# Patient Record
Sex: MALE | Race: WHITE | NOT HISPANIC OR LATINO | Employment: OTHER | URBAN - METROPOLITAN AREA
[De-identification: names, ages, dates, MRNs, and addresses within clinical notes are randomized per-mention and may not be internally consistent; named-entity substitution may affect disease eponyms.]

---

## 2017-01-06 ENCOUNTER — HOSPITAL ENCOUNTER (EMERGENCY)
Facility: CLINIC | Age: 52
Discharge: HOME | End: 2017-01-06
Attending: NURSE PRACTITIONER | Admitting: NURSE PRACTITIONER
Payer: COMMERCIAL

## 2017-01-06 VITALS
RESPIRATION RATE: 18 BRPM | SYSTOLIC BLOOD PRESSURE: 118 MMHG | OXYGEN SATURATION: 94 % | HEART RATE: 79 BPM | TEMPERATURE: 97.1 F | DIASTOLIC BLOOD PRESSURE: 108 MMHG | WEIGHT: 160 LBS | HEIGHT: 67 IN | BODY MASS INDEX: 25.11 KG/M2

## 2017-01-06 DIAGNOSIS — M54.2 CERVICALGIA: ICD-10-CM

## 2017-01-06 DIAGNOSIS — F11.93 OPIOID WITHDRAWAL (H): ICD-10-CM

## 2017-01-06 DIAGNOSIS — A08.4 VIRAL GASTROENTERITIS: ICD-10-CM

## 2017-01-06 LAB
ALBUMIN SERPL-MCNC: 3.8 G/DL (ref 3.4–5)
ALP SERPL-CCNC: 80 U/L (ref 40–150)
ALT SERPL W P-5'-P-CCNC: 26 U/L (ref 0–70)
ANION GAP SERPL CALCULATED.3IONS-SCNC: 9 MMOL/L (ref 3–14)
AST SERPL W P-5'-P-CCNC: 25 U/L (ref 0–45)
BASOPHILS # BLD AUTO: 0 10E9/L (ref 0–0.2)
BASOPHILS NFR BLD AUTO: 0.1 %
BILIRUB SERPL-MCNC: 0.7 MG/DL (ref 0.2–1.3)
BUN SERPL-MCNC: 15 MG/DL (ref 7–30)
CALCIUM SERPL-MCNC: 9.7 MG/DL (ref 8.5–10.1)
CHLORIDE SERPL-SCNC: 87 MMOL/L (ref 94–109)
CO2 SERPL-SCNC: 36 MMOL/L (ref 20–32)
CREAT SERPL-MCNC: 0.8 MG/DL (ref 0.66–1.25)
DIFFERENTIAL METHOD BLD: ABNORMAL
EOSINOPHIL # BLD AUTO: 0 10E9/L (ref 0–0.7)
EOSINOPHIL NFR BLD AUTO: 0 %
ERYTHROCYTE [DISTWIDTH] IN BLOOD BY AUTOMATED COUNT: 12.8 % (ref 10–15)
GFR SERPL CREATININE-BSD FRML MDRD: ABNORMAL ML/MIN/1.7M2
GLUCOSE SERPL-MCNC: 119 MG/DL (ref 70–99)
HCT VFR BLD AUTO: 45.3 % (ref 40–53)
HGB BLD-MCNC: 15.4 G/DL (ref 13.3–17.7)
IMM GRANULOCYTES # BLD: 0 10E9/L (ref 0–0.4)
IMM GRANULOCYTES NFR BLD: 0.2 %
LIPASE SERPL-CCNC: 142 U/L (ref 73–393)
LYMPHOCYTES # BLD AUTO: 1.2 10E9/L (ref 0.8–5.3)
LYMPHOCYTES NFR BLD AUTO: 6.9 %
MCH RBC QN AUTO: 30 PG (ref 26.5–33)
MCHC RBC AUTO-ENTMCNC: 34 G/DL (ref 31.5–36.5)
MCV RBC AUTO: 88 FL (ref 78–100)
MONOCYTES # BLD AUTO: 0.7 10E9/L (ref 0–1.3)
MONOCYTES NFR BLD AUTO: 3.9 %
NEUTROPHILS # BLD AUTO: 15.9 10E9/L (ref 1.6–8.3)
NEUTROPHILS NFR BLD AUTO: 88.9 %
PLATELET # BLD AUTO: 285 10E9/L (ref 150–450)
POTASSIUM SERPL-SCNC: 3.1 MMOL/L (ref 3.4–5.3)
PROT SERPL-MCNC: 8.9 G/DL (ref 6.8–8.8)
RBC # BLD AUTO: 5.14 10E12/L (ref 4.4–5.9)
SODIUM SERPL-SCNC: 132 MMOL/L (ref 133–144)
WBC # BLD AUTO: 17.9 10E9/L (ref 4–11)

## 2017-01-06 PROCEDURE — 83690 ASSAY OF LIPASE: CPT | Performed by: NURSE PRACTITIONER

## 2017-01-06 PROCEDURE — 99285 EMERGENCY DEPT VISIT HI MDM: CPT | Mod: 25

## 2017-01-06 PROCEDURE — 96375 TX/PRO/DX INJ NEW DRUG ADDON: CPT

## 2017-01-06 PROCEDURE — 96365 THER/PROPH/DIAG IV INF INIT: CPT

## 2017-01-06 PROCEDURE — 96361 HYDRATE IV INFUSION ADD-ON: CPT

## 2017-01-06 PROCEDURE — 85025 COMPLETE CBC W/AUTO DIFF WBC: CPT | Performed by: NURSE PRACTITIONER

## 2017-01-06 PROCEDURE — 25000125 ZZHC RX 250: Performed by: NURSE PRACTITIONER

## 2017-01-06 PROCEDURE — 80053 COMPREHEN METABOLIC PANEL: CPT | Performed by: NURSE PRACTITIONER

## 2017-01-06 PROCEDURE — 25000128 H RX IP 250 OP 636: Performed by: NURSE PRACTITIONER

## 2017-01-06 PROCEDURE — 99284 EMERGENCY DEPT VISIT MOD MDM: CPT | Performed by: NURSE PRACTITIONER

## 2017-01-06 RX ORDER — LIDOCAINE 40 MG/G
CREAM TOPICAL
Status: DISCONTINUED | OUTPATIENT
Start: 2017-01-06 | End: 2017-01-06 | Stop reason: HOSPADM

## 2017-01-06 RX ORDER — HYDROMORPHONE HYDROCHLORIDE 1 MG/ML
0.5 INJECTION, SOLUTION INTRAMUSCULAR; INTRAVENOUS; SUBCUTANEOUS
Status: DISCONTINUED | OUTPATIENT
Start: 2017-01-06 | End: 2017-01-06 | Stop reason: HOSPADM

## 2017-01-06 RX ORDER — POTASSIUM CHLORIDE 1500 MG/1
20 TABLET, EXTENDED RELEASE ORAL ONCE
Status: DISCONTINUED | OUTPATIENT
Start: 2017-01-06 | End: 2017-01-06

## 2017-01-06 RX ORDER — POTASSIUM CHLORIDE 7.45 MG/ML
10 INJECTION INTRAVENOUS ONCE
Status: DISCONTINUED | OUTPATIENT
Start: 2017-01-06 | End: 2017-01-06

## 2017-01-06 RX ORDER — BUPIVACAINE HYDROCHLORIDE 2.5 MG/ML
10 INJECTION, SOLUTION INFILTRATION; PERINEURAL ONCE
Status: COMPLETED | OUTPATIENT
Start: 2017-01-06 | End: 2017-01-06

## 2017-01-06 RX ORDER — POTASSIUM CHLORIDE 7.45 MG/ML
10 INJECTION INTRAVENOUS CONTINUOUS
Status: DISCONTINUED | OUTPATIENT
Start: 2017-01-06 | End: 2017-01-06 | Stop reason: HOSPADM

## 2017-01-06 RX ORDER — ONDANSETRON 2 MG/ML
4 INJECTION INTRAMUSCULAR; INTRAVENOUS EVERY 30 MIN PRN
Status: DISCONTINUED | OUTPATIENT
Start: 2017-01-06 | End: 2017-01-06 | Stop reason: HOSPADM

## 2017-01-06 RX ORDER — SODIUM CHLORIDE 9 MG/ML
1000 INJECTION, SOLUTION INTRAVENOUS CONTINUOUS
Status: DISCONTINUED | OUTPATIENT
Start: 2017-01-06 | End: 2017-01-06 | Stop reason: HOSPADM

## 2017-01-06 RX ADMIN — ONDANSETRON HYDROCHLORIDE 4 MG: 2 SOLUTION INTRAMUSCULAR; INTRAVENOUS at 17:46

## 2017-01-06 RX ADMIN — HYDROMORPHONE HYDROCHLORIDE 0.5 MG: 1 INJECTION, SOLUTION INTRAMUSCULAR; INTRAVENOUS; SUBCUTANEOUS at 17:49

## 2017-01-06 RX ADMIN — BUPIVACAINE HYDROCHLORIDE 25 MG: 2.5 INJECTION, SOLUTION INFILTRATION; PERINEURAL at 19:22

## 2017-01-06 RX ADMIN — HYDROMORPHONE HYDROCHLORIDE 0.5 MG: 1 INJECTION, SOLUTION INTRAMUSCULAR; INTRAVENOUS; SUBCUTANEOUS at 18:18

## 2017-01-06 RX ADMIN — SODIUM CHLORIDE 1000 ML: 9 INJECTION, SOLUTION INTRAVENOUS at 19:23

## 2017-01-06 RX ADMIN — POTASSIUM CHLORIDE 10 MEQ: 7.46 INJECTION, SOLUTION INTRAVENOUS at 19:28

## 2017-01-06 RX ADMIN — SODIUM CHLORIDE 1000 ML: 9 INJECTION, SOLUTION INTRAVENOUS at 17:41

## 2017-01-06 ASSESSMENT — ENCOUNTER SYMPTOMS
NAUSEA: 1
DIARRHEA: 0
VOMITING: 1
SORE THROAT: 1
HEADACHES: 1
ABDOMINAL PAIN: 1

## 2017-01-06 NOTE — ED NOTES
Pt here with nausea and vomiting for 2-3 days and then a migraine. Unable to keep his chronic pain meds down.

## 2017-01-06 NOTE — ED PROVIDER NOTES
"  History     Chief Complaint   Patient presents with     Headache     The history is provided by the patient and a relative.     Joselito Abarca is a 51 year old male who presents to the emergency department with a headache and vomiting. He states that for the last couple of days he has been \"power vomiting\" and has been unable to keep anything down including his pain medication. His relative notes he has acid burns on his lips and mouth from how much he has vomited. Patient reports being in \"so much pain\" and has a \"killer headache\". He states that he gets trigger point injections when his head pain is this bad. Patient's last emesis was around 1100 today which is when his relative states he took nausea medication that he had forgotten that he had. Patient complains of abdominal pain and throat pain because much of his vomit has been acid. Patient says he needs IV pain medication \"in case of withdrawal\" because he has not been able to take his pain medication for over 2 days. He notes a history of ulcers but denies diarrhea or history of migraines.  Patient lives alone so no one else is sick around him.    I have reviewed the Medications, Allergies, Past Medical and Surgical History, and Social History in the Epic system.    Patient Active Problem List   Diagnosis     Generalized anxiety disorder     Alcohol abuse, in remission     Esophageal reflux     Constipation     Thoracic or lumbosacral neuritis or radiculitis, unspecified     Intervertebral cervical disc disorder with myelopathy, cervical region     CARDIOVASCULAR SCREENING; LDL GOAL LESS THAN 160     Arthrodesis status     Neck pain     Health Care Home     Degeneration of cervical intervertebral disc     Chronic rhinitis     Disease of bronchial airway (HCC)     Leukocytosis     Tobacco use disorder     Cough     Nausea with vomiting     Acute respiratory failure with hypoxia (H)     Chronic pain     Moderate persistent asthma without complication     " Chronic pain syndrome     Past Medical History   Diagnosis Date     GERD (gastroesophageal reflux disease)      Anxiety      Neck pain 6/15/2011       Past Surgical History   Procedure Laterality Date     Hc drain/inj major joint/bursa w/o us  5/5/2008     Left sacroiliac joint injection.     Hc inj epidural lumbar/sacral w/wo contrast  2009     Fusion cervical anterior two levels  1/29/2010     Hernia repair       Discectomy lumbar posterior microscopic one level  2/21/2012     Procedure:DISCECTOMY LUMBAR POSTERIOR MICROSCOPIC ONE LEVEL; LEFT T1-T2 THORASIC HEMILAMINECTOMY MICRODISCECTOMY WITH MEXTRIX II ; Surgeon:SHARON PURI; Location:[facility] OR     Head & neck surgery       2013     Inject facet joint Bilateral 5/27/2015     Procedure: INJECT FACET JOINT;  Surgeon: Ronald Driscoll MD;  Location: [facility] OR     Inject block medial branch cervical/thoracic/lumbar Bilateral 8/26/2015     Procedure: INJECT BLOCK MEDIAL BRANCH CERVICAL / THORACIC / LUMBAR;  Surgeon: Ronald Driscoll MD;  Location: [facility] OR       Family History   Problem Relation Age of Onset     Family History Negative No family hx of      C.A.D. No family hx of        Social History   Substance Use Topics     Smoking status: Passive Smoke Exposure - Never Smoker     Types: Cigars     Last Attempt to Quit: 12/19/2009     Smokeless tobacco: Never Used     Alcohol Use: No      Comment: HX OF ABUSE-IN REMISSION        Immunization History   Administered Date(s) Administered     Influenza (IIV3) 01/28/2013     Influenza Vaccine IM 3yrs+ 4 Valent IIV4 02/16/2016     Pneumococcal 23 valent 04/28/2016     TDAP (ADACEL AGES 11-64) 04/16/2009          Allergies   Allergen Reactions     Vicodin [Hydrocodone-Acetaminophen] Nausea     HEADACHE       Current Outpatient Prescriptions   Medication Sig Dispense Refill     fluticasone (FLONASE) 50 MCG/ACT spray USE TWO SPRAYS IN EACH NOSTRIL EVERY DAY 16 g 5     oxyCODONE (OXY-IR) 5 MG capsule Take 2 capsules (10 mg) by  mouth every 4 hours as needed 2 caps q 4 hour prn pain up to 12 per day May fill 5/24/2012 360 capsule 0     methadone (DOLOPHINE) 10 MG tablet Take 1 tablet (10 mg) by mouth every 8 hours as needed 90 tablet 0     clonazePAM (KLONOPIN) 0.5 MG tablet Take 0.5-1 tablets (0.25-0.5 mg) by mouth 3 times daily as needed for anxiety 90 tablet 0     doxycycline (VIBRA-TABS) 100 MG tablet Take 1 tablet (100 mg) by mouth 2 times daily 20 tablet 0     clotrimazole 10 MG ros Place 1 Ros (10 mg) inside cheek 5 times daily 70 Ros 0     PARoxetine (PAXIL) 10 MG tablet Take 1 tablet (10 mg) by mouth At Bedtime 30 tablet 3     ipratropium - albuterol 0.5 mg/2.5 mg/3 mL (DUONEB) 0.5-2.5 (3) MG/3ML nebulization NEBULIZE CONTENTS OF ONE VIAL BY MOUTH EVERY 4 HOURS AS NEEDED FOR SHORTNESS OF BREATH / DYSPNEA OR WHEEZING 180 mL 3     albuterol (2.5 MG/3ML) 0.083% nebulizer solution NEBULIZE CONTENTS OF ONE VIAL EVERY 4 HOURS AS NEEDED FOR SHORTNESS OF BREATH /DYSPNEA OR WHEEZING 90 mL 0     gabapentin (NEURONTIN) 300 MG capsule TAKE TWO CAPSULES BY MOUTH THREE TIMES A  capsule 11     fluticasone (FLOVENT HFA) 220 MCG/ACT inhaler Inhale 2 puffs into the lungs 2 times daily 3 Inhaler 1     omeprazole (PRILOSEC) 20 MG capsule TAKE 1 CAPSULE BY MOUTH DAILY, 30 TO 60 MINUTES BEFORE A MEAL. 30 capsule 11     VENTOLIN  (90 BASE) MCG/ACT inhaler INHALE 2 PUFFS INTO THE LUNGS EVERY 6 HOURS AS NEEDED FOR SHORTNESS OF BREATH 18 g 0     ranitidine (ZANTAC) 150 MG tablet TAKE ONE TABLET BY MOUTH EVERY MORNING 30 tablet 11     Cholecalciferol (VITAMIN D) 2000 UNITS tablet Take 1 tablet by mouth daily 100 tablet 3     ibuprofen (ADVIL,MOTRIN) 200 MG tablet Take 3 tablets (600 mg) by mouth every 6 hours as needed for other (mild pain)       escitalopram (LEXAPRO) 10 MG tablet Take 10 mg by mouth daily        zolpidem (AMBIEN) 10 MG tablet Take 10 mg by mouth nightly as needed        clonazePAM (KLONOPIN) 0.5 MG tablet Take 1  "tablet by mouth 3 times daily as needed for anxiety. 20 tablet 0     traZODone (DESYREL) 100 MG tablet Take 3 tablets (300 mg) by mouth At Bedtime 90 tablet 3     Review of Systems   HENT: Positive for sore throat.    Gastrointestinal: Positive for nausea, vomiting and abdominal pain. Negative for diarrhea.   Neurological: Positive for headaches.   All other systems reviewed and are negative.      Physical Exam   BP: 98/84 mmHg  Pulse: 79  Temp: 97.1  F (36.2  C)  Resp: 16  Height: 170.2 cm (5' 7\")  Weight: 72.576 kg (160 lb)  SpO2: 91 %  Physical Exam   Constitutional: He is oriented to person, place, and time. He appears well-developed.   HENT:   Head: Normocephalic and atraumatic.   Mouth/Throat: Mucous membranes are dry (moderately).   Eyes: Conjunctivae and EOM are normal.   Neck: Normal range of motion.   Musculoskeletal: Normal range of motion.   Neurological: He is alert and oriented to person, place, and time.   Skin: Skin is warm. There is pallor.   Clammy   Psychiatric: He has a normal mood and affect. His behavior is normal.   Nursing note and vitals reviewed.      ED Course   Procedures    Results for orders placed or performed during the hospital encounter of 01/06/17 (from the past 24 hour(s))   CBC with platelets differential   Result Value Ref Range    WBC 17.9 (H) 4.0 - 11.0 10e9/L    RBC Count 5.14 4.4 - 5.9 10e12/L    Hemoglobin 15.4 13.3 - 17.7 g/dL    Hematocrit 45.3 40.0 - 53.0 %    MCV 88 78 - 100 fl    MCH 30.0 26.5 - 33.0 pg    MCHC 34.0 31.5 - 36.5 g/dL    RDW 12.8 10.0 - 15.0 %    Platelet Count 285 150 - 450 10e9/L    Diff Method Automated Method     % Neutrophils 88.9 %    % Lymphocytes 6.9 %    % Monocytes 3.9 %    % Eosinophils 0.0 %    % Basophils 0.1 %    % Immature Granulocytes 0.2 %    Absolute Neutrophil 15.9 (H) 1.6 - 8.3 10e9/L    Absolute Lymphocytes 1.2 0.8 - 5.3 10e9/L    Absolute Monocytes 0.7 0.0 - 1.3 10e9/L    Absolute Eosinophils 0.0 0.0 - 0.7 10e9/L    Absolute " Basophils 0.0 0.0 - 0.2 10e9/L    Abs Immature Granulocytes 0.0 0 - 0.4 10e9/L   Comprehensive metabolic panel   Result Value Ref Range    Sodium 132 (L) 133 - 144 mmol/L    Potassium 3.1 (L) 3.4 - 5.3 mmol/L    Chloride 87 (L) 94 - 109 mmol/L    Carbon Dioxide 36 (H) 20 - 32 mmol/L    Anion Gap 9 3 - 14 mmol/L    Glucose 119 (H) 70 - 99 mg/dL    Urea Nitrogen 15 7 - 30 mg/dL    Creatinine 0.80 0.66 - 1.25 mg/dL    GFR Estimate >90  Non  GFR Calc   >60 mL/min/1.7m2    GFR Estimate If Black >90   GFR Calc   >60 mL/min/1.7m2    Calcium 9.7 8.5 - 10.1 mg/dL    Bilirubin Total 0.7 0.2 - 1.3 mg/dL    Albumin 3.8 3.4 - 5.0 g/dL    Protein Total 8.9 (H) 6.8 - 8.8 g/dL    Alkaline Phosphatase 80 40 - 150 U/L    ALT 26 0 - 70 U/L    AST 25 0 - 45 U/L   Lipase   Result Value Ref Range    Lipase 142 73 - 393 U/L       Medications   lidocaine 1 % 1 mL (not administered)   lidocaine (LMX4) kit (not administered)   sodium chloride (PF) 0.9% PF flush 3 mL (not administered)   sodium chloride (PF) 0.9% PF flush 3 mL (not administered)   0.9% sodium chloride BOLUS (not administered)     Followed by   0.9% sodium chloride BOLUS (not administered)     Followed by   0.9% sodium chloride infusion (not administered)   ondansetron (ZOFRAN) injection 4 mg (not administered)   HYDROmorphone (PF) (DILAUDID) injection 0.5 mg (not administered)     Assessments & Plan (with Medical Decision Making)  Joselito is a 51-year-old male with a history of generalized anxiety, and chronic pain who presents to the emergency department today with complaints of nausea, vomiting, opioid withdrawal and right lateral neck pain.  Patient is on chronic opiates at home for his chronic pain syndrome, however, given his vomiting the last 36 hours he has been unable to keep any of these down.  Patient denies any diarrhea, he denies any fever.  It is likely the patient has a viral gastroenteritis and start of opioid withdrawal.   Peripheral IV was established and patient was given IV fluids as well as a dose of Dilaudid and Zofran here with improvement in symptoms.  Patient is requesting a trigger point injection for his right occiput and right lateral neck pain, this was done with 10 mL of 0.25% Marcaine with good relief and no adverse reactions.  CBC was obtained which shows a white count of 17.9 with a left shift, comprehensive panel returns with a sodium of 132, potassium of 3.1, a chloride of 87 and a carbon dioxide of 36.  Patient was given 10 mEq of potassium replacement IV.  Remaining CMP and lipase are unremarkable.  Patient received 1 L of normal saline here and is requesting to be discharged home as he is feeling much better.  Patient does have ODT Zofran at home which he can use as needed, I did encourage patient to follow-up with his primary care provider in the next week, reasons to return to the emergency department were discussed, patient is agreeable to plan of care, questions were answered prior to discharge.    Patient discharged from the emergency department with his friend driving and in stable condition.       I have reviewed the nursing notes.    I have reviewed the findings, diagnosis, plan and need for follow up with the patient.    Discharge Medication List as of 1/6/2017  8:36 PM          Final diagnoses:   Viral gastroenteritis   Opioid withdrawal (H)   Cervicalgia     This document serves as a record of services personally performed by Ernestine Kinsey AP*. It was created on their behalf by Giovanna Khan, a trained medical scribe. The creation of this record is based on the provider's personal observations and the statements of the patient. This document has been checked and approved by the attending provider.     Note: Chart documentation done in part with Dragon Voice Recognition software. Although reviewed after completion, some word and grammatical errors may remain.    1/6/2017   [facility]  EMERGENCY DEPARTMENT      Ernestine Kinsey, CORA CNP  01/06/17 [id number]

## 2017-01-06 NOTE — ED AVS SNAPSHOT
[facility] Emergency Department    [address] [place] [address]    Phone:  [phone]    Fax:  [phone]                                       Joselito Abarca   MRN: [id number]    Department:  [facility] Emergency Department   Date of Visit:  1/6/2017           After Visit Summary Signature Page     I have received my discharge instructions, and my questions have been answered. I have discussed any challenges I see with this plan with the nurse or doctor.    ..........................................................................................................................................  Patient/Patient Representative Signature      ..........................................................................................................................................  Patient Representative Print Name and Relationship to Patient    ..................................................               ................................................  Date                                            Time    ..........................................................................................................................................  Reviewed by Signature/Title    ...................................................              ..............................................  Date                                                            Time

## 2017-01-06 NOTE — ED AVS SNAPSHOT
[facility] Emergency Department    [address] [place] [address]    Phone:  [phone]    Fax:  [phone]                                       Joselito Abarca   MRN: [id number]    Department:  [facility] Emergency Department   Date of Visit:  1/6/2017           Patient Information     Date Of Birth          1965        Your diagnoses for this visit were:     Viral gastroenteritis     Opioid withdrawal (H)     Cervicalgia        You were seen by Ernestine Kinsey APRN CNP.      Follow-up Information     Follow up with Schoen, Gregory G, MD In 1 week.    Specialty:  Family Practice    Why:  As needed    Contact information:    [facility]  [address]   [place] [address] [phone]          Follow up with [facility] Emergency Department.    Specialty:  EMERGENCY MEDICINE    Why:  If symptoms worsen    Contact information:    [address] [place] [address] [phone]    Additional information:    From [address]: Exit at [address] on [address] side of [address]. Turn right on [address]. Turn left at stoplight on [address]. [facility] will be in view two blocks ahead        Discharge Instructions         Viral Gastroenteritis (Adult)    Gastroenteritis is commonly called the stomach flu. It is most often caused by a virus that affects the stomach and intestinal tract and usually lasts from 2 to 7 days. Common viruses causing gastroenteritis include norovirus, rotavirus, and hepatitis A. Non-viral causes of gastroenteritis include bacteria, parasites, and toxins.  The danger from repeated vomiting or diarrhea is dehydration. This is the loss of too much fluid from the body. When this occurs, body fluids must be replaced. Antibiotics do not help with this illness because it is usually viral.Simple home treatment will be helpful.  Symptoms of viral gastroenteritis may include:    Watery, loose  stools    Stomach pain or abdominal cramps    Fever and chills    Nausea and vomiting    Loss of bowel control    Headache  Home care  Gastroenteritis is transmitted by contact with the stool or vomit of an infected person. This can occur from person to person or from contact with a contaminated surface.  Follow these guidelines when caring for yourself at home:    If symptoms are severe, rest at home for the next 24 hours or until you are feeling better.    Wash your hands with soap and water or use alcohol-based  to prevent the spread of infection. Wash your hands after touching anyone who is sick.    Wash your hands or use alcohol-based  after using the toilet and before meals. Clean the toilet after each use.  Remember these tips when preparing food:    People with diarrhea should not prepare or serve food to others. When preparing foods, wash your hands before and after.    Wash your hands after using cutting boards, countertops, knives, or utensils that have been in contact with raw food.    Keep uncooked meats away from cooked and ready-to-eat foods.  Medicine  You may use acetaminophen or NSAID medicines like ibuprofen or naproxen to control fever unless another medicine was given. If you have chronic liver or kidney disease, talk with your healthcare provider before using these medicines. Also talk with your provider if you've had a stomach ulcer or gastrointestinal bleeding. Don't give aspirin to anyone under 18 years of age who is ill with a fever. It may cause severe liver damage. Don't use NSAIDS is you are already taking one for another condition (like arthritis) or are on aspirin (such as for heart disease or after a stroke).  If medicine for vomiting or diarrhea are prescribed, take these only as directed. Do not take over-the-counter medicines for vomiting or diarrhea unless instructed by your healthcare provider.  Diet  Follow these guidelines for food:    Water and liquids are  important so you don't get dehydrated. Drink a small amount at a time or suck on ice chips if you are vomiting.    If you eat, avoid fatty, greasy, spicy, or fried foods.    Don't eat dairy if you have diarrhea. This can make diarrhea worse.    Avoid tobacco, alcohol, and caffeine which may worsen symptoms.  During the first 24 hours (the first full day), follow the diet below:    Beverages. Sports drinks, soft drinks without caffeine, ginger ale, mineral water (plain or flavored), decaffeinated tea and coffee. If you are very dehydrated, sports drinks aren't a good choice. They have too much sugar and not enough electrolytes. In this case, commercially available products called oral rehydration solutions, are best.    Soups. Eat clear broth, consommé, and bouillon.    Desserts. Eat gelatin, popsicles, and fruit juice bars.  During the next 24 hours (the second day), you may add the following to the above:    Hot cereal, plain toast, bread, rolls, and crackers    Plain noodles, rice, mashed potatoes, chicken noodle or rice soup    Unsweetened canned fruit (avoid pineapple), bananas    Limit fat intake to less than 15 grams per day. Do this by avoiding margarine, butter, oils, mayonnaise, sauces, gravies, fried foods, peanut butter, meat, poultry, and fish.    Limit fiber and avoid raw or cooked vegetables, fresh fruits (except bananas), and bran cereals.    Limit caffeine and chocolate. Don't use spices or seasonings other than salt.    Limit dairy products.    Avoid alcohol.  During the next 24 hours:    Gradually resume a normal diet as you feel better and your symptoms improve.    If at any time it starts getting worse again, go back to clear liquids until you feel better.  Follow-up care  Follow up with your healthcare provider, or as advised. Call your provider if you don't get better within 24 hours or if diarrhea lasts more than a week. Also follow up if you are unable to keep down liquids and get dehydrated.  If a stool (diarrhea) sample was taken, call as directed for the results.  Call 911  Call 911 if any of these occur:    Trouble breathing    Chest pain    Confused    Severe drowsiness or trouble awakening    Fainting or loss of consciousness    Rapid heart rate    Seizure    Stiff neck  When to seek medical advice  Call your healthcare provider right away if any of these occur:    Abdominal pain that gets worse    Continued vomiting (unable to keep liquids down)    Frequent diarrhea (more than 5 times a day)    Blood in vomit or stool (black or red color)    Dark urine, reduced urine output, or extreme thirst    Weakness or dizziness    Drowsiness    Fever of 100.4 F (38 C) oral or higher that does not get better with fever medicine    New rash    8162-8511 [employer]. [address] [place] [address], [place] [address]. All rights reserved. This information is not intended as a substitute for professional medical care. Always follow your healthcare professional's instructions.          24 Hour Appointment Hotline       To make an appointment at any [facility], call [phone] ([phone]). If you don't have a family doctor or clinic, we will help you find one. [facility] clinics are conveniently located to serve the needs of you and your family.             Review of your medicines      Our records show that you are taking the medicines listed below. If these are incorrect, please call your family doctor or clinic.        Dose / Directions Last dose taken    * clonazePAM 0.5 MG tablet   Commonly known as:  klonoPIN   Dose:  0.5 mg   Quantity:  20 tablet        Take 1 tablet by mouth 3 times daily as needed for anxiety.   Refills:  0        * clonazePAM 0.5 MG tablet   Commonly known as:  klonoPIN   Dose:  0.25-0.5 mg   Quantity:  90 tablet        Take 0.5-1 tablets (0.25-0.5 mg) by mouth 3 times daily as needed for anxiety   Refills:  0        clotrimazole 10 MG ros   Dose:  1 Ros   Quantity:   70 Ros        Place 1 Ros (10 mg) inside cheek 5 times daily   Refills:  0        doxycycline 100 MG tablet   Commonly known as:  VIBRA-TABS   Dose:  100 mg   Quantity:  20 tablet        Take 1 tablet (100 mg) by mouth 2 times daily   Refills:  0        escitalopram 10 MG tablet   Commonly known as:  LEXAPRO   Dose:  10 mg   Indication:  Depression        Take 10 mg by mouth daily   Refills:  0        fluticasone 220 MCG/ACT Inhaler   Commonly known as:  FLOVENT HFA   Dose:  2 puff   Quantity:  3 Inhaler        Inhale 2 puffs into the lungs 2 times daily   Refills:  1        fluticasone 50 MCG/ACT spray   Commonly known as:  FLONASE   Quantity:  16 g        USE TWO SPRAYS IN EACH NOSTRIL EVERY DAY   Refills:  5        gabapentin 300 MG capsule   Commonly known as:  NEURONTIN   Quantity:  270 capsule        TAKE TWO CAPSULES BY MOUTH THREE TIMES A DAY   Refills:  11        ibuprofen 200 MG tablet   Commonly known as:  ADVIL/MOTRIN   Dose:  600 mg        Take 3 tablets (600 mg) by mouth every 6 hours as needed for other (mild pain)   Refills:  0        ipratropium - albuterol 0.5 mg/2.5 mg/3 mL 0.5-2.5 (3) MG/3ML neb solution   Commonly known as:  DUONEB   Quantity:  180 mL        NEBULIZE CONTENTS OF ONE VIAL BY MOUTH EVERY 4 HOURS AS NEEDED FOR SHORTNESS OF BREATH / DYSPNEA OR WHEEZING   Refills:  3        methadone 10 MG tablet   Commonly known as:  DOLOPHINE   Dose:  10 mg   Quantity:  90 tablet        Take 1 tablet (10 mg) by mouth every 8 hours as needed   Refills:  0        omeprazole 20 MG CR capsule   Commonly known as:  priLOSEC   Quantity:  30 capsule        TAKE 1 CAPSULE BY MOUTH DAILY, 30 TO 60 MINUTES BEFORE A MEAL.   Refills:  11        oxyCODONE 5 MG capsule   Commonly known as:  OXY-IR   Dose:  10 mg   Quantity:  360 capsule        Take 2 capsules (10 mg) by mouth every 4 hours as needed 2 caps q 4 hour prn pain up to 12 per day May fill 5/24/2012   Refills:  0        PARoxetine 10 MG tablet    Commonly known as:  PAXIL   Dose:  10 mg   Quantity:  30 tablet        Take 1 tablet (10 mg) by mouth At Bedtime   Refills:  3        ranitidine 150 MG tablet   Commonly known as:  ZANTAC   Quantity:  30 tablet        TAKE ONE TABLET BY MOUTH EVERY MORNING   Refills:  11        traZODone 100 MG tablet   Commonly known as:  DESYREL   Dose:  300 mg   Quantity:  90 tablet        Take 3 tablets (300 mg) by mouth At Bedtime   Refills:  3        * VENTOLIN  (90 BASE) MCG/ACT Inhaler   Quantity:  18 g   Generic drug:  albuterol        INHALE 2 PUFFS INTO THE LUNGS EVERY 6 HOURS AS NEEDED FOR SHORTNESS OF BREATH   Refills:  0        * albuterol (2.5 MG/3ML) 0.083% neb solution   Quantity:  90 mL        NEBULIZE CONTENTS OF ONE VIAL EVERY 4 HOURS AS NEEDED FOR SHORTNESS OF BREATH /DYSPNEA OR WHEEZING   Refills:  0        vitamin D 2000 UNITS tablet   Dose:  1 tablet   Quantity:  100 tablet        Take 1 tablet by mouth daily   Refills:  3        zolpidem 10 MG tablet   Commonly known as:  AMBIEN   Dose:  10 mg        Take 10 mg by mouth nightly as needed   Refills:  0        * Notice:  This list has 4 medication(s) that are the same as other medications prescribed for you. Read the directions carefully, and ask your doctor or other care provider to review them with you.            Procedures and tests performed during your visit     CBC with platelets differential    Comprehensive metabolic panel    Lipase    Peripheral IV catheter      Orders Needing Specimen Collection     None      Pending Results     No orders found from 1/5/2017 to 1/7/2017.            Pending Culture Results     No orders found from 1/5/2017 to 1/7/2017.            Thank you for choosing [facility]       Thank you for choosing [facility] for your care. Our goal is always to provide you with excellent care. Hearing back from our patients is one way we can continue to improve our services. Please take a few minutes to complete the written survey  "that you may receive in the mail after you visit with us. Thank you!        [employer] Information     [employer] lets you send messages to your doctor, view your test results, renew your prescriptions, schedule appointments and more. To sign up, go to [email or web address] . Click on \"Log in\" on the left side of the screen, which will take you to the Welcome page. Then click on \"Sign up Now\" on the right side of the page.     You will be asked to enter the access code listed below, as well as some personal information. Please follow the directions to create your username and password.     Your access code is: [email or web address]  Expires: 2017  2:11 PM     Your access code will  in 90 days. If you need help or a new code, please call your [facility] clinic or [phone].        Care EveryWhere ID     This is your Care EveryWhere ID. This could be used by other organizations to access your [facility] medical records  [id number]        After Visit Summary       This is your record. Keep this with you and show to your community pharmacist(s) and doctor(s) at your next visit.                  "

## 2017-01-07 NOTE — DISCHARGE INSTRUCTIONS
Viral Gastroenteritis (Adult)    Gastroenteritis is commonly called the stomach flu. It is most often caused by a virus that affects the stomach and intestinal tract and usually lasts from 2 to 7 days. Common viruses causing gastroenteritis include norovirus, rotavirus, and hepatitis A. Non-viral causes of gastroenteritis include bacteria, parasites, and toxins.  The danger from repeated vomiting or diarrhea is dehydration. This is the loss of too much fluid from the body. When this occurs, body fluids must be replaced. Antibiotics do not help with this illness because it is usually viral.Simple home treatment will be helpful.  Symptoms of viral gastroenteritis may include:    Watery, loose stools    Stomach pain or abdominal cramps    Fever and chills    Nausea and vomiting    Loss of bowel control    Headache  Home care  Gastroenteritis is transmitted by contact with the stool or vomit of an infected person. This can occur from person to person or from contact with a contaminated surface.  Follow these guidelines when caring for yourself at home:    If symptoms are severe, rest at home for the next 24 hours or until you are feeling better.    Wash your hands with soap and water or use alcohol-based  to prevent the spread of infection. Wash your hands after touching anyone who is sick.    Wash your hands or use alcohol-based  after using the toilet and before meals. Clean the toilet after each use.  Remember these tips when preparing food:    People with diarrhea should not prepare or serve food to others. When preparing foods, wash your hands before and after.    Wash your hands after using cutting boards, countertops, knives, or utensils that have been in contact with raw food.    Keep uncooked meats away from cooked and ready-to-eat foods.  Medicine  You may use acetaminophen or NSAID medicines like ibuprofen or naproxen to control fever unless another medicine was given. If you have chronic  liver or kidney disease, talk with your healthcare provider before using these medicines. Also talk with your provider if you've had a stomach ulcer or gastrointestinal bleeding. Don't give aspirin to anyone under 18 years of age who is ill with a fever. It may cause severe liver damage. Don't use NSAIDS is you are already taking one for another condition (like arthritis) or are on aspirin (such as for heart disease or after a stroke).  If medicine for vomiting or diarrhea are prescribed, take these only as directed. Do not take over-the-counter medicines for vomiting or diarrhea unless instructed by your healthcare provider.  Diet  Follow these guidelines for food:    Water and liquids are important so you don't get dehydrated. Drink a small amount at a time or suck on ice chips if you are vomiting.    If you eat, avoid fatty, greasy, spicy, or fried foods.    Don't eat dairy if you have diarrhea. This can make diarrhea worse.    Avoid tobacco, alcohol, and caffeine which may worsen symptoms.  During the first 24 hours (the first full day), follow the diet below:    Beverages. Sports drinks, soft drinks without caffeine, ginger ale, mineral water (plain or flavored), decaffeinated tea and coffee. If you are very dehydrated, sports drinks aren't a good choice. They have too much sugar and not enough electrolytes. In this case, commercially available products called oral rehydration solutions, are best.    Soups. Eat clear broth, consommé, and bouillon.    Desserts. Eat gelatin, popsicles, and fruit juice bars.  During the next 24 hours (the second day), you may add the following to the above:    Hot cereal, plain toast, bread, rolls, and crackers    Plain noodles, rice, mashed potatoes, chicken noodle or rice soup    Unsweetened canned fruit (avoid pineapple), bananas    Limit fat intake to less than 15 grams per day. Do this by avoiding margarine, butter, oils, mayonnaise, sauces, gravies, fried foods, peanut  butter, meat, poultry, and fish.    Limit fiber and avoid raw or cooked vegetables, fresh fruits (except bananas), and bran cereals.    Limit caffeine and chocolate. Don't use spices or seasonings other than salt.    Limit dairy products.    Avoid alcohol.  During the next 24 hours:    Gradually resume a normal diet as you feel better and your symptoms improve.    If at any time it starts getting worse again, go back to clear liquids until you feel better.  Follow-up care  Follow up with your healthcare provider, or as advised. Call your provider if you don't get better within 24 hours or if diarrhea lasts more than a week. Also follow up if you are unable to keep down liquids and get dehydrated. If a stool (diarrhea) sample was taken, call as directed for the results.  Call 911  Call 911 if any of these occur:    Trouble breathing    Chest pain    Confused    Severe drowsiness or trouble awakening    Fainting or loss of consciousness    Rapid heart rate    Seizure    Stiff neck  When to seek medical advice  Call your healthcare provider right away if any of these occur:    Abdominal pain that gets worse    Continued vomiting (unable to keep liquids down)    Frequent diarrhea (more than 5 times a day)    Blood in vomit or stool (black or red color)    Dark urine, reduced urine output, or extreme thirst    Weakness or dizziness    Drowsiness    Fever of 100.4 F (38 C) oral or higher that does not get better with fever medicine    New rash    4387-5254 [employer]. [address] [place] [address], [place] [address]. All rights reserved. This information is not intended as a substitute for professional medical care. Always follow your healthcare professional's instructions.

## 2017-01-13 DIAGNOSIS — F41.1 GENERALIZED ANXIETY DISORDER: Primary | ICD-10-CM

## 2017-01-13 RX ORDER — CLONAZEPAM 0.5 MG/1
0.25-0.5 TABLET ORAL 3 TIMES DAILY PRN
Qty: 90 TABLET | Refills: 0 | Status: SHIPPED | OUTPATIENT
Start: 2017-01-13 | End: 2017-02-10

## 2017-01-13 NOTE — TELEPHONE ENCOUNTER
Clonazepam       Last Written Prescription Date: 12/12/2016  Last Fill Quantity: 90,  # refills: 0   Last Office Visit with [facility], [facility] or [facility] prescribing provider: 12/12/2016    Thanks  Dorothy Tony [facility]   [phone]

## 2017-01-24 DIAGNOSIS — M50.30 DDD (DEGENERATIVE DISC DISEASE), CERVICAL: Primary | ICD-10-CM

## 2017-01-24 DIAGNOSIS — M50.00 INTERVERTEBRAL CERVICAL DISC DISORDER WITH MYELOPATHY, CERVICAL REGION: ICD-10-CM

## 2017-01-25 NOTE — TELEPHONE ENCOUNTER
Methadone,  Oxycodone     Last Written Prescription Date: 12/23/16  Last Fill Quantity: 1 month,  # refills: 0   Last Office Visit with [facility], [facility] or [facility] prescribing provider: 12/12/16

## 2017-01-26 RX ORDER — OXYCODONE HYDROCHLORIDE 5 MG/1
10 CAPSULE ORAL EVERY 4 HOURS PRN
Qty: 360 CAPSULE | Refills: 0 | Status: SHIPPED | OUTPATIENT
Start: 2017-01-26 | End: 2017-02-25

## 2017-01-26 RX ORDER — METHADONE HYDROCHLORIDE 10 MG/1
10 TABLET ORAL EVERY 8 HOURS PRN
Qty: 90 TABLET | Refills: 0 | Status: SHIPPED | OUTPATIENT
Start: 2017-01-26 | End: 2017-02-25

## 2017-02-10 DIAGNOSIS — F41.1 GENERALIZED ANXIETY DISORDER: Primary | ICD-10-CM

## 2017-02-11 NOTE — TELEPHONE ENCOUNTER
clonazepam  Last Written Prescription Date: 1/13/17  Last Fill Quantity: 90,  # refills: 0   Last Office Visit with [facility], [facility] or [facility] prescribing provider: 12/12/16      Kandi Negrete  [facility].  [facility]  [phone]

## 2017-02-13 RX ORDER — CLONAZEPAM 0.5 MG/1
0.25-0.5 TABLET ORAL 3 TIMES DAILY PRN
Qty: 90 TABLET | Refills: 0 | Status: SHIPPED | OUTPATIENT
Start: 2017-02-13 | End: 2017-03-11

## 2017-02-25 DIAGNOSIS — M50.00 INTERVERTEBRAL CERVICAL DISC DISORDER WITH MYELOPATHY, CERVICAL REGION: ICD-10-CM

## 2017-02-25 DIAGNOSIS — M50.30 DDD (DEGENERATIVE DISC DISEASE), CERVICAL: ICD-10-CM

## 2017-02-25 NOTE — TELEPHONE ENCOUNTER
Methadone     Last Written Prescription Date: 1/26/17  Last Fill Quantity: 90,  # refills: 0   Last Office Visit with [facility] or [facility] prescribing provider: 12/12/16    Oxycodone   Last Written Prescription Date: 1/26/17  Last Fill Quantity: 360,  # refills: 0   Last Office Visit with [facility], [facility] or [facility] prescribing provider: 12/12/16

## 2017-02-27 RX ORDER — METHADONE HYDROCHLORIDE 10 MG/1
10 TABLET ORAL EVERY 8 HOURS PRN
Qty: 90 TABLET | Refills: 0 | Status: SHIPPED | OUTPATIENT
Start: 2017-02-27 | End: 2017-03-25

## 2017-02-27 RX ORDER — OXYCODONE HYDROCHLORIDE 5 MG/1
10 CAPSULE ORAL EVERY 4 HOURS PRN
Qty: 360 CAPSULE | Refills: 0 | Status: SHIPPED | OUTPATIENT
Start: 2017-02-27 | End: 2017-03-25

## 2017-03-06 ENCOUNTER — HOSPITAL ENCOUNTER (EMERGENCY)
Facility: CLINIC | Age: 52
Discharge: HOME | End: 2017-03-06
Attending: FAMILY MEDICINE | Admitting: FAMILY MEDICINE
Payer: COMMERCIAL

## 2017-03-06 VITALS
WEIGHT: 165 LBS | DIASTOLIC BLOOD PRESSURE: 86 MMHG | OXYGEN SATURATION: 96 % | RESPIRATION RATE: 16 BRPM | BODY MASS INDEX: 25.84 KG/M2 | SYSTOLIC BLOOD PRESSURE: 132 MMHG | HEART RATE: 84 BPM | TEMPERATURE: 97.1 F

## 2017-03-06 DIAGNOSIS — M54.2 TRIGGER POINT WITH NECK PAIN: ICD-10-CM

## 2017-03-06 PROCEDURE — S0020 INJECTION, BUPIVICAINE HYDRO: HCPCS | Performed by: FAMILY MEDICINE

## 2017-03-06 PROCEDURE — 20552 NJX 1/MLT TRIGGER POINT 1/2: CPT

## 2017-03-06 PROCEDURE — 99284 EMERGENCY DEPT VISIT MOD MDM: CPT | Mod: 25 | Performed by: FAMILY MEDICINE

## 2017-03-06 PROCEDURE — 99283 EMERGENCY DEPT VISIT LOW MDM: CPT | Mod: 25

## 2017-03-06 PROCEDURE — 25000125 ZZHC RX 250: Performed by: FAMILY MEDICINE

## 2017-03-06 PROCEDURE — 20552 NJX 1/MLT TRIGGER POINT 1/2: CPT | Performed by: FAMILY MEDICINE

## 2017-03-06 RX ORDER — BUPIVACAINE HYDROCHLORIDE 2.5 MG/ML
30 INJECTION, SOLUTION EPIDURAL; INFILTRATION; INTRACAUDAL ONCE
Status: COMPLETED | OUTPATIENT
Start: 2017-03-06 | End: 2017-03-06

## 2017-03-06 RX ADMIN — BUPIVACAINE HYDROCHLORIDE 75 MG: 2.5 INJECTION, SOLUTION EPIDURAL; INFILTRATION; INTRACAUDAL at 19:19

## 2017-03-06 ASSESSMENT — ENCOUNTER SYMPTOMS
NUMBNESS: 0
WEAKNESS: 0
NECK PAIN: 1
HEADACHES: 1
FEVER: 0

## 2017-03-06 NOTE — ED AVS SNAPSHOT
[facility] Emergency Department    [address] [place] [address]    Phone:  [phone]    Fax:  [phone]                                       Joselito Abarca   MRN: [id number]    Department:  [facility] Emergency Department   Date of Visit:  3/6/2017           After Visit Summary Signature Page     I have received my discharge instructions, and my questions have been answered. I have discussed any challenges I see with this plan with the nurse or doctor.    ..........................................................................................................................................  Patient/Patient Representative Signature      ..........................................................................................................................................  Patient Representative Print Name and Relationship to Patient    ..................................................               ................................................  Date                                            Time    ..........................................................................................................................................  Reviewed by Signature/Title    ...................................................              ..............................................  Date                                                            Time

## 2017-03-06 NOTE — ED AVS SNAPSHOT
[facility] Emergency Department    [address] [place] [address]    Phone:  [phone]    Fax:  [phone]                                       Joselito Abarca   MRN: [id number]    Department:  [facility] Emergency Department   Date of Visit:  3/6/2017           Patient Information     Date Of Birth          1965        Your diagnoses for this visit were:     Trigger point with neck pain        You were seen by Don Love MD.      Follow-up Information     Follow up with Schoen, Gregory G, MD.    Specialty:  Family Practice    Why:  As needed    Contact information:    [facility]  [address] [place] [id number] [phone]          Discharge Instructions       Ice 20 minutes per hour, (20 minutes on, 40 minutes off) at least 4 times a day.   Activity as tolerated.    Recheck in clinic with Dr Schoen if persistent problems.   It was nice visiting with you again tonight.   I hope this gives you long lasting relief.    Thank you for choosing [facility]. We appreciate the opportunity to meet your urgent medical needs. Please let us know if we could have done anything to make your stay more satisfying.    After discharge, please closely monitor for any new or worsening symptoms. Return to the Emergency Department if you develop any acute worsening signs or symptoms.    If you had lab work, cultures or imaging studies done during your stay, the final results may still be pending. We will call you if your plan of care needs to change. However, if you are not improving as expected, please follow up with your primary care provider or clinic.     Start any prescription medications that were prescribed to you and take them as directed.     Please see additional handouts that may be pertinent to your condition.        24 Hour Appointment Hotline       To make an appointment at any [facility], call [phone]  [phone]). If you don't have a family doctor or clinic, we will help you find one. [place] clinics are conveniently located to serve the needs of you and your family.             Review of your medicines      Our records show that you are taking the medicines listed below. If these are incorrect, please call your family doctor or clinic.        Dose / Directions Last dose taken    * clonazePAM 0.5 MG tablet   Commonly known as:  klonoPIN   Dose:  0.5 mg   Quantity:  20 tablet        Take 1 tablet by mouth 3 times daily as needed for anxiety.   Refills:  0        * clonazePAM 0.5 MG tablet   Commonly known as:  klonoPIN   Dose:  0.25-0.5 mg   Quantity:  90 tablet        Take 0.5-1 tablets (0.25-0.5 mg) by mouth 3 times daily as needed for anxiety   Refills:  0        doxycycline 100 MG tablet   Commonly known as:  VIBRA-TABS   Dose:  100 mg   Quantity:  20 tablet        Take 1 tablet (100 mg) by mouth 2 times daily   Refills:  0        escitalopram 10 MG tablet   Commonly known as:  LEXAPRO   Dose:  10 mg   Indication:  Depression        Take 10 mg by mouth daily   Refills:  0        fluticasone 220 MCG/ACT Inhaler   Commonly known as:  FLOVENT HFA   Dose:  2 puff   Quantity:  3 Inhaler        Inhale 2 puffs into the lungs 2 times daily   Refills:  1        fluticasone 50 MCG/ACT spray   Commonly known as:  FLONASE   Quantity:  16 g        USE TWO SPRAYS IN EACH NOSTRIL EVERY DAY   Refills:  5        gabapentin 300 MG capsule   Commonly known as:  NEURONTIN   Quantity:  270 capsule        TAKE TWO CAPSULES BY MOUTH THREE TIMES A DAY   Refills:  11        ibuprofen 200 MG tablet   Commonly known as:  ADVIL/MOTRIN   Dose:  600 mg        Take 3 tablets (600 mg) by mouth every 6 hours as needed for other (mild pain)   Refills:  0        ipratropium - albuterol 0.5 mg/2.5 mg/3 mL 0.5-2.5 (3) MG/3ML neb solution   Commonly known as:  DUONEB   Quantity:  180 mL        NEBULIZE CONTENTS OF ONE VIAL BY MOUTH EVERY 4  HOURS AS NEEDED FOR SHORTNESS OF BREATH / DYSPNEA OR WHEEZING   Refills:  3        methadone 10 MG tablet   Commonly known as:  DOLOPHINE   Dose:  10 mg   Quantity:  90 tablet        Take 1 tablet (10 mg) by mouth every 8 hours as needed   Refills:  0        omeprazole 20 MG CR capsule   Commonly known as:  priLOSEC   Quantity:  30 capsule        TAKE 1 CAPSULE BY MOUTH DAILY, 30 TO 60 MINUTES BEFORE A MEAL.   Refills:  11        oxyCODONE 5 MG capsule   Commonly known as:  OXY-IR   Dose:  10 mg   Quantity:  360 capsule        Take 2 capsules (10 mg) by mouth every 4 hours as needed 2 caps q 4 hour prn pain up to 12 per day May fill 5/24/2012   Refills:  0        ranitidine 150 MG tablet   Commonly known as:  ZANTAC   Quantity:  30 tablet        TAKE ONE TABLET BY MOUTH EVERY MORNING   Refills:  11        traZODone 100 MG tablet   Commonly known as:  DESYREL   Dose:  300 mg   Quantity:  90 tablet        Take 3 tablets (300 mg) by mouth At Bedtime   Refills:  3        * VENTOLIN  (90 BASE) MCG/ACT Inhaler   Quantity:  18 g   Generic drug:  albuterol        INHALE 2 PUFFS INTO THE LUNGS EVERY 6 HOURS AS NEEDED FOR SHORTNESS OF BREATH   Refills:  0        * albuterol (2.5 MG/3ML) 0.083% neb solution   Quantity:  90 mL        NEBULIZE CONTENTS OF ONE VIAL EVERY 4 HOURS AS NEEDED FOR SHORTNESS OF BREATH /DYSPNEA OR WHEEZING   Refills:  0        vitamin D 2000 UNITS tablet   Dose:  1 tablet   Quantity:  100 tablet        Take 1 tablet by mouth daily   Refills:  3        zolpidem 10 MG tablet   Commonly known as:  AMBIEN   Dose:  10 mg        Take 10 mg by mouth nightly as needed   Refills:  0        * Notice:  This list has 4 medication(s) that are the same as other medications prescribed for you. Read the directions carefully, and ask your doctor or other care provider to review them with you.            Orders Needing Specimen Collection     None      Pending Results     No orders found from 3/4/2017 to 3/7/2017.  "           Pending Culture Results     No orders found from 3/4/2017 to 3/7/2017.            Thank you for choosing [facility]       Thank you for choosing [facility] for your care. Our goal is always to provide you with excellent care. Hearing back from our patients is one way we can continue to improve our services. Please take a few minutes to complete the written survey that you may receive in the mail after you visit with us. Thank you!        [employer] Information     [employer] lets you send messages to your doctor, view your test results, renew your prescriptions, schedule appointments and more. To sign up, go to [email or web address] . Click on \"Log in\" on the left side of the screen, which will take you to the Welcome page. Then click on \"Sign up Now\" on the right side of the page.     You will be asked to enter the access code listed below, as well as some personal information. Please follow the directions to create your username and password.     Your access code is: [email or web address]  Expires: 2017  7:23 PM     Your access code will  in 90 days. If you need help or a new code, please call your [facility] clinic or [phone].        Care EveryWhere ID     This is your Care EveryWhere ID. This could be used by other organizations to access your [facility] medical records  [id number]        After Visit Summary       This is your record. Keep this with you and show to your community pharmacist(s) and doctor(s) at your next visit.                  "

## 2017-03-07 NOTE — ED NOTES
Received trigger point injections from MD.  Pt states that the pain is gone and he feels much better.
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
## 2017-03-07 NOTE — DISCHARGE INSTRUCTIONS
Ice 20 minutes per hour, (20 minutes on, 40 minutes off) at least 4 times a day.   Activity as tolerated.    Recheck in clinic with Dr Schoen if persistent problems.   It was nice visiting with you again tonight.   I hope this gives you long lasting relief.    Thank you for choosing [facility]. We appreciate the opportunity to meet your urgent medical needs. Please let us know if we could have done anything to make your stay more satisfying.    After discharge, please closely monitor for any new or worsening symptoms. Return to the Emergency Department if you develop any acute worsening signs or symptoms.    If you had lab work, cultures or imaging studies done during your stay, the final results may still be pending. We will call you if your plan of care needs to change. However, if you are not improving as expected, please follow up with your primary care provider or clinic.     Start any prescription medications that were prescribed to you and take them as directed.     Please see additional handouts that may be pertinent to your condition.

## 2017-03-11 DIAGNOSIS — F41.1 GENERALIZED ANXIETY DISORDER: ICD-10-CM

## 2017-03-11 NOTE — TELEPHONE ENCOUNTER
clonazepam  Last Written Prescription Date:  2/13/17  Last Fill Quantity: 90,  # refills: 0   Last Office Visit with G, P or [facility] prescribing provider: 12/12/16

## 2017-03-13 RX ORDER — CLONAZEPAM 0.5 MG/1
0.25-0.5 TABLET ORAL 3 TIMES DAILY PRN
Qty: 90 TABLET | Refills: 0 | Status: SHIPPED | OUTPATIENT
Start: 2017-03-13 | End: 2017-04-08

## 2017-03-15 ENCOUNTER — HOSPITAL ENCOUNTER (EMERGENCY)
Facility: CLINIC | Age: 52
Discharge: HOME | End: 2017-03-16
Attending: FAMILY MEDICINE | Admitting: FAMILY MEDICINE
Payer: COMMERCIAL

## 2017-03-15 ENCOUNTER — APPOINTMENT (OUTPATIENT)
Dept: GENERAL RADIOLOGY | Facility: CLINIC | Age: 52
End: 2017-03-15
Attending: FAMILY MEDICINE
Payer: COMMERCIAL

## 2017-03-15 ENCOUNTER — TELEPHONE (OUTPATIENT)
Dept: FAMILY MEDICINE | Facility: CLINIC | Age: 52
End: 2017-03-15

## 2017-03-15 DIAGNOSIS — G89.4 CHRONIC PAIN SYNDROME: ICD-10-CM

## 2017-03-15 DIAGNOSIS — J15.9 COMMUNITY ACQUIRED BACTERIAL PNEUMONIA: ICD-10-CM

## 2017-03-15 DIAGNOSIS — E87.6 HYPOKALEMIA: ICD-10-CM

## 2017-03-15 DIAGNOSIS — R05.9 COUGH: ICD-10-CM

## 2017-03-15 DIAGNOSIS — D72.823 LEUKEMOID REACTION: ICD-10-CM

## 2017-03-15 LAB
ANION GAP SERPL CALCULATED.3IONS-SCNC: 15 MMOL/L (ref 3–14)
BASOPHILS # BLD AUTO: 0 10E9/L (ref 0–0.2)
BASOPHILS NFR BLD AUTO: 0.1 %
BUN SERPL-MCNC: 7 MG/DL (ref 7–30)
CALCIUM SERPL-MCNC: 9 MG/DL (ref 8.5–10.1)
CHLORIDE SERPL-SCNC: 97 MMOL/L (ref 94–109)
CO2 SERPL-SCNC: 24 MMOL/L (ref 20–32)
CREAT SERPL-MCNC: 0.94 MG/DL (ref 0.66–1.25)
CRP SERPL-MCNC: 151 MG/L (ref 0–8)
DIFFERENTIAL METHOD BLD: ABNORMAL
EOSINOPHIL # BLD AUTO: 0 10E9/L (ref 0–0.7)
EOSINOPHIL NFR BLD AUTO: 0 %
ERYTHROCYTE [DISTWIDTH] IN BLOOD BY AUTOMATED COUNT: 13.3 % (ref 10–15)
GFR SERPL CREATININE-BSD FRML MDRD: 85 ML/MIN/1.7M2
GLUCOSE SERPL-MCNC: 169 MG/DL (ref 70–99)
HCT VFR BLD AUTO: 42.6 % (ref 40–53)
HGB BLD-MCNC: 14.4 G/DL (ref 13.3–17.7)
IMM GRANULOCYTES # BLD: 0 10E9/L (ref 0–0.4)
IMM GRANULOCYTES NFR BLD: 0.4 %
LYMPHOCYTES # BLD AUTO: 0.6 10E9/L (ref 0.8–5.3)
LYMPHOCYTES NFR BLD AUTO: 5.1 %
MCH RBC QN AUTO: 30.3 PG (ref 26.5–33)
MCHC RBC AUTO-ENTMCNC: 33.8 G/DL (ref 31.5–36.5)
MCV RBC AUTO: 90 FL (ref 78–100)
MONOCYTES # BLD AUTO: 0.3 10E9/L (ref 0–1.3)
MONOCYTES NFR BLD AUTO: 2.2 %
NEUTROPHILS # BLD AUTO: 10.5 10E9/L (ref 1.6–8.3)
NEUTROPHILS NFR BLD AUTO: 92.2 %
PLATELET # BLD AUTO: 150 10E9/L (ref 150–450)
POTASSIUM SERPL-SCNC: 2.7 MMOL/L (ref 3.4–5.3)
RBC # BLD AUTO: 4.76 10E12/L (ref 4.4–5.9)
SODIUM SERPL-SCNC: 136 MMOL/L (ref 133–144)
TROPONIN I SERPL-MCNC: NORMAL UG/L (ref 0–0.04)
WBC # BLD AUTO: 11.4 10E9/L (ref 4–11)

## 2017-03-15 PROCEDURE — 84484 ASSAY OF TROPONIN QUANT: CPT | Performed by: FAMILY MEDICINE

## 2017-03-15 PROCEDURE — 80048 BASIC METABOLIC PNL TOTAL CA: CPT | Performed by: FAMILY MEDICINE

## 2017-03-15 PROCEDURE — 96361 HYDRATE IV INFUSION ADD-ON: CPT

## 2017-03-15 PROCEDURE — 99284 EMERGENCY DEPT VISIT MOD MDM: CPT | Mod: 25

## 2017-03-15 PROCEDURE — 85025 COMPLETE CBC W/AUTO DIFF WBC: CPT | Performed by: FAMILY MEDICINE

## 2017-03-15 PROCEDURE — 71020 XR CHEST 2 VW: CPT | Mod: TC

## 2017-03-15 PROCEDURE — 99284 EMERGENCY DEPT VISIT MOD MDM: CPT | Performed by: FAMILY MEDICINE

## 2017-03-15 PROCEDURE — 86140 C-REACTIVE PROTEIN: CPT | Performed by: FAMILY MEDICINE

## 2017-03-15 PROCEDURE — 83036 HEMOGLOBIN GLYCOSYLATED A1C: CPT | Performed by: FAMILY MEDICINE

## 2017-03-15 PROCEDURE — 25000132 ZZH RX MED GY IP 250 OP 250 PS 637: Performed by: FAMILY MEDICINE

## 2017-03-15 PROCEDURE — S0020 INJECTION, BUPIVICAINE HYDRO: HCPCS | Performed by: FAMILY MEDICINE

## 2017-03-15 PROCEDURE — 25000125 ZZHC RX 250: Performed by: FAMILY MEDICINE

## 2017-03-15 PROCEDURE — 25000128 H RX IP 250 OP 636: Performed by: FAMILY MEDICINE

## 2017-03-15 RX ORDER — POTASSIUM CHLORIDE 1500 MG/1
40 TABLET, EXTENDED RELEASE ORAL ONCE
Status: COMPLETED | OUTPATIENT
Start: 2017-03-15 | End: 2017-03-15

## 2017-03-15 RX ORDER — SODIUM CHLORIDE 9 MG/ML
1000 INJECTION, SOLUTION INTRAVENOUS CONTINUOUS
Status: DISCONTINUED | OUTPATIENT
Start: 2017-03-15 | End: 2017-03-16 | Stop reason: HOSPADM

## 2017-03-15 RX ORDER — BUPIVACAINE HYDROCHLORIDE 2.5 MG/ML
5 INJECTION, SOLUTION EPIDURAL; INFILTRATION; INTRACAUDAL ONCE
Status: COMPLETED | OUTPATIENT
Start: 2017-03-15 | End: 2017-03-15

## 2017-03-15 RX ORDER — LIDOCAINE 40 MG/G
CREAM TOPICAL
Status: DISCONTINUED | OUTPATIENT
Start: 2017-03-15 | End: 2017-03-16 | Stop reason: HOSPADM

## 2017-03-15 RX ORDER — DOXYCYCLINE 100 MG/1
100 CAPSULE ORAL ONCE
Status: COMPLETED | OUTPATIENT
Start: 2017-03-15 | End: 2017-03-15

## 2017-03-15 RX ADMIN — DOXYCYCLINE HYCLATE 100 MG: 100 CAPSULE ORAL at 23:37

## 2017-03-15 RX ADMIN — SODIUM CHLORIDE 1000 ML: 9 INJECTION, SOLUTION INTRAVENOUS at 22:28

## 2017-03-15 RX ADMIN — SODIUM CHLORIDE 1000 ML: 9 INJECTION, SOLUTION INTRAVENOUS at 23:57

## 2017-03-15 RX ADMIN — POTASSIUM CHLORIDE 40 MEQ: 1500 TABLET, EXTENDED RELEASE ORAL at 23:38

## 2017-03-15 RX ADMIN — BUPIVACAINE HYDROCHLORIDE 4 ML: 2.5 INJECTION, SOLUTION EPIDURAL; INFILTRATION; INTRACAUDAL at 23:43

## 2017-03-15 ASSESSMENT — ENCOUNTER SYMPTOMS
VOMITING: 1
SHORTNESS OF BREATH: 1
NECK PAIN: 1
COUGH: 0
DIAPHORESIS: 1
HEADACHES: 1
NAUSEA: 1
DIARRHEA: 0
APPETITE CHANGE: 0
DIFFICULTY URINATING: 1

## 2017-03-15 NOTE — ED AVS SNAPSHOT
[facility] Emergency Department    [address] [place] [address]    Phone:  [phone]    Fax:  [phone]                                       Joselito Abarca   MRN: [id number]    Department:  [facility] Emergency Department   Date of Visit:  3/15/2017           After Visit Summary Signature Page     I have received my discharge instructions, and my questions have been answered. I have discussed any challenges I see with this plan with the nurse or doctor.    ..........................................................................................................................................  Patient/Patient Representative Signature      ..........................................................................................................................................  Patient Representative Print Name and Relationship to Patient    ..................................................               ................................................  Date                                            Time    ..........................................................................................................................................  Reviewed by Signature/Title    ...................................................              ..............................................  Date                                                            Time

## 2017-03-15 NOTE — ED AVS SNAPSHOT
[facility]    [address] [place] [address]    Phone:  [phone]    Fax:  [phone]                                       Joselito Abarca   MRN: [id number]    Department:  [facility]   Date of Visit:  3/15/2017           Patient Information     Date Of Birth          1965        Your diagnoses for this visit were:     Community acquired bacterial pneumonia     Chronic pain syndrome     Hypokalemia     Cough     Leukemoid reaction        You were seen by Jung Mccarty DO.      Follow-up Information     Follow up with Schoen, Gregory G, MD.    Specialty:  Family Practice    Why:  on Friday in Dr. Schoen's clinic at 2:00PM, for recheck    Contact information:    [facility]  [address] [place] [address] [phone]          Follow up with [facility].    Specialty:  EMERGENCY MEDICINE    Why:  If symptoms worsen    Contact information:    [address]   [place] [address] [phone]    Additional information:    From [facility] [address]: Exit at [address] on south side of [place]. Turn right on [address]. Turn left at stoplight on [address]. [facility] will be in view two blocks ahead        Discharge Instructions       Please read and follow the handout(s) instructions. Return, if needed, for increased or worsening symptoms and as directed by the handout(s).    You will be scheduled for a follow-up appointment in your clinic in the next 2 days. It is important you make that appointment with Dr. Schoen.     Increase your fluid intake. Drinking small amounts often is the best way to take in more fluids when you are feeling nauseated.    Yogurt orally twice a day while on the antibiotics may help prevent diarrhea and secondary infections caused by the antibiotic use.    I hope you feel better soon!    Electronically signed, Jung IRWIN  Lul DO        Discharge References/Attachments     ADULT, PNEUMONIA (ENGLISH)    PNEUMONIA, TREATMENT (ENGLISH)    DIET, HIGH POTASSIUM (ENGLISH)    INJECTION, TRIGGER POINT (ENGLISH)      Future Appointments        Provider Department Dept Phone Center    3/17/2017 2:10 PM Gregory G. Schoen, MD [facility] [phone] [facility]      24 Hour Appointment Hotline       To make an appointment at any [facility], call [phone] ([phone]). If you don't have a family doctor or clinic, we will help you find one. [facility] are conveniently located to serve the needs of you and your family.             Review of your medicines      START taking        Dose / Directions Last dose taken    doxycycline 100 MG tablet   Commonly known as:  VIBRA-TABS   Dose:  100 mg   Quantity:  20 tablet        Take 1 tablet (100 mg) by mouth 2 times daily for 20 doses   Refills:  0        potassium chloride SA 20 MEQ CR tablet   Commonly known as:  K-DUR/KLOR-CON M   Dose:  20 mEq   Quantity:  20 tablet        Take 1 tablet (20 mEq) by mouth 3 times daily   Refills:  0          CONTINUE these medicines which may have CHANGED, or have new prescriptions. If we are uncertain of the size of tablets/capsules you have at home, strength may be listed as something that might have changed.        Dose / Directions Last dose taken    ipratropium - albuterol 0.5 mg/2.5 mg/3 mL 0.5-2.5 (3) MG/3ML neb solution   Commonly known as:  DUONEB   Dose:  1 vial   What changed:  See the new instructions.   Quantity:  30 vial        Take 1 vial (3 mLs) by nebulization every 4 hours as needed for shortness of breath / dyspnea   Refills:  0          Our records show that you are taking the medicines listed below. If these are incorrect, please call your family doctor or clinic.        Dose / Directions Last dose taken    * clonazePAM 0.5 MG tablet   Commonly known as:  klonoPIN   Dose:  0.5 mg   Quantity:  20 tablet         Take 1 tablet by mouth 3 times daily as needed for anxiety.   Refills:  0        * clonazePAM 0.5 MG tablet   Commonly known as:  klonoPIN   Dose:  0.25-0.5 mg   Quantity:  90 tablet        Take 0.5-1 tablets (0.25-0.5 mg) by mouth 3 times daily as needed for anxiety   Refills:  0        escitalopram 10 MG tablet   Commonly known as:  LEXAPRO   Dose:  10 mg   Indication:  Depression        Take 10 mg by mouth daily   Refills:  0        fluticasone 220 MCG/ACT Inhaler   Commonly known as:  FLOVENT HFA   Dose:  2 puff   Quantity:  3 Inhaler        Inhale 2 puffs into the lungs 2 times daily   Refills:  1        fluticasone 50 MCG/ACT spray   Commonly known as:  FLONASE   Quantity:  16 g        USE TWO SPRAYS IN EACH NOSTRIL EVERY DAY   Refills:  5        gabapentin 300 MG capsule   Commonly known as:  NEURONTIN   Quantity:  270 capsule        TAKE TWO CAPSULES BY MOUTH THREE TIMES A DAY   Refills:  11        ibuprofen 200 MG tablet   Commonly known as:  ADVIL/MOTRIN   Dose:  600 mg        Take 3 tablets (600 mg) by mouth every 6 hours as needed for other (mild pain)   Refills:  0        methadone 10 MG tablet   Commonly known as:  DOLOPHINE   Dose:  10 mg   Quantity:  90 tablet        Take 1 tablet (10 mg) by mouth every 8 hours as needed   Refills:  0        omeprazole 20 MG CR capsule   Commonly known as:  priLOSEC   Quantity:  30 capsule        TAKE 1 CAPSULE BY MOUTH DAILY, 30 TO 60 MINUTES BEFORE A MEAL.   Refills:  11        oxyCODONE 5 MG capsule   Commonly known as:  OXY-IR   Dose:  10 mg   Quantity:  360 capsule        Take 2 capsules (10 mg) by mouth every 4 hours as needed 2 caps q 4 hour prn pain up to 12 per day May fill 5/24/2012   Refills:  0        ranitidine 150 MG tablet   Commonly known as:  ZANTAC   Quantity:  30 tablet        TAKE ONE TABLET BY MOUTH EVERY MORNING   Refills:  11        traZODone 100 MG tablet   Commonly known as:  DESYREL   Dose:  300 mg   Quantity:  90 tablet        Take 3  tablets (300 mg) by mouth At Bedtime   Refills:  3        * VENTOLIN  (90 BASE) MCG/ACT Inhaler   Quantity:  18 g   Generic drug:  albuterol        INHALE 2 PUFFS INTO THE LUNGS EVERY 6 HOURS AS NEEDED FOR SHORTNESS OF BREATH   Refills:  0        * albuterol (2.5 MG/3ML) 0.083% neb solution   Quantity:  90 mL        NEBULIZE CONTENTS OF ONE VIAL EVERY 4 HOURS AS NEEDED FOR SHORTNESS OF BREATH /DYSPNEA OR WHEEZING   Refills:  0        vitamin D 2000 UNITS tablet   Dose:  1 tablet   Quantity:  100 tablet        Take 1 tablet by mouth daily   Refills:  3        zolpidem 10 MG tablet   Commonly known as:  AMBIEN   Dose:  10 mg        Take 10 mg by mouth nightly as needed   Refills:  0        * Notice:  This list has 4 medication(s) that are the same as other medications prescribed for you. Read the directions carefully, and ask your doctor or other care provider to review them with you.            Prescriptions were sent or printed at these locations (3 Prescriptions)                   [facility]   [address] [address]    Telephone:  [phone]   Fax:  [phone]   Hours:                  These medications are not ready yet because we are checking if your insurance will help you pay for them. Call your pharmacy to confirm that your medication is ready for pickup. It may take up to 24 hours for them to receive the prescription. If the prescription is not ready within 3 business days, please contact your clinic or your provider (3 of 3)         doxycycline (VIBRA-TABS) 100 MG tablet               ipratropium - albuterol 0.5 mg/2.5 mg/3 mL (DUONEB) 0.5-2.5 (3) MG/3ML neb solution               potassium chloride SA (K-DUR/KLOR-CON M) 20 MEQ CR tablet                Procedures and tests performed during your visit     Basic metabolic panel    CBC with platelets differential    CRP inflammation    Hemoglobin A1c    Peripheral IV catheter    Troponin I    XR Chest 2  "Views      Orders Needing Specimen Collection     Ordered          03/15/17 2306  Sputum Culture Aerobic Bacterial - STAT, Prio: STAT, Needs to be Collected     Scheduled Task Status   03/15/17 2307 Collect Sputum Culture Aerobic Bacterial Open   Order Class:  PCU Collect                03/15/17 2306  Gram stain - STAT, Prio: STAT, Needs to be Collected     Scheduled Task Status   03/15/17 2307 Collect Gram stain Open   Order Class:  PCU Collect                  Pending Results     No orders found for last 3 day(s).            Pending Culture Results     No orders found for last 3 day(s).            Thank you for choosing [facility]       Thank you for choosing [facility] for your care. Our goal is always to provide you with excellent care. Hearing back from our patients is one way we can continue to improve our services. Please take a few minutes to complete the written survey that you may receive in the mail after you visit with us. Thank you!        [employer] Information     [employer] lets you send messages to your doctor, view your test results, renew your prescriptions, schedule appointments and more. To sign up, go to [email or web address] . Click on \"Log in\" on the left side of the screen, which will take you to the Welcome page. Then click on \"Sign up Now\" on the right side of the page.     You will be asked to enter the access code listed below, as well as some personal information. Please follow the directions to create your username and password.     Your access code is: [email or web address]  Expires: 2017  8:23 PM     Your access code will  in 90 days. If you need help or a new code, please call your [facility] clinic or [phone].        Care EveryWhere ID     This is your Care EveryWhere ID. This could be used by other organizations to access your [facility] medical records  [id number]        After Visit Summary       This is your record. Keep this with you and show to your community pharmacist(s) and " doctor(s) at your next visit.

## 2017-03-16 VITALS
DIASTOLIC BLOOD PRESSURE: 78 MMHG | OXYGEN SATURATION: 91 % | WEIGHT: 161 LBS | SYSTOLIC BLOOD PRESSURE: 109 MMHG | RESPIRATION RATE: 16 BRPM | TEMPERATURE: 97 F | BODY MASS INDEX: 25.22 KG/M2

## 2017-03-16 LAB — HBA1C MFR BLD: 5.6 % (ref 4.3–6)

## 2017-03-16 PROCEDURE — 25000125 ZZHC RX 250

## 2017-03-16 PROCEDURE — 96374 THER/PROPH/DIAG INJ IV PUSH: CPT

## 2017-03-16 PROCEDURE — 25000128 H RX IP 250 OP 636: Performed by: FAMILY MEDICINE

## 2017-03-16 RX ORDER — IPRATROPIUM BROMIDE AND ALBUTEROL SULFATE 2.5; .5 MG/3ML; MG/3ML
1 SOLUTION RESPIRATORY (INHALATION) EVERY 4 HOURS PRN
Qty: 30 VIAL | Refills: 0 | Status: SHIPPED | OUTPATIENT
Start: 2017-03-15 | End: 2020-12-14

## 2017-03-16 RX ORDER — DOXYCYCLINE HYCLATE 100 MG
100 TABLET ORAL 2 TIMES DAILY
Qty: 20 TABLET | Refills: 0 | Status: SHIPPED | OUTPATIENT
Start: 2017-03-15 | End: 2017-03-25

## 2017-03-16 RX ORDER — LIDOCAINE HYDROCHLORIDE 10 MG/ML
INJECTION, SOLUTION INFILTRATION; PERINEURAL
Status: COMPLETED
Start: 2017-03-16 | End: 2017-03-16

## 2017-03-16 RX ORDER — POTASSIUM CHLORIDE 1500 MG/1
20 TABLET, EXTENDED RELEASE ORAL 3 TIMES DAILY
Qty: 20 TABLET | Refills: 0 | Status: SHIPPED | OUTPATIENT
Start: 2017-03-15 | End: 2017-09-18

## 2017-03-16 RX ORDER — CEFTRIAXONE SODIUM 1 G
1 VIAL (EA) INJECTION ONCE
Status: COMPLETED | OUTPATIENT
Start: 2017-03-16 | End: 2017-03-16

## 2017-03-16 RX ADMIN — LIDOCAINE HYDROCHLORIDE 2.1 ML: 10 INJECTION, SOLUTION INFILTRATION; PERINEURAL at 00:45

## 2017-03-16 RX ADMIN — CEFTRIAXONE SODIUM 1 G: 1 INJECTION, POWDER, FOR SOLUTION INTRAMUSCULAR; INTRAVENOUS at 00:44

## 2017-03-16 NOTE — ED NOTES
"Presents with multiple complaints. States he has been getting short of breath with activity, his chronic back pain is getting worse, and his stomach has been bothering him. \"I've been throwing up a lot of acid\".  "

## 2017-03-16 NOTE — ED NOTES
"Pt is less alert and talkative as I have known him to be in the past. Pt states he has \"been drinking water like crazy lately\".  "

## 2017-03-16 NOTE — DISCHARGE INSTRUCTIONS
Please read and follow the handout(s) instructions. Return, if needed, for increased or worsening symptoms and as directed by the handout(s).    You will be scheduled for a follow-up appointment in your clinic in the next 2 days. It is important you make that appointment with Dr. Schoen.     Increase your fluid intake. Drinking small amounts often is the best way to take in more fluids when you are feeling nauseated.    Yogurt orally twice a day while on the antibiotics may help prevent diarrhea and secondary infections caused by the antibiotic use.    I hope you feel better soon!    Electronically signed, Jung Mccarty DO
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
## 2017-03-16 NOTE — TELEPHONE ENCOUNTER
Joselito was in the ER today and declined to stay in the hospital on observation status.  Please put him on my schedule at 12:30 tomorrow and if that doesn't work he will need to be seen by another provider.  Electronically signed by Greg Schoen, MD

## 2017-03-17 ENCOUNTER — OFFICE VISIT (OUTPATIENT)
Dept: FAMILY MEDICINE | Facility: CLINIC | Age: 52
End: 2017-03-17
Payer: COMMERCIAL

## 2017-03-17 ENCOUNTER — TELEPHONE (OUTPATIENT)
Dept: FAMILY MEDICINE | Facility: CLINIC | Age: 52
End: 2017-03-17

## 2017-03-17 VITALS
HEART RATE: 100 BPM | RESPIRATION RATE: 20 BRPM | SYSTOLIC BLOOD PRESSURE: 98 MMHG | WEIGHT: 160 LBS | BODY MASS INDEX: 25.06 KG/M2 | DIASTOLIC BLOOD PRESSURE: 72 MMHG | OXYGEN SATURATION: 96 % | TEMPERATURE: 96.6 F

## 2017-03-17 DIAGNOSIS — E87.6 HYPOKALEMIA: Primary | ICD-10-CM

## 2017-03-17 DIAGNOSIS — J18.9 PNEUMONIA OF BOTH LOWER LOBES DUE TO INFECTIOUS ORGANISM: ICD-10-CM

## 2017-03-17 LAB — POTASSIUM SERPL-SCNC: 3.6 MMOL/L (ref 3.4–5.3)

## 2017-03-17 PROCEDURE — 36415 COLL VENOUS BLD VENIPUNCTURE: CPT | Performed by: FAMILY MEDICINE

## 2017-03-17 PROCEDURE — 84132 ASSAY OF SERUM POTASSIUM: CPT | Performed by: FAMILY MEDICINE

## 2017-03-17 PROCEDURE — 99213 OFFICE O/P EST LOW 20 MIN: CPT | Performed by: FAMILY MEDICINE

## 2017-03-17 ASSESSMENT — PAIN SCALES - GENERAL: PAINLEVEL: SEVERE PAIN (7)

## 2017-03-17 NOTE — NURSING NOTE
"Chief Complaint   Patient presents with     ER F/U     was in ED on 3/15 with pneumonia       Initial BP 98/72  Pulse 100  Temp 96.6  F (35.9  C) (Temporal)  Resp 20  Wt 160 lb (72.6 kg)  SpO2 96%  BMI 25.06 kg/m2 Estimated body mass index is 25.06 kg/(m^2) as calculated from the following:    Height as of 1/6/17: 5' 7\" (1.702 m).    Weight as of this encounter: 160 lb (72.6 kg).  Medication Reconciliation: complete   Cathleen GRAFF MA      "

## 2017-03-17 NOTE — PROGRESS NOTES
SUBJECTIVE:                                                    Joselito Abarca is a 51 year old male who presents to clinic today for the following health issues:      ED/UC Followup:    Facility:  [facility]  Date of visit: 3/15/17  Reason for visit: Community acquired bacterial pneumonia   Current Status: improved but still SOB     States he is starting to mobilize more sputum.  He is taking doxycycline bid and can feel that this is helping. He is using albuterol 4-5 times a day but varies. Potassium was low and has a hard time tolerating the oral K. He is on PPI for stomach acid and the potassium doesn't sit too well with him.  He was having issues with poor eating the past week and taking in excessive water and feels that might be why the potassium was low.     Current Outpatient Prescriptions   Medication Sig Dispense Refill     doxycycline (VIBRA-TABS) 100 MG tablet Take 1 tablet (100 mg) by mouth 2 times daily for 20 doses 20 tablet 0     ipratropium - albuterol 0.5 mg/2.5 mg/3 mL (DUONEB) 0.5-2.5 (3) MG/3ML neb solution Take 1 vial (3 mLs) by nebulization every 4 hours as needed for shortness of breath / dyspnea 30 vial 0     potassium chloride SA (K-DUR/KLOR-CON M) 20 MEQ CR tablet Take 1 tablet (20 mEq) by mouth 3 times daily 20 tablet 0     clonazePAM (KLONOPIN) 0.5 MG tablet Take 0.5-1 tablets (0.25-0.5 mg) by mouth 3 times daily as needed for anxiety 90 tablet 0     oxyCODONE (OXY-IR) 5 MG capsule Take 2 capsules (10 mg) by mouth every 4 hours as needed 2 caps q 4 hour prn pain up to 12 per day May fill 5/24/2012 360 capsule 0     methadone (DOLOPHINE) 10 MG tablet Take 1 tablet (10 mg) by mouth every 8 hours as needed 90 tablet 0     fluticasone (FLONASE) 50 MCG/ACT spray USE TWO SPRAYS IN EACH NOSTRIL EVERY DAY 16 g 5     albuterol (2.5 MG/3ML) 0.083% nebulizer solution NEBULIZE CONTENTS OF ONE VIAL EVERY 4 HOURS AS NEEDED FOR SHORTNESS OF BREATH /DYSPNEA OR WHEEZING 90 mL 0     gabapentin (NEURONTIN)  300 MG capsule TAKE TWO CAPSULES BY MOUTH THREE TIMES A  capsule 11     fluticasone (FLOVENT HFA) 220 MCG/ACT inhaler Inhale 2 puffs into the lungs 2 times daily 3 Inhaler 1     omeprazole (PRILOSEC) 20 MG capsule TAKE 1 CAPSULE BY MOUTH DAILY, 30 TO 60 MINUTES BEFORE A MEAL. 30 capsule 11     VENTOLIN  (90 BASE) MCG/ACT inhaler INHALE 2 PUFFS INTO THE LUNGS EVERY 6 HOURS AS NEEDED FOR SHORTNESS OF BREATH 18 g 0     ranitidine (ZANTAC) 150 MG tablet TAKE ONE TABLET BY MOUTH EVERY MORNING 30 tablet 11     Cholecalciferol (VITAMIN D) 2000 UNITS tablet Take 1 tablet by mouth daily 100 tablet 3     traZODone (DESYREL) 100 MG tablet Take 3 tablets (300 mg) by mouth At Bedtime 90 tablet 3     escitalopram (LEXAPRO) 10 MG tablet Take 10 mg by mouth daily        zolpidem (AMBIEN) 10 MG tablet Take 10 mg by mouth nightly as needed        ibuprofen (ADVIL,MOTRIN) 200 MG tablet Take 3 tablets (600 mg) by mouth every 6 hours as needed for other (mild pain)       [DISCONTINUED] clonazePAM (KLONOPIN) 0.5 MG tablet Take 1 tablet by mouth 3 times daily as needed for anxiety. 20 tablet 0       Heart is regular, no murmurs.  Lungs with no rales but some scattered rhonchi and only mild end expiratory wheezes with deep breaths.        ASSESSMENT:   Hypokalemia  Pneumonia of both lower lobes due to infectious organism     Appears to be stable on oral antibiotics at this time.      PLAN:  Complete 10 day course of doxy and recheck if not feeling improved. Will stop by lab for K+ today and will make recommendation based on results. Not on any K+ depleting meds but states diet has been poor with excess water.  Also notes he is seeing [facility] next week and will let me know how he is doing after that.     Electronically signed by Greg Schoen, MD

## 2017-03-17 NOTE — TELEPHONE ENCOUNTER
Notes Recorded by Schoen, Gregory G, MD on 3/17/2017 at 2:51 PM  Please notify Spenser that his potassium is now normal and he can stop the supplement as long as he is eating and drinking normally. Avoid excessive water intake.   Electronically signed by Greg Schoen, MD          Left message for patient to return call to clinic.  Cathleen GRAFF MA

## 2017-03-18 ASSESSMENT — ASTHMA QUESTIONNAIRES: ACT_TOTALSCORE: 17

## 2017-03-20 NOTE — TELEPHONE ENCOUNTER
Called pt and left a msg on his personal voice mail with the results and below msg.  Laurita Link MA

## 2017-03-25 DIAGNOSIS — M50.00 INTERVERTEBRAL CERVICAL DISC DISORDER WITH MYELOPATHY, CERVICAL REGION: ICD-10-CM

## 2017-03-25 DIAGNOSIS — M50.30 DDD (DEGENERATIVE DISC DISEASE), CERVICAL: ICD-10-CM

## 2017-03-26 RX ORDER — OXYCODONE HYDROCHLORIDE 5 MG/1
10 CAPSULE ORAL EVERY 4 HOURS PRN
Qty: 360 CAPSULE | Refills: 0 | Status: SHIPPED | OUTPATIENT
Start: 2017-03-26 | End: 2017-04-25

## 2017-03-26 RX ORDER — METHADONE HYDROCHLORIDE 10 MG/1
10 TABLET ORAL EVERY 8 HOURS PRN
Qty: 90 TABLET | Refills: 0 | Status: SHIPPED | OUTPATIENT
Start: 2017-03-26 | End: 2017-04-25

## 2017-04-08 DIAGNOSIS — F41.1 GENERALIZED ANXIETY DISORDER: ICD-10-CM

## 2017-04-08 NOTE — TELEPHONE ENCOUNTER
clonazepam  Last Written Prescription Date: 3/13/17  Last Fill Quantity: 90,  # refills: 0   Last Office Visit with [facility], [facility] or [facility] prescribing provider: 3/17/17

## 2017-04-10 RX ORDER — CLONAZEPAM 0.5 MG/1
0.25-0.5 TABLET ORAL 3 TIMES DAILY PRN
Qty: 90 TABLET | Refills: 0 | Status: SHIPPED | OUTPATIENT
Start: 2017-04-10 | End: 2017-05-12

## 2017-04-24 DIAGNOSIS — M50.30 DEGENERATION OF CERVICAL INTERVERTEBRAL DISC: ICD-10-CM

## 2017-04-24 DIAGNOSIS — K21.9 ESOPHAGEAL REFLUX: ICD-10-CM

## 2017-04-25 DIAGNOSIS — M50.30 DDD (DEGENERATIVE DISC DISEASE), CERVICAL: ICD-10-CM

## 2017-04-25 DIAGNOSIS — M50.00 INTERVERTEBRAL CERVICAL DISC DISORDER WITH MYELOPATHY, CERVICAL REGION: ICD-10-CM

## 2017-04-25 RX ORDER — OXYCODONE HYDROCHLORIDE 5 MG/1
10 CAPSULE ORAL EVERY 4 HOURS PRN
Qty: 360 CAPSULE | Refills: 0 | Status: SHIPPED | OUTPATIENT
Start: 2017-04-25 | End: 2017-05-24

## 2017-04-25 RX ORDER — METHADONE HYDROCHLORIDE 10 MG/1
10 TABLET ORAL EVERY 8 HOURS PRN
Qty: 90 TABLET | Refills: 0 | Status: SHIPPED | OUTPATIENT
Start: 2017-04-25 | End: 2017-05-24

## 2017-04-25 NOTE — TELEPHONE ENCOUNTER
Methadone      Last Written Prescription Date: 3/26/17  Last Fill Quantity: 90,  # refills: 0   Last Office Visit with [facility], [facility] or [facility] prescribing provider: 3/17/17                                             Oxycodone      Last Written Prescription Date: 3/26/17  Last Fill Quantity: 360,  # refills: 0   Last Office Visit with [facility], [facility] or [facility] prescribing provider: 3/17/17

## 2017-04-26 RX ORDER — CHOLECALCIFEROL (VITAMIN D3) 50 MCG
TABLET ORAL
Qty: 100 TABLET | Refills: 3 | Status: SHIPPED | OUTPATIENT
Start: 2017-04-26 | End: 2018-04-30

## 2017-04-26 NOTE — TELEPHONE ENCOUNTER
Cholecalciferol  Last Written Prescription Date: 3/29/16  Last Fill Quantity: 100, # refills: 3  Last Office Visit with [facility], [facility] or [facility] prescribing provider: 3/17/17       DEXA Scan:  No order of DX HIP/PELVIS/SPINE is found.     No order of DX HIP/PELVIS/SPINE W LAT FRACTION ANALYSIS is found.       Creatinine   Date Value Ref Range Status   03/15/2017 0.94 0.66 - 1.25 mg/dL Final         Ranitidine        Last Written Prescription Date: 4/29/16  Last Fill Quantity: 30,  # refills: 11   Last Office Visit with [facility], [facility] or [facility] prescribing provider: 3/17/17

## 2017-04-26 NOTE — TELEPHONE ENCOUNTER
Prescription approved per [facility] Refill Protocol.  SUGAR Knapp

## 2017-05-02 ENCOUNTER — HOSPITAL ENCOUNTER (EMERGENCY)
Facility: CLINIC | Age: 52
Discharge: HOME | End: 2017-05-02
Attending: PHYSICIAN ASSISTANT | Admitting: PHYSICIAN ASSISTANT
Payer: COMMERCIAL

## 2017-05-02 VITALS
RESPIRATION RATE: 18 BRPM | SYSTOLIC BLOOD PRESSURE: 109 MMHG | OXYGEN SATURATION: 99 % | WEIGHT: 165 LBS | BODY MASS INDEX: 25.84 KG/M2 | TEMPERATURE: 97.5 F | HEART RATE: 104 BPM | DIASTOLIC BLOOD PRESSURE: 76 MMHG

## 2017-05-02 DIAGNOSIS — M62.838 NECK MUSCLE SPASM: ICD-10-CM

## 2017-05-02 DIAGNOSIS — G89.29 CHRONIC NECK PAIN: ICD-10-CM

## 2017-05-02 DIAGNOSIS — M54.2 CHRONIC NECK PAIN: ICD-10-CM

## 2017-05-02 DIAGNOSIS — M54.2 NECK PAIN: ICD-10-CM

## 2017-05-02 PROCEDURE — 20553 NJX 1/MLT TRIGGER POINTS 3/>: CPT

## 2017-05-02 PROCEDURE — 99283 EMERGENCY DEPT VISIT LOW MDM: CPT | Mod: 25

## 2017-05-02 PROCEDURE — 20553 NJX 1/MLT TRIGGER POINTS 3/>: CPT | Mod: Z6 | Performed by: PHYSICIAN ASSISTANT

## 2017-05-02 PROCEDURE — 99283 EMERGENCY DEPT VISIT LOW MDM: CPT | Mod: 25 | Performed by: PHYSICIAN ASSISTANT

## 2017-05-02 RX ORDER — BUPIVACAINE HYDROCHLORIDE 2.5 MG/ML
10 INJECTION, SOLUTION EPIDURAL; INFILTRATION; INTRACAUDAL ONCE
Status: DISCONTINUED | OUTPATIENT
Start: 2017-05-02 | End: 2017-05-03 | Stop reason: HOSPADM

## 2017-05-02 NOTE — ED AVS SNAPSHOT
[facility] Emergency Department    [address] [place] [address]    Phone:  [phone]    Fax:  [phone]                                       Joselito Abarca   MRN: [id number]    Department:  [facility] Emergency Department   Date of Visit:  5/2/2017           After Visit Summary Signature Page     I have received my discharge instructions, and my questions have been answered. I have discussed any challenges I see with this plan with the nurse or doctor.    ..........................................................................................................................................  Patient/Patient Representative Signature      ..........................................................................................................................................  Patient Representative Print Name and Relationship to Patient    ..................................................               ................................................  Date                                            Time    ..........................................................................................................................................  Reviewed by Signature/Title    ...................................................              ..............................................  Date                                                            Time

## 2017-05-02 NOTE — ED AVS SNAPSHOT
[facility] Emergency Department    [address] [place] [address]    Phone:  [phone]    Fax:  [phone]                                       Joselito Abarca   MRN: [id number]    Department:  [facility] Emergency [facility]   Date of Visit:  5/2/2017           Patient Information     Date Of Birth          1965        Your diagnoses for this visit were:     Chronic neck pain     Neck muscle spasm        You were seen by Rajendra Ojeda PA-C.      Follow-up Information     Follow up with [facility].    Specialty:  EMERGENCY MEDICINE    Why:  As needed, If symptoms worsen    Contact information:    [address] [place] [address] [phone]    Additional information:    From [address]: Exit at [address] on south side of [place]. Turn right on [address]. Turn left at stoplight on [address]. [facility] will be in view two blocks ahead        Discharge Instructions       1.  Please apply heat to your sore muscles for 20 minutes every 1-2 hours as needed to help loosen up the muscle spasms.    It was a pleasure working with you today!  I am glad that your neck muscles are feeling better with the trigger point injections!     24 Hour Appointment Hotline       To make an appointment at any [facility] clinic, call [phone] ([phone]). If you don't have a family doctor or clinic, we will help you find one. [facility] clinics are conveniently located to serve the needs of you and your family.             Review of your medicines      Our records show that you are taking the medicines listed below. If these are incorrect, please call your family doctor or clinic.        Dose / Directions Last dose taken    clonazePAM 0.5 MG tablet   Commonly known as:  klonoPIN   Dose:  0.25-0.5 mg   Quantity:  90 tablet        Take 0.5-1 tablets (0.25-0.5 mg) by mouth 3 times daily as needed for anxiety    Refills:  0        escitalopram 10 MG tablet   Commonly known as:  LEXAPRO   Dose:  10 mg   Indication:  Depression        Take 10 mg by mouth daily   Refills:  0        fluticasone 220 MCG/ACT Inhaler   Commonly known as:  FLOVENT HFA   Dose:  2 puff   Quantity:  3 Inhaler        Inhale 2 puffs into the lungs 2 times daily   Refills:  1        fluticasone 50 MCG/ACT spray   Commonly known as:  FLONASE   Quantity:  16 g        USE TWO SPRAYS IN EACH NOSTRIL EVERY DAY   Refills:  5        gabapentin 300 MG capsule   Commonly known as:  NEURONTIN   Quantity:  270 capsule        TAKE TWO CAPSULES BY MOUTH THREE TIMES A DAY   Refills:  11        ibuprofen 200 MG tablet   Commonly known as:  ADVIL/MOTRIN   Dose:  600 mg        Take 3 tablets (600 mg) by mouth every 6 hours as needed for other (mild pain)   Refills:  0        ipratropium - albuterol 0.5 mg/2.5 mg/3 mL 0.5-2.5 (3) MG/3ML neb solution   Commonly known as:  DUONEB   Dose:  1 vial   Quantity:  30 vial        Take 1 vial (3 mLs) by nebulization every 4 hours as needed for shortness of breath / dyspnea   Refills:  0        methadone 10 MG tablet   Commonly known as:  DOLOPHINE   Dose:  10 mg   Quantity:  90 tablet        Take 1 tablet (10 mg) by mouth every 8 hours as needed   Refills:  0        omeprazole 20 MG CR capsule   Commonly known as:  priLOSEC   Quantity:  30 capsule        TAKE 1 CAPSULE BY MOUTH DAILY, 30 TO 60 MINUTES BEFORE A MEAL.   Refills:  11        oxyCODONE 5 MG capsule   Commonly known as:  OXY-IR   Dose:  10 mg   Quantity:  360 capsule        Take 2 capsules (10 mg) by mouth every 4 hours as needed 2 caps q 4 hour prn pain up to 12 per day May fill 5/24/2012   Refills:  0        potassium chloride SA 20 MEQ CR tablet   Commonly known as:  K-DUR/KLOR-CON M   Dose:  20 mEq   Quantity:  20 tablet        Take 1 tablet (20 mEq) by mouth 3 times daily   Refills:  0        ranitidine 150 MG tablet   Commonly known as:  ZANTAC   Quantity:  30  tablet        TAKE ONE TABLET BY MOUTH EVERY MORNING   Refills:  10        traZODone 100 MG tablet   Commonly known as:  DESYREL   Dose:  300 mg   Quantity:  90 tablet        Take 3 tablets (300 mg) by mouth At Bedtime   Refills:  3        * VENTOLIN  (90 BASE) MCG/ACT Inhaler   Quantity:  18 g   Generic drug:  albuterol        INHALE 2 PUFFS INTO THE LUNGS EVERY 6 HOURS AS NEEDED FOR SHORTNESS OF BREATH   Refills:  0        * albuterol (2.5 MG/3ML) 0.083% neb solution   Quantity:  90 mL        NEBULIZE CONTENTS OF ONE VIAL EVERY 4 HOURS AS NEEDED FOR SHORTNESS OF BREATH /DYSPNEA OR WHEEZING   Refills:  0        vitamin D 2000 UNITS tablet   Quantity:  100 tablet        TAKE ONE TABLET BY MOUTH EVERY DAY   Refills:  3        zolpidem 10 MG tablet   Commonly known as:  AMBIEN   Dose:  10 mg        Take 10 mg by mouth nightly as needed   Refills:  0        * Notice:  This list has 2 medication(s) that are the same as other medications prescribed for you. Read the directions carefully, and ask your doctor or other care provider to review them with you.            Orders Needing Specimen Collection     None      Pending Results     No orders found from 4/30/2017 to 5/3/2017.            Pending Culture Results     No orders found from 4/30/2017 to 5/3/2017.            Pending Results Instructions     If you had any lab results that were not finalized at the time of your Discharge, you can call the ED Lab Result RN at [phone]. You will be contacted by this team for any positive Lab results or changes in treatment. The nurses are available 7 days a week from 10A to 6:30P.  You can leave a message 24 hours per day and they will return your call.        Thank you for choosing [facility]       Thank you for choosing [facility] for your care. Our goal is always to provide you with excellent care. Hearing back from our patients is one way we can continue to improve our services. Please take a few minutes to complete the  "written survey that you may receive in the mail after you visit with us. Thank you!        [employer] Information     [email or web address] lets you send messages to your doctor, view your test results, renew your prescriptions, schedule appointments and more. To sign up, go to [email or web address] . Click on \"Log in\" on the left side of the screen, which will take you to the Welcome page. Then click on \"Sign up Now\" on the right side of the page.     You will be asked to enter the access code listed below, as well as some personal information. Please follow the directions to create your username and password.     Your access code is: [email or web address]  Expires: 2017  8:23 PM     Your access code will  in 90 days. If you need help or a new code, please call your [facility] clinic or [phone].        Care EveryWhere ID     This is your Care EveryWhere ID. This could be used by other organizations to access your [facility] medical records  [id number]        After Visit Summary       This is your record. Keep this with you and show to your community pharmacist(s) and doctor(s) at your next visit.                  "

## 2017-05-03 NOTE — ED PROVIDER NOTES
History     Chief Complaint   Patient presents with     Headache     Neck Pain     HPI  Joselito Abarca is a 51 year old male who has a history of chronic neck pain and gets periodic headaches related to cervical neck tension. His regular pain medication is not helping his current concerns. He describes a headache extending from the right occiput up over the head and into the posterior oral space. Associated nausea but no vomiting. No photophobia or vision changes. He gets this about every 6 months per his report. Generally, trigger point injection takes care of the symptoms. He would like to repeat that today if at all possible. He denies any new numbness, tingling, or weakness of the right lateral upper extremities. She denies any fevers or chills. No recent URI symptoms.    I have reviewed the Medications, Allergies, Past Medical and Surgical History, and Social History in the [facility] system.    Past Medical History:   Diagnosis Date     Anxiety      GERD (gastroesophageal reflux disease)      Neck pain 6/15/2011        Patient Active Problem List   Diagnosis     Generalized anxiety disorder     Alcohol abuse, in remission     Esophageal reflux     Constipation     Thoracic or lumbosacral neuritis or radiculitis, unspecified     Intervertebral cervical disc disorder with myelopathy, cervical region     CARDIOVASCULAR SCREENING; LDL GOAL LESS THAN 160     Arthrodesis status     Neck pain     Health Care Home     Degeneration of cervical intervertebral disc     Chronic rhinitis     Disease of bronchial airway (HCC)     Leukocytosis     Tobacco use disorder     Cough     Nausea with vomiting     Acute respiratory failure with hypoxia (H)     Chronic pain     Moderate persistent asthma without complication     Chronic pain syndrome        Past Surgical History:   Procedure Laterality Date     DISCECTOMY LUMBAR POSTERIOR MICROSCOPIC ONE LEVEL  2/21/2012    Procedure:DISCECTOMY LUMBAR POSTERIOR MICROSCOPIC ONE LEVEL;  LEFT T1-T2 THORASIC HEMILAMINECTOMY MICRODISCECTOMY WITH MEXTRIX II ; Surgeon:SHARON PURI; Location:[facility] OR     FUSION CERVICAL ANTERIOR TWO LEVELS  1/29/2010     HC DRAIN/INJ MAJOR JOINT/BURSA W/O US  5/5/2008    Left sacroiliac joint injection.     HC INJ EPIDURAL LUMBAR/SACRAL W/WO CONTRAST  2009     HEAD & NECK SURGERY      2013     HERNIA REPAIR       INJECT BLOCK MEDIAL BRANCH CERVICAL/THORACIC/LUMBAR Bilateral 8/26/2015    Procedure: INJECT BLOCK MEDIAL BRANCH CERVICAL / THORACIC / LUMBAR;  Surgeon: Ronald Driscoll MD;  Location: [facility] OR     INJECT FACET JOINT Bilateral 5/27/2015    Procedure: INJECT FACET JOINT;  Surgeon: Ronald Driscoll MD;  Location: [facility] OR        Social History     Social History     Marital status: Single     Spouse name: N/A     Number of children: N/A     Years of education: N/A     Occupational History     umemployed      Social History Main Topics     Smoking status: Passive Smoke Exposure - Never Smoker     Types: Cigars     Last attempt to quit: 12/19/2009     Smokeless tobacco: Never Used     Alcohol use No      Comment: HX OF ABUSE-IN REMISSION     Drug use: No     Sexual activity: Yes     Partners: Female     Other Topics Concern     Not on file     Social History Narrative        No current facility-administered medications for this encounter.      Current Outpatient Prescriptions   Medication     ranitidine (ZANTAC) 150 MG tablet     Cholecalciferol (VITAMIN D) 2000 UNITS tablet     methadone (DOLOPHINE) 10 MG tablet     oxyCODONE (OXY-IR) 5 MG capsule     clonazePAM (KLONOPIN) 0.5 MG tablet     ipratropium - albuterol 0.5 mg/2.5 mg/3 mL (DUONEB) 0.5-2.5 (3) MG/3ML neb solution     fluticasone (FLONASE) 50 MCG/ACT spray     albuterol (2.5 MG/3ML) 0.083% nebulizer solution     gabapentin (NEURONTIN) 300 MG capsule     fluticasone (FLOVENT HFA) 220 MCG/ACT inhaler     omeprazole (PRILOSEC) 20 MG capsule     VENTOLIN  (90 BASE) MCG/ACT inhaler     traZODone  (DESYREL) 100 MG tablet     zolpidem (AMBIEN) 10 MG tablet     potassium chloride SA (K-DUR/KLOR-CON M) 20 MEQ CR tablet     ibuprofen (ADVIL,MOTRIN) 200 MG tablet     escitalopram (LEXAPRO) 10 MG tablet           Allergies   Allergen Reactions     Vicodin [Hydrocodone-Acetaminophen] Nausea     HEADACHE          Review of Systems   All other systems reviewed and are negative.      Physical Exam   BP: 109/76  Pulse: 104  Temp: 97.5  F (36.4  C)  Resp: 18  Weight: 74.8 kg (165 lb)  SpO2: 99 %  Physical Exam  Generally healthy appearing male in NAD who is active and non-toxic appearing.   Neck:  FROM with pain.  No abnormality on inspection.  Nontender along the spinous processes and Tender to palpation over the paraspinal muscles of the cervical  Spine, mostly in the occipital region.  Trapezius muscles tender with trigger points.  No pain with axial loading. Upper extremities with FROM and no pain.  Strength is equal and appropriate.  Bicep, tricep, and brachioradialis DTR's 2/4 without clonus.  Sensation intact to light touch.  Distal pulses normal.    Neuro:  Cranial nerves II-XII grossly intact.  Romberg is steady.  Gait WNL's.  Cerebellar testing is WNL's.  Sensation is intact to light touch throughout.    Skin:  No rashes or lesions are noted on inspection of the torso, face, and upper extremities.       ED Course     ED Course     Procedures       0.25% bupivacaine without epinephrine was used to inject 5 trigger points in the paravertebral cervical musculature, primarily at the level of the occiput. 1 trigger point injection was placed in each of the trapezius muscles. 1 cc of bupivacaine was used for each of the injections.  The patient had immediate relief of his symptoms and was very pleased with the results. He asked for discharge, rather than a trial of monitoring. I did apply a heat pack to the paracervical musculature to help with the discomfort. No complications of the procedure were noted.      Critical Care time:  none               Labs Ordered and Resulted from Time of ED Arrival Up to the Time of Departure from the ED - No data to display    Assessments & Plan (with Medical Decision Making)     Chronic neck pain  Neck muscle spasm     51 year old male with a history of chronic neck and back pain presents for an acute exacerbation of muscle spasms in the cervical spine and trapezius area. He generally gets these type of exacerbations about every 5-6 months. He has had excellent results with trigger point injections in the past, and he is hoping to have them repeated today. He did not have any red flag symptoms at this time, and appears to be an excellent candidate for trigger point injections. Trigger point injections were carried out in a standard fashion as noted in the procedure note above. A total of 7 trigger point injections were given. This provided immediate and excellent relief of his symptoms. He noted resolution of his headache. He is discharged in stable condition and was in agreement with the plan. We discussed heat to be applied to the muscles to help with muscle spasm. Return if symptoms worsening or changing. He was in agreement with this plan.      I have reviewed the nursing notes.    I have reviewed the findings, diagnosis, plan and need for follow up with the patient.    Discharge Medication List as of 5/2/2017 10:03 PM          Final diagnoses:   Chronic neck pain   Neck muscle spasm       Disclaimer: This note consists of symbols derived from keyboarding, dictation and/or voice recognition software. As a result, there may be errors in the script that have gone undetected. Please consider this when interpreting information found in this chart.      5/2/2017   Rajendra Ojeda PA-C   [facility] EMERGENCY DEPARTMENT     Rajendra Ojeda PA-C  05/03/17 0153

## 2017-05-03 NOTE — ED NOTES
Pt c/o chronic neck pain and a HA x 4 days.  States the only thing that helps when is gets this bad is injections.

## 2017-05-12 DIAGNOSIS — F41.1 GENERALIZED ANXIETY DISORDER: ICD-10-CM

## 2017-05-12 RX ORDER — CLONAZEPAM 0.5 MG/1
0.25-0.5 TABLET ORAL 3 TIMES DAILY PRN
Qty: 90 TABLET | Refills: 0 | Status: SHIPPED | OUTPATIENT
Start: 2017-05-12 | End: 2017-06-03

## 2017-05-12 NOTE — TELEPHONE ENCOUNTER
Clonazepam       Last Written Prescription Date: 04/10/2017  Last Fill Quantity: 90,  # refills: 0   Last Office Visit with [facility], [facility] or [facility] prescribing provider: 03/17/2017    Thanks  Dorothy [facility]   [phone]

## 2017-05-24 DIAGNOSIS — M50.30 DDD (DEGENERATIVE DISC DISEASE), CERVICAL: ICD-10-CM

## 2017-05-24 DIAGNOSIS — K21.9 ESOPHAGEAL REFLUX: ICD-10-CM

## 2017-05-24 DIAGNOSIS — J84.9 ILD (INTERSTITIAL LUNG DISEASE) (H): ICD-10-CM

## 2017-05-24 DIAGNOSIS — J45.40 MODERATE PERSISTENT ASTHMA WITHOUT COMPLICATION: Primary | ICD-10-CM

## 2017-05-24 DIAGNOSIS — M50.00 INTERVERTEBRAL CERVICAL DISC DISORDER WITH MYELOPATHY, CERVICAL REGION: ICD-10-CM

## 2017-05-25 RX ORDER — FLUTICASONE PROPIONATE 220 UG/1
AEROSOL, METERED RESPIRATORY (INHALATION)
Qty: 36 G | Refills: 1 | Status: SHIPPED | OUTPATIENT
Start: 2017-05-25 | End: 2018-07-23

## 2017-05-25 NOTE — TELEPHONE ENCOUNTER
Flovent,    Last Written Prescription Date: 5/13/16  Last Fill Quantity: 3, # refills: 1    Last Office Visit with [facility] or  Health prescribing provider:  3/17/17   Future Office Visit:       Date of Last Asthma Action Plan Letter:   Asthma Action Plan Q1 Year    Topic Date Due     Asthma Action Plan - yearly  11/02/2017      Asthma Control Test:   ACT Total Scores 3/17/2017   ACT TOTAL SCORE (Goal Greater than or Equal to 20) 17   In the past 12 months, how many times did you visit the emergency room for your asthma without being admitted to the hospital? 3   In the past 12 months, how many times were you hospitalized overnight because of your asthma? 1       Date of Last Spirometry Test:   No results found for this or any previous visit.      Omeprazole      Last Written Prescription Date: 4/29/16  Last Fill Quantity: 30,  # refills: 11   Last Office Visit with [facility], [facility] or [facility] prescribing provider: 3/17/17

## 2017-05-25 NOTE — TELEPHONE ENCOUNTER
Methadone 10mg  Last Written Prescription Date: 04/28/2017  Last Fill Quantity: 90,  # refills: 0   Last Office Visit with [facility], [facility] or [facility] prescribing provider: 03/17/2017      Oxycodone 5mg      Last Written Prescription Date: 04/28/2017  Last Fill Quantity: 360,  # refills: 0   Last Office Visit with [facility], [facility] or [facility] prescribing provider: 03/17/2017                                               Nancy Valencia, Pharmacy Technician  [facility]  [phone]

## 2017-05-26 RX ORDER — OXYCODONE HYDROCHLORIDE 5 MG/1
10 CAPSULE ORAL EVERY 4 HOURS PRN
Qty: 360 CAPSULE | Refills: 0 | Status: SHIPPED | OUTPATIENT
Start: 2017-05-26 | End: 2017-06-23

## 2017-05-26 RX ORDER — METHADONE HYDROCHLORIDE 10 MG/1
10 TABLET ORAL EVERY 8 HOURS PRN
Qty: 90 TABLET | Refills: 0 | Status: SHIPPED | OUTPATIENT
Start: 2017-05-26 | End: 2017-06-23

## 2017-06-03 DIAGNOSIS — F41.1 GENERALIZED ANXIETY DISORDER: ICD-10-CM

## 2017-06-03 NOTE — TELEPHONE ENCOUNTER
Clonazepam      Last Written Prescription Date: 5/12/17  Last Fill Quantity: 90,  # refills: 0   Last Office Visit with [facility], [facility] or [facility] prescribing provider: 3/17/17

## 2017-06-05 RX ORDER — CLONAZEPAM 0.5 MG/1
0.25-0.5 TABLET ORAL 3 TIMES DAILY PRN
Qty: 90 TABLET | Refills: 0 | Status: SHIPPED | OUTPATIENT
Start: 2017-06-05 | End: 2017-07-11

## 2017-06-12 ENCOUNTER — HOSPITAL ENCOUNTER (EMERGENCY)
Facility: CLINIC | Age: 52
Discharge: HOME | End: 2017-06-12
Attending: FAMILY MEDICINE | Admitting: FAMILY MEDICINE
Payer: COMMERCIAL

## 2017-06-12 VITALS
OXYGEN SATURATION: 99 % | DIASTOLIC BLOOD PRESSURE: 72 MMHG | HEART RATE: 53 BPM | HEIGHT: 67 IN | WEIGHT: 160 LBS | BODY MASS INDEX: 25.11 KG/M2 | RESPIRATION RATE: 17 BRPM | SYSTOLIC BLOOD PRESSURE: 112 MMHG | TEMPERATURE: 98 F

## 2017-06-12 DIAGNOSIS — G89.29 OTHER CHRONIC PAIN: ICD-10-CM

## 2017-06-12 DIAGNOSIS — M54.2 CHRONIC NECK PAIN: ICD-10-CM

## 2017-06-12 DIAGNOSIS — M79.18 MYOFASCIAL PAIN ON RIGHT SIDE: ICD-10-CM

## 2017-06-12 DIAGNOSIS — G89.29 CHRONIC NECK PAIN: ICD-10-CM

## 2017-06-12 DIAGNOSIS — M54.2 NECK PAIN: ICD-10-CM

## 2017-06-12 DIAGNOSIS — M79.10 MYALGIA: ICD-10-CM

## 2017-06-12 PROCEDURE — 20553 NJX 1/MLT TRIGGER POINTS 3/>: CPT | Mod: Z6 | Performed by: FAMILY MEDICINE

## 2017-06-12 PROCEDURE — 99284 EMERGENCY DEPT VISIT MOD MDM: CPT | Mod: 25 | Performed by: FAMILY MEDICINE

## 2017-06-12 PROCEDURE — 20553 NJX 1/MLT TRIGGER POINTS 3/>: CPT | Performed by: FAMILY MEDICINE

## 2017-06-12 PROCEDURE — 99283 EMERGENCY DEPT VISIT LOW MDM: CPT | Mod: 25 | Performed by: FAMILY MEDICINE

## 2017-06-12 PROCEDURE — 25000125 ZZHC RX 250: Performed by: FAMILY MEDICINE

## 2017-06-12 PROCEDURE — S0020 INJECTION, BUPIVICAINE HYDRO: HCPCS | Performed by: FAMILY MEDICINE

## 2017-06-12 RX ORDER — BUPIVACAINE HYDROCHLORIDE 2.5 MG/ML
10 INJECTION, SOLUTION EPIDURAL; INFILTRATION; INTRACAUDAL ONCE
Status: COMPLETED | OUTPATIENT
Start: 2017-06-12 | End: 2017-06-12

## 2017-06-12 RX ADMIN — BUPIVACAINE HYDROCHLORIDE 25 MG: 2.5 INJECTION, SOLUTION EPIDURAL; INFILTRATION; INTRACAUDAL at 21:55

## 2017-06-12 NOTE — ED AVS SNAPSHOT
[facility] Emergency Department    [address] [place] [address]    Phone:  [phone]    Fax:  [phone]                                       Joselito Abarca   MRN: [id number]    Department:  [facility] Emergency Department   Date of Visit:  6/12/2017           Patient Information     Date Of Birth          1965        Your diagnoses for this visit were:     Chronic neck pain     Myofascial pain on right side        You were seen by Becca Bains MD.      Follow-up Information     Follow up with Schoen, Gregory G, MD In 3 days.    Specialty:  Family Practice    Why:  if not improving    Contact information:    [facility]  [address] [place] [address] [phone]          Discharge Instructions       You received trigger point injections with bupivacaine tonight.    Follow-up with your primary care provider if you are no better in 2-3 days.    24 Hour Appointment Hotline       To make an appointment at any [facility], call [phone] ([phone]). If you don't have a family doctor or clinic, we will help you find one. [place] clinics are conveniently located to serve the needs of you and your family.             Review of your medicines      Our records show that you are taking the medicines listed below. If these are incorrect, please call your family doctor or clinic.        Dose / Directions Last dose taken    clonazePAM 0.5 MG tablet   Commonly known as:  klonoPIN   Dose:  0.25-0.5 mg   Quantity:  90 tablet        Take 0.5-1 tablets (0.25-0.5 mg) by mouth 3 times daily as needed for anxiety   Refills:  0        escitalopram 10 MG tablet   Commonly known as:  LEXAPRO   Dose:  10 mg   Indication:  Depression        Take 10 mg by mouth daily   Refills:  0        FLOVENT  MCG/ACT Inhaler   Quantity:  36 g   Generic drug:  fluticasone        INHALE 2 PUFFS INTO THE LUNGS TWICE DAILY   Refills:  1        * fluticasone 50  MCG/ACT spray   Commonly known as:  FLONASE   Quantity:  16 g        USE TWO SPRAYS IN EACH NOSTRIL EVERY DAY   Refills:  5        * fluticasone 50 MCG/ACT spray   Commonly known as:  FLONASE   Quantity:  16 g        USE TWO SPRAYS IN EACH NOSTRIL EVERY DAY   Refills:  5        gabapentin 300 MG capsule   Commonly known as:  NEURONTIN   Quantity:  270 capsule        TAKE TWO CAPSULES BY MOUTH THREE TIMES A DAY   Refills:  11        ibuprofen 200 MG tablet   Commonly known as:  ADVIL/MOTRIN   Dose:  600 mg        Take 3 tablets (600 mg) by mouth every 6 hours as needed for other (mild pain)   Refills:  0        ipratropium - albuterol 0.5 mg/2.5 mg/3 mL 0.5-2.5 (3) MG/3ML neb solution   Commonly known as:  DUONEB   Dose:  1 vial   Quantity:  30 vial        Take 1 vial (3 mLs) by nebulization every 4 hours as needed for shortness of breath / dyspnea   Refills:  0        methadone 10 MG tablet   Commonly known as:  DOLOPHINE   Dose:  10 mg   Quantity:  90 tablet        Take 1 tablet (10 mg) by mouth every 8 hours as needed   Refills:  0        omeprazole 20 MG CR capsule   Commonly known as:  priLOSEC   Quantity:  30 capsule        TAKE 1 CAPSULE BY MOUTH DAILY, 30 TO 60 MINUTES BEFORE A MEAL.   Refills:  9        oxyCODONE 5 MG capsule   Commonly known as:  OXY-IR   Dose:  10 mg   Quantity:  360 capsule        Take 2 capsules (10 mg) by mouth every 4 hours as needed 2 caps q 4 hour prn pain up to 12 per day May fill 5/24/2012   Refills:  0        potassium chloride SA 20 MEQ CR tablet   Commonly known as:  K-DUR/KLOR-CON M   Dose:  20 mEq   Quantity:  20 tablet        Take 1 tablet (20 mEq) by mouth 3 times daily   Refills:  0        ranitidine 150 MG tablet   Commonly known as:  ZANTAC   Quantity:  30 tablet        TAKE ONE TABLET BY MOUTH EVERY MORNING   Refills:  10        traZODone 100 MG tablet   Commonly known as:  DESYREL   Dose:  300 mg   Quantity:  90 tablet        Take 3 tablets (300 mg) by mouth At Bedtime    Refills:  3        * VENTOLIN  (90 BASE) MCG/ACT Inhaler   Quantity:  18 g   Generic drug:  albuterol        INHALE 2 PUFFS INTO THE LUNGS EVERY 6 HOURS AS NEEDED FOR SHORTNESS OF BREATH   Refills:  0        * albuterol (2.5 MG/3ML) 0.083% neb solution   Quantity:  90 mL        NEBULIZE CONTENTS OF ONE VIAL EVERY 4 HOURS AS NEEDED FOR SHORTNESS OF BREATH /DYSPNEA OR WHEEZING   Refills:  0        vitamin D 2000 UNITS tablet   Quantity:  100 tablet        TAKE ONE TABLET BY MOUTH EVERY DAY   Refills:  3        zolpidem 10 MG tablet   Commonly known as:  AMBIEN   Dose:  10 mg        Take 10 mg by mouth nightly as needed   Refills:  0        * Notice:  This list has 4 medication(s) that are the same as other medications prescribed for you. Read the directions carefully, and ask your doctor or other care provider to review them with you.            Orders Needing Specimen Collection     None      Pending Results     No orders found from 6/10/2017 to 6/13/2017.            Pending Culture Results     No orders found from 6/10/2017 to 6/13/2017.            Pending Results Instructions     If you had any lab results that were not finalized at the time of your Discharge, you can call the ED Lab Result RN at [phone]. You will be contacted by this team for any positive Lab results or changes in treatment. The nurses are available 7 days a week from 10A to 6:30P.  You can leave a message 24 hours per day and they will return your call.        Thank you for choosing [facility]       Thank you for choosing [facility] for your care. Our goal is always to provide you with excellent care. Hearing back from our patients is one way we can continue to improve our services. Please take a few minutes to complete the written survey that you may receive in the mail after you visit with us. Thank you!        [facility] Information     [facility] lets you send messages to your doctor, view your test results, renew your prescriptions,  "schedule appointments and more. To sign up, go to [email or web address] . Click on \"Log in\" on the left side of the screen, which will take you to the Welcome page. Then click on \"Sign up Now\" on the right side of the page.     You will be asked to enter the access code listed below, as well as some personal information. Please follow the directions to create your username and password.     Your access code is: [email or web address]  Expires: 9/10/2017 10:21 PM     Your access code will  in 90 days. If you need help or a new code, please call your [facility] clinic or [phone].        Care EveryWhere ID     This is your Care EveryWhere ID. This could be used by other organizations to access your [facility] medical records  [id number]        After Visit Summary       This is your record. Keep this with you and show to your community pharmacist(s) and doctor(s) at your next visit.                  "

## 2017-06-12 NOTE — ED AVS SNAPSHOT
[facility] Emergency Department    [address] [place] [address]    Phone:  [phone]    Fax:  [phone]                                       Joselito Abarca   MRN: [id number]    Department:  [facility] Emergency Department   Date of Visit:  6/12/2017           After Visit Summary Signature Page     I have received my discharge instructions, and my questions have been answered. I have discussed any challenges I see with this plan with the nurse or doctor.    ..........................................................................................................................................  Patient/Patient Representative Signature      ..........................................................................................................................................  Patient Representative Print Name and Relationship to Patient    ..................................................               ................................................  Date                                            Time    ..........................................................................................................................................  Reviewed by Signature/Title    ...................................................              ..............................................  Date                                                            Time

## 2017-06-13 NOTE — ED NOTES
Presents with chronic neck pain that he states he needs to come get injections in at times.  Pain started getting worse a couple days ago.

## 2017-06-13 NOTE — ED PROVIDER NOTES
[facility] ED Provider Note   CC:     Chief Complaint   Patient presents with     Neck Pain     HPI:  Joselito Abarca is a 51 year old male who presented to the emergency department with complaints of neck pain. He states that he has chronic right sided neck pain, that is alleviated with trigger point injections. He endorses that the flare up began Friday after having his granddaughter with him and playing with her for a few days. The patient reports that his pain is at a 9 out of 10 currently, and that his baseline pain level is a 2 out of 10.     Problem List:  Patient Active Problem List    Diagnosis     Chronic pain syndrome     Moderate persistent asthma without complication     Acute respiratory failure with hypoxia (H)     Nausea with vomiting     Cough     Tobacco use disorder     Leukocytosis     Disease of bronchial airway (HCC)     Chronic pain     Degeneration of cervical intervertebral disc     Chronic rhinitis     Health Care Home     Arthrodesis status     Neck pain     CARDIOVASCULAR SCREENING; LDL GOAL LESS THAN 160     Intervertebral cervical disc disorder with myelopathy, cervical region     Constipation     Thoracic or lumbosacral neuritis or radiculitis, unspecified     Esophageal reflux     Generalized anxiety disorder     Alcohol abuse, in remission       MEDS:   Discharge Medication List as of 6/12/2017 10:21 PM      CONTINUE these medications which have NOT CHANGED    Details   clonazePAM (KLONOPIN) 0.5 MG tablet Take 0.5-1 tablets (0.25-0.5 mg) by mouth 3 times daily as needed for anxiety, Disp-90 tablet, R-0, [facility]      oxyCODONE (OXY-IR) 5 MG capsule Take 2 capsules (10 mg) by mouth every 4 hours as needed 2 caps q 4 hour prn pain up to 12 per day May fill 5/24/2012, Disp-360 capsule, R-0, [facility] - 04/28/2017      methadone (DOLOPHINE) 10 MG tablet Take 1 tablet (10 mg) by mouth every 8 hours as  needed, Disp-90 tablet, R-0, [facility] 4/28/17      omeprazole (PRILOSEC) 20 MG CR capsule TAKE 1 CAPSULE BY MOUTH DAILY, 30 TO 60 MINUTES BEFORE A MEAL., Disp-30 capsule, R-9, E-Prescribe      FLOVENT  MCG/ACT Inhaler INHALE 2 PUFFS INTO THE LUNGS TWICE DAILY, Disp-36 g, R-1, E-Prescribe      !! fluticasone (FLONASE) 50 MCG/ACT spray USE TWO SPRAYS IN EACH NOSTRIL EVERY DAY, Disp-16 g, R-5, E-Prescribe      ranitidine (ZANTAC) 150 MG tablet TAKE ONE TABLET BY MOUTH EVERY MORNING, Disp-30 tablet, R-10, E-Prescribe      Cholecalciferol (VITAMIN D) 2000 UNITS tablet TAKE ONE TABLET BY MOUTH EVERY DAY, Disp-100 tablet, R-3, E-Prescribe      ipratropium - albuterol 0.5 mg/2.5 mg/3 mL (DUONEB) 0.5-2.5 (3) MG/3ML neb solution Take 1 vial (3 mLs) by nebulization every 4 hours as needed for shortness of breath / dyspnea, Disp-30 vial, R-0, E-Prescribe      potassium chloride SA (K-DUR/KLOR-CON M) 20 MEQ CR tablet Take 1 tablet (20 mEq) by mouth 3 times daily, Disp-20 tablet, R-0, E-PrescribeTake one tablet 3 times a day with food until rechecked in clinic with Dr. Schoen on Friday and he can direct the number of tablets to take after that visit.      !! fluticasone (FLONASE) 50 MCG/ACT spray USE TWO SPRAYS IN EACH NOSTRIL EVERY DAY, Disp-16 g, R-5, E-Prescribe      albuterol (2.5 MG/3ML) 0.083% nebulizer solution NEBULIZE CONTENTS OF ONE VIAL EVERY 4 HOURS AS NEEDED FOR SHORTNESS OF BREATH /DYSPNEA OR WHEEZING, Disp-90 mL, R-0, E-Prescribe      gabapentin (NEURONTIN) 300 MG capsule TAKE TWO CAPSULES BY MOUTH THREE TIMES A DAY, Disp-270 capsule, R-11, E-Prescribe      VENTOLIN  (90 BASE) MCG/ACT inhaler INHALE 2 PUFFS INTO THE LUNGS EVERY 6 HOURS AS NEEDED FOR SHORTNESS OF BREATH, Disp-18 g, R-0, E-Prescribe      ibuprofen (ADVIL,MOTRIN) 200 MG tablet Take 3 tablets (600 mg) by mouth every 6 hours as needed for other (mild pain), Historical      traZODone (DESYREL) 100 MG tablet Take 3  "tablets (300 mg) by mouth At Bedtime, Disp-90 tablet, R-3, E-Prescribe      escitalopram (LEXAPRO) 10 MG tablet Take 10 mg by mouth daily , Historical      zolpidem (AMBIEN) 10 MG tablet Take 10 mg by mouth nightly as needed , Historical       !! - Potential duplicate medications found. Please discuss with provider.          ALLERGIES:    Allergies   Allergen Reactions     Vicodin [Hydrocodone-Acetaminophen] Nausea     HEADACHE       Past medical, surgical, family and social histories, triage and nursing notes were all reviewed.    Review of Systems   All other systems were reviewed and are negative    Physical Exam     Vitals were reviewed  Patient Vitals for the past 8 hrs:   BP Temp Temp src Pulse Resp SpO2 Height Weight   06/12/17 2210 112/72 - - 53 - - - -   06/12/17 2100 116/89 98  F (36.7  C) Oral 67 17 99 % 1.702 m (5' 7\") 72.6 kg (160 lb)     GENERAL APPEARANCE: Alert, moderate distress due to pain  FACE: normal facies  EYES: Pupils are equal  HENT: normal external exam  NECK: Trigger point tenderness along the right side of the neck and shoulder  RESP: normal respiratory effort  MS: Normal muscle tone, no atrophy, muscle spasms along the right side of the neck and upper shoulders/back  SKIN: no worrisome rash  NEURO: no facial droop; no focal deficits, speech is normal        Available Lab/Imaging Results   No results found for this or any previous visit (from the past 24 hour(s)).    Procedure note: Patient had 5 myofascial trigger point injections with a total of 13 ml of Bupivacaine 0.25%. Patient reported relief and there were no complications shortly after the injections.    Impression     Final diagnoses:   Chronic neck pain   Myofascial pain on right side       ED Course & Medical Decision Making   Joselito Abarca is a 51 year old male who presented to the emergency department with acute exacerbation of his chronic neck pain.  Chin has myofascial pain along the right side of the neck, shoulder and " back.  Some spasms noted of the musculature in this area.  Trigger point injections were locally anesthetized with a total of 13 mL also of bupivacaine 0.25% with improvement of his overall pain level..  Patient will continue his chronic pain regimen.  Recheck in the clinic in 2-3 days if not continuing to see improvement.  Return to the ED at any time if symptoms worsen.      Written after-visit summary and instructions were given at the time of discharge.      Discharge Medication List as of 6/12/2017 10:21 PM        This document serves as a record of services personally performed by Becca Bains MD. It was created on their behalf by Melody Joiner, a trained medical scribe. The creation of this record is based on the provider's personal observations and the statements of the patient. This document has been checked and approved by the attending provider.    This note was completed in part using Dragon voice recognition, and may contain word and grammatical errors.        Becca Bains MD  06/13/17 0302

## 2017-06-13 NOTE — DISCHARGE INSTRUCTIONS
You received trigger point injections with bupivacaine tonight.    Follow-up with your primary care provider if you are no better in 2-3 days.

## 2017-06-23 DIAGNOSIS — M50.00 INTERVERTEBRAL CERVICAL DISC DISORDER WITH MYELOPATHY, CERVICAL REGION: ICD-10-CM

## 2017-06-23 DIAGNOSIS — M50.30 DDD (DEGENERATIVE DISC DISEASE), CERVICAL: ICD-10-CM

## 2017-06-23 RX ORDER — METHADONE HYDROCHLORIDE 10 MG/1
10 TABLET ORAL EVERY 8 HOURS PRN
Qty: 90 TABLET | Refills: 0 | Status: SHIPPED | OUTPATIENT
Start: 2017-06-23 | End: 2017-07-24

## 2017-06-23 RX ORDER — OXYCODONE HYDROCHLORIDE 5 MG/1
10 CAPSULE ORAL EVERY 4 HOURS PRN
Qty: 360 CAPSULE | Refills: 0 | Status: SHIPPED | OUTPATIENT
Start: 2017-06-23 | End: 2017-07-24

## 2017-06-23 NOTE — TELEPHONE ENCOUNTER
methadone  Last Written Prescription Date: 5/26/17  Last Fill Quantity: 90,  # refills: 0   Last Office Visit with [facility] [place], [facility] or  Health prescribing provider: 3/17/17      oxycodone  Last Written Prescription Date: 5/26/17  Last Fill Quantity: 360,  # refills: 0   Last Office Visit with [facility] [place], [facility] or [facility] prescribing provider: 3/17/17    Kandi Negrete  Pharmacy [facility].  [facility]  [phone]

## 2017-06-26 DIAGNOSIS — M50.00 INTERVERTEBRAL CERVICAL DISC DISORDER WITH MYELOPATHY, CERVICAL REGION: ICD-10-CM

## 2017-06-26 DIAGNOSIS — M50.30 DEGENERATION OF CERVICAL INTERVERTEBRAL DISC: ICD-10-CM

## 2017-06-27 RX ORDER — GABAPENTIN 300 MG/1
CAPSULE ORAL
Qty: 270 CAPSULE | Refills: 11 | Status: SHIPPED | OUTPATIENT
Start: 2017-06-27 | End: 2018-08-02

## 2017-07-11 DIAGNOSIS — F41.1 GENERALIZED ANXIETY DISORDER: ICD-10-CM

## 2017-07-12 RX ORDER — CLONAZEPAM 0.5 MG/1
0.25-0.5 TABLET ORAL 3 TIMES DAILY PRN
Qty: 90 TABLET | Refills: 0 | Status: SHIPPED | OUTPATIENT
Start: 2017-07-12 | End: 2017-08-10

## 2017-07-12 NOTE — TELEPHONE ENCOUNTER
Clonazepam 0.5mg      Last Written Prescription Date: 06/13/2017  Last Fill Quantity: 90,  # refills: 0   Last Office Visit with [facility] or [facility] prescribing provider: 03/17/2017    Nancy Valencia, Pharmacy Technician  [facility]  [phone]

## 2017-07-24 DIAGNOSIS — M50.30 DDD (DEGENERATIVE DISC DISEASE), CERVICAL: ICD-10-CM

## 2017-07-24 DIAGNOSIS — M50.00 INTERVERTEBRAL CERVICAL DISC DISORDER WITH MYELOPATHY, CERVICAL REGION: ICD-10-CM

## 2017-07-24 NOTE — TELEPHONE ENCOUNTER
Methadone 10mg      Last Written Prescription Date: 06/23/2017  Last Fill Quantity: 90,  # refills: 0   Last Office Visit with [facility], [facility] or [facility] prescribing provider: 03/17/2017    Oxycodone 5mg      Last Written Prescription Date: 06/23/2017  Last Fill Quantity: 360,  # refills: 0   Last Office Visit with [facility], [facility] or [facility] prescribing provider: 03/17/2017    Nancy Valencia, Pharmacy Technician  [facility]  [phone]

## 2017-07-25 RX ORDER — OXYCODONE HYDROCHLORIDE 5 MG/1
10 CAPSULE ORAL EVERY 4 HOURS PRN
Qty: 360 CAPSULE | Refills: 0 | Status: SHIPPED | OUTPATIENT
Start: 2017-07-25 | End: 2017-08-23

## 2017-07-25 RX ORDER — METHADONE HYDROCHLORIDE 10 MG/1
10 TABLET ORAL EVERY 8 HOURS PRN
Qty: 90 TABLET | Refills: 0 | Status: SHIPPED | OUTPATIENT
Start: 2017-07-25 | End: 2017-08-23

## 2017-07-27 ENCOUNTER — TELEPHONE (OUTPATIENT)
Dept: FAMILY MEDICINE | Facility: CLINIC | Age: 52
End: 2017-07-27

## 2017-07-27 NOTE — TELEPHONE ENCOUNTER
Panel Management Review      Patient has the following on his problem list:     Asthma review     ACT Total Scores 3/17/2017   ACT TOTAL SCORE (Goal Greater than or Equal to 20) 17   In the past 12 months, how many times did you visit the emergency room for your asthma without being admitted to the hospital? 3   In the past 12 months, how many times were you hospitalized overnight because of your asthma? 1      1. Is Asthma diagnosis on the Problem List? Yes    2. Is Asthma listed on Health Maintenance? No   3. Patient is due for:  none        Composite cancer screening  Chart review shows that this patient is due/due soon for the following None  Summary:    Patient is due/failing the following:   NONE    Action needed:   Patient needs office visit for Follow up asthma.    Type of outreach:    Phone, spoke to patient.  patient made an appt  and aug 21 17    Questions for provider review:    None                                                                                                                                    Katt Negron MA 7/27/2017         Chart routed to  .

## 2017-08-04 ENCOUNTER — HOSPITAL ENCOUNTER (EMERGENCY)
Facility: CLINIC | Age: 52
Discharge: HOME | End: 2017-08-04
Attending: FAMILY MEDICINE | Admitting: FAMILY MEDICINE
Payer: COMMERCIAL

## 2017-08-04 VITALS
SYSTOLIC BLOOD PRESSURE: 134 MMHG | HEART RATE: 85 BPM | OXYGEN SATURATION: 99 % | DIASTOLIC BLOOD PRESSURE: 92 MMHG | TEMPERATURE: 97.3 F | RESPIRATION RATE: 20 BRPM

## 2017-08-04 DIAGNOSIS — M79.18 MYOFACIAL MUSCLE PAIN: ICD-10-CM

## 2017-08-04 DIAGNOSIS — G89.29 CHRONIC NECK PAIN: ICD-10-CM

## 2017-08-04 DIAGNOSIS — M54.2 CHRONIC NECK PAIN: ICD-10-CM

## 2017-08-04 DIAGNOSIS — M54.2 CERVICALGIA: ICD-10-CM

## 2017-08-04 DIAGNOSIS — G89.29 OTHER CHRONIC PAIN: ICD-10-CM

## 2017-08-04 PROCEDURE — 20552 NJX 1/MLT TRIGGER POINT 1/2: CPT | Mod: Z6 | Performed by: FAMILY MEDICINE

## 2017-08-04 PROCEDURE — 99283 EMERGENCY DEPT VISIT LOW MDM: CPT | Mod: 25 | Performed by: FAMILY MEDICINE

## 2017-08-04 PROCEDURE — 20552 NJX 1/MLT TRIGGER POINT 1/2: CPT | Performed by: FAMILY MEDICINE

## 2017-08-04 PROCEDURE — 99284 EMERGENCY DEPT VISIT MOD MDM: CPT | Mod: 25 | Performed by: FAMILY MEDICINE

## 2017-08-04 RX ORDER — IBUPROFEN 800 MG/1
800 TABLET, FILM COATED ORAL EVERY 8 HOURS PRN
Qty: 30 TABLET | Refills: 0 | Status: SHIPPED | OUTPATIENT
Start: 2017-08-04 | End: 2017-11-17

## 2017-08-04 RX ORDER — BUPIVACAINE HYDROCHLORIDE 2.5 MG/ML
10 INJECTION, SOLUTION EPIDURAL; INFILTRATION; INTRACAUDAL ONCE
Status: DISCONTINUED | OUTPATIENT
Start: 2017-08-04 | End: 2017-08-04 | Stop reason: HOSPADM

## 2017-08-04 NOTE — ED AVS SNAPSHOT
[facility] Emergency Department    [address] [place] [address]    Phone:  [phone]    Fax:  [phone]                                       Joselito Abarca   MRN: [id number]    Department:  [facility] Emergency Department   Date of Visit:  8/4/2017           After Visit Summary Signature Page     I have received my discharge instructions, and my questions have been answered. I have discussed any challenges I see with this plan with the nurse or doctor.    ..........................................................................................................................................  Patient/Patient Representative Signature      ..........................................................................................................................................  Patient Representative Print Name and Relationship to Patient    ..................................................               ................................................  Date                                            Time    ..........................................................................................................................................  Reviewed by Signature/Title    ...................................................              ..............................................  Date                                                            Time

## 2017-08-04 NOTE — DISCHARGE INSTRUCTIONS
Nonsurgical Treatment of Cervical Spine Problems  Cervical spine problems can often be treated without surgery. Choices include rest, medicines, or injections. Your healthcare provider may also suggest physical therapy or certain exercises. These may all help to relieve your symptoms. These treatments are often successful. But if your symptoms don t subside, talk to your healthcare provider. He or she may tell you that surgery is your best choice.  Relieving your symptoms  Your healthcare provider may recommend:    Medicines. These help to reduce pain and inflammation.    Epidural steroid injections. These are injections into the spinal canal near the spinal cord. They may relieve severe pain and reduce inflammation.    Restricting aggravating activities. This can help to give your cervical spine time to heal.    A soft cervical collar. This can help to support your head while keeping your cervical spine aligned.    Traction. This may help relieve pressure on the irritated nerves.  Restoring mobility and strength  Your healthcare provider may recommend that you work with a physical therapist. This can help you regain mobility and strength in your neck. Physical therapy may last for 4 to 8 weeks. It may include:    Exercises to improve your neck s range of motion and strength.    Evaluation and correction of posture and body movements. This can correct problems that affect your cervical spine.    Heat, massage, and traction to help relieve your symptoms.  Self-care  You ll take an active role in your own therapy. To protect your neck from further injury:    Follow any exercise program given to you by your healthcare provider or physical therapist.    Practice good posture while sitting, standing, or moving.    Have your workspace evaluated. Rearrange it if needed.    When lying down, support your neck. You can use a special cervical pillow or rolled-up towel.   Date Last Reviewed: 10/5/2015    0740-6022 The  [employer]. [address] [place] [address], [place] [address]. All rights reserved. This information is not intended as a substitute for professional medical care. Always follow your healthcare professional's instructions.          Chronic Pain  Pain serves an important role. It lets you know something is wrong that needs your attention. When the body heals, pain normally goes away.  When pain lasts longer than six months, it is called  chronic  pain. This is pain that is present even after the body has healed. Chronic pain can cause mood problems and get in the way of your relationships and your daily life.  A number of conditions can cause chronic pain. Some of the more common include:    Previous surgery    An old injury    Infection    Diseases such as diabetes    Nerve damage    Back injury    Arthritis    Migraine or other headaches    Fibromyalgia    Cancer  Depression and stress can make chronic pain symptoms worse. In some cases, a cause for the pain cannot be found.   Treatment  Treatment can greatly reduce pain. In many cases, pain can become less severe, occur less often, and interfere less with your daily life. Chronic pain is often treated with a combination of medicines, therapies, and lifestyle changes. You will work closely with your healthcare provider to find a treatment plan that works best for you.    Ask your healthcare provider for a referral to a pain management specialty center. These can provide the most recent and proven pain management strategies, along with emotional support and comprehensive services.    Several different types of medicines may be prescribed for chronic pain. Work with your healthcare provider to develop a medicine plan that helps manage your pain.    Physical therapy can be very effective in helping reduce certain types of chronic pain.    Occupational therapy teaches you how to do routine tasks of daily living in ways that lessen your discomfort.    Psychological  therapy can help you cope better with stress and pain.    Other therapies such as meditation, yoga, biofeedback, massage, and acupuncture can also help manage chronic pain.    Changing certain habits can help reduce chronic pain. They include:    Eating healthy    Developing an exercise routine    Getting enough sleep at night    Stopping smoking and limiting alcohol use    Losing excess weight  Follow-up care  Follow up with your healthcare provider as advised. Let your healthcare provider know if your current treatment plan is working or if changes are needed.  Resources  For more information, contact:    [employer] [place] for Headache Society www.achenet.org    American Chronic Pain Association www.theacpa.org 008-792-5620  Date Last Reviewed: 7/26/2015 2000-2017 [employer]. [address] [place] [address], [place] [address]. All rights reserved. This information is not intended as a substitute for professional medical care. Always follow your healthcare professional's instructions.

## 2017-08-04 NOTE — ED AVS SNAPSHOT
[facility] Emergency Department    [address] [place] [address]    Phone:  [phone]    Fax:  [phone]                                       Joselito Abarca   MRN: [id number]    Department:  [facility] Emergency Department   Date of Visit:  8/4/2017           Patient Information     Date Of Birth          1965        Your diagnoses for this visit were:     Myofacial muscle pain     Chronic neck pain        You were seen by Kirk Jaramillo MD.      Follow-up Information     Follow up with Schoen, Gregory G, MD. Schedule an appointment as soon as possible for a visit in 4 days.    Specialty:  Family Practice    Why:  If not improving.    Contact information:    [facility]  [address] [place] [id number] [phone]          Discharge Instructions         Nonsurgical Treatment of Cervical Spine Problems  Cervical spine problems can often be treated without surgery. Choices include rest, medicines, or injections. Your healthcare provider may also suggest physical therapy or certain exercises. These may all help to relieve your symptoms. These treatments are often successful. But if your symptoms don t subside, talk to your healthcare provider. He or she may tell you that surgery is your best choice.  Relieving your symptoms  Your healthcare provider may recommend:    Medicines. These help to reduce pain and inflammation.    Epidural steroid injections. These are injections into the spinal canal near the spinal cord. They may relieve severe pain and reduce inflammation.    Restricting aggravating activities. This can help to give your cervical spine time to heal.    A soft cervical collar. This can help to support your head while keeping your cervical spine aligned.    Traction. This may help relieve pressure on the irritated nerves.  Restoring mobility and strength  Your healthcare provider may recommend that you work with a physical  therapist. This can help you regain mobility and strength in your neck. Physical therapy may last for 4 to 8 weeks. It may include:    Exercises to improve your neck s range of motion and strength.    Evaluation and correction of posture and body movements. This can correct problems that affect your cervical spine.    Heat, massage, and traction to help relieve your symptoms.  Self-care  You ll take an active role in your own therapy. To protect your neck from further injury:    Follow any exercise program given to you by your healthcare provider or physical therapist.    Practice good posture while sitting, standing, or moving.    Have your workspace evaluated. Rearrange it if needed.    When lying down, support your neck. You can use a special cervical pillow or rolled-up towel.   Date Last Reviewed: 10/5/2015    7488-3721 [employer]. [address] [place] [address], [place] [address]. All rights reserved. This information is not intended as a substitute for professional medical care. Always follow your healthcare professional's instructions.          Chronic Pain  Pain serves an important role. It lets you know something is wrong that needs your attention. When the body heals, pain normally goes away.  When pain lasts longer than six months, it is called  chronic  pain. This is pain that is present even after the body has healed. Chronic pain can cause mood problems and get in the way of your relationships and your daily life.  A number of conditions can cause chronic pain. Some of the more common include:    Previous surgery    An old injury    Infection    Diseases such as diabetes    Nerve damage    Back injury    Arthritis    Migraine or other headaches    Fibromyalgia    Cancer  Depression and stress can make chronic pain symptoms worse. In some cases, a cause for the pain cannot be found.   Treatment  Treatment can greatly reduce pain. In many cases, pain can become less severe, occur less often, and  interfere less with your daily life. Chronic pain is often treated with a combination of medicines, therapies, and lifestyle changes. You will work closely with your healthcare provider to find a treatment plan that works best for you.    Ask your healthcare provider for a referral to a pain management specialty center. These can provide the most recent and proven pain management strategies, along with emotional support and comprehensive services.    Several different types of medicines may be prescribed for chronic pain. Work with your healthcare provider to develop a medicine plan that helps manage your pain.    Physical therapy can be very effective in helping reduce certain types of chronic pain.    Occupational therapy teaches you how to do routine tasks of daily living in ways that lessen your discomfort.    Psychological therapy can help you cope better with stress and pain.    Other therapies such as meditation, yoga, biofeedback, massage, and acupuncture can also help manage chronic pain.    Changing certain habits can help reduce chronic pain. They include:    Eating healthy    Developing an exercise routine    Getting enough sleep at night    Stopping smoking and limiting alcohol use    Losing excess weight  Follow-up care  Follow up with your healthcare provider as advised. Let your healthcare provider know if your current treatment plan is working or if changes are needed.  Resources  For more information, contact:    American [place] for Headache Society www.achenet.org    American Chronic Pain Association www.theacpa.org 004-812-4929  Date Last Reviewed: 7/26/2015 2000-2017 [employer]. [address] [place] [address], [place] [address]. All rights reserved. This information is not intended as a substitute for professional medical care. Always follow your healthcare professional's instructions.          Future Appointments        Provider Department Dept Phone Center    8/21/2017 4:30 PM  Gregory G. Schoen, MD [facility] [phone] [facility]      24 Hour Appointment Hotline       To make an appointment at any [facility] call [phone] ([phone]). If you don't have a family doctor or clinic, we will help you find one. [facility] are conveniently located to serve the needs of you and your family.             Review of your medicines      CONTINUE these medicines which may have CHANGED, or have new prescriptions. If we are uncertain of the size of tablets/capsules you have at home, strength may be listed as something that might have changed.        Dose / Directions Last dose taken    ibuprofen 800 MG tablet   Commonly known as:  ADVIL/MOTRIN   Dose:  800 mg   What changed:    - medication strength  - how much to take  - when to take this  - reasons to take this   Quantity:  30 tablet        Take 1 tablet (800 mg) by mouth every 8 hours as needed for pain   Refills:  0          Our records show that you are taking the medicines listed below. If these are incorrect, please call your family doctor or clinic.        Dose / Directions Last dose taken    clonazePAM 0.5 MG tablet   Commonly known as:  klonoPIN   Dose:  0.25-0.5 mg   Quantity:  90 tablet        Take 0.5-1 tablets (0.25-0.5 mg) by mouth 3 times daily as needed for anxiety   Refills:  0        escitalopram 10 MG tablet   Commonly known as:  LEXAPRO   Dose:  10 mg   Indication:  Depression        Take 10 mg by mouth daily   Refills:  0        FLOVENT  MCG/ACT Inhaler   Quantity:  36 g   Generic drug:  fluticasone        INHALE 2 PUFFS INTO THE LUNGS TWICE DAILY   Refills:  1        * fluticasone 50 MCG/ACT spray   Commonly known as:  FLONASE   Quantity:  16 g        USE TWO SPRAYS IN EACH NOSTRIL EVERY DAY   Refills:  5        * fluticasone 50 MCG/ACT spray   Commonly known as:  FLONASE   Quantity:  16 g        USE TWO SPRAYS IN EACH NOSTRIL EVERY DAY   Refills:  5        gabapentin 300 MG  capsule   Commonly known as:  NEURONTIN   Quantity:  270 capsule        TAKE TWO CAPSULES BY MOUTH THREE TIMES A DAY   Refills:  11        ipratropium - albuterol 0.5 mg/2.5 mg/3 mL 0.5-2.5 (3) MG/3ML neb solution   Commonly known as:  DUONEB   Dose:  1 vial   Quantity:  30 vial        Take 1 vial (3 mLs) by nebulization every 4 hours as needed for shortness of breath / dyspnea   Refills:  0        methadone 10 MG tablet   Commonly known as:  DOLOPHINE   Dose:  10 mg   Quantity:  90 tablet        Take 1 tablet (10 mg) by mouth every 8 hours as needed   Refills:  0        omeprazole 20 MG CR capsule   Commonly known as:  priLOSEC   Quantity:  30 capsule        TAKE 1 CAPSULE BY MOUTH DAILY, 30 TO 60 MINUTES BEFORE A MEAL.   Refills:  9        oxyCODONE 5 MG capsule   Commonly known as:  OXY-IR   Dose:  10 mg   Quantity:  360 capsule        Take 2 capsules (10 mg) by mouth every 4 hours as needed 2 caps q 4 hour prn pain up to 12 per day May fill 5/24/2012   Refills:  0        potassium chloride SA 20 MEQ CR tablet   Commonly known as:  K-DUR/KLOR-CON M   Dose:  20 mEq   Quantity:  20 tablet        Take 1 tablet (20 mEq) by mouth 3 times daily   Refills:  0        ranitidine 150 MG tablet   Commonly known as:  ZANTAC   Quantity:  30 tablet        TAKE ONE TABLET BY MOUTH EVERY MORNING   Refills:  10        traZODone 100 MG tablet   Commonly known as:  DESYREL   Dose:  300 mg   Quantity:  90 tablet        Take 3 tablets (300 mg) by mouth At Bedtime   Refills:  3        * VENTOLIN  (90 BASE) MCG/ACT Inhaler   Quantity:  18 g   Generic drug:  albuterol        INHALE 2 PUFFS INTO THE LUNGS EVERY 6 HOURS AS NEEDED FOR SHORTNESS OF BREATH   Refills:  0        * albuterol (2.5 MG/3ML) 0.083% neb solution   Quantity:  90 mL        NEBULIZE CONTENTS OF ONE VIAL EVERY 4 HOURS AS NEEDED FOR SHORTNESS OF BREATH /DYSPNEA OR WHEEZING   Refills:  0        vitamin D 2000 UNITS tablet   Quantity:  100 tablet        TAKE ONE  TABLET BY MOUTH EVERY DAY   Refills:  3        zolpidem 10 MG tablet   Commonly known as:  AMBIEN   Dose:  10 mg        Take 10 mg by mouth nightly as needed   Refills:  0        * Notice:  This list has 4 medication(s) that are the same as other medications prescribed for you. Read the directions carefully, and ask your doctor or other care provider to review them with you.            Prescriptions were sent or printed at these locations (1 Prescription)                   [facility]   [address] [address]    Telephone:  [phone]   Fax:  [phone]   Hours:                  These medications are not ready yet because we are checking if your insurance will help you pay for them. Call your pharmacy to confirm that your medication is ready for pickup. It may take up to 24 hours for them to receive the prescription. If the prescription is not ready within 3 business days, please contact your clinic or your provider (1 of 1)         ibuprofen (ADVIL/MOTRIN) 800 MG tablet                Orders Needing Specimen Collection     None      Pending Results     No orders found from 8/2/2017 to 8/5/2017.            Pending Culture Results     No orders found from 8/2/2017 to 8/5/2017.            Pending Results Instructions     If you had any lab results that were not finalized at the time of your Discharge, you can call the ED Lab Result RN at [phone]. You will be contacted by this team for any positive Lab results or changes in treatment. The nurses are available 7 days a week from 10A to 6:30P.  You can leave a message 24 hours per day and they will return your call.        Thank you for choosing [facility]       Thank you for choosing [facility] for your care. Our goal is always to provide you with excellent care. Hearing back from our patients is one way we can continue to improve our services. Please take a few minutes to complete the written survey that you may  "receive in the mail after you visit with us. Thank you!        [employer] Information     [employer] lets you send messages to your doctor, view your test results, renew your prescriptions, schedule appointments and more. To sign up, go to [email or web address] . Click on \"Log in\" on the left side of the screen, which will take you to the Welcome page. Then click on \"Sign up Now\" on the right side of the page.     You will be asked to enter the access code listed below, as well as some personal information. Please follow the directions to create your username and password.     Your access code is: [email or web address]  Expires: 9/10/2017 10:21 PM     Your access code will  in 90 days. If you need help or a new code, please call your [facility] clinic or [phone].        Care EveryWhere ID     This is your Care EveryWhere ID. This could be used by other organizations to access your [facility] medical records  [id number]        Equal Access to Services     [facility] : Hadii maddy anno Solev, waaxda luqadaha, qaybta kaalmada adeegyadottie, omayra hebert . So [facility] [phone].    ATENCIÓN: Si habla español, tiene a alfaro disposición servicios gratuitos de asistencia lingüística. Llame al [phone].    We comply with applicable federal civil rights laws and [place] laws. We do not discriminate on the basis of race, color, national origin, age, disability sex, sexual orientation or gender identity.            After Visit Summary       This is your record. Keep this with you and show to your community pharmacist(s) and doctor(s) at your next visit.                  "

## 2017-08-04 NOTE — ED PROVIDER NOTES
History     Chief Complaint   Patient presents with     Neck Pain     HPI  Joselito Abarca is a 52 year old male who presents with exacerbation of his chronic neck pain.  He is in requesting to get trigger point injections.  Patient has been in multiple times before and has received trigger point injections with good relief of symptoms.  Patient states sometimes is pain gets so bad that he has to come in to get this.  He has been using his oxycodone and Motrin but this is not been helping today.  He denies any radicular like symptoms or weakness in his extremities.  There is been no recent trauma.    I have reviewed the Medications, Allergies, Past Medical and Surgical History, and Social History in the Epic system.        Review of Systems   All other systems reviewed and are negative.      Physical Exam   BP: (!) 134/92  Pulse: 85  Temp: 97.3  F (36.3  C)  Resp: 20  SpO2: 99 %  Physical Exam   Constitutional: He is oriented to person, place, and time. He appears well-developed and well-nourished. No distress.   Neck: Muscular tenderness present. No spinous process tenderness present. Normal range of motion present.       Cardiovascular: Intact distal pulses.    Neurological: He is alert and oriented to person, place, and time. No cranial nerve deficit. He exhibits normal muscle tone.   Skin: Skin is warm and dry. No rash noted. He is not diaphoretic.   Nursing note and vitals reviewed.      ED Course     ED Course     Procedures   Procedure: 10ml 0.25% Marcaine was divided equally among the 6 trigger points. Cleansing of the skin was performed with alcohol. Joselito was given informed consent as to the possible side effects of the injection to include: allergic reaction, infection, and possible puncture of lung. Joselito agrees to proceed with the trigger point injection(s). A timeout was observed prior to injecting the trigger points. The trigger points areas were injected without complication. Joselito was watched  for signs of allergic reaction and none were noted. Joselito was instructed to ice the trigger point areas and continue the gentle stretching exercises. He is encouraged to watch for evidence of infection at the trigger point injection site(s) and return to the ER or his clinic should infection be suspected.           patient tolerated the trigger point injections well with good relief.  He requested to get even milligrams tablets of the ibuprofen and I prescribed this for him.  He was told to follow-up with his doctor if there is no improvement over the next few days.    Assessments & Plan (with Medical Decision Making)  myofacial muscle pain, chronic neck pain      I have reviewed the nursing notes.    I have reviewed the findings, diagnosis, plan and need for follow up with the patient.      New Prescriptions    IBUPROFEN (ADVIL/MOTRIN) 800 MG TABLET    Take 1 tablet (800 mg) by mouth every 8 hours as needed for pain       Final diagnoses:   Myofacial muscle pain   Chronic neck pain       8/4/2017   [facility] EMERGENCY DEPARTMENT     Kirk Jaramillo MD  08/04/17 0218       Kirk Jaramillo MD  08/06/17 0961

## 2017-08-10 DIAGNOSIS — F41.1 GENERALIZED ANXIETY DISORDER: ICD-10-CM

## 2017-08-10 NOTE — TELEPHONE ENCOUNTER
clonazepam  Last Written Prescription Date: 7/12/17  Last Fill Quantity: 90,  # refills: 0   Last Office Visit with G, P or [facility] prescribing provider: 3/17/17                                       Next 5 appointments (look out 90 days)     Aug 21, 2017  4:30 PM CDT   SHORT with Gregory G Schoen, MD   [facility] ([facility])    [address] [place] [id number] [phone]

## 2017-08-14 RX ORDER — CLONAZEPAM 0.5 MG/1
0.25-0.5 TABLET ORAL 3 TIMES DAILY PRN
Qty: 90 TABLET | Refills: 0 | Status: SHIPPED | OUTPATIENT
Start: 2017-08-14 | End: 2017-08-30

## 2017-08-23 DIAGNOSIS — M50.30 DDD (DEGENERATIVE DISC DISEASE), CERVICAL: ICD-10-CM

## 2017-08-23 DIAGNOSIS — M50.00 INTERVERTEBRAL CERVICAL DISC DISORDER WITH MYELOPATHY, CERVICAL REGION: ICD-10-CM

## 2017-08-24 NOTE — TELEPHONE ENCOUNTER
Methadone 10mg      Last Written Prescription Date: 07/25/2017  Last Fill Quantity: 90,  # refills: 0   Last Office Visit with [facility], [facility] or [facility] prescribing provider: 03/17/2017    Oxycodone 5mg      Last Written Prescription Date: 07/25/2017  Last Fill Quantity: 360,  # refills: 0   Last Office Visit with [facility], [facility] or [facility] prescribing provider: 03/17/2017    Nancy Valencia, Pharmacy Technician  [facility]  [phone]

## 2017-08-29 RX ORDER — METHADONE HYDROCHLORIDE 10 MG/1
10 TABLET ORAL EVERY 8 HOURS PRN
Qty: 90 TABLET | Refills: 0 | Status: SHIPPED | OUTPATIENT
Start: 2017-08-29 | End: 2017-09-25

## 2017-08-29 RX ORDER — OXYCODONE HYDROCHLORIDE 5 MG/1
10 CAPSULE ORAL EVERY 4 HOURS PRN
Qty: 360 CAPSULE | Refills: 0 | Status: SHIPPED | OUTPATIENT
Start: 2017-08-29 | End: 2017-09-25

## 2017-08-30 DIAGNOSIS — F41.1 GENERALIZED ANXIETY DISORDER: ICD-10-CM

## 2017-08-30 RX ORDER — CLONAZEPAM 0.5 MG/1
0.25-0.5 TABLET ORAL 3 TIMES DAILY PRN
Qty: 90 TABLET | Refills: 0 | Status: SHIPPED | OUTPATIENT
Start: 2017-08-30 | End: 2017-10-05

## 2017-08-30 NOTE — TELEPHONE ENCOUNTER
clonazepam      Last Written Prescription Date: 08/14/2017  Last Fill Quantity: 90,  # refills: 0   Last Office Visit with G, P or [facility] prescribing provider: 08/21/2017                                         Next 5 appointments (look out 90 days)     Sep 18, 2017  3:50 PM CDT   Office Visit with Gregory G Schoen, MD   [facility]    [address] [place] [id number] [phone]                    Thank You,  Kaiden Marquez, Pharmacy Tech  [facility]

## 2017-09-18 ENCOUNTER — OFFICE VISIT (OUTPATIENT)
Dept: FAMILY MEDICINE | Facility: CLINIC | Age: 52
End: 2017-09-18
Payer: COMMERCIAL

## 2017-09-18 ENCOUNTER — DOCUMENTATION ONLY (OUTPATIENT)
Dept: FAMILY MEDICINE | Facility: CLINIC | Age: 52
End: 2017-09-18

## 2017-09-18 VITALS
BODY MASS INDEX: 26.78 KG/M2 | SYSTOLIC BLOOD PRESSURE: 102 MMHG | HEART RATE: 72 BPM | RESPIRATION RATE: 16 BRPM | WEIGHT: 171 LBS | OXYGEN SATURATION: 100 % | DIASTOLIC BLOOD PRESSURE: 62 MMHG | TEMPERATURE: 97 F

## 2017-09-18 DIAGNOSIS — Z12.11 SPECIAL SCREENING FOR MALIGNANT NEOPLASMS, COLON: Primary | ICD-10-CM

## 2017-09-18 DIAGNOSIS — Z98.1 ARTHRODESIS STATUS: ICD-10-CM

## 2017-09-18 DIAGNOSIS — M54.2 NECK PAIN: ICD-10-CM

## 2017-09-18 DIAGNOSIS — M50.00 INTERVERTEBRAL CERVICAL DISC DISORDER WITH MYELOPATHY, CERVICAL REGION: ICD-10-CM

## 2017-09-18 DIAGNOSIS — G89.4 CHRONIC PAIN SYNDROME: ICD-10-CM

## 2017-09-18 DIAGNOSIS — M47.14 OSTEOARTHRITIS OF THORACIC SPINE WITH MYELOPATHY: ICD-10-CM

## 2017-09-18 PROCEDURE — 99213 OFFICE O/P EST LOW 20 MIN: CPT | Performed by: FAMILY MEDICINE

## 2017-09-18 ASSESSMENT — PAIN SCALES - GENERAL: PAINLEVEL: SEVERE PAIN (7)

## 2017-09-18 NOTE — NURSING NOTE
"No chief complaint on file.      Initial /62 (BP Location: Right arm, Patient Position: Chair, Cuff Size: Adult Regular)  Pulse 72  Temp 97  F (36.1  C) (Temporal)  Resp 16  Wt 171 lb (77.6 kg)  SpO2 100%  BMI 26.78 kg/m2 Estimated body mass index is 26.78 kg/(m^2) as calculated from the following:    Height as of 6/12/17: 5' 7\" (1.702 m).    Weight as of this encounter: 171 lb (77.6 kg).  Medication Reconciliation: complete  "

## 2017-09-18 NOTE — MR AVS SNAPSHOT
After Visit Summary   9/18/2017    Joselito Abarca    MRN: [id number]           Patient Information     Date Of Birth          1965        Visit Information        Provider Department      9/18/2017 3:50 PM Schoen, Gregory G, MD [facility]        Today's Diagnoses     Special screening for malignant neoplasms, colon    -  1    Chronic pain syndrome        Arthrodesis status        Neck pain        Intervertebral cervical disc disorder with myelopathy, cervical region        Osteoarthritis of thoracic spine with myelopathy           Follow-ups after your visit        Additional Services     GASTROENTEROLOGY ADULT REF PROCEDURE ONLY       Last Lab Result: Creatinine (mg/dL)       Date                     Value                 03/15/2017               0.94             ----------  There is no height or weight on file to calculate BMI.      Patient will be contacted to schedule procedure.     Please be aware that coverage of these services is subject to the terms and limitations of your health insurance plan.  Call member services at your health plan with any benefit or coverage questions.  Any procedures must be performed at a [facility] facility OR coordinated by your clinic's referral office.    Please bring the following with you to your appointment:    (1) Any X-Rays, CTs or MRIs which have been performed.  Contact the facility where they were done to arrange for  prior to your scheduled appointment.    (2) List of current medications   (3) This referral request   (4) Any documents/labs given to you for this referral            ORTHO  REFERRAL       [facility] is referring you to the Orthopedic  Services at [facility].       The  Representative will assist you in the coordination of your Orthopedic and Musculoskeletal Care as prescribed by your physician.    The  Representative will call you within 1  "business day to help schedule your appointment, or you may contact the  Representative at:    All areas ~ [phone]     Type of Referral : Spine: Cervical / Thoracic: Cervical / Thoracic Spine Surgeon        Timeframe requested: Within 2 weeks    Coverage of these services is subject to the terms and limitations of your health insurance plan.  Please call member services at your health plan with any benefit or coverage questions.      If X-rays, CT or MRI's have been performed, please contact the facility where they were done to arrange for , prior to your scheduled appointment.  Please bring this referral request to your appointment and present it to your specialist.                  Future tests that were ordered for you today     Open Future Orders        Priority Expected Expires Ordered    MR Thoracic Spine w/o Contrast Routine  9/18/2018 9/18/2017    MR Cervical Spine w/o Contrast Routine  9/18/2018 9/18/2017            Who to contact     If you have questions or need follow up information about today's clinic visit or your schedule please contact [facility] directly at [phone].  Normal or non-critical lab and imaging results will be communicated to you by MyChart, letter or phone within 4 business days after the clinic has received the results. If you do not hear from us within 7 days, please contact the clinic through Qwilrhart or phone. If you have a critical or abnormal lab result, we will notify you by phone as soon as possible.  Submit refill requests through [employer] or call your pharmacy and they will forward the refill request to us. Please allow 3 business days for your refill to be completed.          Additional Information About Your Visit        [employer] Information     [employer] lets you send messages to your doctor, view your test results, renew your prescriptions, schedule appointments and more. To sign up, go to [email or web address] . Click on \"Log in\" " "on the left side of the screen, which will take you to the Welcome page. Then click on \"Sign up Now\" on the right side of the page.     You will be asked to enter the access code listed below, as well as some personal information. Please follow the directions to create your username and password.     Your access code is: [email or web address]  Expires: 2017  5:18 PM     Your access code will  in 90 days. If you need help or a new code, please call your [facility] or [phone].        Care EveryWhere ID     This is your Care EveryWhere ID. This could be used by other organizations to access your [place] medical records  [id number]        Your Vitals Were     Pulse Temperature Respirations Pulse Oximetry BMI (Body Mass Index)       72 97  F (36.1  C) (Temporal) 16 100% 26.78 kg/m2        Blood Pressure from Last 3 Encounters:   17 102/62   17 (!) 134/92   17 112/72    Weight from Last 3 Encounters:   17 171 lb (77.6 kg)   17 160 lb (72.6 kg)   17 165 lb (74.8 kg)              We Performed the Following     Drug  Screen Comprehensive , Urine with Reported Meds (MedTox) (Pain Care Package)     GASTROENTEROLOGY ADULT REF PROCEDURE ONLY     ORTHO  REFERRAL        Primary Care Provider Office Phone # Fax #    Gregory G Schoen, [phone]       [address] [place] [phone]        Goals        General    I will call to schedule an appointment  if I develop new or worsening symptoms. Started 16. (pt-stated)     Notes - Note created  2016  3:32 PM by Behl, Melissa K, RN    As of today's date 2016 goal is met at 0 - 25%.   Goal Status:  Active  Melissa Behl BSN, RN, PHN  [facility] Care Coordinator  [phone]        I will use my nebulizers and inhalers as prescribed. Started 16 (pt-stated)     Notes - Note edited  5/10/2016  8:58 AM by Behl, Melissa K, RN    As of today's date 5/10/2016 goal is met at 51 - 75%.   " Goal Status:  Active  Melissa Behl BSN, RN, PHN  [facility] Care Coordinator  [phone]          Equal Access to Services     DADA DUARTE : Hadii maddy redman vickie Hebert, waabdida luhelga, qaramirota kaqianada ric, omayra obedin hayaaroscoe cashmansoor camarena la'celsoroscoe teresa. So [facility] [phone].    ATENCIÓN: Si habla español, tiene a alfaro disposición servicios gratuitos de asistencia lingüística. Llame al [phone].    We comply with applicable federal civil rights laws and [place] laws. We do not discriminate on the basis of race, color, national origin, age, disability sex, sexual orientation or gender identity.            Thank you!     Thank you for choosing [facility]  for your care. Our goal is always to provide you with excellent care. Hearing back from our patients is one way we can continue to improve our services. Please take a few minutes to complete the written survey that you may receive in the mail after your visit with us. Thank you!             Your Updated Medication List - Protect others around you: Learn how to safely use, store and throw away your medicines at www.disposemymeds.org.          This list is accurate as of: 9/18/17  5:18 PM.  Always use your most recent med list.                   Brand Name Dispense Instructions for use Diagnosis    clonazePAM 0.5 MG tablet    klonoPIN    90 tablet    Take 0.5-1 tablets (0.25-0.5 mg) by mouth 3 times daily as needed for anxiety    Generalized anxiety disorder       FLOVENT  MCG/ACT Inhaler   Generic drug:  fluticasone     36 g    INHALE 2 PUFFS INTO THE LUNGS TWICE DAILY    ILD (interstitial lung disease) (H)       fluticasone 50 MCG/ACT spray    FLONASE    16 g    USE TWO SPRAYS IN EACH NOSTRIL EVERY DAY    Chronic rhinitis       gabapentin 300 MG capsule    NEURONTIN    270 capsule    TAKE TWO CAPSULES BY MOUTH THREE TIMES A DAY    Intervertebral cervical disc disorder with myelopathy, cervical region, Degeneration of cervical  intervertebral disc       ibuprofen 800 MG tablet    ADVIL/MOTRIN    30 tablet    Take 1 tablet (800 mg) by mouth every 8 hours as needed for pain        ipratropium - albuterol 0.5 mg/2.5 mg/3 mL 0.5-2.5 (3) MG/3ML neb solution    DUONEB    30 vial    Take 1 vial (3 mLs) by nebulization every 4 hours as needed for shortness of breath / dyspnea        methadone 10 MG tablet    DOLOPHINE    90 tablet    Take 1 tablet (10 mg) by mouth every 8 hours as needed    DDD (degenerative disc disease), cervical       omeprazole 20 MG CR capsule    priLOSEC    30 capsule    TAKE 1 CAPSULE BY MOUTH DAILY, 30 TO 60 MINUTES BEFORE A MEAL.    Esophageal reflux       oxyCODONE 5 MG capsule    OXY-IR    360 capsule    Take 2 capsules (10 mg) by mouth every 4 hours as needed 2 caps q 4 hour prn pain up to 12 per day May fill 5/24/2012    Intervertebral cervical disc disorder with myelopathy, cervical region       ranitidine 150 MG tablet    ZANTAC    30 tablet    TAKE ONE TABLET BY MOUTH EVERY MORNING    Esophageal reflux       traZODone 100 MG tablet    DESYREL    90 tablet    Take 3 tablets (300 mg) by mouth At Bedtime    Panic attack       * VENTOLIN  (90 BASE) MCG/ACT Inhaler   Generic drug:  albuterol     18 g    INHALE 2 PUFFS INTO THE LUNGS EVERY 6 HOURS AS NEEDED FOR SHORTNESS OF BREATH    Pneumonitis       * albuterol (2.5 MG/3ML) 0.083% neb solution     90 mL    NEBULIZE CONTENTS OF ONE VIAL EVERY 4 HOURS AS NEEDED FOR SHORTNESS OF BREATH /DYSPNEA OR WHEEZING    Mild intermittent asthma with acute exacerbation       vitamin D 2000 UNITS tablet     100 tablet    TAKE ONE TABLET BY MOUTH EVERY DAY    Degeneration of cervical intervertebral disc       zolpidem 10 MG tablet    AMBIEN     Take 10 mg by mouth nightly as needed        * Notice:  This list has 2 medication(s) that are the same as other medications prescribed for you. Read the directions carefully, and ask your doctor or other care provider to review them with  you.

## 2017-09-19 ENCOUNTER — TELEPHONE (OUTPATIENT)
Dept: FAMILY MEDICINE | Facility: CLINIC | Age: 52
End: 2017-09-19

## 2017-09-19 ASSESSMENT — ASTHMA QUESTIONNAIRES: ACT_TOTALSCORE: 12

## 2017-09-19 NOTE — PROGRESS NOTES
This patient did not show up for the labs that were ordered today.  I have canceled and reordered as future for one week.  Please contact the patient to come back in.  Thank you! -Shahnaz JOHNSON, Lab

## 2017-09-19 NOTE — NURSING NOTE
MRI'S are scheduled on Monday 9/25 at 2:00pm. Message left for patient of date and time. Gloria Devine LPN

## 2017-09-19 NOTE — TELEPHONE ENCOUNTER
Message left for patient of his MRI'S being scheduled on Monday 9/25. Arrive at 1:45 pm for a 2:00 pm scan. Number left to call if this date and time do not work for him. Any questions to return call to clinic.  Gloria Devine LPN

## 2017-09-19 NOTE — LETTER
Dear Joselito,        At [facility] we care about your health and are committed to providing quality patient care, which includes staying current on preventive cancer screenings.  You can increase your chances of finding and treating cancers through regular screenings.     Our records indicate you may be due for the following preventive screening(s):    Colonoscopy    Colonoscopy is recommended every ten years for everyone age 50 and older. We strongly urge our patient's to consider having a colonoscopy done, which is the best screening test available and only needs to be done every 10 years if results are normal. If you are unwilling or unable to have a colonoscopy then we recommend the annual stool testing for blood. This test is called a FIT test and it looks for blood in the stool.       To schedule your colonoscopy, you may contact us by phone at [phone]    If you have had any of the screenings listed above at another facility, please call us so that we may update your chart.          Your [facility] Care Team

## 2017-09-19 NOTE — TELEPHONE ENCOUNTER
Left message for patient to return call to schedule colonoscopy or EGD. If Pat or April are unavailable, please transfer to the surgery center.     OK to schedule with [facility] or [facility]

## 2017-09-25 ENCOUNTER — HOSPITAL ENCOUNTER (OUTPATIENT)
Dept: MRI IMAGING | Facility: CLINIC | Age: 52
End: 2017-09-25
Attending: FAMILY MEDICINE
Payer: COMMERCIAL

## 2017-09-25 ENCOUNTER — HOSPITAL ENCOUNTER (OUTPATIENT)
Dept: MRI IMAGING | Facility: CLINIC | Age: 52
Discharge: HOME | End: 2017-09-25
Attending: FAMILY MEDICINE | Admitting: FAMILY MEDICINE
Payer: COMMERCIAL

## 2017-09-25 DIAGNOSIS — M50.30 DDD (DEGENERATIVE DISC DISEASE), CERVICAL: ICD-10-CM

## 2017-09-25 DIAGNOSIS — M50.00 INTERVERTEBRAL CERVICAL DISC DISORDER WITH MYELOPATHY, CERVICAL REGION: ICD-10-CM

## 2017-09-25 DIAGNOSIS — M47.14 OSTEOARTHRITIS OF THORACIC SPINE WITH MYELOPATHY: ICD-10-CM

## 2017-09-25 PROCEDURE — 72146 MRI CHEST SPINE W/O DYE: CPT

## 2017-09-25 PROCEDURE — 72141 MRI NECK SPINE W/O DYE: CPT

## 2017-09-25 RX ORDER — OXYCODONE HYDROCHLORIDE 5 MG/1
10 CAPSULE ORAL EVERY 4 HOURS PRN
Qty: 360 CAPSULE | Refills: 0 | Status: SHIPPED | OUTPATIENT
Start: 2017-09-25 | End: 2017-10-25

## 2017-09-25 RX ORDER — METHADONE HYDROCHLORIDE 10 MG/1
10 TABLET ORAL EVERY 8 HOURS PRN
Qty: 90 TABLET | Refills: 0 | Status: SHIPPED | OUTPATIENT
Start: 2017-09-25 | End: 2017-10-25

## 2017-09-25 NOTE — TELEPHONE ENCOUNTER
methadone      Last Written Prescription Date: 08/29/2017  Last Fill Quantity: 90,  # refills: 0   Last Office Visit with [facility] or  Health prescribing provider: 09/18/2017    oxycodone      Last Written Prescription Date: 08/29/2017  Last Fill Quantity: 360,  # refills: 0   Last Office Visit with [facility] or  Health prescribing provider: 09/18/2017    Thank You,  Kaiden Marquez, Pharmacy [facility]

## 2017-09-27 NOTE — TELEPHONE ENCOUNTER
Left message for patient to return call to schedule EGD/colonoscopy. If April or Pat are not available, please transfer to same day surgery        X3, letter sent

## 2017-09-29 ENCOUNTER — TELEPHONE (OUTPATIENT)
Dept: FAMILY MEDICINE | Facility: CLINIC | Age: 52
End: 2017-09-29

## 2017-09-29 NOTE — TELEPHONE ENCOUNTER
----- Message from Gregory G Schoen, MD sent at 9/26/2017  2:49 PM CDT -----  Please contact Joselito and inform him that the lowest level of his cervical spine shows progression of degenerative changes and some worsening of the narrowing of the canal for the nerve to pass through.  The upper back between the shoulder blades appeared stable.  Check to see if he has an appointment yet with spine up here in [place] and if not, see about getting him set up for that appointment.   Electronically signed by Greg Schoen, MD

## 2017-09-29 NOTE — TELEPHONE ENCOUNTER
Patient was informed that his lowest level of his cervical spine shows progression of degenerative changes and some worsening of the narrowing of the canal for the nerve to pass through.  The upper back between the shoulder blades appeared stable.  Patient state that he has not set up an appointment with the spine specialist here in [place] yet.  Patient was informed that we will have the care coordinator assist in setting up an appointment with him.  Patient states that he has a new phone number. The following number is  [phone].  Please assist patient in scheduling appointmentYolanda.    Maverick Casillas, [facility]

## 2017-10-02 NOTE — TELEPHONE ENCOUNTER
Message left with patient of appointment being scheduled with Spine Specialist on Thursday, 10/5 at 3:00 pm here in [place]. Number left to call if this date and time do not work. Any questions to feel free to contact the clinic. Gloria Devine LPN

## 2017-10-05 ENCOUNTER — OFFICE VISIT (OUTPATIENT)
Dept: NEUROSURGERY | Facility: CLINIC | Age: 52
End: 2017-10-05
Attending: FAMILY MEDICINE
Payer: COMMERCIAL

## 2017-10-05 VITALS — WEIGHT: 175.2 LBS | HEIGHT: 67 IN | TEMPERATURE: 96.8 F | BODY MASS INDEX: 27.5 KG/M2

## 2017-10-05 DIAGNOSIS — M48.02 SPINAL STENOSIS IN CERVICAL REGION: Primary | ICD-10-CM

## 2017-10-05 DIAGNOSIS — F41.1 GENERALIZED ANXIETY DISORDER: ICD-10-CM

## 2017-10-05 PROCEDURE — 99204 OFFICE O/P NEW MOD 45 MIN: CPT | Performed by: NEUROLOGICAL SURGERY

## 2017-10-05 ASSESSMENT — PAIN SCALES - GENERAL: PAINLEVEL: MODERATE PAIN (4)
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
## 2017-10-05 NOTE — PROGRESS NOTES
"Joselito Abarca is a 52 year old male who presents for:  Chief Complaint   Patient presents with     Neurologic Problem     cervical issues w/ bilateral arm pain        Initial Vitals:  Temp 96.8  F (36  C) (Temporal)  Ht 1.702 m (5' 7\")  Wt 79.5 kg (175 lb 3.2 oz)  BMI 27.44 kg/m2 Estimated body mass index is 27.44 kg/(m^2) as calculated from the following:    Height as of this encounter: 1.702 m (5' 7\").    Weight as of this encounter: 79.5 kg (175 lb 3.2 oz).. Body surface area is 1.94 meters squared. BP completed using cuff size: NA (Not Taken)  Moderate Pain (4)    Do you feel safe in your environment?  Yes  Do you need any refills today? No    Nursing Comments:         Alyse Fairchild cma    "

## 2017-10-05 NOTE — PATIENT INSTRUCTIONS
Surgery scheduled at [facility] for C6-7 ACDF (anterior cervical discectomy and fusion), possible C4-6 removal of hardware     -CT cervical prior to surgery. We can help you schedule this.       Pre-Operative:  -Surgical risks: blood clots in the leg or lung, problems urinating, nerve damage, drainage from the incision, infection, stiffness.  - Pre-operative physical with primary care physician within 30 days of surgical date.   -Stop all solid foods and liquids 8 hours before surgery.    -Shower procedure: Please shower with antibacterial soap the night before surgery and the morning of surgery. Refer to information sheet in folder.   - Discontinue Aspirin, NSAIDs (Advil, Ibuprofen, Naproxen, Nuprin, Diclofenac, Meloxicam, Aleve, Celebrex) x 7 days prior to surgical date. After surgery, do not begin taking these medications until given clearance. May cause bleeding and interfere with bone healing.  - May take Tylenol for pain.      Post-Operative:  -1 night hospitalization.   - Post operative pain may require pain medications and muscle relaxants. You will receive medications upon discharge.  -Do NOT drive while taking narcotic pain medication.  -Post operative incision care- Watch for signs of infection: redness, swelling, warmth, drainage, and fever of 101 degrees or higher. Notify clinic [phone].  -Keep incision clean and dry. You may shower. No submerging incision in water such as pools, hot tubs, baths for at least 8 weeks or until incision is healed.   - Post operative activity limitations for 6 weeks after surgery: no lifting > 10 pounds, no bending, twisting, or overhead reaching. You will be re-evaluated at your follow up appointments.   -If a brace is required per Dr. Vasques, Orthotics will fit you for the brace in the hospital.  -If you are currently employed, you will need to be off work for recovery and healing. Please fax any FMLA/short term disability paperwork to [phone].  You may call our clinic when you'd like to return to work and we can provide a work letter.   - Follow up appointments: 6 week post op, 3 months post op, 6 months post op, 1 year post op. You will need to an xray before each appointment. Please call to schedule these appointments at [phone].

## 2017-10-05 NOTE — NURSING NOTE
Patient Education    Education included but not limited to:  - Surgical risks: blood clots, urinating difficulties, nerve damage, infection.  - Pre-operative physical with primary care physician within 30 days of surgical date.   - Pre-operative clearance from other pertaining specialties.   - Discontinue NSAIDS x 7 days prior to surgical date.   -Do not begin taking NSAIDs (Advil, Motrin, Ibuprofen, Nuprin, Diclofenac, Meloxicam, Aleve, Celebrex, Aspirin, etc.) until 6 weeks after surgery if you had a fusion. May cause bleeding and interfere with bone healing.    -May try Tylenol for pain.  -Smoking cessation  -Discussed being off work after surgery, short term disability, FMLA, etc.   -Forms to be completed    -Pre-op timeline: NPO, shower, medications    -Hospital stay: Checking in, surgery, recovery room, hospital room.    - Post operative pain management: narcotics, muscle relaxants, ice, etc.   -No driving while taking narcotics     -Post operative incision care:   Keep your incision clean and dry.   Okay to shower. No submerging in water until incision healed.   Watch for signs of infection and notify clinic if drainage or fever develops.   - Post operative activity limitations: for the first 6 weeks we recommend no lifting > 10 pounds, no bending, twisting, or overhead reaching.  -If a brace is required per Dr. Vasques, Orthotics will fit you for the brace in the hospital.  - Follow up appointments: 6 week post op, 3 months post op, 6 months post op, 1 year post op. You will need to an xray before each appointment. Please call to schedule follow up appointment at [phone].   - Education book was also given to the patient for further review.      Patient verbalized understanding of above instructions. All questions were answered to the best of my ability and the patient's satisfaction. Patient advised to call with any additional questions or concerns.

## 2017-10-05 NOTE — MR AVS SNAPSHOT
After Visit Summary   10/5/2017    Joselito Abarca    MRN: [id number]           Patient Information     Date Of Birth          1965        Visit Information        Provider Department      10/5/2017 3:00 PM Nikolas Vasques MD [facility]        Care Instructions    Surgery scheduled at [facility] for C6-7 ACDF (anterior cervical discectomy and fusion), possible C4-6 removal of hardware     -CT cervical prior to surgery. We can help you schedule this.       Pre-Operative:  -Surgical risks: blood clots in the leg or lung, problems urinating, nerve damage, drainage from the incision, infection, stiffness.  - Pre-operative physical with primary care physician within 30 days of surgical date.   -Stop all solid foods and liquids 8 hours before surgery.    -Shower procedure: Please shower with antibacterial soap the night before surgery and the morning of surgery. Refer to information sheet in folder.   - Discontinue Aspirin, NSAIDs (Advil, Ibuprofen, Naproxen, Nuprin, Diclofenac, Meloxicam, Aleve, Celebrex) x 7 days prior to surgical date. After surgery, do not begin taking these medications until given clearance. May cause bleeding and interfere with bone healing.  - Discontinue Plavix x 7-10 days prior to surgical date, stay off Plavix 3-4 days post operatively. Discontinue Xarelto 5-7 days prior to surgery. Discontinue coumadin/warfarin 5-7 days prior to surgery. INR needs to be <1.4/  - May take Tylenol for pain.      Post-Operative:  -1 night hospitalization.   - Post operative pain may require pain medications and muscle relaxants. You will receive medications upon discharge.  -Do NOT drive while taking narcotic pain medication.  -Post operative incision care- Watch for signs of infection: redness, swelling, warmth, drainage, and fever of 101 degrees or higher. Notify clinic [phone].  -Keep incision clean and dry. You may shower. No submerging incision in  "water such as pools, hot tubs, baths for at least 8 weeks or until incision is healed.   - Post operative activity limitations for 6 weeks after surgery: no lifting > 10 pounds, no bending, twisting, or overhead reaching. You will be re-evaluated at your follow up appointments.   -If a brace is required per Dr. Vasques, [facility] will fit you for the brace in the hospital.  -If you are currently employed, you will need to be off work for recovery and healing. Please fax any FMLA/short term disability paperwork to [phone]. You may call our clinic when you'd like to return to work and we can provide a work letter.   - Follow up appointments: 6 week post op, 3 months post op, 6 months post op, 1 year post op. You will need to an xray before each appointment. Please call to schedule these appointments at [phone].                Follow-ups after your visit        Who to contact     If you have questions or need follow up information about today's clinic visit or your schedule please contact [facility] directly at [phone].  Normal or non-critical lab and imaging results will be communicated to you by MicroTransponderhart, letter or phone within 4 business days after the clinic has received the results. If you do not hear from us within 7 days, please contact the clinic through MicroTransponderhart or phone. If you have a critical or abnormal lab result, we will notify you by phone as soon as possible.  Submit refill requests through Valopaa or call your pharmacy and they will forward the refill request to us. Please allow 3 business days for your refill to be completed.          Additional Information About Your Visit        MyChart Information     Valopaa lets you send messages to your doctor, view your test results, renew your prescriptions, schedule appointments and more. To sign up, go to [email or web address] . Click on \"Log in\" on the left side of the screen, which will take you to the Welcome page. Then click " "on \"Sign up Now\" on the right side of the page.     You will be asked to enter the access code listed below, as well as some personal information. Please follow the directions to create your username and password.     Your access code is: [email or web address]  Expires: 2017  5:18 PM     Your access code will  in 90 days. If you need help or a new code, please call your [facility] or [phone].        Care EveryWhere ID     This is your Care EveryWhere ID. This could be used by other organizations to access your [facility] medical records  [id number]        Your Vitals Were     Temperature Height BMI (Body Mass Index)             96.8  F (36  C) (Temporal) 5' 7\" (1.702 m) 27.44 kg/m2          Blood Pressure from Last 3 Encounters:   17 102/62   17 (!) 134/92   17 112/72    Weight from Last 3 Encounters:   10/05/17 175 lb 3.2 oz (79.5 kg)   17 171 lb (77.6 kg)   17 160 lb (72.6 kg)              Today, you had the following     No orders found for display       Primary Care Provider Office Phone # Fax #    Gregory G Schoen, [phone] [address] [place] [phone]        Goals        General    I will call to schedule an appointment  if I develop new or worsening symptoms. Started 16. (pt-stated)     Notes - Note created  2016  3:32 PM by Behl, Melissa K, RN    As of today's date 2016 goal is met at 0 - 25%.   Goal Status:  Active  Melissa Behl BSN, RN, N  [facility] Care Coordinator  [phone]        I will use my nebulizers and inhalers as prescribed. Started 16 (pt-stated)     Notes - Note edited  5/10/2016  8:58 AM by Behl, Melissa K, RN    As of today's date 5/10/2016 goal is met at 51 - 75%.   Goal Status:  Active  Melissa Behl BSN, RN, N  [facility] Care Coordinator  [phone]          Equal Access to Services     DADA DUARTE AH: damari Newman, blanca larios, " omayra cashmansoor jean'aan ah. So [facility] [phone].    ATENCIÓN: Si erinla laila, tiene a alfaro disposición servicios gratuitos de asistencia lingüística. Bola marquis [phone].    We comply with applicable federal civil rights laws and [place] laws. We do not discriminate on the basis of race, color, national origin, age, disability, sex, sexual orientation, or gender identity.            Thank you!     Thank you for choosing [facility]  for your care. Our goal is always to provide you with excellent care. Hearing back from our patients is one way we can continue to improve our services. Please take a few minutes to complete the written survey that you may receive in the mail after your visit with us. Thank you!             Your Updated Medication List - Protect others around you: Learn how to safely use, store and throw away your medicines at www.disposemymeds.org.          This list is accurate as of: 10/5/17  3:20 PM.  Always use your most recent med list.                   Brand Name Dispense Instructions for use Diagnosis    clonazePAM 0.5 MG tablet    klonoPIN    90 tablet    Take 0.5-1 tablets (0.25-0.5 mg) by mouth 3 times daily as needed for anxiety    Generalized anxiety disorder       FLOVENT  MCG/ACT Inhaler   Generic drug:  fluticasone     36 g    INHALE 2 PUFFS INTO THE LUNGS TWICE DAILY    ILD (interstitial lung disease) (H)       fluticasone 50 MCG/ACT spray    FLONASE    16 g    USE TWO SPRAYS IN EACH NOSTRIL EVERY DAY    Chronic rhinitis       gabapentin 300 MG capsule    NEURONTIN    270 capsule    TAKE TWO CAPSULES BY MOUTH THREE TIMES A DAY    Intervertebral cervical disc disorder with myelopathy, cervical region, Degeneration of cervical intervertebral disc       ibuprofen 800 MG tablet    ADVIL/MOTRIN    30 tablet    Take 1 tablet (800 mg) by mouth every 8 hours as needed for pain        ipratropium - albuterol 0.5 mg/2.5 mg/3 mL 0.5-2.5 (3) MG/3ML neb  solution    DUONEB    30 vial    Take 1 vial (3 mLs) by nebulization every 4 hours as needed for shortness of breath / dyspnea        methadone 10 MG tablet    DOLOPHINE    90 tablet    Take 1 tablet (10 mg) by mouth every 8 hours as needed    DDD (degenerative disc disease), cervical       omeprazole 20 MG CR capsule    priLOSEC    30 capsule    TAKE 1 CAPSULE BY MOUTH DAILY, 30 TO 60 MINUTES BEFORE A MEAL.    Esophageal reflux       oxyCODONE 5 MG capsule    OXY-IR    360 capsule    Take 2 capsules (10 mg) by mouth every 4 hours as needed 2 caps q 4 hour prn pain up to 12 per day May fill 5/24/2012    Intervertebral cervical disc disorder with myelopathy, cervical region       ranitidine 150 MG tablet    ZANTAC    30 tablet    TAKE ONE TABLET BY MOUTH EVERY MORNING    Esophageal reflux       traZODone 100 MG tablet    DESYREL    90 tablet    Take 3 tablets (300 mg) by mouth At Bedtime    Panic attack       * VENTOLIN  (90 BASE) MCG/ACT Inhaler   Generic drug:  albuterol     18 g    INHALE 2 PUFFS INTO THE LUNGS EVERY 6 HOURS AS NEEDED FOR SHORTNESS OF BREATH    Pneumonitis       * albuterol (2.5 MG/3ML) 0.083% neb solution     90 mL    NEBULIZE CONTENTS OF ONE VIAL EVERY 4 HOURS AS NEEDED FOR SHORTNESS OF BREATH /DYSPNEA OR WHEEZING    Mild intermittent asthma with acute exacerbation       vitamin D 2000 UNITS tablet     100 tablet    TAKE ONE TABLET BY MOUTH EVERY DAY    Degeneration of cervical intervertebral disc       zolpidem 10 MG tablet    AMBIEN     Take 10 mg by mouth nightly as needed        * Notice:  This list has 2 medication(s) that are the same as other medications prescribed for you. Read the directions carefully, and ask your doctor or other care provider to review them with you.

## 2017-10-06 RX ORDER — CLONAZEPAM 0.5 MG/1
0.25-0.5 TABLET ORAL 3 TIMES DAILY PRN
Qty: 90 TABLET | Refills: 0 | Status: SHIPPED | OUTPATIENT
Start: 2017-10-06 | End: 2017-11-04

## 2017-10-06 NOTE — TELEPHONE ENCOUNTER
Clonazepam 0.5mg      Last Written Prescription Date: 08/30/2017  Last Fill Quantity: 90,  # refills: 0   Last Office Visit with G, P or [facility] prescribing provider: 09/18/2017                                         Next 5 appointments (look out 90 days)     Nov 17, 2017  3:10 PM CST   Pre-Op physical with Gregory G Schoen, MD   [facility]    [address] [place] [phone]                  Nancy Valencia, Pharmacy Technician  [facility]  [phone]

## 2017-10-10 DIAGNOSIS — J45.21 MILD INTERMITTENT ASTHMA WITH ACUTE EXACERBATION: ICD-10-CM

## 2017-10-11 NOTE — TELEPHONE ENCOUNTER
VENTOLIN  (90 BASE) MCG/ACT inhaler       Last Written Prescription Date: 4/29/16  Last Fill Quantity: 18, # refills: 0    Last Office Visit with [facility], [facility] or [facility] prescribing provider:  9/18/17   Future Office Visit:    Next 5 appointments (look out 90 days)     Nov 17, 2017  3:10 PM CST   Pre-Op physical with Gregory G Schoen, MD   [facility] ([facility])    [address] [place] [phone]                   Date of Last Asthma Action Plan Letter:   Asthma Action Plan Q1 Year    Topic Date Due     Asthma Action Plan - yearly  11/02/2017      Asthma Control Test:   ACT Total Scores 9/18/2017   ACT TOTAL SCORE (Goal Greater than or Equal to 20) 12   In the past 12 months, how many times did you visit the emergency room for your asthma without being admitted to the hospital? 3   In the past 12 months, how many times were you hospitalized overnight because of your asthma? 0       Date of Last Spirometry Test: 12/26/08

## 2017-10-12 NOTE — TELEPHONE ENCOUNTER
Routing refill request to provider for review/approval because:  A break in medication  Medication given for pneumonitis  Jennifer Barber RN

## 2017-10-13 RX ORDER — ALBUTEROL SULFATE 90 UG/1
AEROSOL, METERED RESPIRATORY (INHALATION)
Qty: 18 G | Refills: 3 | Status: SHIPPED | OUTPATIENT
Start: 2017-10-13 | End: 2018-11-26

## 2017-10-25 DIAGNOSIS — M50.00 INTERVERTEBRAL CERVICAL DISC DISORDER WITH MYELOPATHY, CERVICAL REGION: ICD-10-CM

## 2017-10-25 DIAGNOSIS — M50.30 DDD (DEGENERATIVE DISC DISEASE), CERVICAL: ICD-10-CM

## 2017-10-26 RX ORDER — OXYCODONE HYDROCHLORIDE 5 MG/1
10 CAPSULE ORAL EVERY 4 HOURS PRN
Qty: 60 CAPSULE | Refills: 0 | Status: SHIPPED | OUTPATIENT
Start: 2017-10-26 | End: 2017-10-30

## 2017-10-26 RX ORDER — METHADONE HYDROCHLORIDE 10 MG/1
10 TABLET ORAL EVERY 8 HOURS PRN
Qty: 15 TABLET | Refills: 0 | Status: SHIPPED | OUTPATIENT
Start: 2017-10-26 | End: 2017-10-30

## 2017-10-26 NOTE — TELEPHONE ENCOUNTER
methadone   Last Written Prescription Date: 9/25/17  Last Fill Quantity: 90,  # refills: 0   Last Office Visit with [facility], [facility] or [facility] prescribing provider: 10/5/17                                    Next 5 appointments (look out 90 days)     Nov 17, 2017  3:10 PM CST   Pre-Op physical with Gregory G Schoen, MD   [facility] ([facility])    [address] [place] [phone]                  oxycodone  Last Written Prescription Date: 9/25/17  Last Fill Quantity: 360,  # refills: 0   Last Office Visit with G, [facility] or [facility] prescribing provider: 10/5/17                                         Next 5 appointments (look out 90 days)     Nov 17, 2017  3:10 PM CST   Pre-Op physical with Gregory G Schoen, MD   [facility] ([facility])    [address] [place] [phone]

## 2017-10-27 NOTE — TELEPHONE ENCOUNTER
Reviewed chart - on high dose Oxycodone, Methadone. Also on Klonopin.  Do not feel comfortable to fill for a month supply.  5 days supply to last until Dr Schoen is back to address it.

## 2017-10-30 DIAGNOSIS — M50.00 INTERVERTEBRAL CERVICAL DISC DISORDER WITH MYELOPATHY, CERVICAL REGION: ICD-10-CM

## 2017-10-30 DIAGNOSIS — M50.30 DDD (DEGENERATIVE DISC DISEASE), CERVICAL: ICD-10-CM

## 2017-10-31 RX ORDER — METHADONE HYDROCHLORIDE 10 MG/1
10 TABLET ORAL EVERY 8 HOURS PRN
Qty: 15 TABLET | Refills: 0 | Status: SHIPPED | OUTPATIENT
Start: 2017-10-31 | End: 2017-11-17

## 2017-10-31 RX ORDER — OXYCODONE HYDROCHLORIDE 5 MG/1
10 CAPSULE ORAL EVERY 4 HOURS PRN
Qty: 60 CAPSULE | Refills: 0 | Status: SHIPPED | OUTPATIENT
Start: 2017-10-31 | End: 2017-11-17

## 2017-10-31 NOTE — TELEPHONE ENCOUNTER
Methadone 10mg    Last Written Prescription Date: 10/27/2017  Last Fill Quantity: 90,  # refills: 0   Last Office Visit with [facility], [facility] or [facility] prescribing provider: 09/18/2017                                         Next 5 appointments (look out 90 days)     Nov 17, 2017  3:10 PM CST   Pre-Op physical with Gregory G Schoen, MD   [facility]    [address] [place] [phone]                  Oxycodone 5mg      Last Written Prescription Date: 10/27/2017  Last Fill Quantity: 360,  # refills: 0   Last Office Visit with [facility], [facility] or [facility] prescribing provider: 09/18/2017                                         Next 5 appointments (look out 90 days)     Nov 17, 2017  3:10 PM CST   Pre-Op physical with Gregory G Schoen, MD   [facility] ([facility])    [address] [place] [phone]                  Nancy Valencia, Pharmacy Technician  [facility]  [phone]

## 2017-11-03 DIAGNOSIS — M50.30 DDD (DEGENERATIVE DISC DISEASE), CERVICAL: ICD-10-CM

## 2017-11-03 RX ORDER — METHADONE HYDROCHLORIDE 10 MG/1
10 TABLET ORAL EVERY 8 HOURS PRN
Qty: 15 TABLET | Refills: 0 | Status: CANCELLED | OUTPATIENT
Start: 2017-11-03

## 2017-11-03 NOTE — TELEPHONE ENCOUNTER
Pt would like to have  write scripts his original qty's   Methadone  10 mg / 90 tablets   And His   Oxycodone 5 mg / 360 tablets

## 2017-11-03 NOTE — TELEPHONE ENCOUNTER
Joselito is calling because his refill while Dr Schoen was gone was done by Dr Riddle to cover him just enough until Dr Schoen returned.  His last refill on 10/31 did not have the quantity changed back to what it should of been, but for only a 5 day supply.    Please send in the correct prescription today, he believes he will be out on Sunday.    Thank you,  Karlee LINN

## 2017-11-04 DIAGNOSIS — F41.1 GENERALIZED ANXIETY DISORDER: ICD-10-CM

## 2017-11-04 NOTE — TELEPHONE ENCOUNTER
Clonazepam 0.5mg      Last Written Prescription Date: 10/12/2017  Last Fill Quantity: 90,  # refills: 0   Last Office Visit with G, P or [facility] prescribing provider: 09/18/2017                                         Next 5 appointments (look out 90 days)     Nov 17, 2017  3:10 PM CST   Pre-Op physical with Gregory G Schoen, MD   [facility] ([facility])    [address] [place] [address] [phone]                  Nancy Valencia, Pharmacy Technician  [facility]  [phone]

## 2017-11-06 ENCOUNTER — TELEPHONE (OUTPATIENT)
Dept: FAMILY MEDICINE | Facility: CLINIC | Age: 52
End: 2017-11-06

## 2017-11-06 DIAGNOSIS — M50.30 DDD (DEGENERATIVE DISC DISEASE), CERVICAL: ICD-10-CM

## 2017-11-06 DIAGNOSIS — M50.00 INTERVERTEBRAL CERVICAL DISC DISORDER WITH MYELOPATHY, CERVICAL REGION: ICD-10-CM

## 2017-11-06 RX ORDER — CLONAZEPAM 0.5 MG/1
0.25-0.5 TABLET ORAL 3 TIMES DAILY PRN
Qty: 90 TABLET | Refills: 0 | Status: SHIPPED | OUTPATIENT
Start: 2017-11-06 | End: 2017-12-01

## 2017-11-06 RX ORDER — OXYCODONE HYDROCHLORIDE 5 MG/1
10 CAPSULE ORAL EVERY 4 HOURS PRN
Qty: 60 CAPSULE | Refills: 0 | Status: CANCELLED | OUTPATIENT
Start: 2017-11-06

## 2017-11-06 NOTE — TELEPHONE ENCOUNTER
Reason for Call:  Medication or medication refill: Oxycodone    Do you use a [facility]?  Name of the pharmacy and phone number for the current request:  [facility] [phone]    Name of the medication requested: Oxycodone    Other request: Patient stated that he took is last one today and will start going through withdrawals.     Can we leave a detailed message on this number? YES    Phone number patient can be reached at: Cell number on file:    Telephone Information:   Mobile [phone]       Best Time: any    Call taken on 11/6/2017 at 5:25 PM by Pamella Blanton

## 2017-11-07 RX ORDER — METHADONE HYDROCHLORIDE 10 MG/1
10 TABLET ORAL EVERY 8 HOURS PRN
Qty: 90 TABLET | Refills: 0 | Status: SHIPPED | OUTPATIENT
Start: 2017-11-07 | End: 2017-12-01

## 2017-11-07 RX ORDER — OXYCODONE HYDROCHLORIDE 5 MG/1
10 CAPSULE ORAL EVERY 4 HOURS PRN
Qty: 360 CAPSULE | Refills: 0 | Status: ON HOLD | OUTPATIENT
Start: 2017-11-07 | End: 2017-11-29

## 2017-11-07 NOTE — TELEPHONE ENCOUNTER
oxyCODONE (OXY-IR) 5 MG capsule      Last Written Prescription Date:  10/31/17  Last Fill Quantity: 60,   # refills: 0  Future Office visit:    Next 5 appointments (look out 90 days)     Nov 17, 2017  3:10 PM CST   Pre-Op physical with Gregory G Schoen, MD   [facility]    [address] [place] [phone]   [phone]                   Routing refill request to provider for review/approval because:  Drug not on the [facility], [facility] or [facility] refill protocol or controlled substance

## 2017-11-17 ENCOUNTER — OFFICE VISIT (OUTPATIENT)
Dept: FAMILY MEDICINE | Facility: CLINIC | Age: 52
End: 2017-11-17
Payer: COMMERCIAL

## 2017-11-17 VITALS
HEART RATE: 60 BPM | SYSTOLIC BLOOD PRESSURE: 102 MMHG | HEIGHT: 67 IN | TEMPERATURE: 96.8 F | BODY MASS INDEX: 28.09 KG/M2 | DIASTOLIC BLOOD PRESSURE: 58 MMHG | WEIGHT: 179 LBS | OXYGEN SATURATION: 98 %

## 2017-11-17 DIAGNOSIS — Z23 NEED FOR PROPHYLACTIC VACCINATION AND INOCULATION AGAINST INFLUENZA: ICD-10-CM

## 2017-11-17 DIAGNOSIS — Z01.818 PREOP GENERAL PHYSICAL EXAM: Primary | ICD-10-CM

## 2017-11-17 DIAGNOSIS — M50.30 DDD (DEGENERATIVE DISC DISEASE), CERVICAL: ICD-10-CM

## 2017-11-17 LAB
ANION GAP SERPL CALCULATED.3IONS-SCNC: 7 MMOL/L (ref 3–14)
BASOPHILS # BLD AUTO: 0.1 10E9/L (ref 0–0.2)
BASOPHILS NFR BLD AUTO: 0.8 %
BUN SERPL-MCNC: 10 MG/DL (ref 7–30)
CALCIUM SERPL-MCNC: 8.9 MG/DL (ref 8.5–10.1)
CHLORIDE SERPL-SCNC: 98 MMOL/L (ref 94–109)
CO2 SERPL-SCNC: 30 MMOL/L (ref 20–32)
CREAT SERPL-MCNC: 0.91 MG/DL (ref 0.66–1.25)
DIFFERENTIAL METHOD BLD: NORMAL
EOSINOPHIL # BLD AUTO: 0.7 10E9/L (ref 0–0.7)
EOSINOPHIL NFR BLD AUTO: 8.4 %
ERYTHROCYTE [DISTWIDTH] IN BLOOD BY AUTOMATED COUNT: 12.8 % (ref 10–15)
GFR SERPL CREATININE-BSD FRML MDRD: 88 ML/MIN/1.7M2
GLUCOSE SERPL-MCNC: 92 MG/DL (ref 70–99)
HCT VFR BLD AUTO: 43.9 % (ref 40–53)
HGB BLD-MCNC: 14.2 G/DL (ref 13.3–17.7)
IMM GRANULOCYTES # BLD: 0 10E9/L (ref 0–0.4)
IMM GRANULOCYTES NFR BLD: 0.1 %
LYMPHOCYTES # BLD AUTO: 3.1 10E9/L (ref 0.8–5.3)
LYMPHOCYTES NFR BLD AUTO: 39 %
MCH RBC QN AUTO: 30.1 PG (ref 26.5–33)
MCHC RBC AUTO-ENTMCNC: 32.3 G/DL (ref 31.5–36.5)
MCV RBC AUTO: 93 FL (ref 78–100)
MONOCYTES # BLD AUTO: 0.5 10E9/L (ref 0–1.3)
MONOCYTES NFR BLD AUTO: 6.8 %
NEUTROPHILS # BLD AUTO: 3.5 10E9/L (ref 1.6–8.3)
NEUTROPHILS NFR BLD AUTO: 44.9 %
PLATELET # BLD AUTO: 195 10E9/L (ref 150–450)
POTASSIUM SERPL-SCNC: 3.7 MMOL/L (ref 3.4–5.3)
RBC # BLD AUTO: 4.71 10E12/L (ref 4.4–5.9)
SODIUM SERPL-SCNC: 135 MMOL/L (ref 133–144)
WBC # BLD AUTO: 7.9 10E9/L (ref 4–11)

## 2017-11-17 PROCEDURE — 80048 BASIC METABOLIC PNL TOTAL CA: CPT | Performed by: FAMILY MEDICINE

## 2017-11-17 PROCEDURE — 90471 IMMUNIZATION ADMIN: CPT | Performed by: FAMILY MEDICINE

## 2017-11-17 PROCEDURE — 36415 COLL VENOUS BLD VENIPUNCTURE: CPT | Performed by: FAMILY MEDICINE

## 2017-11-17 PROCEDURE — 99214 OFFICE O/P EST MOD 30 MIN: CPT | Mod: 25 | Performed by: FAMILY MEDICINE

## 2017-11-17 PROCEDURE — 90686 IIV4 VACC NO PRSV 0.5 ML IM: CPT | Performed by: FAMILY MEDICINE

## 2017-11-17 PROCEDURE — 85025 COMPLETE CBC W/AUTO DIFF WBC: CPT | Performed by: FAMILY MEDICINE

## 2017-11-17 PROCEDURE — 93000 ELECTROCARDIOGRAM COMPLETE: CPT | Performed by: FAMILY MEDICINE

## 2017-11-17 NOTE — MR AVS SNAPSHOT
After Visit Summary   11/17/2017    Joselito Abarca    MRN: [id number]           Patient Information     Date Of Birth          1965        Visit Information        Provider Department      11/17/2017 3:10 PM Schoen, Gregory G, MD [facility]        Today's Diagnoses     Preop general physical exam    -  1    Need for prophylactic vaccination and inoculation against influenza        DDD (degenerative disc disease), cervical          Care Instructions      Before Your Surgery      Call your surgeon if there is any change in your health. This includes signs of a cold or flu (such as a sore throat, runny nose, cough, rash or fever).    Do not smoke, drink alcohol or take over the counter medicine (unless your surgeon or primary care doctor tells you to) for the 24 hours before and after surgery.    If you take prescribed drugs: Follow your doctor s orders about which medicines to take and which to stop until after surgery.    Eating and drinking prior to surgery: follow the instructions from your surgeon    Take a shower or bath the night before surgery. Use the soap your surgeon gave you to gently clean your skin. If you do not have soap from your surgeon, use your regular soap. Do not shave or scrub the surgery site.  Wear clean pajamas and have clean sheets on your bed.           Follow-ups after your visit        Your next 10 appointments already scheduled     Nov 22, 2017  3:00 PM CST   CT CERVICAL SPINE W/O CONTRAST with PHCT1   [facility] CT Scan ([facility])    [address] [place] [id number] [phone]           Please bring any scans or X-rays taken at other hospitals, if similar tests were done. Also bring a list of your medicines, including vitamins, minerals and over-the-counter drugs. It is safest to leave personal items at home.  Be sure to tell your doctor:   If you have any allergies.   If there s any chance you are pregnant.    If you are breastfeeding.   If you have any special needs.  You do not need to do anything special to prepare.  Please wear loose clothing, such as a sweat suit or jogging clothes. Avoid snaps, zippers and other metal. We may ask you to undress and put on a hospital gown.            Nov 29, 2017   Procedure with Nikolas Vasques MD   [facility] Periop [facility] ([facility])    [address] [place] [id number] [phone]           From [address]: Exit at [address] on south side of [place]. Turn right on [address]. Turn left at stoplight on [address]. [facility] will be in view two blocks ahead            Nov 29, 2017  7:30 AM CST   XR SURGERY AFTAB LESS THAN 5 MIN FLUORO W STILLS with PHCARM1   [facility] ([facility])    [address]  [place] [id number] [phone]           Please bring a list of your current medicines to your exam. (Include vitamins, minerals and over-thecounter medicines.) Leave your valuables at home.  Tell your doctor if there is a chance you may be pregnant.  You do not need to do anything special for this exam.              Who to contact     If you have questions or need follow up information about today's clinic visit or your schedule please contact [facility] directly at [phone].  Normal or non-critical lab and imaging results will be communicated to you by MyChart, letter or phone within 4 business days after the clinic has received the results. If you do not hear from us within 7 days, please contact the clinic through [facility] or phone. If you have a critical or abnormal lab result, we will notify you by phone as soon as possible.  Submit refill requests through [facility] or call your pharmacy and they will forward the refill request to us. Please allow 3 business days for your refill to be completed.          Additional Information About Your Visit       "  MyChart Information     [email or web address] lets you send messages to your doctor, view your test results, renew your prescriptions, schedule appointments and more. To sign up, go to [email or web address] . Click on \"Log in\" on the left side of the screen, which will take you to the Welcome page. Then click on \"Sign up Now\" on the right side of the page.     You will be asked to enter the access code listed below, as well as some personal information. Please follow the directions to create your username and password.     Your access code is: [email or web address]  Expires: 2017  4:18 PM     Your access code will  in 90 days. If you need help or a new code, please call your [place] clinic or [phone].        Care EveryWhere ID     This is your Care EveryWhere ID. This could be used by other organizations to access your [facility] [place] medical records  [id number]        Your Vitals Were     Pulse Temperature Height Pulse Oximetry BMI (Body Mass Index)       60 96.8  F (36  C) (Tympanic) 5' 7\" (1.702 m) 98% 28.04 kg/m2        Blood Pressure from Last 3 Encounters:   17 102/58   17 102/62   17 (!) 134/92    Weight from Last 3 Encounters:   17 179 lb (81.2 kg)   10/05/17 175 lb 3.2 oz (79.5 kg)   17 171 lb (77.6 kg)              We Performed the Following     Asthma Action Plan (AAP)     Basic metabolic panel     CBC with platelets differential     EKG 12-lead complete w/read - Clinics     FLU VAC, SPLIT VIRUS IM > 3 YO (QUADRIVALENT) [01101]     Vaccine Administration, Initial [09496]        Primary Care Provider Office Phone # Fax #    Gregory G Schoen, [phone]       [address] [place] [address]        Goals        General    I will call to schedule an appointment  if I develop new or worsening symptoms. Started 16. (pt-stated)     Notes - Note created  2016  3:32 PM by Behl, Talia K, RN    As of today's date 2016 goal is met at 0 - 25%.   Goal " Status:  Active  Melissa Behl BSN, RN, [facility] Care Coordinator  [phone]        I will use my nebulizers and inhalers as prescribed. Started 4/26/16 (pt-stated)     Notes - Note edited  5/10/2016  8:58 AM by Behl, Melissa K, RN    As of today's date 5/10/2016 goal is met at 51 - 75%.   Goal Status:  Active  Melissa Behl BSN, RN, N  [facility] Care Coordinator  [phone]          Equal Access to Services     [facility]: Hadii aad ku hadasho Soomaali, waaxda luqadaha, qaybta kaalmada adeegyada, waxay idiin hayaan adeeg kharael hebert . So [facility] [phone].    ATENCIÓN: Si habla español, tiene a alfaro disposición servicios gratuitos de asistencia lingüística. Llame al [phone].    We comply with applicable federal civil rights laws and [place] laws. We do not discriminate on the basis of race, color, national origin, age, disability, sex, sexual orientation, or gender identity.            Thank you!     Thank you for choosing [facility]  for your care. Our goal is always to provide you with excellent care. Hearing back from our patients is one way we can continue to improve our services. Please take a few minutes to complete the written survey that you may receive in the mail after your visit with us. Thank you!             Your Updated Medication List - Protect others around you: Learn how to safely use, store and throw away your medicines at www.disposemymeds.org.          This list is accurate as of: 11/17/17  4:17 PM.  Always use your most recent med list.                   Brand Name Dispense Instructions for use Diagnosis    * albuterol (2.5 MG/3ML) 0.083% neb solution     90 mL    NEBULIZE CONTENTS OF ONE VIAL EVERY 4 HOURS AS NEEDED FOR SHORTNESS OF BREATH /DYSPNEA OR WHEEZING    Mild intermittent asthma with acute exacerbation       * VENTOLIN  (90 BASE) MCG/ACT Inhaler   Generic drug:  albuterol     18 g    INHALE 2 PUFFS INTO THE LUNGS EVERY 6 HOURS AS NEEDED  FOR SHORTNESS OF BREATH, DIFFICULTY BREATHING OR WHEEZING.    Mild intermittent asthma with acute exacerbation       clonazePAM 0.5 MG tablet    klonoPIN    90 tablet    Take 0.5-1 tablets (0.25-0.5 mg) by mouth 3 times daily as needed for anxiety    Generalized anxiety disorder       FLOVENT  MCG/ACT Inhaler   Generic drug:  fluticasone     36 g    INHALE 2 PUFFS INTO THE LUNGS TWICE DAILY    ILD (interstitial lung disease) (H)       fluticasone 50 MCG/ACT spray    FLONASE    16 g    USE TWO SPRAYS IN EACH NOSTRIL EVERY DAY    Chronic rhinitis       gabapentin 300 MG capsule    NEURONTIN    270 capsule    TAKE TWO CAPSULES BY MOUTH THREE TIMES A DAY    Intervertebral cervical disc disorder with myelopathy, cervical region, Degeneration of cervical intervertebral disc       ipratropium - albuterol 0.5 mg/2.5 mg/3 mL 0.5-2.5 (3) MG/3ML neb solution    DUONEB    30 vial    Take 1 vial (3 mLs) by nebulization every 4 hours as needed for shortness of breath / dyspnea        methadone 10 MG tablet    DOLOPHINE    90 tablet    Take 1 tablet (10 mg) by mouth every 8 hours as needed    DDD (degenerative disc disease), cervical       omeprazole 20 MG CR capsule    priLOSEC    30 capsule    TAKE 1 CAPSULE BY MOUTH DAILY, 30 TO 60 MINUTES BEFORE A MEAL.    Esophageal reflux       oxyCODONE 5 MG capsule    OXY-IR    360 capsule    Take 2 capsules (10 mg) by mouth every 4 hours as needed 2 caps q 4 hour prn pain up to 12 per day May fill 5/24/2012    Intervertebral cervical disc disorder with myelopathy, cervical region       ranitidine 150 MG tablet    ZANTAC    30 tablet    TAKE ONE TABLET BY MOUTH EVERY MORNING    Esophageal reflux       traZODone 100 MG tablet    DESYREL    90 tablet    Take 3 tablets (300 mg) by mouth At Bedtime    Panic attack       vitamin D 2000 UNITS tablet     100 tablet    TAKE ONE TABLET BY MOUTH EVERY DAY    Degeneration of cervical intervertebral disc       zolpidem 10 MG tablet    AMBIEN      Take 10 mg by mouth nightly as needed        * Notice:  This list has 2 medication(s) that are the same as other medications prescribed for you. Read the directions carefully, and ask your doctor or other care provider to review them with you.

## 2017-11-17 NOTE — PROGRESS NOTES
[address] [address]  [phone]  Dept: [phone]    PRE-OP EVALUATION:  Today's date: 2017    Joselito Abarca (: 1965) presents for pre-operative evaluation assessment as requested by Dr. Vasques.  He requires evaluation and anesthesia risk assessment prior to undergoing surgery/procedure for treatment of Cervical SPINE.  Proposed procedure: COMBINED DISCECTOMY, FUSION     Date of Surgery/ Procedure: 17  Time of Surgery/ Procedure: 730  Hospital/Surgical Facility: [facility]    Primary Physician: Schoen, Gregory G  Type of Anesthesia Anticipated: General    Patient has a Health Care Directive or Living Will:  NO    Preop Questions 2017   1.  Do you have a history of heart attack, stroke, stent, bypass or surgery on an artery in the head, neck, heart or legs? No   2.  Do you ever have any pain or discomfort in your chest? No   3.  Do you have a history of  Heart Failure? No   4.   Are you troubled by shortness of breath when:  walking on a level surface, or up a slight hill, or at night? YES - Asthma   5.  Do you currently have a cold, bronchitis or other respiratory infection? No   6.  Do you have a cough, shortness of breath, or wheezing? No   7.  Do you sometimes get pains in the calves of your legs when you walk? No   8. Do you or anyone in your family have previous history of blood clots? No   9.  Do you or does anyone in your family have a serious bleeding problem such as prolonged bleeding following surgeries or cuts? No   10. Have you ever had problems with anemia or been told to take iron pills? No   11. Have you had any abnormal blood loss such as black, tarry or bloody stools? No   12. Have you ever had a blood transfusion? No   13. Have you or any of your relatives ever had problems with anesthesia? No   14. Do you have sleep apnea, excessive snoring or daytime drowsiness? No   15. Do you have any prosthetic heart valves?  No   16. Do you have prosthetic joints? No           HPI:                                                      Brief HPI related to upcoming procedure: History of 2 prior cervical spine surgeries in the past, now having bilateral pains into his arms with occasional numbness.  He has been having daily headaches and has been into the ED on several occasions for trigger point injections when pain was not relieved by his already substantial chronic use of narcotics.            MEDICAL HISTORY:                                                    Patient Active Problem List    Diagnosis Date Noted     Chronic pain syndrome 12/13/2016     Priority: Medium     Patient is followed by Gregory G. Schoen, MD for ongoing prescription of pain medication.  All refills should be approved by this provider, or covering partner.    Medication(s): Oxycodone 5 mg IR: Take 2 capsules (10 mg) by mouth every 4 hours as needed 2 caps q 4 hour prn pain up to 12 per day .   Methadone 10 mg three times daily, 90 per month  Clonazepam 0.5 mg tid, 90 per month   Clinic visit frequency required: Q 3 months     Controlled substance agreement:  Encounter-Level CSA - 2/27/15:               Controlled Substance Agreement - Scan on 3/14/2015  8:47 AM : Controlled Medication Agreement-02/27/15 (below)            Pain Clinic evaluation in the past: Yes    DIRE Total Score(s):  No flowsheet data found.    Last [facility] website verification:  10/30/2016     [email or web address]         Moderate persistent asthma without complication 11/02/2016     Priority: Medium     Acute respiratory failure with hypoxia (H) 04/29/2016     Priority: Medium     Nausea with vomiting 04/27/2016     Priority: Medium     Cough 03/06/2016     Priority: Medium     Tobacco use disorder 02/17/2016     Priority: Medium     Leukocytosis 02/16/2016     Priority: Medium     Disease of bronchial airway (HCC) 02/12/2016     Priority: Medium     Degeneration of cervical  intervertebral disc 01/26/2015     Priority: Medium     Chronic rhinitis 01/26/2015     Priority: Medium     Health Care Home 09/30/2013     Priority: Medium     Status:  Accepted  Care Coordinator:    Melissa Behl BSN, RN, PHN  [facility] Care Coordinator  [phone]     See Letters for [facility] Care Plan  Date:  April 26, 2016            Arthrodesis status 06/15/2011     Priority: Medium     Neck pain 06/15/2011     Priority: Medium     CARDIOVASCULAR SCREENING; LDL GOAL LESS THAN 160 10/31/2010     Priority: Medium     Intervertebral cervical disc disorder with myelopathy, cervical region 11/12/2009     Priority: Medium          Constipation 03/19/2008     Priority: Medium     Problem list name updated by automated process. Provider to review       Thoracic or lumbosacral neuritis or radiculitis, unspecified 03/19/2008     Priority: Medium     Esophageal reflux 07/09/2003     Priority: Medium     Generalized anxiety disorder 07/08/2003     Priority: Medium     Alcohol abuse, in remission 07/08/2003     Priority: Medium      Past Medical History:   Diagnosis Date     Anxiety      GERD (gastroesophageal reflux disease)      Neck pain 6/15/2011     Past Surgical History:   Procedure Laterality Date     DISCECTOMY LUMBAR POSTERIOR MICROSCOPIC ONE LEVEL  2/21/2012    Procedure:DISCECTOMY LUMBAR POSTERIOR MICROSCOPIC ONE LEVEL; LEFT T1-T2 THORASIC HEMILAMINECTOMY MICRODISCECTOMY WITH MEXTRIX II ; Surgeon:SHARON PURI; Location:[facility] OR     FUSION CERVICAL ANTERIOR TWO LEVELS  1/29/2010     HC DRAIN/INJ MAJOR JOINT/BURSA W/O US  5/5/2008    Left sacroiliac joint injection.     HC INJ EPIDURAL LUMBAR/SACRAL W/WO CONTRAST  2009     HEAD & NECK SURGERY      2013     HERNIA REPAIR       INJECT BLOCK MEDIAL BRANCH CERVICAL/THORACIC/LUMBAR Bilateral 8/26/2015    Procedure: INJECT BLOCK MEDIAL BRANCH CERVICAL / THORACIC / LUMBAR;  Surgeon: Ronald Driscoll MD;  Location: [facility] OR     INJECT FACET JOINT Bilateral 5/27/2015     Procedure: INJECT FACET JOINT;  Surgeon: Ronald Driscoll MD;  Location: [facility] OR     Current Outpatient Prescriptions   Medication Sig Dispense Refill     oxyCODONE (OXY-IR) 5 MG capsule Take 2 capsules (10 mg) by mouth every 4 hours as needed 2 caps q 4 hour prn pain up to 12 per day May fill 5/24/2012 360 capsule 0     methadone (DOLOPHINE) 10 MG tablet Take 1 tablet (10 mg) by mouth every 8 hours as needed 90 tablet 0     clonazePAM (KLONOPIN) 0.5 MG tablet Take 0.5-1 tablets (0.25-0.5 mg) by mouth 3 times daily as needed for anxiety 90 tablet 0     VENTOLIN  (90 BASE) MCG/ACT Inhaler INHALE 2 PUFFS INTO THE LUNGS EVERY 6 HOURS AS NEEDED FOR SHORTNESS OF BREATH, DIFFICULTY BREATHING OR WHEEZING. 18 g 3     gabapentin (NEURONTIN) 300 MG capsule TAKE TWO CAPSULES BY MOUTH THREE TIMES A  capsule 11     omeprazole (PRILOSEC) 20 MG CR capsule TAKE 1 CAPSULE BY MOUTH DAILY, 30 TO 60 MINUTES BEFORE A MEAL. 30 capsule 9     FLOVENT  MCG/ACT Inhaler INHALE 2 PUFFS INTO THE LUNGS TWICE DAILY 36 g 1     fluticasone (FLONASE) 50 MCG/ACT spray USE TWO SPRAYS IN EACH NOSTRIL EVERY DAY 16 g 5     ranitidine (ZANTAC) 150 MG tablet TAKE ONE TABLET BY MOUTH EVERY MORNING 30 tablet 10     Cholecalciferol (VITAMIN D) 2000 UNITS tablet TAKE ONE TABLET BY MOUTH EVERY  tablet 3     ipratropium - albuterol 0.5 mg/2.5 mg/3 mL (DUONEB) 0.5-2.5 (3) MG/3ML neb solution Take 1 vial (3 mLs) by nebulization every 4 hours as needed for shortness of breath / dyspnea 30 vial 0     albuterol (2.5 MG/3ML) 0.083% nebulizer solution NEBULIZE CONTENTS OF ONE VIAL EVERY 4 HOURS AS NEEDED FOR SHORTNESS OF BREATH /DYSPNEA OR WHEEZING 90 mL 0     traZODone (DESYREL) 100 MG tablet Take 3 tablets (300 mg) by mouth At Bedtime 90 tablet 3     zolpidem (AMBIEN) 10 MG tablet Take 10 mg by mouth nightly as needed        [DISCONTINUED] VENTOLIN  (90 BASE) MCG/ACT inhaler INHALE 2 PUFFS INTO THE LUNGS EVERY 6 HOURS AS NEEDED FOR  "SHORTNESS OF BREATH 18 g 0     OTC products: None, except as noted above    Allergies   Allergen Reactions     Vicodin [Hydrocodone-Acetaminophen] Nausea     HEADACHE      Latex Allergy: NO    Social History   Substance Use Topics     Smoking status: Passive Smoke Exposure - Never Smoker     Types: Cigars     Last attempt to quit: 12/19/2009     Smokeless tobacco: Never Used     Alcohol use No      Comment: HX OF ABUSE-IN REMISSION     History   Drug Use No       REVIEW OF SYSTEMS:                                                    C: NEGATIVE for fever, chills, change in weight  I: NEGATIVE for worrisome rashes, moles or lesions  E: NEGATIVE for vision changes or irritation  E/M: NEGATIVE for ear, mouth and throat problems  R: NEGATIVE for significant cough or SOB; using flovent bid and also albuterol 3 times a day. Has done well since getting out of a previous moldy environment that was causing severe respiratory issues a couple of years ago.   CV: NEGATIVE for chest pain, palpitations or peripheral edema  GI: NEGATIVE for nausea, abdominal pain, heartburn, or change in bowel habits  : NEGATIVE for frequency, dysuria, or hematuria  M: NEGATIVE for significant arthralgias or myalgia  N: NEGATIVE for weakness, dizziness or paresthesias  E: NEGATIVE for temperature intolerance, skin/hair changes  H: NEGATIVE for bleeding problems  P: NEGATIVE for changes in mood or affect    EXAM:                                                    /58  Pulse 60  Temp 96.8  F (36  C) (Tympanic)  Ht 5' 7\" (1.702 m)  Wt 179 lb (81.2 kg)  SpO2 98%  BMI 28.04 kg/m2    GENERAL APPEARANCE: healthy, alert and no distress     EYES: EOMI,  PERRL     HENT: ear canals and TM's normal and nose and mouth without ulcers or lesions but he is edentulous.      NECK: no adenopathy, no asymmetry, masses, or scars and thyroid normal to palpation     RESP: lungs clear to auscultation - no rales, rhonchi or wheezes     CV: regular rates and " rhythm, normal S1 S2, no S3 or S4 and no murmur, click or rub     ABDOMEN:  soft, nontender, no HSM or masses and bowel sounds normal     MS: extremities normal- no gross deformities noted, no evidence of inflammation in joints, FROM in all extremities.Neck ROM is limited from prior fusion and with spasm/tightness of paracervical muscles and trapezius.     SKIN: no suspicious lesions or rashes     NEURO: Normal strength and tone, sensory exam grossly normal, mentation intact and speech normal     PSYCH: mentation appears normal. and affect normal/bright     LYMPHATICS: No axillary, cervical, or supraclavicular nodes    DIAGNOSTICS:                                                    EKG: to my review, appears normal, NSR, normal axis, normal intervals, no acute ST/T changes c/w ischemia, no LVH by voltage criteria, unchanged from previous tracings      Results for orders placed or performed in visit on 11/17/17   Basic metabolic panel   Result Value Ref Range    Sodium 135 133 - 144 mmol/L    Potassium 3.7 3.4 - 5.3 mmol/L    Chloride 98 94 - 109 mmol/L    Carbon Dioxide 30 20 - 32 mmol/L    Anion Gap 7 3 - 14 mmol/L    Glucose 92 70 - 99 mg/dL    Urea Nitrogen 10 7 - 30 mg/dL    Creatinine 0.91 0.66 - 1.25 mg/dL    GFR Estimate 88 >60 mL/min/1.7m2    GFR Estimate If Black >90 >60 mL/min/1.7m2    Calcium 8.9 8.5 - 10.1 mg/dL   CBC with platelets differential   Result Value Ref Range    WBC 7.9 4.0 - 11.0 10e9/L    RBC Count 4.71 4.4 - 5.9 10e12/L    Hemoglobin 14.2 13.3 - 17.7 g/dL    Hematocrit 43.9 40.0 - 53.0 %    MCV 93 78 - 100 fl    MCH 30.1 26.5 - 33.0 pg    MCHC 32.3 31.5 - 36.5 g/dL    RDW 12.8 10.0 - 15.0 %    Platelet Count 195 150 - 450 10e9/L    Diff Method Automated Method     % Neutrophils 44.9 %    % Lymphocytes 39.0 %    % Monocytes 6.8 %    % Eosinophils 8.4 %    % Basophils 0.8 %    % Immature Granulocytes 0.1 %    Absolute Neutrophil 3.5 1.6 - 8.3 10e9/L    Absolute Lymphocytes 3.1 0.8 - 5.3  10e9/L    Absolute Monocytes 0.5 0.0 - 1.3 10e9/L    Absolute Eosinophils 0.7 0.0 - 0.7 10e9/L    Absolute Basophils 0.1 0.0 - 0.2 10e9/L    Abs Immature Granulocytes 0.0 0 - 0.4 10e9/L         IMPRESSION:                                                    Reason for surgery/procedure: Recurring severe neck pain and spasms with bilateral radiation of pain into his arms.   Diagnosis/reason for consult: assessment for tolerance of anesthesia and surgery    The proposed surgical procedure is considered LOW risk.    REVISED CARDIAC RISK INDEX  The patient has the following serious cardiovascular risks for perioperative complications such as (MI, PE, VFib and 3  AV Block):  No serious cardiac risks  INTERPRETATION: 0 risks: Class I (very low risk - 0.4% complication rate)    The patient has the following additional risks for perioperative complications:  High tolerance to opioid analgesics due to chronic use of high dose pain medications and benzodiazepine for anxiety.  Also has moderate obstructive lung disease, currently fairly well controlled with use of flovent but still using albuterol 2-3 times daily.        RECOMMENDATIONS:                                                        Pulmonary Risk  Incentive spirometry post op  Nebulizations as needed for management of bronchial spasms/wheezing.    Narcotic oversedation risk. Should be very cautious in use of additional pain meds and monitor with capnography if does not go home same day.     He can take his usual pain meds and inhalers on the day of surgery           APPROVAL GIVEN to proceed with proposed procedure, without further diagnostic evaluation. He is able to tolerate  > 4 METS of activity without chest pains or dyspnea.        Signed Electronically by: Gregory G. Schoen, MD    Copy of this evaluation report is provided to requesting physician.    [place] Preop Guidelines

## 2017-11-17 NOTE — PROGRESS NOTES

## 2017-11-17 NOTE — NURSING NOTE
"Chief Complaint   Patient presents with     Pre-Op Exam       Initial /58  Pulse 60  Temp 96.8  F (36  C) (Tympanic)  Ht 5' 7\" (1.702 m)  Wt 179 lb (81.2 kg)  SpO2 98%  BMI 28.04 kg/m2 Estimated body mass index is 28.04 kg/(m^2) as calculated from the following:    Height as of this encounter: 5' 7\" (1.702 m).    Weight as of this encounter: 179 lb (81.2 kg).  Medication Reconciliation: complete  Crystal Putnam CMA      "

## 2017-11-22 ENCOUNTER — HOSPITAL ENCOUNTER (OUTPATIENT)
Dept: CT IMAGING | Facility: CLINIC | Age: 52
Discharge: HOME | End: 2017-11-22
Attending: NEUROLOGICAL SURGERY | Admitting: NEUROLOGICAL SURGERY
Payer: COMMERCIAL

## 2017-11-22 DIAGNOSIS — M48.02 SPINAL STENOSIS IN CERVICAL REGION: ICD-10-CM

## 2017-11-22 PROCEDURE — 72125 CT NECK SPINE W/O DYE: CPT

## 2017-11-28 ENCOUNTER — ANESTHESIA EVENT (OUTPATIENT)
Dept: SURGERY | Facility: CLINIC | Age: 52
DRG: 473 | End: 2017-11-28
Payer: COMMERCIAL

## 2017-11-28 NOTE — H&P (VIEW-ONLY)
[address] [address]  [phone]  Dept: [phone]    PRE-OP EVALUATION:  Today's date: 2017    Joselito Abarca (: 1965) presents for pre-operative evaluation assessment as requested by Dr. Vasques.  He requires evaluation and anesthesia risk assessment prior to undergoing surgery/procedure for treatment of Cervical SPINE.  Proposed procedure: COMBINED DISCECTOMY, FUSION     Date of Surgery/ Procedure: 17  Time of Surgery/ Procedure: 730  Hospital/Surgical Facility: [facility]    Primary Physician: Schoen, Gregory G  Type of Anesthesia Anticipated: General    Patient has a Health Care Directive or Living Will:  NO    Preop Questions 2017   1.  Do you have a history of heart attack, stroke, stent, bypass or surgery on an artery in the head, neck, heart or legs? No   2.  Do you ever have any pain or discomfort in your chest? No   3.  Do you have a history of  Heart Failure? No   4.   Are you troubled by shortness of breath when:  walking on a level surface, or up a slight hill, or at night? YES - Asthma   5.  Do you currently have a cold, bronchitis or other respiratory infection? No   6.  Do you have a cough, shortness of breath, or wheezing? No   7.  Do you sometimes get pains in the calves of your legs when you walk? No   8. Do you or anyone in your family have previous history of blood clots? No   9.  Do you or does anyone in your family have a serious bleeding problem such as prolonged bleeding following surgeries or cuts? No   10. Have you ever had problems with anemia or been told to take iron pills? No   11. Have you had any abnormal blood loss such as black, tarry or bloody stools? No   12. Have you ever had a blood transfusion? No   13. Have you or any of your relatives ever had problems with anesthesia? No   14. Do you have sleep apnea, excessive snoring or daytime drowsiness? No   15. Do you have any prosthetic heart valves?  No   16. Do you have prosthetic joints? No           HPI:                                                      Brief HPI related to upcoming procedure: History of 2 prior cervical spine surgeries in the past, now having bilateral pains into his arms with occasional numbness.  He has been having daily headaches and has been into the ED on several occasions for trigger point injections when pain was not relieved by his already substantial chronic use of narcotics.            MEDICAL HISTORY:                                                    Patient Active Problem List    Diagnosis Date Noted     Chronic pain syndrome 12/13/2016     Priority: Medium     Patient is followed by Gregory G. Schoen, MD for ongoing prescription of pain medication.  All refills should be approved by this provider, or covering partner.    Medication(s): Oxycodone 5 mg IR: Take 2 capsules (10 mg) by mouth every 4 hours as needed 2 caps q 4 hour prn pain up to 12 per day .   Methadone 10 mg three times daily, 90 per month  Clonazepam 0.5 mg tid, 90 per month   Clinic visit frequency required: Q 3 months     Controlled substance agreement:  Encounter-Level CSA - 2/27/15:               Controlled Substance Agreement - Scan on 3/14/2015  8:47 AM : Controlled Medication Agreement-02/27/15 (below)            Pain Clinic evaluation in the past: Yes    DIRE Total Score(s):  No flowsheet data found.    Last [facility] website verification:  10/30/2016     [email or web address]         Moderate persistent asthma without complication 11/02/2016     Priority: Medium     Acute respiratory failure with hypoxia (H) 04/29/2016     Priority: Medium     Nausea with vomiting 04/27/2016     Priority: Medium     Cough 03/06/2016     Priority: Medium     Tobacco use disorder 02/17/2016     Priority: Medium     Leukocytosis 02/16/2016     Priority: Medium     Disease of bronchial airway (HCC) 02/12/2016     Priority: Medium     Degeneration of cervical  intervertebral disc 01/26/2015     Priority: Medium     Chronic rhinitis 01/26/2015     Priority: Medium     Health Care Home 09/30/2013     Priority: Medium     Status:  Accepted  Care Coordinator:    Melissa Behl BSN, RN, PHN  [facility] Care Coordinator  [phone]     See Letters for [facility] Care Plan  Date:  April 26, 2016            Arthrodesis status 06/15/2011     Priority: Medium     Neck pain 06/15/2011     Priority: Medium     CARDIOVASCULAR SCREENING; LDL GOAL LESS THAN 160 10/31/2010     Priority: Medium     Intervertebral cervical disc disorder with myelopathy, cervical region 11/12/2009     Priority: Medium          Constipation 03/19/2008     Priority: Medium     Problem list name updated by automated process. Provider to review       Thoracic or lumbosacral neuritis or radiculitis, unspecified 03/19/2008     Priority: Medium     Esophageal reflux 07/09/2003     Priority: Medium     Generalized anxiety disorder 07/08/2003     Priority: Medium     Alcohol abuse, in remission 07/08/2003     Priority: Medium      Past Medical History:   Diagnosis Date     Anxiety      GERD (gastroesophageal reflux disease)      Neck pain 6/15/2011     Past Surgical History:   Procedure Laterality Date     DISCECTOMY LUMBAR POSTERIOR MICROSCOPIC ONE LEVEL  2/21/2012    Procedure:DISCECTOMY LUMBAR POSTERIOR MICROSCOPIC ONE LEVEL; LEFT T1-T2 THORASIC HEMILAMINECTOMY MICRODISCECTOMY WITH MEXTRIX II ; Surgeon:SHARON PURI; Location:[facility] OR     FUSION CERVICAL ANTERIOR TWO LEVELS  1/29/2010     HC DRAIN/INJ MAJOR JOINT/BURSA W/O US  5/5/2008    Left sacroiliac joint injection.     HC INJ EPIDURAL LUMBAR/SACRAL W/WO CONTRAST  2009     HEAD & NECK SURGERY      2013     HERNIA REPAIR       INJECT BLOCK MEDIAL BRANCH CERVICAL/THORACIC/LUMBAR Bilateral 8/26/2015    Procedure: INJECT BLOCK MEDIAL BRANCH CERVICAL / THORACIC / LUMBAR;  Surgeon: Ronald Driscoll MD;  Location: [facility] OR     INJECT FACET JOINT Bilateral 5/27/2015     Procedure: INJECT FACET JOINT;  Surgeon: Ronald Driscoll MD;  Location: [facility] OR     Current Outpatient Prescriptions   Medication Sig Dispense Refill     oxyCODONE (OXY-IR) 5 MG capsule Take 2 capsules (10 mg) by mouth every 4 hours as needed 2 caps q 4 hour prn pain up to 12 per day May fill 5/24/2012 360 capsule 0     methadone (DOLOPHINE) 10 MG tablet Take 1 tablet (10 mg) by mouth every 8 hours as needed 90 tablet 0     clonazePAM (KLONOPIN) 0.5 MG tablet Take 0.5-1 tablets (0.25-0.5 mg) by mouth 3 times daily as needed for anxiety 90 tablet 0     VENTOLIN  (90 BASE) MCG/ACT Inhaler INHALE 2 PUFFS INTO THE LUNGS EVERY 6 HOURS AS NEEDED FOR SHORTNESS OF BREATH, DIFFICULTY BREATHING OR WHEEZING. 18 g 3     gabapentin (NEURONTIN) 300 MG capsule TAKE TWO CAPSULES BY MOUTH THREE TIMES A  capsule 11     omeprazole (PRILOSEC) 20 MG CR capsule TAKE 1 CAPSULE BY MOUTH DAILY, 30 TO 60 MINUTES BEFORE A MEAL. 30 capsule 9     FLOVENT  MCG/ACT Inhaler INHALE 2 PUFFS INTO THE LUNGS TWICE DAILY 36 g 1     fluticasone (FLONASE) 50 MCG/ACT spray USE TWO SPRAYS IN EACH NOSTRIL EVERY DAY 16 g 5     ranitidine (ZANTAC) 150 MG tablet TAKE ONE TABLET BY MOUTH EVERY MORNING 30 tablet 10     Cholecalciferol (VITAMIN D) 2000 UNITS tablet TAKE ONE TABLET BY MOUTH EVERY  tablet 3     ipratropium - albuterol 0.5 mg/2.5 mg/3 mL (DUONEB) 0.5-2.5 (3) MG/3ML neb solution Take 1 vial (3 mLs) by nebulization every 4 hours as needed for shortness of breath / dyspnea 30 vial 0     albuterol (2.5 MG/3ML) 0.083% nebulizer solution NEBULIZE CONTENTS OF ONE VIAL EVERY 4 HOURS AS NEEDED FOR SHORTNESS OF BREATH /DYSPNEA OR WHEEZING 90 mL 0     traZODone (DESYREL) 100 MG tablet Take 3 tablets (300 mg) by mouth At Bedtime 90 tablet 3     zolpidem (AMBIEN) 10 MG tablet Take 10 mg by mouth nightly as needed        [DISCONTINUED] VENTOLIN  (90 BASE) MCG/ACT inhaler INHALE 2 PUFFS INTO THE LUNGS EVERY 6 HOURS AS NEEDED FOR  "SHORTNESS OF BREATH 18 g 0     OTC products: None, except as noted above    Allergies   Allergen Reactions     Vicodin [Hydrocodone-Acetaminophen] Nausea     HEADACHE      Latex Allergy: NO    Social History   Substance Use Topics     Smoking status: Passive Smoke Exposure - Never Smoker     Types: Cigars     Last attempt to quit: 12/19/2009     Smokeless tobacco: Never Used     Alcohol use No      Comment: HX OF ABUSE-IN REMISSION     History   Drug Use No       REVIEW OF SYSTEMS:                                                    C: NEGATIVE for fever, chills, change in weight  I: NEGATIVE for worrisome rashes, moles or lesions  E: NEGATIVE for vision changes or irritation  E/M: NEGATIVE for ear, mouth and throat problems  R: NEGATIVE for significant cough or SOB; using flovent bid and also albuterol 3 times a day. Has done well since getting out of a previous moldy environment that was causing severe respiratory issues a couple of years ago.   CV: NEGATIVE for chest pain, palpitations or peripheral edema  GI: NEGATIVE for nausea, abdominal pain, heartburn, or change in bowel habits  : NEGATIVE for frequency, dysuria, or hematuria  M: NEGATIVE for significant arthralgias or myalgia  N: NEGATIVE for weakness, dizziness or paresthesias  E: NEGATIVE for temperature intolerance, skin/hair changes  H: NEGATIVE for bleeding problems  P: NEGATIVE for changes in mood or affect    EXAM:                                                    /58  Pulse 60  Temp 96.8  F (36  C) (Tympanic)  Ht 5' 7\" (1.702 m)  Wt 179 lb (81.2 kg)  SpO2 98%  BMI 28.04 kg/m2    GENERAL APPEARANCE: healthy, alert and no distress     EYES: EOMI,  PERRL     HENT: ear canals and TM's normal and nose and mouth without ulcers or lesions but he is edentulous.      NECK: no adenopathy, no asymmetry, masses, or scars and thyroid normal to palpation     RESP: lungs clear to auscultation - no rales, rhonchi or wheezes     CV: regular rates and " rhythm, normal S1 S2, no S3 or S4 and no murmur, click or rub     ABDOMEN:  soft, nontender, no HSM or masses and bowel sounds normal     MS: extremities normal- no gross deformities noted, no evidence of inflammation in joints, FROM in all extremities.Neck ROM is limited from prior fusion and with spasm/tightness of paracervical muscles and trapezius.     SKIN: no suspicious lesions or rashes     NEURO: Normal strength and tone, sensory exam grossly normal, mentation intact and speech normal     PSYCH: mentation appears normal. and affect normal/bright     LYMPHATICS: No axillary, cervical, or supraclavicular nodes    DIAGNOSTICS:                                                    EKG: to my review, appears normal, NSR, normal axis, normal intervals, no acute ST/T changes c/w ischemia, no LVH by voltage criteria, unchanged from previous tracings      Results for orders placed or performed in visit on 11/17/17   Basic metabolic panel   Result Value Ref Range    Sodium 135 133 - 144 mmol/L    Potassium 3.7 3.4 - 5.3 mmol/L    Chloride 98 94 - 109 mmol/L    Carbon Dioxide 30 20 - 32 mmol/L    Anion Gap 7 3 - 14 mmol/L    Glucose 92 70 - 99 mg/dL    Urea Nitrogen 10 7 - 30 mg/dL    Creatinine 0.91 0.66 - 1.25 mg/dL    GFR Estimate 88 >60 mL/min/1.7m2    GFR Estimate If Black >90 >60 mL/min/1.7m2    Calcium 8.9 8.5 - 10.1 mg/dL   CBC with platelets differential   Result Value Ref Range    WBC 7.9 4.0 - 11.0 10e9/L    RBC Count 4.71 4.4 - 5.9 10e12/L    Hemoglobin 14.2 13.3 - 17.7 g/dL    Hematocrit 43.9 40.0 - 53.0 %    MCV 93 78 - 100 fl    MCH 30.1 26.5 - 33.0 pg    MCHC 32.3 31.5 - 36.5 g/dL    RDW 12.8 10.0 - 15.0 %    Platelet Count 195 150 - 450 10e9/L    Diff Method Automated Method     % Neutrophils 44.9 %    % Lymphocytes 39.0 %    % Monocytes 6.8 %    % Eosinophils 8.4 %    % Basophils 0.8 %    % Immature Granulocytes 0.1 %    Absolute Neutrophil 3.5 1.6 - 8.3 10e9/L    Absolute Lymphocytes 3.1 0.8 - 5.3  10e9/L    Absolute Monocytes 0.5 0.0 - 1.3 10e9/L    Absolute Eosinophils 0.7 0.0 - 0.7 10e9/L    Absolute Basophils 0.1 0.0 - 0.2 10e9/L    Abs Immature Granulocytes 0.0 0 - 0.4 10e9/L         IMPRESSION:                                                    Reason for surgery/procedure: Recurring severe neck pain and spasms with bilateral radiation of pain into his arms.   Diagnosis/reason for consult: assessment for tolerance of anesthesia and surgery    The proposed surgical procedure is considered LOW risk.    REVISED CARDIAC RISK INDEX  The patient has the following serious cardiovascular risks for perioperative complications such as (MI, PE, VFib and 3  AV Block):  No serious cardiac risks  INTERPRETATION: 0 risks: Class I (very low risk - 0.4% complication rate)    The patient has the following additional risks for perioperative complications:  High tolerance to opioid analgesics due to chronic use of high dose pain medications and benzodiazepine for anxiety.  Also has moderate obstructive lung disease, currently fairly well controlled with use of flovent but still using albuterol 2-3 times daily.        RECOMMENDATIONS:                                                        Pulmonary Risk  Incentive spirometry post op  Nebulizations as needed for management of bronchial spasms/wheezing.    Narcotic oversedation risk. Should be very cautious in use of additional pain meds and monitor with capnography if does not go home same day.     He can take his usual pain meds and inhalers on the day of surgery           APPROVAL GIVEN to proceed with proposed procedure, without further diagnostic evaluation. He is able to tolerate  > 4 METS of activity without chest pains or dyspnea.        Signed Electronically by: Gregory G. Schoen, MD    Copy of this evaluation report is provided to requesting physician.    [place] Preop Guidelines

## 2017-11-29 ENCOUNTER — HOSPITAL ENCOUNTER (INPATIENT)
Facility: CLINIC | Age: 52
LOS: 1 days | Discharge: HOME | DRG: 473 | End: 2017-11-30
Attending: NEUROLOGICAL SURGERY | Admitting: NEUROLOGICAL SURGERY
Payer: COMMERCIAL

## 2017-11-29 ENCOUNTER — HOSPITAL ENCOUNTER (OUTPATIENT)
Dept: GENERAL RADIOLOGY | Facility: CLINIC | Age: 52
DRG: 473 | End: 2017-11-29
Attending: NEUROLOGICAL SURGERY | Admitting: NEUROLOGICAL SURGERY
Payer: COMMERCIAL

## 2017-11-29 ENCOUNTER — ANESTHESIA (OUTPATIENT)
Dept: SURGERY | Facility: CLINIC | Age: 52
DRG: 473 | End: 2017-11-29
Payer: COMMERCIAL

## 2017-11-29 ENCOUNTER — SURGERY (OUTPATIENT)
Age: 52
End: 2017-11-29

## 2017-11-29 DIAGNOSIS — M48.02 STENOSIS OF CERVICAL SPINE: ICD-10-CM

## 2017-11-29 DIAGNOSIS — M50.00 INTERVERTEBRAL CERVICAL DISC DISORDER WITH MYELOPATHY, CERVICAL REGION: ICD-10-CM

## 2017-11-29 PROBLEM — Z98.1 STATUS POST CERVICAL SPINAL ARTHRODESIS: Status: ACTIVE | Noted: 2017-11-29

## 2017-11-29 PROCEDURE — 40000277 XR SURGERY CARM FLUORO LESS THAN 5 MIN W STILLS: Mod: TC

## 2017-11-29 PROCEDURE — 36000071 ZZH SURGERY LEVEL 5 W FLUORO 1ST 30 MIN: Performed by: NEUROLOGICAL SURGERY

## 2017-11-29 PROCEDURE — 22551 ARTHRD ANT NTRBDY CERVICAL: CPT | Mod: AS | Performed by: PHYSICIAN ASSISTANT

## 2017-11-29 PROCEDURE — 0RG10A0 FUSION OF CERVICAL VERTEBRAL JOINT WITH INTERBODY FUSION DEVICE, ANTERIOR APPROACH, ANTERIOR COLUMN, OPEN APPROACH: ICD-10-PCS | Performed by: NEUROLOGICAL SURGERY

## 2017-11-29 PROCEDURE — 22855 REMOVAL ANTERIOR INSTRMJ: CPT | Mod: 51 | Performed by: NEUROLOGICAL SURGERY

## 2017-11-29 PROCEDURE — 37000009 ZZH ANESTHESIA TECHNICAL FEE, EACH ADDTL 15 MIN: Performed by: NEUROLOGICAL SURGERY

## 2017-11-29 PROCEDURE — 27210794 ZZH OR GENERAL SUPPLY STERILE: Performed by: NEUROLOGICAL SURGERY

## 2017-11-29 PROCEDURE — 22853 INSJ BIOMECHANICAL DEVICE: CPT | Mod: AS | Performed by: PHYSICIAN ASSISTANT

## 2017-11-29 PROCEDURE — 25000132 ZZH RX MED GY IP 250 OP 250 PS 637: Performed by: PHYSICIAN ASSISTANT

## 2017-11-29 PROCEDURE — 27210995 ZZH RX 272: Performed by: NEUROLOGICAL SURGERY

## 2017-11-29 PROCEDURE — 01N10ZZ RELEASE CERVICAL NERVE, OPEN APPROACH: ICD-10-PCS | Performed by: NEUROLOGICAL SURGERY

## 2017-11-29 PROCEDURE — 71000015 ZZH RECOVERY PHASE 1 LEVEL 2 EA ADDTL HR: Performed by: NEUROLOGICAL SURGERY

## 2017-11-29 PROCEDURE — 25000566 ZZH SEVOFLURANE, EA 15 MIN: Performed by: NEUROLOGICAL SURGERY

## 2017-11-29 PROCEDURE — 12000000 ZZH R&B MED SURG/OB

## 2017-11-29 PROCEDURE — 22551 ARTHRD ANT NTRBDY CERVICAL: CPT | Performed by: NEUROLOGICAL SURGERY

## 2017-11-29 PROCEDURE — 71000014 ZZH RECOVERY PHASE 1 LEVEL 2 FIRST HR: Performed by: NEUROLOGICAL SURGERY

## 2017-11-29 PROCEDURE — 25000128 H RX IP 250 OP 636: Performed by: NURSE ANESTHETIST, CERTIFIED REGISTERED

## 2017-11-29 PROCEDURE — 37000008 ZZH ANESTHESIA TECHNICAL FEE, 1ST 30 MIN: Performed by: NEUROLOGICAL SURGERY

## 2017-11-29 PROCEDURE — 25000128 H RX IP 250 OP 636: Performed by: NEUROLOGICAL SURGERY

## 2017-11-29 PROCEDURE — 27810325 ZZHC OR IMPLANT OTHER OPNP: Performed by: NEUROLOGICAL SURGERY

## 2017-11-29 PROCEDURE — C9399 UNCLASSIFIED DRUGS OR BIOLOG: HCPCS | Performed by: NURSE ANESTHETIST, CERTIFIED REGISTERED

## 2017-11-29 PROCEDURE — 25000125 ZZHC RX 250: Performed by: NURSE ANESTHETIST, CERTIFIED REGISTERED

## 2017-11-29 PROCEDURE — 25000128 H RX IP 250 OP 636: Performed by: PHYSICIAN ASSISTANT

## 2017-11-29 PROCEDURE — 22853 INSJ BIOMECHANICAL DEVICE: CPT | Performed by: NEUROLOGICAL SURGERY

## 2017-11-29 PROCEDURE — 0RP104Z REMOVAL OF INTERNAL FIXATION DEVICE FROM CERVICAL VERTEBRAL JOINT, OPEN APPROACH: ICD-10-PCS | Performed by: NEUROLOGICAL SURGERY

## 2017-11-29 PROCEDURE — 40000306 ZZH STATISTIC PRE PROC ASSESS II: Performed by: NEUROLOGICAL SURGERY

## 2017-11-29 PROCEDURE — 25000125 ZZHC RX 250: Performed by: NEUROLOGICAL SURGERY

## 2017-11-29 PROCEDURE — 27810322 ZZHC OR SPINE - CAGE/SPACER/DISK/CORD/CONNECTOR OPNP: Performed by: NEUROLOGICAL SURGERY

## 2017-11-29 PROCEDURE — 22855 REMOVAL ANTERIOR INSTRMJ: CPT | Mod: AS | Performed by: PHYSICIAN ASSISTANT

## 2017-11-29 PROCEDURE — 27110028 ZZH OR GENERAL SUPPLY NON-STERILE: Performed by: NEUROLOGICAL SURGERY

## 2017-11-29 PROCEDURE — 0RT30ZZ RESECTION OF CERVICAL VERTEBRAL DISC, OPEN APPROACH: ICD-10-PCS | Performed by: NEUROLOGICAL SURGERY

## 2017-11-29 PROCEDURE — 36000069 ZZH SURGERY LEVEL 5 EA 15 ADDTL MIN: Performed by: NEUROLOGICAL SURGERY

## 2017-11-29 DEVICE — IMPLANTABLE DEVICE: Type: IMPLANTABLE DEVICE | Site: SPINE CERVICAL | Status: FUNCTIONAL

## 2017-11-29 RX ORDER — KETAMINE HYDROCHLORIDE 10 MG/ML
INJECTION, SOLUTION INTRAMUSCULAR; INTRAVENOUS PRN
Status: DISCONTINUED | OUTPATIENT
Start: 2017-11-29 | End: 2017-11-29

## 2017-11-29 RX ORDER — OXYCODONE HYDROCHLORIDE 5 MG/1
5-10 TABLET ORAL
Status: DISCONTINUED | OUTPATIENT
Start: 2017-11-29 | End: 2017-11-30 | Stop reason: HOSPADM

## 2017-11-29 RX ORDER — FENTANYL CITRATE 50 UG/ML
25-50 INJECTION, SOLUTION INTRAMUSCULAR; INTRAVENOUS
Status: DISCONTINUED | OUTPATIENT
Start: 2017-11-29 | End: 2017-11-29 | Stop reason: HOSPADM

## 2017-11-29 RX ORDER — MEPERIDINE HYDROCHLORIDE 25 MG/ML
12.5 INJECTION INTRAMUSCULAR; INTRAVENOUS; SUBCUTANEOUS
Status: DISCONTINUED | OUTPATIENT
Start: 2017-11-29 | End: 2017-11-29 | Stop reason: HOSPADM

## 2017-11-29 RX ORDER — PROPOFOL 10 MG/ML
INJECTION, EMULSION INTRAVENOUS PRN
Status: DISCONTINUED | OUTPATIENT
Start: 2017-11-29 | End: 2017-11-29

## 2017-11-29 RX ORDER — ACETAMINOPHEN 325 MG/1
650 TABLET ORAL EVERY 4 HOURS PRN
Status: DISCONTINUED | OUTPATIENT
Start: 2017-12-02 | End: 2017-11-30 | Stop reason: HOSPADM

## 2017-11-29 RX ORDER — PROCHLORPERAZINE MALEATE 5 MG
10 TABLET ORAL EVERY 6 HOURS PRN
Status: DISCONTINUED | OUTPATIENT
Start: 2017-11-29 | End: 2017-11-30 | Stop reason: HOSPADM

## 2017-11-29 RX ORDER — SODIUM CHLORIDE, SODIUM LACTATE, POTASSIUM CHLORIDE, CALCIUM CHLORIDE 600; 310; 30; 20 MG/100ML; MG/100ML; MG/100ML; MG/100ML
INJECTION, SOLUTION INTRAVENOUS CONTINUOUS
Status: DISCONTINUED | OUTPATIENT
Start: 2017-11-29 | End: 2017-11-29 | Stop reason: HOSPADM

## 2017-11-29 RX ORDER — METOCLOPRAMIDE HYDROCHLORIDE 5 MG/ML
10 INJECTION INTRAMUSCULAR; INTRAVENOUS EVERY 6 HOURS PRN
Status: DISCONTINUED | OUTPATIENT
Start: 2017-11-29 | End: 2017-11-30 | Stop reason: HOSPADM

## 2017-11-29 RX ORDER — NALOXONE HYDROCHLORIDE 0.4 MG/ML
.1-.4 INJECTION, SOLUTION INTRAMUSCULAR; INTRAVENOUS; SUBCUTANEOUS
Status: DISCONTINUED | OUTPATIENT
Start: 2017-11-29 | End: 2017-11-30 | Stop reason: HOSPADM

## 2017-11-29 RX ORDER — CEFAZOLIN SODIUM 1 G
1 VIAL (EA) INJECTION EVERY 8 HOURS
Status: COMPLETED | OUTPATIENT
Start: 2017-11-29 | End: 2017-11-30

## 2017-11-29 RX ORDER — HYDRALAZINE HYDROCHLORIDE 20 MG/ML
2.5-5 INJECTION INTRAMUSCULAR; INTRAVENOUS EVERY 10 MIN PRN
Status: DISCONTINUED | OUTPATIENT
Start: 2017-11-29 | End: 2017-11-29 | Stop reason: HOSPADM

## 2017-11-29 RX ORDER — LIDOCAINE HYDROCHLORIDE 20 MG/ML
INJECTION, SOLUTION INFILTRATION; PERINEURAL PRN
Status: DISCONTINUED | OUTPATIENT
Start: 2017-11-29 | End: 2017-11-29

## 2017-11-29 RX ORDER — ONDANSETRON 2 MG/ML
4 INJECTION INTRAMUSCULAR; INTRAVENOUS EVERY 30 MIN PRN
Status: DISCONTINUED | OUTPATIENT
Start: 2017-11-29 | End: 2017-11-29 | Stop reason: HOSPADM

## 2017-11-29 RX ORDER — ONDANSETRON 4 MG/1
4 TABLET, ORALLY DISINTEGRATING ORAL EVERY 6 HOURS PRN
Status: DISCONTINUED | OUTPATIENT
Start: 2017-11-29 | End: 2017-11-30 | Stop reason: HOSPADM

## 2017-11-29 RX ORDER — ACETAMINOPHEN 325 MG/1
975 TABLET ORAL EVERY 8 HOURS
Status: DISCONTINUED | OUTPATIENT
Start: 2017-11-29 | End: 2017-11-30 | Stop reason: HOSPADM

## 2017-11-29 RX ORDER — EPHEDRINE SULFATE 50 MG/ML
INJECTION, SOLUTION INTRAMUSCULAR; INTRAVENOUS; SUBCUTANEOUS PRN
Status: DISCONTINUED | OUTPATIENT
Start: 2017-11-29 | End: 2017-11-29

## 2017-11-29 RX ORDER — AMOXICILLIN 250 MG
1-2 CAPSULE ORAL 2 TIMES DAILY
Status: DISCONTINUED | OUTPATIENT
Start: 2017-11-29 | End: 2017-11-30 | Stop reason: HOSPADM

## 2017-11-29 RX ORDER — SODIUM CHLORIDE 9 MG/ML
INJECTION, SOLUTION INTRAVENOUS CONTINUOUS
Status: DISCONTINUED | OUTPATIENT
Start: 2017-11-29 | End: 2017-11-30 | Stop reason: HOSPADM

## 2017-11-29 RX ORDER — HYDROXYZINE HYDROCHLORIDE 25 MG/1
25 TABLET, FILM COATED ORAL EVERY 6 HOURS PRN
Status: DISCONTINUED | OUTPATIENT
Start: 2017-11-29 | End: 2017-11-30 | Stop reason: HOSPADM

## 2017-11-29 RX ORDER — CEFAZOLIN SODIUM 1 G/3ML
1 INJECTION, POWDER, FOR SOLUTION INTRAMUSCULAR; INTRAVENOUS SEE ADMIN INSTRUCTIONS
Status: DISCONTINUED | OUTPATIENT
Start: 2017-11-29 | End: 2017-11-29 | Stop reason: HOSPADM

## 2017-11-29 RX ORDER — CALCIUM CARBONATE 500 MG/1
1000 TABLET, CHEWABLE ORAL 4 TIMES DAILY PRN
Status: DISCONTINUED | OUTPATIENT
Start: 2017-11-29 | End: 2017-11-30 | Stop reason: HOSPADM

## 2017-11-29 RX ORDER — METOCLOPRAMIDE 5 MG/1
10 TABLET ORAL EVERY 6 HOURS PRN
Status: DISCONTINUED | OUTPATIENT
Start: 2017-11-29 | End: 2017-11-30 | Stop reason: HOSPADM

## 2017-11-29 RX ORDER — ONDANSETRON 2 MG/ML
INJECTION INTRAMUSCULAR; INTRAVENOUS PRN
Status: DISCONTINUED | OUTPATIENT
Start: 2017-11-29 | End: 2017-11-29

## 2017-11-29 RX ORDER — METHOCARBAMOL 750 MG/1
750 TABLET, FILM COATED ORAL EVERY 6 HOURS PRN
Status: DISCONTINUED | OUTPATIENT
Start: 2017-11-29 | End: 2017-11-30 | Stop reason: HOSPADM

## 2017-11-29 RX ORDER — FENTANYL CITRATE 50 UG/ML
INJECTION, SOLUTION INTRAMUSCULAR; INTRAVENOUS PRN
Status: DISCONTINUED | OUTPATIENT
Start: 2017-11-29 | End: 2017-11-29

## 2017-11-29 RX ORDER — ACETAMINOPHEN 10 MG/ML
INJECTION, SOLUTION INTRAVENOUS PRN
Status: DISCONTINUED | OUTPATIENT
Start: 2017-11-29 | End: 2017-11-29

## 2017-11-29 RX ORDER — OXYCODONE HYDROCHLORIDE 5 MG/1
10 CAPSULE ORAL EVERY 4 HOURS PRN
Qty: 120 CAPSULE | Refills: 0 | Status: SHIPPED | OUTPATIENT
Start: 2017-11-29 | End: 2017-12-01

## 2017-11-29 RX ORDER — FENTANYL CITRATE 50 UG/ML
50-100 INJECTION, SOLUTION INTRAMUSCULAR; INTRAVENOUS
Status: DISCONTINUED | OUTPATIENT
Start: 2017-11-29 | End: 2017-11-29 | Stop reason: HOSPADM

## 2017-11-29 RX ORDER — HYDROMORPHONE HYDROCHLORIDE 1 MG/ML
.3-.5 INJECTION, SOLUTION INTRAMUSCULAR; INTRAVENOUS; SUBCUTANEOUS
Status: DISCONTINUED | OUTPATIENT
Start: 2017-11-29 | End: 2017-11-30 | Stop reason: HOSPADM

## 2017-11-29 RX ORDER — ONDANSETRON 2 MG/ML
4 INJECTION INTRAMUSCULAR; INTRAVENOUS EVERY 6 HOURS PRN
Status: DISCONTINUED | OUTPATIENT
Start: 2017-11-29 | End: 2017-11-30 | Stop reason: HOSPADM

## 2017-11-29 RX ORDER — ALBUTEROL SULFATE 0.83 MG/ML
2.5 SOLUTION RESPIRATORY (INHALATION) EVERY 4 HOURS PRN
Status: DISCONTINUED | OUTPATIENT
Start: 2017-11-29 | End: 2017-11-29 | Stop reason: HOSPADM

## 2017-11-29 RX ORDER — ONDANSETRON 4 MG/1
4 TABLET, ORALLY DISINTEGRATING ORAL EVERY 30 MIN PRN
Status: DISCONTINUED | OUTPATIENT
Start: 2017-11-29 | End: 2017-11-29 | Stop reason: HOSPADM

## 2017-11-29 RX ORDER — HYDROMORPHONE HYDROCHLORIDE 1 MG/ML
.5-1 INJECTION, SOLUTION INTRAMUSCULAR; INTRAVENOUS; SUBCUTANEOUS EVERY 10 MIN PRN
Status: DISCONTINUED | OUTPATIENT
Start: 2017-11-29 | End: 2017-11-29 | Stop reason: HOSPADM

## 2017-11-29 RX ORDER — LIDOCAINE 40 MG/G
CREAM TOPICAL
Status: DISCONTINUED | OUTPATIENT
Start: 2017-11-29 | End: 2017-11-30 | Stop reason: HOSPADM

## 2017-11-29 RX ORDER — CEFAZOLIN SODIUM 2 G/100ML
2 INJECTION, SOLUTION INTRAVENOUS
Status: COMPLETED | OUTPATIENT
Start: 2017-11-29 | End: 2017-11-29

## 2017-11-29 RX ORDER — NALOXONE HYDROCHLORIDE 0.4 MG/ML
.1-.4 INJECTION, SOLUTION INTRAMUSCULAR; INTRAVENOUS; SUBCUTANEOUS
Status: DISCONTINUED | OUTPATIENT
Start: 2017-11-29 | End: 2017-11-29 | Stop reason: HOSPADM

## 2017-11-29 RX ORDER — OXYCODONE HYDROCHLORIDE 5 MG/1
10 TABLET ORAL
Status: DISCONTINUED | OUTPATIENT
Start: 2017-11-29 | End: 2017-11-29 | Stop reason: HOSPADM

## 2017-11-29 RX ORDER — LIDOCAINE HYDROCHLORIDE AND EPINEPHRINE 10; 10 MG/ML; UG/ML
INJECTION, SOLUTION INFILTRATION; PERINEURAL PRN
Status: DISCONTINUED | OUTPATIENT
Start: 2017-11-29 | End: 2017-11-29 | Stop reason: HOSPADM

## 2017-11-29 RX ORDER — DEXAMETHASONE SODIUM PHOSPHATE 4 MG/ML
INJECTION, SOLUTION INTRA-ARTICULAR; INTRALESIONAL; INTRAMUSCULAR; INTRAVENOUS; SOFT TISSUE PRN
Status: DISCONTINUED | OUTPATIENT
Start: 2017-11-29 | End: 2017-11-29

## 2017-11-29 RX ADMIN — PROPOFOL 200 MG: 10 INJECTION, EMULSION INTRAVENOUS at 07:37

## 2017-11-29 RX ADMIN — OXYCODONE HYDROCHLORIDE 10 MG: 5 TABLET ORAL at 13:05

## 2017-11-29 RX ADMIN — SUGAMMADEX 200 MG: 100 INJECTION, SOLUTION INTRAVENOUS at 09:18

## 2017-11-29 RX ADMIN — FENTANYL CITRATE 100 MCG: 50 INJECTION, SOLUTION INTRAMUSCULAR; INTRAVENOUS at 07:36

## 2017-11-29 RX ADMIN — METHOCARBAMOL 750 MG: 750 TABLET ORAL at 15:58

## 2017-11-29 RX ADMIN — HYDROMORPHONE HYDROCHLORIDE 1 MG: 1 INJECTION, SOLUTION INTRAMUSCULAR; INTRAVENOUS; SUBCUTANEOUS at 11:34

## 2017-11-29 RX ADMIN — CEFAZOLIN SODIUM 1 G: 1 INJECTION, POWDER, FOR SOLUTION INTRAMUSCULAR; INTRAVENOUS at 15:59

## 2017-11-29 RX ADMIN — SENNOSIDES AND DOCUSATE SODIUM 1 TABLET: 8.6; 5 TABLET ORAL at 14:00

## 2017-11-29 RX ADMIN — MIDAZOLAM HYDROCHLORIDE 2 MG: 1 INJECTION, SOLUTION INTRAMUSCULAR; INTRAVENOUS at 07:29

## 2017-11-29 RX ADMIN — LIDOCAINE HYDROCHLORIDE 80 MG: 20 INJECTION, SOLUTION INFILTRATION; PERINEURAL at 07:37

## 2017-11-29 RX ADMIN — HYDROMORPHONE HYDROCHLORIDE 1 MG: 1 INJECTION, SOLUTION INTRAMUSCULAR; INTRAVENOUS; SUBCUTANEOUS at 10:45

## 2017-11-29 RX ADMIN — SODIUM CHLORIDE, POTASSIUM CHLORIDE, SODIUM LACTATE AND CALCIUM CHLORIDE: 600; 310; 30; 20 INJECTION, SOLUTION INTRAVENOUS at 06:40

## 2017-11-29 RX ADMIN — ACETAMINOPHEN 975 MG: 325 TABLET ORAL at 17:21

## 2017-11-29 RX ADMIN — LIDOCAINE HYDROCHLORIDE 1 ML: 10 INJECTION, SOLUTION EPIDURAL; INFILTRATION; INTRACAUDAL; PERINEURAL at 06:40

## 2017-11-29 RX ADMIN — Medication 10 MG: at 07:43

## 2017-11-29 RX ADMIN — OXYCODONE HYDROCHLORIDE 10 MG: 5 TABLET ORAL at 19:05

## 2017-11-29 RX ADMIN — HYDROMORPHONE HYDROCHLORIDE 0.5 MG: 1 INJECTION, SOLUTION INTRAMUSCULAR; INTRAVENOUS; SUBCUTANEOUS at 08:48

## 2017-11-29 RX ADMIN — ROCURONIUM BROMIDE 10 MG: 10 INJECTION INTRAVENOUS at 08:19

## 2017-11-29 RX ADMIN — KETAMINE HCL-NACL SOLN PREF SY 50 MG/5ML-0.9% (10MG/ML) 25 MG: 10 SOLUTION PREFILLED SYRINGE at 07:42

## 2017-11-29 RX ADMIN — HYDROMORPHONE HYDROCHLORIDE 0.5 MG: 1 INJECTION, SOLUTION INTRAMUSCULAR; INTRAVENOUS; SUBCUTANEOUS at 10:26

## 2017-11-29 RX ADMIN — FENTANYL CITRATE 50 MCG: 50 INJECTION, SOLUTION INTRAMUSCULAR; INTRAVENOUS at 09:45

## 2017-11-29 RX ADMIN — FENTANYL CITRATE 50 MCG: 50 INJECTION, SOLUTION INTRAMUSCULAR; INTRAVENOUS at 09:43

## 2017-11-29 RX ADMIN — HYDROMORPHONE HYDROCHLORIDE 0.5 MG: 1 INJECTION, SOLUTION INTRAMUSCULAR; INTRAVENOUS; SUBCUTANEOUS at 18:59

## 2017-11-29 RX ADMIN — OXYCODONE HYDROCHLORIDE 10 MG: 5 TABLET ORAL at 22:25

## 2017-11-29 RX ADMIN — SODIUM CHLORIDE: 9 INJECTION, SOLUTION INTRAVENOUS at 12:47

## 2017-11-29 RX ADMIN — SODIUM CHLORIDE, POTASSIUM CHLORIDE, SODIUM LACTATE AND CALCIUM CHLORIDE: 600; 310; 30; 20 INJECTION, SOLUTION INTRAVENOUS at 07:29

## 2017-11-29 RX ADMIN — DEXAMETHASONE SODIUM PHOSPHATE 10 MG: 4 INJECTION, SOLUTION INTRAMUSCULAR; INTRAVENOUS at 07:42

## 2017-11-29 RX ADMIN — ROCURONIUM BROMIDE 50 MG: 10 INJECTION INTRAVENOUS at 07:38

## 2017-11-29 RX ADMIN — HYDROMORPHONE HYDROCHLORIDE 1 MG: 1 INJECTION, SOLUTION INTRAMUSCULAR; INTRAVENOUS; SUBCUTANEOUS at 07:29

## 2017-11-29 RX ADMIN — FENTANYL CITRATE 50 MCG: 50 INJECTION, SOLUTION INTRAMUSCULAR; INTRAVENOUS at 10:34

## 2017-11-29 RX ADMIN — ONDANSETRON 4 MG: 2 INJECTION INTRAMUSCULAR; INTRAVENOUS at 09:12

## 2017-11-29 RX ADMIN — HYDROMORPHONE HYDROCHLORIDE 1 MG: 1 INJECTION, SOLUTION INTRAMUSCULAR; INTRAVENOUS; SUBCUTANEOUS at 11:01

## 2017-11-29 RX ADMIN — ROCURONIUM BROMIDE 10 MG: 10 INJECTION INTRAVENOUS at 08:31

## 2017-11-29 RX ADMIN — FENTANYL CITRATE 50 MCG: 50 INJECTION, SOLUTION INTRAMUSCULAR; INTRAVENOUS at 08:26

## 2017-11-29 RX ADMIN — Medication 5 ML: at 09:00

## 2017-11-29 RX ADMIN — ACETAMINOPHEN 1000 MG: 10 INJECTION, SOLUTION INTRAVENOUS at 09:13

## 2017-11-29 RX ADMIN — CEFAZOLIN SODIUM 2 G: 2 INJECTION, SOLUTION INTRAVENOUS at 07:42

## 2017-11-29 RX ADMIN — FENTANYL CITRATE 50 MCG: 50 INJECTION, SOLUTION INTRAMUSCULAR; INTRAVENOUS at 08:53

## 2017-11-29 RX ADMIN — OXYCODONE HYDROCHLORIDE 10 MG: 5 TABLET ORAL at 15:58

## 2017-11-29 RX ADMIN — HYDROMORPHONE HYDROCHLORIDE 0.5 MG: 1 INJECTION, SOLUTION INTRAMUSCULAR; INTRAVENOUS; SUBCUTANEOUS at 21:07

## 2017-11-29 RX ADMIN — RANITIDINE HYDROCHLORIDE 150 MG: 150 TABLET, FILM COATED ORAL at 20:00

## 2017-11-29 RX ADMIN — FENTANYL CITRATE 50 MCG: 50 INJECTION, SOLUTION INTRAMUSCULAR; INTRAVENOUS at 08:16

## 2017-11-29 RX ADMIN — SODIUM CHLORIDE, POTASSIUM CHLORIDE, SODIUM LACTATE AND CALCIUM CHLORIDE: 600; 310; 30; 20 INJECTION, SOLUTION INTRAVENOUS at 08:14

## 2017-11-29 RX ADMIN — Medication 5 ML: at 08:28

## 2017-11-29 RX ADMIN — FENTANYL CITRATE 50 MCG: 50 INJECTION, SOLUTION INTRAMUSCULAR; INTRAVENOUS at 10:16

## 2017-11-29 RX ADMIN — HYDROXYZINE HYDROCHLORIDE 25 MG: 25 TABLET ORAL at 15:58

## 2017-11-29 RX ADMIN — FENTANYL CITRATE 50 MCG: 50 INJECTION, SOLUTION INTRAMUSCULAR; INTRAVENOUS at 10:38

## 2017-11-29 RX ADMIN — FENTANYL CITRATE 50 MCG: 50 INJECTION, SOLUTION INTRAMUSCULAR; INTRAVENOUS at 10:13

## 2017-11-29 RX ADMIN — HYDROMORPHONE HYDROCHLORIDE 0.5 MG: 1 INJECTION, SOLUTION INTRAMUSCULAR; INTRAVENOUS; SUBCUTANEOUS at 19:58

## 2017-11-29 RX ADMIN — HYDROMORPHONE HYDROCHLORIDE 0.5 MG: 1 INJECTION, SOLUTION INTRAMUSCULAR; INTRAVENOUS; SUBCUTANEOUS at 22:26

## 2017-11-29 RX ADMIN — HYDROMORPHONE HYDROCHLORIDE 0.5 MG: 1 INJECTION, SOLUTION INTRAMUSCULAR; INTRAVENOUS; SUBCUTANEOUS at 09:17

## 2017-11-29 RX ADMIN — HYDROMORPHONE HYDROCHLORIDE 0.5 MG: 1 INJECTION, SOLUTION INTRAMUSCULAR; INTRAVENOUS; SUBCUTANEOUS at 10:28

## 2017-11-29 RX ADMIN — KETAMINE HCL-NACL SOLN PREF SY 50 MG/5ML-0.9% (10MG/ML) 25 MG: 10 SOLUTION PREFILLED SYRINGE at 09:13

## 2017-11-29 RX ADMIN — LIDOCAINE HYDROCHLORIDE,EPINEPHRINE BITARTRATE 2 ML: 10; .01 INJECTION, SOLUTION INFILTRATION; PERINEURAL at 08:08

## 2017-11-29 RX ADMIN — SENNOSIDES AND DOCUSATE SODIUM 2 TABLET: 8.6; 5 TABLET ORAL at 20:00

## 2017-11-29 ASSESSMENT — ACTIVITIES OF DAILY LIVING (ADL)
BATHING: 0-->INDEPENDENT
TRANSFERRING: 2 - ASSISTIVE PERSON
AMBULATION: 2 - ASSISTIVE PERSON
BATHING: 0 - INDEPENDENT
COGNITION: 0 - NO COGNITION ISSUES REPORTED
DRESS: 0-->INDEPENDENT
AMBULATION: 0-->INDEPENDENT
TOILETING: 0 - INDEPENDENT
SWALLOWING: 0-->SWALLOWS FOODS/LIQUIDS WITHOUT DIFFICULTY
RETIRED_COMMUNICATION: 0-->UNDERSTANDS/COMMUNICATES WITHOUT DIFFICULTY
SWALLOWING: 0 - SWALLOWS FOODS/LIQUIDS WITHOUT DIFFICULTY
COMMUNICATION: 0 - UNDERSTANDS/COMMUNICATES WITHOUT DIFFICULTY
TOILETING: 0-->INDEPENDENT
DRESS: 0 - INDEPENDENT
EATING: 0 - INDEPENDENT
TRANSFERRING: 0-->INDEPENDENT
FALL_HISTORY_WITHIN_LAST_SIX_MONTHS: NO
RETIRED_EATING: 0-->INDEPENDENT

## 2017-11-29 ASSESSMENT — LIFESTYLE VARIABLES: TOBACCO_USE: 1

## 2017-11-29 NOTE — ANESTHESIA PREPROCEDURE EVALUATION
Anesthesia Evaluation     . Pt has had prior anesthetic. Type: General           ROS/MED HX    ENT/Pulmonary:     (+)tobacco use, Current use Moderate Persistent asthma Treatment: Nebulizer prn and Inhaler prn,  , . .    Neurologic:     (+)neuropathy - Cervical-related, migraines,     Cardiovascular:     (+) Dyslipidemia, ----. : . . . :. . Previous cardiac testing Echodate:5/1/16results:EF 65Stress Testdate:1/15/09 results:NormalECG reviewed date:11/17/17 results: date: results:          METS/Exercise Tolerance:  >4 METS   Hematologic:  - neg hematologic  ROS       Musculoskeletal:   (+) , , other musculoskeletal-       GI/Hepatic:     (+) GERD Other,       Renal/Genitourinary:  - ROS Renal section negative       Endo:  - neg endo ROS       Psychiatric:  - neg psychiatric ROS       Infectious Disease:  - neg infectious disease ROS       Malignancy:      - no malignancy   Other:    (+) H/O Chronic Pain,H/O chronic opiod use ,                    Physical Exam  Normal systems: cardiovascular, pulmonary and dental    Airway   Mallampati: II  TM distance: >3 FB  Neck ROM: full    Dental     Cardiovascular   Rhythm and rate: regular and normal      Pulmonary                     Anesthesia Plan      History & Physical Review  History and physical reviewed and following examination; no interval change.    ASA Status:  2 .    NPO Status:  > 8 hours    Plan for General and ETT with Intravenous induction. Maintenance will be Balanced.    PONV prophylaxis:  Ondansetron (or other 5HT-3) and Dexamethasone or Solumedrol  Additional equipment: Videolaryngoscope      Postoperative Care  Postoperative pain management:  IV analgesics, Oral pain medications and Multi-modal analgesia.      Consents  Anesthetic plan, risks, benefits and alternatives discussed with:  Patient..                          .

## 2017-11-29 NOTE — PROGRESS NOTES
S-(situation): Patient arrives to room 273 via cart from PACU @ 1200.    B-(background): s/p discectomy fusion.    A-(assessment): pt A/Ox3, see VS f/s. Anterior neck dressing C/D/I. MARQUITA drain to bulb suction. Receiving Roxicodone for pain management, see MAR.     R-(recommendations): Orders reviewed with pt. Will monitor patient per MD orders.     Inpatient nursing criteria listed below were met:    Health care directives status obtained and documented: Yes  Core Measures assessed (SSI): Yes  SCD's Documented: Yes  Vaccine assessment done and vaccines ordered if appropriate: Yes  Skin issues/needs documented:Yes  Isolation needs addressed, if appropriate: NA  Fall Prevention: Care plan updated, Education given and documented Yes  MRSA swab completed for patient 55 years and older (exclude MIKAEL and TKA): NA  My Chart patient sign up addressed and documented: Yes  Care Plan initiated: Yes  Education Assessment documented:Yes  Education Documented (Pre-existing chronic infection such as, MRSA/VRE need education on admission): Yes  New medication patient education completed and documented (Possible Side Effects of Common Medications handout): Yes  Home medications if not able to send immediately home with family stored here: NA   Reminder note placed in discharge instructions: NA  Discharge planning review completed (admission navigator) Yes

## 2017-11-29 NOTE — OR NURSING
Transfer from  PACU to Room 273  Transferred to bed via pivot to bed with 2 standby assist  S: 53 y/o male  S/P discectomy fusion cervical anterior 6-7; removal of hardware C4-6       Anesthesia Type:  general       Surgeon:  Dr. Vasques       Allergies:  See Medication Reconciliation Record       DNR: NO       B:  Pertinent Medical History:   Past Medical History:   Diagnosis Date     Anxiety      GERD (gastroesophageal reflux disease)      Neck pain 6/15/2011                 Surgical History:    Past Surgical History:   Procedure Laterality Date     DISCECTOMY LUMBAR POSTERIOR MICROSCOPIC ONE LEVEL  2/21/2012    Procedure:DISCECTOMY LUMBAR POSTERIOR MICROSCOPIC ONE LEVEL; LEFT T1-T2 THORASIC HEMILAMINECTOMY MICRODISCECTOMY WITH MEXTRIX II ; Surgeon:SHARON PURI; Location:[facility] OR     FUSION CERVICAL ANTERIOR TWO LEVELS  1/29/2010     HC DRAIN/INJ MAJOR JOINT/BURSA W/O US  5/5/2008    Left sacroiliac joint injection.     HC INJ EPIDURAL LUMBAR/SACRAL W/WO CONTRAST  2009     HEAD & NECK SURGERY      2013     HERNIA REPAIR       INJECT BLOCK MEDIAL BRANCH CERVICAL/THORACIC/LUMBAR Bilateral 8/26/2015    Procedure: INJECT BLOCK MEDIAL BRANCH CERVICAL / THORACIC / LUMBAR;  Surgeon: Ronald Driscoll MD;  [facility]     INJECT FACET JOINT Bilateral 5/27/2015    Procedure: INJECT FACET JOINT;  Surgeon: Ronald Driscoll MD;  Location: [facility] OR       A:  EBL: 50 ml        IVF:  2000 ml LR        UOP:  475 ml void        NPO:  ___Yes _X__No         Vomiting:  ___Yes _X__No         Drainage: red bloody per MARQUITA        Skin Integrity: normal except for neck incision          RFO: _X__Yes___No; MARQUITA                 Brace/sling/equipment:  _x__Yes___No; pneumoboots         See PACU record for ongoing assessment, vital signs and pain assessment.    R: Post-Op vitals and assessments as ordered/indicated per patient's condition.       Follow Post-Op orders and notify Physician prn.       Continue to involve patient/family in plan  of care and discharge planning.       Reinforce Pre-Operative education.       Implement skin safety interventions as appropriate.  Report called to Mica WOOTEN, med/surg    Name: Lashon Casper RN, PACU

## 2017-11-29 NOTE — IP AVS SNAPSHOT
MRN:[id number]                      After Visit Summary   11/29/2017    Joselito Abarca    MRN: [id number]           Thank you!     Thank you for choosing [facility] for your care. Our goal is always to provide you with excellent care. Hearing back from our patients is one way we can continue to improve our services. Please take a few minutes to complete the written survey that you may receive in the mail after you visit with us. Thank you!        Patient Information     Date Of Birth          1965        Designated Caregiver       Most Recent Value    Caregiver    Will someone help with your care after discharge? no      About your hospital stay     You were admitted on:  November 29, 2017 You last received care in the:  [address] [facility]    You were discharged on:  November 30, 2017        Reason for your hospital stay       Neck fusion                  Who to Call     For medical emergencies, please call 911.  For non-urgent questions about your medical care, please call your primary care provider or clinic, [phone]  For questions related to your surgery, please call your surgery clinic        Attending Provider     Provider Nikolas Mcduffie MD Neurosurgery       Primary Care Provider Office Phone # Fax #    Gregory G Schoen, [phone]      After Care Instructions     Activity       Your activity upon discharge: activity as tolerated            Diet       Follow this diet upon discharge: Regular            Discharge Instructions       Discharge instructions:  No lifting of more than 10 pounds until follow up visit.  Ok to shampoo hair, shower or bathe, but, do not scrub or submerge incision under water until evaluated post operatively in clinic.    Ok to walk as tolerated, avoid bedrest.    No contact sports until after follow up visit  No high impact activities such as; running/jogging, snowmobile or 4 dumont riding or any other  "recreational vehicles    Call my office at [phone] for increasing redness, swelling or pus draining from the incision, increased pain or any other questions and concerns.    Go to ER with any seizure activity, mental status change (increasing confusion), difficulty with speech or increasing or acute weakness                  Follow-up Appointments     Follow-up and recommended labs and tests        F/u in 6 weeks with new xrays                  Pending Results     No orders found for last 3 day(s).            Admission Information     Date & Time Provider Department Dept. Phone    2017 Nikolas Vasques MD [address] [facility] [phone]      Your Vitals Were     Blood Pressure Pulse Temperature Respirations Height Weight    131/66 68 96.5  F (35.8  C) (Oral) 16 1.702 m (5' 7\") 81.2 kg (179 lb)    Pulse Oximetry BMI (Body Mass Index)                96% 28.04 kg/m2          MyChart Information     [facility] lets you send messages to your doctor, view your test results, renew your prescriptions, schedule appointments and more. To sign up, go to [email or web address] . Click on \"Log in\" on the left side of the screen, which will take you to the Welcome page. Then click on \"Sign up Now\" on the right side of the page.     You will be asked to enter the access code listed below, as well as some personal information. Please follow the directions to create your username and password.     Your access code is: [email or web address]  Expires: 2017  4:18 PM     Your access code will  in 90 days. If you need help or a new code, please call your [facility] clinic or [phone].        Care EveryWhere ID     This is your Care EveryWhere ID. This could be used by other organizations to access your [facility] medical records  [id number]        Equal Access to Services     DADA DUARTE AH: Melvin Hebert, damari garibay, omayra sandoval " lajosefina ahDusty So [facility] [phone].    ATENCIÓN: Si habla laila, tiene a alfaro disposición servicios gratuitos de asistencia lingüística. Bola al [phone].    We comply with applicable federal civil rights laws and [place] laws. We do not discriminate on the basis of race, color, national origin, age, disability, sex, sexual orientation, or gender identity.               Review of your medicines      CONTINUE these medicines which have NOT CHANGED        Dose / Directions    * albuterol (2.5 MG/3ML) 0.083% neb solution   Used for:  Mild intermittent asthma with acute exacerbation        NEBULIZE CONTENTS OF ONE VIAL EVERY 4 HOURS AS NEEDED FOR SHORTNESS OF BREATH /DYSPNEA OR WHEEZING   Quantity:  90 mL   Refills:  0       * VENTOLIN  (90 BASE) MCG/ACT Inhaler   Used for:  Mild intermittent asthma with acute exacerbation   Generic drug:  albuterol        INHALE 2 PUFFS INTO THE LUNGS EVERY 6 HOURS AS NEEDED FOR SHORTNESS OF BREATH, DIFFICULTY BREATHING OR WHEEZING.   Quantity:  18 g   Refills:  3       clonazePAM 0.5 MG tablet   Commonly known as:  klonoPIN   Used for:  Generalized anxiety disorder        Dose:  0.25-0.5 mg   Take 0.5-1 tablets (0.25-0.5 mg) by mouth 3 times daily as needed for anxiety   Quantity:  90 tablet   Refills:  0       FLOVENT  MCG/ACT Inhaler   Used for:  ILD (interstitial lung disease) (H)   Generic drug:  fluticasone        INHALE 2 PUFFS INTO THE LUNGS TWICE DAILY   Quantity:  36 g   Refills:  1       fluticasone 50 MCG/ACT spray   Commonly known as:  FLONASE   Used for:  Chronic rhinitis        USE TWO SPRAYS IN EACH NOSTRIL EVERY DAY   Quantity:  16 g   Refills:  5       gabapentin 300 MG capsule   Commonly known as:  NEURONTIN   Used for:  Intervertebral cervical disc disorder with myelopathy, cervical region, Degeneration of cervical intervertebral disc        TAKE TWO CAPSULES BY MOUTH THREE TIMES A DAY   Quantity:  270 capsule   Refills:  11       ipratropium -  albuterol 0.5 mg/2.5 mg/3 mL 0.5-2.5 (3) MG/3ML neb solution   Commonly known as:  DUONEB        Dose:  1 vial   Take 1 vial (3 mLs) by nebulization every 4 hours as needed for shortness of breath / dyspnea   Quantity:  30 vial   Refills:  0       methadone 10 MG tablet   Commonly known as:  DOLOPHINE   Used for:  DDD (degenerative disc disease), cervical        Dose:  10 mg   Take 1 tablet (10 mg) by mouth every 8 hours as needed   Quantity:  90 tablet   Refills:  0       omeprazole 20 MG CR capsule   Commonly known as:  priLOSEC   Used for:  Esophageal reflux        TAKE 1 CAPSULE BY MOUTH DAILY, 30 TO 60 MINUTES BEFORE A MEAL.   Quantity:  30 capsule   Refills:  9       oxyCODONE 5 MG capsule   Commonly known as:  OXY-IR   Used for:  Intervertebral cervical disc disorder with myelopathy, cervical region        Dose:  10 mg   Take 2 capsules (10 mg) by mouth every 4 hours as needed 2 caps q 4 hour prn pain up to 12 per day May fill 5/24/2012   Quantity:  120 capsule   Refills:  0       ranitidine 150 MG tablet   Commonly known as:  ZANTAC   Used for:  Esophageal reflux        TAKE ONE TABLET BY MOUTH EVERY MORNING   Quantity:  30 tablet   Refills:  10       traZODone 100 MG tablet   Commonly known as:  DESYREL   Used for:  Panic attack        Dose:  300 mg   Take 3 tablets (300 mg) by mouth At Bedtime   Quantity:  90 tablet   Refills:  3       vitamin D 2000 UNITS tablet   Used for:  Degeneration of cervical intervertebral disc        TAKE ONE TABLET BY MOUTH EVERY DAY   Quantity:  100 tablet   Refills:  3       zolpidem 10 MG tablet   Commonly known as:  AMBIEN        Dose:  10 mg   Take 10 mg by mouth nightly as needed   Refills:  0       * Notice:  This list has 2 medication(s) that are the same as other medications prescribed for you. Read the directions carefully, and ask your doctor or other care provider to review them with you.         Where to get your medicines      Some of these will need a paper  prescription and others can be bought over the counter. Ask your nurse if you have questions.     Bring a paper prescription for each of these medications     oxyCODONE 5 MG capsule                Protect others around you: Learn how to safely use, store and throw away your medicines at www.disposemymeds.org.             Medication List: This is a list of all your medications and when to take them. Check marks below indicate your daily home schedule. Keep this list as a reference.      Medications           Morning Afternoon Evening Bedtime As Needed    * albuterol (2.5 MG/3ML) 0.083% neb solution   NEBULIZE CONTENTS OF ONE VIAL EVERY 4 HOURS AS NEEDED FOR SHORTNESS OF BREATH /DYSPNEA OR WHEEZING                                   * VENTOLIN  (90 BASE) MCG/ACT Inhaler   INHALE 2 PUFFS INTO THE LUNGS EVERY 6 HOURS AS NEEDED FOR SHORTNESS OF BREATH, DIFFICULTY BREATHING OR WHEEZING.   Generic drug:  albuterol                                   clonazePAM 0.5 MG tablet   Commonly known as:  klonoPIN   Take 0.5-1 tablets (0.25-0.5 mg) by mouth 3 times daily as needed for anxiety   Last time this was given:  0.5 mg on 11/30/2017  6:37 AM                                   FLOVENT  MCG/ACT Inhaler   INHALE 2 PUFFS INTO THE LUNGS TWICE DAILY   Generic drug:  fluticasone                                      fluticasone 50 MCG/ACT spray   Commonly known as:  FLONASE   USE TWO SPRAYS IN EACH NOSTRIL EVERY DAY                                   gabapentin 300 MG capsule   Commonly known as:  NEURONTIN   TAKE TWO CAPSULES BY MOUTH THREE TIMES A DAY                                         ipratropium - albuterol 0.5 mg/2.5 mg/3 mL 0.5-2.5 (3) MG/3ML neb solution   Commonly known as:  DUONEB   Take 1 vial (3 mLs) by nebulization every 4 hours as needed for shortness of breath / dyspnea                                   methadone 10 MG tablet   Commonly known as:  DOLOPHINE   Take 1 tablet (10 mg) by mouth every 8 hours  as needed                                   omeprazole 20 MG CR capsule   Commonly known as:  priLOSEC   TAKE 1 CAPSULE BY MOUTH DAILY, 30 TO 60 MINUTES BEFORE A MEAL.                                   oxyCODONE 5 MG capsule   Commonly known as:  OXY-IR   Take 2 capsules (10 mg) by mouth every 4 hours as needed 2 caps q 4 hour prn pain up to 12 per day May fill 5/24/2012                                   ranitidine 150 MG tablet   Commonly known as:  ZANTAC   TAKE ONE TABLET BY MOUTH EVERY MORNING   Last time this was given:  150 mg on 11/30/2017  8:00 AM                                   traZODone 100 MG tablet   Commonly known as:  DESYREL   Take 3 tablets (300 mg) by mouth At Bedtime                                   vitamin D 2000 UNITS tablet   TAKE ONE TABLET BY MOUTH EVERY DAY                                   zolpidem 10 MG tablet   Commonly known as:  AMBIEN   Take 10 mg by mouth nightly as needed                                   * Notice:  This list has 2 medication(s) that are the same as other medications prescribed for you. Read the directions carefully, and ask your doctor or other care provider to review them with you.

## 2017-11-29 NOTE — OP NOTE
Date of surgery: 11/29/2017  Surgeon: Nikolas Vasques MD  Assistant: GEORGETTE Baez  Note: Israel Rodriguez was present for and assisted with the entire surgery, and his role as an assistant was crucial for his aid in positioning, exposure, suctioning, retraction, and closure    Preoperative diagnosis: Cervical stenosis  Postoperative diagnosis: Cervical stenosis    Procedure:  1.  C6-7 anterior discectomy and interbody arthrodesis  2.  C6-7 insertion of [place] Aero-C intervertebral graft  3.  C6-7 anterior instrumentation with insertion of titanium anchors via titanium plate  4.  Removal of prior C4-6 anterior plate and screws  5.  Exploration of prior C4-6 anterior cervical fusion  6.  Use of intraoperative microscope and fluoroscopy    EBL: 50 mL    Indications: 52-year-old male with prior C4-6 ACDF, who presented with progressive neck and bilateral arm.  MRI demonstrated right sided C6-7 central and foraminal stenosis.  CT showed good fusion, but the plate and screws over-riding the C6-7 disk space.  Underwent non-operative management with failure to improve.  Risks, benefits, indications, and alternatives were discussed with the patient and family in detail.  All their questions were answered, and they wished to proceed with surgery.    Description of surgery: The patient was positioned supine.  Sterile prepping and draping procedures were performed.  Antibiotics were administered and timeout was performed.  A right horizontal neck incision was performed.  The monopolar was used to divide the platysma, and the Metzenbaum scissors were used to create a plane in the fascia medial to the sternocleidomastoid muscle.  Blunt dissection was used to come down upon the anterior cervical spine.  The monopolar was used to elevate the longus coli, exposing the prior plate and screws.  This was identified as a Venture plate.  The screwdriver was used to remove the screws, and the plate was elevated and removed with an upgoing  curette.  The prior C4-6 fusion was explored, and there was no evidence of instability. The Trimline retractor was inserted.  The 15 blade was used to perform an annulotomy in the C6-7 disc space.  A complete discectomy was performed with combination of the high-speed drill, curettes, and pituitary rongeurs.  Interbody arthrodesis was performed with a high-speed drill.  The posterior osteophytes were removed with the drill, and the posterior longitudinal ligament was removed with the Kerrison Burgos, with decompression of the bilateral neural foramina.  A [place] Aero-C intervertebral graft was delivered in the disc space at C6-7.  Anterior instrumentation was performed with insertion of titanium anchors via the titanium plate.  Hemostasis was achieved.  Antibiotic irrigation was performed.  The platysma and dermal layers were closed with 3-0 Vicryl sutures, and the skin was closed with a running subcuticular stitch.  There were no intraprocedural complications.

## 2017-11-29 NOTE — IP AVS SNAPSHOT
[address] [facility]    [address] [facility]     [place] [address]    Phone:  [phone]                                       After Visit Summary   11/29/2017    Joselito Abarca    MRN: [id number]           After Visit Summary Signature Page     I have received my discharge instructions, and my questions have been answered. I have discussed any challenges I see with this plan with the nurse or doctor.    ..........................................................................................................................................  Patient/Patient Representative Signature      ..........................................................................................................................................  Patient Representative Print Name and Relationship to Patient    ..................................................               ................................................  Date                                            Time    ..........................................................................................................................................  Reviewed by Signature/Title    ...................................................              ..............................................  Date                                                            Time

## 2017-11-30 ENCOUNTER — APPOINTMENT (OUTPATIENT)
Dept: PHYSICAL THERAPY | Facility: CLINIC | Age: 52
DRG: 473 | End: 2017-11-30
Attending: PHYSICIAN ASSISTANT
Payer: COMMERCIAL

## 2017-11-30 VITALS
OXYGEN SATURATION: 96 % | HEIGHT: 67 IN | SYSTOLIC BLOOD PRESSURE: 131 MMHG | DIASTOLIC BLOOD PRESSURE: 66 MMHG | TEMPERATURE: 96.5 F | RESPIRATION RATE: 16 BRPM | WEIGHT: 179 LBS | BODY MASS INDEX: 28.09 KG/M2 | HEART RATE: 68 BPM

## 2017-11-30 LAB — GLUCOSE BLDC GLUCOMTR-MCNC: 123 MG/DL (ref 70–99)

## 2017-11-30 PROCEDURE — 25000128 H RX IP 250 OP 636: Performed by: PHYSICIAN ASSISTANT

## 2017-11-30 PROCEDURE — 00000146 ZZHCL STATISTIC GLUCOSE BY METER IP

## 2017-11-30 PROCEDURE — 25000128 H RX IP 250 OP 636: Performed by: NEUROLOGICAL SURGERY

## 2017-11-30 PROCEDURE — 40000894 ZZH STATISTIC OT IP EVAL DEFER

## 2017-11-30 PROCEDURE — 97161 PT EVAL LOW COMPLEX 20 MIN: CPT | Mod: GP | Performed by: PHYSICAL THERAPIST

## 2017-11-30 PROCEDURE — 25000132 ZZH RX MED GY IP 250 OP 250 PS 637: Performed by: PHYSICIAN ASSISTANT

## 2017-11-30 PROCEDURE — 40000193 ZZH STATISTIC PT WARD VISIT: Performed by: PHYSICAL THERAPIST

## 2017-11-30 PROCEDURE — 25000132 ZZH RX MED GY IP 250 OP 250 PS 637: Performed by: NEUROLOGICAL SURGERY

## 2017-11-30 RX ORDER — CLONAZEPAM 0.5 MG/1
0.25 TABLET ORAL 3 TIMES DAILY PRN
Status: DISCONTINUED | OUTPATIENT
Start: 2017-11-30 | End: 2017-11-30 | Stop reason: HOSPADM

## 2017-11-30 RX ORDER — CLONAZEPAM 0.5 MG/1
0.5 TABLET ORAL 3 TIMES DAILY PRN
Status: DISCONTINUED | OUTPATIENT
Start: 2017-11-30 | End: 2017-11-30 | Stop reason: HOSPADM

## 2017-11-30 RX ORDER — CLONAZEPAM 0.5 MG/1
.25-.5 TABLET ORAL 3 TIMES DAILY PRN
Status: DISCONTINUED | OUTPATIENT
Start: 2017-11-30 | End: 2017-11-30

## 2017-11-30 RX ADMIN — OXYCODONE HYDROCHLORIDE 10 MG: 5 TABLET ORAL at 08:00

## 2017-11-30 RX ADMIN — SENNOSIDES AND DOCUSATE SODIUM 1 TABLET: 8.6; 5 TABLET ORAL at 08:00

## 2017-11-30 RX ADMIN — SODIUM CHLORIDE: 9 INJECTION, SOLUTION INTRAVENOUS at 00:06

## 2017-11-30 RX ADMIN — ACETAMINOPHEN 975 MG: 325 TABLET ORAL at 00:07

## 2017-11-30 RX ADMIN — OXYCODONE HYDROCHLORIDE 10 MG: 5 TABLET ORAL at 04:19

## 2017-11-30 RX ADMIN — RANITIDINE HYDROCHLORIDE 150 MG: 150 TABLET, FILM COATED ORAL at 08:00

## 2017-11-30 RX ADMIN — ACETAMINOPHEN 975 MG: 325 TABLET ORAL at 08:00

## 2017-11-30 RX ADMIN — OXYCODONE HYDROCHLORIDE 10 MG: 5 TABLET ORAL at 10:53

## 2017-11-30 RX ADMIN — CLONAZEPAM 0.5 MG: 0.5 TABLET ORAL at 06:37

## 2017-11-30 RX ADMIN — HYDROMORPHONE HYDROCHLORIDE 0.5 MG: 1 INJECTION, SOLUTION INTRAMUSCULAR; INTRAVENOUS; SUBCUTANEOUS at 00:04

## 2017-11-30 RX ADMIN — CEFAZOLIN SODIUM 1 G: 1 INJECTION, POWDER, FOR SOLUTION INTRAMUSCULAR; INTRAVENOUS at 00:42

## 2017-11-30 RX ADMIN — OXYCODONE HYDROCHLORIDE 10 MG: 5 TABLET ORAL at 01:34

## 2017-11-30 NOTE — PLAN OF CARE
S-(situation): OT evaluation order    B-(background): Patient POD#1 C6-C7 fusion anterior approach    A-(assessment): Patient has been completing self-cares and mobility independently.     R-(recommendations): Defer OT evaluation at this time.  Patient to discharge home with family assist as needed.    JEANNIE Conner/L  [facility]  [phone]

## 2017-11-30 NOTE — ANESTHESIA POSTPROCEDURE EVALUATION
Patient: Joselito Abarca    Procedure(s):  Anterior Cervical Discectomy and Fusion Cervical Six - Cervical Seven, Exploration and Revision Cervical Four - Cervical Six with Hardware Removal - Wound Class: I-Clean   - Wound Class: I-Clean    Diagnosis:cervical stenosis  Diagnosis Additional Information: No value filed.    Anesthesia Type:  General, ETT    Note:  Anesthesia Post Evaluation    Patient location during evaluation: Bedside and Floor  Patient participation: Able to fully participate in evaluation  Level of consciousness: awake and alert  Pain management: satisfactory to patient  Airway patency: patent  Cardiovascular status: stable  Respiratory status: room air and spontaneous ventilation  Hydration status: stable  PONV: none     Anesthetic complications: None    Comments: Late entry:  Appear to tolerate Gen well without anesthesia related problems / complications noted.  Pain level satisfactory. No N  /  V last night.  No other complaints per patient.          Last vitals:  Vitals:    11/29/17 1912 11/29/17 2337 11/30/17 0726   BP: 129/73 146/87 131/66   Pulse: 89 78 68   Resp: 18 18 16   Temp: 97.8  F (36.6  C) 97.8  F (36.6  C) 96.5  F (35.8  C)   SpO2: 94% 96% 96%         Electronically Signed By: CORA Robins CRNA  November 30, 2017  3:02 PM

## 2017-11-30 NOTE — PROGRESS NOTES
Patient's VSS.  Tolerating liquids PO well, UOP adequate.  Denies numbness, tingling or other symptoms associated with his procedure.  Pain is controlled with Oxycodone.  He appears comfortable.

## 2017-11-30 NOTE — PROGRESS NOTES
Patient is alert and oriented x4 tonight. MARQUITA draining very little. Patients dressing is clean, dry and intact. Vitals stable. CMS intact. Lungs are clear. Patient is receiving Dilaudid IV and oxycodone for pain. Rates pain severe. Patient is refusing the capnography tonight. Pulse oximeter applied.

## 2017-11-30 NOTE — DISCHARGE SUMMARY
[facility]    Discharge Summary  Neurosurgery    Date of Admission:  11/29/2017  Date of Discharge:  11/30/2017  Discharging Provider: Tootie Garner  Date of Service (when I saw the patient): 11/30/17    Discharge Diagnoses   Active Problems:    Status post cervical spinal arthrodesis      History of Present Illness   Joselito Abarca is an 52 year old male who presented with progressive neck and bilateral arm.  MRI demonstrated right sided C6-7 central and foraminal stenosis.  CT showed good fusion, but the plate and screws over-riding the C6-7 disk space.  Underwent non-operative management with failure to improve. Subsequently underwent C6-7 ACDF and removal of C4-6 hardware with Dr. Nikolas Vasques on 11/29/2017. Today, he is lying up in bed and states he is feeling well. He has worked with PT, which went well. Pain is well controlled with oxycodone. He has been ambulating and up moving around. He is eager to d/c home.    Hospital Course   Joselito Abarca was admitted on 11/29/2017.  The following problems were addressed during his hospitalization:    Active Problems:    Status post cervical spinal arthrodesis    Assessment: doing well s/p ACDF    Plan:   -D/c to home today  -[facility] with xray prior        I have discussed the following assessment and plan with Dr. Nikolas Vasques who is in agreement with initial plan and will follow up with further consultation recommendations.    Tootie HEDRICKC  [facility]  [place] [address]    Tel [phone]  Pager [phone]    Pending Results   These results will be followed up by Dr. Vasques  Unresulted Labs Ordered in the Past 30 Days of this Admission     No orders found for last 61 day(s).          Code Status   Full Code    Primary Care Physician   Gregory G. Schoen    Physical Exam   Temp: 96.5  F (35.8  C) Temp src: Oral BP: 131/66  Pulse: 68 Heart Rate: 78 Resp: 16 SpO2: 96 % O2 Device: None (Room air) Oxygen Delivery: 2 LPM  Vitals:    11/29/17 0620   Weight: 179 lb (81.2 kg)     Vital Signs with Ranges  Temp:  [96.5  F (35.8  C)-98.9  F (37.2  C)] 96.5  F (35.8  C)  Pulse:  [68-89] 68  Heart Rate:  [74-89] 78  Resp:  [12-20] 16  BP: (118-146)/(66-91) 131/66  SpO2:  [90 %-96 %] 96 %  I/O last 3 completed shifts:  In: 5846 [P.O.:2981; I.V.:2865]  Out: 4888 [Urine:4750; Drains:88; Blood:50]    Constitutional: Awake, alert, cooperative, no apparent distress, and appears stated age.  Eyes: Lids and lashes normal, pupils equal, round and reactive to light, extra ocular muscles intact  ENT: Normocephalic, without obvious abnormality, atraumatic  Respiratory: No increased work of breathing  Skin: No bruising or bleeding, normal skin color, texture, turgor, no redness, warmth, or swelling.  Incision covered with steri-strips CDI.   Musculoskeletal: There is no redness, warmth, or swelling of the joints.  Full range of motion noted.  Motor strength is 5 out of 5 all extremities bilaterally.  Tone is normal.  Neurologic: Awake, alert, oriented to name, place and time.  Cranial nerves II-XII are grossly intact.  Motor is 5 out of 5 bilaterally.     Neuropsychiatric: Calm, normal eye contact, alert, normal affect, oriented to self, place, time and situation, memory for past and recent events intact and thought process normal.       Time Spent on this Encounter   I, Tootie Garner, personally saw the patient today and spent less than or equal to 30 minutes discharging this patient.    Discharge Disposition   Discharged to home  Condition at discharge: Stable    Consultations This Hospital Stay   OCCUPATIONAL THERAPY ADULT IP CONSULT  PHYSICAL THERAPY ADULT IP CONSULT    Discharge Orders     Reason for your hospital stay   Neck fusion     Follow-up and recommended labs and tests    F/u in 6 weeks with new xrays     Activity   Your activity upon discharge:  activity as tolerated     Discharge Instructions   Discharge instructions:  No lifting of more than 10 pounds until follow up visit.  Ok to shampoo hair, shower or bathe, but, do not scrub or submerge incision under water until evaluated post operatively in clinic.    Ok to walk as tolerated, avoid bedrest.    No contact sports until after follow up visit  No high impact activities such as; running/jogging, snowmobile or 4 dumont riding or any other recreational vehicles    Call my office at [phone] for increasing redness, swelling or pus draining from the incision, increased pain or any other questions and concerns.    Go to ER with any seizure activity, mental status change (increasing confusion), difficulty with speech or increasing or acute weakness     Full Code     Diet   Follow this diet upon discharge: Regular       Discharge Medications   Current Discharge Medication List      CONTINUE these medications which have CHANGED    Details   oxyCODONE (OXY-IR) 5 MG capsule Take 2 capsules (10 mg) by mouth every 4 hours as needed 2 caps q 4 hour prn pain up to 12 per day May fill 5/24/2012  Qty: 120 capsule, Refills: 0    Associated Diagnoses: Intervertebral cervical disc disorder with myelopathy, cervical region         CONTINUE these medications which have NOT CHANGED    Details   methadone (DOLOPHINE) 10 MG tablet Take 1 tablet (10 mg) by mouth every 8 hours as needed  Qty: 90 tablet, Refills: 0    Associated Diagnoses: DDD (degenerative disc disease), cervical      clonazePAM (KLONOPIN) 0.5 MG tablet Take 0.5-1 tablets (0.25-0.5 mg) by mouth 3 times daily as needed for anxiety  Qty: 90 tablet, Refills: 0    Comments: armani FATIMA 10/12/2017, 90/30ds  Associated Diagnoses: Generalized anxiety disorder      VENTOLIN  (90 BASE) MCG/ACT Inhaler INHALE 2 PUFFS INTO THE LUNGS EVERY 6 HOURS AS NEEDED FOR SHORTNESS OF BREATH, DIFFICULTY BREATHING OR WHEEZING.  Qty: 18 g, Refills: 3    Associated  Diagnoses: Mild intermittent asthma with acute exacerbation      gabapentin (NEURONTIN) 300 MG capsule TAKE TWO CAPSULES BY MOUTH THREE TIMES A DAY  Qty: 270 capsule, Refills: 11    Associated Diagnoses: Intervertebral cervical disc disorder with myelopathy, cervical region; Degeneration of cervical intervertebral disc      omeprazole (PRILOSEC) 20 MG CR capsule TAKE 1 CAPSULE BY MOUTH DAILY, 30 TO 60 MINUTES BEFORE A MEAL.  Qty: 30 capsule, Refills: 9    Associated Diagnoses: Esophageal reflux      FLOVENT  MCG/ACT Inhaler INHALE 2 PUFFS INTO THE LUNGS TWICE DAILY  Qty: 36 g, Refills: 1    Associated Diagnoses: ILD (interstitial lung disease) (H)      fluticasone (FLONASE) 50 MCG/ACT spray USE TWO SPRAYS IN EACH NOSTRIL EVERY DAY  Qty: 16 g, Refills: 5    Associated Diagnoses: Chronic rhinitis      ranitidine (ZANTAC) 150 MG tablet TAKE ONE TABLET BY MOUTH EVERY MORNING  Qty: 30 tablet, Refills: 10    Associated Diagnoses: Esophageal reflux      Cholecalciferol (VITAMIN D) 2000 UNITS tablet TAKE ONE TABLET BY MOUTH EVERY DAY  Qty: 100 tablet, Refills: 3    Associated Diagnoses: Degeneration of cervical intervertebral disc      ipratropium - albuterol 0.5 mg/2.5 mg/3 mL (DUONEB) 0.5-2.5 (3) MG/3ML neb solution Take 1 vial (3 mLs) by nebulization every 4 hours as needed for shortness of breath / dyspnea  Qty: 30 vial, Refills: 0      albuterol (2.5 MG/3ML) 0.083% nebulizer solution NEBULIZE CONTENTS OF ONE VIAL EVERY 4 HOURS AS NEEDED FOR SHORTNESS OF BREATH /DYSPNEA OR WHEEZING  Qty: 90 mL, Refills: 0    Associated Diagnoses: Mild intermittent asthma with acute exacerbation      traZODone (DESYREL) 100 MG tablet Take 3 tablets (300 mg) by mouth At Bedtime  Qty: 90 tablet, Refills: 3    Associated Diagnoses: Panic attack      zolpidem (AMBIEN) 10 MG tablet Take 10 mg by mouth nightly as needed            Allergies   Allergies   Allergen Reactions     Vicodin [Hydrocodone-Acetaminophen] Nausea     HEADACHE

## 2017-11-30 NOTE — PROGRESS NOTES
11/30/17 0804       Present no   Living Environment   Lives With alone;child(senait), adult  (has adult son living with him for a few days/weeks post-op)   Living Arrangements apartment   Home Accessibility stairs (1 railing present)   Number of Stairs to Enter Home 12   Stair Railings at Home inside, present at both sides   Transportation Available family or friend will provide   Self-Care   Dominant Hand right   Usual Activity Tolerance fair   Current Activity Tolerance fair   Regular Exercise yes   Activity/Exercise Type walking   Exercise Amount/Frequency 20 mins   Equipment Currently Used at Home none   Functional Level Prior   Ambulation 0-->independent   Transferring 0-->independent   Toileting 0-->independent   Bathing 0-->independent   Dressing 0-->independent   Eating 0-->independent   Communication 0-->understands/communicates without difficulty   Swallowing 0-->swallows foods/liquids without difficulty   Cognition 0 - no cognition issues reported   Fall history within last six months no   Which of the above functional risks had a recent onset or change? none   General Information   Onset of Illness/Injury or Date of Surgery - Date 11/29/17   Referring Physician Dr. Vasques   Patient/Family Goals Statement go home with son   Pertinent History of Current Problem (include personal factors and/or comorbidities that impact the POC) History of chronic neck pain for 30 years, s/p disectomy fusion anterior approach. Had 2 previous surgeries on neck and back fusions   Precautions/Limitations no known precautions/limitations   Cognitive Status Examination   Orientation orientation to person, place and time   Level of Consciousness alert   Follows Commands and Answers Questions 100% of the time   Personal Safety and Judgment intact   Memory intact   Pain Assessment   Patient Currently in Pain Yes, see Vital Sign flowsheet  (anterior right side of neck 7/10)   Integumentary/Edema    Integumentary/Edema other (describe)   Integumentary/Edema Comments bandage and drainage tube over R anterior neck, no discoloration or draingage on bandage. Nursing will monitor   Posture    Posture Forward head position;Kyphosis   Posture Comments severe foward head, able to correct mildly with cueing   Range of Motion (ROM)   ROM Comment WNL, limited cervical due to fusion and pain   Strength   Strength Comments not tested   Bed Mobility   Bed Mobility Bed mobility skill: Rolling/Turning;Bed mobility skill: Scooting/Bridging;Bed mobility skill: Sit to supine;Bed mobility skill: Supine to sit;Bed mobility analysis   Bed Mobility Skill: Rolling/Turning   Level of [place]: Rolling/Turning independent   Bed Mobility Skill: Scooting/Bridging   Level of [place]: Scooting/Bridging independent   Bed Mobility Skill: Sit to Supine   Level of [place]: Sit/Supine independent   Bed Mobility Skill: Supine to Sit   Level of [place]: Supine/Sit independent   Bed Mobility Analysis   Impairments Contributing to Impaired Bed Mobility pain   Transfer Skills   Transfer Comments Sit to stand Independent, bed to chair independent   Gait   Gait Gait Skill;Gait Analysis;Stairs   Gait Skills   Level of [place]: Gait independent   Assistive Device for Transfer: Gait (none)   Gait Distance 200 feet   Gait Analysis   Gait Pattern Used swing-through gait   Gait Deviations Noted (pt tends to move quickly)   Impairments Contributing to Gait Deviations impaired postural control  (forward head and looking down)   Stairs   Self Performance Independent   Rails 2 rails   Indicate number of stairs 12   Balance   Balance no deficits were identified   Balance Comments Pt did bump into a obstacle on his right side but did not lose balance   Coordination   Coordination no deficits were identified   Clinical Impression   Criteria for Skilled Therapeutic Intervention no;evaluation only;no problems identified which require skilled  "intervention;current level of function same as previous level of function   PT Diagnosis s/p anterior disectomy and cervical fusion anterior approach   Clinical Presentation Stable/Uncomplicated   Clinical Presentation Rationale Acute post-op presentation, all functional mobility completed independently and safely without AD. Pt to d/c home with support from son.   Clinical Decision Making (Complexity) Low complexity   Anticipated Equipment Needs at Discharge (none)   Anticipated Discharge Disposition Home  (with support from son)   Risk & Benefits of therapy have been explained No   Patient, Family & other staff in agreement with plan of care Yes   Clinical Impression Comments Pt independent with all functional mobility. Pt to walk with nursing 3x/day until he discharges home with support from his son and son to transport.   [facility] [employer] \"6 Clicks\"   2016, Trustees of [facility], under license to [employer].  All rights reserved.   6 Clicks Short Forms Basic Mobility Inpatient Short Form   [facility] AM-PAC  \"6 Clicks\" V.2 Basic Mobility Inpatient Short Form   1. Turning from your back to your side while in a flat bed without using bedrails? 4 - None   2. Moving from lying on your back to sitting on the side of a flat bed without using bedrails? 4 - None   3. Moving to and from a bed to a chair (including a wheelchair)? 4 - None   4. Standing up from a chair using your arms (e.g., wheelchair, or bedside chair)? 4 - None   5. To walk in hospital room? 4 - None   6. Climbing 3-5 steps with a railing? 4 - None   Basic Mobility Raw Score (Score out of 24.Lower scores equate to lower levels of function) 24   Total Evaluation Time   Total Evaluation Time (Minutes) 20     "

## 2017-11-30 NOTE — PROGRESS NOTES
S-(situation): Patient discharged to home via private car    B-(background): Cervical 6-7 fusion    A-(assessment): Pt is alert and oriented x3. Pain controlled with oxycodone. Pt up independently in the room.    R-(recommendations): Discharge instructions reviewed with pt. Listed belongings gathered and returned to patient.          Discharge Nursing Criteria:     Care Plan and Patient education resolved: Yes    New Medications- pt has been educated about purpose and side effects: Yes    Vaccines  Pneumonia Vaccine verified at discharge: Yes  Influenza status verified at discharge:  Yes    MISC  Prescriptions if needed, hard copies sent with patient  Yes  Home and hospital aquired medications returned to patient: NA  Medication Bin checked and emptied on discharge Yes  Patient reports post-discharge pain management plan is effective: Yes

## 2017-11-30 NOTE — PROVIDER NOTIFICATION
Using neurosurg paging system, Arlen Ernst NP was paged to restart pt's home clonazepam due to pt having increased anxiety this morning.  Orders received and placed.

## 2017-11-30 NOTE — PLAN OF CARE
Problem: Patient Care Overview  Goal: Plan of Care/Patient Progress Review  Discharge Planner PT   Patient plan for discharge: home with son for support and for transport  Current status: Pt independent and safe with bed mobility, transfers and ambulation 200+ft and ascending 12 stairs with B handrails.  Barriers to return to prior living situation: none  Recommendations for discharge: Pt to d/c home with son for support and no AD and son for transportation. Recommend pt walk with nursing 3x/day until discharge.  Rationale for recommendations: Pt completed all functional mobility safely.       Entered by: Beena Feliciano 11/30/2017 8:36 AM      Patient is a 52 year old year old male. Prior to admission, patient was living alone in an apartment with 12 stairs to enter, using no AD for mobility, and requiring no assist for ADLs. Currently, patient is requiring no assistance for functional mobility and independent with no AD for ambulation. Barriers to return to previous living situation include none.  Patient equipment needs at discharge include nothing.  Recommend discharge to home with son for support with son for transport.

## 2017-12-01 ENCOUNTER — TELEPHONE (OUTPATIENT)
Dept: FAMILY MEDICINE | Facility: CLINIC | Age: 52
End: 2017-12-01

## 2017-12-01 DIAGNOSIS — F41.1 GENERALIZED ANXIETY DISORDER: ICD-10-CM

## 2017-12-01 DIAGNOSIS — M50.00 INTERVERTEBRAL CERVICAL DISC DISORDER WITH MYELOPATHY, CERVICAL REGION: ICD-10-CM

## 2017-12-01 DIAGNOSIS — M50.30 DDD (DEGENERATIVE DISC DISEASE), CERVICAL: ICD-10-CM

## 2017-12-01 RX ORDER — OXYCODONE HYDROCHLORIDE 5 MG/1
10 CAPSULE ORAL EVERY 4 HOURS PRN
Qty: 120 CAPSULE | Refills: 0 | Status: SHIPPED | OUTPATIENT
Start: 2017-12-01 | End: 2017-12-14

## 2017-12-01 NOTE — TELEPHONE ENCOUNTER
Type of Visit  [facility]  Diagnosis/Reason for Visit  Status post cervical spinal arthrodesis, cervical stenosis  Date of Visit  11/30/17  # of ED Visits in past year  6      Please call patient for follow up.

## 2017-12-01 NOTE — TELEPHONE ENCOUNTER
"Hospital/TCU/ED for chronic condition Discharge Protocol    \"Hi, my name is Laurita Bhandari, a registered nurse, and I am calling from [facility].  I am calling to follow up and see how things are going for you after your recent emergency visit/hospital/TCU stay.\"    Tell me how you are doing now that you are home?\" pretty sore. But pain is undercontrol. Throat.      Discharge Instructions    \"Let's review your discharge instructions.  What is/are the follow-up recommendations?  Pt. Response:   After Care Instructions           Activity         Your activity upon discharge: activity as tolerated               Diet         Follow this diet upon discharge: Regular               Discharge Instructions         Discharge instructions:  No lifting of more than 10 pounds until follow up visit.  Ok to shampoo hair, shower or bathe, but, do not scrub or submerge incision under water until evaluated post operatively in clinic.     Ok to walk as tolerated, avoid bedrest.     No contact sports until after follow up visit  No high impact activities such as; running/jogging, snowmobile or 4 dumont riding or any other recreational vehicles     Call my office at [phone] for increasing redness, swelling or pus draining from the incision, increased pain or any other questions and concerns.     Go to ER with any seizure activity, mental status change (increasing confusion), difficulty with speech or increasing or acute weakness                  \"Has an appointment with your primary care provider been scheduled?\"   No (schedule appointment)    \"When you see the provider, I would recommend that you bring your medications with you.\"    Medications    \"Tell me what changed about your medicines when you discharged?\"    Changes to chronic meds?    0-1    \"What questions do you have about your medications?\"    None     New diagnoses of heart failure, COPD, diabetes, or MI?    No                  Medication reconciliation " "completed? Yes  Was MTM referral placed (*Make sure to put transitions as reason for referral)?   No    Call Summary    \"What questions or concerns do you have about your recent visit and your follow-up care?\"     none. Patient to call and make appt for 6 week post op x-ray and appt.    \"If you have questions or things don't continue to improve, we encourage you contact us through the main clinic number (give number).  Even if the clinic is not open, triage nurses are available 24/7 to help you.     We would like you to know that our clinic has extended hours (provide information).  We also have urgent care (provide details on closest location and hours/contact info)\"      \"Thank you for your time and take care!\"    Laurita Bhandari RN  [facility]  12/1/2017 2:02 PM       "

## 2017-12-05 NOTE — TELEPHONE ENCOUNTER
Methadone      Last Written Prescription Date:  11/07/2017  Last Fill Quantity: 90,   # refills: 0  Last Office Visit: 11/17/2017  Future Office visit:    Next 5 appointments (look out 90 days)     Jan 11, 2018  2:30 PM CST   [facility] Tootie Garner [facility]-C   [facility]    [address] [place] [id number]   [phone]                   Routing refill request to provider for review/approval because:  Drug not on the [facility], [facility] or  Health refill protocol or controlled substance    Ailyn Tobias RN

## 2017-12-06 RX ORDER — CLONAZEPAM 0.5 MG/1
0.25-0.5 TABLET ORAL 3 TIMES DAILY PRN
Qty: 90 TABLET | Refills: 0 | Status: SHIPPED | OUTPATIENT
Start: 2017-12-06 | End: 2018-01-09

## 2017-12-06 RX ORDER — METHADONE HYDROCHLORIDE 10 MG/1
10 TABLET ORAL EVERY 8 HOURS PRN
Qty: 90 TABLET | Refills: 0 | Status: SHIPPED | OUTPATIENT
Start: 2017-12-06 | End: 2018-01-03

## 2017-12-07 ENCOUNTER — TELEPHONE (OUTPATIENT)
Dept: FAMILY MEDICINE | Facility: CLINIC | Age: 52
End: 2017-12-07

## 2017-12-07 NOTE — TELEPHONE ENCOUNTER
Dr schoen is okay with him filling the one from Dr Boogie. Patient stated he will call back in another 10 days to get more. He wants to go back to his 360 he gets is what patient stated.  Katt Marie MA 12/7/2017

## 2017-12-07 NOTE — TELEPHONE ENCOUNTER
Please note that he had surgery and the intent of that is to reduce his pain and thus reduce his dependence on narcotics to manage his pain.  If he doesn't feel he has any less pain after the surgery, then we need to have him seen in the pain clinic to look at better/safer alternatives or have them determine that his current plan is going to be the best way to approach his pain.   Electronically signed by Greg Schoen, MD

## 2017-12-07 NOTE — TELEPHONE ENCOUNTER
oxycodone shows approved 12/1/17- pharmacy never recieved- we did get the clonazpam and methadone

## 2017-12-07 NOTE — TELEPHONE ENCOUNTER
Joselito calls to report that  Dr Wood from [facility] has written him a script for Oxycodone 5m g tablets, quantity of 120.  He had neck/spinal surgery on 11/29/17.   Is asking if it is ok to have this script filled. He has a pain medication contract with Dr Schoen.   Please call and let him know [phone]

## 2017-12-14 DIAGNOSIS — M50.00 INTERVERTEBRAL CERVICAL DISC DISORDER WITH MYELOPATHY, CERVICAL REGION: ICD-10-CM

## 2017-12-15 RX ORDER — OXYCODONE HYDROCHLORIDE 5 MG/1
10 CAPSULE ORAL EVERY 4 HOURS PRN
Qty: 120 CAPSULE | Refills: 0 | Status: SHIPPED | OUTPATIENT
Start: 2017-12-15 | End: 2017-12-22

## 2017-12-22 ENCOUNTER — OFFICE VISIT (OUTPATIENT)
Dept: FAMILY MEDICINE | Facility: CLINIC | Age: 52
End: 2017-12-22
Payer: COMMERCIAL

## 2017-12-22 VITALS
SYSTOLIC BLOOD PRESSURE: 130 MMHG | BODY MASS INDEX: 28.44 KG/M2 | WEIGHT: 181.6 LBS | TEMPERATURE: 98.7 F | OXYGEN SATURATION: 98 % | HEART RATE: 101 BPM | DIASTOLIC BLOOD PRESSURE: 76 MMHG

## 2017-12-22 DIAGNOSIS — G57.31 PERONEAL PALSY, RIGHT: ICD-10-CM

## 2017-12-22 DIAGNOSIS — M50.00 INTERVERTEBRAL CERVICAL DISC DISORDER WITH MYELOPATHY, CERVICAL REGION: Primary | ICD-10-CM

## 2017-12-22 DIAGNOSIS — J45.40 MODERATE PERSISTENT ASTHMA WITHOUT COMPLICATION: ICD-10-CM

## 2017-12-22 DIAGNOSIS — G89.4 CHRONIC PAIN SYNDROME: ICD-10-CM

## 2017-12-22 PROCEDURE — 99000 SPECIMEN HANDLING OFFICE-LAB: CPT | Performed by: FAMILY MEDICINE

## 2017-12-22 PROCEDURE — 80307 DRUG TEST PRSMV CHEM ANLYZR: CPT | Mod: 90 | Performed by: FAMILY MEDICINE

## 2017-12-22 PROCEDURE — 99214 OFFICE O/P EST MOD 30 MIN: CPT | Performed by: FAMILY MEDICINE

## 2017-12-22 RX ORDER — OXYCODONE HYDROCHLORIDE 5 MG/1
10 CAPSULE ORAL EVERY 4 HOURS PRN
Qty: 360 CAPSULE | Refills: 0 | Status: SHIPPED | OUTPATIENT
Start: 2017-12-22 | End: 2018-01-23

## 2017-12-22 ASSESSMENT — ANXIETY QUESTIONNAIRES
6. BECOMING EASILY ANNOYED OR IRRITABLE: NOT AT ALL
3. WORRYING TOO MUCH ABOUT DIFFERENT THINGS: NOT AT ALL
2. NOT BEING ABLE TO STOP OR CONTROL WORRYING: NOT AT ALL
1. FEELING NERVOUS, ANXIOUS, OR ON EDGE: MORE THAN HALF THE DAYS
GAD7 TOTAL SCORE: 2
7. FEELING AFRAID AS IF SOMETHING AWFUL MIGHT HAPPEN: NOT AT ALL
5. BEING SO RESTLESS THAT IT IS HARD TO SIT STILL: NOT AT ALL

## 2017-12-22 ASSESSMENT — PAIN SCALES - GENERAL: PAINLEVEL: MODERATE PAIN (5)

## 2017-12-22 ASSESSMENT — PATIENT HEALTH QUESTIONNAIRE - PHQ9
5. POOR APPETITE OR OVEREATING: NOT AT ALL
SUM OF ALL RESPONSES TO PHQ QUESTIONS 1-9: 3

## 2017-12-22 NOTE — NURSING NOTE
"Chief Complaint   Patient presents with     Surgical Followup     Surgery done 11/29/17       Initial /76 (BP Location: Left arm, Patient Position: Chair, Cuff Size: Adult Regular)  Pulse 101  Temp 98.7  F (37.1  C) (Temporal)  Wt 181 lb 9.6 oz (82.4 kg)  SpO2 98%  BMI 28.44 kg/m2 Estimated body mass index is 28.44 kg/(m^2) as calculated from the following:    Height as of 11/29/17: 5' 7\" (1.702 m).    Weight as of this encounter: 181 lb 9.6 oz (82.4 kg).  Medication Reconciliation: complete   Barbara Villalta CMA    Health Maintenance Due   Topic Date Due     URINE DRUG SCREEN Q1 YR  07/01/1982     LIPID SCREEN Q5 YR MALE (SYSTEM ASSIGNED)  07/01/2002     COLON CANCER SCREEN (SYSTEM ASSIGNED)  07/01/2017     NICOLETTE QUESTIONNAIRE 1 YEAR  12/12/2017     PHQ-9 Q1YR  12/12/2017       Health Maintenance reviewed at today's visit patient asked to schedule/complete:   Patient was given qustionaire and is aware of the rest.       "

## 2017-12-22 NOTE — MR AVS SNAPSHOT
"              After Visit Summary   12/22/2017    Joselito Abarca    MRN: [id number]           Patient Information     Date Of Birth          1965        Visit Information        Provider Department      12/22/2017 3:50 PM Schoen, Gregory G, MD [facility]        Today's Diagnoses     Chronic pain syndrome    -  1    Intervertebral cervical disc disorder with myelopathy, cervical region           Follow-ups after your visit        Your next 10 appointments already scheduled     Jan 11, 2018  2:30 PM CST   Return Visit with Tootie Garner PA-C   [facility] ([facility]    [address] [place] [phone]              Who to contact     If you have questions or need follow up information about today's clinic visit or your schedule please contact [facility] directly at [phone].  Normal or non-critical lab and imaging results will be communicated to you by MyChart, letter or phone within 4 business days after the clinic has received the results. If you do not hear from us within 7 days, please contact the clinic through MyChart or phone. If you have a critical or abnormal lab result, we will notify you by phone as soon as possible.  Submit refill requests through [employer] or call your pharmacy and they will forward the refill request to us. Please allow 3 business days for your refill to be completed.          Additional Information About Your Visit        MyChart Information     [employer] lets you send messages to your doctor, view your test results, renew your prescriptions, schedule appointments and more. To sign up, go to [email or web address] . Click on \"Log in\" on the left side of the screen, which will take you to the Welcome page. Then click on \"Sign up Now\" on the right side of the page.     You will be asked to enter the access code listed below, as well as some personal information. Please follow the " directions to create your username and password.     Your access code is: [email or web address]  Expires: 3/22/2018  4:35 PM     Your access code will  in 90 days. If you need help or a new code, please call your [facility] or [phone].        Care EveryWhere ID     This is your Care EveryWhere ID. This could be used by other organizations to access your [facility] medical records  [id number]        Your Vitals Were     Pulse Temperature Pulse Oximetry BMI (Body Mass Index)          101 98.7  F (37.1  C) (Temporal) 98% 28.44 kg/m2         Blood Pressure from Last 3 Encounters:   17 130/76   17 131/66   17 102/58    Weight from Last 3 Encounters:   17 181 lb 9.6 oz (82.4 kg)   17 179 lb (81.2 kg)   17 179 lb (81.2 kg)              We Performed the Following     Drug  Screen Comprehensive , Urine with Reported Meds (Med[employer]) (Pain Care Package)          Where to get your medicines      Some of these will need a paper prescription and others can be bought over the counter.  Ask your nurse if you have questions.     Bring a paper prescription for each of these medications     oxyCODONE 5 MG capsule          Primary Care Provider Office Phone # Fax #    Gregory G Schoen, [phone]       [address] [place] [phone]        Goals        General    I will call to schedule an appointment  if I develop new or worsening symptoms. Started 16. (pt-stated)     Notes - Note created  2016  3:32 PM by Behl, Melissa K, RN    As of today's date 2016 goal is met at 0 - 25%.   Goal Status:  Active  Melissa Behl BSN, RN, N  [facility] Care Coordinator  [phone]        I will use my nebulizers and inhalers as prescribed. Started 16 (pt-stated)     Notes - Note edited  5/10/2016  8:58 AM by Behl, Melissa K, RN    As of today's date 5/10/2016 goal is met at 51 - 75%.   Goal Status:  Active  Melissa Behl BSN, RN, PHN  [facility] Care  Coordinator  [phone]          Equal Access to Services     DADA DUARTE : Hadii aad ku hadrositathalia Hebert, waabdidottie garibay, freditony tamezmaomayra das. So [facility] [phone].    ATENCIÓN: Si habla español, tiene a alfaro disposición servicios gratuitos de asistencia lingüística. Llame al [phone].    We comply with applicable federal civil rights laws and [place] laws. We do not discriminate on the basis of race, color, national origin, age, disability, sex, sexual orientation, or gender identity.            Thank you!     Thank you for choosing [facility]  for your care. Our goal is always to provide you with excellent care. Hearing back from our patients is one way we can continue to improve our services. Please take a few minutes to complete the written survey that you may receive in the mail after your visit with us. Thank you!             Your Updated Medication List - Protect others around you: Learn how to safely use, store and throw away your medicines at www.disposemymeds.org.          This list is accurate as of: 12/22/17  4:35 PM.  Always use your most recent med list.                   Brand Name Dispense Instructions for use Diagnosis    * albuterol (2.5 MG/3ML) 0.083% neb solution     90 mL    NEBULIZE CONTENTS OF ONE VIAL EVERY 4 HOURS AS NEEDED FOR SHORTNESS OF BREATH /DYSPNEA OR WHEEZING    Mild intermittent asthma with acute exacerbation       * VENTOLIN  (90 BASE) MCG/ACT Inhaler   Generic drug:  albuterol     18 g    INHALE 2 PUFFS INTO THE LUNGS EVERY 6 HOURS AS NEEDED FOR SHORTNESS OF BREATH, DIFFICULTY BREATHING OR WHEEZING.    Mild intermittent asthma with acute exacerbation       clonazePAM 0.5 MG tablet    klonoPIN    90 tablet    Take 0.5-1 tablets (0.25-0.5 mg) by mouth 3 times daily as needed for anxiety    Generalized anxiety disorder       FLOVENT  MCG/ACT Inhaler   Generic drug:  fluticasone     36 g    INHALE  2 PUFFS INTO THE LUNGS TWICE DAILY    ILD (interstitial lung disease) (H)       fluticasone 50 MCG/ACT spray    FLONASE    16 g    USE TWO SPRAYS IN EACH NOSTRIL EVERY DAY    Chronic rhinitis       gabapentin 300 MG capsule    NEURONTIN    270 capsule    TAKE TWO CAPSULES BY MOUTH THREE TIMES A DAY    Intervertebral cervical disc disorder with myelopathy, cervical region, Degeneration of cervical intervertebral disc       ipratropium - albuterol 0.5 mg/2.5 mg/3 mL 0.5-2.5 (3) MG/3ML neb solution    DUONEB    30 vial    Take 1 vial (3 mLs) by nebulization every 4 hours as needed for shortness of breath / dyspnea        methadone 10 MG tablet    DOLOPHINE    90 tablet    Take 1 tablet (10 mg) by mouth every 8 hours as needed    DDD (degenerative disc disease), cervical       omeprazole 20 MG CR capsule    priLOSEC    30 capsule    TAKE 1 CAPSULE BY MOUTH DAILY, 30 TO 60 MINUTES BEFORE A MEAL.    Esophageal reflux       oxyCODONE 5 MG capsule    OXY-IR    360 capsule    Take 2 capsules (10 mg) by mouth every 4 hours as needed 2 caps q 4 hour prn pain up to 12 per day May fill 5/24/2012    Intervertebral cervical disc disorder with myelopathy, cervical region       ranitidine 150 MG tablet    ZANTAC    30 tablet    TAKE ONE TABLET BY MOUTH EVERY MORNING    Esophageal reflux       traZODone 100 MG tablet    DESYREL    90 tablet    Take 3 tablets (300 mg) by mouth At Bedtime    Panic attack       vitamin D 2000 UNITS tablet     100 tablet    TAKE ONE TABLET BY MOUTH EVERY DAY    Degeneration of cervical intervertebral disc       zolpidem 10 MG tablet    AMBIEN     Take 10 mg by mouth nightly as needed        * Notice:  This list has 2 medication(s) that are the same as other medications prescribed for you. Read the directions carefully, and ask your doctor or other care provider to review them with you.

## 2017-12-22 NOTE — PROGRESS NOTES
SUBJECTIVE:   Joselito Abarca is a 52 year old male who presents to clinic today for the following health issues:      Post surgical check - date of surgery 11/2/9/17  States that his neck surgery has helped his neck pain and headaches significantly. However, he now notes that his right foot now spontaneously inverts.  He is able to ambulate without any issues but after a while of walking he will have pain on the lateral side of his foot.     He is having trouble sleeping because he has discomfort in the thoracic spine and this is keeping him awake at night due to pain. He describes it as a very small spot, about the size of a silver dollar, that can be exceptionally painful.  He did have MRI if the thoracic spine on 9/25/17 which showed an old T8 compression.     Current Outpatient Prescriptions   Medication Sig Dispense Refill     oxyCODONE (OXY-IR) 5 MG capsule Take 2 capsules (10 mg) by mouth every 4 hours as needed 2 caps q 4 hour prn pain up to 12 per day May fill 5/24/2012 360 capsule 0     methadone (DOLOPHINE) 10 MG tablet Take 1 tablet (10 mg) by mouth every 8 hours as needed 90 tablet 0     clonazePAM (KLONOPIN) 0.5 MG tablet Take 0.5-1 tablets (0.25-0.5 mg) by mouth 3 times daily as needed for anxiety 90 tablet 0     VENTOLIN  (90 BASE) MCG/ACT Inhaler INHALE 2 PUFFS INTO THE LUNGS EVERY 6 HOURS AS NEEDED FOR SHORTNESS OF BREATH, DIFFICULTY BREATHING OR WHEEZING. 18 g 3     gabapentin (NEURONTIN) 300 MG capsule TAKE TWO CAPSULES BY MOUTH THREE TIMES A  capsule 11     omeprazole (PRILOSEC) 20 MG CR capsule TAKE 1 CAPSULE BY MOUTH DAILY, 30 TO 60 MINUTES BEFORE A MEAL. 30 capsule 9     FLOVENT  MCG/ACT Inhaler INHALE 2 PUFFS INTO THE LUNGS TWICE DAILY 36 g 1     fluticasone (FLONASE) 50 MCG/ACT spray USE TWO SPRAYS IN EACH NOSTRIL EVERY DAY 16 g 5     ranitidine (ZANTAC) 150 MG tablet TAKE ONE TABLET BY MOUTH EVERY MORNING 30 tablet 10     Cholecalciferol (VITAMIN D) 2000 UNITS  tablet TAKE ONE TABLET BY MOUTH EVERY  tablet 3     traZODone (DESYREL) 100 MG tablet Take 3 tablets (300 mg) by mouth At Bedtime 90 tablet 3     zolpidem (AMBIEN) 10 MG tablet Take 10 mg by mouth nightly as needed        ipratropium - albuterol 0.5 mg/2.5 mg/3 mL (DUONEB) 0.5-2.5 (3) MG/3ML neb solution Take 1 vial (3 mLs) by nebulization every 4 hours as needed for shortness of breath / dyspnea (Patient not taking: Reported on 12/22/2017) 30 vial 0     albuterol (2.5 MG/3ML) 0.083% nebulizer solution NEBULIZE CONTENTS OF ONE VIAL EVERY 4 HOURS AS NEEDED FOR SHORTNESS OF BREATH /DYSPNEA OR WHEEZING (Patient not taking: Reported on 12/22/2017) 90 mL 0     ROS:   Const: No weight loss or weight gain.  Denies night sweats.  HEENT: Posterior neck pain and headaches are markedly improved since his cervical spine surgery.  RESP: No current acute breathing issues.  He is not using albuterol on a regular basis.  CVR: neg  MSK: As noted above his cervical pain has improved but now is focused on increased pain in his thoracic area and this is at a level of severity that he feels he cannot cut back on his current pain medications.  Neuro: He describes a small area of severe pain consistent with notalgia however his MRI did not show any cord lesions.  Psych: States he does not feel depressed; seems to be in good spirits.    OBJECTIVE:  /76 (BP Location: Left arm, Patient Position: Chair, Cuff Size: Adult Regular)  Pulse 101  Temp 98.7  F (37.1  C) (Temporal)  Wt 181 lb 9.6 oz (82.4 kg)  SpO2 98%  BMI 28.44 kg/m2  Alert and oriented, in no acute distress.  He has his Steri-Strips still partially in place on his anterior cervical access point.  These were quite dirty and were removed and surgical incision appears very well-healed.  Adhesive was cleansed off with adhesive remover here in the clinic.  Lungs were clear to auscultation.  Heart is regular without murmurs clicks or rubs.  Neck showed slight decrease  in range of motion related to his fusion and surgeries.  There was no tenderness to palpation along the paracervical muscles.  Palpation over the thoracic spine showed a very tender area just to the right of midline but not over the bony spinous process.  He admits that his posture is very poor and finds it very difficult to actually sit fully upright during his exam today.  Extremities show that his right foot has been deviated inward and he states this is a new condition since his surgery.  I am able to rotate it to a normal neutral position and he is able to hold it in that position.  He appears to have normal strength for pronation supination as well as flexion and extension of the foot.  I do not detect any sensory deficits.  His gait appears normal without evidence of foot drop.    ASSESSMENT:   Intervertebral cervical disc disorder with myelopathy, cervical region  Chronic pain syndrome  Moderate persistent asthma without complication  Peroneal palsy, right    PLAN:  Joselito is very pleased with the improvements with his cervical spine surgery.  His wounds are well-healed and he is finding less headaches and less pain in his cervical spine.  However this appears to be replaced by when he determines for severe pain in his thoracic spine.  MRI previous to this procedure did not show evidence of acute cord issues or cord lesions to account for the type of symptoms he is describing.  He does have some degree of kyphosis from poor lifelong posture.  It would seem that this would be amenable to physical therapy over time.  He is concerned with what appears to be a peroneal nerve palsy on his right foot since his cervical spine surgery.  While there may be some weakness present he is able to demonstrate normal foot control when consciously attempting.  It seems that when he is not thinking about it the foot will tend to move toward an inversion position.  I have asked him to address this specifically with  neurosurgery when he follows up for his postoperative evaluation on January 11, 2018.    We had a discussion about my concerns with his newly found pain as a means of trying to maintain continued use of his narcotics.  He adamantly states he would be more than happy to get off of these medications if we can figure out how to manage his pain.  He will also address his thoracic condition with neurosurgery when he is seen on the 11th.  I do not see anything of concern to the extent that we would do anything other than physical therapy.  If that is corroborated then we will initiate physical therapy for his lumbar spine.  We discussed that he has been on significant doses of narcotic medications for very long time and it he indeed has narcotic dependence and therefore it will require a very long withdrawal in order to successfully manage that without significant symptoms.  He could go through an acute detox program which would be highly pleasant for him and that is of  what he seems to be most fearful.  We discussed what a 10% taper would be with adjustments every 2-4 weeks based on tolerance and symptoms.  He agreed to doing pill counts, random urine testing whenever we wished, and was adamant that he is not abusing his medications.  He did bring in his current medication bottles for pill counts today and they were consistent with where he should be at this point in a month.  I agreed to refill his current dosing of medications that he has been on for some time.  I had been feeling just 2 weeks at a time and hopes that his surgery would lessen his need for pain medication and we could start beginning at taper.  At this point he disagrees indicating his thoracic pain is significant as his cervical pain was in the past.  A urine drug test will be done today.  I will see him back in 1 month.    Electronically signed by Greg Schoen, MD

## 2017-12-23 ASSESSMENT — ANXIETY QUESTIONNAIRES: GAD7 TOTAL SCORE: 2

## 2018-01-02 ENCOUNTER — TELEPHONE (OUTPATIENT)
Dept: FAMILY MEDICINE | Facility: CLINIC | Age: 53
End: 2018-01-02

## 2018-01-02 LAB — PAIN DRUG SCR UR W RPTD MEDS: NORMAL

## 2018-01-02 NOTE — TELEPHONE ENCOUNTER
----- Message from Gregory G Schoen, MD sent at 1/2/2018  2:06 PM CST -----  Please contact Joselito and let him know that his drug screen checked out fine for everything he is supposed to be taking but it appears that he also is taking citalopram/celexa and that is not on his medication list. Is he getting that from another provider?   Electronically signed by Greg Schoen, MD

## 2018-01-02 NOTE — TELEPHONE ENCOUNTER
Patient called back. I did give him the information. He was prescribed it by Dr. Lissette Ulloa his mental health provider.  Thank you,  Chandni Aranda   for [facility]

## 2018-01-03 DIAGNOSIS — M50.30 DDD (DEGENERATIVE DISC DISEASE), CERVICAL: ICD-10-CM

## 2018-01-03 RX ORDER — CITALOPRAM HYDROBROMIDE 20 MG/1
20 TABLET ORAL DAILY
Qty: 90 TABLET | Refills: 3 | COMMUNITY
Start: 2018-01-03 | End: 2020-02-05

## 2018-01-03 RX ORDER — METHADONE HYDROCHLORIDE 10 MG/1
10 TABLET ORAL EVERY 8 HOURS PRN
Qty: 90 TABLET | Refills: 0 | Status: SHIPPED | OUTPATIENT
Start: 2018-01-03 | End: 2018-01-23

## 2018-01-03 NOTE — TELEPHONE ENCOUNTER
Routing refill request to provider for review/approval because:  Drug not on the [facility] refill protocol   Jennifer Barber RN    Last Written Prescription Date:  12/6/2017  Last Fill Quantity: 90,  # refills: 0   Last Office Visit with [facility], P or [facility] prescribing provider:  12/22/2017   Future Office Visit:    Next 5 appointments (look out 90 days)     Jan 11, 2018  2:30 PM CST   Return Visit with Tootie Garner PA-C   [facility]    [address] [place] [id number] [phone]                     Requested Prescriptions   Pending Prescriptions Disp Refills     methadone (DOLOPHINE) 10 MG tablet 90 tablet 0     Sig: Take 1 tablet (10 mg) by mouth every 8 hours as needed    There is no refill protocol information for this order

## 2018-01-03 NOTE — TELEPHONE ENCOUNTER
Patient states that he takes 20 mg of Celexa daily. Medication list updated.  Crystal Putnam CMA

## 2018-01-09 DIAGNOSIS — F41.1 GENERALIZED ANXIETY DISORDER: ICD-10-CM

## 2018-01-09 DIAGNOSIS — N52.9 ERECTILE DYSFUNCTION, UNSPECIFIED ERECTILE DYSFUNCTION TYPE: Primary | ICD-10-CM

## 2018-01-10 NOTE — TELEPHONE ENCOUNTER
Routing refill request to provider for review/approval because:  Drug not active on patient's medication list.    Ailyn Tobias RN     There are no medications in this encounter.

## 2018-01-10 NOTE — TELEPHONE ENCOUNTER
Patient would actually like this script to be waiting at the  for . Please call him when ready to be picked up at the  [phone]

## 2018-01-11 ENCOUNTER — RADIANT APPOINTMENT (OUTPATIENT)
Dept: GENERAL RADIOLOGY | Facility: CLINIC | Age: 53
End: 2018-01-11
Attending: PHYSICIAN ASSISTANT
Payer: COMMERCIAL

## 2018-01-11 ENCOUNTER — OFFICE VISIT (OUTPATIENT)
Dept: NEUROSURGERY | Facility: CLINIC | Age: 53
End: 2018-01-11
Payer: COMMERCIAL

## 2018-01-11 VITALS — TEMPERATURE: 96.6 F | HEIGHT: 67 IN | WEIGHT: 181.8 LBS | BODY MASS INDEX: 28.53 KG/M2

## 2018-01-11 DIAGNOSIS — M21.6X1 ACQUIRED INVERSION DEFORMITY OF RIGHT FOOT: ICD-10-CM

## 2018-01-11 DIAGNOSIS — Z98.1 S/P CERVICAL SPINAL FUSION: Primary | ICD-10-CM

## 2018-01-11 DIAGNOSIS — Z98.1 S/P CERVICAL SPINAL FUSION: ICD-10-CM

## 2018-01-11 PROCEDURE — 99024 POSTOP FOLLOW-UP VISIT: CPT | Performed by: PHYSICIAN ASSISTANT

## 2018-01-11 PROCEDURE — 72040 X-RAY EXAM NECK SPINE 2-3 VW: CPT | Mod: TC

## 2018-01-11 PROCEDURE — 99213 OFFICE O/P EST LOW 20 MIN: CPT | Mod: 24 | Performed by: PHYSICIAN ASSISTANT

## 2018-01-11 RX ORDER — CLONAZEPAM 0.5 MG/1
0.25-0.5 TABLET ORAL 3 TIMES DAILY PRN
Qty: 90 TABLET | Refills: 0 | Status: SHIPPED | OUTPATIENT
Start: 2018-01-11 | End: 2018-02-06

## 2018-01-11 RX ORDER — SILDENAFIL 100 MG/1
100 TABLET, FILM COATED ORAL DAILY PRN
Qty: 4 TABLET | Refills: 0 | Status: SHIPPED | OUTPATIENT
Start: 2018-01-11

## 2018-01-11 ASSESSMENT — PAIN SCALES - GENERAL: PAINLEVEL: MILD PAIN (2)

## 2018-01-11 NOTE — PATIENT INSTRUCTIONS
1. EMG ordered call [phone] to schedule. I will call you with results.  2. May increase lifting restriction to 20 pounds until 3 months post-op.  3. followup in 6 weeks with xray prior     4. Call the clinic at [phone] for increasing redness, swelling or pus draining from the incision, increased pain or any other questions and concerns.

## 2018-01-11 NOTE — PROGRESS NOTES
[facility]  Neurosurgery followup:    HPI: 6 weeks s/p C6-7 ACDF with removal of hardware at C4-6. States his neck has been feeling well and continues to improve. Denies radicular pain or weakness.   He also states that he noticed his right foot has been inverted. He noticed this approximately 1-2 weeks after surgery. States it feels as though the lateral aspect of his foot is numb as well and that he is walking on this side of his foot. He is able to mariangel his foot, but it causes pain along lateral aspect of leg. Denies foot drop.    Exam:  Constitutional:  Alert, well nourished, NAD.  HEENT: Normocephalic, atraumatic.   Pulm:  Without shortness of breath   CV:  No pitting edema of BLE.     Neurological:  Awake  Alert  Oriented x 3  Motor exam:     Shoulder Abduction:  Right:  5/5    Left:  5/5  Biceps:                      Right:  5/5    Left:  5/5  Triceps:                     Right:  5/5    Left:  5/5  Wrist Extensors:       Right:  5/5    Left:  5/5  Wrist Flexors:           Right:  5/5    Left:  5/5  Intrinsics:                  Right:  5/5    Left:  5/5        IP Q DF PF EHL  R   5  5   5   5    5  L   5  5   5   5    5       Able to spontaneously move U/E bilaterally  Sensation intact throughout all U/E dermatomes    Non antalgic, non myelopathic gait. No foot drop.  Incision: Healing nicely.  Imaging: AP and lateral lumbar films reveal stable hardware.  A/P: 6 weeks s/p C6-7 ACDF with removal of hardware at C4-6. Doing well post operatively and pain continues to improve. XRs reveal stable hardware. He does have inversion of right foot on exam. No foot drop. I have ordered him an EMG for his right foot inversion to evaluate for peroneal nerve palsy vs L5 radiculopathy. I will call him with these results. Patient voiced understanding and agreement.    - May increase lifting restriction to 20 pounds until 3 months post-op.  - followup in 6 weeks with xray prior     - Call the clinic at [phone]  for increased pain or any other questions and concerns.        Tootie Garner PA-C  [facility]  [address] [address]    Tel [phone]  Pager [phone]

## 2018-01-11 NOTE — LETTER
1/11/2018         RE: Joselito Abarca  [address]   [facility] [id number]        Dear Colleague,    Thank you for referring your patient, Joselito Abarca, to the [facility]. Please see a copy of my visit note below.    [facility]  Neurosurgery followup:    HPI: 6 weeks s/p C6-7 ACDF with removal of hardware at C4-6. States his neck has been feeling well and continues to improve. Denies radicular pain or weakness.   He also states that he noticed his right foot has been inverted. He noticed this approximately 1-2 weeks after surgery. States it feels as though the lateral aspect of his foot is numb as well and that he is walking on this side of his foot. He is able to mariangel his foot, but it causes pain along lateral aspect of leg. Denies foot drop.    Exam:  Constitutional:  Alert, well nourished, NAD.  HEENT: Normocephalic, atraumatic.   Pulm:  Without shortness of breath   CV:  No pitting edema of BLE.     Neurological:  Awake  Alert  Oriented x 3  Motor exam:     Shoulder Abduction:  Right:  5/5    Left:  5/5  Biceps:                      Right:  5/5    Left:  5/5  Triceps:                     Right:  5/5    Left:  5/5  Wrist Extensors:       Right:  5/5    Left:  5/5  Wrist Flexors:           Right:  5/5    Left:  5/5  Intrinsics:                  Right:  5/5    Left:  5/5        IP Q DF PF EHL  R   5  5   5   5    5  L   5  5   5   5    5       Able to spontaneously move U/E bilaterally  Sensation intact throughout all U/E dermatomes    Non antalgic, non myelopathic gait. No foot drop.  Incision: Healing nicely.  Imaging: AP and lateral lumbar films reveal stable hardware.  A/P: 6 weeks s/p C6-7 ACDF with removal of hardware at C4-6. Doing well post operatively and pain continues to improve. XRs reveal stable hardware. He does have inversion of right foot on exam. No foot drop. I have ordered him an EMG for his right foot inversion to evaluate for peroneal  "nerve palsy vs L5 radiculopathy. I will call him with these results. Patient voiced understanding and agreement.    - May increase lifting restriction to 20 pounds until 3 months post-op.  - followup in 6 weeks with xray prior     - Call the clinic at [phone] for increased pain or any other questions and concerns.        Tootie Garner PA-C  [facility]  [address]  [address] [place] [address]    Tel [phone]  Pager [phone]      Joselito Abarca is a 52 year old male who presents for:  Chief Complaint   Patient presents with     Surgical Followup     6 week follow up ACDF DOS: 11/29/17        Initial Vitals:  Temp 96.6  F (35.9  C) (Temporal)  Ht 5' 7\" (1.702 m)  Wt 181 lb 12.8 oz (82.5 kg)  BMI 28.47 kg/m2 Estimated body mass index is 28.47 kg/(m^2) as calculated from the following:    Height as of this encounter: 5' 7\" (1.702 m).    Weight as of this encounter: 181 lb 12.8 oz (82.5 kg).. Body surface area is 1.97 meters squared. BP completed using cuff size: NA (Not Taken)  Mild Pain (2)    Do you feel safe in your environment?  Yes  Do you need any refills today? No    Nursing Comments:         Alyse Fairchild CMA      Again, thank you for allowing me to participate in the care of your patient.        Sincerely,        Tootie Garner PA-C    "

## 2018-01-11 NOTE — PROGRESS NOTES
"Joselito Abarca is a 52 year old male who presents for:  Chief Complaint   Patient presents with     Surgical Followup     6 week follow up ACDF DOS: 11/29/17        Initial Vitals:  Temp 96.6  F (35.9  C) (Temporal)  Ht 5' 7\" (1.702 m)  Wt 181 lb 12.8 oz (82.5 kg)  BMI 28.47 kg/m2 Estimated body mass index is 28.47 kg/(m^2) as calculated from the following:    Height as of this encounter: 5' 7\" (1.702 m).    Weight as of this encounter: 181 lb 12.8 oz (82.5 kg).. Body surface area is 1.97 meters squared. BP completed using cuff size: NA (Not Taken)  Mild Pain (2)    Do you feel safe in your environment?  Yes  Do you need any refills today? No    Nursing Comments:         Alyse Fairchild CMA    "

## 2018-01-11 NOTE — MR AVS SNAPSHOT
After Visit Summary   1/11/2018    Joselito Abarca    MRN: [id number]           Patient Information     Date Of Birth          1965        Visit Information        Provider Department      1/11/2018 2:30 PM Tootie Garner PA-C [facility]        Today's Diagnoses     S/P cervical spinal fusion    -  1    Acquired inversion deformity of right foot          Care Instructions    1. EMG ordered call [phone] to schedule. I will call you with results.  2. May increase lifting restriction to 20 pounds until 3 months post-op.  3. followup in 6 weeks with xray prior     4. Call the clinic at [phone] for increasing redness, swelling or pus draining from the incision, increased pain or any other questions and concerns.          Follow-ups after your visit        Additional Services     NEUROLOGY ADULT REFERRAL       Your provider has referred you for the following:   EMG at [facility]: [facility] [phone]   [email or web address]    Please be aware that coverage of these services is subject to the terms and limitations of your health insurance plan.  Call member services at your health plan with any benefit or coverage questions.      Please bring the following with you to your appointment:    (1) Any X-Rays, CTs or MRIs which have been performed.  Contact the facility where they were done to arrange for  prior to your scheduled appointment.    (2) List of current medications  (3) This referral request   (4) Any documents/labs given to you for this referral                  Future tests that were ordered for you today     Open Future Orders        Priority Expected Expires Ordered    XR Cervical Spine 2/3 Views Routine 2/22/2018 1/11/2019 1/11/2018            Who to contact     If you have questions or need follow up information about today's clinic visit or your schedule please  "contact [facility] directly at [phone].  Normal or non-critical lab and imaging results will be communicated to you by MyChart, letter or phone within 4 business days after the clinic has received the results. If you do not hear from us within 7 days, please contact the clinic through MyChart or phone. If you have a critical or abnormal lab result, we will notify you by phone as soon as possible.  Submit refill requests through [employer] or call your pharmacy and they will forward the refill request to us. Please allow 3 business days for your refill to be completed.          Additional Information About Your Visit        [employer] Information     [employer] lets you send messages to your doctor, view your test results, renew your prescriptions, schedule appointments and more. To sign up, go to [email or web address] . Click on \"Log in\" on the left side of the screen, which will take you to the Welcome page. Then click on \"Sign up Now\" on the right side of the page.     You will be asked to enter the access code listed below, as well as some personal information. Please follow the directions to create your username and password.     Your access code is: [email or web address]  Expires: 3/22/2018  4:35 PM     Your access code will  in 90 days. If you need help or a new code, please call your [facility] clinic or [phone].        Care EveryWhere ID     This is your Care EveryWhere ID. This could be used by other organizations to access your [facility] medical records  [id number]        Your Vitals Were     Temperature Height BMI (Body Mass Index)             96.6  F (35.9  C) (Temporal) 5' 7\" (1.702 m) 28.47 kg/m2          Blood Pressure from Last 3 Encounters:   17 130/76   17 131/66   17 102/58    Weight from Last 3 Encounters:   18 181 lb 12.8 oz (82.5 kg)   17 181 lb 9.6 oz (82.4 kg)   17 179 lb (81.2 kg)              We Performed the Following     NEUROLOGY ADULT " REFERRAL        Primary Care Provider Office Phone # Fax #    Gregory G Schoen, [phone] [address] [place] [address]        Goals        General    I will call to schedule an appointment  if I develop new or worsening symptoms. Started 4/26/16. (pt-stated)     Notes - Note created  4/26/2016  3:32 PM by Behl, Melissa K, RN    As of today's date 4/26/2016 goal is met at 0 - 25%.   Goal Status:  Active  Melissa Behl BSN, RN, N  [facility] Care Coordinator  [phone]        I will use my nebulizers and inhalers as prescribed. Started 4/26/16 (pt-stated)     Notes - Note edited  5/10/2016  8:58 AM by Behl, Melissa K, RN    As of today's date 5/10/2016 goal is met at 51 - 75%.   Goal Status:  Active  Melissa Behl BSN, RN, [facility] Care Coordinator  [phone]          Equal Access to Services     [facility]: Hadii aad ku hadasho Soomaali, waaxda luqadaha, qaybta kaalmada adeegyada, waxay carly haymaikel hebert . So [facility] [phone].    ATENCIÓN: Si habla español, tiene a alfaro disposición servicios gratuitos de asistencia lingüística. Llame al [phone].    We comply with applicable federal civil rights laws and [place] laws. We do not discriminate on the basis of race, color, national origin, age, disability, sex, sexual orientation, or gender identity.            Thank you!     Thank you for choosing [facility]  for your care. Our goal is always to provide you with excellent care. Hearing back from our patients is one way we can continue to improve our services. Please take a few minutes to complete the written survey that you may receive in the mail after your visit with us. Thank you!             Your Updated Medication List - Protect others around you: Learn how to safely use, store and throw away your medicines at www.disposemymeds.org.          This list is accurate as of: 1/11/18  2:50 PM.  Always use your most recent med  list.                   Brand Name Dispense Instructions for use Diagnosis    * albuterol (2.5 MG/3ML) 0.083% neb solution     90 mL    NEBULIZE CONTENTS OF ONE VIAL EVERY 4 HOURS AS NEEDED FOR SHORTNESS OF BREATH /DYSPNEA OR WHEEZING    Mild intermittent asthma with acute exacerbation       * VENTOLIN  (90 BASE) MCG/ACT Inhaler   Generic drug:  albuterol     18 g    INHALE 2 PUFFS INTO THE LUNGS EVERY 6 HOURS AS NEEDED FOR SHORTNESS OF BREATH, DIFFICULTY BREATHING OR WHEEZING.    Mild intermittent asthma with acute exacerbation       citalopram 20 MG tablet    celeXA    90 tablet    Take 1 tablet (20 mg) by mouth daily        clonazePAM 0.5 MG tablet    klonoPIN    90 tablet    Take 0.5-1 tablets (0.25-0.5 mg) by mouth 3 times daily as needed for anxiety    Generalized anxiety disorder       FLOVENT  MCG/ACT Inhaler   Generic drug:  fluticasone     36 g    INHALE 2 PUFFS INTO THE LUNGS TWICE DAILY    ILD (interstitial lung disease) (H)       fluticasone 50 MCG/ACT spray    FLONASE    16 g    USE TWO SPRAYS IN EACH NOSTRIL EVERY DAY    Chronic rhinitis       gabapentin 300 MG capsule    NEURONTIN    270 capsule    TAKE TWO CAPSULES BY MOUTH THREE TIMES A DAY    Intervertebral cervical disc disorder with myelopathy, cervical region, Degeneration of cervical intervertebral disc       ipratropium - albuterol 0.5 mg/2.5 mg/3 mL 0.5-2.5 (3) MG/3ML neb solution    DUONEB    30 vial    Take 1 vial (3 mLs) by nebulization every 4 hours as needed for shortness of breath / dyspnea        methadone 10 MG tablet    DOLOPHINE    90 tablet    Take 1 tablet (10 mg) by mouth every 8 hours as needed    DDD (degenerative disc disease), cervical       omeprazole 20 MG CR capsule    priLOSEC    30 capsule    TAKE 1 CAPSULE BY MOUTH DAILY, 30 TO 60 MINUTES BEFORE A MEAL.    Esophageal reflux       oxyCODONE 5 MG capsule    OXY-IR    360 capsule    Take 2 capsules (10 mg) by mouth every 4 hours as needed 2 caps q 4 hour prn  pain up to 12 per day May fill 5/24/2012    Intervertebral cervical disc disorder with myelopathy, cervical region       ranitidine 150 MG tablet    ZANTAC    30 tablet    TAKE ONE TABLET BY MOUTH EVERY MORNING    Esophageal reflux       sildenafil 100 MG tablet    VIAGRA    4 tablet    Take 1 tablet (100 mg) by mouth daily as needed 30 min to 4 hrs before sex. Do not use with nitroglycerin, terazosin or doxazosin.    Erectile dysfunction, unspecified erectile dysfunction type       traZODone 100 MG tablet    DESYREL    90 tablet    Take 3 tablets (300 mg) by mouth At Bedtime    Panic attack       vitamin D 2000 UNITS tablet     100 tablet    TAKE ONE TABLET BY MOUTH EVERY DAY    Degeneration of cervical intervertebral disc       zolpidem 10 MG tablet    AMBIEN     Take 10 mg by mouth nightly as needed        * Notice:  This list has 2 medication(s) that are the same as other medications prescribed for you. Read the directions carefully, and ask your doctor or other care provider to review them with you.

## 2018-01-23 DIAGNOSIS — M50.30 DDD (DEGENERATIVE DISC DISEASE), CERVICAL: ICD-10-CM

## 2018-01-23 DIAGNOSIS — M50.00 INTERVERTEBRAL CERVICAL DISC DISORDER WITH MYELOPATHY, CERVICAL REGION: ICD-10-CM

## 2018-01-24 RX ORDER — METHADONE HYDROCHLORIDE 10 MG/1
10 TABLET ORAL EVERY 8 HOURS PRN
Qty: 90 TABLET | Refills: 0 | Status: SHIPPED | OUTPATIENT
Start: 2018-01-24 | End: 2018-02-28

## 2018-01-24 RX ORDER — OXYCODONE HYDROCHLORIDE 5 MG/1
10 CAPSULE ORAL EVERY 4 HOURS PRN
Qty: 360 CAPSULE | Refills: 0 | Status: SHIPPED | OUTPATIENT
Start: 2018-01-24 | End: 2018-02-25

## 2018-01-24 NOTE — TELEPHONE ENCOUNTER
Requested Prescriptions   Pending Prescriptions Disp Refills     methadone (DOLOPHINE) 10 MG tablet 90 tablet 0     Sig: Take 1 tablet (10 mg) by mouth every 8 hours as needed    There is no refill protocol information for this order        oxyCODONE (OXY-IR) 5 MG capsule 360 capsule 0     Sig: Take 2 capsules (10 mg) by mouth every 4 hours as needed 2 caps q 4 hour prn pain up to 12 per day May fill 5/24/2012    There is no refill protocol information for this order

## 2018-02-03 ENCOUNTER — HOSPITAL ENCOUNTER (EMERGENCY)
Facility: CLINIC | Age: 53
Discharge: HOME | End: 2018-02-03
Attending: PHYSICIAN ASSISTANT | Admitting: PHYSICIAN ASSISTANT
Payer: COMMERCIAL

## 2018-02-03 ENCOUNTER — APPOINTMENT (OUTPATIENT)
Dept: GENERAL RADIOLOGY | Facility: CLINIC | Age: 53
End: 2018-02-03
Attending: PHYSICIAN ASSISTANT
Payer: COMMERCIAL

## 2018-02-03 VITALS
SYSTOLIC BLOOD PRESSURE: 109 MMHG | DIASTOLIC BLOOD PRESSURE: 84 MMHG | RESPIRATION RATE: 16 BRPM | TEMPERATURE: 97.5 F | OXYGEN SATURATION: 96 % | HEART RATE: 86 BPM

## 2018-02-03 DIAGNOSIS — J45.41 MODERATE PERSISTENT ASTHMA WITH EXACERBATION: ICD-10-CM

## 2018-02-03 DIAGNOSIS — J06.9 UPPER RESPIRATORY TRACT INFECTION, UNSPECIFIED TYPE: ICD-10-CM

## 2018-02-03 LAB
FLUAV+FLUBV AG SPEC QL: NEGATIVE
FLUAV+FLUBV AG SPEC QL: NEGATIVE
SPECIMEN SOURCE: NORMAL

## 2018-02-03 PROCEDURE — 87804 INFLUENZA ASSAY W/OPTIC: CPT | Performed by: PHYSICIAN ASSISTANT

## 2018-02-03 PROCEDURE — 99284 EMERGENCY DEPT VISIT MOD MDM: CPT | Mod: Z6 | Performed by: PHYSICIAN ASSISTANT

## 2018-02-03 PROCEDURE — 99284 EMERGENCY DEPT VISIT MOD MDM: CPT | Mod: 25 | Performed by: PHYSICIAN ASSISTANT

## 2018-02-03 PROCEDURE — 71046 X-RAY EXAM CHEST 2 VIEWS: CPT | Mod: TC

## 2018-02-03 ASSESSMENT — ENCOUNTER SYMPTOMS
CHILLS: 0
LIGHT-HEADEDNESS: 0
NAUSEA: 0
HEADACHES: 1
VOMITING: 0
FEVER: 0
SHORTNESS OF BREATH: 1
ABDOMINAL PAIN: 0
MYALGIAS: 0
COUGH: 1
SORE THROAT: 1
DIARRHEA: 0

## 2018-02-03 NOTE — ED AVS SNAPSHOT
[facility]    [address] [place] [address]    Phone:  [phone]    Fax:  [phone]                                       Joselito Abarca   MRN: [id number]    Department:  [facility]   Date of Visit:  2/3/2018           Patient Information     Date Of Birth          1965        Your diagnoses for this visit were:     Upper respiratory tract infection, unspecified type     Moderate persistent asthma with exacerbation        You were seen by Marcia Fair PA-C.      Follow-up Information     Follow up with [facility].    Specialty:  EMERGENCY MEDICINE    Why:  If symptoms worsen    Contact information:    [address]   [place] [address] [phone]    Additional information:    From [address]: Exit at [address] on [place] side of [place]. Turn right on [address]. Turn left at stoplight on [address]. [facility] will be in view two blocks ahead        Follow up with Schoen, Gregory G, MD. Call in 2 days.    Specialty:  Family Practice    Why:  For ER follow up, As needed for persistent symptoms    Contact information:    [address] [place] [id number] [phone]          Discharge Instructions       Please use your home nebulizer treatments as needed for wheezing or increased cough. Rest and drink plenty of fluids to stay hydrated. Follow up early next week in clinic if symptoms persist. Return to the emergency department for worsening symptoms.    Thank you for choosing [facility] Emergency [facility]. It was a pleasure taking care of you today. If you have any questions, please call [phone].    Marcia Fair PA-C    Viral Upper Respiratory Illness (Adult)  You have a viral upper respiratory illness (URI), which is another term for the common cold. This illness is contagious during the first few days. It is spread through  the air by coughing and sneezing. It may also be spread by direct contact (touching the sick person and then touching your own eyes, nose, or mouth). Frequent handwashing will decrease risk of spread. Most viral illnesses go away within 7 to 10 days with rest and simple home remedies. Sometimes the illness may last for several weeks. Antibiotics will not kill a virus, and they are generally not prescribed for this condition.    Home care    If symptoms are severe, rest at home for the first 2 to 3 days. When you resume activity, don't let yourself get too tired.    Avoid being exposed to cigarette smoke (yours or others ).    You may use acetaminophen or ibuprofen to control pain and fever, unless another medicine was prescribed. (Note: If you have chronic liver or kidney disease, have ever had a stomach ulcer or gastrointestinal bleeding, or are taking blood-thinning medicines, talk with your healthcare provider before using these medicines.) Aspirin should never be given to anyone under 18 years of age who is ill with a viral infection or fever. It may cause severe liver or brain damage.    Your appetite may be poor, so a light diet is fine. Avoid dehydration by drinking 6 to 8 glasses of fluids per day (water, soft drinks, juices, tea, or soup). Extra fluids will help loosen secretions in the nose and lungs.    Over-the-counter cold medicines will not shorten the length of time you re sick, but they may be helpful for the following symptoms: cough, sore throat, and nasal and sinus congestion. (Note: Do not use decongestants if you have high blood pressure.)  Follow-up care  Follow up with your healthcare provider, or as advised.  When to seek medical advice  Call your healthcare provider right away if any of these occur:    Cough with lots of colored sputum (mucus)    Severe headache; face, neck, or ear pain    Difficulty swallowing due to throat pain    Fever of 100.4 F (38 C)  Call 911, or get immediate medical  care  Call emergency services right away if any of these occur:    Chest pain, shortness of breath, wheezing, or difficulty breathing    Coughing up blood    Inability to swallow due to throat pain      24 Hour Appointment Hotline       To make an appointment at any [facility], call [phone] ([phone]). If you don't have a family doctor or clinic, we will help you find one. [facility] clinics are conveniently located to serve the needs of you and your family.             Review of your medicines      Our records show that you are taking the medicines listed below. If these are incorrect, please call your family doctor or clinic.        Dose / Directions Last dose taken    * albuterol (2.5 MG/3ML) 0.083% neb solution   Quantity:  90 mL        NEBULIZE CONTENTS OF ONE VIAL EVERY 4 HOURS AS NEEDED FOR SHORTNESS OF BREATH /DYSPNEA OR WHEEZING   Refills:  0        * VENTOLIN  (90 BASE) MCG/ACT Inhaler   Quantity:  18 g   Generic drug:  albuterol        INHALE 2 PUFFS INTO THE LUNGS EVERY 6 HOURS AS NEEDED FOR SHORTNESS OF BREATH, DIFFICULTY BREATHING OR WHEEZING.   Refills:  3        citalopram 20 MG tablet   Commonly known as:  celeXA   Dose:  20 mg   Quantity:  90 tablet        Take 1 tablet (20 mg) by mouth daily   Refills:  3        clonazePAM 0.5 MG tablet   Commonly known as:  klonoPIN   Dose:  0.25-0.5 mg   Quantity:  90 tablet        Take 0.5-1 tablets (0.25-0.5 mg) by mouth 3 times daily as needed for anxiety   Refills:  0        FLOVENT  MCG/ACT Inhaler   Quantity:  36 g   Generic drug:  fluticasone        INHALE 2 PUFFS INTO THE LUNGS TWICE DAILY   Refills:  1        fluticasone 50 MCG/ACT spray   Commonly known as:  FLONASE   Quantity:  16 g        USE TWO SPRAYS IN EACH NOSTRIL EVERY DAY   Refills:  5        gabapentin 300 MG capsule   Commonly known as:  NEURONTIN   Quantity:  270 capsule        TAKE TWO CAPSULES BY MOUTH THREE TIMES A DAY   Refills:  11        ipratropium -  albuterol 0.5 mg/2.5 mg/3 mL 0.5-2.5 (3) MG/3ML neb solution   Commonly known as:  DUONEB   Dose:  1 vial   Quantity:  30 vial        Take 1 vial (3 mLs) by nebulization every 4 hours as needed for shortness of breath / dyspnea   Refills:  0        methadone 10 MG tablet   Commonly known as:  DOLOPHINE   Dose:  10 mg   Quantity:  90 tablet        Take 1 tablet (10 mg) by mouth every 8 hours as needed   Refills:  0        omeprazole 20 MG CR capsule   Commonly known as:  priLOSEC   Quantity:  30 capsule        TAKE 1 CAPSULE BY MOUTH DAILY, 30 TO 60 MINUTES BEFORE A MEAL.   Refills:  9        oxyCODONE 5 MG capsule   Commonly known as:  OXY-IR   Dose:  10 mg   Quantity:  360 capsule        Take 2 capsules (10 mg) by mouth every 4 hours as needed 2 caps q 4 hour prn pain up to 12 per day May fill 5/24/2012   Refills:  0        ranitidine 150 MG tablet   Commonly known as:  ZANTAC   Quantity:  30 tablet        TAKE ONE TABLET BY MOUTH EVERY MORNING   Refills:  10        sildenafil 100 MG tablet   Commonly known as:  VIAGRA   Dose:  100 mg   Quantity:  4 tablet        Take 1 tablet (100 mg) by mouth daily as needed 30 min to 4 hrs before sex. Do not use with nitroglycerin, terazosin or doxazosin.   Refills:  0        traZODone 100 MG tablet   Commonly known as:  DESYREL   Dose:  300 mg   Quantity:  90 tablet        Take 3 tablets (300 mg) by mouth At Bedtime   Refills:  3        vitamin D 2000 UNITS tablet   Quantity:  100 tablet        TAKE ONE TABLET BY MOUTH EVERY DAY   Refills:  3        zolpidem 10 MG tablet   Commonly known as:  AMBIEN   Dose:  10 mg        Take 10 mg by mouth nightly as needed   Refills:  0        * Notice:  This list has 2 medication(s) that are the same as other medications prescribed for you. Read the directions carefully, and ask your doctor or other care provider to review them with you.            Procedures and tests performed during your visit     Influenza A/B antigen    XR Chest 2 Views  "     Orders Needing Specimen Collection     None      Pending Results     Date and Time Order Name Status Description    2/3/2018 1954 XR Chest 2 Views Preliminary             Pending Culture Results     No orders found from 2018 to 2018.            Pending Results Instructions     If you had any lab results that were not finalized at the time of your Discharge, you can call the ED Lab Result RN at [phone]. You will be contacted by this team for any positive Lab results or changes in treatment. The nurses are available 7 days a week from 10A to 6:30P.  You can leave a message 24 hours per day and they will return your call.        Thank you for choosing [facility]       Thank you for choosing [facility] for your care. Our goal is always to provide you with excellent care. Hearing back from our patients is one way we can continue to improve our services. Please take a few minutes to complete the written survey that you may receive in the mail after you visit with us. Thank you!        [employer] Information     [employer] lets you send messages to your doctor, view your test results, renew your prescriptions, schedule appointments and more. To sign up, go to [email or web address] . Click on \"Log in\" on the left side of the screen, which will take you to the Welcome page. Then click on \"Sign up Now\" on the right side of the page.     You will be asked to enter the access code listed below, as well as some personal information. Please follow the directions to create your username and password.     Your access code is: [email or web address]  Expires: 3/22/2018  4:35 PM     Your access code will  in 90 days. If you need help or a new code, please call your [facility] clinic or [phone].        Care EveryWhere ID     This is your Care EveryWhere ID. This could be used by other organizations to access your [facility] medical records  [id number]        Equal Access to Services     DADA [place]: Melvin johnston " damari Hebert, lbanca tamezmaomayra das. So [facility] [phone].    ATENCIÓN: Si habla español, tiene a alfaro disposición servicios gratuitos de asistencia lingüística. Llame al [phone].    We comply with applicable federal civil rights laws and [place] laws. We do not discriminate on the basis of race, color, national origin, age, disability, sex, sexual orientation, or gender identity.            After Visit Summary       This is your record. Keep this with you and show to your community pharmacist(s) and doctor(s) at your next visit.

## 2018-02-04 NOTE — ED PROVIDER NOTES
History     Chief Complaint   Patient presents with     Cough     The history is provided by the patient and the spouse.     Joselito Abarca is a 52 year old male with a history of chronic pain, anxiety, asthma, GERD, who presents to the emergency department complaining of a cough. Patient thinks he has pneumonia or bronchitis. He started having breathing difficulties and a cough last week but it got better before it got worse last night. His symptoms seem to be worse at night. His cough is dry. He had a headache and a sore throat associated but no nasal congestion. His wife has pneumonia, she thinks he got it from him. Joselito reports he has gotten pneumonia in the past because he was living in a house with mold. Patient has been using inhalers with no relief.  Denies fever, chills, body aches.    Problem List:    Patient Active Problem List    Diagnosis Date Noted     Status post cervical spinal arthrodesis 11/29/2017     Priority: Medium     Chronic pain syndrome 12/13/2016     Priority: Medium     Patient is followed by Gregory G. Schoen, MD for ongoing prescription of pain medication.  All refills should be approved by this provider, or covering partner.    Medication(s): Oxycodone 5 mg IR: Take 2 capsules (10 mg) by mouth every 4 hours as needed 2 caps q 4 hour prn pain up to 12 per day .   Methadone 10 mg three times daily, 90 per month  Clonazepam 0.5 mg tid, 90 per month   Clinic visit frequency required: Q 3 months     Controlled substance agreement:  Encounter-Level CSA - 2/27/15:               Controlled Substance Agreement - Scan on 3/14/2015  8:47 AM : Controlled Medication Agreement-02/27/15 (below)            Pain Clinic evaluation in the past: Yes    DIRE Total Score(s):  No flowsheet data found.    Last [facility] website verification:  10/30/2016     [email or web address]         Moderate persistent asthma without complication 11/02/2016     Priority: Medium     Acute respiratory failure with  hypoxia (H) 04/29/2016     Priority: Medium     Nausea with vomiting 04/27/2016     Priority: Medium     Cough 03/06/2016     Priority: Medium     Leukocytosis 02/16/2016     Priority: Medium     Disease of bronchial airway (HCC) 02/12/2016     Priority: Medium     Degeneration of cervical intervertebral disc 01/26/2015     Priority: Medium     Chronic rhinitis 01/26/2015     Priority: Medium     Health Care Home 09/30/2013     Priority: Medium     Status:  Accepted  Care Coordinator:    Melissa Behl BSN, RN, PHN  [facility] Care Coordinator  [phone]     See Letters for [facility] Care Plan  Date:  April 26, 2016            Arthrodesis status 06/15/2011     Priority: Medium     Neck pain 06/15/2011     Priority: Medium     CARDIOVASCULAR SCREENING; LDL GOAL LESS THAN 160 10/31/2010     Priority: Medium     Intervertebral cervical disc disorder with myelopathy, cervical region 11/12/2009     Priority: Medium     Patient is followed by TIARA JIMENEZ for ongoing prescription of narcotic pain medicine.  Med: methadone 10 mg tid. Taking oxycodone up to 8 per day, 120 per month  Maximum use per month: 90  Expected duration: ongoing  Narcotic agreement on file: YES  Clinic visit recommended: Q 3 months         Constipation 03/19/2008     Priority: Medium     Problem list name updated by automated process. Provider to review       Thoracic or lumbosacral neuritis or radiculitis, unspecified 03/19/2008     Priority: Medium     Esophageal reflux 07/09/2003     Priority: Medium     Generalized anxiety disorder 07/08/2003     Priority: Medium     Alcohol abuse, in remission 07/08/2003     Priority: Medium        Past Medical History:    Past Medical History:   Diagnosis Date     Anxiety      GERD (gastroesophageal reflux disease)      Neck pain 6/15/2011       Past Surgical History:    Past Surgical History:   Procedure Laterality Date     DISCECTOMY LUMBAR POSTERIOR MICROSCOPIC ONE LEVEL  2/21/2012    Procedure:DISCECTOMY  LUMBAR POSTERIOR MICROSCOPIC ONE LEVEL; LEFT T1-T2 THORASIC HEMILAMINECTOMY MICRODISCECTOMY WITH MEXTRIX II ; Surgeon:SHARON PURI; Location:[facility] OR     DISCECTOMY, FUSION CERVICAL ANTERIOR ONE LEVEL, COMBINED N/A 11/29/2017    Procedure: COMBINED DISCECTOMY, FUSION CERVICAL ANTERIOR ONE LEVEL;  Anterior Cervical Discectomy and Fusion Cervical Six - Cervical Seven, Exploration and Revision Cervical Four - Cervical Six with Hardware Removal;  Surgeon: Nikolas Vasques MD;  Location: [facility] OR     EXPLORE SPINE, REMOVE HARDWARE, COMBINED N/A 11/29/2017    Procedure: COMBINED EXPLORE SPINE, REMOVE HARDWARE;;  Surgeon: Nikolas Vasques MD;  Location: [facility] OR     FUSION CERVICAL ANTERIOR TWO LEVELS  1/29/2010     HC DRAIN/INJ MAJOR JOINT/BURSA W/O US  5/5/2008    Left sacroiliac joint injection.     HC INJ EPIDURAL LUMBAR/SACRAL W/WO CONTRAST  2009     HEAD & NECK SURGERY      2013     HERNIA REPAIR       INJECT BLOCK MEDIAL BRANCH CERVICAL/THORACIC/LUMBAR Bilateral 8/26/2015    Procedure: INJECT BLOCK MEDIAL BRANCH CERVICAL / THORACIC / LUMBAR;  Surgeon: Ronald Driscoll MD;  Location: [facility] OR     INJECT FACET JOINT Bilateral 5/27/2015    Procedure: INJECT FACET JOINT;  Surgeon: Ronald Driscoll MD;  Location: [facility] OR       Family History:    Family History   Problem Relation Age of Onset     Family History Negative No family hx of      C.A.D. No family hx of        Social History:  Marital Status:  Single [1]  Social History   Substance Use Topics     Smoking status: Passive Smoke Exposure - Never Smoker     Types: Cigars     Last attempt to quit: 12/19/2009     Smokeless tobacco: Never Used     Alcohol use No      Comment: HX OF ABUSE-IN REMISSION        Medications:      methadone (DOLOPHINE) 10 MG tablet   oxyCODONE (OXY-IR) 5 MG capsule   clonazePAM (KLONOPIN) 0.5 MG tablet   citalopram (CELEXA) 20 MG tablet   VENTOLIN  (90 BASE) MCG/ACT Inhaler   gabapentin (NEURONTIN) 300 MG capsule   omeprazole  (PRILOSEC) 20 MG CR capsule   FLOVENT  MCG/ACT Inhaler   fluticasone (FLONASE) 50 MCG/ACT spray   ranitidine (ZANTAC) 150 MG tablet   Cholecalciferol (VITAMIN D) 2000 UNITS tablet   ipratropium - albuterol 0.5 mg/2.5 mg/3 mL (DUONEB) 0.5-2.5 (3) MG/3ML neb solution   albuterol (2.5 MG/3ML) 0.083% nebulizer solution   traZODone (DESYREL) 100 MG tablet   zolpidem (AMBIEN) 10 MG tablet   sildenafil (VIAGRA) 100 MG tablet         Review of Systems   Constitutional: Negative for chills and fever.   HENT: Positive for sore throat. Negative for congestion.    Respiratory: Positive for cough and shortness of breath.    Gastrointestinal: Negative for abdominal pain, diarrhea, nausea and vomiting.   Musculoskeletal: Negative for myalgias.   Neurological: Positive for headaches. Negative for light-headedness.   All other systems reviewed and are negative.      Physical Exam   BP: 112/82  Pulse: 99  Temp: 98.7  F (37.1  C)  Resp: 18  SpO2: 99 %      Physical Exam   Constitutional: He is oriented to person, place, and time. He appears well-developed and well-nourished. No distress.   Appears older than stated age   HENT:   Head: Normocephalic and atraumatic.   Mouth/Throat: Oropharynx is clear and moist. No oropharyngeal exudate.   Eyes: Conjunctivae and EOM are normal. Pupils are equal, round, and reactive to light.   Neck: Normal range of motion. Neck supple.   Cardiovascular: Normal rate, regular rhythm, normal heart sounds and intact distal pulses.    Pulmonary/Chest: Effort normal. He has no decreased breath sounds. He has no wheezes. He has rhonchi (end expiratory, clears with cough) in the right upper field.   Speaking in long, complete sentences without evidence of respiratory distress.   Musculoskeletal: Normal range of motion. He exhibits no deformity.   Lymphadenopathy:     He has no cervical adenopathy.   Neurological: He is alert and oriented to person, place, and time.   Skin: Skin is warm. No rash noted. He  is not diaphoretic.   Psychiatric: He has a normal mood and affect. His behavior is normal.   Nursing note and vitals reviewed.      ED Course     ED Course     Procedures      Results for orders placed or performed during the hospital encounter of 02/03/18 (from the past 24 hour(s))   Influenza A/B antigen   Result Value Ref Range    Influenza A/B Agn Specimen Nasopharyngeal     Influenza A Negative NEG^Negative    Influenza B Negative NEG^Negative   XR Chest 2 Views    Narrative    CHEST TWO VIEWS    2/3/2018 8:04 PM     HISTORY: Cough.    COMPARISON: 3/15/2017.      Impression    IMPRESSION: No acute cardiopulmonary disease.     LEONIDES NICHOLAS MD       Medications - No data to display     Assessments & Plan (with Medical Decision Making)  Joselito Abarca is a 52 year old who presented to the ED complaining of a cough.  This has been ongoing for the last week.  Cough is dry and he has associated sore throat and headache but no other symptoms.  He is concerned for bronchitis or pneumonia.  On arrival to the ED he was afebrile with SPO2 of 99% on room air.  Overall vitals stable.  He was speaking in long full sentences with no evidence of respiratory distress.  He had faint rhonchi in the right upper field that cleared with coughing, overall no wheezes and excellent airflow.  An x-ray today showed no acute lobar pneumonia.  Influenza screen was also negative.  I discussed findings with the patient.  Giving reassuring exam, negative chest x-ray, and normal vital signs, I do not think further workup indicated at this time and patient agrees.  It sounds that he likely has a viral upper respiratory infection that is mildly exacerbating his asthma.  Based on exam, I did not feel oral steroids or antibiotics indicated at this point.  He has nebulizer treatments at home that he agrees to use 4-6 times daily as needed. Encouraged rest, fluids, humidifier. He was encouraged to call his PCP Monday to schedule follow-up if he  is not seeing improvement.  I discussed indications of when to return to the emergency department.  The patient expressed understanding and was comfortable with this plan and with discharge.     I have reviewed the nursing notes.    I have reviewed the findings, diagnosis, plan and need for follow up with the patient.    Discharge Medication List as of 2/3/2018  8:38 PM          Final diagnoses:   Upper respiratory tract infection, unspecified type   Moderate persistent asthma with exacerbation       This document serves as a record of services personally performed by Marcia Fair PA. It was created on their behalf by Wild Salazar, a trained medical scribe. The creation of this record is based on the provider's personal observations and the statements of the patient. This document has been checked and approved by the attending provider.      Note: Chart documentation done in part with Dragon Voice Recognition software. Although reviewed after completion, some word and grammatical errors may remain.      2/3/2018   [facility] EMERGENCY DEPARTMENT     Marcia Fair PA-C  02/03/18 [id number]

## 2018-02-04 NOTE — DISCHARGE INSTRUCTIONS
Please use your home nebulizer treatments as needed for wheezing or increased cough. Rest and drink plenty of fluids to stay hydrated. Follow up early next week in clinic if symptoms persist. Return to the emergency department for worsening symptoms.    Thank you for choosing [facility] Emergency Department. It was a pleasure taking care of you today. If you have any questions, please call [phone].    Marcia Fair PA-C    Viral Upper Respiratory Illness (Adult)  You have a viral upper respiratory illness (URI), which is another term for the common cold. This illness is contagious during the first few days. It is spread through the air by coughing and sneezing. It may also be spread by direct contact (touching the sick person and then touching your own eyes, nose, or mouth). Frequent handwashing will decrease risk of spread. Most viral illnesses go away within 7 to 10 days with rest and simple home remedies. Sometimes the illness may last for several weeks. Antibiotics will not kill a virus, and they are generally not prescribed for this condition.    Home care    If symptoms are severe, rest at home for the first 2 to 3 days. When you resume activity, don't let yourself get too tired.    Avoid being exposed to cigarette smoke (yours or others ).    You may use acetaminophen or ibuprofen to control pain and fever, unless another medicine was prescribed. (Note: If you have chronic liver or kidney disease, have ever had a stomach ulcer or gastrointestinal bleeding, or are taking blood-thinning medicines, talk with your healthcare provider before using these medicines.) Aspirin should never be given to anyone under 18 years of age who is ill with a viral infection or fever. It may cause severe liver or brain damage.    Your appetite may be poor, so a light diet is fine. Avoid dehydration by drinking 6 to 8 glasses of fluids per day (water, soft drinks, juices, tea, or soup). Extra fluids will help  loosen secretions in the nose and lungs.    Over-the-counter cold medicines will not shorten the length of time you re sick, but they may be helpful for the following symptoms: cough, sore throat, and nasal and sinus congestion. (Note: Do not use decongestants if you have high blood pressure.)  Follow-up care  Follow up with your healthcare provider, or as advised.  When to seek medical advice  Call your healthcare provider right away if any of these occur:    Cough with lots of colored sputum (mucus)    Severe headache; face, neck, or ear pain    Difficulty swallowing due to throat pain    Fever of 100.4 F (38 C)  Call 911, or get immediate medical care  Call emergency services right away if any of these occur:    Chest pain, shortness of breath, wheezing, or difficulty breathing    Coughing up blood    Inability to swallow due to throat pain

## 2018-02-04 NOTE — ED NOTES
Pt reports cough for past week and has hx of chronic bronchitis from mold, wants to make sure not pneumonia

## 2018-02-05 ENCOUNTER — CARE COORDINATION (OUTPATIENT)
Dept: CARE COORDINATION | Facility: CLINIC | Age: 53
End: 2018-02-05

## 2018-02-05 NOTE — LETTER
[facility] CARE COORDINATION  [address]   [address]  [phone]   February 5, 2018      Joselito Abarca  [address]   [place] [address]      Dear Joselito,    I am a clinic care coordinator who works with Gregory G. Schoen, MD at [facility].  I wanted to introduce myself and provide you with my contact information so that you can call me with questions or concerns about your health care. Below is a description of clinic care coordination and how I can further assist you.     The clinic care coordinator is a registered nurse and/or  who understand the health care system. The goal of clinic care coordination is to help you manage your health and improve access to the [facility] system in the most efficient manner. The registered nurse can assist you in meeting your health care goals by providing education, coordinating services, and strengthening the communication among your providers. The  can assist you with financial, behavioral, psychosocial, chemical dependency, counseling, and/or psychiatric resources.    Please feel free to contact me at [phone], with any questions or concerns. We at [facility] are focused on providing you with the highest-quality healthcare experience possible and that all starts with you.     Sincerely,     Melissa Behl BSN, RN, PHN  [facility] Care Coordinator  [phone]       Enclosed: I have enclosed a copy of a 24 Hour Access Plan. This has helpful phone numbers for you to call when needed. Please keep this in an easy to access place to use as needed.

## 2018-02-05 NOTE — PROGRESS NOTES
Clinic Care Coordination Contact  [facility]/Voicemail    Referral Source: ED Follow-Up  Clinical Data: Care Coordinator Outreach  Outreach attempted x 1.  Left message on voicemail with call back information and requested return call.  Plan: Care Coordinator will mail out care coordination introduction letter with care coordinator contact information and explanation of care coordination services. Care Coordinator will try to reach patient again in 1-2 business days.    Melissa Behl BSN, RN, PHN  [facility] Care Coordinator  [phone]

## 2018-02-05 NOTE — LETTER
Health Care Home - Access Care Plan    About Me  Patient Name:  Joselito Matthews    YOB: 1965  Age:                             52 year old   Shayne MRN:            [id number] Telephone Information:     Home Phone [phone]   Mobile [phone]       Address:    [address]  [place] [address] Email address:  No e-mail address on record      Emergency Contact(s)  Name Relationship Lgl Grd Work Phone Home Phone Mobile Phone   1. HARJIT MATTHEWS Brother   [phone]    2. CARLOS A MATTHEWS Father   [phone]             Health Maintenance: Routine Health maintenance Reviewed: Due/Overdue     My Access Plan  Medical Emergency 911   Questions or concerns during clinic hours Primary Clinic Line, I will call the clinic directly: Primary Clinic: [facility] [phone]   24 Hour Appointment Line [phone] or  [phone]) (toll free)   24 Hour Nurse Line [phone] (toll free)   Questions or concerns outside clinic hours 24 Hour Appointment Line, I will call the after-hours on-call line:   [facility] [phone] or [phone]) (toll-free)   Preferred Urgent Care Preferred Urgent Care: [facility], [phone]   Preferred Hospital Preferred Hospital: [facility]  [phone]   Preferred Pharmacy [facility]  [place] - [address] [address]      Behavioral Health Crisis Line The National Suicide Prevention Lifeline at 1-551.694.8965 or 918     My Care Team Members  Patient Care Team       Relationship Specialty Notifications Start End    Schoen, Gregory G, MD PCP - General Family Practice  3/9/15     Phone: [phone] Fax: [phone]         [address] [place] [address]    Behl, Melissa K, RN Clinic Care Coordinator Nurse Admissions 2/5/18     Comment:  Phone [phone]        My Medical and Care  Information  Problem List   Patient Active Problem List   Diagnosis     Generalized anxiety disorder     Alcohol abuse, in remission     Esophageal reflux     Constipation     Thoracic or lumbosacral neuritis or radiculitis, unspecified     Intervertebral cervical disc disorder with myelopathy, cervical region     CARDIOVASCULAR SCREENING; LDL GOAL LESS THAN 160     Arthrodesis status     Neck pain     Health Care Home     Degeneration of cervical intervertebral disc     Chronic rhinitis     Disease of bronchial airway (HCC)     Leukocytosis     Cough     Nausea with vomiting     Acute respiratory failure with hypoxia (H)     Moderate persistent asthma without complication     Chronic pain syndrome     Status post cervical spinal arthrodesis      Current Medications and Allergies:  See printed Medication Report

## 2018-02-06 DIAGNOSIS — F41.1 GENERALIZED ANXIETY DISORDER: ICD-10-CM

## 2018-02-06 NOTE — PROGRESS NOTES
Clinic Care Coordination Contact  [facility]/Voicemail    Referral Source: ED Follow-Up  Clinical Data: Care Coordinator Outreach  Outreach attempted x 1.  Left message on voicemail with call back information and requested return call.  Plan: Care Coordinator mailed out care coordination introduction letter on 2/5/18. Care Coordinator will do no further outreaches at this time.    Melissa Behl BSN, RN, N  [facility] Care Coordinator  [phone]

## 2018-02-07 ENCOUNTER — TRANSFERRED RECORDS (OUTPATIENT)
Dept: HEALTH INFORMATION MANAGEMENT | Facility: CLINIC | Age: 53
End: 2018-02-07

## 2018-02-07 RX ORDER — CLONAZEPAM 0.5 MG/1
0.25-0.5 TABLET ORAL 3 TIMES DAILY PRN
Qty: 90 TABLET | Refills: 0 | Status: SHIPPED | OUTPATIENT
Start: 2018-02-07 | End: 2018-03-10

## 2018-02-07 NOTE — TELEPHONE ENCOUNTER
Clonazepam 0.5 MG      Last Written Prescription Date:  1/11/18  Last Fill Quantity: 90,   # refills: 0  Last Office Visit: 12/22/17  Future Office visit:       Routing refill request to provider for review/approval because:  Drug not on the [facility], P or [facility] refill protocol or controlled substance

## 2018-02-09 ENCOUNTER — TELEPHONE (OUTPATIENT)
Dept: NEUROSURGERY | Facility: CLINIC | Age: 53
End: 2018-02-09

## 2018-02-09 DIAGNOSIS — M79.671 RIGHT FOOT PAIN: Primary | ICD-10-CM

## 2018-02-09 NOTE — TELEPHONE ENCOUNTER
"Spoke with patient regarding RLE EMG results. EMG within normal limits. Patient states he continues to have right foot pain and feeling like \"it's crooked\". He has seen his PCP in regards for this as well. Recommend referral to orthopedics for further evaluation. Referral placed. Patient voiced understanding and agreement.  "

## 2018-02-15 ENCOUNTER — OFFICE VISIT (OUTPATIENT)
Dept: PODIATRY | Facility: CLINIC | Age: 53
End: 2018-02-15
Payer: COMMERCIAL

## 2018-02-15 ENCOUNTER — RADIANT APPOINTMENT (OUTPATIENT)
Dept: GENERAL RADIOLOGY | Facility: CLINIC | Age: 53
End: 2018-02-15
Attending: PODIATRIST
Payer: COMMERCIAL

## 2018-02-15 VITALS — HEIGHT: 67 IN | BODY MASS INDEX: 27.15 KG/M2 | WEIGHT: 173 LBS | TEMPERATURE: 97.2 F

## 2018-02-15 DIAGNOSIS — Q66.6 PES VALGUS OF RIGHT FOOT: ICD-10-CM

## 2018-02-15 DIAGNOSIS — M79.671 RIGHT FOOT PAIN: ICD-10-CM

## 2018-02-15 DIAGNOSIS — M50.00 INTERVERTEBRAL CERVICAL DISC DISORDER WITH MYELOPATHY, CERVICAL REGION: ICD-10-CM

## 2018-02-15 DIAGNOSIS — Z98.1 STATUS POST CERVICAL SPINAL ARTHRODESIS: ICD-10-CM

## 2018-02-15 DIAGNOSIS — M21.371 FOOT DROP, RIGHT: Primary | ICD-10-CM

## 2018-02-15 PROCEDURE — 73630 X-RAY EXAM OF FOOT: CPT | Mod: TC

## 2018-02-15 PROCEDURE — 99243 OFF/OP CNSLTJ NEW/EST LOW 30: CPT | Performed by: PODIATRIST

## 2018-02-15 ASSESSMENT — PAIN SCALES - GENERAL: PAINLEVEL: NO PAIN (0)

## 2018-02-15 NOTE — NURSING NOTE
"Chief Complaint   Patient presents with     Consult     lateral Right foot pain, onset Nov 29,2017; new pt       Initial Temp 97.2  F (36.2  C) (Temporal)  Ht 5' 7\" (1.702 m)  Wt 173 lb (78.5 kg)  BMI 27.1 kg/m2 Estimated body mass index is 27.1 kg/(m^2) as calculated from the following:    Height as of this encounter: 5' 7\" (1.702 m).    Weight as of this encounter: 173 lb (78.5 kg).  BP completed using cuff size: NA (Not Taken)  Medication Reconciliation: complete    Eloise Martinez CMA, February 15, 2018  "

## 2018-02-15 NOTE — PATIENT INSTRUCTIONS
Reliable shoe stores: To maximize your experience and provide the best possible fit.  Be sure to show them your foot concerns and tell them Dr. Asher sent you.      Stores listed in bold have only athletic shoes, and stores that are not bold are mostly casual or variety of shoes    [employer]  [address] [place] [address]  [phone]    [employer] [place]  [address] [place] [address]  [phone]     [employer]  [address] [place] [address]  [phone]    [employer]  [address] [facility]  [place] [address]  [phone]    [employer]  [address] [place], [place] [address] [phone]    [employer]  [address] [place] [address]  [phone]                         [employer] Locations:     [address] [place] [address]   [phone]     [address] [place] [address] [address] [place] [address]   [phone]     [address] [place] [address]   [phone]     [address] [facility] [place] [address]   [phone]     [address] [place] [address]   [phone]     [address] [place] [address]   [phone]     [address]   [place] [address]   [phone]     [address] [facility]   [place] [address]   [phone]             How to find reasonable shoes          The correct width    Correct Fitting    Correct Length      Foot Distortion    Posture Distortion                          Torsional Rigidity      Grasp behind the heel and underneath the foot and twist      Bad    Excessive torsion/twist in midfoot     Less torsion/twist in midfoot is better                   Heel Counter Rigidity      Grasp just above   midsole and squeeze      Bad    Soft heel counter      Good    Rigid Heel Counter      Flexion Rigidity      Grasp shoe and bend from forefoot to rearfoot
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
## 2018-02-15 NOTE — MR AVS SNAPSHOT
After Visit Summary   2/15/2018    Joselito Abarca    MRN: [id number]           Patient Information     Date Of Birth          1965        Visit Information        Provider Department      2/15/2018 3:00 PM Manuel Asher, [facility]'s Diagnoses     Right foot pain    -  1    Foot drop, right          Care Instructions    Reliable shoe stores: To maximize your experience and provide the best possible fit.  Be sure to show them your foot concerns and tell them Dr. Asher sent you.      Stores listed in bold have only athletic shoes, and stores that are not bold are mostly casual or variety of shoes    [employer]  [address] [place] [address]  [phone]    [employer]  [address] [place] [address]  [phone]    [employer]  [address] [place] [address]  [phone]    [employer]  [address] [facility]  [facility] [address]  [phone]    [employer]  [address] [place] [address]  [phone]    [employer]  [address] [place] [address]  [phone]                         [employer] Locations:     [address] [place] [address]   [phone]     [address] [place] [address]   [phone]     [address] [place] [address]   [phone]     [address] [place] [address]   [phone]     [address] [place] [address] [place] [address]   [phone]     [address] [place] [address]   [phone]     [address] [facility] [address]   [place] [address]   [phone]     [address]   [place], [place] [id number] [phone]             How to find reasonable shoes          The correct width    Correct Fitting    Correct Length      Foot Distortion    Posture Distortion                          Torsional Rigidity      Grasp behind the heel and underneath the foot  "and twist      Bad    Excessive torsion/twist in midfoot     Less torsion/twist in midfoot is better                   Heel Counter Rigidity      Grasp just above   midsole and squeeze      Bad    Soft heel counter      Good    Rigid Heel Counter      Flexion Rigidity      Grasp shoe and bend from forefoot to rearfoot                        Follow-ups after your visit        Additional Services     ORTHOTICS REFERRAL       [facility] scheduling staff may contact patient to arrange appointments for casting of orthotics and often do not leave messages.  The patient may call this number for scheduling at their convenience. Scheduling Phone [phone].      AFO on right to provide stability, mild drop foot right side after multiple cervical fusions and radiculopathy. May be hinged but sturdy.  If he refuses the AFO may consider orthotic but this will not stabilize as much                  Who to contact     If you have questions or need follow up information about today's clinic visit or your schedule please contact [facility] directly at [phone].  Normal or non-critical lab and imaging results will be communicated to you by Omadahart, letter or phone within 4 business days after the clinic has received the results. If you do not hear from us within 7 days, please contact the clinic through Omadahart or phone. If you have a critical or abnormal lab result, we will notify you by phone as soon as possible.  Submit refill requests through CoNarrative or call your pharmacy and they will forward the refill request to us. Please allow 3 business days for your refill to be completed.          Additional Information About Your Visit        CoNarrative Information     CoNarrative lets you send messages to your doctor, view your test results, renew your prescriptions, schedule appointments and more. To sign up, go to [email or web address] . Click on \"Log in\" on the left side of the screen, which will take you to the " "Welcome page. Then click on \"Sign up Now\" on the right side of the page.     You will be asked to enter the access code listed below, as well as some personal information. Please follow the directions to create your username and password.     Your access code is: [email or web address]  Expires: 3/22/2018  4:35 PM     Your access code will  in 90 days. If you need help or a new code, please call your [facility] or [phone].        Care EveryWhere ID     This is your Care EveryWhere ID. This could be used by other organizations to access your [place] medical records  [id number]        Your Vitals Were     Temperature Height BMI (Body Mass Index)             97.2  F (36.2  C) (Temporal) 5' 7\" (1.702 m) 27.1 kg/m2          Blood Pressure from Last 3 Encounters:   18 109/84   17 130/76   17 131/66    Weight from Last 3 Encounters:   02/15/18 173 lb (78.5 kg)   18 181 lb 12.8 oz (82.5 kg)   17 181 lb 9.6 oz (82.4 kg)              We Performed the Following     ORTHOTICS REFERRAL        Primary Care Provider Office Phone # Fax #    Gregory G Schoen, [phone]        [facility] [address] [place] [place] [phone]        Goals        General    I will call to schedule an appointment  if I develop new or worsening symptoms. Started 16. (pt-stated)     Notes - Note created  2016  3:32 PM by Behl, Melissa K, RN    As of today's date 2016 goal is met at 0 - 25%.   Goal Status:  Active  Melissa Behl BSN, RN, PHN  [facility] Care Coordinator  [phone]        I will use my nebulizers and inhalers as prescribed. Started 16 (pt-stated)     Notes - Note edited  5/10/2016  8:58 AM by Behl, Melissa K, RN    As of today's date 5/10/2016 goal is met at 51 - 75%.   Goal Status:  Active  Melissa Behl BSN, RN, N  [facility] Care Coordinator  [phone]          Equal Access to Services     DADA DUARTE : damari Newman qaybta " omayra zhengin hayaan adeeg kharash la'aan ah. [facility] [phone].    ATENCIÓN: Si robert ulloa, tiene a alfaro disposición servicios gratuitos de asistencia lingüística. Bola al [phone].    We comply with applicable federal civil rights laws and [place] laws. We do not discriminate on the basis of race, color, national origin, age, disability, sex, sexual orientation, or gender identity.            Thank you!     Thank you for choosing [facility]  for your care. Our goal is always to provide you with excellent care. Hearing back from our patients is one way we can continue to improve our services. Please take a few minutes to complete the written survey that you may receive in the mail after your visit with us. Thank you!             Your Updated Medication List - Protect others around you: Learn how to safely use, store and throw away your medicines at www.disposemymeds.org.          This list is accurate as of 2/15/18  3:38 PM.  Always use your most recent med list.                   Brand Name Dispense Instructions for use Diagnosis    * albuterol (2.5 MG/3ML) 0.083% neb solution     90 mL    NEBULIZE CONTENTS OF ONE VIAL EVERY 4 HOURS AS NEEDED FOR SHORTNESS OF BREATH /DYSPNEA OR WHEEZING    Mild intermittent asthma with acute exacerbation       * VENTOLIN  (90 BASE) MCG/ACT Inhaler   Generic drug:  albuterol     18 g    INHALE 2 PUFFS INTO THE LUNGS EVERY 6 HOURS AS NEEDED FOR SHORTNESS OF BREATH, DIFFICULTY BREATHING OR WHEEZING.    Mild intermittent asthma with acute exacerbation       citalopram 20 MG tablet    celeXA    90 tablet    Take 1 tablet (20 mg) by mouth daily        clonazePAM 0.5 MG tablet    klonoPIN    90 tablet    Take 0.5-1 tablets (0.25-0.5 mg) by mouth 3 times daily as needed for anxiety    Generalized anxiety disorder       FLOVENT  MCG/ACT Inhaler   Generic drug:  fluticasone     36 g    INHALE 2 PUFFS INTO THE LUNGS TWICE DAILY    ILD  (interstitial lung disease) (H)       fluticasone 50 MCG/ACT spray    FLONASE    16 g    USE TWO SPRAYS IN EACH NOSTRIL EVERY DAY    Chronic rhinitis       gabapentin 300 MG capsule    NEURONTIN    270 capsule    TAKE TWO CAPSULES BY MOUTH THREE TIMES A DAY    Intervertebral cervical disc disorder with myelopathy, cervical region, Degeneration of cervical intervertebral disc       ipratropium - albuterol 0.5 mg/2.5 mg/3 mL 0.5-2.5 (3) MG/3ML neb solution    DUONEB    30 vial    Take 1 vial (3 mLs) by nebulization every 4 hours as needed for shortness of breath / dyspnea        methadone 10 MG tablet    DOLOPHINE    90 tablet    Take 1 tablet (10 mg) by mouth every 8 hours as needed    DDD (degenerative disc disease), cervical       omeprazole 20 MG CR capsule    priLOSEC    30 capsule    TAKE 1 CAPSULE BY MOUTH DAILY, 30 TO 60 MINUTES BEFORE A MEAL.    Esophageal reflux       oxyCODONE 5 MG capsule    OXY-IR    360 capsule    Take 2 capsules (10 mg) by mouth every 4 hours as needed 2 caps q 4 hour prn pain up to 12 per day May fill 5/24/2012    Intervertebral cervical disc disorder with myelopathy, cervical region       ranitidine 150 MG tablet    ZANTAC    30 tablet    TAKE ONE TABLET BY MOUTH EVERY MORNING    Esophageal reflux       sildenafil 100 MG tablet    VIAGRA    4 tablet    Take 1 tablet (100 mg) by mouth daily as needed 30 min to 4 hrs before sex. Do not use with nitroglycerin, terazosin or doxazosin.    Erectile dysfunction, unspecified erectile dysfunction type       traZODone 100 MG tablet    DESYREL    90 tablet    Take 3 tablets (300 mg) by mouth At Bedtime    Panic attack       vitamin D 2000 UNITS tablet     100 tablet    TAKE ONE TABLET BY MOUTH EVERY DAY    Degeneration of cervical intervertebral disc       zolpidem 10 MG tablet    AMBIEN     Take 10 mg by mouth nightly as needed        * Notice:  This list has 2 medication(s) that are the same as other medications prescribed for you. Read the  directions carefully, and ask your doctor or other care provider to review them with you.

## 2018-02-25 DIAGNOSIS — M50.00 INTERVERTEBRAL CERVICAL DISC DISORDER WITH MYELOPATHY, CERVICAL REGION: ICD-10-CM

## 2018-02-26 NOTE — TELEPHONE ENCOUNTER
Last Written Prescription Date:  1/24/18  Last Fill Quantity: 360,  # refills: 0   Last office visit: 12/22/2017 with prescribing provider:  11/17/17   Future Office Visit:

## 2018-02-27 RX ORDER — OXYCODONE HYDROCHLORIDE 5 MG/1
10 CAPSULE ORAL EVERY 4 HOURS PRN
Qty: 360 CAPSULE | Refills: 0 | Status: SHIPPED | OUTPATIENT
Start: 2018-02-27 | End: 2018-03-21

## 2018-02-28 DIAGNOSIS — M50.30 DDD (DEGENERATIVE DISC DISEASE), CERVICAL: ICD-10-CM

## 2018-03-01 DIAGNOSIS — K21.9 ESOPHAGEAL REFLUX: ICD-10-CM

## 2018-03-02 NOTE — TELEPHONE ENCOUNTER
Routing refill request to provider for review/approval because:  Drug not on the [facility] refill protocol     Ailyn Tobias RN

## 2018-03-02 NOTE — TELEPHONE ENCOUNTER
Methadone 10mg      Last Written Prescription Date:  1/24/18  Last Fill Quantity: 90,   # refills: 0  Last Office Visit: 12/22/17  Future Office visit:       Routing refill request to provider for review/approval because:  Drug not on the [facility], [facility] or [facility] refill protocol or controlled substance

## 2018-03-02 NOTE — TELEPHONE ENCOUNTER
Prescription approved per [facility] Refill Protocol.  Januray Ross RN

## 2018-03-07 RX ORDER — METHADONE HYDROCHLORIDE 10 MG/1
10 TABLET ORAL EVERY 8 HOURS PRN
Qty: 90 TABLET | Refills: 0 | Status: SHIPPED | OUTPATIENT
Start: 2018-03-07 | End: 2018-04-05

## 2018-03-10 DIAGNOSIS — F41.1 GENERALIZED ANXIETY DISORDER: ICD-10-CM

## 2018-03-12 RX ORDER — CLONAZEPAM 0.5 MG/1
0.25-0.5 TABLET ORAL 3 TIMES DAILY PRN
Qty: 90 TABLET | Refills: 0 | Status: SHIPPED | OUTPATIENT
Start: 2018-03-12 | End: 2018-04-05

## 2018-03-12 NOTE — TELEPHONE ENCOUNTER
Clonazepam 0.5 MG       Last Written Prescription Date:  2/7/18  Last Fill Quantity: 90,   # refills: 0  Last Office Visit: 12/22/17  Future Office visit:    Next 5 appointments (look out 90 days)     Mar 21, 2018  4:30 PM CDT   [facility]    [address] [place] [address]   [phone]                   Routing refill request to provider for review/approval because:  Drug not on the [facility], [facility] or [facility] refill protocol or controlled substance

## 2018-03-13 ENCOUNTER — TELEPHONE (OUTPATIENT)
Dept: NEUROSURGERY | Facility: CLINIC | Age: 53
End: 2018-03-13

## 2018-03-13 NOTE — TELEPHONE ENCOUNTER
Contacted patient for post op follow up. He is s/p C6-7 ACDF with removal of hardware at C4-6 on 11/29/17.     LVM with patient, advising to call back so we can schedule post op f/u appt with Dr. Vasques. Awaiting call back.

## 2018-03-21 ENCOUNTER — OFFICE VISIT (OUTPATIENT)
Dept: FAMILY MEDICINE | Facility: CLINIC | Age: 53
End: 2018-03-21
Payer: COMMERCIAL

## 2018-03-21 VITALS
TEMPERATURE: 97.9 F | BODY MASS INDEX: 27.47 KG/M2 | OXYGEN SATURATION: 99 % | WEIGHT: 175 LBS | DIASTOLIC BLOOD PRESSURE: 62 MMHG | SYSTOLIC BLOOD PRESSURE: 104 MMHG | HEART RATE: 90 BPM | HEIGHT: 67 IN

## 2018-03-21 DIAGNOSIS — J45.40 MODERATE PERSISTENT ASTHMA WITHOUT COMPLICATION: ICD-10-CM

## 2018-03-21 DIAGNOSIS — M50.00 INTERVERTEBRAL CERVICAL DISC DISORDER WITH MYELOPATHY, CERVICAL REGION: ICD-10-CM

## 2018-03-21 DIAGNOSIS — M51.369 DDD (DEGENERATIVE DISC DISEASE), LUMBAR: Primary | ICD-10-CM

## 2018-03-21 PROCEDURE — 99213 OFFICE O/P EST LOW 20 MIN: CPT | Performed by: FAMILY MEDICINE

## 2018-03-21 RX ORDER — OXYCODONE HYDROCHLORIDE 5 MG/1
10 CAPSULE ORAL EVERY 4 HOURS PRN
Qty: 360 CAPSULE | Refills: 0 | Status: SHIPPED | OUTPATIENT
Start: 2018-03-21 | End: 2018-04-30

## 2018-03-21 NOTE — MR AVS SNAPSHOT
"              After Visit Summary   3/21/2018    Joselito Abarca    MRN: [id number]           Patient Information     Date Of Birth          1965        Visit Information        Provider Department      3/21/2018 4:30 PM Schoen, Gregory G, MD [facility]         Follow-ups after your visit        Who to contact     If you have questions or need follow up information about today's clinic visit or your schedule please contact [facility] directly at [phone].  Normal or non-critical lab and imaging results will be communicated to you by MyChart, letter or phone within 4 business days after the clinic has received the results. If you do not hear from us within 7 days, please contact the clinic through MyChart or phone. If you have a critical or abnormal lab result, we will notify you by phone as soon as possible.  Submit refill requests through daysoft or call your pharmacy and they will forward the refill request to us. Please allow 3 business days for your refill to be completed.          Additional Information About Your Visit        [facility] Information     daysoft lets you send messages to your doctor, view your test results, renew your prescriptions, schedule appointments and more. To sign up, go to [email or web address] . Click on \"Log in\" on the left side of the screen, which will take you to the Welcome page. Then click on \"Sign up Now\" on the right side of the page.     You will be asked to enter the access code listed below, as well as some personal information. Please follow the directions to create your username and password.     Your access code is: [email or web address]  Expires: 3/22/2018  5:35 PM     Your access code will  in 90 days. If you need help or a new code, please call your [facility] or [phone].        Care EveryWhere ID     This is your Care EveryWhere ID. This could be used by other organizations to access your [facility] medical " "records  [id number]        Your Vitals Were     Pulse Temperature Height Pulse Oximetry BMI (Body Mass Index)       90 97.9  F (36.6  C) (Temporal) 5' 7\" (1.702 m) 99% 27.41 kg/m2        Blood Pressure from Last 3 Encounters:   03/21/18 104/62   02/03/18 109/84   12/22/17 130/76    Weight from Last 3 Encounters:   03/21/18 175 lb (79.4 kg)   02/15/18 173 lb (78.5 kg)   01/11/18 181 lb 12.8 oz (82.5 kg)              Today, you had the following     No orders found for display       Primary Care Provider Office Phone # Fax #    Gregory G Schoen, [phone] [address] [place] [phone]        Goals        General    I will call to schedule an appointment  if I develop new or worsening symptoms. Started 4/26/16. (pt-stated)     Notes - Note created  4/26/2016  3:32 PM by Behl, Melissa K, RN    As of today's date 4/26/2016 goal is met at 0 - 25%.   Goal Status:  Active  Melissa Behl BSN, RN, [facility] Care Coordinator  [phone]        I will use my nebulizers and inhalers as prescribed. Started 4/26/16 (pt-stated)     Notes - Note edited  5/10/2016  8:58 AM by Behl, Melissa K, RN    As of today's date 5/10/2016 goal is met at 51 - 75%.   Goal Status:  Active  Melissa Behl BSN, RN, N  [facility] Care Coordinator  [phone]          Equal Access to Services     [facility] : Hadii maddy redman hadashthalia Soomaali, waaxda luqadaha, qaybta kaalmada omayra larios. So [facility] [phone].    ATENCIÓN: Si habla español, tiene a alfaro disposición servicios gratuitos de asistencia lingüística. Llame al [phone].    We comply with applicable federal civil rights laws and [place] laws. We do not discriminate on the basis of race, color, national origin, age, disability, sex, sexual orientation, or gender identity.            Thank you!     Thank you for choosing [facility]  for your care. Our goal is always to provide you with " excellent care. Hearing back from our patients is one way we can continue to improve our services. Please take a few minutes to complete the written survey that you may receive in the mail after your visit with us. Thank you!             Your Updated Medication List - Protect others around you: Learn how to safely use, store and throw away your medicines at www.disposemymeds.org.          This list is accurate as of 3/21/18  4:58 PM.  Always use your most recent med list.                   Brand Name Dispense Instructions for use Diagnosis    * albuterol (2.5 MG/3ML) 0.083% neb solution     90 mL    NEBULIZE CONTENTS OF ONE VIAL EVERY 4 HOURS AS NEEDED FOR SHORTNESS OF BREATH /DYSPNEA OR WHEEZING    Mild intermittent asthma with acute exacerbation       * VENTOLIN  (90 BASE) MCG/ACT Inhaler   Generic drug:  albuterol     18 g    INHALE 2 PUFFS INTO THE LUNGS EVERY 6 HOURS AS NEEDED FOR SHORTNESS OF BREATH, DIFFICULTY BREATHING OR WHEEZING.    Mild intermittent asthma with acute exacerbation       citalopram 20 MG tablet    celeXA    90 tablet    Take 1 tablet (20 mg) by mouth daily        clonazePAM 0.5 MG tablet    klonoPIN    90 tablet    Take 0.5-1 tablets (0.25-0.5 mg) by mouth 3 times daily as needed for anxiety    Generalized anxiety disorder       FLOVENT  MCG/ACT Inhaler   Generic drug:  fluticasone     36 g    INHALE 2 PUFFS INTO THE LUNGS TWICE DAILY    ILD (interstitial lung disease) (H)       fluticasone 50 MCG/ACT spray    FLONASE    16 g    USE TWO SPRAYS IN EACH NOSTRIL EVERY DAY    Chronic rhinitis       gabapentin 300 MG capsule    NEURONTIN    270 capsule    TAKE TWO CAPSULES BY MOUTH THREE TIMES A DAY    Intervertebral cervical disc disorder with myelopathy, cervical region, Degeneration of cervical intervertebral disc       ipratropium - albuterol 0.5 mg/2.5 mg/3 mL 0.5-2.5 (3) MG/3ML neb solution    DUONEB    30 vial    Take 1 vial (3 mLs) by nebulization every 4 hours as needed  for shortness of breath / dyspnea        methadone 10 MG tablet    DOLOPHINE    90 tablet    Take 1 tablet (10 mg) by mouth every 8 hours as needed    DDD (degenerative disc disease), cervical       omeprazole 20 MG CR capsule    priLOSEC    30 capsule    TAKE 1 CAPSULE BY MOUTH DAILY, 30 TO 60 MINUTES BEFORE A MEAL.    Esophageal reflux       oxyCODONE 5 MG capsule    OXY-IR    360 capsule    Take 2 capsules (10 mg) by mouth every 4 hours as needed 2 caps q 4 hour prn pain up to 12 per day May fill 5/24/2012    Intervertebral cervical disc disorder with myelopathy, cervical region       ranitidine 150 MG tablet    ZANTAC    30 tablet    TAKE ONE TABLET BY MOUTH EVERY MORNING    Esophageal reflux       sildenafil 100 MG tablet    VIAGRA    4 tablet    Take 1 tablet (100 mg) by mouth daily as needed 30 min to 4 hrs before sex. Do not use with nitroglycerin, terazosin or doxazosin.    Erectile dysfunction, unspecified erectile dysfunction type       traZODone 100 MG tablet    DESYREL    90 tablet    Take 3 tablets (300 mg) by mouth At Bedtime    Panic attack       vitamin D 2000 UNITS tablet     100 tablet    TAKE ONE TABLET BY MOUTH EVERY DAY    Degeneration of cervical intervertebral disc       zolpidem 10 MG tablet    AMBIEN     Take 10 mg by mouth nightly as needed        * Notice:  This list has 2 medication(s) that are the same as other medications prescribed for you. Read the directions carefully, and ask your doctor or other care provider to review them with you.

## 2018-03-21 NOTE — NURSING NOTE
"Chief Complaint   Patient presents with     Back Pain       Initial /62  Pulse 90  Temp 97.9  F (36.6  C) (Temporal)  Ht 5' 7\" (1.702 m)  Wt 175 lb (79.4 kg)  SpO2 99%  BMI 27.41 kg/m2 Estimated body mass index is 27.41 kg/(m^2) as calculated from the following:    Height as of this encounter: 5' 7\" (1.702 m).    Weight as of this encounter: 175 lb (79.4 kg).  Medication Reconciliation: complete  Crystal Putnam CMA      "

## 2018-03-21 NOTE — PROGRESS NOTES
SUBJECTIVE:   Joselito Abarca is a 52 year old male who presents to clinic today for the following health issues:      Back Pain       Duration: 5 months        Specific cause: none    Description:   Location of pain: low back right  Character of pain: constant  Pain radiation:radiates into the right foot  New numbness or weakness in legs, not attributed to pain:  Weakness of the right foot    Intensity: moderate    History:   Pain interferes with job: Not applicable  History of back problems: no prior back problems  Any previous MRI or X-rays: None  Sees a specialist for back pain:  No  Therapies tried without relief: steroid injection    Alleviating factors:   Improved by: none      Precipitating factors:  Worsened by: Nothing    Functional and Psychosocial Screen (Tristen STarT Back):      Not performed today    Continues to have low back pain and weakness/inversion of the right foot.  He does feel that the foot issue is related to a lumbar disc issue.  He had an EMG of the lower extremity but states that he only was tested below the knee and that might not detect back issues.  He has a history of low back issues and several years ago did have injections and PT and chiropractic care and eventually the back improved and has been good for several years now. He did see Dr. Asher who didn't find any specific issues in the foot itself and suggested options and recommended an AFO to support his foot/ankle to reduce pain, risk of falling. However, he states that he had to miss that appointment due to other issues but remains interested in rescheduling but says he didn't know how to go about that.     Cervical spine doesn't seem to be too much better after his AFDC and he still has focal pain, headaches.  This is in contrast to his follow up postsurgical reports of being improved on his office visit notes.  We previously discussed an expectation/hope that we could begin tapering pain meds as he healed from his neck  surgery, having been on these meds for many years prior to becoming my patient due to his provider retiring.      He otherwise states that his breathing has been fine this winter with his current inhaler program and no acute exacerbations. He did go into the ED in early February with a cough and after evaluation no steroids or antibiotics were indicated.     Current Outpatient Prescriptions   Medication Sig Dispense Refill     oxyCODONE (OXY-IR) 5 MG capsule Take 2 capsules (10 mg) by mouth every 4 hours as needed 2 caps q 4 hour prn pain up to 12 per day May fill 5/24/2012 360 capsule 0     clonazePAM (KLONOPIN) 0.5 MG tablet Take 0.5-1 tablets (0.25-0.5 mg) by mouth 3 times daily as needed for anxiety 90 tablet 0     methadone (DOLOPHINE) 10 MG tablet Take 1 tablet (10 mg) by mouth every 8 hours as needed 90 tablet 0     omeprazole (PRILOSEC) 20 MG CR capsule TAKE 1 CAPSULE BY MOUTH DAILY, 30 TO 60 MINUTES BEFORE A MEAL. 30 capsule 1     sildenafil (VIAGRA) 100 MG tablet Take 1 tablet (100 mg) by mouth daily as needed 30 min to 4 hrs before sex. Do not use with nitroglycerin, terazosin or doxazosin. 4 tablet 0     citalopram (CELEXA) 20 MG tablet Take 1 tablet (20 mg) by mouth daily 90 tablet 3     VENTOLIN  (90 BASE) MCG/ACT Inhaler INHALE 2 PUFFS INTO THE LUNGS EVERY 6 HOURS AS NEEDED FOR SHORTNESS OF BREATH, DIFFICULTY BREATHING OR WHEEZING. 18 g 3     gabapentin (NEURONTIN) 300 MG capsule TAKE TWO CAPSULES BY MOUTH THREE TIMES A  capsule 11     FLOVENT  MCG/ACT Inhaler INHALE 2 PUFFS INTO THE LUNGS TWICE DAILY 36 g 1     fluticasone (FLONASE) 50 MCG/ACT spray USE TWO SPRAYS IN EACH NOSTRIL EVERY DAY 16 g 5     ranitidine (ZANTAC) 150 MG tablet TAKE ONE TABLET BY MOUTH EVERY MORNING 30 tablet 10     Cholecalciferol (VITAMIN D) 2000 UNITS tablet TAKE ONE TABLET BY MOUTH EVERY  tablet 3     ipratropium - albuterol 0.5 mg/2.5 mg/3 mL (DUONEB) 0.5-2.5 (3) MG/3ML neb solution Take 1 vial  "(3 mLs) by nebulization every 4 hours as needed for shortness of breath / dyspnea 30 vial 0     albuterol (2.5 MG/3ML) 0.083% nebulizer solution NEBULIZE CONTENTS OF ONE VIAL EVERY 4 HOURS AS NEEDED FOR SHORTNESS OF BREATH /DYSPNEA OR WHEEZING 90 mL 0     traZODone (DESYREL) 100 MG tablet Take 3 tablets (300 mg) by mouth At Bedtime 90 tablet 3     zolpidem (AMBIEN) 10 MG tablet Take 10 mg by mouth nightly as needed        OBJECTIVE:  /62  Pulse 90  Temp 97.9  F (36.6  C) (Temporal)  Ht 5' 7\" (1.702 m)  Wt 175 lb (79.4 kg)  SpO2 99%  BMI 27.41 kg/m2  Alert and oriented, in no acute distress.  He does appear a bit anxious.   His neck ROM is limited by his fusion and his surgical scars are well healed.  There is some mild, diffuse tenderness of the paracervical muscles noted.    He is able to flex forward at the waist cautiously to about 45 degrees.  His gait is antalgic/guarded and takes caution very slowly getting on and off the exam table. He has diffuse tenderness over the lumbar area bilaterally.  SI joints nontender.   SLR is negative bilaterally. Reflexes 2+/4 at knees and 1+/4 at the ankle but symmetric.  He still appears to have pronator weakness of the ankle with inversion.      ASSESSMENT:   DDD (degenerative disc disease), lumbar  Intervertebral cervical disc disorder with myelopathy, cervical region  Moderate persistent asthma without complication    PLAN:  Will refer to orthotics for AFO molding and orthotics as ordered by Dr. Asher.  Will order MRI of his lower back/lumbar region to reassess for changes since prior and formulate a plan. I am concerned that he has shifted to a new location for pain as we were discussing changing his pain medication plan.  Regardless of findings, will need to address his chronic pain meds with a pain medication consult but will assess the lumbar spine first and proceed from there.   No changes in his inhalers for he asthma, which remains clinically stable. "     Electronically signed by Greg Schoen, MD

## 2018-03-22 ENCOUNTER — TELEPHONE (OUTPATIENT)
Dept: FAMILY MEDICINE | Facility: CLINIC | Age: 53
End: 2018-03-22

## 2018-03-22 NOTE — TELEPHONE ENCOUNTER
I have attempted to contact this patient by phone with the following results: LM.      Pt no showed his orthotic appt on February 20th.  Called to inform pt to call and reschedule this and also his mri order was placed and he can call and set this up.  Piper Brooks, [facility]

## 2018-04-05 DIAGNOSIS — F41.1 GENERALIZED ANXIETY DISORDER: ICD-10-CM

## 2018-04-05 DIAGNOSIS — M50.30 DDD (DEGENERATIVE DISC DISEASE), CERVICAL: ICD-10-CM

## 2018-04-05 DIAGNOSIS — K21.9 ESOPHAGEAL REFLUX: ICD-10-CM

## 2018-04-06 NOTE — TELEPHONE ENCOUNTER
Prescription approved per [facility] Refill Protocol.    Ailyn Tobias RN

## 2018-04-06 NOTE — TELEPHONE ENCOUNTER
Clonazepam,   Last Written Prescription Date:  3/12/18  Last Fill Quantity: 90,   # refills: 0  Last Office Visit: 3/21/18  Future Office visit:       Routing refill request to provider for review/approval because:  Drug not on the [facility], [facility] or [facility] refill protocol or controlled substance      Methadone          Last Written Prescription Date:  3/7/18  Last Fill Quantity: 90,   # refills: 0  Last Office Visit: 3/21/18  Future Office visit:       Routing refill request to provider for review/approval because:  Drug not on the [facility], [facility] or [facility] refill protocol or controlled substance

## 2018-04-06 NOTE — TELEPHONE ENCOUNTER
"Requested Prescriptions   Pending Prescriptions Disp Refills     ranitidine (ZANTAC) 150 MG tablet [Pharmacy Med Name: RANITIDINE HCL 150MG TABS] 30 tablet 10     Sig: TAKE ONE TABLET BY MOUTH EVERY MORNING    H2 Blockers Protocol Passed    4/5/2018  6:14 PM       Passed - Patient is age 12 or older       Passed - Recent (12 mo) or future (30 days) visit within the authorizing provider's specialty    Patient had office visit in the last 12 months or has a visit in the next 30 days with authorizing provider or within the authorizing provider's specialty.  See \"Patient Info\" tab in inbasket, or \"Choose Columns\" in Meds & Orders section of the refill encounter.            Last Written Prescription Date:  4/26/17  Last Fill Quantity: 30,  # refills: 10   Last office visit: 3/21/2018 with prescribing provider:     Future Office Visit:      "

## 2018-04-09 ENCOUNTER — APPOINTMENT (OUTPATIENT)
Dept: GENERAL RADIOLOGY | Facility: CLINIC | Age: 53
End: 2018-04-09
Attending: EMERGENCY MEDICINE
Payer: COMMERCIAL

## 2018-04-09 ENCOUNTER — HOSPITAL ENCOUNTER (EMERGENCY)
Facility: CLINIC | Age: 53
Discharge: HOME | End: 2018-04-09
Attending: EMERGENCY MEDICINE | Admitting: EMERGENCY MEDICINE
Payer: COMMERCIAL

## 2018-04-09 ENCOUNTER — APPOINTMENT (OUTPATIENT)
Dept: CT IMAGING | Facility: CLINIC | Age: 53
End: 2018-04-09
Attending: EMERGENCY MEDICINE
Payer: COMMERCIAL

## 2018-04-09 VITALS
RESPIRATION RATE: 20 BRPM | HEART RATE: 103 BPM | DIASTOLIC BLOOD PRESSURE: 70 MMHG | TEMPERATURE: 98.2 F | WEIGHT: 170 LBS | BODY MASS INDEX: 26.63 KG/M2 | OXYGEN SATURATION: 95 % | SYSTOLIC BLOOD PRESSURE: 100 MMHG

## 2018-04-09 DIAGNOSIS — J18.9 PNEUMONIA OF BOTH LUNGS DUE TO INFECTIOUS ORGANISM, UNSPECIFIED PART OF LUNG: ICD-10-CM

## 2018-04-09 LAB
ANION GAP SERPL CALCULATED.3IONS-SCNC: 9 MMOL/L (ref 3–14)
BASOPHILS # BLD AUTO: 0 10E9/L (ref 0–0.2)
BASOPHILS NFR BLD AUTO: 0.1 %
BUN SERPL-MCNC: 9 MG/DL (ref 7–30)
CALCIUM SERPL-MCNC: 8.5 MG/DL (ref 8.5–10.1)
CHLORIDE SERPL-SCNC: 96 MMOL/L (ref 94–109)
CO2 SERPL-SCNC: 28 MMOL/L (ref 20–32)
CREAT SERPL-MCNC: 0.93 MG/DL (ref 0.66–1.25)
D DIMER PPP FEU-MCNC: 0.6 UG/ML FEU (ref 0–0.5)
DIFFERENTIAL METHOD BLD: ABNORMAL
EOSINOPHIL # BLD AUTO: 0 10E9/L (ref 0–0.7)
EOSINOPHIL NFR BLD AUTO: 0.3 %
ERYTHROCYTE [DISTWIDTH] IN BLOOD BY AUTOMATED COUNT: 13.8 % (ref 10–15)
GFR SERPL CREATININE-BSD FRML MDRD: 86 ML/MIN/1.7M2
GLUCOSE SERPL-MCNC: 120 MG/DL (ref 70–99)
HCT VFR BLD AUTO: 41.3 % (ref 40–53)
HGB BLD-MCNC: 13.4 G/DL (ref 13.3–17.7)
IMM GRANULOCYTES # BLD: 0 10E9/L (ref 0–0.4)
IMM GRANULOCYTES NFR BLD: 0.1 %
LACTATE BLD-SCNC: 1.3 MMOL/L (ref 0.7–2)
LYMPHOCYTES # BLD AUTO: 1.1 10E9/L (ref 0.8–5.3)
LYMPHOCYTES NFR BLD AUTO: 6.8 %
MCH RBC QN AUTO: 29.7 PG (ref 26.5–33)
MCHC RBC AUTO-ENTMCNC: 32.4 G/DL (ref 31.5–36.5)
MCV RBC AUTO: 92 FL (ref 78–100)
MONOCYTES # BLD AUTO: 0.8 10E9/L (ref 0–1.3)
MONOCYTES NFR BLD AUTO: 5 %
NEUTROPHILS # BLD AUTO: 13.6 10E9/L (ref 1.6–8.3)
NEUTROPHILS NFR BLD AUTO: 87.7 %
PLATELET # BLD AUTO: 174 10E9/L (ref 150–450)
POTASSIUM SERPL-SCNC: 4 MMOL/L (ref 3.4–5.3)
RBC # BLD AUTO: 4.51 10E12/L (ref 4.4–5.9)
SODIUM SERPL-SCNC: 133 MMOL/L (ref 133–144)
TROPONIN I SERPL-MCNC: <0.015 UG/L (ref 0–0.04)
WBC # BLD AUTO: 15.6 10E9/L (ref 4–11)

## 2018-04-09 PROCEDURE — 80048 BASIC METABOLIC PNL TOTAL CA: CPT | Performed by: EMERGENCY MEDICINE

## 2018-04-09 PROCEDURE — 99285 EMERGENCY DEPT VISIT HI MDM: CPT | Mod: 25 | Performed by: EMERGENCY MEDICINE

## 2018-04-09 PROCEDURE — 93010 ELECTROCARDIOGRAM REPORT: CPT | Mod: Z6 | Performed by: EMERGENCY MEDICINE

## 2018-04-09 PROCEDURE — 25000128 H RX IP 250 OP 636: Performed by: EMERGENCY MEDICINE

## 2018-04-09 PROCEDURE — 83605 ASSAY OF LACTIC ACID: CPT | Performed by: EMERGENCY MEDICINE

## 2018-04-09 PROCEDURE — 25000125 ZZHC RX 250: Performed by: EMERGENCY MEDICINE

## 2018-04-09 PROCEDURE — 84484 ASSAY OF TROPONIN QUANT: CPT | Performed by: EMERGENCY MEDICINE

## 2018-04-09 PROCEDURE — 96365 THER/PROPH/DIAG IV INF INIT: CPT | Performed by: EMERGENCY MEDICINE

## 2018-04-09 PROCEDURE — 96361 HYDRATE IV INFUSION ADD-ON: CPT | Performed by: EMERGENCY MEDICINE

## 2018-04-09 PROCEDURE — 85025 COMPLETE CBC W/AUTO DIFF WBC: CPT | Performed by: EMERGENCY MEDICINE

## 2018-04-09 PROCEDURE — 85379 FIBRIN DEGRADATION QUANT: CPT | Performed by: EMERGENCY MEDICINE

## 2018-04-09 PROCEDURE — 93005 ELECTROCARDIOGRAM TRACING: CPT | Performed by: EMERGENCY MEDICINE

## 2018-04-09 PROCEDURE — 71046 X-RAY EXAM CHEST 2 VIEWS: CPT | Mod: TC

## 2018-04-09 PROCEDURE — 71260 CT THORAX DX C+: CPT

## 2018-04-09 RX ORDER — ACETAMINOPHEN 500 MG
1000 TABLET ORAL ONCE
Status: DISCONTINUED | OUTPATIENT
Start: 2018-04-09 | End: 2018-04-10 | Stop reason: HOSPADM

## 2018-04-09 RX ORDER — LEVOFLOXACIN 750 MG/1
750 TABLET, FILM COATED ORAL DAILY
Qty: 10 TABLET | Refills: 0 | Status: SHIPPED | OUTPATIENT
Start: 2018-04-09 | End: 2019-02-15

## 2018-04-09 RX ORDER — PREDNISONE 20 MG/1
60 TABLET ORAL DAILY
Qty: 15 TABLET | Refills: 0 | Status: SHIPPED | OUTPATIENT
Start: 2018-04-09 | End: 2019-02-04

## 2018-04-09 RX ORDER — IOPAMIDOL 755 MG/ML
100 INJECTION, SOLUTION INTRAVASCULAR ONCE
Status: COMPLETED | OUTPATIENT
Start: 2018-04-09 | End: 2018-04-09

## 2018-04-09 RX ORDER — CEFTRIAXONE 2 G/1
2 INJECTION, POWDER, FOR SOLUTION INTRAMUSCULAR; INTRAVENOUS EVERY 24 HOURS
Status: DISCONTINUED | OUTPATIENT
Start: 2018-04-09 | End: 2018-04-10 | Stop reason: HOSPADM

## 2018-04-09 RX ADMIN — SODIUM CHLORIDE 1000 ML: 9 INJECTION, SOLUTION INTRAVENOUS at 20:20

## 2018-04-09 RX ADMIN — SODIUM CHLORIDE 60 ML: 9 INJECTION, SOLUTION INTRAVENOUS at 21:24

## 2018-04-09 RX ADMIN — SODIUM CHLORIDE 1000 ML: 9 INJECTION, SOLUTION INTRAVENOUS at 21:34

## 2018-04-09 RX ADMIN — IOPAMIDOL 100 ML: 755 INJECTION, SOLUTION INTRAVENOUS at 21:23

## 2018-04-09 RX ADMIN — CEFTRIAXONE SODIUM 2 G: 2 INJECTION, POWDER, FOR SOLUTION INTRAMUSCULAR; INTRAVENOUS at 21:34

## 2018-04-09 ASSESSMENT — ENCOUNTER SYMPTOMS
DIZZINESS: 1
SHORTNESS OF BREATH: 1
WHEEZING: 1
LIGHT-HEADEDNESS: 1
HEADACHES: 1
BACK PAIN: 1
FEVER: 0

## 2018-04-09 ASSESSMENT — PULMONARY FUNCTION TESTS: PERSONAL_BEST_PEFR_L/MIN: 475

## 2018-04-09 NOTE — ED AVS SNAPSHOT
[facility] Emergency Department    [address] [place] [address]    Phone:  [phone]    Fax:  [phone]                                       Joselito Abarca   MRN: [id number]    Department:  [facility] Emergency Department   Date of Visit:  4/9/2018           After Visit Summary Signature Page     I have received my discharge instructions, and my questions have been answered. I have discussed any challenges I see with this plan with the nurse or doctor.    ..........................................................................................................................................  Patient/Patient Representative Signature      ..........................................................................................................................................  Patient Representative Print Name and Relationship to Patient    ..................................................               ................................................  Date                                            Time    ..........................................................................................................................................  Reviewed by Signature/Title    ...................................................              ..............................................  Date                                                            Time

## 2018-04-09 NOTE — ED AVS SNAPSHOT
[facility] Emergency Department    [address] [place] [address]    Phone:  [phone]    Fax:  [phone]                                       Joselito Abarca   MRN: [id number]    Department:  [facility] Emergency Department   Date of Visit:  4/9/2018           Patient Information     Date Of Birth          1965        Your diagnoses for this visit were:     Pneumonia of both lungs due to infectious organism, unspecified part of lung        You were seen by Enzo Kinsey MD.      Follow-up Information     Follow up with Schoen, Gregory G, MD.    Specialty:  Family Practice    Why:  later this week    Contact information:    [address]   [place] [address] [phone]          Discharge Instructions         Pneumonia (Adult)  Pneumonia is an infection deep within the lungs. It is in the small air sacs (alveoli). Pneumonia may be caused by a virus or bacteria. Pneumonia caused by bacteria is usually treated with an antibiotic. Severe cases may need to be treated in the hospital. Milder cases can be treated at home. Symptoms usually start to get better during the first 2 days of treatment.    Home care  Follow these guidelines when caring for yourself at home:    Rest at home for the first 2 to 3 days, or until you feel stronger. Don t let yourself get overly tired when you go back to your activities.    Stay away from cigarette smoke - yours or other people s.    You may use acetaminophen or ibuprofen to control fever or pain, unless another medicine was prescribed. If you have chronic liver or kidney disease, talk with your healthcare provider before using these medicines. Also talk with your provider if you ve had a stomach ulcer or gastrointestinal bleeding. Don t give aspirin to anyone younger than 18 years of age who is ill with a fever. It may cause severe liver damage.    Your appetite may be poor, so a light diet is fine.    Drink 6 to 8 glasses of  fluids every day to make sure you are getting enough fluids. Beverages can include water, sport drinks, sodas without caffeine, juices, tea, or soup. Fluids will help loosen secretions in the lung. This will make it easier for you to cough up the phlegm (sputum). If you also have heart or kidney disease, check with your healthcare provider before you drink extra fluids.    Take antibiotic medicine prescribed until it is all gone, even if you are feeling better after a few days.  Follow-up care  Follow up with your healthcare provider in the next 2 to 3 days, or as advised. This is to be sure the medicine is helping you get better.  If you are 65 or older, you should get a pneumococcal vaccine and a yearly flu (influenza) shot. You should also get these vaccines if you have chronic lung disease like asthma, emphysema, or COPD. Recently, a second type of pneumonia vaccine has become available for everyone over 65 years old. This is in addition to the previous vaccine. Ask your provider about this.  When to seek medical advice  Call your healthcare provider right away if any of these occur:    You don t get better within the first 48 hours of treatment    Shortness of breath gets worse    Rapid breathing (more than 25 breaths per minute)    Coughing up blood    Chest pain gets worse with breathing    Fever of 100.4 F (38 C) or higher that doesn t get better with fever medicine    Weakness, dizziness, or fainting that gets worse    Thirst or dry mouth that gets worse    Sinus pain, headache, or a stiff neck    Chest pain not caused by coughing  Date Last Reviewed: 1/1/2017 2000-2017 [employer]. [address] [place] [address], [place] [address]. All rights reserved. This information is not intended as a substitute for professional medical care. Always follow your healthcare professional's instructions.          Your next 10 appointments already scheduled     Apr 13, 2018 12:00 PM CDT   Office Visit with Silvio  CLAYTON Miller MD   [facility]    [address] [id number] [phone]           Bring a current list of meds and any records pertaining to this visit. For Physicals, please bring immunization records and any forms needing to be filled out. Please arrive 10 minutes early to complete paperwork.              24 Hour Appointment Hotline       To make an appointment at any [facility], call [phone] ([phone]). If you don't have a family doctor or clinic, we will help you find one. [facility] are conveniently located to serve the needs of you and your family.             Review of your medicines      START taking        Dose / Directions Last dose taken    levofloxacin 750 MG tablet   Commonly known as:  LEVAQUIN   Dose:  750 mg   Quantity:  10 tablet        Take 1 tablet (750 mg) by mouth daily for 10 days   Refills:  0        predniSONE 20 MG tablet   Commonly known as:  DELTASONE   Dose:  60 mg   Quantity:  15 tablet        Take 3 tablets (60 mg) by mouth daily for 5 days   Refills:  0          Our records show that you are taking the medicines listed below. If these are incorrect, please call your family doctor or clinic.        Dose / Directions Last dose taken    * albuterol (2.5 MG/3ML) 0.083% neb solution   Quantity:  90 mL        NEBULIZE CONTENTS OF ONE VIAL EVERY 4 HOURS AS NEEDED FOR SHORTNESS OF BREATH /DYSPNEA OR WHEEZING   Refills:  0        * VENTOLIN  (90 BASE) MCG/ACT Inhaler   Quantity:  18 g   Generic drug:  albuterol        INHALE 2 PUFFS INTO THE LUNGS EVERY 6 HOURS AS NEEDED FOR SHORTNESS OF BREATH, DIFFICULTY BREATHING OR WHEEZING.   Refills:  3        citalopram 20 MG tablet   Commonly known as:  celeXA   Dose:  20 mg   Quantity:  90 tablet        Take 1 tablet (20 mg) by mouth daily   Refills:  3        clonazePAM 0.5 MG tablet   Commonly known as:  klonoPIN   Dose:  0.25-0.5 mg   Quantity:  90 tablet         Take 0.5-1 tablets (0.25-0.5 mg) by mouth 3 times daily as needed for anxiety   Refills:  0        FLOVENT  MCG/ACT Inhaler   Quantity:  36 g   Generic drug:  fluticasone        INHALE 2 PUFFS INTO THE LUNGS TWICE DAILY   Refills:  1        fluticasone 50 MCG/ACT spray   Commonly known as:  FLONASE   Quantity:  16 g        USE TWO SPRAYS IN EACH NOSTRIL EVERY DAY   Refills:  5        gabapentin 300 MG capsule   Commonly known as:  NEURONTIN   Quantity:  270 capsule        TAKE TWO CAPSULES BY MOUTH THREE TIMES A DAY   Refills:  11        ipratropium - albuterol 0.5 mg/2.5 mg/3 mL 0.5-2.5 (3) MG/3ML neb solution   Commonly known as:  DUONEB   Dose:  1 vial   Quantity:  30 vial        Take 1 vial (3 mLs) by nebulization every 4 hours as needed for shortness of breath / dyspnea   Refills:  0        methadone 10 MG tablet   Commonly known as:  DOLOPHINE   Dose:  10 mg   Quantity:  90 tablet        Take 1 tablet (10 mg) by mouth every 8 hours as needed   Refills:  0        omeprazole 20 MG CR capsule   Commonly known as:  priLOSEC   Quantity:  30 capsule        TAKE 1 CAPSULE BY MOUTH DAILY, 30 TO 60 MINUTES BEFORE A MEAL.   Refills:  1        oxyCODONE 5 MG capsule   Commonly known as:  OXY-IR   Dose:  10 mg   Quantity:  360 capsule        Take 2 capsules (10 mg) by mouth every 4 hours as needed 2 caps q 4 hour prn pain up to 12 per day May fill 5/24/2012   Refills:  0        ranitidine 150 MG tablet   Commonly known as:  ZANTAC   Quantity:  30 tablet        TAKE ONE TABLET BY MOUTH EVERY MORNING   Refills:  10        sildenafil 100 MG tablet   Commonly known as:  VIAGRA   Dose:  100 mg   Quantity:  4 tablet        Take 1 tablet (100 mg) by mouth daily as needed 30 min to 4 hrs before sex. Do not use with nitroglycerin, terazosin or doxazosin.   Refills:  0        traZODone 100 MG tablet   Commonly known as:  DESYREL   Dose:  300 mg   Quantity:  90 tablet        Take 3 tablets (300 mg) by mouth At Bedtime    Refills:  3        vitamin D 2000 UNITS tablet   Quantity:  100 tablet        TAKE ONE TABLET BY MOUTH EVERY DAY   Refills:  3        zolpidem 10 MG tablet   Commonly known as:  AMBIEN   Dose:  10 mg        Take 10 mg by mouth nightly as needed   Refills:  0        * Notice:  This list has 2 medication(s) that are the same as other medications prescribed for you. Read the directions carefully, and ask your doctor or other care provider to review them with you.            Prescriptions were sent or printed at these locations (2 Prescriptions)                   [facility] [address]   [address] [place] [id number]    Telephone:  [phone]   Fax:  [phone]   Hours:                  E-Prescribed (2 of 2)         predniSONE (DELTASONE) 20 MG tablet               levofloxacin (LEVAQUIN) 750 MG tablet                Procedures and tests performed during your visit     Basic metabolic panel    CBC with platelets differential    CT Chest Pulmonary Embolism w Contrast    D dimer quantitative    EKG 12-lead, tracing only    Lactic acid whole blood    Peak flow    Peripheral IV catheter    Troponin I    XR Chest 2 Views      Orders Needing Specimen Collection     None      Pending Results     No orders found from 4/7/2018 to 4/10/2018.            Pending Culture Results     No orders found from 4/7/2018 to 4/10/2018.            Pending Results Instructions     If you had any lab results that were not finalized at the time of your Discharge, you can call the ED Lab Result RN at [phone]. You will be contacted by this team for any positive Lab results or changes in treatment. The nurses are available 7 days a week from 10A to 6:30P.  You can leave a message 24 hours per day and they will return your call.        Thank you for choosing [facility]       Thank you for choosing [facility] for your care. Our goal is always to provide you with excellent care. Hearing  "back from our patients is one way we can continue to improve our services. Please take a few minutes to complete the written survey that you may receive in the mail after you visit with us. Thank you!        [employer] Information     [employer] lets you send messages to your doctor, view your test results, renew your prescriptions, schedule appointments and more. To sign up, go to [email or web address] . Click on \"Log in\" on the left side of the screen, which will take you to the Welcome page. Then click on \"Sign up Now\" on the right side of the page.     You will be asked to enter the access code listed below, as well as some personal information. Please follow the directions to create your username and password.     Your access code is: [email or web address]  Expires: 2018 10:35 PM     Your access code will  in 90 days. If you need help or a new code, please call your [facility] clinic or [phone].        Care EveryWhere ID     This is your Care EveryWhere ID. This could be used by other organizations to access your [facility] medical records  [id number]        Equal Access to Services     DADA DUARTE : Hadshelby Hebert, damari garibay, blanca larios, omayra hebert . So [facility] [phone].    ATENCIÓN: Si habla español, tiene a alfaro disposición servicios gratuitos de asistencia lingüística. Bola al [phone].    We comply with applicable federal civil rights laws and [place] laws. We do not discriminate on the basis of race, color, national origin, age, disability, sex, sexual orientation, or gender identity.            After Visit Summary       This is your record. Keep this with you and show to your community pharmacist(s) and doctor(s) at your next visit.                  "

## 2018-04-10 ENCOUNTER — PATIENT OUTREACH (OUTPATIENT)
Dept: CARE COORDINATION | Facility: CLINIC | Age: 53
End: 2018-04-10

## 2018-04-10 NOTE — ED PROVIDER NOTES
History     Chief Complaint   Patient presents with     Shortness of Breath     HPI  Joselito Abarca is a 52 year old male who presents to the emergency department with concerns of shortness of breath. Patient reports that the past couple days he has struggled to catch his breath even after the slightest activity. He states this is worsen in the morning when he gets up, but also at night when he is trying to lay down for bed. He is unsure what triggered this, he denies any cold like symptoms recently. He has been increasingly light-headed and dizzy. He also complains of back pain and a headache that he noticed today. No noted fever.    Problem List:    Patient Active Problem List    Diagnosis Date Noted     Status post cervical spinal arthrodesis 11/29/2017     Priority: Medium     Chronic pain syndrome 12/13/2016     Priority: Medium     Patient is followed by Gregory G. Schoen, MD for ongoing prescription of pain medication.  All refills should be approved by this provider, or covering partner.    Medication(s): Oxycodone 5 mg IR: Take 2 capsules (10 mg) by mouth every 4 hours as needed 2 caps q 4 hour prn pain up to 12 per day .   Methadone 10 mg three times daily, 90 per month  Clonazepam 0.5 mg tid, 90 per month   Clinic visit frequency required: Q 3 months     Controlled substance agreement:  Encounter-Level CSA - 2/27/15:               Controlled Substance Agreement - Scan on 3/14/2015  8:47 AM : Controlled Medication Agreement-02/27/15 (below)            Pain Clinic evaluation in the past: Yes    DIRE Total Score(s):  No flowsheet data found.    Last [facility] website verification:  10/30/2016     [email or web address]         Moderate persistent asthma without complication 11/02/2016     Priority: Medium     Acute respiratory failure with hypoxia (H) 04/29/2016     Priority: Medium     Nausea with vomiting 04/27/2016     Priority: Medium     Cough 03/06/2016     Priority: Medium     Leukocytosis  02/16/2016     Priority: Medium     Disease of bronchial airway (HCC) 02/12/2016     Priority: Medium     Degeneration of cervical intervertebral disc 01/26/2015     Priority: Medium     Chronic rhinitis 01/26/2015     Priority: Medium     Health Care Home 09/30/2013     Priority: Medium     Status:  Accepted  Care Coordinator:    Melissa Behl BSN, RN, PHN  [facility] Care Coordinator  [phone]     See Letters for [facility] Care Plan  Date:  April 26, 2016            Arthrodesis status 06/15/2011     Priority: Medium     Neck pain 06/15/2011     Priority: Medium     CARDIOVASCULAR SCREENING; LDL GOAL LESS THAN 160 10/31/2010     Priority: Medium     Intervertebral cervical disc disorder with myelopathy, cervical region 11/12/2009     Priority: Medium     Patient is followed by TIARA JIMENEZ for ongoing prescription of narcotic pain medicine.  Med: methadone 10 mg tid. Taking oxycodone up to 8 per day, 120 per month  Maximum use per month: 90  Expected duration: ongoing  Narcotic agreement on file: YES  Clinic visit recommended: Q 3 months         Constipation 03/19/2008     Priority: Medium     Problem list name updated by automated process. Provider to review       Thoracic or lumbosacral neuritis or radiculitis, unspecified 03/19/2008     Priority: Medium     Esophageal reflux 07/09/2003     Priority: Medium     Generalized anxiety disorder 07/08/2003     Priority: Medium     Alcohol abuse, in remission 07/08/2003     Priority: Medium        Past Medical History:    Past Medical History:   Diagnosis Date     Anxiety      GERD (gastroesophageal reflux disease)      Neck pain 6/15/2011       Past Surgical History:    Past Surgical History:   Procedure Laterality Date     DISCECTOMY LUMBAR POSTERIOR MICROSCOPIC ONE LEVEL  2/21/2012    Procedure:DISCECTOMY LUMBAR POSTERIOR MICROSCOPIC ONE LEVEL; LEFT T1-T2 THORASIC HEMILAMINECTOMY MICRODISCECTOMY WITH MEXTRIX II ; Surgeon:SHARON PURI; Location: OR      DISCECTOMY, FUSION CERVICAL ANTERIOR ONE LEVEL, COMBINED N/A 11/29/2017    Procedure: COMBINED DISCECTOMY, FUSION CERVICAL ANTERIOR ONE LEVEL;  Anterior Cervical Discectomy and Fusion Cervical Six - Cervical Seven, Exploration and Revision Cervical Four - Cervical Six with Hardware Removal;  Surgeon: Nikolas Vasques MD;  Location: [facility] OR     EXPLORE SPINE, REMOVE HARDWARE, COMBINED N/A 11/29/2017    Procedure: COMBINED EXPLORE SPINE, REMOVE HARDWARE;;  Surgeon: Nikolas Vasques MD;  Location: [facility] OR     FUSION CERVICAL ANTERIOR TWO LEVELS  1/29/2010     HC DRAIN/INJ MAJOR JOINT/BURSA W/O US  5/5/2008    Left sacroiliac joint injection.     HC INJ EPIDURAL LUMBAR/SACRAL W/WO CONTRAST  2009     HEAD & NECK SURGERY      2013     HERNIA REPAIR       INJECT BLOCK MEDIAL BRANCH CERVICAL/THORACIC/LUMBAR Bilateral 8/26/2015    Procedure: INJECT BLOCK MEDIAL BRANCH CERVICAL / THORACIC / LUMBAR;  Surgeon: Ronald Driscoll MD;  Location: [facility] OR     INJECT FACET JOINT Bilateral 5/27/2015    Procedure: INJECT FACET JOINT;  Surgeon: Ronald Driscoll MD;  Location: [facility] OR       Family History:    Family History   Problem Relation Age of Onset     Family History Negative No family hx of      C.A.D. No family hx of        Social History:  Marital Status:  Single [1]  Social History   Substance Use Topics     Smoking status: Passive Smoke Exposure - Never Smoker     Types: Cigars     Last attempt to quit: 12/19/2009     Smokeless tobacco: Never Used     Alcohol use No      Comment: HX OF ABUSE-IN REMISSION        Medications:      predniSONE (DELTASONE) 20 MG tablet   levofloxacin (LEVAQUIN) 750 MG tablet   ranitidine (ZANTAC) 150 MG tablet   oxyCODONE (OXY-IR) 5 MG capsule   clonazePAM (KLONOPIN) 0.5 MG tablet   methadone (DOLOPHINE) 10 MG tablet   omeprazole (PRILOSEC) 20 MG CR capsule   sildenafil (VIAGRA) 100 MG tablet   citalopram (CELEXA) 20 MG tablet   VENTOLIN  (90 BASE) MCG/ACT Inhaler   gabapentin (NEURONTIN) 300  MG capsule   FLOVENT  MCG/ACT Inhaler   fluticasone (FLONASE) 50 MCG/ACT spray   Cholecalciferol (VITAMIN D) 2000 UNITS tablet   ipratropium - albuterol 0.5 mg/2.5 mg/3 mL (DUONEB) 0.5-2.5 (3) MG/3ML neb solution   albuterol (2.5 MG/3ML) 0.083% nebulizer solution   traZODone (DESYREL) 100 MG tablet   zolpidem (AMBIEN) 10 MG tablet         Review of Systems   Constitutional: Negative for fever.   Respiratory: Positive for shortness of breath and wheezing.    Cardiovascular: Negative for leg swelling.   Musculoskeletal: Positive for back pain.   Neurological: Positive for dizziness, light-headedness and headaches.   All other systems are reviewed and are negative      Physical Exam   BP: 113/75  Pulse: 103  Temp: 98.2  F (36.8  C)  Resp: 20  Weight: 77.1 kg (170 lb)  SpO2: 92 %      Physical Exam   Constitutional: He appears well-developed and well-nourished. He appears ill.   HENT:   Head: Normocephalic and atraumatic.   Mouth/Throat: Mucous membranes are dry.   Eyes: Conjunctivae are normal. Pupils are equal, round, and reactive to light.   Neck: Normal range of motion. Neck supple.   Cardiovascular: Regular rhythm, normal heart sounds and normal pulses.  Tachycardia present.    Pulmonary/Chest: Effort normal and breath sounds normal. He has no decreased breath sounds. He has no wheezes.   Very slight wheeze at the end of exhale. Talking in full sentences.     Abdominal: Soft. Bowel sounds are normal. There is no tenderness. There is no rebound and no guarding.   Musculoskeletal: Normal range of motion. He exhibits no edema.   Neurological: He is alert. No cranial nerve deficit.   Skin: Skin is warm and dry. There is pallor.   Psychiatric: He has a normal mood and affect. His behavior is normal.       ED Course     ED Course     Procedures            EKG: Normal sinus rhythm, normal axis.  Rate of 97 beats per minute.  No acute ST or T wave changes.  Interpreted by myself.      Critical Care time:  none                Results for orders placed or performed during the hospital encounter of 04/09/18 (from the past 24 hour(s))   CBC with platelets differential   Result Value Ref Range    WBC 15.6 (H) 4.0 - 11.0 10e9/L    RBC Count 4.51 4.4 - 5.9 10e12/L    Hemoglobin 13.4 13.3 - 17.7 g/dL    Hematocrit 41.3 40.0 - 53.0 %    MCV 92 78 - 100 fl    MCH 29.7 26.5 - 33.0 pg    MCHC 32.4 31.5 - 36.5 g/dL    RDW 13.8 10.0 - 15.0 %    Platelet Count 174 150 - 450 10e9/L    Diff Method Automated Method     % Neutrophils 87.7 %    % Lymphocytes 6.8 %    % Monocytes 5.0 %    % Eosinophils 0.3 %    % Basophils 0.1 %    % Immature Granulocytes 0.1 %    Absolute Neutrophil 13.6 (H) 1.6 - 8.3 10e9/L    Absolute Lymphocytes 1.1 0.8 - 5.3 10e9/L    Absolute Monocytes 0.8 0.0 - 1.3 10e9/L    Absolute Eosinophils 0.0 0.0 - 0.7 10e9/L    Absolute Basophils 0.0 0.0 - 0.2 10e9/L    Abs Immature Granulocytes 0.0 0 - 0.4 10e9/L   Basic metabolic panel   Result Value Ref Range    Sodium 133 133 - 144 mmol/L    Potassium 4.0 3.4 - 5.3 mmol/L    Chloride 96 94 - 109 mmol/L    Carbon Dioxide 28 20 - 32 mmol/L    Anion Gap 9 3 - 14 mmol/L    Glucose 120 (H) 70 - 99 mg/dL    Urea Nitrogen 9 7 - 30 mg/dL    Creatinine 0.93 0.66 - 1.25 mg/dL    GFR Estimate 86 >60 mL/min/1.7m2    GFR Estimate If Black >90 >60 mL/min/1.7m2    Calcium 8.5 8.5 - 10.1 mg/dL   D dimer quantitative   Result Value Ref Range    D Dimer 0.6 (H) 0.0 - 0.50 ug/ml FEU   Troponin I   Result Value Ref Range    Troponin I ES <0.015 0.000 - 0.045 ug/L   XR Chest 2 Views    Narrative    XR CHEST 2 VW   4/9/2018 8:31 PM     HISTORY: soa;     COMPARISON: None.    FINDINGS: The heart is negative.  There are areas of patchy infiltrate  in the right upper and lower lung zones. These are new since 2/3/2018.  It would be consistent with early pneumonitis. The pulmonary  vasculature is normal.  The bones and soft tissues are unremarkable.      Impression    IMPRESSION: Patchy infiltrates in the  right lung, suspect pneumonia.         ALIN OLSON MD   Lactic acid whole blood   Result Value Ref Range    Lactic Acid 1.3 0.7 - 2.0 mmol/L   CT Chest Pulmonary Embolism w Contrast    Narrative    CT CHEST WITH CONTRAST  4/9/2018 9:42 PM    HISTORY: Dyspnea. Evaluate for pulmonary embolism.    COMPARISON: A CT of the chest without contrast on 4/29/2016 and  radiographs earlier today.    TECHNIQUE: Routine transverse CT imaging of the chest was performed  following the uneventful intravenous administration of 100 mL IsoVue  370 contrast. A pulmonary embolism protocol was utilized. Radiation  dose for this scan was reduced using automated exposure control,  adjustment of the mA and/or kV according to patient size, or iterative  reconstruction technique.    FINDINGS: The heart size is normal. There are a few nonenlarged lymph  nodes within the mediastinum. No enlarged lymph node or other abnormal  mass is seen. The thoracic aorta is unremarkable. There is very good  opacification of the pulmonary arteries with contrast. No pulmonary  embolism is seen. There are a few scattered areas of hazy infiltrates  throughout both lungs. Allowing for different techniques, this does  not appear to be as prominent as seen on the prior CT. No pneumothorax  is demonstrated. No pleural effusion is identified. The osseous  structures are unremarkable. No chest wall pathology is seen. Within  the visualized upper abdomen, again seen is cholelithiasis.      Impression    IMPRESSION: Mild diffuse hazy infiltrates throughout both lungs.  Otherwise unremarkable CT of the chest. Specifically, no pulmonary  embolism is seen.    ROXANA POOL MD       Medications   acetaminophen (TYLENOL) tablet 1,000 mg (1,000 mg Oral Not Given 4/9/18 2039)   0.9% sodium chloride BOLUS (1,000 mLs Intravenous New Bag 4/9/18 2134)   cefTRIAXone (ROCEPHIN) 2 g vial to attach to  ml bag for ADULTS or NS 50 ml bag for PEDS (0 g Intravenous Stopped  4/9/18 2210)   0.9% sodium chloride BOLUS (0 mLs Intravenous Stopped 4/9/18 2120)   iopamidol (ISOVUE-370) solution 100 mL (100 mLs Intravenous Given 4/9/18 2123)   sodium chloride 0.9 % bag 500mL for CT scan flush use (60 mLs Intravenous Given 4/9/18 2124)   sodium chloride (PF) 0.9% PF flush 3 mL (3 mLs Intravenous Given 4/9/18 2123)       Assessments & Plan (with Medical Decision Making)  52-year-old male asthmatic patient who presents acutely short of air over the last 2 days.  He has had no fever.  He has been using nebulizers at home but lungs are clear here.  He has a history of some recurrent pneumonia.  Chest x-ray revealed new patchy infiltrate of the right lung.  White count is elevated.  Initially slightly tachycardic but blood pressure normal and lactic acid normal.  D-dimer slightly elevated.  CT of the chest negative for PE.  After IV fluids, tachycardia resolved.  Blood pressure remained stable.  He was given an IV dose of Rocephin.  Peak flow is strong at 475.  He appears stable for outpatient management.  He is prescribed Levaquin and prednisone.  Plan is for follow-up later this week.  He should return anytime sooner if condition worsens.     I have reviewed the nursing notes.    I have reviewed the findings, diagnosis, plan and need for follow up with the patient.       New Prescriptions    LEVOFLOXACIN (LEVAQUIN) 750 MG TABLET    Take 1 tablet (750 mg) by mouth daily for 10 days    PREDNISONE (DELTASONE) 20 MG TABLET    Take 3 tablets (60 mg) by mouth daily for 5 days       Final diagnoses:   Pneumonia of both lungs due to infectious organism, unspecified part of lung     This document serves as a record of services personally performed by Enzo Kinsey MD. It was created on their behalf by Jessika Sheehan, a trained medical scribe. The creation of this record is based on the provider's personal observations and the statements of the patient. This document has been checked and approved by the  attending provider.  Note: Chart documentation done in part with Dragon Voice Recognition software. Although reviewed after completion, some word and grammatical errors may remain.  4/9/2018   [facility] EMERGENCY DEPARTMENT     Enzo Kinsey MD  04/09/18 [id number]

## 2018-04-10 NOTE — DISCHARGE INSTRUCTIONS

## 2018-04-11 NOTE — PROGRESS NOTES
Clinic Care Coordination Contact    Situation: Patient chart reviewed by care coordinator.    Background: Pt was on the IP discharge list with a risk score of 66% and 2 ED visit.     Assessment: Pt was seen in th ED for sob and was dx with pneumonia.  Pt does have a follow up appt scheduled.     Plan/Recommendations: no outreach by CC at this time. Would recommend outreach at next ED visit/admission if within 90 days.     Luisa GUTIÉRREZ, RN, PHN  Care Coordination    [id number] [place] [address]  Office: [phone]  Fax [phone]   [facility]  [address] [place] [address]  Office: [phone] Fax [phone]  [email or web address]   [email or web address]   Connect with [facility] on social media.

## 2018-04-16 RX ORDER — CLONAZEPAM 0.5 MG/1
0.25-0.5 TABLET ORAL 3 TIMES DAILY PRN
Qty: 90 TABLET | Refills: 0 | Status: SHIPPED | OUTPATIENT
Start: 2018-04-16 | End: 2018-05-15

## 2018-04-16 RX ORDER — METHADONE HYDROCHLORIDE 10 MG/1
10 TABLET ORAL EVERY 8 HOURS PRN
Qty: 90 TABLET | Refills: 0 | Status: SHIPPED | OUTPATIENT
Start: 2018-04-16 | End: 2018-05-15

## 2018-04-16 NOTE — TELEPHONE ENCOUNTER
Patient calling to check on these medication refills. Please advise if this can be done today, he has been out of medication since April 7th.

## 2018-04-16 NOTE — TELEPHONE ENCOUNTER
Pt calling on this, please address this today. It's been over 3 business days and he has been out of medication.

## 2018-04-16 NOTE — TELEPHONE ENCOUNTER
Prescription walked down to [facility] pharmacy.  Shakir Posey MA

## 2018-04-17 NOTE — TELEPHONE ENCOUNTER
Patient called pharmacy requesting a refill on thrush medication from a year ago and we don't have any record of what it was here at the pharmacy.

## 2018-04-30 ENCOUNTER — HOSPITAL ENCOUNTER (EMERGENCY)
Facility: CLINIC | Age: 53
Discharge: HOME | End: 2018-04-30
Attending: NURSE PRACTITIONER | Admitting: NURSE PRACTITIONER
Payer: COMMERCIAL

## 2018-04-30 VITALS
OXYGEN SATURATION: 98 % | RESPIRATION RATE: 20 BRPM | TEMPERATURE: 98.2 F | DIASTOLIC BLOOD PRESSURE: 90 MMHG | BODY MASS INDEX: 26.63 KG/M2 | WEIGHT: 170 LBS | SYSTOLIC BLOOD PRESSURE: 126 MMHG

## 2018-04-30 DIAGNOSIS — M50.30 DEGENERATION OF CERVICAL INTERVERTEBRAL DISC: ICD-10-CM

## 2018-04-30 DIAGNOSIS — K21.9 ESOPHAGEAL REFLUX: ICD-10-CM

## 2018-04-30 DIAGNOSIS — M54.2 NECK PAIN, CHRONIC: ICD-10-CM

## 2018-04-30 DIAGNOSIS — G89.29 NECK PAIN, CHRONIC: ICD-10-CM

## 2018-04-30 DIAGNOSIS — M50.00 INTERVERTEBRAL CERVICAL DISC DISORDER WITH MYELOPATHY, CERVICAL REGION: ICD-10-CM

## 2018-04-30 DIAGNOSIS — J45.21 MILD INTERMITTENT ASTHMA WITH ACUTE EXACERBATION: ICD-10-CM

## 2018-04-30 PROCEDURE — 20553 NJX 1/MLT TRIGGER POINTS 3/>: CPT | Mod: Z6 | Performed by: NURSE PRACTITIONER

## 2018-04-30 PROCEDURE — 20553 NJX 1/MLT TRIGGER POINTS 3/>: CPT | Performed by: NURSE PRACTITIONER

## 2018-04-30 PROCEDURE — 99283 EMERGENCY DEPT VISIT LOW MDM: CPT | Mod: 25 | Performed by: NURSE PRACTITIONER

## 2018-04-30 RX ORDER — CHOLECALCIFEROL (VITAMIN D3) 50 MCG
TABLET ORAL
Qty: 100 TABLET | Refills: 3 | Status: SHIPPED | OUTPATIENT
Start: 2018-04-30 | End: 2019-05-06

## 2018-04-30 RX ORDER — BUPIVACAINE HYDROCHLORIDE 2.5 MG/ML
10 INJECTION, SOLUTION EPIDURAL; INFILTRATION; INTRACAUDAL ONCE
Status: DISCONTINUED | OUTPATIENT
Start: 2018-04-30 | End: 2018-05-01 | Stop reason: HOSPADM

## 2018-04-30 NOTE — TELEPHONE ENCOUNTER
"Requested Prescriptions   Pending Prescriptions Disp Refills     Cholecalciferol (VITAMIN D) 2000 units tablet [Pharmacy Med Name: VITAMIN D3 TAB 2000UNIT] 100 tablet 3     Sig: TAKE ONE TABLET BY MOUTH EVERY DAY    Vitamin Supplements (Adult) Protocol Passed    4/30/2018  5:39 PM       Passed - High dose Vitamin D not ordered       Passed - Recent (12 mo) or future (30 days) visit within the authorizing provider's specialty    Patient had office visit in the last 12 months or has a visit in the next 30 days with authorizing provider or within the authorizing provider's specialty.  See \"Patient Info\" tab in inbasket, or \"Choose Columns\" in Meds & Orders section of the refill encounter.            omeprazole (PRILOSEC) 20 MG CR capsule [Pharmacy Med Name: OMEPRAZOLE 20MG CPDR] 30 capsule 1     Sig: TAKE 1 CAPSULE BY MOUTH DAILY, 30 TO 60 MINUTES BEFORE A MEAL.    PPI Protocol Passed    4/30/2018  5:39 PM       Passed - Not on Clopidogrel (unless Pantoprazole ordered)       Passed - No diagnosis of osteoporosis on record       Passed - Recent (12 mo) or future (30 days) visit within the authorizing provider's specialty    Patient had office visit in the last 12 months or has a visit in the next 30 days with authorizing provider or within the authorizing provider's specialty.  See \"Patient Info\" tab in inbasket, or \"Choose Columns\" in Meds & Orders section of the refill encounter.           Passed - Patient is age 18 or older          Last Written Prescription Date:  4/26/17  Last Fill Quantity: 100,  # refills: 3   Last Office Visit with [facility] [place] or [facility] prescribing provider:  4/13/18   Future Office Visit:     Omeprazole 20 MG       Last Written Prescription Date:  3/2/18  Last Fill Quantity: 30,   # refills: 1  Last Office Visit: 4/13/18  Future Office visit:           "

## 2018-04-30 NOTE — ED AVS SNAPSHOT
[facility] Emergency Department    [address] [place] [address]    Phone:  [phone]    Fax:  [phone]                                       Joselito Abarca   MRN: [id number]    Department:  [facility] Emergency Department   Date of Visit:  4/30/2018           After Visit Summary Signature Page     I have received my discharge instructions, and my questions have been answered. I have discussed any challenges I see with this plan with the nurse or doctor.    ..........................................................................................................................................  Patient/Patient Representative Signature      ..........................................................................................................................................  Patient Representative Print Name and Relationship to Patient    ..................................................               ................................................  Date                                            Time    ..........................................................................................................................................  Reviewed by Signature/Title    ...................................................              ..............................................  Date                                                            Time

## 2018-04-30 NOTE — TELEPHONE ENCOUNTER
Oxycodone   300 MG     Last Written Prescription Date:  6/27/17  Last Fill Quantity: 270,   # refills: 11  Last Office Visit: 4/13/18  Future Office visit:       Routing refill request to provider for review/approval because:  Drug not on the [facility], [facility] or [facility] refill protocol or controlled substance

## 2018-04-30 NOTE — TELEPHONE ENCOUNTER
Prescription approved per [facility] [place] Refill Protocol.    Ailyn Tobias RN

## 2018-05-01 ENCOUNTER — PATIENT OUTREACH (OUTPATIENT)
Dept: CARE COORDINATION | Facility: CLINIC | Age: 53
End: 2018-05-01

## 2018-05-01 RX ORDER — OXYCODONE HYDROCHLORIDE 5 MG/1
10 CAPSULE ORAL EVERY 4 HOURS PRN
Qty: 360 CAPSULE | Refills: 0 | Status: SHIPPED | OUTPATIENT
Start: 2018-05-01 | End: 2018-07-26

## 2018-05-01 ASSESSMENT — ENCOUNTER SYMPTOMS
NECK PAIN: 1
MYALGIAS: 1

## 2018-05-01 NOTE — ED NOTES
"Pt presents with concerns of severe neck pain.  Pt states that he needs \"novocain injections, that is the only thing that works\".  "

## 2018-05-01 NOTE — DISCHARGE INSTRUCTIONS
Trigger Point Injection  The cause of your muscle pain or spasms may be one or more trigger points. Your healthcare provider may decide to inject the painful spots to relax the muscle. This can help relieve your pain. Relaxing the muscle can also make movement easier. You may then be able to exercise to strengthen the muscle and help it heal.    What is a trigger point?  A trigger point is a tight, painful  knot  of muscle fiber. It can form where a muscle is strained or injured. The knot can sometimes be felt under the skin. A trigger point is very tender to the touch. Pain may also spread to other parts of the affected muscle. Muscles around a knee, shoulder blade, or other bones are prone to trigger points. This is because these muscles are more likely to be injured.     Injecting a trigger point can help relax the affected muscle and relieve pain.   About the injections  Any muscle in the body can have one or more trigger points. Several injections may be needed in each trigger point to best relieve pain. These injections may be given in sessions about 2 weeks apart, depending on the preference of your healthcare provider. In some cases, you may not feel much change in your symptoms until after the third injection.  Risks and possible complications  Risks and complications are very rare, but may include:    Infection    Bleeding    Lung puncture (pneumothorax)    Nerve damage   Date Last Reviewed: 9/21/2015 2000-2017 [employer]. [address] [place] [address], [place] [address]. All rights reserved. This information is not intended as a substitute for professional medical care. Always follow your healthcare professional's instructions.

## 2018-05-01 NOTE — ED PROVIDER NOTES
History     Chief Complaint   Patient presents with     Neck Pain     HPI  Joselito Abarca is a 52 year old male who presents to the emergency department for complaints of ongoing neck pain.  Patient has chronic neck pain, history of 4 cervical fusions, most recently 3 months ago.  Patient is on chronic oxycodone, methadone and clonazepam.  Patient presents here requesting trigger point injections for his neck pain this evening.  Patient denies any fever, trauma or other complaints today.    Problem List:    Patient Active Problem List    Diagnosis Date Noted     Status post cervical spinal arthrodesis 11/29/2017     Priority: Medium     Chronic pain syndrome 12/13/2016     Priority: Medium     Patient is followed by Gregory G. Schoen, MD for ongoing prescription of pain medication.  All refills should be approved by this provider, or covering partner.    Medication(s): Oxycodone 5 mg IR: Take 2 capsules (10 mg) by mouth every 4 hours as needed 2 caps q 4 hour prn pain up to 12 per day .   Methadone 10 mg three times daily, 90 per month  Clonazepam 0.5 mg tid, 90 per month   Clinic visit frequency required: Q 3 months     Controlled substance agreement:  Encounter-Level CSA - 2/27/15:               Controlled Substance Agreement - Scan on 3/14/2015  8:47 AM : Controlled Medication Agreement-02/27/15 (below)            Pain Clinic evaluation in the past: Yes    DIRE Total Score(s):  No flowsheet data found.    Last [facility] website verification:  10/30/2016     [email or web address]         Moderate persistent asthma without complication 11/02/2016     Priority: Medium     Acute respiratory failure with hypoxia (H) 04/29/2016     Priority: Medium     Nausea with vomiting 04/27/2016     Priority: Medium     Cough 03/06/2016     Priority: Medium     Leukocytosis 02/16/2016     Priority: Medium     Disease of bronchial airway (HCC) 02/12/2016     Priority: Medium     Degeneration of cervical intervertebral disc  01/26/2015     Priority: Medium     Chronic rhinitis 01/26/2015     Priority: Medium     Health Care Home 09/30/2013     Priority: Medium     Status:  Accepted  Care Coordinator:    Melissa Behl BSN, RN, PHN  [facility] Care Coordinator  [phone]     See Letters for [facility] Care Plan  Date:  April 26, 2016            Arthrodesis status 06/15/2011     Priority: Medium     Neck pain 06/15/2011     Priority: Medium     CARDIOVASCULAR SCREENING; LDL GOAL LESS THAN 160 10/31/2010     Priority: Medium     Intervertebral cervical disc disorder with myelopathy, cervical region 11/12/2009     Priority: Medium     Patient is followed by TIARA JIMENEZ for ongoing prescription of narcotic pain medicine.  Med: methadone 10 mg tid. Taking oxycodone up to 8 per day, 120 per month  Maximum use per month: 90  Expected duration: ongoing  Narcotic agreement on file: YES  Clinic visit recommended: Q 3 months         Constipation 03/19/2008     Priority: Medium     Problem list name updated by automated process. Provider to review       Thoracic or lumbosacral neuritis or radiculitis, unspecified 03/19/2008     Priority: Medium     Esophageal reflux 07/09/2003     Priority: Medium     Generalized anxiety disorder 07/08/2003     Priority: Medium     Alcohol abuse, in remission 07/08/2003     Priority: Medium        Past Medical History:    Past Medical History:   Diagnosis Date     Anxiety      GERD (gastroesophageal reflux disease)      Neck pain 6/15/2011       Past Surgical History:    Past Surgical History:   Procedure Laterality Date     DISCECTOMY LUMBAR POSTERIOR MICROSCOPIC ONE LEVEL  2/21/2012    Procedure:DISCECTOMY LUMBAR POSTERIOR MICROSCOPIC ONE LEVEL; LEFT T1-T2 THORASIC HEMILAMINECTOMY MICRODISCECTOMY WITH MEXTRIX II ; Surgeon:SHARON PURI; Location:[facility] OR     DISCECTOMY, FUSION CERVICAL ANTERIOR ONE LEVEL, COMBINED N/A 11/29/2017    Procedure: COMBINED DISCECTOMY, FUSION CERVICAL ANTERIOR ONE LEVEL;   Anterior Cervical Discectomy and Fusion Cervical Six - Cervical Seven, Exploration and Revision Cervical Four - Cervical Six with Hardware Removal;  Surgeon: Nikolas Vasques MD;  Location: [facility] OR     EXPLORE SPINE, REMOVE HARDWARE, COMBINED N/A 11/29/2017    Procedure: COMBINED EXPLORE SPINE, REMOVE HARDWARE;;  Surgeon: Nikolas Vasques MD;  Location: [facility] OR     FUSION CERVICAL ANTERIOR TWO LEVELS  1/29/2010     HC DRAIN/INJ MAJOR JOINT/BURSA W/O US  5/5/2008    Left sacroiliac joint injection.     HC INJ EPIDURAL LUMBAR/SACRAL W/WO CONTRAST  2009     HEAD & NECK SURGERY      2013     HERNIA REPAIR       INJECT BLOCK MEDIAL BRANCH CERVICAL/THORACIC/LUMBAR Bilateral 8/26/2015    Procedure: INJECT BLOCK MEDIAL BRANCH CERVICAL / THORACIC / LUMBAR;  Surgeon: Ronald Driscoll MD;  Location: [facility] OR     INJECT FACET JOINT Bilateral 5/27/2015    Procedure: INJECT FACET JOINT;  Surgeon: Ronald Driscoll MD;  Location: [facility] OR       Family History:    Family History   Problem Relation Age of Onset     Family History Negative No family hx of      C.A.D. No family hx of        Social History:  Marital Status:  Single [1]  Social History   Substance Use Topics     Smoking status: Passive Smoke Exposure - Never Smoker     Types: Cigars     Last attempt to quit: 12/19/2009     Smokeless tobacco: Never Used     Alcohol use No      Comment: HX OF ABUSE-IN REMISSION        Medications:      oxyCODONE (OXY-IR) 5 MG capsule   albuterol (2.5 MG/3ML) 0.083% nebulizer solution   Cholecalciferol (VITAMIN D) 2000 units tablet   citalopram (CELEXA) 20 MG tablet   clonazePAM (KLONOPIN) 0.5 MG tablet   FLOVENT  MCG/ACT Inhaler   fluticasone (FLONASE) 50 MCG/ACT spray   gabapentin (NEURONTIN) 300 MG capsule   ipratropium - albuterol 0.5 mg/2.5 mg/3 mL (DUONEB) 0.5-2.5 (3) MG/3ML neb solution   methadone (DOLOPHINE) 10 MG tablet   omeprazole (PRILOSEC) 20 MG CR capsule   ranitidine (ZANTAC) 150 MG tablet   sildenafil (VIAGRA) 100 MG tablet    traZODone (DESYREL) 100 MG tablet   VENTOLIN  (90 BASE) MCG/ACT Inhaler   zolpidem (AMBIEN) 10 MG tablet         Review of Systems   Musculoskeletal: Positive for myalgias and neck pain.   All other systems reviewed and are negative.      Physical Exam   BP: 126/90  Heart Rate: 96  Temp: 98.2  F (36.8  C)  Resp: 18  Weight: 77.1 kg (170 lb)  SpO2: 98 %      Physical Exam   Constitutional: He is oriented to person, place, and time. He appears well-developed and well-nourished. No distress.   HENT:   Head: Normocephalic.   Eyes: Conjunctivae are normal.   Cardiovascular: Normal rate.    Pulmonary/Chest: Effort normal.   Musculoskeletal: He exhibits tenderness (Bilateral basal skull, bilateral T7 and right trapezius region).   Neurological: He is alert and oriented to person, place, and time.   Skin: Skin is warm and dry. He is not diaphoretic. No erythema.   Psychiatric: He has a normal mood and affect.       ED Course     ED Course     Procedures    Trigger point injections done with a total of 20 mL's of Marcaine 0.25%, base of skull bilaterally, right trapezius region and 1 inch bilateral regions to C7 laterally.  All areas were prepped with alcohol, patient tolerated well and received good pain relief with these, there is no complications.    No results found for this or any previous visit (from the past 24 hour(s)).    Medications   bupivacaine (MARCAINE) preservative free injection 0.25% (25 mg Intradermal Handoff 4/30/18 2796)       Assessments & Plan (with Medical Decision Making)  Acute exacerbation of chronic neck pain.  This was relieved with trigger point injections as noted above.  Patient tolerated these well with no complications.  Patient was discharged in stable condition and will continue his narcotic regime as previously prescribed.  Reasons to return to the emergency department were discussed which patient is agreeable to.     I have reviewed the nursing notes.    I have reviewed the  findings, diagnosis, plan and need for follow up with the patient.    Discharge Medication List as of 4/30/2018 11:51 PM          Final diagnoses:   Neck pain, chronic       4/30/2018   [facility] EMERGENCY DEPARTMENT     Ernestine Kinsey APRN CNP  05/01/18 0000

## 2018-05-01 NOTE — TELEPHONE ENCOUNTER
"Requested Prescriptions   Pending Prescriptions Disp Refills     ipratropium - albuterol 0.5 mg/2.5 mg/3 mL (DUONEB) 0.5-2.5 (3) MG/3ML neb solution [Pharmacy Med Name: IPRATROPIUM-ALBUTEROL 0.5-2.5 SOLN] 180 mL 3     Sig: NEBULIZE CONTENTS OF ONE VIAL BY MOUTH EVERY 4 HOURS AS NEEDED FOR SHORTNESS OF BREATH / DYSPNEA OR WHEEZING    Short-Acting Beta Agonist Inhalers Protocol  Failed    4/30/2018  8:23 PM       Failed - Asthma control assessment score within normal limits in last 6 months    Please review ACT score.          Passed - Patient is age 12 or older       Passed - Recent (6 mo) or future (30 days) visit within the authorizing provider's specialty    Patient had office visit in the last 6 months or has a visit in the next 30 days with authorizing provider or within the authorizing provider's specialty.  See \"Patient Info\" tab in inbasket, or \"Choose Columns\" in Meds & Orders section of the refill encounter.            Last Written Prescription Date:  3/15/17  Last Fill Quantity: 30,  # refills: 0   Last office visit: 3/21/2018 with prescribing provider:     Future Office Visit:      "

## 2018-05-01 NOTE — LETTER
[facility] CARE COORDINATION  [address] [address]  Phone: [phone]      May 7, 2018      Joselito Abarca  [address]   [facility] [address]    Dear Joselito,    We have been trying to reach you to introduce you to [facility] Care Coordination program.  The goal of care coordination is to help you manage your health and improve access to the [facility] system in the most efficient manner.  The Care Coordinator is a nurse who understands the healthcare system and will assist you in improving your access to care.     As your Physician and Care Coordinator we partner to help you achieve your health care goals.     We will continue to reach out; however, if you are able to call your Care Coordinator at [phone], that would be appreciated.  We at [facility] are focused on providing you with the highest-quality healthcare experience possible.      It is a pleasure to partner with you as we work towards achieving your optimal state of wellness.        Sincerely,        Lusia GUTIÉRREZ, RN, PHN  Care Coordination    [facility]  [address] [place] [address]  Office: [phone]  Fax [phone]   [facility]  [address] [place] [address]  Office: [phone] Fax [phone]  [email or web address]   [email or web address]   Connect with [facility] on social media.

## 2018-05-02 RX ORDER — IPRATROPIUM BROMIDE AND ALBUTEROL SULFATE 2.5; .5 MG/3ML; MG/3ML
SOLUTION RESPIRATORY (INHALATION)
Qty: 180 ML | Refills: 3 | Status: SHIPPED | OUTPATIENT
Start: 2018-05-02 | End: 2018-05-15

## 2018-05-02 NOTE — PROGRESS NOTES
Clinic Care Coordination Contact  [facility]/Voicemail       Clinical Data: Care Coordinator Outreach  Outreach attempted x 1.  No name one VM did not leave a message.     Plan: Care Coordinator  Care Coordinator will try to reach patient again in 1-2 business days.      Luisa GUTIÉRREZ, RN, PHN  Care Coordination    [facility]  [address] [place] [address]  Office: [phone]  Fax [phone]   [facility]  [address] [place] [address]  Office: [phone] Fax [phone]  [email or web address]   [email or web address]   Connect with [facility] Services on social media.

## 2018-05-02 NOTE — TELEPHONE ENCOUNTER
ACT Total Scores 11/2/2016 3/17/2017 9/18/2017   ACT TOTAL SCORE (Goal Greater than or Equal to 20) 18 17 12   In the past 12 months, how many times did you visit the emergency room for your asthma without being admitted to the hospital? 3 3 3   In the past 12 months, how many times were you hospitalized overnight because of your asthma? 3 1 0     Routing refill request to provider for review/approval because:  Labs out of range:  ACT  Labs not current:  ACT  MAXIMUS MaryN, RN

## 2018-05-03 NOTE — PROGRESS NOTES
Clinic Care Coordination Contact  [facility]/Voicemail    Referral Source: ED Follow-Up  Clinical Data: Care Coordinator Outreach  Outreach attempted x 2.  Left message on voicemail with call back information and requested return call.  Plan: Care Coordinator will try to reach patient again in 1-2 business days.      Luisa GUTIÉRREZ, RN, PHN  Care Coordination    [facility]  [address] [place] [address]  Office: [phone]  Fax [phone]   [facility]  [address] [place] [address]  Office: [phone] Fax [phone]  [email or web address]   [email or web address]   Connect with [facility] Services on social media.    \

## 2018-05-07 NOTE — PROGRESS NOTES
Clinic Care Coordination Contact  [facility]/Voicemail    Referral Source: ED Follow-Up  Clinical Data: Care Coordinator Outreach  Plan: Care Coordinator will mail out care coordination introduction letter with care coordinator contact information and explanation of care coordination services. Care Coordinator will do no further outreaches at this time.      Luisa GUTIÉRREZ, RN, PHN  Care Coordination    [facility]  [address] [place] [address]  Office: [phone]  Fax [phone]   [facility]  [address] [place] [address]  Office: [phone] Fax [phone]  [email or web address]   [email or web address]   Connect with [facility] [employer] [facility] on social media.

## 2018-05-09 ENCOUNTER — TELEPHONE (OUTPATIENT)
Dept: NEUROSURGERY | Facility: CLINIC | Age: 53
End: 2018-05-09

## 2018-05-09 NOTE — TELEPHONE ENCOUNTER
Contacted patient for post op follow up. He is s/p C6-7 ACDF with removal of hardware at C4-6 on 11/29/17. He has not returned past follow up phone calls or messages. Attempted to call again today, and patient did answer. He reports some soreness in neck and between shoulder blades. He is due for a 6 month post op appt and xray. Will coordinate scheduling at [facility] with Dr. Vasques. Advised patient to call back with further questions/concerns.

## 2018-05-11 ENCOUNTER — HOSPITAL ENCOUNTER (OUTPATIENT)
Dept: MRI IMAGING | Facility: CLINIC | Age: 53
Discharge: HOME | End: 2018-05-11
Attending: FAMILY MEDICINE | Admitting: FAMILY MEDICINE
Payer: COMMERCIAL

## 2018-05-11 DIAGNOSIS — M51.369 DDD (DEGENERATIVE DISC DISEASE), LUMBAR: ICD-10-CM

## 2018-05-11 PROCEDURE — 72148 MRI LUMBAR SPINE W/O DYE: CPT

## 2018-05-14 ENCOUNTER — TELEPHONE (OUTPATIENT)
Dept: FAMILY MEDICINE | Facility: CLINIC | Age: 53
End: 2018-05-14

## 2018-05-14 NOTE — TELEPHONE ENCOUNTER
** Patient Call Attempt With Normal Lab or Test Results**    I have attempted to contact this patient by phone with the following results: left message to return my call on answering machine.    When patient returns call, please advise of the following result.  If patient has further questions or concerns route call back to team, thank you.    Please inform that the MRI does show a couple of areas with bulging discs and some mild cord compression but no serious areas of concern. He has a consult with Dr. Vasques (neurosurgery on 5/17) and needs to be sure he follows up with this appointment.   Electronically signed by Greg Schoen, MD Kayli Sparks, CMA (AAMA)

## 2018-05-15 ENCOUNTER — HOSPITAL ENCOUNTER (EMERGENCY)
Facility: CLINIC | Age: 53
Discharge: HOME | End: 2018-05-15
Attending: EMERGENCY MEDICINE | Admitting: EMERGENCY MEDICINE
Payer: COMMERCIAL

## 2018-05-15 ENCOUNTER — APPOINTMENT (OUTPATIENT)
Dept: GENERAL RADIOLOGY | Facility: CLINIC | Age: 53
End: 2018-05-15
Attending: EMERGENCY MEDICINE
Payer: COMMERCIAL

## 2018-05-15 VITALS
DIASTOLIC BLOOD PRESSURE: 80 MMHG | SYSTOLIC BLOOD PRESSURE: 110 MMHG | HEART RATE: 81 BPM | RESPIRATION RATE: 16 BRPM | WEIGHT: 180 LBS | OXYGEN SATURATION: 95 % | TEMPERATURE: 98.8 F | BODY MASS INDEX: 28.19 KG/M2

## 2018-05-15 DIAGNOSIS — R05.9 COUGH: ICD-10-CM

## 2018-05-15 DIAGNOSIS — F41.1 GENERALIZED ANXIETY DISORDER: ICD-10-CM

## 2018-05-15 DIAGNOSIS — R06.00 DYSPNEA, UNSPECIFIED TYPE: ICD-10-CM

## 2018-05-15 DIAGNOSIS — M50.30 DDD (DEGENERATIVE DISC DISEASE), CERVICAL: ICD-10-CM

## 2018-05-15 LAB
ALBUMIN SERPL-MCNC: 3.3 G/DL (ref 3.4–5)
ALP SERPL-CCNC: 60 U/L (ref 40–150)
ALT SERPL W P-5'-P-CCNC: 28 U/L (ref 0–70)
ANION GAP SERPL CALCULATED.3IONS-SCNC: 9 MMOL/L (ref 3–14)
AST SERPL W P-5'-P-CCNC: 24 U/L (ref 0–45)
BASE EXCESS BLDV CALC-SCNC: 2.6 MMOL/L
BASOPHILS # BLD AUTO: 0 10E9/L (ref 0–0.2)
BASOPHILS NFR BLD AUTO: 0.1 %
BILIRUB SERPL-MCNC: 0.4 MG/DL (ref 0.2–1.3)
BUN SERPL-MCNC: 9 MG/DL (ref 7–30)
CALCIUM SERPL-MCNC: 8.8 MG/DL (ref 8.5–10.1)
CHLORIDE SERPL-SCNC: 102 MMOL/L (ref 94–109)
CO2 SERPL-SCNC: 26 MMOL/L (ref 20–32)
CREAT SERPL-MCNC: 1.03 MG/DL (ref 0.66–1.25)
DIFFERENTIAL METHOD BLD: ABNORMAL
EOSINOPHIL # BLD AUTO: 0.2 10E9/L (ref 0–0.7)
EOSINOPHIL NFR BLD AUTO: 1.3 %
ERYTHROCYTE [DISTWIDTH] IN BLOOD BY AUTOMATED COUNT: 13.8 % (ref 10–15)
GFR SERPL CREATININE-BSD FRML MDRD: 76 ML/MIN/1.7M2
GLUCOSE SERPL-MCNC: 144 MG/DL (ref 70–99)
HCO3 BLDV-SCNC: 27 MMOL/L (ref 21–28)
HCT VFR BLD AUTO: 39.9 % (ref 40–53)
HGB BLD-MCNC: 13.1 G/DL (ref 13.3–17.7)
IMM GRANULOCYTES # BLD: 0.1 10E9/L (ref 0–0.4)
IMM GRANULOCYTES NFR BLD: 0.3 %
LACTATE BLD-SCNC: 2.1 MMOL/L (ref 0.4–1.9)
LYMPHOCYTES # BLD AUTO: 1.6 10E9/L (ref 0.8–5.3)
LYMPHOCYTES NFR BLD AUTO: 9 %
MCH RBC QN AUTO: 30 PG (ref 26.5–33)
MCHC RBC AUTO-ENTMCNC: 32.8 G/DL (ref 31.5–36.5)
MCV RBC AUTO: 92 FL (ref 78–100)
MONOCYTES # BLD AUTO: 0.7 10E9/L (ref 0–1.3)
MONOCYTES NFR BLD AUTO: 3.9 %
NEUTROPHILS # BLD AUTO: 15 10E9/L (ref 1.6–8.3)
NEUTROPHILS NFR BLD AUTO: 85.4 %
NT-PROBNP SERPL-MCNC: 86 PG/ML (ref 0–900)
O2/TOTAL GAS SETTING VFR VENT: 21 %
PCO2 BLDV: 41 MM HG (ref 40–50)
PH BLDV: 7.43 PH (ref 7.32–7.43)
PLATELET # BLD AUTO: 218 10E9/L (ref 150–450)
PO2 BLDV: 41 MM HG (ref 25–47)
POTASSIUM SERPL-SCNC: 3.8 MMOL/L (ref 3.4–5.3)
PROT SERPL-MCNC: 7.8 G/DL (ref 6.8–8.8)
RBC # BLD AUTO: 4.36 10E12/L (ref 4.4–5.9)
SODIUM SERPL-SCNC: 137 MMOL/L (ref 133–144)
TROPONIN I SERPL-MCNC: <0.015 UG/L (ref 0–0.04)
WBC # BLD AUTO: 17.6 10E9/L (ref 4–11)

## 2018-05-15 PROCEDURE — 85025 COMPLETE CBC W/AUTO DIFF WBC: CPT | Performed by: EMERGENCY MEDICINE

## 2018-05-15 PROCEDURE — 99285 EMERGENCY DEPT VISIT HI MDM: CPT | Mod: 25 | Performed by: EMERGENCY MEDICINE

## 2018-05-15 PROCEDURE — 83605 ASSAY OF LACTIC ACID: CPT | Performed by: EMERGENCY MEDICINE

## 2018-05-15 PROCEDURE — 84484 ASSAY OF TROPONIN QUANT: CPT | Performed by: EMERGENCY MEDICINE

## 2018-05-15 PROCEDURE — 71046 X-RAY EXAM CHEST 2 VIEWS: CPT | Mod: TC

## 2018-05-15 PROCEDURE — 83880 ASSAY OF NATRIURETIC PEPTIDE: CPT | Performed by: EMERGENCY MEDICINE

## 2018-05-15 PROCEDURE — 25000125 ZZHC RX 250: Performed by: EMERGENCY MEDICINE

## 2018-05-15 PROCEDURE — 93005 ELECTROCARDIOGRAM TRACING: CPT | Performed by: EMERGENCY MEDICINE

## 2018-05-15 PROCEDURE — 93010 ELECTROCARDIOGRAM REPORT: CPT | Mod: Z6 | Performed by: EMERGENCY MEDICINE

## 2018-05-15 PROCEDURE — 80053 COMPREHEN METABOLIC PANEL: CPT | Performed by: EMERGENCY MEDICINE

## 2018-05-15 PROCEDURE — 96360 HYDRATION IV INFUSION INIT: CPT | Performed by: EMERGENCY MEDICINE

## 2018-05-15 PROCEDURE — 25000128 H RX IP 250 OP 636: Performed by: EMERGENCY MEDICINE

## 2018-05-15 PROCEDURE — 94640 AIRWAY INHALATION TREATMENT: CPT | Performed by: EMERGENCY MEDICINE

## 2018-05-15 PROCEDURE — 82803 BLOOD GASES ANY COMBINATION: CPT | Performed by: EMERGENCY MEDICINE

## 2018-05-15 RX ORDER — SODIUM CHLORIDE 9 MG/ML
1000 INJECTION, SOLUTION INTRAVENOUS CONTINUOUS
Status: DISCONTINUED | OUTPATIENT
Start: 2018-05-15 | End: 2018-05-16 | Stop reason: HOSPADM

## 2018-05-15 RX ORDER — PREDNISONE 20 MG/1
40 TABLET ORAL DAILY
Qty: 10 TABLET | Refills: 0 | Status: SHIPPED | OUTPATIENT
Start: 2018-05-15 | End: 2019-02-04

## 2018-05-15 RX ORDER — AZITHROMYCIN 250 MG/1
TABLET, FILM COATED ORAL
Qty: 6 TABLET | Refills: 0 | Status: SHIPPED | OUTPATIENT
Start: 2018-05-15 | End: 2019-02-04

## 2018-05-15 RX ORDER — IPRATROPIUM BROMIDE AND ALBUTEROL SULFATE 2.5; .5 MG/3ML; MG/3ML
3 SOLUTION RESPIRATORY (INHALATION) ONCE
Status: COMPLETED | OUTPATIENT
Start: 2018-05-15 | End: 2018-05-15

## 2018-05-15 RX ORDER — NYSTATIN 100000/ML
500000 SUSPENSION, ORAL (FINAL DOSE FORM) ORAL 4 TIMES DAILY
Qty: 60 ML | Refills: 0 | Status: SHIPPED | OUTPATIENT
Start: 2018-05-15 | End: 2018-07-05

## 2018-05-15 RX ADMIN — SODIUM CHLORIDE 1000 ML: 9 INJECTION, SOLUTION INTRAVENOUS at 20:34

## 2018-05-15 RX ADMIN — IPRATROPIUM BROMIDE AND ALBUTEROL SULFATE 3 ML: .5; 3 SOLUTION RESPIRATORY (INHALATION) at 20:32

## 2018-05-15 NOTE — TELEPHONE ENCOUNTER
methadone,   Last Written Prescription Date:  4/16/18  Last Fill Quantity: 90,  # refills: 0   Last office visit: 3/21/2018 with prescribing provider:  3/21/18   Future Office Visit:   Next 5 appointments (look out 90 days)     May 17, 2018  2:20 PM CDT   Return Visit with Nikolas Vasques MD   [address] [address]   [phone]                 clonazepam  Last Written Prescription Date:  4/16/18  Last Fill Quantity: 90,  # refills: 0   Last office visit: 3/21/2018 with prescribing provider:  3/21/18   Future Office Visit:   Next 5 appointments (look out 90 days)     May 17, 2018  2:20 PM CDT   Return Visit with Nikolas Vasques MD   [facility] ([address] [address]   [phone]                   Requested Prescriptions   Pending Prescriptions Disp Refills     clonazePAM (KLONOPIN) 0.5 MG tablet 90 tablet 0     Sig: Take 0.5-1 tablets (0.25-0.5 mg) by mouth 3 times daily as needed for anxiety    There is no refill protocol information for this order        methadone (DOLOPHINE) 10 MG tablet 90 tablet 0     Sig: Take 1 tablet (10 mg) by mouth every 8 hours as needed    There is no refill protocol information for this order

## 2018-05-15 NOTE — TELEPHONE ENCOUNTER
Tried to reach patient, left message for patient to call the clinic back.  Maverick Casillas, [facility]

## 2018-05-15 NOTE — ED AVS SNAPSHOT
[facility] Emergency Department    [address] [place] [address]    Phone:  [phone]    Fax:  [phone]                                       Joselito Abarca   MRN: [id number]    Department:  [facility] Emergency Department   Date of Visit:  5/15/2018           After Visit Summary Signature Page     I have received my discharge instructions, and my questions have been answered. I have discussed any challenges I see with this plan with the nurse or doctor.    ..........................................................................................................................................  Patient/Patient Representative Signature      ..........................................................................................................................................  Patient Representative Print Name and Relationship to Patient    ..................................................               ................................................  Date                                            Time    ..........................................................................................................................................  Reviewed by Signature/Title    ...................................................              ..............................................  Date                                                            Time

## 2018-05-15 NOTE — ED AVS SNAPSHOT
[facility]    [address]     [place] [address]    Phone:  [phone]    Fax:  [phone]                                       Joselito Abarca   MRN: [id number]    Department:  [facility]   Date of Visit:  5/15/2018           Patient Information     Date Of Birth          1965        Your diagnoses for this visit were:     Cough     Dyspnea, unspecified type        You were seen by Easton Alfaro MD.      Follow-up Information     Follow up with Schoen, Gregory G, MD.    Specialty:  Family Practice    Why:  Consider referral to pulmonology as you have had recurrent similar presentations in the past.    Contact information:    [address]   [place] [address] [phone]          Discharge Instructions         Shortness of Breath (Dyspnea)  Shortness of breath is the feeling that you can't catch your breath or get enough air. It is also known as dyspnea.  Dyspnea can be caused by many different conditions. They include:    Acute asthma attack    Worsening of chronic lung diseases such as chronic bronchitis and emphysema    Heart failure. This is when weak heart muscle allows extra fluid to collect in the lungs.    Panic attacks or anxiety. Fear can cause rapid breathing (hyperventilation).    Pneumonia, or an infection in the lung tissue    Exposure to toxic substances, fumes, smoke, or certain medicines    Blood clot in the lung (pulmonary embolism). This is often from a piece of blood clot in a deep vein of the leg (deep vein thrombosis) that breaks off and travels to the lungs.    Heart attack or heart-related chest pain (angina)    Anemia    Collapsed lung (pneumothorax)    Dehydration    Pregnancy  Based on your visit today, the exact cause of your shortness of breath is not certain. Your tests don t show any of the serious causes of dyspnea. You may need other tests to find out if you have a serious problem. It s  important to watch for any new symptoms or symptoms that get worse. Follow up with your healthcare provider as directed.  Home care  Follow these tips to take care of yourself at home:    When your symptoms are better, go back to your usual activities.    If you smoke, you should stop. Join a quit-smoking program or ask your healthcare provider for help.    Eat a healthy diet and get plenty of sleep.    Get regular exercise. Talk with your healthcare provider before starting to exercise, especially if you have other medical problems.    Cut down on the amount of caffeine and stimulants you consume.  Follow-up care  Follow up with your healthcare provider, or as advised.  If tests were done, you will be told if your treatment needs to be changed. You can call as directed for the results.  If an X-ray was taken, a specialist will review it. You will be notified of any new findings that may affect your care.  Call 911  Shortness of breath may be a sign of a serious medical problem. For example, it may be a problem with your heart or lungs. Call 911 if you have worsening shortness of breath or trouble breathing, especially with any of the symptoms below:    You are confused or it s difficult to wake you.    You faint or lose consciousness.    You have a fast heartbeat, or your heartbeat is irregular.    You are coughing up blood.    You have pain in your chest, arm, shoulder, neck, or upper back.    You break out in a sweat.  When to seek medical advice  Call your healthcare provider right away if any of these occur:    Slight shortness of breath or wheezing    Redness, pain or swelling in your leg, arm, or other body area    Swelling in both legs or ankles    Fast weight gain    Dizziness or weakness    Fever of 100.4 F (38 C) or higher, or as directed by your healthcare provider  Date Last Reviewed: 9/13/2015 2000-2017 [employer] [address], [place] [address]. All rights reserved. This  information is not intended as a substitute for professional medical care. Always follow your healthcare professional's instructions.          Chronic Cough with Uncertain Cause (Adult)    Everyone has had a cough as part of the common cold, flu, or bronchitis. This kind of cough occurs along with an achy feeling, low-grade fever, nasal and sinus congestion, and a scratchy or sore throat. This usually gets better in 2 to 3 weeks. A cough that lasts longer than 3 weeks may be due to other causes.  If your cough does not improve over the next 2 weeks, further testing may be needed. Follow up with your healthcare provider as advised. Cough suppressants may be recommended. Based on your exam today, the exact cause of your cough is not certain. Below are some common causes for persistent cough.  Smoker's cough  Smoker s cough doesn t go away. If you continue to smoke, it only gets worse. The cough is from irritation in the air passages. Talk to your healthcare provider about quitting. Medicines or nicotine-replacement products, like gum or the patch, may make quitting easier.  Postnasal drip  A cough that is worse at night may be due to postnasal drip. Excess mucus in the nose drains from the back of your nose to your throat. This triggers the cough reflex. Postnasal drip may be due to a sinus infection or allergy. Common allergens include dust, tobacco smoke (both inhaled and secondhand smoke), environmental pollutants, pollen, mold, pets, cleaning agents, room deodorizers, and chemical fumes. Over-the-counter antihistamines or decongestants may be helpful for allergies. A sinus infection may requires antibiotic treatment. See your healthcare provider if symptoms continue.  Medicines  Certain prescribed medicines can cause a chronic cough in some people:    ACE inhibitors for high blood pressure. These include benazepril, captopril, enalapril, fosinopril, lisinopril, quinapril, ramipril, and others.    Beta-blockers for  high blood pressure and other conditions. These include propranolol, atenolol, metoprolol, nadolol, and others.  Let your healthcare provider know if you are taking any of these.  Asthma  Cough may be the only sign of mild asthma. You may have tests to find out if asthma is causing your cough. You may also take asthma medicine on a trial basis.  Acid reflux (heartburn, GERD)  The esophagus is the tube that carries food from the mouth to the stomach. A valve at its lower end prevents stomach acids from flowing upward. If this valve does not work properly, acid from the stomach enters the esophagus. This may cause a burning pain in the upper abdomen or lower chest, belching, or cough. Symptoms are often worse when lying flat. Avoid eating or drinking before bedtime. Try using extra pillows to raise your upper body, or place 4-inch blocks under the head of your bed. You may try an over-the-counter antacid or an acid-blocking medicine such as famotidine, cimetidine, ranitidine, esomeprazole, lansoprazole, or omeprazole. Stronger medicines for this condition can be prescribed by your healthcare provider.  Follow-up care  Follow up with your healthcare provider, or as advised, if your cough does not improve. Further testing may be needed.  Note: If an X-ray was taken, a specialist will review it. You will be notified of any new findings that may affect your care.  When to seek medical advice  Call your healthcare provider right away if any of these occur:    Mild wheezing or difficulty breathing    Fever of 100.4 F (38 C) or higher, or as directed by your healthcare provider    Unexpected weight loss    Coughing up large amounts of colored sputum    Night sweats (sheets and pajamas get soaking wet)  Call 911  Call 911 if any of these occur:    Coughing up blood    Moderate to severe trouble breathing or wheezing  Date Last Reviewed: 9/13/2015 2000-2017 [employer]. [address] [facility], [facility]  [address]. All rights reserved. This information is not intended as a substitute for professional medical care. Always follow your healthcare professional's instructions.          Your next 10 appointments already scheduled     May 17, 2018  2:15 PM CDT   XR CERVICAL SPINE 2/3 VIEWS with [id number]   [facility] ([facility])    [address] [place] [address]              Please bring a list of your current medicines to your exam. (Include vitamins, minerals and over-thecounter medicines.) Leave your valuables at home.  Tell your doctor if there is a chance you may be pregnant.  You do not need to do anything special for this exam.            May 17, 2018  2:20 PM CDT   Return Visit with Nikolas Vasques MD   [facility] ([address] [address] [phone]              24 Hour Appointment Hotline       To make an appointment at any [facility], call [phone] ([phone]). If you don't have a family doctor or clinic, we will help you find one. [facility] are conveniently located to serve the needs of you and your family.             Review of your medicines      START taking        Dose / Directions Last dose taken    azithromycin 250 MG tablet   Commonly known as:  ZITHROMAX Z-SAMRA   Quantity:  6 tablet        Two tablets on the first day, then one tablet daily for the next 4 days   Refills:  0        nystatin 333857 UNIT/ML suspension   Commonly known as:  MYCOSTATIN   Dose:  782330 Units   Quantity:  60 mL        Take 5 mLs (500,000 Units) by mouth 4 times daily   Refills:  0        predniSONE 20 MG tablet   Commonly known as:  DELTASONE   Dose:  40 mg   Quantity:  10 tablet        Take 2 tablets (40 mg) by mouth daily for 5 days   Refills:  0          Our records show that you are taking the medicines listed below. If these are incorrect, please call your family doctor or clinic.         Dose / Directions Last dose taken    * albuterol (2.5 MG/3ML) 0.083% neb solution   Quantity:  90 mL        NEBULIZE CONTENTS OF ONE VIAL EVERY 4 HOURS AS NEEDED FOR SHORTNESS OF BREATH /DYSPNEA OR WHEEZING   Refills:  0        * VENTOLIN  (90 Base) MCG/ACT Inhaler   Quantity:  18 g   Generic drug:  albuterol        INHALE 2 PUFFS INTO THE LUNGS EVERY 6 HOURS AS NEEDED FOR SHORTNESS OF BREATH, DIFFICULTY BREATHING OR WHEEZING.   Refills:  3        citalopram 20 MG tablet   Commonly known as:  celeXA   Dose:  20 mg   Quantity:  90 tablet        Take 1 tablet (20 mg) by mouth daily   Refills:  3        clonazePAM 0.5 MG tablet   Commonly known as:  klonoPIN   Dose:  0.25-0.5 mg   Quantity:  90 tablet        Take 0.5-1 tablets (0.25-0.5 mg) by mouth 3 times daily as needed for anxiety   Refills:  0        FLOVENT  MCG/ACT Inhaler   Quantity:  36 g   Generic drug:  fluticasone        INHALE 2 PUFFS INTO THE LUNGS TWICE DAILY   Refills:  1        fluticasone 50 MCG/ACT spray   Commonly known as:  FLONASE   Quantity:  16 g        USE TWO SPRAYS IN EACH NOSTRIL EVERY DAY   Refills:  5        gabapentin 300 MG capsule   Commonly known as:  NEURONTIN   Quantity:  270 capsule        TAKE TWO CAPSULES BY MOUTH THREE TIMES A DAY   Refills:  11        ipratropium - albuterol 0.5 mg/2.5 mg/3 mL 0.5-2.5 (3) MG/3ML neb solution   Commonly known as:  DUONEB   Dose:  1 vial   Quantity:  30 vial        Take 1 vial (3 mLs) by nebulization every 4 hours as needed for shortness of breath / dyspnea   Refills:  0        methadone 10 MG tablet   Commonly known as:  DOLOPHINE   Dose:  10 mg   Quantity:  90 tablet        Take 1 tablet (10 mg) by mouth every 8 hours as needed   Refills:  0        omeprazole 20 MG CR capsule   Commonly known as:  priLOSEC   Quantity:  30 capsule        TAKE 1 CAPSULE BY MOUTH DAILY, 30 TO 60 MINUTES BEFORE A MEAL.   Refills:  10        oxyCODONE 5 MG capsule   Commonly known as:  OXY-IR   Dose:   10 mg   Quantity:  360 capsule        Take 2 capsules (10 mg) by mouth every 4 hours as needed 2 caps q 4 hour prn pain up to 12 per day May fill 5/24/2012   Refills:  0        ranitidine 150 MG tablet   Commonly known as:  ZANTAC   Quantity:  30 tablet        TAKE ONE TABLET BY MOUTH EVERY MORNING   Refills:  10        sildenafil 100 MG tablet   Commonly known as:  VIAGRA   Dose:  100 mg   Quantity:  4 tablet        Take 1 tablet (100 mg) by mouth daily as needed 30 min to 4 hrs before sex. Do not use with nitroglycerin, terazosin or doxazosin.   Refills:  0        traZODone 100 MG tablet   Commonly known as:  DESYREL   Dose:  300 mg   Quantity:  90 tablet        Take 3 tablets (300 mg) by mouth At Bedtime   Refills:  3        vitamin D 2000 units tablet   Quantity:  100 tablet        TAKE ONE TABLET BY MOUTH EVERY DAY   Refills:  3        zolpidem 10 MG tablet   Commonly known as:  AMBIEN   Dose:  10 mg        Take 10 mg by mouth nightly as needed   Refills:  0        * Notice:  This list has 2 medication(s) that are the same as other medications prescribed for you. Read the directions carefully, and ask your doctor or other care provider to review them with you.            Prescriptions were sent or printed at these locations (3 Prescriptions)                   Other Prescriptions                Printed at Department/Unit printer (3 of 3)         azithromycin (ZITHROMAX Z-SAMRA) 250 MG tablet               nystatin (MYCOSTATIN) 038235 UNIT/ML suspension               predniSONE (DELTASONE) 20 MG tablet                Procedures and tests performed during your visit     Blood gas venous    CBC with platelets differential    Comprehensive metabolic panel    EKG 12-lead, tracing only    Lactate for Sepsis Protocol    Nt probnp inpatient (BNP)    Peripheral IV: Standard    Troponin I    XR Chest 2 Views      Orders Needing Specimen Collection     None      Pending Results     No orders found from 5/13/2018 to  "2018.            Pending Culture Results     No orders found from 2018 to 2018.            Pending Results Instructions     If you had any lab results that were not finalized at the time of your Discharge, you can call the ED Lab Result RN at [phone]. You will be contacted by this team for any positive Lab results or changes in treatment. The nurses are available 7 days a week from 10A to 6:30P.  You can leave a message 24 hours per day and they will return your call.        Thank you for choosing [facility]       Thank you for choosing [facility] for your care. Our goal is always to provide you with excellent care. Hearing back from our patients is one way we can continue to improve our services. Please take a few minutes to complete the written survey that you may receive in the mail after you visit with us. Thank you!        [employer] Information     [employer] lets you send messages to your doctor, view your test results, renew your prescriptions, schedule appointments and more. To sign up, go to [email or web address] . Click on \"Log in\" on the left side of the screen, which will take you to the Welcome page. Then click on \"Sign up Now\" on the right side of the page.     You will be asked to enter the access code listed below, as well as some personal information. Please follow the directions to create your username and password.     Your access code is: [email or web address]  Expires: 2018 10:35 PM     Your access code will  in 90 days. If you need help or a new code, please call your [facility] clinic or [phone].        Care EveryWhere ID     This is your Care EveryWhere ID. This could be used by other organizations to access your [facility] medical records  [id number]        Equal Access to Services     [facility] FELICIA : Melvin Hebert, damari garibay, omayra sandoval. So [facility] [phone].    ATENCIÓN: Si erinla español tiene " a alfaro disposición servicios gratuitos de asistencia lingüística. Lldoron al [phone].    We comply with applicable federal civil rights laws and [place] laws. We do not discriminate on the basis of race, color, national origin, age, disability, sex, sexual orientation, or gender identity.            After Visit Summary       This is your record. Keep this with you and show to your community pharmacist(s) and doctor(s) at your next visit.

## 2018-05-16 ENCOUNTER — TELEPHONE (OUTPATIENT)
Dept: FAMILY MEDICINE | Facility: CLINIC | Age: 53
End: 2018-05-16

## 2018-05-16 DIAGNOSIS — R06.9 BREATHING PROBLEM: Primary | ICD-10-CM

## 2018-05-16 RX ORDER — METHADONE HYDROCHLORIDE 10 MG/1
10 TABLET ORAL EVERY 8 HOURS PRN
Qty: 21 TABLET | Refills: 0 | Status: SHIPPED | OUTPATIENT
Start: 2018-05-16 | End: 2018-07-19

## 2018-05-16 RX ORDER — CLONAZEPAM 0.5 MG/1
0.25-0.5 TABLET ORAL 3 TIMES DAILY PRN
Qty: 21 TABLET | Refills: 0 | Status: SHIPPED | OUTPATIENT
Start: 2018-05-16 | End: 2018-07-24

## 2018-05-16 NOTE — TELEPHONE ENCOUNTER
Patient was seen in ED last night for breathing issues, per patient ER provider suggested patient check with his PCP on seeing pulmonologist, patient wondering if referral is appropriate or if he should see you for a visit to discuss, see below ER providers documentation.  Patient aware PCP is out of office until Monday and is ok with waiting for him to advise  Angella Leiva CMA    Assessments & Plan (with Medical Decision Making)   Patient is a 52-year-old male who presents 3 days of cough, congestion, shortness of breath.  Patient states he was recently diagnosed with pneumonia.  Records show that a month ago he had similar presentation and x-ray showed possible right lower lobe infiltrate but subsequent CT showed diffuse mild haziness in both lung fields.  No pulmonary embolism at that time.  He was treated with antibiotics and steroids with improvement.  Records however show that he has had recurrent episodes very similar to this.  He had an extensive workup at [facility] which was nondiagnostic.  He had a single band IgG which was a little bit low but otherwise all the testing was normal except one positive elevated IgE test.  By report fungal viral and bacterial etiologies were negative.  Patient states he thinks it is related to fungus in his home.  He was a former smoker.  He has a diagnosis of asthma.  He has been using his inhaler.  No chest pain.  EKG showed no acute abnormality was unchanged from previous.  Troponin was normal.  His white count was elevated.  Lactic acid is mildly elevated at 2.1.  Doubt sepsis.  Chest x-ray today again showed diffuse mild interstitial bilaterally.  He was wheezing and resolved with nebulization.  Question if he has has recurrent allergic type inflammation of the lungs.  At this point will put him on steroids for burst and taper.  Also Zithromax to cover for atypical pneumonia.  He has enough inhaler at home.  He requests nystatin oral thrush.  I have asked him to  contact his regular doctor for recheck and consider referral to pulmonologist to reassess.  Reasons to return for reassessment were discussed.

## 2018-05-16 NOTE — TELEPHONE ENCOUNTER
Spoke with patient and informed him of message. Patient confirms he will attend appt with Dr. Vasques tomorrow.  Angella Leiva CMA

## 2018-05-16 NOTE — ED PROVIDER NOTES
"  History     Chief Complaint   Patient presents with     Shortness of Breath     Cough     HPI  Joselito Abarca is a 52 year old male who presents with complaints of 3 days of cough, congestion, and shortness of breath.  He states he was recently diagnosed with pneumonia.  Records reviewed that he had imaging 1 month ago which showed possible right lower lobe infiltrate and on CT scan showed mild diffuse haziness in both lung fields.  No evidence of PE at that time.  Patient was treated with Levaquin and steroids.  He states that he \"did not take care of himself\".  Since the diagnosis he states he has been at his brothers working on cars every day.  He denies fever or chills with this.  Been trying his nebulizer with some improvement.  He took [place] lozenge and that made his cough worse.  He has been diagnosed with asthma previously.  He is a former smoker of 25 years.  He had recurrent respiratory illnesses and had a workup through [facility] with extensive serologic workup.  The results were nondiagnostic with a single band of IgG being a little bit low and all others tests were normal and assessment for fungal, viral, and bacterial etiology for his symptoms were unremarkable.  He did have a positive test with elevated IgE suggestive of allergic component.  Patient denies exposure to infectious illness.  He has had no fever or chills.  Denies chest pain.  No abdominal complaints.  No nausea or vomiting.  No leg pain or swelling.  Does have some anxiety.  History of acid reflux.  Chronic pain issues and is followed by Dr.Schoen for this.  He is not currently on steroids or antibiotics.    Problem List:    Patient Active Problem List    Diagnosis Date Noted     Status post cervical spinal arthrodesis 11/29/2017     Priority: Medium     Chronic pain syndrome 12/13/2016     Priority: Medium     Patient is followed by Gregory G. Schoen, MD for ongoing prescription of pain medication.  All refills should be approved " by this provider, or covering partner.    Medication(s): Oxycodone 5 mg IR: Take 2 capsules (10 mg) by mouth every 4 hours as needed 2 caps q 4 hour prn pain up to 12 per day .   Methadone 10 mg three times daily, 90 per month  Clonazepam 0.5 mg tid, 90 per month   Clinic visit frequency required: Q 3 months     Controlled substance agreement:  Encounter-Level CSA - 2/27/15:               Controlled Substance Agreement - Scan on 3/14/2015  8:47 AM : Controlled Medication Agreement-02/27/15 (below)            Pain Clinic evaluation in the past: Yes    DIRE Total Score(s):  No flowsheet data found.    Last [facility] website verification:  10/30/2016     [email or web address]         Moderate persistent asthma without complication 11/02/2016     Priority: Medium     Acute respiratory failure with hypoxia (H) 04/29/2016     Priority: Medium     Nausea with vomiting 04/27/2016     Priority: Medium     Cough 03/06/2016     Priority: Medium     Leukocytosis 02/16/2016     Priority: Medium     Disease of bronchial airway (HCC) 02/12/2016     Priority: Medium     Degeneration of cervical intervertebral disc 01/26/2015     Priority: Medium     Chronic rhinitis 01/26/2015     Priority: Medium     Health Care Home 09/30/2013     Priority: Medium     Status:  Accepted  Care Coordinator:    Melissa Behl BSN, RN, PHN  [facility] Care Coordinator  [phone]     See Letters for [facility] Care Plan  Date:  April 26, 2016            Arthrodesis status 06/15/2011     Priority: Medium     Neck pain 06/15/2011     Priority: Medium     CARDIOVASCULAR SCREENING; LDL GOAL LESS THAN 160 10/31/2010     Priority: Medium     Intervertebral cervical disc disorder with myelopathy, cervical region 11/12/2009     Priority: Medium     Patient is followed by TIARA JIMENEZ for ongoing prescription of narcotic pain medicine.  Med: methadone 10 mg tid. Taking oxycodone up to 8 per day, 120 per month  Maximum use per month: 90  Expected duration:  ongoing  Narcotic agreement on file: YES  Clinic visit recommended: Q 3 months         Constipation 03/19/2008     Priority: Medium     Problem list name updated by automated process. Provider to review       Thoracic or lumbosacral neuritis or radiculitis, unspecified 03/19/2008     Priority: Medium     Esophageal reflux 07/09/2003     Priority: Medium     Generalized anxiety disorder 07/08/2003     Priority: Medium     Alcohol abuse, in remission 07/08/2003     Priority: Medium        Past Medical History:    Past Medical History:   Diagnosis Date     Anxiety      GERD (gastroesophageal reflux disease)      Neck pain 6/15/2011       Past Surgical History:    Past Surgical History:   Procedure Laterality Date     DISCECTOMY LUMBAR POSTERIOR MICROSCOPIC ONE LEVEL  2/21/2012    Procedure:DISCECTOMY LUMBAR POSTERIOR MICROSCOPIC ONE LEVEL; LEFT T1-T2 THORASIC HEMILAMINECTOMY MICRODISCECTOMY WITH MEXTRIX II ; Surgeon:SHARON PURI; Location:[facility] OR     DISCECTOMY, FUSION CERVICAL ANTERIOR ONE LEVEL, COMBINED N/A 11/29/2017    Procedure: COMBINED DISCECTOMY, FUSION CERVICAL ANTERIOR ONE LEVEL;  Anterior Cervical Discectomy and Fusion Cervical Six - Cervical Seven, Exploration and Revision Cervical Four - Cervical Six with Hardware Removal;  Surgeon: Nikolas Vasques MD;  Location: [facility] OR     EXPLORE SPINE, REMOVE HARDWARE, COMBINED N/A 11/29/2017    Procedure: COMBINED EXPLORE SPINE, REMOVE HARDWARE;;  Surgeon: Nikolas Vasques MD;  Location: [facility] OR     FUSION CERVICAL ANTERIOR TWO LEVELS  1/29/2010     HC DRAIN/INJ MAJOR JOINT/BURSA W/O US  5/5/2008    Left sacroiliac joint injection.     HC INJ EPIDURAL LUMBAR/SACRAL W/WO CONTRAST  2009     HEAD & NECK SURGERY      2013     HERNIA REPAIR       INJECT BLOCK MEDIAL BRANCH CERVICAL/THORACIC/LUMBAR Bilateral 8/26/2015    Procedure: INJECT BLOCK MEDIAL BRANCH CERVICAL / THORACIC / LUMBAR;  Surgeon: Ronald Driscoll MD;  Location: [facility] OR     INJECT FACET JOINT  Bilateral 5/27/2015    Procedure: INJECT FACET JOINT;  Surgeon: Ronald Driscoll MD;  Location: [facility] OR       Family History:    Family History   Problem Relation Age of Onset     Family History Negative No family hx of      C.A.D. No family hx of        Social History:  Marital Status:  Single [1]  Social History   Substance Use Topics     Smoking status: Passive Smoke Exposure - Never Smoker     Types: Cigars     Last attempt to quit: 12/19/2009     Smokeless tobacco: Never Used     Alcohol use No      Comment: HX OF ABUSE-IN REMISSION        Medications:      albuterol (2.5 MG/3ML) 0.083% nebulizer solution   azithromycin (ZITHROMAX Z-SAMRA) 250 MG tablet   Cholecalciferol (VITAMIN D) 2000 units tablet   citalopram (CELEXA) 20 MG tablet   clonazePAM (KLONOPIN) 0.5 MG tablet   FLOVENT  MCG/ACT Inhaler   fluticasone (FLONASE) 50 MCG/ACT spray   gabapentin (NEURONTIN) 300 MG capsule   ipratropium - albuterol 0.5 mg/2.5 mg/3 mL (DUONEB) 0.5-2.5 (3) MG/3ML neb solution   methadone (DOLOPHINE) 10 MG tablet   nystatin (MYCOSTATIN) 075050 UNIT/ML suspension   omeprazole (PRILOSEC) 20 MG CR capsule   oxyCODONE (OXY-IR) 5 MG capsule   predniSONE (DELTASONE) 20 MG tablet   ranitidine (ZANTAC) 150 MG tablet   sildenafil (VIAGRA) 100 MG tablet   traZODone (DESYREL) 100 MG tablet   VENTOLIN  (90 BASE) MCG/ACT Inhaler   zolpidem (AMBIEN) 10 MG tablet   [DISCONTINUED] ipratropium - albuterol 0.5 mg/2.5 mg/3 mL (DUONEB) 0.5-2.5 (3) MG/3ML neb solution         Review of Systems all other systems reviewed and are negative.    Physical Exam   BP: 95/68  Pulse: 110  Temp: 98.8  F (37.1  C)  Resp: 16  Weight: 81.6 kg (180 lb)  SpO2: 98 %      Physical Exam general alert male in mild respiratory distress.  HEENT shows no scleral injection.  Nasal packs are boggy but patent.  Orally there is no postnasal drip.  Speech is clear and concise.  Neck is supple without JVD or stridor.  Lungs reveal a faint expiratory wheeze at the  right base.  No other adventitious sounds.  Cardiac regular rate without murmur.  Benign abdominal exam.  No leg pain or swelling.    ED Course     ED Course     Procedures               EKG Interpretation:      Interpreted by Easton Alfaro  Time reviewed: 20:20  Symptoms at time of EKG: Cough and shortness of breath  Rhythm: normal sinus   Rate: Normal  Axis: Normal  Ectopy: none  Conduction: normal  ST Segments/ T Waves: No acute ischemic changes  Q Waves: none  Comparison to prior: Unchanged from 4/9/18    Clinical Impression: normal EKG    Results for orders placed or performed during the hospital encounter of 05/15/18   XR Chest 2 Views    Narrative    CHEST TWO VIEWS    5/15/2018 8:51 PM     HISTORY: Cough and shortness of breath.    COMPARISON: Chest CT 4/9/2018.      Impression    IMPRESSION: Mild interstitial opacities throughout the lungs, right  greater than left, concerning for pneumonia or interstitial edema. The  infiltrates are less pronounced when compared to prior chest x-ray  4/9/2018. No significant pleural effusion or pneumothorax. Cardiac  silhouette within normal limits.    SHAYLA PRATT MD                 Critical Care time:  none               Results for orders placed or performed during the hospital encounter of 05/15/18 (from the past 24 hour(s))   CBC with platelets differential   Result Value Ref Range    WBC 17.6 (H) 4.0 - 11.0 10e9/L    RBC Count 4.36 (L) 4.4 - 5.9 10e12/L    Hemoglobin 13.1 (L) 13.3 - 17.7 g/dL    Hematocrit 39.9 (L) 40.0 - 53.0 %    MCV 92 78 - 100 fl    MCH 30.0 26.5 - 33.0 pg    MCHC 32.8 31.5 - 36.5 g/dL    RDW 13.8 10.0 - 15.0 %    Platelet Count 218 150 - 450 10e9/L    Diff Method Automated Method     % Neutrophils 85.4 %    % Lymphocytes 9.0 %    % Monocytes 3.9 %    % Eosinophils 1.3 %    % Basophils 0.1 %    % Immature Granulocytes 0.3 %    Absolute Neutrophil 15.0 (H) 1.6 - 8.3 10e9/L    Absolute Lymphocytes 1.6 0.8 - 5.3 10e9/L    Absolute Monocytes 0.7 0.0  - 1.3 10e9/L    Absolute Eosinophils 0.2 0.0 - 0.7 10e9/L    Absolute Basophils 0.0 0.0 - 0.2 10e9/L    Abs Immature Granulocytes 0.1 0 - 0.4 10e9/L   Comprehensive metabolic panel   Result Value Ref Range    Sodium 137 133 - 144 mmol/L    Potassium 3.8 3.4 - 5.3 mmol/L    Chloride 102 94 - 109 mmol/L    Carbon Dioxide 26 20 - 32 mmol/L    Anion Gap 9 3 - 14 mmol/L    Glucose 144 (H) 70 - 99 mg/dL    Urea Nitrogen 9 7 - 30 mg/dL    Creatinine 1.03 0.66 - 1.25 mg/dL    GFR Estimate 76 >60 mL/min/1.7m2    GFR Estimate If Black >90 >60 mL/min/1.7m2    Calcium 8.8 8.5 - 10.1 mg/dL    Bilirubin Total 0.4 0.2 - 1.3 mg/dL    Albumin 3.3 (L) 3.4 - 5.0 g/dL    Protein Total 7.8 6.8 - 8.8 g/dL    Alkaline Phosphatase 60 40 - 150 U/L    ALT 28 0 - 70 U/L    AST 24 0 - 45 U/L   Troponin I   Result Value Ref Range    Troponin I ES <0.015 0.000 - 0.045 ug/L   Nt probnp inpatient (BNP)   Result Value Ref Range    N-Terminal Pro BNP Inpatient 86 0 - 900 pg/mL   Blood gas venous   Result Value Ref Range    Ph Venous 7.43 7.32 - 7.43 pH    PCO2 Venous 41 40 - 50 mm Hg    PO2 Venous 41 25 - 47 mm Hg    Bicarbonate Venous 27 21 - 28 mmol/L    Base Excess Venous 2.6 mmol/L    FIO2 21    Lactate for Sepsis Protocol   Result Value Ref Range    Lactate for Sepsis Protocol 2.1 (HH) 0.4 - 1.9 mmol/L   XR Chest 2 Views    Narrative    CHEST TWO VIEWS    5/15/2018 8:51 PM     HISTORY: Cough and shortness of breath.    COMPARISON: Chest CT 4/9/2018.      Impression    IMPRESSION: Mild interstitial opacities throughout the lungs, right  greater than left, concerning for pneumonia or interstitial edema. The  infiltrates are less pronounced when compared to prior chest x-ray  4/9/2018. No significant pleural effusion or pneumothorax. Cardiac  silhouette within normal limits.    SHAYLA PRATT MD       Medications   0.9% sodium chloride BOLUS (0 mLs Intravenous Stopped 5/15/18 7730)     Followed by   sodium chloride 0.9% infusion (not administered)    lidocaine 1 % 1 mL (not administered)   ipratropium - albuterol 0.5 mg/2.5 mg/3 mL (DUONEB) neb solution 3 mL (3 mLs Nebulization Given 5/15/18 2032)     IV is established and blood work is obtained.  Patient is given a DuoNeb.  Chest x-rays ordered.  EKG is ordered.  Assessments & Plan (with Medical Decision Making)   Patient is a 52-year-old male who presents 3 days of cough, congestion, shortness of breath.  Patient states he was recently diagnosed with pneumonia.  Records show that a month ago he had similar presentation and x-ray showed possible right lower lobe infiltrate but subsequent CT showed diffuse mild haziness in both lung fields.  No pulmonary embolism at that time.  He was treated with antibiotics and steroids with improvement.  Records however show that he has had recurrent episodes very similar to this.  He had an extensive workup at [place] care which was nondiagnostic.  He had a single band IgG which was a little bit low but otherwise all the testing was normal except one positive elevated IgE test.  By report fungal viral and bacterial etiologies were negative.  Patient states he thinks it is related to fungus in his home.  He was a former smoker.  He has a diagnosis of asthma.  He has been using his inhaler.  No chest pain.  EKG showed no acute abnormality was unchanged from previous.  Troponin was normal.  His white count was elevated.  Lactic acid is mildly elevated at 2.1.  Doubt sepsis.  Chest x-ray today again showed diffuse mild interstitial bilaterally.  He was wheezing and resolved with nebulization.  Question if he has has recurrent allergic type inflammation of the lungs.  At this point will put him on steroids for burst and taper.  Also Zithromax to cover for atypical pneumonia.  He has enough inhaler at home.  He requests nystatin oral thrush.  I have asked him to contact his regular doctor for recheck and consider referral to pulmonologist to reassess.  Reasons to return for  reassessment were discussed.  I have reviewed the nursing notes.    I have reviewed the findings, diagnosis, plan and need for follow up with the patient.       New Prescriptions    AZITHROMYCIN (ZITHROMAX Z-SAMRA) 250 MG TABLET    Two tablets on the first day, then one tablet daily for the next 4 days    NYSTATIN (MYCOSTATIN) 677877 UNIT/ML SUSPENSION    Take 5 mLs (500,000 Units) by mouth 4 times daily    PREDNISONE (DELTASONE) 20 MG TABLET    Take 2 tablets (40 mg) by mouth daily for 5 days       Final diagnoses:   Cough   Dyspnea, unspecified type       5/15/2018   [facility] EMERGENCY DEPARTMENT     Easton Alfaro MD  05/15/18 2841       Easton Alfaro MD  05/15/18 0578

## 2018-05-16 NOTE — TELEPHONE ENCOUNTER
Please let patient know that I reviewed chart - I do not feel comfortable to refill the Klonopin and Methadone for a month supply.  Med list also suggest that he is taking the oxycodone. Will refill for one week supply - will have his primary care provider to address it when he gets back

## 2018-05-16 NOTE — DISCHARGE INSTRUCTIONS
Shortness of Breath (Dyspnea)  Shortness of breath is the feeling that you can't catch your breath or get enough air. It is also known as dyspnea.  Dyspnea can be caused by many different conditions. They include:    Acute asthma attack    Worsening of chronic lung diseases such as chronic bronchitis and emphysema    Heart failure. This is when weak heart muscle allows extra fluid to collect in the lungs.    Panic attacks or anxiety. Fear can cause rapid breathing (hyperventilation).    Pneumonia, or an infection in the lung tissue    Exposure to toxic substances, fumes, smoke, or certain medicines    Blood clot in the lung (pulmonary embolism). This is often from a piece of blood clot in a deep vein of the leg (deep vein thrombosis) that breaks off and travels to the lungs.    Heart attack or heart-related chest pain (angina)    Anemia    Collapsed lung (pneumothorax)    Dehydration    Pregnancy  Based on your visit today, the exact cause of your shortness of breath is not certain. Your tests don t show any of the serious causes of dyspnea. You may need other tests to find out if you have a serious problem. It s important to watch for any new symptoms or symptoms that get worse. Follow up with your healthcare provider as directed.  Home care  Follow these tips to take care of yourself at home:    When your symptoms are better, go back to your usual activities.    If you smoke, you should stop. Join a quit-smoking program or ask your healthcare provider for help.    Eat a healthy diet and get plenty of sleep.    Get regular exercise. Talk with your healthcare provider before starting to exercise, especially if you have other medical problems.    Cut down on the amount of caffeine and stimulants you consume.  Follow-up care  Follow up with your healthcare provider, or as advised.  If tests were done, you will be told if your treatment needs to be changed. You can call as directed for the results.  If an X-ray was  taken, a specialist will review it. You will be notified of any new findings that may affect your care.  Call 911  Shortness of breath may be a sign of a serious medical problem. For example, it may be a problem with your heart or lungs. Call 911 if you have worsening shortness of breath or trouble breathing, especially with any of the symptoms below:    You are confused or it s difficult to wake you.    You faint or lose consciousness.    You have a fast heartbeat, or your heartbeat is irregular.    You are coughing up blood.    You have pain in your chest, arm, shoulder, neck, or upper back.    You break out in a sweat.  When to seek medical advice  Call your healthcare provider right away if any of these occur:    Slight shortness of breath or wheezing    Redness, pain or swelling in your leg, arm, or other body area    Swelling in both legs or ankles    Fast weight gain    Dizziness or weakness    Fever of 100.4 F (38 C) or higher, or as directed by your healthcare provider  Date Last Reviewed: 9/13/2015 2000-2017 The "[employer]. [address] [place] [address]. All rights reserved. This information is not intended as a substitute for professional medical care. Always follow your healthcare professional's instructions.          Chronic Cough with Uncertain Cause (Adult)    Everyone has had a cough as part of the common cold, flu, or bronchitis. This kind of cough occurs along with an achy feeling, low-grade fever, nasal and sinus congestion, and a scratchy or sore throat. This usually gets better in 2 to 3 weeks. A cough that lasts longer than 3 weeks may be due to other causes.  If your cough does not improve over the next 2 weeks, further testing may be needed. Follow up with your healthcare provider as advised. Cough suppressants may be recommended. Based on your exam today, the exact cause of your cough is not certain. Below are some common causes for persistent cough.  Smoker's  cough  Smoker s cough doesn t go away. If you continue to smoke, it only gets worse. The cough is from irritation in the air passages. Talk to your healthcare provider about quitting. Medicines or nicotine-replacement products, like gum or the patch, may make quitting easier.  Postnasal drip  A cough that is worse at night may be due to postnasal drip. Excess mucus in the nose drains from the back of your nose to your throat. This triggers the cough reflex. Postnasal drip may be due to a sinus infection or allergy. Common allergens include dust, tobacco smoke (both inhaled and secondhand smoke), environmental pollutants, pollen, mold, pets, cleaning agents, room deodorizers, and chemical fumes. Over-the-counter antihistamines or decongestants may be helpful for allergies. A sinus infection may requires antibiotic treatment. See your healthcare provider if symptoms continue.  Medicines  Certain prescribed medicines can cause a chronic cough in some people:    ACE inhibitors for high blood pressure. These include benazepril, captopril, enalapril, fosinopril, lisinopril, quinapril, ramipril, and others.    Beta-blockers for high blood pressure and other conditions. These include propranolol, atenolol, metoprolol, nadolol, and others.  Let your healthcare provider know if you are taking any of these.  Asthma  Cough may be the only sign of mild asthma. You may have tests to find out if asthma is causing your cough. You may also take asthma medicine on a trial basis.  Acid reflux (heartburn, GERD)  The esophagus is the tube that carries food from the mouth to the stomach. A valve at its lower end prevents stomach acids from flowing upward. If this valve does not work properly, acid from the stomach enters the esophagus. This may cause a burning pain in the upper abdomen or lower chest, belching, or cough. Symptoms are often worse when lying flat. Avoid eating or drinking before bedtime. Try using extra pillows to raise  your upper body, or place 4-inch blocks under the head of your bed. You may try an over-the-counter antacid or an acid-blocking medicine such as famotidine, cimetidine, ranitidine, esomeprazole, lansoprazole, or omeprazole. Stronger medicines for this condition can be prescribed by your healthcare provider.  Follow-up care  Follow up with your healthcare provider, or as advised, if your cough does not improve. Further testing may be needed.  Note: If an X-ray was taken, a specialist will review it. You will be notified of any new findings that may affect your care.  When to seek medical advice  Call your healthcare provider right away if any of these occur:    Mild wheezing or difficulty breathing    Fever of 100.4 F (38 C) or higher, or as directed by your healthcare provider    Unexpected weight loss    Coughing up large amounts of colored sputum    Night sweats (sheets and pajamas get soaking wet)  Call 911  Call 911 if any of these occur:    Coughing up blood    Moderate to severe trouble breathing or wheezing  Date Last Reviewed: 9/13/2015 2000-2017 [employer]. [address] [place] [address]. All rights reserved. This information is not intended as a substitute for professional medical care. Always follow your healthcare professional's instructions.

## 2018-05-16 NOTE — ED TRIAGE NOTES
Pt presents with shortness of breath and non productive cough for 3 days.  He reports a hx of pneumonia 2 weeks ago, doesn't think he fully recovered, has been using nebs every 4 hours, last used just prior to arrival.

## 2018-05-16 NOTE — TELEPHONE ENCOUNTER
Reviewed chart - I do not feel comfortable to refill benzo and Methadone a month supply.  Med list also suggest that he is taking the oxycodone. Will refill for one week supply - will have his primary care provider to address it when he gets back

## 2018-05-17 ENCOUNTER — RADIANT APPOINTMENT (OUTPATIENT)
Dept: GENERAL RADIOLOGY | Facility: CLINIC | Age: 53
End: 2018-05-17
Attending: PHYSICIAN ASSISTANT
Payer: COMMERCIAL

## 2018-05-17 ENCOUNTER — OFFICE VISIT (OUTPATIENT)
Dept: NEUROSURGERY | Facility: CLINIC | Age: 53
End: 2018-05-17
Payer: COMMERCIAL

## 2018-05-17 VITALS
HEART RATE: 101 BPM | SYSTOLIC BLOOD PRESSURE: 160 MMHG | OXYGEN SATURATION: 97 % | BODY MASS INDEX: 26.69 KG/M2 | WEIGHT: 170.4 LBS | DIASTOLIC BLOOD PRESSURE: 78 MMHG

## 2018-05-17 DIAGNOSIS — Z98.1 S/P CERVICAL SPINAL FUSION: ICD-10-CM

## 2018-05-17 DIAGNOSIS — M21.371 RIGHT FOOT DROP: Primary | ICD-10-CM

## 2018-05-17 PROCEDURE — 72040 X-RAY EXAM NECK SPINE 2-3 VW: CPT | Mod: TC

## 2018-05-17 PROCEDURE — 99214 OFFICE O/P EST MOD 30 MIN: CPT | Performed by: NEUROLOGICAL SURGERY

## 2018-05-17 ASSESSMENT — PAIN SCALES - GENERAL: PAINLEVEL: EXTREME PAIN (8)
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
## 2018-05-17 NOTE — TELEPHONE ENCOUNTER
Patient is scheduled to see pcp next week.    Also pulmonology is booked out to October she said she will put him on wait list and they will call him.    Katt Marie MA 5/17/2018

## 2018-05-17 NOTE — MR AVS SNAPSHOT
After Visit Summary   5/17/2018    Joselito Abarca    MRN: [id number]           Patient Information     Date Of Birth          1965        Visit Information        Provider Department      5/17/2018 2:20 PM Nikolas Vasques MD [facility]        Today's Diagnoses     Lumbar radiculopathy    -  1    S/P cervical spinal fusion          Care Instructions    1. Order for bilateral occipital nerve block. You will be called to schedule.  2. Order for right leg EMG. You can call to schedule this at [place]. We will call you with the results once completed.   3. Please call our clinic with any questions or concerns: [phone]            Follow-ups after your visit        Additional Services     NEUROLOGY ADULT REFERRAL       Your provider has referred you for the following:   EMG at [facility] [place]: [facility][note 3] - [facility] [place] [facility] [phone]   [email or web address]  Right leg EMG    Please be aware that coverage of these services is subject to the terms and limitations of your health insurance plan.  Call member services at your health plan with any benefit or coverage questions.      Please bring the following with you to your appointment:    (1) Any X-Rays, CTs or MRIs which have been performed.  Contact the facility where they were done to arrange for  prior to your scheduled appointment.    (2) List of current medications  (3) This referral request   (4) Any documents/labs given to you for this referral                  Future tests that were ordered for you today     Open Future Orders        Priority Expected Expires Ordered    XR Great Occipital Nerve Block Injection Routine 5/17/2018 5/17/2019 5/17/2018            Who to contact     If you have questions or need follow up information about today's clinic visit or your schedule please contact [facility] directly at [phone].  Normal or  "non-critical lab and imaging results will be communicated to you by MyChart, letter or phone within 4 business days after the clinic has received the results. If you do not hear from us within 7 days, please contact the clinic through [facility] or phone. If you have a critical or abnormal lab result, we will notify you by phone as soon as possible.  Submit refill requests through [employer] or call your pharmacy and they will forward the refill request to us. Please allow 3 business days for your refill to be completed.          Additional Information About Your Visit        [employer]ITY Information     [employer]ITY lets you send messages to your doctor, view your test results, renew your prescriptions, schedule appointments and more. To sign up, go to [email or web address] . Click on \"Log in\" on the left side of the screen, which will take you to the Welcome page. Then click on \"Sign up Now\" on the right side of the page.     You will be asked to enter the access code listed below, as well as some personal information. Please follow the directions to create your username and password.     Your access code is: [email or web address]  Expires: 2018 10:35 PM     Your access code will  in 90 days. If you need help or a new code, please call your [place] clinic or [phone].        Care EveryWhere ID     This is your Care EveryWhere ID. This could be used by other organizations to access your [place] medical records  [id number]        Your Vitals Were     Pulse Pulse Oximetry BMI (Body Mass Index)             101 97% 26.69 kg/m2          Blood Pressure from Last 3 Encounters:   18 160/78   05/15/18 110/80   18 126/90    Weight from Last 3 Encounters:   18 170 lb 6.4 oz (77.3 kg)   05/15/18 180 lb (81.6 kg)   18 170 lb (77.1 kg)              We Performed the Following     NEUROLOGY ADULT REFERRAL        Primary Care Provider Office Phone # Fax #    Gregory G Schoen, [phone]       " [address] [place] [address]        Goals        General    I will call to schedule an appointment  if I develop new or worsening symptoms. Started 4/26/16. (pt-stated)     Notes - Note created  4/26/2016  3:32 PM by Behl, Melissa K, RN    As of today's date 4/26/2016 goal is met at 0 - 25%.   Goal Status:  Active  Melissa Behl BSN, RN, N  [facility] Care Coordinator  [phone]        I will use my nebulizers and inhalers as prescribed. Started 4/26/16 (pt-stated)     Notes - Note edited  5/10/2016  8:58 AM by Behl, Melissa K, RN    As of today's date 5/10/2016 goal is met at 51 - 75%.   Goal Status:  Active  Melissa Behl BSN, RN, N  [facility] Care Coordinator  [phone]          Equal Access to Services     [facility]: Hadii aad ku hadasho Soomaali, waaxda luqadaha, qaybta kaalmada adeegyada, waxay idiin hayaaroscoe westarael hebert . So [facility] [phone].    ATENCIÓN: Si habla español, tiene a alfaro disposición servicios gratuitos de asistencia lingüística. Llame al [phone].    We comply with applicable federal civil rights laws and [place] laws. We do not discriminate on the basis of race, color, national origin, age, disability, sex, sexual orientation, or gender identity.            Thank you!     Thank you for choosing [facility]  for your care. Our goal is always to provide you with excellent care. Hearing back from our patients is one way we can continue to improve our services. Please take a few minutes to complete the written survey that you may receive in the mail after your visit with us. Thank you!             Your Updated Medication List - Protect others around you: Learn how to safely use, store and throw away your medicines at www.disposemymeds.org.          This list is accurate as of 5/17/18  3:08 PM.  Always use your most recent med list.                   Brand Name Dispense Instructions for use Diagnosis    * albuterol (2.5 MG/3ML) 0.083% neb  solution     90 mL    NEBULIZE CONTENTS OF ONE VIAL EVERY 4 HOURS AS NEEDED FOR SHORTNESS OF BREATH /DYSPNEA OR WHEEZING    Mild intermittent asthma with acute exacerbation       * VENTOLIN  (90 Base) MCG/ACT Inhaler   Generic drug:  albuterol     18 g    INHALE 2 PUFFS INTO THE LUNGS EVERY 6 HOURS AS NEEDED FOR SHORTNESS OF BREATH, DIFFICULTY BREATHING OR WHEEZING.    Mild intermittent asthma with acute exacerbation       azithromycin 250 MG tablet    ZITHROMAX Z-SAMRA    6 tablet    Two tablets on the first day, then one tablet daily for the next 4 days        citalopram 20 MG tablet    celeXA    90 tablet    Take 1 tablet (20 mg) by mouth daily        clonazePAM 0.5 MG tablet    klonoPIN    21 tablet    Take 0.5-1 tablets (0.25-0.5 mg) by mouth 3 times daily as needed for anxiety    Generalized anxiety disorder       FLOVENT  MCG/ACT Inhaler   Generic drug:  fluticasone     36 g    INHALE 2 PUFFS INTO THE LUNGS TWICE DAILY    ILD (interstitial lung disease) (H)       fluticasone 50 MCG/ACT spray    FLONASE    16 g    USE TWO SPRAYS IN EACH NOSTRIL EVERY DAY    Chronic rhinitis       gabapentin 300 MG capsule    NEURONTIN    270 capsule    TAKE TWO CAPSULES BY MOUTH THREE TIMES A DAY    Intervertebral cervical disc disorder with myelopathy, cervical region, Degeneration of cervical intervertebral disc       ipratropium - albuterol 0.5 mg/2.5 mg/3 mL 0.5-2.5 (3) MG/3ML neb solution    DUONEB    30 vial    Take 1 vial (3 mLs) by nebulization every 4 hours as needed for shortness of breath / dyspnea        methadone 10 MG tablet    DOLOPHINE    21 tablet    Take 1 tablet (10 mg) by mouth every 8 hours as needed    DDD (degenerative disc disease), cervical       nystatin 832810 UNIT/ML suspension    MYCOSTATIN    60 mL    Take 5 mLs (500,000 Units) by mouth 4 times daily        omeprazole 20 MG CR capsule    priLOSEC    30 capsule    TAKE 1 CAPSULE BY MOUTH DAILY, 30 TO 60 MINUTES BEFORE A MEAL.     Esophageal reflux       oxyCODONE 5 MG capsule    OXY-IR    360 capsule    Take 2 capsules (10 mg) by mouth every 4 hours as needed 2 caps q 4 hour prn pain up to 12 per day May fill 5/24/2012    Intervertebral cervical disc disorder with myelopathy, cervical region       predniSONE 20 MG tablet    DELTASONE    10 tablet    Take 2 tablets (40 mg) by mouth daily for 5 days        ranitidine 150 MG tablet    ZANTAC    30 tablet    TAKE ONE TABLET BY MOUTH EVERY MORNING    Esophageal reflux       sildenafil 100 MG tablet    VIAGRA    4 tablet    Take 1 tablet (100 mg) by mouth daily as needed 30 min to 4 hrs before sex. Do not use with nitroglycerin, terazosin or doxazosin.    Erectile dysfunction, unspecified erectile dysfunction type       traZODone 100 MG tablet    DESYREL    90 tablet    Take 3 tablets (300 mg) by mouth At Bedtime    Panic attack       vitamin D 2000 units tablet     100 tablet    TAKE ONE TABLET BY MOUTH EVERY DAY    Degeneration of cervical intervertebral disc       zolpidem 10 MG tablet    AMBIEN     Take 10 mg by mouth nightly as needed        * Notice:  This list has 2 medication(s) that are the same as other medications prescribed for you. Read the directions carefully, and ask your doctor or other care provider to review them with you.

## 2018-05-17 NOTE — PATIENT INSTRUCTIONS
1. Order for bilateral occipital nerve block. You will be called to schedule.  2. Order for right leg EMG. You can call to schedule this at [place]. We will call you with the results once completed.   3. Please call our clinic with any questions or concerns: [phone]

## 2018-05-17 NOTE — NURSING NOTE
"Joselito Abarca is a 52 year old male who presents for:  Chief Complaint   Patient presents with     Surgical Followup     S/P Cervical fusion DOS: 11/29/17        Initial Vitals:  /78 (BP Location: Right arm, Patient Position: Sitting, Cuff Size: Adult Large)  Pulse 101  Wt 170 lb 6.4 oz (77.3 kg)  SpO2 97%  BMI 26.69 kg/m2 Estimated body mass index is 26.69 kg/(m^2) as calculated from the following:    Height as of 3/21/18: 5' 7\" (1.702 m).    Weight as of this encounter: 170 lb 6.4 oz (77.3 kg).. Body surface area is 1.91 meters squared. BP completed using cuff size: large  Extreme Pain (8)    Do you feel safe in your environment?  Yes  Do you need any refills today? No    Nursing Comments: per patient he was told Dr. Vasques would discuss lumbar MRI        Arlen Boyd  "
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
## 2018-05-23 DIAGNOSIS — M50.00 INTERVERTEBRAL CERVICAL DISC DISORDER WITH MYELOPATHY, CERVICAL REGION: ICD-10-CM

## 2018-05-23 DIAGNOSIS — M50.30 DDD (DEGENERATIVE DISC DISEASE), CERVICAL: ICD-10-CM

## 2018-05-23 DIAGNOSIS — F41.1 GENERALIZED ANXIETY DISORDER: ICD-10-CM

## 2018-05-23 NOTE — TELEPHONE ENCOUNTER
Clonazepam       Last Written Prescription Date:  5/16/18  Last Fill Quantity: 21,   # refills: 0  Last Office Visit: 3/14/18  Future Office visit:    Next 5 appointments (look out 90 days)     May 25, 2018  3:30 PM CDT   SHORT with Gregory G Schoen, MD   [address] [address]   [phone]                   Routing refill request to provider for review/approval because:  Drug not on the [facility], [facility] or [facility] refill protocol or controlled substance  Methadone 10 MG       Last Written Prescription Date:  5/16/18  Last Fill Quantity: 21,   # refills: 0  Last Office Visit: 3/14/18  Future Office visit:    Next 5 appointments (look out 90 days)     May 25, 2018  3:30 PM CDT   SHORT with Gregory G Schoen, MD   [facility] ([address] [address]   [phone]                   Routing refill request to provider for review/approval because:  Drug not on the [facility], [facility] or [facility] refill protocol or controlled substance  Methadone 10 MG       Last Written Prescription Date:  5/16/18  Last Fill Quantity: 21,   # refills: 0  Last Office Visit: 3/14/18  Future Office visit:    Next 5 appointments (look out 90 days)     May 25, 2018  3:30 PM CDT   SHORT with Gregory G Schoen, MD   [facility] ([address] [address]   [phone]                   Routing refill request to provider for review/approval because:  Drug not on the [facility], [facility] or [facility] refill protocol or controlled substance

## 2018-05-25 ENCOUNTER — HOSPITAL ENCOUNTER (OUTPATIENT)
Dept: GENERAL RADIOLOGY | Facility: CLINIC | Age: 53
Discharge: HOME | End: 2018-05-25
Attending: FAMILY MEDICINE | Admitting: FAMILY MEDICINE
Payer: COMMERCIAL

## 2018-05-25 ENCOUNTER — OFFICE VISIT (OUTPATIENT)
Dept: FAMILY MEDICINE | Facility: CLINIC | Age: 53
End: 2018-05-25
Payer: COMMERCIAL

## 2018-05-25 VITALS
OXYGEN SATURATION: 96 % | HEART RATE: 79 BPM | BODY MASS INDEX: 27 KG/M2 | WEIGHT: 172.4 LBS | TEMPERATURE: 99 F | DIASTOLIC BLOOD PRESSURE: 82 MMHG | SYSTOLIC BLOOD PRESSURE: 132 MMHG

## 2018-05-25 DIAGNOSIS — M50.30 DDD (DEGENERATIVE DISC DISEASE), CERVICAL: ICD-10-CM

## 2018-05-25 DIAGNOSIS — R06.00 DYSPNEA, UNSPECIFIED TYPE: ICD-10-CM

## 2018-05-25 DIAGNOSIS — J47.9 DISEASE OF BRONCHIAL AIRWAY (H): Primary | ICD-10-CM

## 2018-05-25 DIAGNOSIS — M50.00 INTERVERTEBRAL CERVICAL DISC DISORDER WITH MYELOPATHY, CERVICAL REGION: ICD-10-CM

## 2018-05-25 DIAGNOSIS — F41.1 GENERALIZED ANXIETY DISORDER: ICD-10-CM

## 2018-05-25 LAB
BASOPHILS # BLD AUTO: 0 10E9/L (ref 0–0.2)
BASOPHILS NFR BLD AUTO: 0.1 %
D DIMER PPP FEU-MCNC: 0.5 UG/ML FEU (ref 0–0.5)
DIFFERENTIAL METHOD BLD: ABNORMAL
EOSINOPHIL # BLD AUTO: 0.2 10E9/L (ref 0–0.7)
EOSINOPHIL NFR BLD AUTO: 0.9 %
ERYTHROCYTE [DISTWIDTH] IN BLOOD BY AUTOMATED COUNT: 14.2 % (ref 10–15)
HCT VFR BLD AUTO: 40.4 % (ref 40–53)
HGB BLD-MCNC: 13 G/DL (ref 13.3–17.7)
IMM GRANULOCYTES # BLD: 0 10E9/L (ref 0–0.4)
IMM GRANULOCYTES NFR BLD: 0.2 %
LYMPHOCYTES # BLD AUTO: 1.1 10E9/L (ref 0.8–5.3)
LYMPHOCYTES NFR BLD AUTO: 6.3 %
MCH RBC QN AUTO: 30.2 PG (ref 26.5–33)
MCHC RBC AUTO-ENTMCNC: 32.2 G/DL (ref 31.5–36.5)
MCV RBC AUTO: 94 FL (ref 78–100)
MONOCYTES # BLD AUTO: 0.3 10E9/L (ref 0–1.3)
MONOCYTES NFR BLD AUTO: 1.9 %
NEUTROPHILS # BLD AUTO: 15.9 10E9/L (ref 1.6–8.3)
NEUTROPHILS NFR BLD AUTO: 90.6 %
PLATELET # BLD AUTO: 217 10E9/L (ref 150–450)
RBC # BLD AUTO: 4.31 10E12/L (ref 4.4–5.9)
WBC # BLD AUTO: 17.5 10E9/L (ref 4–11)

## 2018-05-25 PROCEDURE — 85379 FIBRIN DEGRADATION QUANT: CPT | Performed by: FAMILY MEDICINE

## 2018-05-25 PROCEDURE — 99214 OFFICE O/P EST MOD 30 MIN: CPT | Performed by: FAMILY MEDICINE

## 2018-05-25 PROCEDURE — 36415 COLL VENOUS BLD VENIPUNCTURE: CPT | Performed by: FAMILY MEDICINE

## 2018-05-25 PROCEDURE — 71046 X-RAY EXAM CHEST 2 VIEWS: CPT | Mod: TC

## 2018-05-25 PROCEDURE — 85025 COMPLETE CBC W/AUTO DIFF WBC: CPT | Performed by: FAMILY MEDICINE

## 2018-05-25 RX ORDER — PREDNISONE 20 MG/1
TABLET ORAL
Qty: 20 TABLET | Refills: 0 | Status: SHIPPED | OUTPATIENT
Start: 2018-05-25 | End: 2018-07-05

## 2018-05-25 RX ORDER — METHADONE HYDROCHLORIDE 10 MG/1
10 TABLET ORAL EVERY 8 HOURS PRN
Qty: 90 TABLET | Refills: 0 | Status: SHIPPED | OUTPATIENT
Start: 2018-05-25 | End: 2018-06-20

## 2018-05-25 RX ORDER — CLONAZEPAM 0.5 MG/1
0.25-0.5 TABLET ORAL 3 TIMES DAILY PRN
Qty: 90 TABLET | Refills: 0 | Status: SHIPPED | OUTPATIENT
Start: 2018-05-25 | End: 2018-06-21

## 2018-05-25 RX ORDER — AMOXICILLIN AND CLAVULANATE POTASSIUM 500; 125 MG/1; MG/1
1 TABLET, FILM COATED ORAL 3 TIMES DAILY
Qty: 30 TABLET | Refills: 0 | Status: SHIPPED | OUTPATIENT
Start: 2018-05-25 | End: 2018-07-05

## 2018-05-25 RX ORDER — OXYCODONE HYDROCHLORIDE 5 MG/1
10 CAPSULE ORAL EVERY 4 HOURS PRN
Qty: 360 CAPSULE | Refills: 0 | Status: SHIPPED | OUTPATIENT
Start: 2018-05-25 | End: 2018-06-27

## 2018-05-25 NOTE — MR AVS SNAPSHOT
After Visit Summary   5/25/2018    Joselito Abarca    MRN: [id number]           Patient Information     Date Of Birth          1965        Visit Information        Provider Department      5/25/2018 3:30 PM Schoen, Gregory G, MD [facility]        Today's Diagnoses     Disease of bronchial airway (HCC)    -  1    Dyspnea, unspecified type        Generalized anxiety disorder        DDD (degenerative disc disease), cervical        Intervertebral cervical disc disorder with myelopathy, cervical region           Follow-ups after your visit        Additional Services     PULMONARY MEDICINE REFERRAL       Your provider has referred you to: [facility]: [facility] [phone]   [email or web address]    Please be aware that coverage of these services is subject to the terms and limitations of your health insurance plan.  Call member services at your health plan with any benefit or coverage questions.      Please bring the following with you to your appointment:    (1) Any X-Rays, CTs or MRIs which have been performed.  Contact the facility where they were done to arrange for  prior to your scheduled appointment.    (2) List of current medications   (3) This referral request   (4) Any documents/labs given to you for this referral                  Who to contact     If you have questions or need follow up information about today's clinic visit or your schedule please contact [facility] directly at [phone].  Normal or non-critical lab and imaging results will be communicated to you by MyChart, letter or phone within 4 business days after the clinic has received the results. If you do not hear from us within 7 days, please contact the clinic through MyChart or phone. If you have a critical or abnormal lab result, we will notify you by phone as soon as possible.  Submit refill requests through triptaphart or call your  pharmacy and they will forward the refill request to us. Please allow 3 business days for your refill to be completed.          Additional Information About Your Visit        Care EveryWhere ID     This is your Care EveryWhere ID. This could be used by other organizations to access your [facility] medical records  [id number]        Your Vitals Were     Pulse Temperature Pulse Oximetry BMI (Body Mass Index)          79 99  F (37.2  C) (Temporal) 96% 27 kg/m2         Blood Pressure from Last 3 Encounters:   05/25/18 132/82   05/17/18 160/78   05/15/18 110/80    Weight from Last 3 Encounters:   05/25/18 172 lb 6.4 oz (78.2 kg)   05/17/18 170 lb 6.4 oz (77.3 kg)   05/15/18 180 lb (81.6 kg)              We Performed the Following     CBC with platelets differential     D dimer, quantitative     PULMONARY MEDICINE REFERRAL          Today's Medication Changes          These changes are accurate as of 5/25/18 11:59 PM.  If you have any questions, ask your nurse or doctor.               Start taking these medicines.        Dose/Directions    amoxicillin-clavulanate 500-125 MG per tablet   Commonly known as:  AUGMENTIN   Used for:  Dyspnea, unspecified type   Started by:  Schoen, Gregory G, MD        Dose:  1 tablet   Take 1 tablet by mouth 3 times daily   Quantity:  30 tablet   Refills:  0       predniSONE 20 MG tablet   Commonly known as:  DELTASONE   Used for:  Dyspnea, unspecified type   Started by:  Schoen, Gregory G, MD        Take three tabs daily for three days Take two tabs daily for three days Take one tab daily for three days Take 1/2 tab daily for 4 days   Quantity:  20 tablet   Refills:  0         These medicines have changed or have updated prescriptions.        Dose/Directions    * clonazePAM 0.5 MG tablet   Commonly known as:  klonoPIN   This may have changed:  Another medication with the same name was added. Make sure you understand how and when to take each.   Used for:  Generalized anxiety disorder    Changed by:  Schoen, Gregory G, MD        Dose:  0.25-0.5 mg   Take 0.5-1 tablets (0.25-0.5 mg) by mouth 3 times daily as needed for anxiety   Quantity:  21 tablet   Refills:  0       * clonazePAM 0.5 MG tablet   Commonly known as:  klonoPIN   This may have changed:  You were already taking a medication with the same name, and this prescription was added. Make sure you understand how and when to take each.   Used for:  Generalized anxiety disorder   Changed by:  Schoen, Gregory G, MD        Dose:  0.25-0.5 mg   Take 0.5-1 tablets (0.25-0.5 mg) by mouth 3 times daily as needed for anxiety   Quantity:  90 tablet   Refills:  0       * methadone 10 MG tablet   Commonly known as:  DOLOPHINE   This may have changed:  Another medication with the same name was added. Make sure you understand how and when to take each.   Used for:  DDD (degenerative disc disease), cervical   Changed by:  Schoen, Gregory G, MD        Dose:  10 mg   Take 1 tablet (10 mg) by mouth every 8 hours as needed   Quantity:  21 tablet   Refills:  0       * methadone 10 MG tablet   Commonly known as:  DOLOPHINE   This may have changed:  You were already taking a medication with the same name, and this prescription was added. Make sure you understand how and when to take each.   Used for:  DDD (degenerative disc disease), cervical   Changed by:  Schoen, Gregory G, MD        Dose:  10 mg   Take 1 tablet (10 mg) by mouth every 8 hours as needed   Quantity:  90 tablet   Refills:  0       * oxyCODONE 5 MG capsule   Commonly known as:  OXY-IR   This may have changed:  Another medication with the same name was added. Make sure you understand how and when to take each.   Used for:  Intervertebral cervical disc disorder with myelopathy, cervical region   Changed by:  Schoen, Gregory G, MD        Dose:  10 mg   Take 2 capsules (10 mg) by mouth every 4 hours as needed 2 caps q 4 hour prn pain up to 12 per day May fill 5/24/2012   Quantity:  360 capsule    Refills:  0       * oxyCODONE 5 MG capsule   Commonly known as:  OXY-IR   This may have changed:  You were already taking a medication with the same name, and this prescription was added. Make sure you understand how and when to take each.   Used for:  Intervertebral cervical disc disorder with myelopathy, cervical region   Changed by:  Schoen, Gregory G, MD        Dose:  10 mg   Take 2 capsules (10 mg) by mouth every 4 hours as needed 2 caps q 4 hour prn pain up to 12 per day May fill 5/24/2012   Quantity:  360 capsule   Refills:  0       * Notice:  This list has 6 medication(s) that are the same as other medications prescribed for you. Read the directions carefully, and ask your doctor or other care provider to review them with you.         Where to get your medicines      These medications were sent to [facility] [place] [facility] [address]   [address] [facility] [address]     Phone:  [phone]     amoxicillin-clavulanate 500-125 MG per tablet    predniSONE 20 MG tablet         Some of these will need a paper prescription and others can be bought over the counter.  Ask your nurse if you have questions.     Bring a paper prescription for each of these medications     clonazePAM 0.5 MG tablet    methadone 10 MG tablet    oxyCODONE 5 MG capsule               Information about OPIOIDS     PRESCRIPTION OPIOIDS: WHAT YOU NEED TO KNOW   You have a prescription for an opioid (narcotic) pain medicine. Opioids can cause addiction. If you have a history of chemical dependency of any type, you are at a higher risk of becoming addicted to opioids. Only take this medicine after all other options have been tried. Take it for as short a time and as few doses as possible.     Do not:    Drive. If you drive while taking these medicines, you could be arrested for driving under the influence (DUI).    Operate heavy machinery    Do any other dangerous activities while taking these medicines.      Drink any alcohol while taking these medicines.      Take with any other medicines that contain acetaminophen. Read all labels carefully. Look for the word  acetaminophen  or  Tylenol.  Ask your pharmacist if you have questions or are unsure.    Store your pills in a secure place, locked if possible. We will not replace any lost or stolen medicine. If you don t finish your medicine, please throw away (dispose) as directed by your pharmacist. The [place] Pollution Control Agency has more information about safe disposal: https://www.pca.[facility].[place]/living-green/managing-unwanted-medications    All opioids tend to cause constipation. Drink plenty of water and eat foods that have a lot of fiber, such as fruits, vegetables, prune juice, apple juice and high-fiber cereal. Take a laxative (Miralax, milk of magnesia, Colace, Senna) if you don t move your bowels at least every other day.          Primary Care Provider Office Phone # Fax #    Gregory G Schoen, [phone]       [address] [place] [phone]        Goals        General    I will call to schedule an appointment  if I develop new or worsening symptoms. Started 4/26/16. (pt-stated)     Notes - Note created  4/26/2016  3:32 PM by Behl, Melissa K, RN    As of today's date 4/26/2016 goal is met at 0 - 25%.   Goal Status:  Active  Melissa Behl BSN, RN, N  [facility] Care Coordinator  [phone]        I will use my nebulizers and inhalers as prescribed. Started 4/26/16 (pt-stated)     Notes - Note edited  5/10/2016  8:58 AM by Behl, Melissa K, RN    As of today's date 5/10/2016 goal is met at 51 - 75%.   Goal Status:  Active  Melissa Behl BSN, RN, N  [facility] Care Coordinator  [phone]          Equal Access to Services     DADA DUARTE : Melvin johnston Solev, waaxda luqadaha, qaybta kaalmada adeegyadottie, omayra moore. [facility] [phone].    ATENCIÓN: Si cary westfall alfaro  disposición servicios gratuitos de asistencia lingüística. [employer] [place]quis [phone].    We comply with applicable federal civil rights laws and [place] laws. We do not discriminate on the basis of race, color, national origin, age, disability, sex, sexual orientation, or gender identity.            Thank you!     Thank you for choosing [facility]  for your care. Our goal is always to provide you with excellent care. Hearing back from our patients is one way we can continue to improve our services. Please take a few minutes to complete the written survey that you may receive in the mail after your visit with us. Thank you!             Your Updated Medication List - Protect others around you: Learn how to safely use, store and throw away your medicines at www.disposemymeds.org.          This list is accurate as of 5/25/18 11:59 PM.  Always use your most recent med list.                   Brand Name Dispense Instructions for use Diagnosis    * albuterol (2.5 MG/3ML) 0.083% neb solution     90 mL    NEBULIZE CONTENTS OF ONE VIAL EVERY 4 HOURS AS NEEDED FOR SHORTNESS OF BREATH /DYSPNEA OR WHEEZING    Mild intermittent asthma with acute exacerbation       * VENTOLIN  (90 Base) MCG/ACT Inhaler   Generic drug:  albuterol     18 g    INHALE 2 PUFFS INTO THE LUNGS EVERY 6 HOURS AS NEEDED FOR SHORTNESS OF BREATH, DIFFICULTY BREATHING OR WHEEZING.    Mild intermittent asthma with acute exacerbation       amoxicillin-clavulanate 500-125 MG per tablet    AUGMENTIN    30 tablet    Take 1 tablet by mouth 3 times daily    Dyspnea, unspecified type       citalopram 20 MG tablet    celeXA    90 tablet    Take 1 tablet (20 mg) by mouth daily        * clonazePAM 0.5 MG tablet    klonoPIN    21 tablet    Take 0.5-1 tablets (0.25-0.5 mg) by mouth 3 times daily as needed for anxiety    Generalized anxiety disorder       * clonazePAM 0.5 MG tablet    klonoPIN    90 tablet    Take 0.5-1 tablets (0.25-0.5 mg) by mouth 3  times daily as needed for anxiety    Generalized anxiety disorder       FLOVENT  MCG/ACT Inhaler   Generic drug:  fluticasone     36 g    INHALE 2 PUFFS INTO THE LUNGS TWICE DAILY    ILD (interstitial lung disease) (H)       fluticasone 50 MCG/ACT spray    FLONASE    16 g    USE TWO SPRAYS IN EACH NOSTRIL EVERY DAY    Chronic rhinitis       gabapentin 300 MG capsule    NEURONTIN    270 capsule    TAKE TWO CAPSULES BY MOUTH THREE TIMES A DAY    Intervertebral cervical disc disorder with myelopathy, cervical region, Degeneration of cervical intervertebral disc       ipratropium - albuterol 0.5 mg/2.5 mg/3 mL 0.5-2.5 (3) MG/3ML neb solution    DUONEB    30 vial    Take 1 vial (3 mLs) by nebulization every 4 hours as needed for shortness of breath / dyspnea        * methadone 10 MG tablet    DOLOPHINE    21 tablet    Take 1 tablet (10 mg) by mouth every 8 hours as needed    DDD (degenerative disc disease), cervical       * methadone 10 MG tablet    DOLOPHINE    90 tablet    Take 1 tablet (10 mg) by mouth every 8 hours as needed    DDD (degenerative disc disease), cervical       nystatin 868543 UNIT/ML suspension    MYCOSTATIN    60 mL    Take 5 mLs (500,000 Units) by mouth 4 times daily        omeprazole 20 MG CR capsule    priLOSEC    30 capsule    TAKE 1 CAPSULE BY MOUTH DAILY, 30 TO 60 MINUTES BEFORE A MEAL.    Esophageal reflux       * oxyCODONE 5 MG capsule    OXY-IR    360 capsule    Take 2 capsules (10 mg) by mouth every 4 hours as needed 2 caps q 4 hour prn pain up to 12 per day May fill 5/24/2012    Intervertebral cervical disc disorder with myelopathy, cervical region       * oxyCODONE 5 MG capsule    OXY-IR    360 capsule    Take 2 capsules (10 mg) by mouth every 4 hours as needed 2 caps q 4 hour prn pain up to 12 per day May fill 5/24/2012    Intervertebral cervical disc disorder with myelopathy, cervical region       predniSONE 20 MG tablet    DELTASONE    20 tablet    Take three tabs daily for three  days Take two tabs daily for three days Take one tab daily for three days Take 1/2 tab daily for 4 days    Dyspnea, unspecified type       ranitidine 150 MG tablet    ZANTAC    30 tablet    TAKE ONE TABLET BY MOUTH EVERY MORNING    Esophageal reflux       sildenafil 100 MG tablet    VIAGRA    4 tablet    Take 1 tablet (100 mg) by mouth daily as needed 30 min to 4 hrs before sex. Do not use with nitroglycerin, terazosin or doxazosin.    Erectile dysfunction, unspecified erectile dysfunction type       traZODone 100 MG tablet    DESYREL    90 tablet    Take 3 tablets (300 mg) by mouth At Bedtime    Panic attack       vitamin D 2000 units tablet     100 tablet    TAKE ONE TABLET BY MOUTH EVERY DAY    Degeneration of cervical intervertebral disc       zolpidem 10 MG tablet    AMBIEN     Take 10 mg by mouth nightly as needed        * Notice:  This list has 8 medication(s) that are the same as other medications prescribed for you. Read the directions carefully, and ask your doctor or other care provider to review them with you.

## 2018-05-25 NOTE — PROGRESS NOTES
SUBJECTIVE:   Joselito Abarca is a 52 year old male who presents to clinic today for the following health issues:      ED/UC Followup:    Facility:  [facility]  Date of visit: 5/15/2018  Reason for visit: shortness of breath  Current Status: was better for 6 days while on antibiotics, but then it all got worse. Still having a very hard time breathing.          States his current condition has been going on for a couple of months. States he can't breathe or talk even when he gets up, has major headaches in the am and has nearly fallen due to weakness. He was concerned about environmental triggers and cleaned all his carpets, etc and that didn't help. For the past two days, states that his nebs and inhalers are not working. Xrays on 4/9 and 5/15 showed slowly improving interstitial infiltrates. His WBC was up to 17.6 and this was felt to be demargination and also had a lactic acid of 2.1.  He was felt to have a possible allergic exposure and was treated with a burst of prednisone but also a Z-Thanh to cover for atypical bacteria given the presence of interstitial infiltrates.  He states he felt a little better for the next couple of days but the past 3 days he feels that his nebulizations are no longer helping.  He does not feel like he is wheezing, just feels more short of breath.  In addition to cleaning his carpet he notes that his cat had particularly foul-smelling urine that bothered him with his breathing and wondered about a reaction to that.  Denies having any fever or chills.    Current Outpatient Prescriptions   Medication Sig Dispense Refill     albuterol (2.5 MG/3ML) 0.083% nebulizer solution NEBULIZE CONTENTS OF ONE VIAL EVERY 4 HOURS AS NEEDED FOR SHORTNESS OF BREATH /DYSPNEA OR WHEEZING 90 mL 0     Cholecalciferol (VITAMIN D) 2000 units tablet TAKE ONE TABLET BY MOUTH EVERY  tablet 3     citalopram (CELEXA) 20 MG tablet Take 1 tablet (20 mg) by mouth daily 90 tablet 3     clonazePAM (KLONOPIN)  0.5 MG tablet Take 0.5-1 tablets (0.25-0.5 mg) by mouth 3 times daily as needed for anxiety 21 tablet 0     FLOVENT  MCG/ACT Inhaler INHALE 2 PUFFS INTO THE LUNGS TWICE DAILY 36 g 1     fluticasone (FLONASE) 50 MCG/ACT spray USE TWO SPRAYS IN EACH NOSTRIL EVERY DAY 16 g 5     gabapentin (NEURONTIN) 300 MG capsule TAKE TWO CAPSULES BY MOUTH THREE TIMES A  capsule 11     ipratropium - albuterol 0.5 mg/2.5 mg/3 mL (DUONEB) 0.5-2.5 (3) MG/3ML neb solution Take 1 vial (3 mLs) by nebulization every 4 hours as needed for shortness of breath / dyspnea 30 vial 0     methadone (DOLOPHINE) 10 MG tablet Take 1 tablet (10 mg) by mouth every 8 hours as needed 21 tablet 0     nystatin (MYCOSTATIN) 656573 UNIT/ML suspension Take 5 mLs (500,000 Units) by mouth 4 times daily 60 mL 0     omeprazole (PRILOSEC) 20 MG CR capsule TAKE 1 CAPSULE BY MOUTH DAILY, 30 TO 60 MINUTES BEFORE A MEAL. 30 capsule 10     oxyCODONE (OXY-IR) 5 MG capsule Take 2 capsules (10 mg) by mouth every 4 hours as needed 2 caps q 4 hour prn pain up to 12 per day May fill 5/24/2012 360 capsule 0     ranitidine (ZANTAC) 150 MG tablet TAKE ONE TABLET BY MOUTH EVERY MORNING 30 tablet 10     sildenafil (VIAGRA) 100 MG tablet Take 1 tablet (100 mg) by mouth daily as needed 30 min to 4 hrs before sex. Do not use with nitroglycerin, terazosin or doxazosin. 4 tablet 0     traZODone (DESYREL) 100 MG tablet Take 3 tablets (300 mg) by mouth At Bedtime 90 tablet 3     VENTOLIN  (90 BASE) MCG/ACT Inhaler INHALE 2 PUFFS INTO THE LUNGS EVERY 6 HOURS AS NEEDED FOR SHORTNESS OF BREATH, DIFFICULTY BREATHING OR WHEEZING. 18 g 3     zolpidem (AMBIEN) 10 MG tablet Take 10 mg by mouth nightly as needed        ROS:  No fever, chills  HEENT: some nasal congestion, using steroid nasal spray.  RESP; as above  CVR: no chest pains, palpitations  GI: neg  MSK: seeing neurology for neck and back pains, s/p ACDF.    OBJECTIVE:  /82 (Cuff Size: Adult Regular)  Pulse  79  Temp 99  F (37.2  C) (Temporal)  Wt 172 lb 6.4 oz (78.2 kg)  SpO2 96%  BMI 27 kg/m2  Alert and oriented, in no acute distress.  Upon entering the room, she was extremely short of breath.  His respiratory rate was in the high 20s.  Sats on room air 96% on room air.  His pulse was stable at 79 his blood pressure was normal.  TMs were clear, nose clear oropharynx appeared normal and well hydrated.  Neck was supple without adenopathy.  His chest is clear to auscultation anteriorly and posteriorly and appeared to have adequate air movement.  Skin was pale, but this is his norm.    He remains significantly short of breath throughout the time of his room.  Upon leaving the room I was able to experience make a phone call in the severe shortness of breath and speech difficulties he appeared to resolve completely he was speaking to someone on the phone.  Staff was also surprised how well he was doing based on his severe dyspnea while she was history.    Results for orders placed or performed in visit on 05/25/18   CBC with platelets differential   Result Value Ref Range    WBC 17.5 (H) 4.0 - 11.0 10e9/L    RBC Count 4.31 (L) 4.4 - 5.9 10e12/L    Hemoglobin 13.0 (L) 13.3 - 17.7 g/dL    Hematocrit 40.4 40.0 - 53.0 %    MCV 94 78 - 100 fl    MCH 30.2 26.5 - 33.0 pg    MCHC 32.2 31.5 - 36.5 g/dL    RDW 14.2 10.0 - 15.0 %    Platelet Count 217 150 - 450 10e9/L    Diff Method Automated Method     % Neutrophils 90.6 %    % Lymphocytes 6.3 %    % Monocytes 1.9 %    % Eosinophils 0.9 %    % Basophils 0.1 %    % Immature Granulocytes 0.2 %    Absolute Neutrophil 15.9 (H) 1.6 - 8.3 10e9/L    Absolute Lymphocytes 1.1 0.8 - 5.3 10e9/L    Absolute Monocytes 0.3 0.0 - 1.3 10e9/L    Absolute Eosinophils 0.2 0.0 - 0.7 10e9/L    Absolute Basophils 0.0 0.0 - 0.2 10e9/L    Abs Immature Granulocytes 0.0 0 - 0.4 10e9/L   D dimer, quantitative   Result Value Ref Range    D Dimer 0.5 0.0 - 0.50 ug/ml FEU         Chest xray now appears to  have cleared the interstitial changes that were present.   WBC remains elevated with a left shift, suggestive of either demargination from perhaps his nebs or actually represents a bacterial infection.  D-dimer is normal so PE highly unlikely and of low clinical suspicion.     ASSESSMENT:     Disease of bronchial airway (H)  Dyspnea, unspecified type  Generalized anxiety disorder  DDD (degenerative disc disease), cervical  Intervertebral cervical disc disorder with myelopathy, cervical region    PLAN:  I will treat with antibiotics (Augmentin, as Julio had no benefit or change) and also a more prolonged prednisone taper beginning at 60 mg daily for three days.  He will continue with his other breathing treatments.  This may be related to environmental conditions outside (pine pollen is extremely high) and the prednisone should be of benefit for that.   We discussed his pain meds and plan and he is working with neurosurgery at this time on a plan for his cervical spine and lower back and as long as actively participating in his plan, will renew his chronic pain meds but we do need to be moving toward a plan to reduce.    Electronically signed by Greg Schoen, MD

## 2018-05-29 RX ORDER — METHADONE HYDROCHLORIDE 10 MG/1
10 TABLET ORAL EVERY 8 HOURS PRN
Qty: 21 TABLET | Refills: 0 | OUTPATIENT
Start: 2018-05-29

## 2018-05-29 RX ORDER — OXYCODONE HYDROCHLORIDE 5 MG/1
10 CAPSULE ORAL EVERY 4 HOURS PRN
Qty: 360 CAPSULE | Refills: 0 | OUTPATIENT
Start: 2018-05-29

## 2018-05-29 RX ORDER — CLONAZEPAM 0.5 MG/1
0.25-0.5 TABLET ORAL 3 TIMES DAILY PRN
Qty: 21 TABLET | Refills: 0 | OUTPATIENT
Start: 2018-05-29

## 2018-06-04 ENCOUNTER — TELEPHONE (OUTPATIENT)
Dept: FAMILY MEDICINE | Facility: CLINIC | Age: 53
End: 2018-06-04

## 2018-06-05 DIAGNOSIS — J30.2 CHRONIC SEASONAL ALLERGIC RHINITIS DUE TO FUNGAL SPORES: Primary | ICD-10-CM

## 2018-06-05 NOTE — TELEPHONE ENCOUNTER
"Requested Prescriptions   Pending Prescriptions Disp Refills     fluticasone (FLONASE) 50 MCG/ACT spray [Pharmacy Med Name: FLUTICASONE PROPIONATE 50 SUSP] 16 g 5     Sig: SPRAY 2 SPRAYS IN EACH NOSTRIL EVERY DAY    Inhaled Steroids Protocol Failed    6/5/2018  3:48 PM       Failed - Asthma control assessment score within normal limits in last 6 months    Please review ACT score.          Passed - Patient is age 12 or older       Passed - Recent (6 mo) or future (30 days) visit within the authorizing provider's specialty    Patient had office visit in the last 6 months or has a visit in the next 30 days with authorizing provider or within the authorizing provider's specialty.  See \"Patient Info\" tab in inbasket, or \"Choose Columns\" in Meds & Orders section of the refill encounter.              Last Written Prescription Date:  5/15/17  Last Fill Quantity: 16 g,  # refills: 5   Last Office Visit with [facility] prescribing provider:  5/25/18   Future Office Visit:       "

## 2018-06-07 PROBLEM — J30.2 CHRONIC SEASONAL ALLERGIC RHINITIS DUE TO FUNGAL SPORES: Status: ACTIVE | Noted: 2018-06-07

## 2018-06-07 RX ORDER — FLUTICASONE PROPIONATE 50 MCG
SPRAY, SUSPENSION (ML) NASAL
Qty: 16 G | Refills: 5 | Status: SHIPPED | OUTPATIENT
Start: 2018-06-07 | End: 2019-06-28

## 2018-06-09 ENCOUNTER — HOSPITAL ENCOUNTER (EMERGENCY)
Facility: CLINIC | Age: 53
Discharge: HOME | End: 2018-06-09
Attending: PHYSICIAN ASSISTANT | Admitting: PHYSICIAN ASSISTANT
Payer: COMMERCIAL

## 2018-06-09 VITALS
WEIGHT: 170 LBS | SYSTOLIC BLOOD PRESSURE: 125 MMHG | DIASTOLIC BLOOD PRESSURE: 92 MMHG | TEMPERATURE: 98.1 F | OXYGEN SATURATION: 99 % | RESPIRATION RATE: 20 BRPM | HEIGHT: 67 IN | BODY MASS INDEX: 26.68 KG/M2

## 2018-06-09 DIAGNOSIS — M54.2 CHRONIC NECK PAIN: ICD-10-CM

## 2018-06-09 DIAGNOSIS — G89.29 CHRONIC NECK PAIN: ICD-10-CM

## 2018-06-09 PROCEDURE — 20552 NJX 1/MLT TRIGGER POINT 1/2: CPT | Mod: Z6 | Performed by: PHYSICIAN ASSISTANT

## 2018-06-09 PROCEDURE — 20552 NJX 1/MLT TRIGGER POINT 1/2: CPT | Performed by: PHYSICIAN ASSISTANT

## 2018-06-09 PROCEDURE — 99283 EMERGENCY DEPT VISIT LOW MDM: CPT | Mod: 25 | Performed by: PHYSICIAN ASSISTANT

## 2018-06-09 PROCEDURE — 25000125 ZZHC RX 250: Performed by: PHYSICIAN ASSISTANT

## 2018-06-09 PROCEDURE — 99284 EMERGENCY DEPT VISIT MOD MDM: CPT | Mod: 25 | Performed by: PHYSICIAN ASSISTANT

## 2018-06-09 RX ORDER — BUPIVACAINE HYDROCHLORIDE 5 MG/ML
1 INJECTION, SOLUTION EPIDURAL; INTRACAUDAL ONCE
Status: COMPLETED | OUTPATIENT
Start: 2018-06-09 | End: 2018-06-09

## 2018-06-09 RX ADMIN — BUPIVACAINE HYDROCHLORIDE 5 MG: 5 INJECTION, SOLUTION EPIDURAL; INTRACAUDAL at 18:45

## 2018-06-09 ASSESSMENT — ENCOUNTER SYMPTOMS
FEVER: 0
NECK PAIN: 1
WEAKNESS: 0
HEADACHES: 1
NUMBNESS: 0

## 2018-06-09 NOTE — ED AVS SNAPSHOT
[facility] Emergency Department    [address] [place] [address]    Phone:  [phone]    Fax:  [phone]                                       Joselito Abarca   MRN: [id number]    Department:  [facility] Emergency Department   Date of Visit:  6/9/2018           After Visit Summary Signature Page     I have received my discharge instructions, and my questions have been answered. I have discussed any challenges I see with this plan with the nurse or doctor.    ..........................................................................................................................................  Patient/Patient Representative Signature      ..........................................................................................................................................  Patient Representative Print Name and Relationship to Patient    ..................................................               ................................................  Date                                            Time    ..........................................................................................................................................  Reviewed by Signature/Title    ...................................................              ..............................................  Date                                                            Time

## 2018-06-09 NOTE — ED PROVIDER NOTES
History     Chief Complaint   Patient presents with     Neck Pain     The history is provided by the patient.     Joselito Abarca is a 52 year old male with a history of three neck surgeries presents to the ED with severe neck pain that started three days ago. He also has a headache that is worse on the right side of his head, and is typical for his previous neck pain flares. Joselito takes pain pills for his neck pain but they have not been working for the last couple of days and he can't find relief. He has previously had novocaine injections in his neck that relieves the pain for a couple months. He denies any trauma, fever, bowel or bladder problems, or numbness/weakness to arms. Patient states that the pain has gone away a couple hours after his previous injections.     Problem List:    Patient Active Problem List    Diagnosis Date Noted     Chronic seasonal allergic rhinitis due to fungal spores 06/07/2018     Priority: Medium     Status post cervical spinal arthrodesis 11/29/2017     Priority: Medium     Chronic pain syndrome 12/13/2016     Priority: Medium     Patient is followed by Gregory G. Schoen, MD for ongoing prescription of pain medication.  All refills should be approved by this provider, or covering partner.    Medication(s): Oxycodone 5 mg IR: Take 2 capsules (10 mg) by mouth every 4 hours as needed 2 caps q 4 hour prn pain up to 12 per day .   Methadone 10 mg three times daily, 90 per month  Clonazepam 0.5 mg tid, 90 per month   Clinic visit frequency required: Q 3 months     Controlled substance agreement:  Encounter-Level CSA - 2/27/15:               Controlled Substance Agreement - Scan on 3/14/2015  8:47 AM : Controlled Medication Agreement-02/27/15 (below)            Pain Clinic evaluation in the past: Yes    DIRE Total Score(s):  No flowsheet data found.    Last [facility] website verification:  10/30/2016     [email or web address]         Moderate persistent asthma without complication  11/02/2016     Priority: Medium     Acute respiratory failure with hypoxia (H) 04/29/2016     Priority: Medium     Nausea with vomiting 04/27/2016     Priority: Medium     Cough 03/06/2016     Priority: Medium     Leukocytosis 02/16/2016     Priority: Medium     Disease of bronchial airway (HCC) 02/12/2016     Priority: Medium     Degeneration of cervical intervertebral disc 01/26/2015     Priority: Medium     Health Care Home 09/30/2013     Priority: Medium     Status:  Accepted  Care Coordinator:    Melissa Behl BSN, RN, PHN  [facility] Care Coordinator  [phone]     See Letters for [facility] Care Plan  Date:  April 26, 2016            Arthrodesis status 06/15/2011     Priority: Medium     Neck pain 06/15/2011     Priority: Medium     CARDIOVASCULAR SCREENING; LDL GOAL LESS THAN 160 10/31/2010     Priority: Medium     Intervertebral cervical disc disorder with myelopathy, cervical region 11/12/2009     Priority: Medium     Patient is followed by TIARA JIMENEZ for ongoing prescription of narcotic pain medicine.  Med: methadone 10 mg tid. Taking oxycodone up to 8 per day, 120 per month  Maximum use per month: 90  Expected duration: ongoing  Narcotic agreement on file: YES  Clinic visit recommended: Q 3 months         Constipation 03/19/2008     Priority: Medium     Problem list name updated by automated process. Provider to review       Thoracic or lumbosacral neuritis or radiculitis, unspecified 03/19/2008     Priority: Medium     Esophageal reflux 07/09/2003     Priority: Medium     Generalized anxiety disorder 07/08/2003     Priority: Medium     Alcohol abuse, in remission 07/08/2003     Priority: Medium        Past Medical History:    Past Medical History:   Diagnosis Date     Anxiety      GERD (gastroesophageal reflux disease)      Neck pain 6/15/2011       Past Surgical History:    Past Surgical History:   Procedure Laterality Date     DISCECTOMY LUMBAR POSTERIOR MICROSCOPIC ONE LEVEL  2/21/2012     Procedure:DISCECTOMY LUMBAR POSTERIOR MICROSCOPIC ONE LEVEL; LEFT T1-T2 THORASIC HEMILAMINECTOMY MICRODISCECTOMY WITH MEXTRIX II ; Surgeon:SHARON PURI; Location:[facility] OR     DISCECTOMY, FUSION CERVICAL ANTERIOR ONE LEVEL, COMBINED N/A 11/29/2017    Procedure: COMBINED DISCECTOMY, FUSION CERVICAL ANTERIOR ONE LEVEL;  Anterior Cervical Discectomy and Fusion Cervical Six - Cervical Seven, Exploration and Revision Cervical Four - Cervical Six with Hardware Removal;  Surgeon: Nikolas Vasques MD;  Location: [facility] OR     EXPLORE SPINE, REMOVE HARDWARE, COMBINED N/A 11/29/2017    Procedure: COMBINED EXPLORE SPINE, REMOVE HARDWARE;;  Surgeon: Nikolas Vasques MD;  Location: [facility] OR     FUSION CERVICAL ANTERIOR TWO LEVELS  1/29/2010     HC DRAIN/INJ MAJOR JOINT/BURSA W/O US  5/5/2008    Left sacroiliac joint injection.     HC INJ EPIDURAL LUMBAR/SACRAL W/WO CONTRAST  2009     HEAD & NECK SURGERY      2013     HERNIA REPAIR       INJECT BLOCK MEDIAL BRANCH CERVICAL/THORACIC/LUMBAR Bilateral 8/26/2015    Procedure: INJECT BLOCK MEDIAL BRANCH CERVICAL / THORACIC / LUMBAR;  Surgeon: Ronald Driscoll MD;  Location: [facility] OR     INJECT FACET JOINT Bilateral 5/27/2015    Procedure: INJECT FACET JOINT;  Surgeon: Ronald Driscoll MD;  Location: [facility] OR       Family History:    Family History   Problem Relation Age of Onset     Family History Negative No family hx of      C.A.D. No family hx of        Social History:  Marital Status:  Single [1]  Social History   Substance Use Topics     Smoking status: Passive Smoke Exposure - Never Smoker     Types: Cigars     Last attempt to quit: 12/19/2009     Smokeless tobacco: Never Used     Alcohol use No      Comment: HX OF ABUSE-IN REMISSION        Medications:      albuterol (2.5 MG/3ML) 0.083% nebulizer solution   amoxicillin-clavulanate (AUGMENTIN) 500-125 MG per tablet   Cholecalciferol (VITAMIN D) 2000 units tablet   citalopram (CELEXA) 20 MG tablet   clonazePAM (KLONOPIN) 0.5 MG  "tablet   clonazePAM (KLONOPIN) 0.5 MG tablet   FLOVENT  MCG/ACT Inhaler   fluticasone (FLONASE) 50 MCG/ACT spray   gabapentin (NEURONTIN) 300 MG capsule   ipratropium - albuterol 0.5 mg/2.5 mg/3 mL (DUONEB) 0.5-2.5 (3) MG/3ML neb solution   methadone (DOLOPHINE) 10 MG tablet   methadone (DOLOPHINE) 10 MG tablet   nystatin (MYCOSTATIN) 305604 UNIT/ML suspension   omeprazole (PRILOSEC) 20 MG CR capsule   oxyCODONE (OXY-IR) 5 MG capsule   oxyCODONE (OXY-IR) 5 MG capsule   predniSONE (DELTASONE) 20 MG tablet   ranitidine (ZANTAC) 150 MG tablet   sildenafil (VIAGRA) 100 MG tablet   traZODone (DESYREL) 100 MG tablet   VENTOLIN  (90 BASE) MCG/ACT Inhaler   zolpidem (AMBIEN) 10 MG tablet         Review of Systems   Constitutional: Negative for fever.   Gastrointestinal:        No bowel issues.   Genitourinary:        No bladder issues.   Musculoskeletal: Positive for neck pain.   Neurological: Positive for headaches. Negative for weakness and numbness.   All other systems reviewed and are negative.      Physical Exam   BP: (!) 125/92  Heart Rate: 74  Temp: 98.1  F (36.7  C)  Resp: 20  Height: 170.2 cm (5' 7\")  Weight: 77.1 kg (170 lb)  SpO2: 99 %      Physical Exam   Constitutional: He is oriented to person, place, and time. He appears well-developed and well-nourished. No distress.   HENT:   Head: Normocephalic and atraumatic.   Mouth/Throat: Oropharynx is clear and moist. No oropharyngeal exudate.   Eyes: Conjunctivae and EOM are normal. Pupils are equal, round, and reactive to light. No scleral icterus.   Neck: Neck supple. Muscular tenderness (bilateral occipital region, paraspinous muscular tenderness on right down into right trapezius) present. No spinous process tenderness present. No rigidity. Decreased range of motion (slight decreased) present.   Patient has forward head carriage.    Cardiovascular: Intact distal pulses.    Pulmonary/Chest: Effort normal. No respiratory distress.   Abdominal: Soft. " Bowel sounds are normal. There is no tenderness.   Musculoskeletal: He exhibits no edema or tenderness.   Normal strength in bilateral upper extremities.   Neurological: He is alert and oriented to person, place, and time. No cranial nerve deficit.   Skin: Skin is warm. No rash noted. He is not diaphoretic.   Psychiatric: He has a normal mood and affect.   Nursing note and vitals reviewed.      ED Course     ED Course     Procedures    Procedure: Total of 8 cc 0.5% bupivacaine was used. Cleansing of the skin was performed with alcohol.  Side effects and complications of trigger point injections discussed. Joselito agrees to proceed with the trigger point injection(s). A timeout was observed prior to injecting the trigger points- bilateral occipital musculature insertion sites, right paraspinous musculature, right trapezius. The trigger points areas were injected without complication. Joselito was watched for signs of allergic reaction and none were noted. Joselito was instructed to ice the trigger point areas and continue the gentle stretching exercises. He is encouraged to watch for evidence of infection at the trigger point injection site(s) and return to the ER or his clinic should infection be suspected.    No results found for this or any previous visit (from the past 24 hour(s)).    Medications   bupivacaine (MARCAINE) preservative free injection 0.5% (5 mg Intradermal Given 6/9/18 1845)       Assessments & Plan (with Medical Decision Making)  Joselito Abarca is a 52 year old male who presented to the ED complaining of acute on chronic neck pain.  Reports symptoms began in the last couple days.  No new injury, no new symptoms other than worsening of chronic symptoms.  Patient had normal strength in bilateral upper extremities.  Noted to have tenderness throughout the musculature of the neck, more prominent in the bilateral occipital region and down to the right paraspinous and trapezius muscles.  He requested  trigger point injections and I think this is reasonable.  This was performed as detailed above without complication.  Patient reported mild relief, and stated he anticipates continued relief over the next several hours.  He felt comfortable with discharge at this time.  I advised to ice the neck and practice gentle stretching.  Should follow-up next week if no improvement.  Discussed indications of when to return to the ED.  All questions answered and patient was comfortable with plan and with discharge.     I have reviewed the nursing notes.    I have reviewed the findings, diagnosis, plan and need for follow up with the patient.    Discharge Medication List as of 6/9/2018  6:43 PM          Final diagnoses:   Chronic neck pain     This document serves as a record of services personally performed by Marcia Fair PA. It was created on their behalf by Wild Salazar, a trained medical scribe. The creation of this record is based on the provider's personal observations and the statements of the patient. This document has been checked and approved by the attending provider.    Note: Chart documentation done in part with Dragon Voice Recognition software. Although reviewed after completion, some word and grammatical errors may remain.    6/9/2018   [facility] EMERGENCY DEPARTMENT     Marcia Fair PA-C  06/09/18 5978

## 2018-06-09 NOTE — DISCHARGE INSTRUCTIONS
I would like you to go home and apply ice to your neck.  Practice gentle stretching as well and try to massage out those tight spots.  Use your home pain medications as prescribed for continued pain.  Follow-up next week if you are not having any improvement.  If you develop any worsening symptoms do not hesitate to return to the emergency department.    Thank you for choosing [facility] Emergency Department. It was a pleasure taking care of you today. If you have any questions, please call [phone].    Marcia Fair PA-C

## 2018-06-09 NOTE — ED AVS SNAPSHOT
[facility] Emergency Department    [address] [place] [address]    Phone:  [phone]    Fax:  [phone]                                       Joselito Abarca   MRN: [id number]    Department:  [facility] Emergency Department   Date of Visit:  6/9/2018           Patient Information     Date Of Birth          1965        Your diagnoses for this visit were:     Chronic neck pain        You were seen by Marcia Fair PA-C.      Follow-up Information     Follow up with [facility] Emergency Department.    Specialty:  EMERGENCY MEDICINE    Why:  If symptoms worsen    Contact information:    [address] [place] [address] [place] [address] [phone]    Additional information:    From [address]: Exit at [address] on south side of [place]. Turn right on [address]. Turn left at stoplight on [address]. [facility] will be in view two blocks ahead        Follow up with Schoen, Gregory G, MD In 3 days.    Specialty:  Family Practice    Why:  As needed for persistent symptoms    Contact information:    [address] [place] [address] [address] [place] [address] [phone]          Discharge Instructions       I would like you to go home and apply ice to your neck.  Practice gentle stretching as well and try to massage out those tight spots.  Use your home pain medications as prescribed for continued pain.  Follow-up next week if you are not having any improvement.  If you develop any worsening symptoms do not hesitate to return to the emergency department.    Thank you for choosing [facility]'s Emergency Department. It was a pleasure taking care of you today. If you have any questions, please call [phone].    Marcia Fair PA-C      Your next 10 appointments already scheduled     Jul 27, 2018  1:30 PM CDT   [facility] Miranda [facility]   [facility]    [address] [place] [address]  [address]   [phone]              24 Hour Appointment Hotline       To make an appointment at any [facility], call [phone] ([phone]). If you don't have a family doctor or clinic, we will help you find one. [facility] clinics are conveniently located to serve the needs of you and your family.             Review of your medicines      Our records show that you are taking the medicines listed below. If these are incorrect, please call your family doctor or clinic.        Dose / Directions Last dose taken    * albuterol (2.5 MG/3ML) 0.083% neb solution   Quantity:  90 mL        NEBULIZE CONTENTS OF ONE VIAL EVERY 4 HOURS AS NEEDED FOR SHORTNESS OF BREATH /DYSPNEA OR WHEEZING   Refills:  0        * VENTOLIN  (90 Base) MCG/ACT Inhaler   Quantity:  18 g   Generic drug:  albuterol        INHALE 2 PUFFS INTO THE LUNGS EVERY 6 HOURS AS NEEDED FOR SHORTNESS OF BREATH, DIFFICULTY BREATHING OR WHEEZING.   Refills:  3        amoxicillin-clavulanate 500-125 MG per tablet   Commonly known as:  AUGMENTIN   Dose:  1 tablet   Quantity:  30 tablet        Take 1 tablet by mouth 3 times daily   Refills:  0        citalopram 20 MG tablet   Commonly known as:  celeXA   Dose:  20 mg   Quantity:  90 tablet        Take 1 tablet (20 mg) by mouth daily   Refills:  3        * clonazePAM 0.5 MG tablet   Commonly known as:  klonoPIN   Dose:  0.25-0.5 mg   Quantity:  21 tablet        Take 0.5-1 tablets (0.25-0.5 mg) by mouth 3 times daily as needed for anxiety   Refills:  0        * clonazePAM 0.5 MG tablet   Commonly known as:  klonoPIN   Dose:  0.25-0.5 mg   Quantity:  90 tablet        Take 0.5-1 tablets (0.25-0.5 mg) by mouth 3 times daily as needed for anxiety   Refills:  0        FLOVENT  MCG/ACT Inhaler   Quantity:  36 g   Generic drug:  fluticasone        INHALE 2 PUFFS INTO THE LUNGS TWICE DAILY   Refills:  1        fluticasone 50 MCG/ACT spray   Commonly known as:  FLONASE   Quantity:  16 g        SPRAY  2 SPRAYS IN EACH NOSTRIL EVERY DAY   Refills:  5        gabapentin 300 MG capsule   Commonly known as:  NEURONTIN   Quantity:  270 capsule        TAKE TWO CAPSULES BY MOUTH THREE TIMES A DAY   Refills:  11        ipratropium - albuterol 0.5 mg/2.5 mg/3 mL 0.5-2.5 (3) MG/3ML neb solution   Commonly known as:  DUONEB   Dose:  1 vial   Quantity:  30 vial        Take 1 vial (3 mLs) by nebulization every 4 hours as needed for shortness of breath / dyspnea   Refills:  0        * methadone 10 MG tablet   Commonly known as:  DOLOPHINE   Dose:  10 mg   Quantity:  21 tablet        Take 1 tablet (10 mg) by mouth every 8 hours as needed   Refills:  0        * methadone 10 MG tablet   Commonly known as:  DOLOPHINE   Dose:  10 mg   Quantity:  90 tablet        Take 1 tablet (10 mg) by mouth every 8 hours as needed   Refills:  0        nystatin 580061 UNIT/ML suspension   Commonly known as:  MYCOSTATIN   Dose:  141394 Units   Quantity:  60 mL        Take 5 mLs (500,000 Units) by mouth 4 times daily   Refills:  0        omeprazole 20 MG CR capsule   Commonly known as:  priLOSEC   Quantity:  30 capsule        TAKE 1 CAPSULE BY MOUTH DAILY, 30 TO 60 MINUTES BEFORE A MEAL.   Refills:  10        * oxyCODONE 5 MG capsule   Commonly known as:  OXY-IR   Dose:  10 mg   Quantity:  360 capsule        Take 2 capsules (10 mg) by mouth every 4 hours as needed 2 caps q 4 hour prn pain up to 12 per day May fill 5/24/2012   Refills:  0        * oxyCODONE 5 MG capsule   Commonly known as:  OXY-IR   Dose:  10 mg   Quantity:  360 capsule        Take 2 capsules (10 mg) by mouth every 4 hours as needed 2 caps q 4 hour prn pain up to 12 per day May fill 5/24/2012   Refills:  0        predniSONE 20 MG tablet   Commonly known as:  DELTASONE   Quantity:  20 tablet        Take three tabs daily for three days Take two tabs daily for three days Take one tab daily for three days Take 1/2 tab daily for 4 days   Refills:  0        ranitidine 150 MG tablet    Commonly known as:  ZANTAC   Quantity:  30 tablet        TAKE ONE TABLET BY MOUTH EVERY MORNING   Refills:  10        sildenafil 100 MG tablet   Commonly known as:  VIAGRA   Dose:  100 mg   Quantity:  4 tablet        Take 1 tablet (100 mg) by mouth daily as needed 30 min to 4 hrs before sex. Do not use with nitroglycerin, terazosin or doxazosin.   Refills:  0        traZODone 100 MG tablet   Commonly known as:  DESYREL   Dose:  300 mg   Quantity:  90 tablet        Take 3 tablets (300 mg) by mouth At Bedtime   Refills:  3        vitamin D 2000 units tablet   Quantity:  100 tablet        TAKE ONE TABLET BY MOUTH EVERY DAY   Refills:  3        zolpidem 10 MG tablet   Commonly known as:  AMBIEN   Dose:  10 mg        Take 10 mg by mouth nightly as needed   Refills:  0        * Notice:  This list has 8 medication(s) that are the same as other medications prescribed for you. Read the directions carefully, and ask your doctor or other care provider to review them with you.            Orders Needing Specimen Collection     None      Pending Results     No orders found from 6/7/2018 to 6/10/2018.            Pending Culture Results     No orders found from 6/7/2018 to 6/10/2018.            Pending Results Instructions     If you had any lab results that were not finalized at the time of your Discharge, you can call the ED Lab Result RN at [phone]. You will be contacted by this team for any positive Lab results or changes in treatment. The nurses are available 7 days a week from 10A to 6:30P.  You can leave a message 24 hours per day and they will return your call.        Thank you for choosing [facility]       Thank you for choosing [facility] for your care. Our goal is always to provide you with excellent care. Hearing back from our patients is one way we can continue to improve our services. Please take a few minutes to complete the written survey that you may receive in the mail after you visit with us. Thank you!         Care EveryWhere ID     This is your Care EveryWhere ID. This could be used by other organizations to access your [facility] medical records  [id number]        Equal Access to Services     DADA DUARTE : Melvin Hebert, damari garibay, blanca larios, omayra moore. So [facility] [phone].    ATENCIÓN: Si habla español, tiene a alfaro disposición servicios gratuitos de asistencia lingüística. Llame al [phone].    We comply with applicable federal civil rights laws and [place] laws. We do not discriminate on the basis of race, color, national origin, age, disability, sex, sexual orientation, or gender identity.            After Visit Summary       This is your record. Keep this with you and show to your community pharmacist(s) and doctor(s) at your next visit.

## 2018-06-11 ENCOUNTER — PATIENT OUTREACH (OUTPATIENT)
Dept: CARE COORDINATION | Facility: CLINIC | Age: 53
End: 2018-06-11

## 2018-06-11 NOTE — LETTER
[facility] CARE COORDINATION  [address]   [address]  Tel. [phone] / Fax [phone]    June 13, 2018    Joselito Abarca  [facility] [address] [place]   [facility] [address]      Dear Joselito,    I am a clinic care coordinator who works with Gregory G. Schoen, MD at [facility]. I have been trying to reach you recently to introduce Clinic Care Coordination and to see if there was anything I could assist you with.  I wanted to introduce myself and provide you with my contact information so that you can call me with questions or concerns about your health care. Below is a description of clinic care coordination and how I can further assist you.     The clinic care coordinator is a registered nurse and/or  who understand the health care system. The goal of clinic care coordination is to help you manage your health and improve access to the [facility] system in the most efficient manner. The registered nurse can assist you in meeting your health care goals by providing education, coordinating services, and strengthening the communication among your providers. The  can assist you with financial, behavioral, psychosocial, chemical dependency, counseling, and/or psychiatric resources.    Please feel free to contact me at [phone], with any questions or concerns. We at [facility] are focused on providing you with the highest-quality healthcare experience possible and that all starts with you.     Sincerely,     Luisa GUTIÉRREZ, RN, PHN  Care Coordination    [address] [place] [address]  Office: [phone]  Fax [phone]   [facility]  [address] [place] [address]  Office: [phone] Fax [phone]  [email or web address]   [email or web address]   Connect with [facility] on social media.

## 2018-06-11 NOTE — PROGRESS NOTES
Clinic Care Coordination Contact  [facility]/Voicemail    Referral Source: ED Follow-Up  Clinical Data: Care Coordinator Outreach  Outreach attempted x 1.  Left message on voicemail with call back information and requested return call.  Plan:  Care Coordinator will try to reach patient again in 1-2 business days.      Luisa GUTIÉRREZ, RN, PHN  Care Coordination    [facility]  [address] [place] [address]  Office: [phone]  Fax [phone]   [facility]  [address] [place] [address]  Office: [phone] Fax [phone]  [email or web address]   [email or web address]   Connect with [facility] on social media.

## 2018-06-13 NOTE — PROGRESS NOTES
Clinic Care Coordination Contact  [facility]/Voicemail    Referral Source: ED Follow-Up  Clinical Data: Care Coordinator Outreach  Outreach attempted x 2.  Left message on voicemail with call back information and requested return call.  Plan: Care Coordinator will mail out care coordination introduction letter with care coordinator contact information and explanation of care coordination services. Care Coordinator will try to reach patient again in 3-5 business days.      Luisa GUTIÉRREZ, RN, PHN  Care Coordination    [facility]  [address] [place] [address]  Office: [phone]  Fax [phone]   [facility]  [address] [place] [address]  Office: [phone] Fax [phone]  [email or web address]   [email or web address]   Connect with [facility] on social media.

## 2018-06-14 NOTE — PROGRESS NOTES
Clinic Care Coordination Contact  [facility]/Voicemail    Referral Source: ED Follow-Up  Clinical Data: Care Coordinator Outreach  Outreach attempted x 3.  Left message on voicemail with call back information and requested return call.  Plan:  Care Coordinator will do no further outreaches at this time.      Luisa GUTIÉRREZ, RN, PHN  Care Coordination    [facility]  [address] [place] [address]  Office: [phone]  Fax [phone]   [facility]  [address] [place] [address]  Office: [phone] Fax [phone]  [email or web address]   [email or web address]   Connect with [facility] on social media.

## 2018-06-18 ENCOUNTER — TELEPHONE (OUTPATIENT)
Dept: SURGERY | Facility: CLINIC | Age: 53
End: 2018-06-18

## 2018-06-18 NOTE — TELEPHONE ENCOUNTER
Patient called to schedule an injection. Patient is scheduled on 7/12/2018 @ 1000 w/[place], arriving @ 900. Notified patient that he will need a .

## 2018-06-20 DIAGNOSIS — M50.30 DDD (DEGENERATIVE DISC DISEASE), CERVICAL: ICD-10-CM

## 2018-06-20 NOTE — TELEPHONE ENCOUNTER
Requested Prescriptions   Pending Prescriptions Disp Refills     methadone (DOLOPHINE) 10 MG tablet 90 tablet 0     Sig: Take 1 tablet (10 mg) by mouth every 8 hours as needed    There is no refill protocol information for this order          Last Written Prescription Date:  5/25/18  Last Fill Quantity: 90,  # refills: 0   Last office visit: 5/25/2018 with prescribing provider:  5/25/18   Future Office Visit:

## 2018-06-21 DIAGNOSIS — F41.1 GENERALIZED ANXIETY DISORDER: ICD-10-CM

## 2018-06-22 RX ORDER — METHADONE HYDROCHLORIDE 10 MG/1
10 TABLET ORAL EVERY 8 HOURS PRN
Qty: 90 TABLET | Refills: 0 | Status: SHIPPED | OUTPATIENT
Start: 2018-06-22 | End: 2018-07-05

## 2018-06-22 RX ORDER — CLONAZEPAM 0.5 MG/1
0.25-0.5 TABLET ORAL 3 TIMES DAILY PRN
Qty: 90 TABLET | Refills: 0 | Status: SHIPPED | OUTPATIENT
Start: 2018-06-22 | End: 2018-07-05

## 2018-06-22 NOTE — TELEPHONE ENCOUNTER
Clonazepam 0.5 MG       Last Written Prescription Date:  5/25/18  Last Fill Quantity: 90,   # refills: 0  Last Office Visit: 5/25/18  Future Office visit:       Routing refill request to provider for review/approval because:  Drug not on the [facility], P or [facility] refill protocol or controlled substance

## 2018-06-27 DIAGNOSIS — M50.00 INTERVERTEBRAL CERVICAL DISC DISORDER WITH MYELOPATHY, CERVICAL REGION: ICD-10-CM

## 2018-06-28 RX ORDER — OXYCODONE HYDROCHLORIDE 5 MG/1
10 CAPSULE ORAL EVERY 4 HOURS PRN
Qty: 360 CAPSULE | Refills: 0 | Status: SHIPPED | OUTPATIENT
Start: 2018-06-28 | End: 2018-07-05

## 2018-06-28 NOTE — TELEPHONE ENCOUNTER
Requested Prescriptions   Pending Prescriptions Disp Refills     oxyCODONE (OXY-IR) 5 MG capsule 360 capsule 0     Sig: Take 2 capsules (10 mg) by mouth every 4 hours as needed 2 caps q 4 hour prn pain up to 12 per day May fill 5/24/2012    There is no refill protocol information for this order              Last Written Prescription Date:  5/25/18  Last Fill Quantity: 360,   # refills: 0  Last Office Visit: 5/25/18  Future Office visit:       Routing refill request to provider for review/approval because:  Drug not on the [facility], [facility] or [facility] refill protocol or controlled substance

## 2018-07-09 NOTE — PROGRESS NOTES
[facility]  [address] [address]  [phone]  Dept: [phone]    PRE-OP EVALUATION:  Today's date: 7/10/2018    Joselito Abarca (: 1965) presents for pre-operative evaluation assessment as requested by Dr. Driscoll.  He requires evaluation and anesthesia risk assessment prior to undergoing surgery/procedure for treatment of chronic neck pain with occipital nerve block.    Fax number for surgical facility:   Primary Physician: Schoen, Gregory G  Type of Anesthesia Anticipated: Monitor Anesthesia care    Patient has a Health Care Directive or Living Will:  NO    Preop Questions 7/10/2018   What are you having done? Occipital nerve block   Date of Surgery/Procedure: 18   Facility or Hospital where procedure/surgery will be performed: [facility]   1.  Do you have a history of Heart attack, stroke, stent, coronary bypass surgery, or other heart surgery? No   2.  Do you ever have any pain or discomfort in your chest? No   3.  Do you have a history of  Heart Failure? No   4.   Are you troubled by shortness of breath when:  walking on a level surface, or up a slight hill, or at night? No   5.  Do you currently have a cold, bronchitis or other respiratory infection? No   6.  Do you have a cough, shortness of breath, or wheezing? No   7.  Do you sometimes get pains in the calves of your legs when you walk? No   8. Do you or anyone in your family have previous history of blood clots? No   9.  Do you or does anyone in your family have a serious bleeding problem such as prolonged bleeding following surgeries or cuts? No   10. Have you ever had problems with anemia or been told to take iron pills? No   11. Have you had any abnormal blood loss such as black, tarry or bloody stools? No   12. Have you ever had a blood transfusion? No   13. Have you or any of your relatives ever had problems with anesthesia? No   14. Do you have sleep apnea, excessive snoring or daytime  drowsiness? No   15. Do you have any prosthetic heart valves? No   16. Do you have prosthetic joints? No         HPI:     HPI related to upcoming procedure: constant neck pain that radiates into the scalp. Has history of 3 back surgeries including cervical spinal fusion. Plan to complete occipital nerve block.    See problem list for active medical problems.  Problems all longstanding and stable, except as noted/documented.  See ROS for pertinent symptoms related to these conditions.                                                                                                                                                          .    MEDICAL HISTORY:     Patient Active Problem List    Diagnosis Date Noted     Chronic seasonal allergic rhinitis due to fungal spores 06/07/2018     Priority: Medium     Status post cervical spinal arthrodesis 11/29/2017     Priority: Medium     Chronic pain syndrome 12/13/2016     Priority: Medium     Patient is followed by Gregory G. Schoen, MD for ongoing prescription of pain medication.  All refills should be approved by this provider, or covering partner.    Medication(s): Oxycodone 5 mg IR: Take 2 capsules (10 mg) by mouth every 4 hours as needed 2 caps q 4 hour prn pain up to 12 per day .   Methadone 10 mg three times daily, 90 per month  Clonazepam 0.5 mg tid, 90 per month   Clinic visit frequency required: Q 3 months     Controlled substance agreement:  Encounter-Level CSA - 2/27/15:               Controlled Substance Agreement - Scan on 3/14/2015  8:47 AM : Controlled Medication Agreement-02/27/15 (below)            Pain Clinic evaluation in the past: Yes    DIRE Total Score(s):  No flowsheet data found.    Last [facility] website verification:  10/30/2016     [email or web address]         Moderate persistent asthma without complication 11/02/2016     Priority: Medium     Acute respiratory failure with hypoxia (H) 04/29/2016     Priority: Medium     Nausea with vomiting  04/27/2016     Priority: Medium     Cough 03/06/2016     Priority: Medium     Leukocytosis 02/16/2016     Priority: Medium     Disease of bronchial airway (HCC) 02/12/2016     Priority: Medium     Degeneration of cervical intervertebral disc 01/26/2015     Priority: Medium     Health Care Home 09/30/2013     Priority: Medium     Status:  Accepted  Care Coordinator:    Melissa Behl BSN, RN, PHN  [facility] Care Coordinator  [phone]     See Letters for [facility] Care Plan  Date:  April 26, 2016            Arthrodesis status 06/15/2011     Priority: Medium     Neck pain 06/15/2011     Priority: Medium     CARDIOVASCULAR SCREENING; LDL GOAL LESS THAN 160 10/31/2010     Priority: Medium     Intervertebral cervical disc disorder with myelopathy, cervical region 11/12/2009     Priority: Medium     Patient is followed by TIARA JIMENEZ for ongoing prescription of narcotic pain medicine.  Med: methadone 10 mg tid. Taking oxycodone up to 8 per day, 120 per month  Maximum use per month: 90  Expected duration: ongoing  Narcotic agreement on file: YES  Clinic visit recommended: Q 3 months         Constipation 03/19/2008     Priority: Medium     Problem list name updated by automated process. Provider to review       Thoracic or lumbosacral neuritis or radiculitis, unspecified 03/19/2008     Priority: Medium     Esophageal reflux 07/09/2003     Priority: Medium     Generalized anxiety disorder 07/08/2003     Priority: Medium     Alcohol abuse, in remission 07/08/2003     Priority: Medium      Past Medical History:   Diagnosis Date     Anxiety      GERD (gastroesophageal reflux disease)      Neck pain 6/15/2011     Past Surgical History:   Procedure Laterality Date     DISCECTOMY LUMBAR POSTERIOR MICROSCOPIC ONE LEVEL  2/21/2012    Procedure:DISCECTOMY LUMBAR POSTERIOR MICROSCOPIC ONE LEVEL; LEFT T1-T2 THORASIC HEMILAMINECTOMY MICRODISCECTOMY WITH MEXTRIX II ; Surgeon:SHARON PURI; Location:[facility] OR     DISCECTOMY,  FUSION CERVICAL ANTERIOR ONE LEVEL, COMBINED N/A 11/29/2017    Procedure: COMBINED DISCECTOMY, FUSION CERVICAL ANTERIOR ONE LEVEL;  Anterior Cervical Discectomy and Fusion Cervical Six - Cervical Seven, Exploration and Revision Cervical Four - Cervical Six with Hardware Removal;  Surgeon: Nikolas Vasques MD;  Location: [facility] OR     EXPLORE SPINE, REMOVE HARDWARE, COMBINED N/A 11/29/2017    Procedure: COMBINED EXPLORE SPINE, REMOVE HARDWARE;;  Surgeon: Nikolas Vasques MD;  Location: [facility] OR     FUSION CERVICAL ANTERIOR TWO LEVELS  1/29/2010     HC DRAIN/INJ MAJOR JOINT/BURSA W/O US  5/5/2008    Left sacroiliac joint injection.     HC INJ EPIDURAL LUMBAR/SACRAL W/WO CONTRAST  2009     HEAD & NECK SURGERY      2013     HERNIA REPAIR       INJECT BLOCK MEDIAL BRANCH CERVICAL/THORACIC/LUMBAR Bilateral 8/26/2015    Procedure: INJECT BLOCK MEDIAL BRANCH CERVICAL / THORACIC / LUMBAR;  Surgeon: Ronald Driscoll MD;  Location: [facility] OR     INJECT FACET JOINT Bilateral 5/27/2015    Procedure: INJECT FACET JOINT;  Surgeon: Ronald Driscoll MD;  Location: [facility] OR     Current Outpatient Prescriptions   Medication Sig Dispense Refill     albuterol (2.5 MG/3ML) 0.083% nebulizer solution NEBULIZE CONTENTS OF ONE VIAL EVERY 4 HOURS AS NEEDED FOR SHORTNESS OF BREATH /DYSPNEA OR WHEEZING 90 mL 0     Cholecalciferol (VITAMIN D) 2000 units tablet TAKE ONE TABLET BY MOUTH EVERY  tablet 3     citalopram (CELEXA) 20 MG tablet Take 1 tablet (20 mg) by mouth daily 90 tablet 3     clonazePAM (KLONOPIN) 0.5 MG tablet Take 0.5-1 tablets (0.25-0.5 mg) by mouth 3 times daily as needed for anxiety 21 tablet 0     FLOVENT  MCG/ACT Inhaler INHALE 2 PUFFS INTO THE LUNGS TWICE DAILY 36 g 1     fluticasone (FLONASE) 50 MCG/ACT spray SPRAY 2 SPRAYS IN EACH NOSTRIL EVERY DAY 16 g 5     gabapentin (NEURONTIN) 300 MG capsule TAKE TWO CAPSULES BY MOUTH THREE TIMES A  capsule 11     ipratropium - albuterol 0.5 mg/2.5 mg/3 mL (DUONEB) 0.5-2.5  (3) MG/3ML neb solution Take 1 vial (3 mLs) by nebulization every 4 hours as needed for shortness of breath / dyspnea 30 vial 0     methadone (DOLOPHINE) 10 MG tablet Take 1 tablet (10 mg) by mouth every 8 hours as needed 21 tablet 0     omeprazole (PRILOSEC) 20 MG CR capsule TAKE 1 CAPSULE BY MOUTH DAILY, 30 TO 60 MINUTES BEFORE A MEAL. 30 capsule 10     oxyCODONE (OXY-IR) 5 MG capsule Take 2 capsules (10 mg) by mouth every 4 hours as needed 2 caps q 4 hour prn pain up to 12 per day May fill 5/24/2012 360 capsule 0     ranitidine (ZANTAC) 150 MG tablet TAKE ONE TABLET BY MOUTH EVERY MORNING 30 tablet 10     sildenafil (VIAGRA) 100 MG tablet Take 1 tablet (100 mg) by mouth daily as needed 30 min to 4 hrs before sex. Do not use with nitroglycerin, terazosin or doxazosin. 4 tablet 0     traZODone (DESYREL) 100 MG tablet Take 3 tablets (300 mg) by mouth At Bedtime 90 tablet 3     VENTOLIN  (90 BASE) MCG/ACT Inhaler INHALE 2 PUFFS INTO THE LUNGS EVERY 6 HOURS AS NEEDED FOR SHORTNESS OF BREATH, DIFFICULTY BREATHING OR WHEEZING. 18 g 3     zolpidem (AMBIEN) 10 MG tablet Take 10 mg by mouth nightly as needed        OTC products: Excedrin tension headache    Allergies   Allergen Reactions     Vicodin [Hydrocodone-Acetaminophen] Nausea     Other reaction(s): Diaphoresis, Vomiting  HEADACHE      Latex Allergy: NO    Social History   Substance Use Topics     Smoking status: Passive Smoke Exposure - Never Smoker     Types: Cigars     Last attempt to quit: 12/19/2009     Smokeless tobacco: Never Used     Alcohol use No      Comment: HX OF ABUSE-IN REMISSION     History   Drug Use No       REVIEW OF SYSTEMS:   CONSTITUTIONAL: NEGATIVE for fever, chills, change in weight  INTEGUMENTARY/SKIN: NEGATIVE for worrisome rashes, moles or lesions  EYES: NEGATIVE for vision changes or irritation  ENT/MOUTH: NEGATIVE for ear, mouth and throat problems  RESP: NEGATIVE for significant cough or SOB  CV: NEGATIVE for chest pain,  "palpitations or peripheral edema  GI: NEGATIVE for nausea, abdominal pain, heartburn, or change in bowel habits  : NEGATIVE for frequency, dysuria, or hematuria  MUSCULOSKELETAL: POSITIVE for neck pain radiating into scalp bilaterally  NEURO: NEGATIVE for weakness, dizziness or paresthesias  ENDOCRINE: NEGATIVE for temperature intolerance, skin/hair changes  HEME: NEGATIVE for bleeding problems  PSYCHIATRIC: NEGATIVE for changes in mood or affect    EXAM:   /72  Pulse 60  Temp 98.1  F (36.7  C) (Temporal)  Resp 16  Ht 5' 7.01\" (1.702 m)  Wt 172 lb 6.4 oz (78.2 kg)  BMI 27 kg/m2    GENERAL APPEARANCE: alert and no acute distress     EYES: EOMI,  PERRL     HENT: ear canals and TM's normal and nose and mouth without ulcers or lesions     NECK: no adenopathy, no asymmetry, masses, or scars and thyroid normal to palpation     RESP: lungs clear to auscultation - no rales, rhonchi or wheezes     CV: regular rates and rhythm, normal S1 S2, no S3 or S4 and no murmur, click or rub     ABDOMEN:  soft, nontender, no HSM or masses and bowel sounds normal     MS: Tenderness to palpation of bilateral paraspinal cervical muscles extremities normal- no gross deformities noted, no evidence of inflammation in joints, FROM in all extremities.     SKIN: no suspicious lesions or rashes     NEURO: Normal strength and tone, sensory exam grossly normal, mentation intact and speech normal     PSYCH: mentation appears normal. and affect normal/bright     LYMPHATICS: No cervical adenopathy    DIAGNOSTICS:   EKG: appears normal, NSR, normal axis, normal intervals, no acute ST/T changes c/w ischemia, no LVH by voltage criteria, unchanged from previous tracings    Recent Labs   Lab Test  05/25/18   1541  05/15/18   2030  04/09/18   2012   03/15/17   2220   HGB  13.0*  13.1*  13.4   < >  14.4   PLT  217  218  174   < >  150   NA   --   137  133   < >  136   POTASSIUM   --   3.8  4.0   < >  2.7*   CR   --   1.03  0.93   < >  0.94 "   A1C   --    --    --    --   5.6    < > = values in this interval not displayed.      Results for orders placed or performed in visit on 07/10/18   CBC with platelets   Result Value Ref Range    WBC 14.4 (H) 4.0 - 11.0 10e9/L    RBC Count 4.52 4.4 - 5.9 10e12/L    Hemoglobin 13.7 13.3 - 17.7 g/dL    Hematocrit 42.2 40.0 - 53.0 %    MCV 93 78 - 100 fl    MCH 30.3 26.5 - 33.0 pg    MCHC 32.5 31.5 - 36.5 g/dL    RDW 12.9 10.0 - 15.0 %    Platelet Count 199 150 - 450 10e9/L     IMPRESSION:   Reason for surgery/procedure: occipital nerve block  Diagnosis/reason for consult: preop    The proposed surgical procedure is considered LOW risk.    REVISED CARDIAC RISK INDEX  The patient has the following serious cardiovascular risks for perioperative complications such as (MI, PE, VFib and 3  AV Block):  No serious cardiac risks  INTERPRETATION: 0 risks: Class I (very low risk - 0.4% complication rate)    The patient has the following additional risks for perioperative complications:  No identified additional risks      ICD-10-CM    1. Preop general physical exam Z01.818    2. Cervical radiculopathy M54.12    3. Anemia, unspecified type D64.9 CBC with platelets   4. Elevated random blood glucose level R73.09 Basic metabolic panel       RECOMMENDATIONS:     --Consult hospital rounder / IM to assist post-op medical management    --Patient is to hold all oral medications other than omeprazole the morning of surgery. Okay to take breathing medications.       WBC count is elevated and etiology unknown. Recommend patient is seen on 7/11/18 for further evaluation. I did not hear back from patient regarding this recommendation. A message was left on his home phone. I would recommend his WBC is rechecked prior to the procedure if the interventional radiologist feels this is necessary. Okay to proceed with injection if interventional radiologist is comfortable with proceeding with elevated white count.     Signed Electronically by:  Louisa Buck, CORA CNP    Copy of this evaluation report is provided to requesting physician.    [facility] Preop Guidelines    Revised Cardiac Risk Index

## 2018-07-09 NOTE — PATIENT INSTRUCTIONS
Stop Excedrin 3 days prior to the procedure.  Take omeprazole the morning of the procedure and hold all other medications that morning. Okay to take breathing medications the morning of surgery.    DOMENICA Dunn      Before Your Surgery      Call your surgeon if there is any change in your health. This includes signs of a cold or flu (such as a sore throat, runny nose, cough, rash or fever).    Do not smoke, drink alcohol or take over the counter medicine (unless your surgeon or primary care doctor tells you to) for the 24 hours before and after surgery.    If you take prescribed drugs: Follow your doctor s orders about which medicines to take and which to stop until after surgery.    Eating and drinking prior to surgery: follow the instructions from your surgeon    Take a shower or bath the night before surgery. Use the soap your surgeon gave you to gently clean your skin. If you do not have soap from your surgeon, use your regular soap. Do not shave or scrub the surgery site.  Wear clean pajamas and have clean sheets on your bed.

## 2018-07-09 NOTE — PROGRESS NOTES
"  SUBJECTIVE:   Joselito Abarca is a 53 year old male who presents to clinic today for the following health issues:  {Provider please address medication reconciliation discrepancies--rooming staff please delete if no med/rec issues}    HPI  {additional problems for roomer to add, delete if none:904097}  Problem list and histories reviewed & adjusted, as indicated.  Additional history: {NONE - AS DOCUMENTED:465640::\"as documented\"}    {ACUTE Problem SUPERLIST - brief histories:590277}    {HIST REVIEW/ LINKS 2:162390}    {PROVIDER CHARTING PREFERENCE:123273}  "

## 2018-07-10 ENCOUNTER — OFFICE VISIT (OUTPATIENT)
Dept: FAMILY MEDICINE | Facility: OTHER | Age: 53
End: 2018-07-10
Payer: COMMERCIAL

## 2018-07-10 VITALS
HEIGHT: 67 IN | WEIGHT: 172.4 LBS | RESPIRATION RATE: 16 BRPM | HEART RATE: 60 BPM | DIASTOLIC BLOOD PRESSURE: 72 MMHG | BODY MASS INDEX: 27.06 KG/M2 | TEMPERATURE: 98.1 F | SYSTOLIC BLOOD PRESSURE: 104 MMHG

## 2018-07-10 DIAGNOSIS — D64.9 ANEMIA, UNSPECIFIED TYPE: ICD-10-CM

## 2018-07-10 DIAGNOSIS — M54.12 CERVICAL RADICULOPATHY: ICD-10-CM

## 2018-07-10 DIAGNOSIS — Z01.818 PREOP GENERAL PHYSICAL EXAM: Primary | ICD-10-CM

## 2018-07-10 DIAGNOSIS — R73.9 ELEVATED RANDOM BLOOD GLUCOSE LEVEL: ICD-10-CM

## 2018-07-10 LAB
ANION GAP SERPL CALCULATED.3IONS-SCNC: 10 MMOL/L (ref 3–14)
BUN SERPL-MCNC: 6 MG/DL (ref 7–30)
CALCIUM SERPL-MCNC: 9 MG/DL (ref 8.5–10.1)
CHLORIDE SERPL-SCNC: 102 MMOL/L (ref 94–109)
CO2 SERPL-SCNC: 28 MMOL/L (ref 20–32)
CREAT SERPL-MCNC: 1 MG/DL (ref 0.66–1.25)
ERYTHROCYTE [DISTWIDTH] IN BLOOD BY AUTOMATED COUNT: 12.9 % (ref 10–15)
GFR SERPL CREATININE-BSD FRML MDRD: 79 ML/MIN/1.7M2
GLUCOSE SERPL-MCNC: 65 MG/DL (ref 70–99)
HCT VFR BLD AUTO: 42.2 % (ref 40–53)
HGB BLD-MCNC: 13.7 G/DL (ref 13.3–17.7)
MCH RBC QN AUTO: 30.3 PG (ref 26.5–33)
MCHC RBC AUTO-ENTMCNC: 32.5 G/DL (ref 31.5–36.5)
MCV RBC AUTO: 93 FL (ref 78–100)
PLATELET # BLD AUTO: 199 10E9/L (ref 150–450)
POTASSIUM SERPL-SCNC: 3.7 MMOL/L (ref 3.4–5.3)
RBC # BLD AUTO: 4.52 10E12/L (ref 4.4–5.9)
SODIUM SERPL-SCNC: 140 MMOL/L (ref 133–144)
WBC # BLD AUTO: 14.4 10E9/L (ref 4–11)

## 2018-07-10 PROCEDURE — 99214 OFFICE O/P EST MOD 30 MIN: CPT | Performed by: STUDENT IN AN ORGANIZED HEALTH CARE EDUCATION/TRAINING PROGRAM

## 2018-07-10 PROCEDURE — 36415 COLL VENOUS BLD VENIPUNCTURE: CPT | Performed by: STUDENT IN AN ORGANIZED HEALTH CARE EDUCATION/TRAINING PROGRAM

## 2018-07-10 PROCEDURE — 85027 COMPLETE CBC AUTOMATED: CPT | Performed by: STUDENT IN AN ORGANIZED HEALTH CARE EDUCATION/TRAINING PROGRAM

## 2018-07-10 PROCEDURE — 80048 BASIC METABOLIC PNL TOTAL CA: CPT | Performed by: STUDENT IN AN ORGANIZED HEALTH CARE EDUCATION/TRAINING PROGRAM

## 2018-07-10 ASSESSMENT — PAIN SCALES - GENERAL: PAINLEVEL: NO PAIN (0)

## 2018-07-10 NOTE — MR AVS SNAPSHOT
After Visit Summary   7/10/2018    Joselito Abarca    MRN: [id number]           Patient Information     Date Of Birth          1965        Visit Information        Provider Department      7/10/2018 1:20 PM Louisa Buck APRN [facility]        Today's Diagnoses     Preop general physical exam    -  1    Screen for colon cancer        Screening for HIV (human immunodeficiency virus)        Anemia, unspecified type        Elevated random blood glucose level          Care Instructions    Stop Excedrin 3 days prior to the procedure.  Take omeprazole the morning of the procedure and hold all other medications that morning. Okay to take breathing medications the morning of surgery.    DOMENICA Dunn      Before Your Surgery      Call your surgeon if there is any change in your health. This includes signs of a cold or flu (such as a sore throat, runny nose, cough, rash or fever).    Do not smoke, drink alcohol or take over the counter medicine (unless your surgeon or primary care doctor tells you to) for the 24 hours before and after surgery.    If you take prescribed drugs: Follow your doctor s orders about which medicines to take and which to stop until after surgery.    Eating and drinking prior to surgery: follow the instructions from your surgeon    Take a shower or bath the night before surgery. Use the soap your surgeon gave you to gently clean your skin. If you do not have soap from your surgeon, use your regular soap. Do not shave or scrub the surgery site.  Wear clean pajamas and have clean sheets on your bed.           Follow-ups after your visit        Your next 10 appointments already scheduled     Jul 12, 2018   Procedure with Ronald Driscoll MD   [facility] ([facility])    [address] [place] [address]   [phone]           From [address]: Exit at [address] on [place] of [place]. Turn  right on [address]. Turn left at stoplight on [address]. [facility] will be in view two blocks ahead            Jul 12, 2018 10:00 AM CDT   XR FLUORO NEEDLE PLACEMENT SPINE with PHCARM1   [facility])    [address] [place] [id number] [phone]           For nerve root injection, please send or bring copies of any MRIs or other scans you have had.  Bring a list of your current medicines to your exam. (Include vitamins, minerals and over-the-counter medicines.) Leave your valuables at home.  Plan to have someone drive you home afterward.  Stop taking the following medicines (but talk to your doctor first):   If you take blood thinners, you may need to stop taking them a few days before treatment. Talk to your doctor before stopping these medicines.Stop taking Coumadin (warfarin) 3 days before treatment. Restart the day after treatment.   If you take Plavix, Ticlid, Pletal or Persantine, please ask your doctor if you should stop these medicines. You may need extra tests on the morning of your scan.   If you take Xarelto (Rivaroxaban), you may need to stop taking it 24 hours before treatment. Talk to your doctor before stopping this medicine. Restart the day after treatment.  You may take your other medicines as normal.  Stop all food and drink (including water) 3 hours before your test or treatment.  Please tell the doctor:   If you are allergic to X-ray dye (contrast fluid).   If you may be pregnant.  Injections take about 30 to 45 minutes. Most people spend up to 2 hours in the clinic or hospital.  Please call the Imaging Department at your exam site with any questions.            Jul 27, 2018  1:30 PM CDT   [facility] Visit [facility] Yung Miranda MD   [facility]    [address] [id number] [phone]              Who to contact     If you have questions or need follow  "up information about today's clinic visit or your schedule please contact [facility] directly at [phone].  Normal or non-critical lab and imaging results will be communicated to you by MyChart, letter or phone within 4 business days after the clinic has received the results. If you do not hear from us within 7 days, please contact the clinic through MyChart or phone. If you have a critical or abnormal lab result, we will notify you by phone as soon as possible.  Submit refill requests through [employer] or call your pharmacy and they will forward the refill request to us. Please allow 3 business days for your refill to be completed.          Additional Information About Your Visit        Care EveryWhere ID     This is your Care EveryWhere ID. This could be used by other organizations to access your [place] medical records  [id number]        Your Vitals Were     Pulse Temperature Respirations Height BMI (Body Mass Index)       60 98.1  F (36.7  C) (Temporal) 16 5' 7.01\" (1.702 m) 27 kg/m2        Blood Pressure from Last 3 Encounters:   07/10/18 104/72   06/09/18 (!) 125/92   05/25/18 132/82    Weight from Last 3 Encounters:   07/10/18 172 lb 6.4 oz (78.2 kg)   06/09/18 170 lb (77.1 kg)   05/25/18 172 lb 6.4 oz (78.2 kg)              We Performed the Following     Basic metabolic panel     CBC with platelets        Primary Care Provider Office Phone # Fax #    Gregory G Schoen, [phone]       [address] [place] [address]        Goals        General    I will call to schedule an appointment  if I develop new or worsening symptoms. Started 4/26/16. (pt-stated)     Notes - Note created  4/26/2016  3:32 PM by Behl, Melissa K, RN    As of today's date 4/26/2016 goal is met at 0 - 25%.   Goal Status:  Active  Melissa Behl BSN, RN, N  [facility] Care Coordinator  [phone]        I will use my nebulizers and inhalers as prescribed. Started 4/26/16 (pt-stated)     " Notes - Note edited  5/10/2016  8:58 AM by Behl, Melissa K, RN    As of today's date 5/10/2016 goal is met at 51 - 75%.   Goal Status:  Active  Melissa Behl BSN, RN, PHN  [facility] Care Coordinator  [phone]          Equal Access to Services     GRETEL DUARTE : Hadii aad ku hadasho Soomaali, waaxda luqadaha, qaybta kaalmada adeegyada, waxay idiin hayaan adeeg migue la'aan . So [facility] [phone].    ATENCIÓN: Si habla español, tiene a alfaro disposición servicios gratuitos de asistencia lingüística. Llame al [phone].    We comply with applicable federal civil rights laws and [place] laws. We do not discriminate on the basis of race, color, national origin, age, disability, sex, sexual orientation, or gender identity.            Thank you!     Thank you for choosing [facility]  for your care. Our goal is always to provide you with excellent care. Hearing back from our patients is one way we can continue to improve our services. Please take a few minutes to complete the written survey that you may receive in the mail after your visit with us. Thank you!             Your Updated Medication List - Protect others around you: Learn how to safely use, store and throw away your medicines at www.disposemymeds.org.          This list is accurate as of 7/10/18  1:41 PM.  Always use your most recent med list.                   Brand Name Dispense Instructions for use Diagnosis    * albuterol (2.5 MG/3ML) 0.083% neb solution     90 mL    NEBULIZE CONTENTS OF ONE VIAL EVERY 4 HOURS AS NEEDED FOR SHORTNESS OF BREATH /DYSPNEA OR WHEEZING    Mild intermittent asthma with acute exacerbation       * VENTOLIN  (90 Base) MCG/ACT Inhaler   Generic drug:  albuterol     18 g    INHALE 2 PUFFS INTO THE LUNGS EVERY 6 HOURS AS NEEDED FOR SHORTNESS OF BREATH, DIFFICULTY BREATHING OR WHEEZING.    Mild intermittent asthma with acute exacerbation       citalopram 20 MG tablet    celeXA    90 tablet    Take 1 tablet  (20 mg) by mouth daily        clonazePAM 0.5 MG tablet    klonoPIN    21 tablet    Take 0.5-1 tablets (0.25-0.5 mg) by mouth 3 times daily as needed for anxiety    Generalized anxiety disorder       FLOVENT  MCG/ACT Inhaler   Generic drug:  fluticasone     36 g    INHALE 2 PUFFS INTO THE LUNGS TWICE DAILY    ILD (interstitial lung disease) (H)       fluticasone 50 MCG/ACT spray    FLONASE    16 g    SPRAY 2 SPRAYS IN EACH NOSTRIL EVERY DAY    Chronic seasonal allergic rhinitis due to fungal spores       gabapentin 300 MG capsule    NEURONTIN    270 capsule    TAKE TWO CAPSULES BY MOUTH THREE TIMES A DAY    Intervertebral cervical disc disorder with myelopathy, cervical region, Degeneration of cervical intervertebral disc       ipratropium - albuterol 0.5 mg/2.5 mg/3 mL 0.5-2.5 (3) MG/3ML neb solution    DUONEB    30 vial    Take 1 vial (3 mLs) by nebulization every 4 hours as needed for shortness of breath / dyspnea        methadone 10 MG tablet    DOLOPHINE    21 tablet    Take 1 tablet (10 mg) by mouth every 8 hours as needed    DDD (degenerative disc disease), cervical       omeprazole 20 MG CR capsule    priLOSEC    30 capsule    TAKE 1 CAPSULE BY MOUTH DAILY, 30 TO 60 MINUTES BEFORE A MEAL.    Esophageal reflux       oxyCODONE 5 MG capsule    OXY-IR    360 capsule    Take 2 capsules (10 mg) by mouth every 4 hours as needed 2 caps q 4 hour prn pain up to 12 per day May fill 5/24/2012    Intervertebral cervical disc disorder with myelopathy, cervical region       ranitidine 150 MG tablet    ZANTAC    30 tablet    TAKE ONE TABLET BY MOUTH EVERY MORNING    Esophageal reflux       sildenafil 100 MG tablet    VIAGRA    4 tablet    Take 1 tablet (100 mg) by mouth daily as needed 30 min to 4 hrs before sex. Do not use with nitroglycerin, terazosin or doxazosin.    Erectile dysfunction, unspecified erectile dysfunction type       traZODone 100 MG tablet    DESYREL    90 tablet    Take 3 tablets (300 mg) by mouth  At Bedtime    Panic attack       vitamin D 2000 units tablet     100 tablet    TAKE ONE TABLET BY MOUTH EVERY DAY    Degeneration of cervical intervertebral disc       zolpidem 10 MG tablet    AMBIEN     Take 10 mg by mouth nightly as needed        * Notice:  This list has 2 medication(s) that are the same as other medications prescribed for you. Read the directions carefully, and ask your doctor or other care provider to review them with you.

## 2018-07-11 ENCOUNTER — TELEPHONE (OUTPATIENT)
Dept: FAMILY MEDICINE | Facility: OTHER | Age: 53
End: 2018-07-11

## 2018-07-11 ASSESSMENT — ASTHMA QUESTIONNAIRES: ACT_TOTALSCORE: 19

## 2018-07-11 NOTE — TELEPHONE ENCOUNTER
Brionna Will RT (R) contacted Joselito on 07/11/18 and left a message. If patient calls back please inform patient of results below, thanks    Notes Recorded by Louisa Buck APRN CNP on 7/10/2018 at 8:45 PM  Please call Spenser and let him know his white count is elevated. I would like to see him tomorrow to make sure there isn't an underlying infection. I can see him at 9 am or 9:40 am.  Thanks,  Louisa Buck, CNP

## 2018-07-12 ENCOUNTER — ANESTHESIA (OUTPATIENT)
Dept: SURGERY | Facility: CLINIC | Age: 53
End: 2018-07-12
Payer: COMMERCIAL

## 2018-07-12 ENCOUNTER — SURGERY (OUTPATIENT)
Age: 53
End: 2018-07-12

## 2018-07-12 ENCOUNTER — ANESTHESIA EVENT (OUTPATIENT)
Dept: SURGERY | Facility: CLINIC | Age: 53
End: 2018-07-12
Payer: COMMERCIAL

## 2018-07-12 ENCOUNTER — HOSPITAL ENCOUNTER (OUTPATIENT)
Facility: CLINIC | Age: 53
Discharge: HOME | End: 2018-07-12
Attending: ANESTHESIOLOGY | Admitting: ANESTHESIOLOGY
Payer: COMMERCIAL

## 2018-07-12 VITALS
DIASTOLIC BLOOD PRESSURE: 75 MMHG | SYSTOLIC BLOOD PRESSURE: 111 MMHG | RESPIRATION RATE: 16 BRPM | TEMPERATURE: 98.3 F | OXYGEN SATURATION: 97 %

## 2018-07-12 PROCEDURE — 37000008 ZZH ANESTHESIA TECHNICAL FEE, 1ST 30 MIN: Performed by: ANESTHESIOLOGY

## 2018-07-12 PROCEDURE — 25000128 H RX IP 250 OP 636: Performed by: NURSE ANESTHETIST, CERTIFIED REGISTERED

## 2018-07-12 PROCEDURE — 25000125 ZZHC RX 250: Performed by: ANESTHESIOLOGY

## 2018-07-12 PROCEDURE — 64405 NJX AA&/STRD GR OCPL NRV: CPT | Mod: 50 | Performed by: ANESTHESIOLOGY

## 2018-07-12 PROCEDURE — 25000125 ZZHC RX 250: Performed by: NURSE ANESTHETIST, CERTIFIED REGISTERED

## 2018-07-12 PROCEDURE — 25000128 H RX IP 250 OP 636: Performed by: ANESTHESIOLOGY

## 2018-07-12 RX ORDER — METOPROLOL TARTRATE 1 MG/ML
1-2 INJECTION, SOLUTION INTRAVENOUS EVERY 5 MIN PRN
Status: DISCONTINUED | OUTPATIENT
Start: 2018-07-12 | End: 2018-07-12 | Stop reason: HOSPADM

## 2018-07-12 RX ORDER — PROPOFOL 10 MG/ML
INJECTION, EMULSION INTRAVENOUS PRN
Status: DISCONTINUED | OUTPATIENT
Start: 2018-07-12 | End: 2018-07-12

## 2018-07-12 RX ORDER — LIDOCAINE 40 MG/G
CREAM TOPICAL
Status: DISCONTINUED | OUTPATIENT
Start: 2018-07-12 | End: 2018-07-12 | Stop reason: HOSPADM

## 2018-07-12 RX ORDER — SODIUM CHLORIDE, SODIUM LACTATE, POTASSIUM CHLORIDE, CALCIUM CHLORIDE 600; 310; 30; 20 MG/100ML; MG/100ML; MG/100ML; MG/100ML
INJECTION, SOLUTION INTRAVENOUS CONTINUOUS
Status: DISCONTINUED | OUTPATIENT
Start: 2018-07-12 | End: 2018-07-12 | Stop reason: HOSPADM

## 2018-07-12 RX ORDER — NALOXONE HYDROCHLORIDE 0.4 MG/ML
.1-.4 INJECTION, SOLUTION INTRAMUSCULAR; INTRAVENOUS; SUBCUTANEOUS
Status: DISCONTINUED | OUTPATIENT
Start: 2018-07-12 | End: 2018-07-12 | Stop reason: HOSPADM

## 2018-07-12 RX ORDER — BUPIVACAINE HYDROCHLORIDE 2.5 MG/ML
INJECTION, SOLUTION INFILTRATION; PERINEURAL PRN
Status: DISCONTINUED | OUTPATIENT
Start: 2018-07-12 | End: 2018-07-12 | Stop reason: HOSPADM

## 2018-07-12 RX ORDER — ALBUTEROL SULFATE 0.83 MG/ML
2.5 SOLUTION RESPIRATORY (INHALATION) EVERY 4 HOURS PRN
Status: DISCONTINUED | OUTPATIENT
Start: 2018-07-12 | End: 2018-07-12 | Stop reason: HOSPADM

## 2018-07-12 RX ORDER — FENTANYL CITRATE 50 UG/ML
25-50 INJECTION, SOLUTION INTRAMUSCULAR; INTRAVENOUS
Status: DISCONTINUED | OUTPATIENT
Start: 2018-07-12 | End: 2018-07-12 | Stop reason: HOSPADM

## 2018-07-12 RX ORDER — ONDANSETRON 2 MG/ML
4 INJECTION INTRAMUSCULAR; INTRAVENOUS EVERY 30 MIN PRN
Status: DISCONTINUED | OUTPATIENT
Start: 2018-07-12 | End: 2018-07-12 | Stop reason: HOSPADM

## 2018-07-12 RX ORDER — LIDOCAINE HYDROCHLORIDE 20 MG/ML
INJECTION, SOLUTION INFILTRATION; PERINEURAL PRN
Status: DISCONTINUED | OUTPATIENT
Start: 2018-07-12 | End: 2018-07-12

## 2018-07-12 RX ORDER — IOPAMIDOL 612 MG/ML
INJECTION, SOLUTION INTRAVASCULAR PRN
Status: DISCONTINUED | OUTPATIENT
Start: 2018-07-12 | End: 2018-07-12 | Stop reason: HOSPADM

## 2018-07-12 RX ORDER — ONDANSETRON 4 MG/1
4 TABLET, ORALLY DISINTEGRATING ORAL EVERY 30 MIN PRN
Status: DISCONTINUED | OUTPATIENT
Start: 2018-07-12 | End: 2018-07-12 | Stop reason: HOSPADM

## 2018-07-12 RX ADMIN — BUPIVACAINE HYDROCHLORIDE 5 ML: 2.5 INJECTION, SOLUTION EPIDURAL; INFILTRATION; INTRACAUDAL; PERINEURAL at 09:40

## 2018-07-12 RX ADMIN — LIDOCAINE HYDROCHLORIDE 1 ML: 10 INJECTION, SOLUTION EPIDURAL; INFILTRATION; INTRACAUDAL; PERINEURAL at 09:27

## 2018-07-12 RX ADMIN — PROPOFOL 50 MG: 10 INJECTION, EMULSION INTRAVENOUS at 09:47

## 2018-07-12 RX ADMIN — LIDOCAINE HYDROCHLORIDE 50 MG: 20 INJECTION, SOLUTION INFILTRATION; PERINEURAL at 09:41

## 2018-07-12 RX ADMIN — PROPOFOL 50 MG: 10 INJECTION, EMULSION INTRAVENOUS at 09:43

## 2018-07-12 RX ADMIN — IOPAMIDOL 5 ML: 612 INJECTION, SOLUTION INTRAVENOUS at 09:41

## 2018-07-12 RX ADMIN — PROPOFOL 50 MG: 10 INJECTION, EMULSION INTRAVENOUS at 09:41

## 2018-07-12 RX ADMIN — SODIUM BICARBONATE 5 MEQ: 84 INJECTION, SOLUTION INTRAVENOUS at 09:40

## 2018-07-12 ASSESSMENT — LIFESTYLE VARIABLES: TOBACCO_USE: 1

## 2018-07-12 NOTE — OP NOTE
CHIEF COMPLAINT: Occipital neuralgia  PROCEDURE: Bilateral occipital nerve blocks  PROCEDURE DETAILS: After written informed consent was obtained from the patient, the patient was escorted to the procedure room.  The patient was placed in the prone position.  A  time out  was conducted to verify patient identity, procedure to be performed, side, site, allergies and any special requirements.  The skin over the neck and upper back region were prepped and draped in normal sterile fashion.  I used ChloraPrep.  Fluoroscopy was used to identify the location which is the midpoint between the occipital protuberance and mastoid process.  I then in a fanlike motion over the greater occipital nerve injected 5 cc of 0.25% bupivacaine with 4 mg/cc of triamcinolone.    The patient was monitored with blood pressure and pulse oximetry machines with the assistance of an RN throughout the procedure.  The patient was alert and responsive to questions throughout the procedure.   The patient tolerated the procedure well and was observed in the post-procedural area.  The patient was dismissed without apparent complications.     DIAGNOSIS:  1.  Bilateral occipital neuralgia  2.  White count of 14,000 possible early stages of pneumonia given that he has had a history of recurrent pneumonias.  However, no signs of any systemic illness except for the increased white count.  PLAN:  1.  We completed bilateral occipital nerve blocks today.  I did not feel the white count of 14,000 was a reason to postpone the surgery.  He said he has been through numerous bouts of pneumonia.  The etiology seems to be unclear.  He is not showing any signs of any systemic illness with no fevers or chills and generally he feels healthy.  I did inform him that if he starts to feel sick that he needs to go and and most likely he would need a round of antibiotics to treat the pneumonia.  Ronald Driscoll MD  Diplomate of the American Board of Anesthesiology, Pain  Medicine

## 2018-07-12 NOTE — ANESTHESIA PREPROCEDURE EVALUATION
Anesthesia Evaluation     . Pt has had prior anesthetic. Type: General           ROS/MED HX    ENT/Pulmonary:     (+)tobacco use, Current use Moderate Persistent asthma Treatment: Nebulizer prn and Inhaler prn,  , . .    Neurologic:     (+)neuropathy - Cervical-related, migraines,     Cardiovascular:     (+) Dyslipidemia, ----. : . . . :. . Previous cardiac testing Echodate:5/1/16results:EF 65Stress Testdate:1/15/09 results:NormalECG reviewed date:11/17/17 results: date: results:          METS/Exercise Tolerance:  >4 METS   Hematologic:  - neg hematologic  ROS       Musculoskeletal:   (+) , , other musculoskeletal-       GI/Hepatic: Comment: History of ETOH abuse    (+) GERD Asymptomatic on medication,       Renal/Genitourinary:  - ROS Renal section negative       Endo:  - neg endo ROS       Psychiatric:     (+) psychiatric history anxiety      Infectious Disease:  - neg infectious disease ROS       Malignancy:      - no malignancy   Other:    (+) H/O Chronic Pain,H/O chronic opiod use ,                    Physical Exam  Normal systems: cardiovascular, pulmonary and dental    Airway   Mallampati: II  TM distance: >3 FB  Neck ROM: full    Dental     Cardiovascular   Rhythm and rate: regular and normal      Pulmonary    breath sounds clear to auscultation                    Anesthesia Plan      History & Physical Review  History and physical reviewed and following examination; no interval change.    ASA Status:  2 .    NPO Status:  > 6 hours    Plan for MAC with Propofol induction. Maintenance will be TIVA.  Reason for MAC:  Deep or markedly invasive procedure (G8)         Postoperative Care      Consents  Anesthetic plan, risks, benefits and alternatives discussed with:  Patient.  Use of blood products discussed: No .   .                          .

## 2018-07-12 NOTE — DISCHARGE INSTRUCTIONS
Home Care Instructions                Procedure: Bilateral greater occipital nerve blocks    Activity:    Rest today    Do not work today    Resume normal activity tomorrow    Pain:    You may experience soreness at the injection site for one or two days    You may use an ice pack for 20 minutes every 2 hours for the first 24 hours    You may use a heating pad after the first 24 hours    You may use Tylenol  (acetaminophen) every 4 hours or other pain medicines as directed by your physician    Safety  Sedation medicine, if given may remain active for many hours.    It is important for the next 24 hours that you do not:    Drive a car    Operate machines or power tools    Consume alcohol, including beer    Sign any important papers or legal documents    You may experience numbness radiating into your legs or arms, (depending on the procedure location)  This numbness may last several hours.  Until the numb sensation returns to normal please use caution in walking, climbing stairs, stepping out of your vehicle, etc.    Common side effects of steroids:  Not everyone will experience corticosteroid side effects. If side effects are experienced they will gradually subside in the 7-10 day period following an injection.    Most common side effects include:    Flushed face and/or chest    Feeling of warmth, particularly in face but could be overall feeling of warmth    Increased blood sugar in diabetic patients    Menstrual irregularities may occur.  If taking hormone based birth control an alternate method of birth control is recommended    Sleep disturbances and/or mood swings are possible    Leg cramps    Please contact us if you have:  Severe pain   Fever more than 101.5 degrees Fahrenheit  Signs of infection (redness, swelling or drainage)      If you have questions during normal business hours (8am-5pm Monday-Friday) contact the [facility] at [phone]. If you need help after hours, we recommend that you  go to a hospital emergency room or dial 911.

## 2018-07-12 NOTE — ANESTHESIA CARE TRANSFER NOTE
Patient: Joselito Abarca    Procedure(s):  bilateral occipital nerve blocks - Wound Class: I-Clean    Diagnosis: S/P cervical spinal fusion  Diagnosis Additional Information: No value filed.    Anesthesia Type:   MAC     Note:  Airway :Nasal Cannula  Patient transferred to:Phase II  Handoff Report: Identifed the Patient, Identified the Reponsible Provider, Reviewed the pertinent medical history, Discussed the surgical course, Reviewed Intra-OP anesthesia mangement and issues during anesthesia, Set expectations for post-procedure period and Allowed opportunity for questions and acknowledgement of understanding      Vitals: (Last set prior to Anesthesia Care Transfer)    CRNA VITALS  7/12/2018 0921 - 7/12/2018 0955      7/12/2018             Resp Rate (observed): 16                Electronically Signed By: CORA Jin CRNA  July 12, 2018  9:55 AM

## 2018-07-12 NOTE — ANESTHESIA POSTPROCEDURE EVALUATION
Patient: Joselito Abarca    Procedure(s):  bilateral occipital nerve blocks - Wound Class: I-Clean    Diagnosis:S/P cervical spinal fusion  Diagnosis Additional Information: No value filed.    Anesthesia Type:  MAC    Note:  Anesthesia Post Evaluation    Patient location during evaluation: Phase 2  Patient participation: Able to fully participate in evaluation  Level of consciousness: awake  Pain management: adequate  Airway patency: patent  Cardiovascular status: acceptable and hemodynamically stable  Respiratory status: acceptable, room air and nonlabored ventilation  Hydration status: stable  PONV: none     Anesthetic complications: None    Comments: Patient was happy with the anesthesia care received and no anesthesia related complications were noted.  I will follow up with the patient again if it is needed.        Last vitals:  Vitals:    07/12/18 0915   BP: 117/80   Resp: 18   Temp: 98.3  F (36.8  C)         Electronically Signed By: CORA Jin CRNA  July 12, 2018  10:08 AM

## 2018-07-12 NOTE — TELEPHONE ENCOUNTER
Spoke with pt, he says this is likely from his chronic lung infection. He is seeing someone for this on 7/27/18.  He is scheduled with EM for tomorrow but says its not any worse than it has been.     EM should pt still be seen or are your comfortable letting him wait until 7/27. He states he would come in if his condition worsens.     Pt just had a procedure today at hospital and is going to take a nap. Please leave a voicemail if he doesn't answer. Please remove him from tomorrows schedule if EM doesn't need to see him

## 2018-07-12 NOTE — IP AVS SNAPSHOT
[facility]    [address]     [place] [address]    Phone:  [phone]                                       After Visit Summary   7/12/2018    Joselito Abarca    MRN: [id number]           After Visit Summary Signature Page     I have received my discharge instructions, and my questions have been answered. I have discussed any challenges I see with this plan with the nurse or doctor.    ..........................................................................................................................................  Patient/Patient Representative Signature      ..........................................................................................................................................  Patient Representative Print Name and Relationship to Patient    ..................................................               ................................................  Date                                            Time    ..........................................................................................................................................  Reviewed by Signature/Title    ...................................................              ..............................................  Date                                                            Time

## 2018-07-12 NOTE — IP AVS SNAPSHOT
MRN:[id number]                      After Visit Summary   7/12/2018    Joselito Abarca    MRN: [id number]           Thank you!     Thank you for choosing [facility] for your care. Our goal is always to provide you with excellent care. Hearing back from our patients is one way we can continue to improve our services. Please take a few minutes to complete the written survey that you may receive in the mail after you visit with us. Thank you!        Patient Information     Date Of Birth          1965        About your hospital stay     You were admitted on:  July 12, 2018 You last received care in the:  [facility]    You were discharged on:  July 12, 2018       Who to Call     For medical emergencies, please call 911.  For non-urgent questions about your medical care, please call your primary care provider or clinic, [phone]  For questions related to your surgery, please call your surgery clinic        Attending Provider     Provider Specialty    Ronald Driscoll MD [facility]       Primary Care Provider Office Phone # Fax #    Gregory G Schoen, [phone]      After Care Instructions     Discharge Instructions       Review outpatient procedure discharge instructions as directed by Provider.                  Your next 10 appointments already scheduled     Jul 27, 2018  1:30 PM CDT   [facility]ung Miranda [facility]   [facility]    [address] [place] [id number] [phone]              Further instructions from your care team       Home Care Instructions                Procedure: Bilateral greater occipital nerve blocks    Activity:    Rest today    Do not work today    Resume normal activity tomorrow    Pain:    You may experience soreness at the injection site for one or two days    You may use an ice pack for 20 minutes every 2 hours for the first 24 hours    You may use a heating pad  after the first 24 hours    You may use Tylenol  (acetaminophen) every 4 hours or other pain medicines as directed by your physician    Safety  Sedation medicine, if given may remain active for many hours.    It is important for the next 24 hours that you do not:    Drive a car    Operate machines or power tools    Consume alcohol, including beer    Sign any important papers or legal documents    You may experience numbness radiating into your legs or arms, (depending on the procedure location)  This numbness may last several hours.  Until the numb sensation returns to normal please use caution in walking, climbing stairs, stepping out of your vehicle, etc.    Common side effects of steroids:  Not everyone will experience corticosteroid side effects. If side effects are experienced they will gradually subside in the 7-10 day period following an injection.    Most common side effects include:    Flushed face and/or chest    Feeling of warmth, particularly in face but could be overall feeling of warmth    Increased blood sugar in diabetic patients    Menstrual irregularities may occur.  If taking hormone based birth control an alternate method of birth control is recommended    Sleep disturbances and/or mood swings are possible    Leg cramps    Please contact us if you have:  Severe pain   Fever more than 101.5 degrees Fahrenheit  Signs of infection (redness, swelling or drainage)      If you have questions during normal business hours (8am-5pm Monday-Friday) contact the [facility] at [phone]. If you need help after hours, we recommend that you go to a hospital emergency room or dial 911.                 Pending Results     No orders found from 7/10/2018 to 7/13/2018.            Admission Information     Date & Time Provider Department Dept. Phone    7/12/2018 Ronald Driscoll MD [facility] Phase [phone]      Your Vitals Were     Blood Pressure Temperature Respirations Pulse Oximetry           111/75 98.3  F (36.8  C) (Oral) 16 97%        Care EveryWhere ID     This is your Care EveryWhere ID. This could be used by other organizations to access your [facility] medical records  [id number]        Equal Access to Services     DADA DUARTE AH: Hadii maddy redman vickie Solev, waabdida luqadaha, qaybta kaalmada ric, omayra obedin hayaaroscoe cashmansoor camarena emilee moore. So [facility] [phone].    ATENCIÓN: Si habla español, tiene a alfaro disposición servicios gratuitos de asistencia lingüística. Llame al [phone].    We comply with applicable federal civil rights laws and [place] laws. We do not discriminate on the basis of race, color, national origin, age, disability, sex, sexual orientation, or gender identity.               Review of your medicines      CONTINUE these medicines which have NOT CHANGED        Dose / Directions    * albuterol (2.5 MG/3ML) 0.083% neb solution   Used for:  Mild intermittent asthma with acute exacerbation        NEBULIZE CONTENTS OF ONE VIAL EVERY 4 HOURS AS NEEDED FOR SHORTNESS OF BREATH /DYSPNEA OR WHEEZING   Quantity:  90 mL   Refills:  0       * VENTOLIN  (90 Base) MCG/ACT Inhaler   Used for:  Mild intermittent asthma with acute exacerbation   Generic drug:  albuterol        INHALE 2 PUFFS INTO THE LUNGS EVERY 6 HOURS AS NEEDED FOR SHORTNESS OF BREATH, DIFFICULTY BREATHING OR WHEEZING.   Quantity:  18 g   Refills:  3       citalopram 20 MG tablet   Commonly known as:  celeXA        Dose:  20 mg   Take 1 tablet (20 mg) by mouth daily   Quantity:  90 tablet   Refills:  3       clonazePAM 0.5 MG tablet   Commonly known as:  klonoPIN   Used for:  Generalized anxiety disorder        Dose:  0.25-0.5 mg   Take 0.5-1 tablets (0.25-0.5 mg) by mouth 3 times daily as needed for anxiety   Quantity:  21 tablet   Refills:  0       FLOVENT  MCG/ACT Inhaler   Used for:  ILD (interstitial lung disease) (H)   Generic drug:  fluticasone        INHALE 2 PUFFS INTO THE LUNGS TWICE DAILY    Quantity:  36 g   Refills:  1       fluticasone 50 MCG/ACT spray   Commonly known as:  FLONASE   Used for:  Chronic seasonal allergic rhinitis due to fungal spores        SPRAY 2 SPRAYS IN EACH NOSTRIL EVERY DAY   Quantity:  16 g   Refills:  5       gabapentin 300 MG capsule   Commonly known as:  NEURONTIN   Used for:  Intervertebral cervical disc disorder with myelopathy, cervical region, Degeneration of cervical intervertebral disc        TAKE TWO CAPSULES BY MOUTH THREE TIMES A DAY   Quantity:  270 capsule   Refills:  11       ipratropium - albuterol 0.5 mg/2.5 mg/3 mL 0.5-2.5 (3) MG/3ML neb solution   Commonly known as:  DUONEB        Dose:  1 vial   Take 1 vial (3 mLs) by nebulization every 4 hours as needed for shortness of breath / dyspnea   Quantity:  30 vial   Refills:  0       methadone 10 MG tablet   Commonly known as:  DOLOPHINE   Used for:  DDD (degenerative disc disease), cervical        Dose:  10 mg   Take 1 tablet (10 mg) by mouth every 8 hours as needed   Quantity:  21 tablet   Refills:  0       omeprazole 20 MG CR capsule   Commonly known as:  priLOSEC   Used for:  Esophageal reflux        TAKE 1 CAPSULE BY MOUTH DAILY, 30 TO 60 MINUTES BEFORE A MEAL.   Quantity:  30 capsule   Refills:  10       oxyCODONE 5 MG capsule   Commonly known as:  OXY-IR   Used for:  Intervertebral cervical disc disorder with myelopathy, cervical region        Dose:  10 mg   Take 2 capsules (10 mg) by mouth every 4 hours as needed 2 caps q 4 hour prn pain up to 12 per day May fill 5/24/2012   Quantity:  360 capsule   Refills:  0       ranitidine 150 MG tablet   Commonly known as:  ZANTAC   Used for:  Esophageal reflux        TAKE ONE TABLET BY MOUTH EVERY MORNING   Quantity:  30 tablet   Refills:  10       sildenafil 100 MG tablet   Commonly known as:  VIAGRA   Used for:  Erectile dysfunction, unspecified erectile dysfunction type        Dose:  100 mg   Take 1 tablet (100 mg) by mouth daily as needed 30 min to 4 hrs  before sex. Do not use with nitroglycerin, terazosin or doxazosin.   Quantity:  4 tablet   Refills:  0       traZODone 100 MG tablet   Commonly known as:  DESYREL   Used for:  Panic attack        Dose:  300 mg   Take 3 tablets (300 mg) by mouth At Bedtime   Quantity:  90 tablet   Refills:  3       vitamin D 2000 units tablet   Used for:  Degeneration of cervical intervertebral disc        TAKE ONE TABLET BY MOUTH EVERY DAY   Quantity:  100 tablet   Refills:  3       zolpidem 10 MG tablet   Commonly known as:  AMBIEN        Dose:  10 mg   Take 10 mg by mouth nightly as needed   Refills:  0       * Notice:  This list has 2 medication(s) that are the same as other medications prescribed for you. Read the directions carefully, and ask your doctor or other care provider to review them with you.             Protect others around you: Learn how to safely use, store and throw away your medicines at www.disposemymeds.org.             Medication List: This is a list of all your medications and when to take them. Check marks below indicate your daily home schedule. Keep this list as a reference.      Medications           Morning Afternoon Evening Bedtime As Needed    * albuterol (2.5 MG/3ML) 0.083% neb solution   NEBULIZE CONTENTS OF ONE VIAL EVERY 4 HOURS AS NEEDED FOR SHORTNESS OF BREATH /DYSPNEA OR WHEEZING                                * VENTOLIN  (90 Base) MCG/ACT Inhaler   INHALE 2 PUFFS INTO THE LUNGS EVERY 6 HOURS AS NEEDED FOR SHORTNESS OF BREATH, DIFFICULTY BREATHING OR WHEEZING.   Generic drug:  albuterol                                citalopram 20 MG tablet   Commonly known as:  celeXA   Take 1 tablet (20 mg) by mouth daily                                clonazePAM 0.5 MG tablet   Commonly known as:  klonoPIN   Take 0.5-1 tablets (0.25-0.5 mg) by mouth 3 times daily as needed for anxiety                                FLOVENT  MCG/ACT Inhaler   INHALE 2 PUFFS INTO THE LUNGS TWICE DAILY   Generic  drug:  fluticasone                                fluticasone 50 MCG/ACT spray   Commonly known as:  FLONASE   SPRAY 2 SPRAYS IN EACH NOSTRIL EVERY DAY                                gabapentin 300 MG capsule   Commonly known as:  NEURONTIN   TAKE TWO CAPSULES BY MOUTH THREE TIMES A DAY                                ipratropium - albuterol 0.5 mg/2.5 mg/3 mL 0.5-2.5 (3) MG/3ML neb solution   Commonly known as:  DUONEB   Take 1 vial (3 mLs) by nebulization every 4 hours as needed for shortness of breath / dyspnea                                methadone 10 MG tablet   Commonly known as:  DOLOPHINE   Take 1 tablet (10 mg) by mouth every 8 hours as needed                                omeprazole 20 MG CR capsule   Commonly known as:  priLOSEC   TAKE 1 CAPSULE BY MOUTH DAILY, 30 TO 60 MINUTES BEFORE A MEAL.                                oxyCODONE 5 MG capsule   Commonly known as:  OXY-IR   Take 2 capsules (10 mg) by mouth every 4 hours as needed 2 caps q 4 hour prn pain up to 12 per day May fill 5/24/2012                                ranitidine 150 MG tablet   Commonly known as:  ZANTAC   TAKE ONE TABLET BY MOUTH EVERY MORNING                                sildenafil 100 MG tablet   Commonly known as:  VIAGRA   Take 1 tablet (100 mg) by mouth daily as needed 30 min to 4 hrs before sex. Do not use with nitroglycerin, terazosin or doxazosin.                                traZODone 100 MG tablet   Commonly known as:  DESYREL   Take 3 tablets (300 mg) by mouth At Bedtime                                vitamin D 2000 units tablet   TAKE ONE TABLET BY MOUTH EVERY DAY                                zolpidem 10 MG tablet   Commonly known as:  AMBIEN   Take 10 mg by mouth nightly as needed                                * Notice:  This list has 2 medication(s) that are the same as other medications prescribed for you. Read the directions carefully, and ask your doctor or other care provider to review them with you.

## 2018-07-13 NOTE — TELEPHONE ENCOUNTER
Patient did not show up for appointment. I would recommend that he follow up with someone for recheck.  Louisa Buck, ISABELLC

## 2018-07-15 ENCOUNTER — APPOINTMENT (OUTPATIENT)
Dept: GENERAL RADIOLOGY | Facility: CLINIC | Age: 53
End: 2018-07-15
Attending: FAMILY MEDICINE
Payer: COMMERCIAL

## 2018-07-15 ENCOUNTER — HOSPITAL ENCOUNTER (EMERGENCY)
Facility: CLINIC | Age: 53
Discharge: HOME | End: 2018-07-15
Attending: FAMILY MEDICINE | Admitting: FAMILY MEDICINE
Payer: COMMERCIAL

## 2018-07-15 VITALS
TEMPERATURE: 97.7 F | OXYGEN SATURATION: 96 % | SYSTOLIC BLOOD PRESSURE: 104 MMHG | RESPIRATION RATE: 16 BRPM | DIASTOLIC BLOOD PRESSURE: 79 MMHG | WEIGHT: 170 LBS | BODY MASS INDEX: 26.62 KG/M2 | HEART RATE: 95 BPM

## 2018-07-15 DIAGNOSIS — J20.9 ACUTE BRONCHITIS WITH BRONCHOSPASM: ICD-10-CM

## 2018-07-15 PROCEDURE — 99284 EMERGENCY DEPT VISIT MOD MDM: CPT | Mod: 25 | Performed by: FAMILY MEDICINE

## 2018-07-15 PROCEDURE — 99284 EMERGENCY DEPT VISIT MOD MDM: CPT | Mod: Z6 | Performed by: FAMILY MEDICINE

## 2018-07-15 PROCEDURE — 71046 X-RAY EXAM CHEST 2 VIEWS: CPT | Mod: TC

## 2018-07-15 RX ORDER — ONDANSETRON 2 MG/ML
4 INJECTION INTRAMUSCULAR; INTRAVENOUS EVERY 30 MIN PRN
Status: DISCONTINUED | OUTPATIENT
Start: 2018-07-15 | End: 2018-07-15

## 2018-07-15 RX ORDER — CEFDINIR 300 MG/1
300 CAPSULE ORAL 2 TIMES DAILY
Qty: 20 CAPSULE | Refills: 0 | Status: SHIPPED | OUTPATIENT
Start: 2018-07-15 | End: 2018-07-27

## 2018-07-15 RX ORDER — PREDNISONE 10 MG/1
TABLET ORAL
Qty: 28 TABLET | Refills: 0 | Status: SHIPPED | OUTPATIENT
Start: 2018-07-15 | End: 2019-02-04

## 2018-07-15 NOTE — ED TRIAGE NOTES
Pt presents with cough, wheezing and shortness of breath. He reports that cough started last week, getting worse and now gets winded with any activity.  Recently stopped abx for pneumonia.

## 2018-07-15 NOTE — ED AVS SNAPSHOT
[facility]    [address]     [place] [address]    Phone:  [phone]    Fax:  [phone]                                       Joselito Abarca   MRN: [id number]    Department:  [facility]   Date of Visit:  7/15/2018           Patient Information     Date Of Birth          1965        Your diagnoses for this visit were:     Acute bronchitis with bronchospasm        You were seen by Becca Bains MD.      Follow-up Information     Follow up with Schoen, Gregory G, MD In 3 days.    Specialty:  Family Practice    Why:  if not improving    Contact information:    [address]   [place] [id number] [phone]          Follow up with [facility].    Specialty:  EMERGENCY MEDICINE    Why:  If symptoms worsen    Contact information:    [address]   [place] [id number] [phone]    Additional information:    From [address]: Exit at [address] on south side of [place]. Turn right on [address]. Turn left at stoplight on [address]. [facility] will be in view two blocks ahead        Discharge Instructions       Thank you for giving us the opportunity to see you. The impression is that you have bronchial disease with recurrent infections and wheezing (bronchospasms)    Begin Omnicef 300 mg twice a day for 10 days.  Resume prednisone and taper as directed.    Please follow-up with Dr. Schoen within the next 5-7 days, and sooner if worsening.  Keep your appointment with the pulmonologist on July 27.    Please do a DuoNeb every 4 hours while awake as well as you are wheezing.    After discharge, please closely monitor for any new or worsening symptoms. Return to the Emergency Department at any time if your symptoms worsen.        Your next 10 appointments already scheduled     Jul 27, 2018  1:30 PM CDT   New Visit with Yung Miranda MD   [facility]  [place] ([facility])    [address] [place] [id number] [phone]              24 Hour Appointment Hotline       To make an appointment at any [facility], call [phone] ([phone]). If you don't have a family doctor or clinic, we will help you find one. [facility] are conveniently located to serve the needs of you and your family.             Review of your medicines      START taking        Dose / Directions Last dose taken    cefdinir 300 MG capsule   Commonly known as:  OMNICEF   Dose:  300 mg   Quantity:  20 capsule        Take 1 capsule (300 mg) by mouth 2 times daily   Refills:  0        predniSONE 10 MG tablet   Commonly known as:  DELTASONE   Quantity:  28 tablet        2 tablets 2 times daily for 5 days, then 1 tablet 2 times daily for 2 days, then 1 tablet daily for 2 days, then 1/2 tablet daily for 2 days, then stop   Refills:  0          Our records show that you are taking the medicines listed below. If these are incorrect, please call your family doctor or clinic.        Dose / Directions Last dose taken    * albuterol (2.5 MG/3ML) 0.083% neb solution   Quantity:  90 mL        NEBULIZE CONTENTS OF ONE VIAL EVERY 4 HOURS AS NEEDED FOR SHORTNESS OF BREATH /DYSPNEA OR WHEEZING   Refills:  0        * VENTOLIN  (90 Base) MCG/ACT Inhaler   Quantity:  18 g   Generic drug:  albuterol        INHALE 2 PUFFS INTO THE LUNGS EVERY 6 HOURS AS NEEDED FOR SHORTNESS OF BREATH, DIFFICULTY BREATHING OR WHEEZING.   Refills:  3        citalopram 20 MG tablet   Commonly known as:  celeXA   Dose:  20 mg   Quantity:  90 tablet        Take 1 tablet (20 mg) by mouth daily   Refills:  3        clonazePAM 0.5 MG tablet   Commonly known as:  klonoPIN   Dose:  0.25-0.5 mg   Quantity:  21 tablet        Take 0.5-1 tablets (0.25-0.5 mg) by mouth 3 times daily as needed for anxiety   Refills:  0        FLOVENT  MCG/ACT Inhaler   Quantity:  36 g   Generic drug:   fluticasone        INHALE 2 PUFFS INTO THE LUNGS TWICE DAILY   Refills:  1        fluticasone 50 MCG/ACT spray   Commonly known as:  FLONASE   Quantity:  16 g        SPRAY 2 SPRAYS IN EACH NOSTRIL EVERY DAY   Refills:  5        gabapentin 300 MG capsule   Commonly known as:  NEURONTIN   Quantity:  270 capsule        TAKE TWO CAPSULES BY MOUTH THREE TIMES A DAY   Refills:  11        ipratropium - albuterol 0.5 mg/2.5 mg/3 mL 0.5-2.5 (3) MG/3ML neb solution   Commonly known as:  DUONEB   Dose:  1 vial   Quantity:  30 vial        Take 1 vial (3 mLs) by nebulization every 4 hours as needed for shortness of breath / dyspnea   Refills:  0        methadone 10 MG tablet   Commonly known as:  DOLOPHINE   Dose:  10 mg   Quantity:  21 tablet        Take 1 tablet (10 mg) by mouth every 8 hours as needed   Refills:  0        omeprazole 20 MG CR capsule   Commonly known as:  priLOSEC   Quantity:  30 capsule        TAKE 1 CAPSULE BY MOUTH DAILY, 30 TO 60 MINUTES BEFORE A MEAL.   Refills:  10        oxyCODONE 5 MG capsule   Commonly known as:  OXY-IR   Dose:  10 mg   Quantity:  360 capsule        Take 2 capsules (10 mg) by mouth every 4 hours as needed 2 caps q 4 hour prn pain up to 12 per day May fill 5/24/2012   Refills:  0        ranitidine 150 MG tablet   Commonly known as:  ZANTAC   Quantity:  30 tablet        TAKE ONE TABLET BY MOUTH EVERY MORNING   Refills:  10        sildenafil 100 MG tablet   Commonly known as:  VIAGRA   Dose:  100 mg   Quantity:  4 tablet        Take 1 tablet (100 mg) by mouth daily as needed 30 min to 4 hrs before sex. Do not use with nitroglycerin, terazosin or doxazosin.   Refills:  0        traZODone 100 MG tablet   Commonly known as:  DESYREL   Dose:  300 mg   Quantity:  90 tablet        Take 3 tablets (300 mg) by mouth At Bedtime   Refills:  3        vitamin D 2000 units tablet   Quantity:  100 tablet        TAKE ONE TABLET BY MOUTH EVERY DAY   Refills:  3        zolpidem 10 MG tablet   Commonly  known as:  AMBIEN   Dose:  10 mg        Take 10 mg by mouth nightly as needed   Refills:  0        * Notice:  This list has 2 medication(s) that are the same as other medications prescribed for you. Read the directions carefully, and ask your doctor or other care provider to review them with you.            Prescriptions were sent or printed at these locations (2 Prescriptions)                   [facility] - [place], [place] - [address]   [address] [address]    Telephone:  [phone]   Fax:  [phone]   Hours:                  E-Prescribed (1 of 2)         cefdinir (OMNICEF) 300 MG capsule                 Printed at Department/Unit printer (1 of 2)         predniSONE (DELTASONE) 10 MG tablet                Procedures and tests performed during your visit     XR Chest 2 Views      Orders Needing Specimen Collection     None      Pending Results     No orders found from 7/13/2018 to 7/16/2018.            Pending Culture Results     No orders found from 7/13/2018 to 7/16/2018.            Pending Results Instructions     If you had any lab results that were not finalized at the time of your Discharge, you can call the ED Lab Result RN at [phone]. You will be contacted by this team for any positive Lab results or changes in treatment. The nurses are available 7 days a week from 10A to 6:30P.  You can leave a message 24 hours per day and they will return your call.        Thank you for choosing [facility]       Thank you for choosing [facility] for your care. Our goal is always to provide you with excellent care. Hearing back from our patients is one way we can continue to improve our services. Please take a few minutes to complete the written survey that you may receive in the mail after you visit with us. Thank you!        Care EveryWhere ID     This is your Care EveryWhere ID. This could be used by other organizations to access your [facility] medical  records  [id number]        Equal Access to Services     DADA DUARTE : Melvin Hebert, damari garibay, omayra sandoval. So [facility] [phone].    ATENCIÓN: Si habla español, tiene a alfaro disposición servicios gratuitos de asistencia lingüística. Llame al [phone].    We comply with applicable federal civil rights laws and [place] laws. We do not discriminate on the basis of race, color, national origin, age, disability, sex, sexual orientation, or gender identity.            After Visit Summary       This is your record. Keep this with you and show to your community pharmacist(s) and doctor(s) at your next visit.

## 2018-07-15 NOTE — ED AVS SNAPSHOT
[facility]    [address] [place] [address]    Phone:  [phone]    Fax:  [phone]                                       Joselito Abarca   MRN: [id number]    Department:  [facility]   Date of Visit:  7/15/2018           After Visit Summary Signature Page     I have received my discharge instructions, and my questions have been answered. I have discussed any challenges I see with this plan with the nurse or doctor.    ..........................................................................................................................................  Patient/Patient Representative Signature      ..........................................................................................................................................  Patient Representative Print Name and Relationship to Patient    ..................................................               ................................................  Date                                            Time    ..........................................................................................................................................  Reviewed by Signature/Title    ...................................................              ..............................................  Date                                                            Time

## 2018-07-15 NOTE — ED PROVIDER NOTES
[facility] ED Provider Note   CC:     Chief Complaint   Patient presents with     Cough     Shortness of Breath     HPI:  Joselito Abarca is a 53 year old male who presented to the emergency department with 2 week history of progressive cough that is productive of brownish thick sputum, and wheezing.  Patient states he has been dealing with this for the last 3 years.  He is having frequent episodes of pulmonary problems, with relapse of symptoms after discontinuing his antibiotic and steroid medication.  Patient states that he has been trying to manage at home, waiting to see the pulmonologist on July 27, but he did not think he could wait any longer.  He has a nebulizer machine, but states that his current symptoms are beyond that.  He feels that he needs to get back on an antibiotic and redness on.  His medical records indicate that he had bilateral pneumonia on April 9, and return to the emergency department on May 15.  He returned to see Dr. Barnett on May 25, and has required antibiotics along with prednisone.  Patient has a history of moderate persistent asthma, and history of bronchitis.  He has smoked for 32 years, and quit 6 years ago.  He was smoking up to 1-2 packs per day.  Patient denies any fever, chills, but does feel congested and slightly short of breath.  He has headaches frequently.  He recently had an injection for his muscle tension headaches.    Problem List:  Patient Active Problem List    Diagnosis     Chronic seasonal allergic rhinitis due to fungal spores     Status post cervical spinal arthrodesis     Chronic pain syndrome     Moderate persistent asthma without complication     Acute respiratory failure with hypoxia (H)     Nausea with vomiting     Cough     Leukocytosis     Disease of bronchial airway (HCC)     Degeneration of cervical intervertebral disc     Health Care Home     Arthrodesis status     Neck pain      CARDIOVASCULAR SCREENING; LDL GOAL LESS THAN 160     Intervertebral cervical disc disorder with myelopathy, cervical region     Constipation     Thoracic or lumbosacral neuritis or radiculitis, unspecified     Esophageal reflux     Generalized anxiety disorder     Alcohol abuse, in remission       MEDS:   Previous Medications    ALBUTEROL (2.5 MG/3ML) 0.083% NEBULIZER SOLUTION    NEBULIZE CONTENTS OF ONE VIAL EVERY 4 HOURS AS NEEDED FOR SHORTNESS OF BREATH /DYSPNEA OR WHEEZING    CHOLECALCIFEROL (VITAMIN D) 2000 UNITS TABLET    TAKE ONE TABLET BY MOUTH EVERY DAY    CITALOPRAM (CELEXA) 20 MG TABLET    Take 1 tablet (20 mg) by mouth daily    CLONAZEPAM (KLONOPIN) 0.5 MG TABLET    Take 0.5-1 tablets (0.25-0.5 mg) by mouth 3 times daily as needed for anxiety    FLOVENT  MCG/ACT INHALER    INHALE 2 PUFFS INTO THE LUNGS TWICE DAILY    FLUTICASONE (FLONASE) 50 MCG/ACT SPRAY    SPRAY 2 SPRAYS IN EACH NOSTRIL EVERY DAY    GABAPENTIN (NEURONTIN) 300 MG CAPSULE    TAKE TWO CAPSULES BY MOUTH THREE TIMES A DAY    IPRATROPIUM - ALBUTEROL 0.5 MG/2.5 MG/3 ML (DUONEB) 0.5-2.5 (3) MG/3ML NEB SOLUTION    Take 1 vial (3 mLs) by nebulization every 4 hours as needed for shortness of breath / dyspnea    METHADONE (DOLOPHINE) 10 MG TABLET    Take 1 tablet (10 mg) by mouth every 8 hours as needed    OMEPRAZOLE (PRILOSEC) 20 MG CR CAPSULE    TAKE 1 CAPSULE BY MOUTH DAILY, 30 TO 60 MINUTES BEFORE A MEAL.    OXYCODONE (OXY-IR) 5 MG CAPSULE    Take 2 capsules (10 mg) by mouth every 4 hours as needed 2 caps q 4 hour prn pain up to 12 per day May fill 5/24/2012    RANITIDINE (ZANTAC) 150 MG TABLET    TAKE ONE TABLET BY MOUTH EVERY MORNING    SILDENAFIL (VIAGRA) 100 MG TABLET    Take 1 tablet (100 mg) by mouth daily as needed 30 min to 4 hrs before sex. Do not use with nitroglycerin, terazosin or doxazosin.    TRAZODONE (DESYREL) 100 MG TABLET    Take 3 tablets (300 mg) by mouth At Bedtime    VENTOLIN  (90 BASE) MCG/ACT INHALER     INHALE 2 PUFFS INTO THE LUNGS EVERY 6 HOURS AS NEEDED FOR SHORTNESS OF BREATH, DIFFICULTY BREATHING OR WHEEZING.    ZOLPIDEM (AMBIEN) 10 MG TABLET    Take 10 mg by mouth nightly as needed        ALLERGIES:    Allergies   Allergen Reactions     Vicodin [Hydrocodone-Acetaminophen] Nausea     Other reaction(s): Diaphoresis, Vomiting  HEADACHE       Past Surgical History:   Procedure Laterality Date     DISCECTOMY LUMBAR POSTERIOR MICROSCOPIC ONE LEVEL  2/21/2012    Procedure:DISCECTOMY LUMBAR POSTERIOR MICROSCOPIC ONE LEVEL; LEFT T1-T2 THORASIC HEMILAMINECTOMY MICRODISCECTOMY WITH MEXTRIX II ; Surgeon:SHARON PURI; Location:[facility] OR     DISCECTOMY, FUSION CERVICAL ANTERIOR ONE LEVEL, COMBINED N/A 11/29/2017    Procedure: COMBINED DISCECTOMY, FUSION CERVICAL ANTERIOR ONE LEVEL;  Anterior Cervical Discectomy and Fusion Cervical Six - Cervical Seven, Exploration and Revision Cervical Four - Cervical Six with Hardware Removal;  Surgeon: Nikolas Vasques MD;  Location: [facility] OR     EXPLORE SPINE, REMOVE HARDWARE, COMBINED N/A 11/29/2017    Procedure: COMBINED EXPLORE SPINE, REMOVE HARDWARE;;  Surgeon: Nikolas Vasques MD;  Location: [facility] OR     FUSION CERVICAL ANTERIOR TWO LEVELS  1/29/2010     HC DRAIN/INJ MAJOR JOINT/BURSA W/O US  5/5/2008    Left sacroiliac joint injection.     HC INJ EPIDURAL LUMBAR/SACRAL W/WO CONTRAST  2009     HEAD & NECK SURGERY      2013     HERNIA REPAIR       INJECT BLOCK MEDIAL BRANCH CERVICAL/THORACIC/LUMBAR Bilateral 8/26/2015    Procedure: INJECT BLOCK MEDIAL BRANCH CERVICAL / THORACIC / LUMBAR;  Surgeon: Ronald Driscoll MD;  Location: [facility]     INJECT FACET JOINT Bilateral 5/27/2015    Procedure: INJECT FACET JOINT;  Surgeon: Ronald Driscoll MD;  Location: [facility]     NERVE BLOCK OCCIPITAL Bilateral 7/12/2018    Procedure: NERVE BLOCK OCCIPITAL;  bilateral occipital nerve blocks;  Surgeon: Ronald Driscoll MD;  Location: [facility] OR       Social History   Substance Use Topics     Smoking  status: Passive Smoke Exposure - Never Smoker     Types: Cigars     Last attempt to quit: 12/19/2009     Smokeless tobacco: Never Used     Alcohol use No      Comment: HX OF ABUSE-IN REMISSION         Review of Systems   Except as noted in HPI, all other systems were reviewed and are negative    Physical Exam     Vitals were reviewed  Patient Vitals for the past 8 hrs:   BP Temp Temp src Pulse Resp SpO2 Weight   07/15/18 1750 104/79 97.7  F (36.5  C) Temporal 95 16 96 % 77.1 kg (170 lb)     GENERAL APPEARANCE: Alert, no respiratory difficulty  FACE: normal facies  EYES: Pupils are equal  HENT: normal external exam  NECK: no adenopathy or asymmetry  RESP: Normal respiratory effort; however patient has bilateral expiratory wheezes  CV: regular rate and rhythm; no significant murmurs, gallops or rubs  ABD: soft, no tenderness; no rebound or guarding; bowel sounds are normal  EXT: No calf tenderness or pitting edema  SKIN: no worrisome rash  NEURO: no facial droop; no focal deficits, speech is normal  PSYCH: normal mood and affect      Available Lab/Imaging Results     Results for orders placed or performed during the hospital encounter of 07/15/18 (from the past 24 hour(s))   XR Chest 2 Views    Narrative    CHEST TWO VIEW   7/15/2018 6:11 PM     HISTORY: Cough, SOA, wheezing.     COMPARISON: 5/25/2018.     FINDINGS: Cardiomediastinal silhouette within normal limits. No focal  airspace opacities. No pleural effusion. No pneumothorax identified.  The bones are unremarkable.       Impression    IMPRESSION: No acute cardiopulmonary abnormalities.    SHAYLA PRATT MD                Impression     Final diagnoses:   Acute bronchitis with bronchospasm         ED Course & Medical Decision Making   Joselito Abarca is a 53 year old male who presented to the emergency department with 2 week history of progressive cough that is productive of brownish thick sputum.  He has also been wheezing.  He has a history of bronchial  disease and has had recurrent pneumonias.  He has had some patchy infiltrates on prior CT scans.  He has had at least 2 documented bouts of pneumonia that was complicated and possibly another bout of bronchial infection that required another course of antibiotics and steroid.  Patient presents with 2 week history of cough, wheezing but no fever or chills.  He does have shortness of breath.  Patient was seen shortly after arrival, and his temperature is 97.7 with blood pressure 104/79.  Heart rate 95, respiratory rate is 16 and 96% oxygen saturation.  Exam revealed bilateral expiratory wheeze.  I offered a DuoNeb but the patient states that he has that at home.  He wanted to get started on an antibiotic and steroids since these have helped.  However it seems that shortly after he finishes his medications his symptoms recur.  He is not scheduled to see the pulmonologist until July 27.  Chest x-ray was done today and reveals no definite infiltrates.  Patient was advised to continue duo nebs at home, take Omnicef for his productive cough, and prednisone for his expiratory wheezes.  Follow-up with Dr. Barnett within the next 5 days, and sooner if his symptoms do not improve.  Return to the ED at any time if symptoms worsen.           Written after-visit summary and instructions were given at the time of discharge.      New Prescriptions    CEFDINIR (OMNICEF) 300 MG CAPSULE    Take 1 capsule (300 mg) by mouth 2 times daily    PREDNISONE (DELTASONE) 10 MG TABLET    2 tablets 2 times daily for 5 days, then 1 tablet 2 times daily for 2 days, then 1 tablet daily for 2 days, then 1/2 tablet daily for 2 days, then stop            This note was dictated using electronic voice recognition software and although reviewed, may contain grammatical and spelling errors.        Becca Bains MD  07/15/18 8084

## 2018-07-15 NOTE — DISCHARGE INSTRUCTIONS
Thank you for giving us the opportunity to see you. The impression is that you have bronchial disease with recurrent infections and wheezing (bronchospasms)    Begin Omnicef 300 mg twice a day for 10 days.  Resume prednisone and taper as directed.    Please follow-up with Dr. Schoen within the next 5-7 days, and sooner if worsening.  Keep your appointment with the pulmonologist on July 27.    Please do a DuoNeb every 4 hours while awake as well as you are wheezing.    After discharge, please closely monitor for any new or worsening symptoms. Return to the Emergency Department at any time if your symptoms worsen.

## 2018-07-16 ENCOUNTER — PATIENT OUTREACH (OUTPATIENT)
Dept: CARE COORDINATION | Facility: CLINIC | Age: 53
End: 2018-07-16

## 2018-07-16 NOTE — PROGRESS NOTES
Clinic Care Coordination Contact    Clinic Care Coordination Contact  OUTREACH    Referral Information:  Referral Source: ED Follow-Up    Primary Diagnosis: Respiratory Disorders - other    Chief Complaint   Patient presents with     Clinic Care Coordination - Post Hospital     ED follow up        Universal Utilization: increased ED visits  Clinic Utilization  Difficulty keeping appointments:: No  Utilization    Last refreshed: 7/16/2018  9:41 AM:  No Show Count (past year) 4       Last refreshed: 7/16/2018  9:41 AM:  ED visits 7       Last refreshed: 7/16/2018  9:41 AM:  Hospital admissions 1          Current as of: 7/16/2018  9:41 AM           Clinical Concerns:  Current Medical Concerns:  Called and spoke with pt,  States he is doing better today and not as SOB.  He has been doing his nebulizer every 4 hours as advised by discharging provider.  Pt is also taking his antibiotics and prednisone.  States he is scheduled to see the pulmonologist and has enough antibiotics to last until then.  Pt states he just seems to be in a cycle of getting bronchitis.  States he will get better on the antibiotics and then shortly after he will start getting sick again.  States he had a living environment that had mold and has has issues ever since.  Pt has not been dx with COPD and his last PFT was normal function. Pt has a history of smoking cigars, however, quit in 2009.   Current Behavioral Concerns: no current concerns    Education Provided to patient: advised to continue to use nebulizer every 4-6 hours as needed for wheezing. Call clinic if symptoms are worsening before scheduled appt.       Health Maintenance Reviewed: Not assessed  Clinical Pathway: None    Medication Management:  Self management     Functional Status:  Dependent ADLs:: Independent  Bed or wheelchair confined:: No  Mobility Status: Independent    Living Situation:  Current living arrangement:: I live in a private home    Diet/Exercise/Sleep:      Transportation:  Transportation concerns (GOAL):: No  Transportation means:: Regular car     Psychosocial:     Financial/Insurance:      Resources and Interventions:  Current Resources: none    Equipment Currently Used at Home: nebulizer         Goals:   Goals        General    I will call to schedule an appointment  if I develop new or worsening symptoms. Started 4/26/16. (pt-stated)     Notes - Note created  4/26/2016  3:32 PM by Behl, Melissa K, RN    As of today's date 4/26/2016 goal is met at 0 - 25%.   Goal Status:  Active  Melissa Behl BSN, RN, N  [facility] Care Coordinator  [phone]        I will use my nebulizers and inhalers as prescribed. Started 4/26/16 (pt-stated)     Notes - Note edited  5/10/2016  8:58 AM by Behl, Melissa K, RN    As of today's date 5/10/2016 goal is met at 51 - 75%.   Goal Status:  Active  Melissa Behl BSN, RN, N  [facility] Care Coordinator  [phone]            Patient/Caregiver understanding: Pt verbalizes understanding of contacting the clinic if he does not continue to improve.       Future Appointments              In 1 week Yung Miranda MD [facility]          Plan:   Pt will complete medications as prescribed  Pt will use nebulizer every 4-6 hours as needed for wheezing  Pt will keep scheduled appt with pulmonology  Pt will call with any new or worsening symptoms  RN will outreach in week.     Luisa GUTIÉRREZ, RN, PHN  Care Coordination    [facility]  [address] [place] [address]  Office: [phone]  Fax [phone]   [facility]  [address] [place] [address]  Office: [phone] Fax [phone]  [email or web address]   [email or web address]   Connect with [facility] on social media.

## 2018-07-16 NOTE — TELEPHONE ENCOUNTER
Patient was in the ED on [facility] 7/15/2018, care coordination outreach will follow up, per ED visit patient to follow up with PCP in 5 days  Closing encounter  Brionna Will RT (R)

## 2018-07-19 DIAGNOSIS — M50.30 DDD (DEGENERATIVE DISC DISEASE), CERVICAL: ICD-10-CM

## 2018-07-19 RX ORDER — METHADONE HYDROCHLORIDE 10 MG/1
10 TABLET ORAL EVERY 8 HOURS PRN
Qty: 90 TABLET | Refills: 0 | Status: SHIPPED | OUTPATIENT
Start: 2018-07-19 | End: 2018-08-22

## 2018-07-19 NOTE — TELEPHONE ENCOUNTER
Requested Prescriptions   Pending Prescriptions Disp Refills     methadone (DOLOPHINE) 10 MG tablet 21 tablet 0     Sig: Take 1 tablet (10 mg) by mouth every 8 hours as needed    There is no refill protocol information for this order        Last Written Prescription Date:  05/16/2018  Last Fill Quantity: 21,  # refills: 0   Last office visit: 7/10/2018 with prescribing provider:  07/10/2018   Future Office Visit:

## 2018-07-20 NOTE — TELEPHONE ENCOUNTER
Script brought to [facility] pharmacy.    Methadone     Mica GREY

## 2018-07-23 DIAGNOSIS — J84.9 ILD (INTERSTITIAL LUNG DISEASE) (H): ICD-10-CM

## 2018-07-23 NOTE — TELEPHONE ENCOUNTER
"Requested Prescriptions   Pending Prescriptions Disp Refills     FLOVENT  MCG/ACT Inhaler [Pharmacy Med Name: FLOVENT HFA 220MCG/ACT AERO] 36 g 1    Last Written Prescription Date:  10/13/17  Last Fill Quantity: 18 g,  # refills: 3   Last office visit: 7/10/2018 with prescribing provider:  5/25/18   Future Office Visit:     Sig: INHALE 2 PUFFS INTO THE LUNGS TWICE DAILY    Inhaled Steroids Protocol Failed    7/23/2018  5:04 PM       Failed - Asthma control assessment score within normal limits in last 6 months    Please review ACT score.          Passed - Patient is age 12 or older       Passed - Recent (6 mo) or future (30 days) visit within the authorizing provider's specialty    Patient had office visit in the last 6 months or has a visit in the next 30 days with authorizing provider or within the authorizing provider's specialty.  See \"Patient Info\" tab in inbasket, or \"Choose Columns\" in Meds & Orders section of the refill encounter.              "

## 2018-07-24 ENCOUNTER — TELEPHONE (OUTPATIENT)
Dept: FAMILY MEDICINE | Facility: CLINIC | Age: 53
End: 2018-07-24

## 2018-07-24 DIAGNOSIS — F41.1 GENERALIZED ANXIETY DISORDER: ICD-10-CM

## 2018-07-24 RX ORDER — CLONAZEPAM 0.5 MG/1
0.25-0.5 TABLET ORAL 3 TIMES DAILY PRN
Qty: 21 TABLET | Refills: 0 | Status: SHIPPED | OUTPATIENT
Start: 2018-07-24 | End: 2018-07-27

## 2018-07-24 NOTE — TELEPHONE ENCOUNTER
Clonazepam  0.5 MG     Last Written Prescription Date:  5/16/18  Last Fill Quantity: 21,   # refills: 0  Last Office Visit: 5/25/18  Future Office visit:       Routing refill request to provider for review/approval because:  Drug not on the [facility], P or [facility] refill protocol or controlled substance

## 2018-07-25 DIAGNOSIS — B37.0 THRUSH: Primary | ICD-10-CM

## 2018-07-25 RX ORDER — FLUTICASONE PROPIONATE 220 UG/1
AEROSOL, METERED RESPIRATORY (INHALATION)
Qty: 36 G | Refills: 2 | Status: SHIPPED | OUTPATIENT
Start: 2018-07-25 | End: 2020-10-07

## 2018-07-25 NOTE — TELEPHONE ENCOUNTER
Patient requesting refill on nystatin [id number]/ml susp, last prescribed by ER    Nancy Valencia, Pharmacy Technician  [facility]  [phone]

## 2018-07-25 NOTE — TELEPHONE ENCOUNTER
FLOVENT Prescription approved per [facility] Refill Protocol.......................SUGAR Knapp

## 2018-07-26 DIAGNOSIS — M50.00 INTERVERTEBRAL CERVICAL DISC DISORDER WITH MYELOPATHY, CERVICAL REGION: ICD-10-CM

## 2018-07-26 RX ORDER — NYSTATIN 100000/ML
500000 SUSPENSION, ORAL (FINAL DOSE FORM) ORAL 4 TIMES DAILY
Qty: 280 ML | Refills: 0 | Status: SHIPPED | OUTPATIENT
Start: 2018-07-26 | End: 2018-09-04

## 2018-07-26 NOTE — TELEPHONE ENCOUNTER
Requested Prescriptions   Pending Prescriptions Disp Refills     oxyCODONE (OXY-IR) 5 MG capsule 360 capsule 0     Sig: Take 2 capsules (10 mg) by mouth every 4 hours as needed 2 caps q 4 hour prn pain up to 12 per day May fill 5/24/2012    There is no refill protocol information for this order        Last Written Prescription Date:  05/01/2018  Last Fill Quantity: 360,  # refills: 0   Last office visit: 7/10/2018 with prescribing provider:  07/10/2018   Future Office Visit:

## 2018-07-26 NOTE — TELEPHONE ENCOUNTER
Nystatin       Last Written Prescription Date:    Last Fill Quantity: ,   # refills:   Last Office Visit: 5/25/18  Future Office visit:       Routing refill request to provider for review/approval because:  Drug not on the [facility], P or [facility] refill protocol or controlled substance

## 2018-07-27 ENCOUNTER — OFFICE VISIT (OUTPATIENT)
Dept: PULMONOLOGY | Facility: CLINIC | Age: 53
End: 2018-07-27
Payer: COMMERCIAL

## 2018-07-27 VITALS
HEART RATE: 89 BPM | TEMPERATURE: 97.5 F | DIASTOLIC BLOOD PRESSURE: 83 MMHG | HEIGHT: 67 IN | RESPIRATION RATE: 20 BRPM | WEIGHT: 170 LBS | OXYGEN SATURATION: 98 % | SYSTOLIC BLOOD PRESSURE: 133 MMHG | BODY MASS INDEX: 26.68 KG/M2

## 2018-07-27 DIAGNOSIS — K21.9 GASTROESOPHAGEAL REFLUX DISEASE, ESOPHAGITIS PRESENCE NOT SPECIFIED: ICD-10-CM

## 2018-07-27 DIAGNOSIS — R06.02 SOB (SHORTNESS OF BREATH): Primary | ICD-10-CM

## 2018-07-27 DIAGNOSIS — K44.9 HIATAL HERNIA: ICD-10-CM

## 2018-07-27 DIAGNOSIS — R91.8 PULMONARY NODULES: ICD-10-CM

## 2018-07-27 LAB
ERYTHROCYTE [DISTWIDTH] IN BLOOD BY AUTOMATED COUNT: 13 % (ref 10–15)
FEF 25/75: NORMAL
FEV-1: NORMAL
FEV1/FVC: NORMAL
FVC: NORMAL
HCT VFR BLD AUTO: 42.6 % (ref 40–53)
HGB BLD-MCNC: 13.6 G/DL (ref 13.3–17.7)
INR PPP: 0.88 (ref 0.86–1.14)
MCH RBC QN AUTO: 30.2 PG (ref 26.5–33)
MCHC RBC AUTO-ENTMCNC: 31.9 G/DL (ref 31.5–36.5)
MCV RBC AUTO: 95 FL (ref 78–100)
PLATELET # BLD AUTO: 247 10E9/L (ref 150–450)
RBC # BLD AUTO: 4.5 10E12/L (ref 4.4–5.9)
WBC # BLD AUTO: 11.4 10E9/L (ref 4–11)

## 2018-07-27 PROCEDURE — 87389 HIV-1 AG W/HIV-1&-2 AB AG IA: CPT

## 2018-07-27 PROCEDURE — 36415 COLL VENOUS BLD VENIPUNCTURE: CPT

## 2018-07-27 PROCEDURE — 85027 COMPLETE CBC AUTOMATED: CPT

## 2018-07-27 PROCEDURE — 94010 BREATHING CAPACITY TEST: CPT

## 2018-07-27 PROCEDURE — 99205 OFFICE O/P NEW HI 60 MIN: CPT | Mod: 25

## 2018-07-27 PROCEDURE — 86255 FLUORESCENT ANTIBODY SCREEN: CPT

## 2018-07-27 PROCEDURE — 85610 PROTHROMBIN TIME: CPT

## 2018-07-27 RX ORDER — OXYCODONE HYDROCHLORIDE 5 MG/1
10 CAPSULE ORAL EVERY 4 HOURS PRN
Qty: 360 CAPSULE | Refills: 0 | Status: SHIPPED | OUTPATIENT
Start: 2018-07-27 | End: 2018-08-28

## 2018-07-27 RX ORDER — CLONAZEPAM 0.5 MG/1
0.25-0.5 TABLET ORAL 3 TIMES DAILY PRN
Qty: 90 TABLET | Refills: 0 | Status: SHIPPED | OUTPATIENT
Start: 2018-07-27 | End: 2018-08-28

## 2018-07-27 ASSESSMENT — PAIN SCALES - GENERAL: PAINLEVEL: NO PAIN (0)

## 2018-07-27 NOTE — TELEPHONE ENCOUNTER
Called patient and let him know that Dr. Schoen will be filling a 90# prescription for him that he can  next weekend.  No further action is needed as of right now.     Barbara Villalta, CMA

## 2018-07-27 NOTE — PATIENT INSTRUCTIONS
Thank you for coming in today to see José Miranda MD.      José Miranda MD has requested that you have the following tests before your next appointment with him:    <> Blood Work: Today    <> Chest CT scan: 8/3/18 at 2 pm in [facility]    <> [facility]: [phone]    <> Bronchoscopy 2 weeks from now. See Additional information.  [facility] will be calling you with a date and time for this procedure.    If you have any questions or concerns please feel free to call.    Clinic: [phone]  [phone]       [facility] (Pulmonology)   at [facility]  [phone]

## 2018-07-27 NOTE — MR AVS SNAPSHOT
After Visit Summary   2018    Joselito Abarca    MRN: [id number]           Patient Information     Date Of Birth          1965        Visit Information        Provider Department      2018 1:30 PM Yung Miranda MD [facility]        Today's Diagnoses     SOB (shortness of breath)    -  1    Pulmonary nodules        Gastroesophageal reflux disease, esophagitis presence not specified        Hiatal hernia          Care Instructions    Thank you for coming in today to see José Miranda MD.      José Miranda MD has requested that you have the following tests before your next appointment with him:    <> Blood Work: Today    <> Chest CT scan: 8/3/18 at 2 pm in [facility]    <> [facility] referral: [facility] [place], [place]: [phone]    <> Bronchoscopy 2 weeks from now. See Additional information.  [facility] will be calling you with a date and time for this procedure.    If you have any questions or concerns please feel free to call.    Clinic: [phone]  [phone]       [facility] (Pulmonology)   at [facility]  [phone]                          Follow-ups after your visit        Additional Services     GASTROENTEROLOGY ADULT REF CONSULT ONLY       Preferred Location: [facility] [place], [place]: [phone]      Please be aware that coverage of these services is subject to the terms and limitations of your health insurance plan.  Call member services at your health plan with any benefit or coverage questions.  Any procedures must be performed at a [facility] facility OR coordinated by your clinic's referral office.    Please bring the following with you to your appointment:    (1) Any X-Rays, CTs or MRIs which have been performed.  Contact the facility where they were done to arrange for  prior to your scheduled appointment.    (2) List of current medications   (3) This referral  request   (4) Any documents/labs given to you for this referral                  Your next 10 appointments already scheduled     Aug 03, 2018  2:00 PM CDT   CT CHEST W/O CONTRAST with PHCT1   [facility] CT Scan ([facility])    [address] [place] [id number] [phone]           Please bring any scans or X-rays taken at other hospitals, if similar tests were done. Also bring a list of your medicines, including vitamins, minerals and over-the-counter drugs. It is safest to leave personal items at home.  Be sure to tell your doctor:   If you have any allergies.   If there s any chance you are pregnant.   If you are breastfeeding.  You do not need to do anything special to prepare for this exam.  Please wear loose clothing, such as a sweat suit or jogging clothes. Avoid snaps, zippers and other metal. We may ask you to undress and put on a hospital gown.              Future tests that were ordered for you today     Open Future Orders        Priority Expected Expires Ordered    Anti Nuclear Estrella IgG by IFA with Reflex Routine  10/25/2018 7/27/2018    Scleroderma Antibody Scl70 JEANINE IgG Routine  10/25/2018 7/27/2018    IgG Subclasses Routine  10/25/2018 7/27/2018    CT Chest w/o Contrast Routine 8/3/2018 7/27/2019 7/27/2018            Who to contact     If you have questions or need follow up information about today's clinic visit or your schedule please contact [facility] directly at [phone].  Normal or non-critical lab and imaging results will be communicated to you by MyChart, letter or phone within 4 business days after the clinic has received the results. If you do not hear from us within 7 days, please contact the clinic through MyChart or phone. If you have a critical or abnormal lab result, we will notify you by phone as soon as possible.  Submit refill requests through [employer] or call your pharmacy and they will forward the refill request to us. Please  "allow 3 business days for your refill to be completed.          Additional Information About Your Visit        Care EveryWhere ID     This is your Care EveryWhere ID. This could be used by other organizations to access your [facility] medical records  [id number]        Your Vitals Were     Pulse Temperature Respirations Height Pulse Oximetry BMI (Body Mass Index)    89 97.5  F (36.4  C) (Temporal) 20 5' 7.01\" (1.702 m) 98% 26.62 kg/m2       Blood Pressure from Last 3 Encounters:   07/27/18 133/83   07/15/18 104/79   07/12/18 111/75    Weight from Last 3 Encounters:   07/27/18 170 lb (77.1 kg)   07/15/18 170 lb (77.1 kg)   07/10/18 172 lb 6.4 oz (78.2 kg)              We Performed the Following     ANCA IgG by IFA with Reflex to Titer     BRONCHOSCOPY W TRANSBRONCH BIOPSY, SINGLE LOBE     CBC with platelets     GASTROENTEROLOGY ADULT REF CONSULT ONLY     HIV Antigen Antibody Combo     INR     Spirometry, Breathing Capacity: Normal Order, Clinic Performed          Today's Medication Changes          These changes are accurate as of 7/27/18  2:41 PM.  If you have any questions, ask your nurse or doctor.               Stop taking these medicines if you haven't already. Please contact your care team if you have questions.     cefdinir 300 MG capsule   Commonly known as:  OMNICEF   Stopped by:  Yung Miranda MD                    Primary Care Provider Office Phone # Fax #    Gregory G Schoen, [phone]       [address] [place] [address]        Goals        General    I will call to schedule an appointment  if I develop new or worsening symptoms. Started 4/26/16. (pt-stated)     Notes - Note created  4/26/2016  3:32 PM by Behl, Melissa K, RN    As of today's date 4/26/2016 goal is met at 0 - 25%.   Goal Status:  Active  Melissa Behl BSN, RN, PHN  [facility] Care Coordinator  [phone]        I will use my nebulizers and inhalers as prescribed. Started 4/26/16 (pt-stated)     Notes " - Note edited  5/10/2016  8:58 AM by Behl, Melissa K, RN    As of today's date 5/10/2016 goal is met at 51 - 75%.   Goal Status:  Active  Melissa Behl BSN, RN, PHN  [facility] Care Coordinator  [phone]          Equal Access to Services     [facility]: Hadii aad ku hadasho Soomaali, waaxda luqadaha, qaybta kaalmada adeegyada, waxay idiin hayaan adeeg khsonia la'aan . So [facility] [phone].    ATENCIÓN: Si habla español, tiene a alfaro disposición servicios gratuitos de asistencia lingüística. Llame al [phone].    We comply with applicable federal civil rights laws and [place] laws. We do not discriminate on the basis of race, color, national origin, age, disability, sex, sexual orientation, or gender identity.            Thank you!     Thank you for choosing [facility]  for your care. Our goal is always to provide you with excellent care. Hearing back from our patients is one way we can continue to improve our services. Please take a few minutes to complete the written survey that you may receive in the mail after your visit with us. Thank you!             Your Updated Medication List - Protect others around you: Learn how to safely use, store and throw away your medicines at www.disposemymeds.org.          This list is accurate as of 7/27/18  2:41 PM.  Always use your most recent med list.                   Brand Name Dispense Instructions for use Diagnosis    * albuterol (2.5 MG/3ML) 0.083% neb solution     90 mL    NEBULIZE CONTENTS OF ONE VIAL EVERY 4 HOURS AS NEEDED FOR SHORTNESS OF BREATH /DYSPNEA OR WHEEZING    Mild intermittent asthma with acute exacerbation       * VENTOLIN  (90 Base) MCG/ACT Inhaler   Generic drug:  albuterol     18 g    INHALE 2 PUFFS INTO THE LUNGS EVERY 6 HOURS AS NEEDED FOR SHORTNESS OF BREATH, DIFFICULTY BREATHING OR WHEEZING.    Mild intermittent asthma with acute exacerbation       citalopram 20 MG tablet    celeXA    90 tablet    Take 1 tablet (20  mg) by mouth daily        clonazePAM 0.5 MG tablet    klonoPIN    21 tablet    Take 0.5-1 tablets (0.25-0.5 mg) by mouth 3 times daily as needed for anxiety    Generalized anxiety disorder       FLOVENT  MCG/ACT Inhaler   Generic drug:  fluticasone     36 g    INHALE 2 PUFFS INTO THE LUNGS TWICE DAILY    ILD (interstitial lung disease) (H)       fluticasone 50 MCG/ACT spray    FLONASE    16 g    SPRAY 2 SPRAYS IN EACH NOSTRIL EVERY DAY    Chronic seasonal allergic rhinitis due to fungal spores       gabapentin 300 MG capsule    NEURONTIN    270 capsule    TAKE TWO CAPSULES BY MOUTH THREE TIMES A DAY    Intervertebral cervical disc disorder with myelopathy, cervical region, Degeneration of cervical intervertebral disc       ipratropium - albuterol 0.5 mg/2.5 mg/3 mL 0.5-2.5 (3) MG/3ML neb solution    DUONEB    30 vial    Take 1 vial (3 mLs) by nebulization every 4 hours as needed for shortness of breath / dyspnea        methadone 10 MG tablet    DOLOPHINE    90 tablet    Take 1 tablet (10 mg) by mouth every 8 hours as needed    DDD (degenerative disc disease), cervical       nystatin 153567 UNIT/ML suspension    MYCOSTATIN    280 mL    Take 5 mLs (500,000 Units) by mouth 4 times daily    Thrush       omeprazole 20 MG CR capsule    priLOSEC    30 capsule    TAKE 1 CAPSULE BY MOUTH DAILY, 30 TO 60 MINUTES BEFORE A MEAL.    Esophageal reflux       oxyCODONE 5 MG capsule    OXY-IR    360 capsule    Take 2 capsules (10 mg) by mouth every 4 hours as needed 2 caps q 4 hour prn pain up to 12 per day May fill 5/24/2012    Intervertebral cervical disc disorder with myelopathy, cervical region       ranitidine 150 MG tablet    ZANTAC    30 tablet    TAKE ONE TABLET BY MOUTH EVERY MORNING    Esophageal reflux       sildenafil 100 MG tablet    VIAGRA    4 tablet    Take 1 tablet (100 mg) by mouth daily as needed 30 min to 4 hrs before sex. Do not use with nitroglycerin, terazosin or doxazosin.    Erectile dysfunction,  unspecified erectile dysfunction type       traZODone 100 MG tablet    DESYREL    90 tablet    Take 3 tablets (300 mg) by mouth At Bedtime    Panic attack       vitamin D 2000 units tablet     100 tablet    TAKE ONE TABLET BY MOUTH EVERY DAY    Degeneration of cervical intervertebral disc       zolpidem 10 MG tablet    AMBIEN     Take 10 mg by mouth nightly as needed        * Notice:  This list has 2 medication(s) that are the same as other medications prescribed for you. Read the directions carefully, and ask your doctor or other care provider to review them with you.

## 2018-07-27 NOTE — TELEPHONE ENCOUNTER
Patient states that he was supposed to received 90 tablets of the Clonazepam and he only received 21. Patient can be reached at [phone].  Please advise.
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
## 2018-07-30 LAB
ANCA AB PATTERN SER IF-IMP: NORMAL
C-ANCA TITR SER IF: NORMAL {TITER}
HIV 1+2 AB+HIV1 P24 AG SERPL QL IA: NONREACTIVE

## 2018-07-31 ENCOUNTER — TELEPHONE (OUTPATIENT)
Dept: PULMONOLOGY | Facility: CLINIC | Age: 53
End: 2018-07-31

## 2018-07-31 NOTE — TELEPHONE ENCOUNTER
Left message for pt to return call to clinic to ensure that he is aware of bronch 8/7 at 10:00am with check in at 9:00am, and aware of pre procedure prep.

## 2018-07-31 NOTE — TELEPHONE ENCOUNTER
Patient returned call to clinic and instructions for bronch given to patient. Procedure on 8/7/18 at 10 am with 9 am check in.  Instructions include: must have , no food after midnight and no fluid 6 hrs before 10 am, no talking during exam, risk factors given, location noted, allow 4 hrs for entire procedure, tell nurse after exam if having SOB, chest pain or more than flecks of blood with cough, patient will hold meds until after procedure, patient not on ASA or other blood thinners, will have IV sedation and throat numbed, results begin to return with in the week but some take up to 6-8 weeks and given clinic number to call with questions.

## 2018-08-02 DIAGNOSIS — M50.30 DEGENERATION OF CERVICAL INTERVERTEBRAL DISC: ICD-10-CM

## 2018-08-02 DIAGNOSIS — M50.00 INTERVERTEBRAL CERVICAL DISC DISORDER WITH MYELOPATHY, CERVICAL REGION: ICD-10-CM

## 2018-08-03 ENCOUNTER — HOSPITAL ENCOUNTER (OUTPATIENT)
Dept: CT IMAGING | Facility: CLINIC | Age: 53
Discharge: HOME | End: 2018-08-03
Payer: COMMERCIAL

## 2018-08-03 DIAGNOSIS — R91.8 PULMONARY NODULES: ICD-10-CM

## 2018-08-03 PROCEDURE — 71250 CT THORAX DX C-: CPT

## 2018-08-03 NOTE — TELEPHONE ENCOUNTER
Requested Prescriptions   Pending Prescriptions Disp Refills     gabapentin (NEURONTIN) 300 MG capsule [Pharmacy Med Name: GABAPENTIN 300MG CAPS] 270 capsule 11     Sig: TAKE TWO CAPSULES BY MOUTH THREE TIMES A DAY    There is no refill protocol information for this order        Last Written Prescription Date:  06/27/2017  Last Fill Quantity: 270,  # refills: 11   Last office visit: 7/10/2018 with prescribing provider:  07/10/2018   Future Office Visit:

## 2018-08-05 RX ORDER — GABAPENTIN 300 MG/1
CAPSULE ORAL
Qty: 270 CAPSULE | Refills: 11 | Status: SHIPPED | OUTPATIENT
Start: 2018-08-05 | End: 2019-11-19

## 2018-08-07 ENCOUNTER — SURGERY (OUTPATIENT)
Age: 53
End: 2018-08-07

## 2018-08-07 ENCOUNTER — HOSPITAL ENCOUNTER (OUTPATIENT)
Facility: CLINIC | Age: 53
Discharge: HOME | End: 2018-08-07
Payer: COMMERCIAL

## 2018-08-07 ENCOUNTER — APPOINTMENT (OUTPATIENT)
Dept: GENERAL RADIOLOGY | Facility: CLINIC | Age: 53
End: 2018-08-07
Payer: COMMERCIAL

## 2018-08-07 VITALS
RESPIRATION RATE: 13 BRPM | DIASTOLIC BLOOD PRESSURE: 75 MMHG | OXYGEN SATURATION: 93 % | SYSTOLIC BLOOD PRESSURE: 104 MMHG | HEART RATE: 79 BPM

## 2018-08-07 LAB
APPEARANCE FLD: CLEAR
BRONCHOSCOPY: NORMAL
CD19 CELLS NFR BRONCH: 2 %
CD3 CELLS NFR BRONCH: 94 %
CD3+CD4+ CELLS NFR BRONCH: 37 %
CD3+CD4+ CELLS/CD3+CD8+ CLL BRONCH: 0.74 %
CD3+CD8+ CELLS NFR BRONCH: 50 %
CD3-CD16+CD56+ CELLS NFR SPEC: 5 %
COLOR FLD: COLORLESS
COPATH REPORT: NORMAL
EOSINOPHIL NFR FLD MANUAL: 1 %
GRAM STN SPEC: NORMAL
IFC SPECIMEN: NORMAL
LYMPHOCYTES NFR FLD MANUAL: 10 %
NEUTS BAND NFR FLD MANUAL: 25 %
OTHER CELLS FLD MANUAL: 64 %
SPECIMEN SOURCE FLD: NORMAL
SPECIMEN SOURCE: NORMAL
T-CELL SUBSET INTERPRETATION: NORMAL
WBC # FLD AUTO: 184 /UL

## 2018-08-07 PROCEDURE — 31628 BRONCHOSCOPY/LUNG BX EACH: CPT

## 2018-08-07 PROCEDURE — 25000128 H RX IP 250 OP 636

## 2018-08-07 PROCEDURE — 71046 X-RAY EXAM CHEST 2 VIEWS: CPT

## 2018-08-07 PROCEDURE — G0500 MOD SEDAT ENDO SERVICE >5YRS: HCPCS

## 2018-08-07 PROCEDURE — 87633 RESP VIRUS 12-25 TARGETS: CPT

## 2018-08-07 PROCEDURE — 87205 SMEAR GRAM STAIN: CPT

## 2018-08-07 PROCEDURE — 87102 FUNGUS ISOLATION CULTURE: CPT

## 2018-08-07 PROCEDURE — 99153 MOD SED SAME PHYS/QHP EA: CPT

## 2018-08-07 PROCEDURE — 88108 CYTOPATH CONCENTRATE TECH: CPT

## 2018-08-07 PROCEDURE — 25000125 ZZHC RX 250

## 2018-08-07 PROCEDURE — 87015 SPECIMEN INFECT AGNT CONCNTJ: CPT

## 2018-08-07 PROCEDURE — 31624 DX BRONCHOSCOPE/LAVAGE: CPT

## 2018-08-07 PROCEDURE — 88305 TISSUE EXAM BY PATHOLOGIST: CPT

## 2018-08-07 PROCEDURE — 87116 MYCOBACTERIA CULTURE: CPT

## 2018-08-07 PROCEDURE — 86356 MONONUCLEAR CELL ANTIGEN: CPT

## 2018-08-07 PROCEDURE — 27210995 ZZH RX 272

## 2018-08-07 PROCEDURE — 87206 SMEAR FLUORESCENT/ACID STAI: CPT

## 2018-08-07 PROCEDURE — 31632 BRONCHOSCOPY/LUNG BX ADDL: CPT

## 2018-08-07 PROCEDURE — 87070 CULTURE OTHR SPECIMN AEROBIC: CPT

## 2018-08-07 PROCEDURE — 89051 BODY FLUID CELL COUNT: CPT

## 2018-08-07 PROCEDURE — 88312 SPECIAL STAINS GROUP 1: CPT

## 2018-08-07 PROCEDURE — 99152 MOD SED SAME PHYS/QHP 5/>YRS: CPT

## 2018-08-07 RX ORDER — LIDOCAINE HYDROCHLORIDE AND EPINEPHRINE 10; 10 MG/ML; UG/ML
INJECTION, SOLUTION INFILTRATION; PERINEURAL PRN
Status: DISCONTINUED | OUTPATIENT
Start: 2018-08-07 | End: 2018-08-07 | Stop reason: HOSPADM

## 2018-08-07 RX ORDER — FENTANYL CITRATE 50 UG/ML
INJECTION, SOLUTION INTRAMUSCULAR; INTRAVENOUS PRN
Status: DISCONTINUED | OUTPATIENT
Start: 2018-08-07 | End: 2018-08-07 | Stop reason: HOSPADM

## 2018-08-07 RX ORDER — LIDOCAINE HYDROCHLORIDE 40 MG/ML
INJECTION, SOLUTION RETROBULBAR PRN
Status: DISCONTINUED | OUTPATIENT
Start: 2018-08-07 | End: 2018-08-07 | Stop reason: HOSPADM

## 2018-08-07 RX ORDER — ALBUTEROL SULFATE 0.83 MG/ML
SOLUTION RESPIRATORY (INHALATION) PRN
Status: DISCONTINUED | OUTPATIENT
Start: 2018-08-07 | End: 2018-08-07 | Stop reason: HOSPADM

## 2018-08-07 RX ORDER — MAGNESIUM HYDROXIDE 1200 MG/15ML
LIQUID ORAL PRN
Status: DISCONTINUED | OUTPATIENT
Start: 2018-08-07 | End: 2018-08-07 | Stop reason: HOSPADM

## 2018-08-07 RX ADMIN — FENTANYL CITRATE 75 MCG: 50 INJECTION, SOLUTION INTRAMUSCULAR; INTRAVENOUS at 10:26

## 2018-08-07 RX ADMIN — LIDOCAINE HYDROCHLORIDE 3 ML: 40 INJECTION, SOLUTION RETROBULBAR; TOPICAL at 09:49

## 2018-08-07 RX ADMIN — MIDAZOLAM 2 MG: 1 INJECTION INTRAMUSCULAR; INTRAVENOUS at 10:26

## 2018-08-07 RX ADMIN — LIDOCAINE HYDROCHLORIDE 10 ML: 20 SOLUTION ORAL; TOPICAL at 10:26

## 2018-08-07 RX ADMIN — SODIUM CHLORIDE 120 ML: 900 IRRIGANT IRRIGATION at 10:38

## 2018-08-07 RX ADMIN — LIDOCAINE HYDROCHLORIDE AND EPINEPHRINE 5 ML: 10; 10 INJECTION, SOLUTION INFILTRATION; PERINEURAL at 10:47

## 2018-08-07 RX ADMIN — FENTANYL CITRATE 75 MCG: 50 INJECTION, SOLUTION INTRAMUSCULAR; INTRAVENOUS at 10:32

## 2018-08-07 RX ADMIN — LIDOCAINE HYDROCHLORIDE AND EPINEPHRINE 5 ML: 10; 10 INJECTION, SOLUTION INFILTRATION; PERINEURAL at 10:42

## 2018-08-07 RX ADMIN — MIDAZOLAM 1 MG: 1 INJECTION INTRAMUSCULAR; INTRAVENOUS at 10:32

## 2018-08-07 RX ADMIN — FENTANYL CITRATE 50 MCG: 50 INJECTION, SOLUTION INTRAMUSCULAR; INTRAVENOUS at 10:36

## 2018-08-07 RX ADMIN — ALBUTEROL SULFATE 2.5 MG: 2.5 SOLUTION RESPIRATORY (INHALATION) at 09:49

## 2018-08-07 RX ADMIN — LIDOCAINE HYDROCHLORIDE 9 ML: 40 INJECTION, SOLUTION RETROBULBAR; TOPICAL at 10:36

## 2018-08-07 RX ADMIN — LIDOCAINE HYDROCHLORIDE 12 ML: 10 INJECTION, SOLUTION EPIDURAL; INFILTRATION; INTRACAUDAL; PERINEURAL at 10:36

## 2018-08-07 NOTE — OR NURSING
Bronchoscopy with BAL and transbronchial biopsies on RML.  Fluoro time was 1.8 minutes.  Sedation given per MD instruction.  Desatted intermittently to high 80s, recovered with deep breathing and increased O2.  Other VSS.  Pt taken to recovery by RN.

## 2018-08-07 NOTE — IP AVS SNAPSHOT
[facility], [place], Endoscopy    [address] [facility] [place] [address]    Phone:  [phone]                                       After Visit Summary   8/7/2018    Joselito Abarca    MRN: [id number]           After Visit Summary Signature Page     I have received my discharge instructions, and my questions have been answered. I have discussed any challenges I see with this plan with the nurse or doctor.    ..........................................................................................................................................  Patient/Patient Representative Signature      ..........................................................................................................................................  Patient Representative Print Name and Relationship to Patient    ..................................................               ................................................  Date                                            Time    ..........................................................................................................................................  Reviewed by Signature/Title    ...................................................              ..............................................  Date                                                            Time

## 2018-08-07 NOTE — DISCHARGE INSTRUCTIONS
Discharge Instructions after Bronchoscopy with Lavage and Biopsy    Activity  __X_ You had medicine to relax and for pain. You may feel dizzy or sleepy.  For 24 hours:    Do not drive or use heavy equipment.    Do not make important decisions.    Do not drink any alcohol.    Diet  __X_ When you can swallow easily, you may go back to your regular diet, medicines  and light exercise.    Follow-up  ___ We took small tissue and fluid samples to study. We will call you with the results in about 10 business days.    Call right away if you have:    Unusual chest pain    Temperature above 100.6  F (37.5  C)    Coughing that does not stop.    If you have severe pain, bleeding, or shortness of breath, go to an emergency room.    If you have questions, call:  Monday to Friday, 7 a.m. to 4:30 p.m.  Endoscopy: [phone] (We may have to call you back)    After hours:  Hospital: [phone] (Ask for the pulmonary fellow on call)

## 2018-08-07 NOTE — IP AVS SNAPSHOT
MRN:[id number]                      After Visit Summary   8/7/2018    Joselito Abarca    MRN: [id number]           Thank you!     Thank you for choosing [facility] for your care. Our goal is always to provide you with excellent care. Hearing back from our patients is one way we can continue to improve our services. Please take a few minutes to complete the written survey that you may receive in the mail after you visit with us. Thank you!        Patient Information     Date Of Birth          1965        About your hospital stay     You were admitted on:  August 7, 2018 You last received care in the:  [facility], Endoscopy    You were discharged on:  August 7, 2018       Who to Call     For medical emergencies, please call 911.  For non-urgent questions about your medical care, please call your primary care provider or clinic, [phone]  For questions related to your surgery, please call your surgery clinic        Attending Provider     Provider Yung Norton MD Internal Medicine       Primary Care Provider Office Phone # Fax #    Gregory G Schoen, [phone]      Further instructions from your care team       Discharge Instructions after Bronchoscopy with Lavage and Biopsy    Activity  __X_ You had medicine to relax and for pain. You may feel dizzy or sleepy.  For 24 hours:    Do not drive or use heavy equipment.    Do not make important decisions.    Do not drink any alcohol.    Diet  __X_ When you can swallow easily, you may go back to your regular diet, medicines  and light exercise.    Follow-up  ___ We took small tissue and fluid samples to study. We will call you with the results in about 10 business days.    Call right away if you have:    Unusual chest pain    Temperature above 100.6  F (37.5  C)    Coughing that does not stop.    If you have severe pain, bleeding, or shortness of breath, go to an emergency room.    If you have questions,  call:  Monday to Friday, 7 a.m. to 4:30 p.m.  Endoscopy: [phone] (We may have to call you back)    After hours:  Hospital: [phone] (Ask for the pulmonary fellow on call)    Pending Results     No orders found from 8/5/2018 to 8/8/2018.            Admission Information     Date & Time Provider Department Dept. Phone    8/7/2018 Yung Miranda MD [facility], [place], Endoscopy [phone]      Your Vitals Were     Blood Pressure Pulse Respirations Pulse Oximetry          123/64 79 36 94%        Care EveryWhere ID     This is your Care EveryWhere ID. This could be used by other organizations to access your [place] medical records  [id number]        Equal Access to Services     DADA DUARTE : Melvin anno Solev, waaxda luqadaha, qaybta kaalmada adeegyada, omayra moore. So [facility] [phone].    ATENCIÓN: Si habla español, tiene a alfaro disposición servicios gratuitos de asistencia lingüística. Llame al [phone].    We comply with applicable federal civil rights laws and [place] laws. We do not discriminate on the basis of race, color, national origin, age, disability, sex, sexual orientation, or gender identity.               Review of your medicines      UNREVIEWED medicines. Ask your doctor about these medicines        Dose / Directions    * albuterol (2.5 MG/3ML) 0.083% neb solution   Used for:  Mild intermittent asthma with acute exacerbation        NEBULIZE CONTENTS OF ONE VIAL EVERY 4 HOURS AS NEEDED FOR SHORTNESS OF BREATH /DYSPNEA OR WHEEZING   Quantity:  90 mL   Refills:  0       * VENTOLIN  (90 Base) MCG/ACT Inhaler   Used for:  Mild intermittent asthma with acute exacerbation   Generic drug:  albuterol        INHALE 2 PUFFS INTO THE LUNGS EVERY 6 HOURS AS NEEDED FOR SHORTNESS OF BREATH, DIFFICULTY BREATHING OR WHEEZING.   Quantity:  18 g   Refills:  3       citalopram 20 MG tablet   Commonly known as:  celeXA        Dose:  20 mg   Take 1 tablet  (20 mg) by mouth daily   Quantity:  90 tablet   Refills:  3       clonazePAM 0.5 MG tablet   Commonly known as:  klonoPIN   Used for:  Generalized anxiety disorder        Dose:  0.25-0.5 mg   Take 0.5-1 tablets (0.25-0.5 mg) by mouth 3 times daily as needed for anxiety   Quantity:  90 tablet   Refills:  0       FLOVENT  MCG/ACT Inhaler   Used for:  ILD (interstitial lung disease) (H)   Generic drug:  fluticasone        INHALE 2 PUFFS INTO THE LUNGS TWICE DAILY   Quantity:  36 g   Refills:  2       fluticasone 50 MCG/ACT spray   Commonly known as:  FLONASE   Used for:  Chronic seasonal allergic rhinitis due to fungal spores        SPRAY 2 SPRAYS IN EACH NOSTRIL EVERY DAY   Quantity:  16 g   Refills:  5       gabapentin 300 MG capsule   Commonly known as:  NEURONTIN   Used for:  Intervertebral cervical disc disorder with myelopathy, cervical region, Degeneration of cervical intervertebral disc        TAKE TWO CAPSULES BY MOUTH THREE TIMES A DAY   Quantity:  270 capsule   Refills:  11       ipratropium - albuterol 0.5 mg/2.5 mg/3 mL 0.5-2.5 (3) MG/3ML neb solution   Commonly known as:  DUONEB        Dose:  1 vial   Take 1 vial (3 mLs) by nebulization every 4 hours as needed for shortness of breath / dyspnea   Quantity:  30 vial   Refills:  0       methadone 10 MG tablet   Commonly known as:  DOLOPHINE   Used for:  DDD (degenerative disc disease), cervical        Dose:  10 mg   Take 1 tablet (10 mg) by mouth every 8 hours as needed   Quantity:  90 tablet   Refills:  0       nystatin 249155 UNIT/ML suspension   Commonly known as:  MYCOSTATIN   Used for:  Thrush        Dose:  660724 Units   Take 5 mLs (500,000 Units) by mouth 4 times daily   Quantity:  280 mL   Refills:  0       omeprazole 20 MG CR capsule   Commonly known as:  priLOSEC   Used for:  Esophageal reflux        TAKE 1 CAPSULE BY MOUTH DAILY, 30 TO 60 MINUTES BEFORE A MEAL.   Quantity:  30 capsule   Refills:  10       oxyCODONE 5 MG capsule   Commonly  known as:  OXY-IR   Used for:  Intervertebral cervical disc disorder with myelopathy, cervical region        Dose:  10 mg   Take 2 capsules (10 mg) by mouth every 4 hours as needed 2 caps q 4 hour prn pain up to 12 per day May fill 5/24/2012   Quantity:  360 capsule   Refills:  0       ranitidine 150 MG tablet   Commonly known as:  ZANTAC   Used for:  Esophageal reflux        TAKE ONE TABLET BY MOUTH EVERY MORNING   Quantity:  30 tablet   Refills:  10       sildenafil 100 MG tablet   Commonly known as:  VIAGRA   Used for:  Erectile dysfunction, unspecified erectile dysfunction type        Dose:  100 mg   Take 1 tablet (100 mg) by mouth daily as needed 30 min to 4 hrs before sex. Do not use with nitroglycerin, terazosin or doxazosin.   Quantity:  4 tablet   Refills:  0       traZODone 100 MG tablet   Commonly known as:  DESYREL   Used for:  Panic attack        Dose:  300 mg   Take 3 tablets (300 mg) by mouth At Bedtime   Quantity:  90 tablet   Refills:  3       vitamin D 2000 units tablet   Used for:  Degeneration of cervical intervertebral disc        TAKE ONE TABLET BY MOUTH EVERY DAY   Quantity:  100 tablet   Refills:  3       zolpidem 10 MG tablet   Commonly known as:  AMBIEN        Dose:  10 mg   Take 10 mg by mouth nightly as needed   Refills:  0       * Notice:  This list has 2 medication(s) that are the same as other medications prescribed for you. Read the directions carefully, and ask your doctor or other care provider to review them with you.             Protect others around you: Learn how to safely use, store and throw away your medicines at www.disposemymeds.org.             Medication List: This is a list of all your medications and when to take them. Check marks below indicate your daily home schedule. Keep this list as a reference.      Medications           Morning Afternoon Evening Bedtime As Needed    * albuterol (2.5 MG/3ML) 0.083% neb solution   NEBULIZE CONTENTS OF ONE VIAL EVERY 4 HOURS AS  NEEDED FOR SHORTNESS OF BREATH /DYSPNEA OR WHEEZING   Last time this was given:  2.5 mg on 8/7/2018  9:49 AM                                * VENTOLIN  (90 Base) MCG/ACT Inhaler   INHALE 2 PUFFS INTO THE LUNGS EVERY 6 HOURS AS NEEDED FOR SHORTNESS OF BREATH, DIFFICULTY BREATHING OR WHEEZING.   Generic drug:  albuterol                                citalopram 20 MG tablet   Commonly known as:  celeXA   Take 1 tablet (20 mg) by mouth daily                                clonazePAM 0.5 MG tablet   Commonly known as:  klonoPIN   Take 0.5-1 tablets (0.25-0.5 mg) by mouth 3 times daily as needed for anxiety                                FLOVENT  MCG/ACT Inhaler   INHALE 2 PUFFS INTO THE LUNGS TWICE DAILY   Generic drug:  fluticasone                                fluticasone 50 MCG/ACT spray   Commonly known as:  FLONASE   SPRAY 2 SPRAYS IN EACH NOSTRIL EVERY DAY                                gabapentin 300 MG capsule   Commonly known as:  NEURONTIN   TAKE TWO CAPSULES BY MOUTH THREE TIMES A DAY                                ipratropium - albuterol 0.5 mg/2.5 mg/3 mL 0.5-2.5 (3) MG/3ML neb solution   Commonly known as:  DUONEB   Take 1 vial (3 mLs) by nebulization every 4 hours as needed for shortness of breath / dyspnea                                methadone 10 MG tablet   Commonly known as:  DOLOPHINE   Take 1 tablet (10 mg) by mouth every 8 hours as needed                                nystatin 558839 UNIT/ML suspension   Commonly known as:  MYCOSTATIN   Take 5 mLs (500,000 Units) by mouth 4 times daily                                omeprazole 20 MG CR capsule   Commonly known as:  priLOSEC   TAKE 1 CAPSULE BY MOUTH DAILY, 30 TO 60 MINUTES BEFORE A MEAL.                                oxyCODONE 5 MG capsule   Commonly known as:  OXY-IR   Take 2 capsules (10 mg) by mouth every 4 hours as needed 2 caps q 4 hour prn pain up to 12 per day May fill 5/24/2012                                ranitidine  150 MG tablet   Commonly known as:  ZANTAC   TAKE ONE TABLET BY MOUTH EVERY MORNING                                sildenafil 100 MG tablet   Commonly known as:  VIAGRA   Take 1 tablet (100 mg) by mouth daily as needed 30 min to 4 hrs before sex. Do not use with nitroglycerin, terazosin or doxazosin.                                traZODone 100 MG tablet   Commonly known as:  DESYREL   Take 3 tablets (300 mg) by mouth At Bedtime                                vitamin D 2000 units tablet   TAKE ONE TABLET BY MOUTH EVERY DAY                                zolpidem 10 MG tablet   Commonly known as:  AMBIEN   Take 10 mg by mouth nightly as needed                                * Notice:  This list has 2 medication(s) that are the same as other medications prescribed for you. Read the directions carefully, and ask your doctor or other care provider to review them with you.

## 2018-08-08 LAB
CMV DNA SPEC NAA+PROBE-ACNC: NORMAL [IU]/ML
CMV DNA SPEC NAA+PROBE-LOG#: NORMAL {LOG_IU}/ML
COPATH REPORT: NORMAL
FLUAV H1 2009 PAND RNA SPEC QL NAA+PROBE: NEGATIVE
FLUAV H1 RNA SPEC QL NAA+PROBE: NEGATIVE
FLUAV H3 RNA SPEC QL NAA+PROBE: NEGATIVE
FLUAV RNA SPEC QL NAA+PROBE: NEGATIVE
FLUBV RNA SPEC QL NAA+PROBE: NEGATIVE
HADV DNA SPEC QL NAA+PROBE: NEGATIVE
HADV DNA SPEC QL NAA+PROBE: NEGATIVE
HMPV RNA SPEC QL NAA+PROBE: NEGATIVE
HPIV1 RNA SPEC QL NAA+PROBE: NEGATIVE
HPIV2 RNA SPEC QL NAA+PROBE: NEGATIVE
HPIV3 RNA SPEC QL NAA+PROBE: NEGATIVE
MICROBIOLOGIST REVIEW: NORMAL
RHINOVIRUS RNA SPEC QL NAA+PROBE: NEGATIVE
RSV RNA SPEC QL NAA+PROBE: NEGATIVE
RSV RNA SPEC QL NAA+PROBE: NEGATIVE
SPECIMEN SOURCE: NORMAL
SPECIMEN SOURCE: NORMAL

## 2018-08-09 LAB
BACTERIA SPEC CULT: NO GROWTH
SPECIMEN SOURCE: NORMAL

## 2018-08-10 LAB
ACID FAST STN SPEC QL: NORMAL
SPECIMEN SOURCE: NORMAL

## 2018-08-16 ENCOUNTER — TELEPHONE (OUTPATIENT)
Dept: PULMONOLOGY | Facility: CLINIC | Age: 53
End: 2018-08-16

## 2018-08-16 NOTE — TELEPHONE ENCOUNTER
Bronchoscopy results have not revealed a cause for his nodules.     I have asked him to call me back when he starts to get symptoms again.  We may have him give sputum specimens versus repeat a bronchoscopy.      He needs to finish getting blood work that was ordered on 7/27.  JOSH, scleroderma Ab, and IgG testing.      He needs to call GI to set up an initial evaluation for his GERD.    José Miranda MD

## 2018-08-17 ENCOUNTER — PATIENT OUTREACH (OUTPATIENT)
Dept: CARE COORDINATION | Facility: CLINIC | Age: 53
End: 2018-08-17

## 2018-08-17 NOTE — TELEPHONE ENCOUNTER
Bob Staton, can you let Joselito know how to get his bloodwork finished up.  There are three blood tests from 7/27 that I ordered that the lab didn't draw for some reason.  He needs to get these drawn.   Thanks.   José  (Routing comment)

## 2018-08-17 NOTE — TELEPHONE ENCOUNTER
Please contact patient and help him set up a lab only appointment for labs ordered by Ruben.  Thank you

## 2018-08-20 DIAGNOSIS — R91.8 PULMONARY NODULES: ICD-10-CM

## 2018-08-20 PROCEDURE — 82784 ASSAY IGA/IGD/IGG/IGM EACH: CPT

## 2018-08-20 PROCEDURE — 86038 ANTINUCLEAR ANTIBODIES: CPT

## 2018-08-20 PROCEDURE — 36415 COLL VENOUS BLD VENIPUNCTURE: CPT

## 2018-08-20 PROCEDURE — 82787 IGG 1 2 3 OR 4 EACH: CPT

## 2018-08-20 PROCEDURE — 86235 NUCLEAR ANTIGEN ANTIBODY: CPT

## 2018-08-21 ENCOUNTER — TELEPHONE (OUTPATIENT)
Dept: GASTROENTEROLOGY | Facility: CLINIC | Age: 53
End: 2018-08-21

## 2018-08-21 LAB
ENA SCL70 IGG SER IA-ACNC: <0.2 AI (ref 0–0.9)
IGG SERPL-MCNC: 1550 MG/DL (ref 695–1620)
IGG1 SER-MCNC: 1450 MG/DL (ref 300–856)
IGG2 SER-MCNC: 117 MG/DL (ref 158–761)
IGG3 SER-MCNC: 33 MG/DL (ref 24–192)
IGG4 SER-MCNC: 49 MG/DL (ref 11–86)

## 2018-08-21 NOTE — PROGRESS NOTES
Clinic Care Coordination Contact    Clinic Care Coordination Contact  OUTREACH    Referral Information:     Primary Diagnosis: Respiratory Disorders - other    Chief Complaint   Patient presents with     Clinic Care Coordination - Follow-up     RN follow up      Universal Utilization:   Clinic Utilization  Difficulty keeping appointments:: No  Utilization    Last refreshed: 8/20/2018  6:16 PM:  No Show Count (past year) 4       Last refreshed: 8/20/2018  6:16 PM:  ED visits 6       Last refreshed: 8/20/2018  6:16 PM:  Hospital admissions 1          Current as of: 8/20/2018  6:16 PM           Clinical Concerns:  Current Medical Concerns:  Called and spoke with pt, states he has been doing ok. He just had a bronchoscopy and didn't find anything.  He will continue to follow up with his pulmonologist.  Pt also states he has an appt with a GI doc because he is having problems with his stomach.   Otherwise pt is doing ok and will contact his care team with any questions or concerns.     Current Behavioral Concerns: no current concerns    Education Provided to patient: follow up as recommended      Health Maintenance Reviewed: Not assessed  Clinical Pathway: None    Medication Management:  Self management     Functional Status:   Independent    Transportation:    Transportation means:: Regular car     Psychosocial:   Financial/Insurance concerns : No    Resources and Interventions:  Current Resources:      Goals: na    Patient/Caregiver understanding: Pt has a good understanding of follow up recommendations       Future Appointments              In 2 days Devin Pelayo MD [facility]          Plan:   Pt will follow up with providers as scheduled  Pt will contact care team as needed  RN CC will be available for any future needs.     Luisa STROUDN, RN, PHN  Care Coordination    [address] [address]  Office:  [phone]  Fax [phone]   [facility]  [address] [place] [address]  Office: [phone] Fax [phone]  [email or web address]   [email or web address]   Connect with [facility] on social media.

## 2018-08-21 NOTE — TELEPHONE ENCOUNTER
PREVISIT INFORMATION                                                    Joselito WOLFGANG Rima scheduled for future visit at [facility] specialty clinics.    Patient is scheduled to see Dr Pelayo  on 8/23/18  Reason for visit: GERD  Referring provider Yung Miranda   Has patient seen previous specialist? unknown  Medical Records:  Available in chart.  Patient was previously seen at a [facility] or [facility] facility.    REVIEW                                                      New patient packet mailed to patient: No  Medication reconciliation complete: No      Current Outpatient Prescriptions   Medication Sig Dispense Refill     albuterol (2.5 MG/3ML) 0.083% nebulizer solution NEBULIZE CONTENTS OF ONE VIAL EVERY 4 HOURS AS NEEDED FOR SHORTNESS OF BREATH /DYSPNEA OR WHEEZING 90 mL 0     Cholecalciferol (VITAMIN D) 2000 units tablet TAKE ONE TABLET BY MOUTH EVERY  tablet 3     citalopram (CELEXA) 20 MG tablet Take 1 tablet (20 mg) by mouth daily 90 tablet 3     clonazePAM (KLONOPIN) 0.5 MG tablet Take 0.5-1 tablets (0.25-0.5 mg) by mouth 3 times daily as needed for anxiety 90 tablet 0     FLOVENT  MCG/ACT Inhaler INHALE 2 PUFFS INTO THE LUNGS TWICE DAILY 36 g 2     fluticasone (FLONASE) 50 MCG/ACT spray SPRAY 2 SPRAYS IN EACH NOSTRIL EVERY DAY 16 g 5     gabapentin (NEURONTIN) 300 MG capsule TAKE TWO CAPSULES BY MOUTH THREE TIMES A  capsule 11     ipratropium - albuterol 0.5 mg/2.5 mg/3 mL (DUONEB) 0.5-2.5 (3) MG/3ML neb solution Take 1 vial (3 mLs) by nebulization every 4 hours as needed for shortness of breath / dyspnea 30 vial 0     methadone (DOLOPHINE) 10 MG tablet Take 1 tablet (10 mg) by mouth every 8 hours as needed 90 tablet 0     nystatin (MYCOSTATIN) 420618 UNIT/ML suspension Take 5 mLs (500,000 Units) by mouth 4 times daily 280 mL 0     omeprazole (PRILOSEC) 20 MG CR capsule TAKE 1 CAPSULE BY MOUTH DAILY, 30 TO 60 MINUTES BEFORE A MEAL. 30 capsule 10      oxyCODONE (OXY-IR) 5 MG capsule Take 2 capsules (10 mg) by mouth every 4 hours as needed 2 caps q 4 hour prn pain up to 12 per day May fill 5/24/2012 360 capsule 0     ranitidine (ZANTAC) 150 MG tablet TAKE ONE TABLET BY MOUTH EVERY MORNING 30 tablet 10     sildenafil (VIAGRA) 100 MG tablet Take 1 tablet (100 mg) by mouth daily as needed 30 min to 4 hrs before sex. Do not use with nitroglycerin, terazosin or doxazosin. 4 tablet 0     traZODone (DESYREL) 100 MG tablet Take 3 tablets (300 mg) by mouth At Bedtime 90 tablet 3     VENTOLIN  (90 BASE) MCG/ACT Inhaler INHALE 2 PUFFS INTO THE LUNGS EVERY 6 HOURS AS NEEDED FOR SHORTNESS OF BREATH, DIFFICULTY BREATHING OR WHEEZING. 18 g 3     zolpidem (AMBIEN) 10 MG tablet Take 10 mg by mouth nightly as needed          Allergies: Vicodin [hydrocodone-acetaminophen]        PLAN/FOLLOW-UP NEEDED                                                      Previsit review complete.  Patient will see provider at future scheduled appointment. VM left for patient to call clinic back to see if GI has been seen outside the [facility] system     Patient Reminders Given:  Please, make sure you bring an updated list of your medications.   If you are having a procedure, please, present 15 minutes early.  If you need to cancel or reschedule,please call [phone].    Devin HATFIELD

## 2018-08-22 DIAGNOSIS — M50.30 DDD (DEGENERATIVE DISC DISEASE), CERVICAL: ICD-10-CM

## 2018-08-22 NOTE — TELEPHONE ENCOUNTER
Last Written Prescription Date:  7/169/18  Last Fill Quantity: 90,  # refills: 0   Last office visit: 7/10/2018 with prescribing provider:  7/10/18   Future Office Visit:

## 2018-08-23 ENCOUNTER — OFFICE VISIT (OUTPATIENT)
Dept: GASTROENTEROLOGY | Facility: CLINIC | Age: 53
End: 2018-08-23
Payer: COMMERCIAL

## 2018-08-23 VITALS — DIASTOLIC BLOOD PRESSURE: 68 MMHG | SYSTOLIC BLOOD PRESSURE: 94 MMHG | OXYGEN SATURATION: 97 % | HEART RATE: 74 BPM

## 2018-08-23 DIAGNOSIS — K21.9 GASTROESOPHAGEAL REFLUX DISEASE, ESOPHAGITIS PRESENCE NOT SPECIFIED: Primary | ICD-10-CM

## 2018-08-23 DIAGNOSIS — Z12.11 SPECIAL SCREENING FOR MALIGNANT NEOPLASMS, COLON: ICD-10-CM

## 2018-08-23 DIAGNOSIS — K59.03 DRUG-INDUCED CONSTIPATION: ICD-10-CM

## 2018-08-23 LAB — ANA SER QL IF: NEGATIVE

## 2018-08-23 PROCEDURE — 99204 OFFICE O/P NEW MOD 45 MIN: CPT | Performed by: INTERNAL MEDICINE

## 2018-08-23 RX ORDER — METHADONE HYDROCHLORIDE 10 MG/1
10 TABLET ORAL EVERY 8 HOURS PRN
Qty: 90 TABLET | Refills: 0 | Status: SHIPPED | OUTPATIENT
Start: 2018-08-23 | End: 2018-09-18

## 2018-08-23 ASSESSMENT — PAIN SCALES - GENERAL: PAINLEVEL: NO PAIN (0)

## 2018-08-23 NOTE — PATIENT INSTRUCTIONS
Start Miralax 1 scoop twice per day.    Schedule EGD and Colonoscopy same day with anesthesia.  Extended prep.    Continue omeprazole and zantac at current dosing.    Avoid Excedrin.

## 2018-08-23 NOTE — PROGRESS NOTES
GASTROENTEROLOGY NEW PATIENT CLINIC VISIT    CC/REFERRING MD:    Schoen, Gregory G Nathaniel T Gaeckle    REASON FOR CONSULTATION:   Yung Miranda for   Chief Complaint   Patient presents with     Consult     GERD       HISTORY OF PRESENT ILLNESS:    Joselito Abarca is 53 year old male who presents for evaluation of GERD.  The patient reports a history of reflux and heartburn symptoms since his early 20s.  He has been on PPI therapy long-term.  He reports worsening of his reflux symptoms over the past 10 years.  He notes that certain foods such as spicy, tomato, or caffeine cause increased amount of symptoms.  He does not have dysphagia or odynophagia.  He notes that he has an increase in nausea and vomiting recently.  He vomits a few times per week.  He reports that he sometimes vomits undigested food from the previous day.  He has been undergoing evaluation with pulmonology and they have noted significant reflux which may be related to his lung issues as well as a hiatal hernia on his imaging.  The patient has not had prior GI evaluation or endoscopic tests.  The patient takes NSAIDs with Excedrin approximately 6 times a day for headache.  He is on chronic narcotics due to neck pain.  He has chronic constipation and reports only having a bowel movement every 2 weeks to 1 month.  He has never had screening colonoscopy.    PREVIOUS ENDOSCOPY:  None    PROBLEM LIST  Patient Active Problem List    Diagnosis Date Noted     Chronic seasonal allergic rhinitis due to fungal spores 06/07/2018     Priority: Medium     Status post cervical spinal arthrodesis 11/29/2017     Priority: Medium     Chronic pain syndrome 12/13/2016     Priority: Medium     Patient is followed by Gregory G. Schoen, MD for ongoing prescription of pain medication.  All refills should be approved by this provider, or covering partner.    Medication(s): Oxycodone 5 mg IR: Take 2 capsules (10 mg) by mouth every 4 hours as needed 2 caps q  4 hour prn pain up to 12 per day .   Methadone 10 mg three times daily, 90 per month  Clonazepam 0.5 mg tid, 90 per month   Clinic visit frequency required: Q 3 months     Controlled substance agreement:  Encounter-Level CSA - 2/27/15:               Controlled Substance Agreement - Scan on 3/14/2015  8:47 AM : Controlled Medication Agreement-02/27/15 (below)            Pain Clinic evaluation in the past: Yes    DIRE Total Score(s):  No flowsheet data found.    Last [facility] website verification:  10/30/2016     [email or web address]         Moderate persistent asthma without complication 11/02/2016     Priority: Medium     Acute respiratory failure with hypoxia (H) 04/29/2016     Priority: Medium     Nausea with vomiting 04/27/2016     Priority: Medium     Cough 03/06/2016     Priority: Medium     Leukocytosis 02/16/2016     Priority: Medium     Disease of bronchial airway (HCC) 02/12/2016     Priority: Medium     Degeneration of cervical intervertebral disc 01/26/2015     Priority: Medium     Health Care Home 09/30/2013     Priority: Medium     Status:  Accepted  Care Coordinator:    Melissa Behl BSN, RN, PHN  [facility] Care Coordinator  [phone]     See Letters for [facility] Care Plan  Date:  April 26, 2016            Arthrodesis status 06/15/2011     Priority: Medium     Neck pain 06/15/2011     Priority: Medium     CARDIOVASCULAR SCREENING; LDL GOAL LESS THAN 160 10/31/2010     Priority: Medium     Intervertebral cervical disc disorder with myelopathy, cervical region 11/12/2009     Priority: Medium     Patient is followed by TIARA JIMENEZ for ongoing prescription of narcotic pain medicine.  Med: methadone 10 mg tid. Taking oxycodone up to 8 per day, 120 per month  Maximum use per month: 90  Expected duration: ongoing  Narcotic agreement on file: YES  Clinic visit recommended: Q 3 months         Constipation 03/19/2008     Priority: Medium     Problem list name updated by automated process. Provider to  review       Thoracic or lumbosacral neuritis or radiculitis, unspecified 03/19/2008     Priority: Medium     Esophageal reflux 07/09/2003     Priority: Medium     Generalized anxiety disorder 07/08/2003     Priority: Medium     Alcohol abuse, in remission 07/08/2003     Priority: Medium       PERTINENT PAST MEDICAL HISTORY:  (I personally reviewed this history with the patient at today's visit)  Past Medical History:   Diagnosis Date     Allergic rhinitis      Anxiety      GERD (gastroesophageal reflux disease)      Neck pain 6/15/2011         PREVIOUS SURGERIES: (I personally reviewed this history with the patient at today's visit)  Past Surgical History:   Procedure Laterality Date     BRONCHOSCOPY (RIGID OR FLEXIBLE), DIAGNOSTIC N/A 8/7/2018    Procedure: COMBINED BRONCHOSCOPY (RIGID OR FLEXIBLE), LAVAGE;  Bronchoscopy with Lavage and Transbronchial Biopsy;  Surgeon: Yung Miranda MD;  Location:  GI     DISCECTOMY LUMBAR POSTERIOR MICROSCOPIC ONE LEVEL  2/21/2012    Procedure:DISCECTOMY LUMBAR POSTERIOR MICROSCOPIC ONE LEVEL; LEFT T1-T2 THORASIC HEMILAMINECTOMY MICRODISCECTOMY WITH MEXTRIX II ; Surgeon:SHARON PURI; Location:[facility] OR     DISCECTOMY, FUSION CERVICAL ANTERIOR ONE LEVEL, COMBINED N/A 11/29/2017    Procedure: COMBINED DISCECTOMY, FUSION CERVICAL ANTERIOR ONE LEVEL;  Anterior Cervical Discectomy and Fusion Cervical Six - Cervical Seven, Exploration and Revision Cervical Four - Cervical Six with Hardware Removal;  Surgeon: Nikolas Vasques MD;  Location: [facility] OR     EXPLORE SPINE, REMOVE HARDWARE, COMBINED N/A 11/29/2017    Procedure: COMBINED EXPLORE SPINE, REMOVE HARDWARE;;  Surgeon: Nikolas Vasques MD;  Location: [facility] OR     FUSION CERVICAL ANTERIOR TWO LEVELS  1/29/2010     HC DRAIN/INJ MAJOR JOINT/BURSA W/O US  5/5/2008    Left sacroiliac joint injection.     HC INJ EPIDURAL LUMBAR/SACRAL W/WO CONTRAST  2009     HEAD & NECK SURGERY      2013     HERNIA REPAIR       INJECT BLOCK  MEDIAL BRANCH CERVICAL/THORACIC/LUMBAR Bilateral 8/26/2015    Procedure: INJECT BLOCK MEDIAL BRANCH CERVICAL / THORACIC / LUMBAR;  Surgeon: Ronald Driscoll MD;  Location: [facility] OR     INJECT FACET JOINT Bilateral 5/27/2015    Procedure: INJECT FACET JOINT;  Surgeon: Ronald Driscoll MD;  Location: [facility] OR     NERVE BLOCK OCCIPITAL Bilateral 7/12/2018    Procedure: NERVE BLOCK OCCIPITAL;  bilateral occipital nerve blocks;  Surgeon: Ronald Driscoll MD;  Location: [facility] OR         ALLERGIES:     Allergies   Allergen Reactions     Vicodin [Hydrocodone-Acetaminophen] Nausea     Other reaction(s): Diaphoresis, Vomiting  HEADACHE       PERTINENT MEDICATIONS:    Current Outpatient Prescriptions:      albuterol (2.5 MG/3ML) 0.083% nebulizer solution, NEBULIZE CONTENTS OF ONE VIAL EVERY 4 HOURS AS NEEDED FOR SHORTNESS OF BREATH /DYSPNEA OR WHEEZING, Disp: 90 mL, Rfl: 0     Cholecalciferol (VITAMIN D) 2000 units tablet, TAKE ONE TABLET BY MOUTH EVERY DAY, Disp: 100 tablet, Rfl: 3     citalopram (CELEXA) 20 MG tablet, Take 1 tablet (20 mg) by mouth daily, Disp: 90 tablet, Rfl: 3     clonazePAM (KLONOPIN) 0.5 MG tablet, Take 0.5-1 tablets (0.25-0.5 mg) by mouth 3 times daily as needed for anxiety, Disp: 90 tablet, Rfl: 0     FLOVENT  MCG/ACT Inhaler, INHALE 2 PUFFS INTO THE LUNGS TWICE DAILY, Disp: 36 g, Rfl: 2     fluticasone (FLONASE) 50 MCG/ACT spray, SPRAY 2 SPRAYS IN EACH NOSTRIL EVERY DAY, Disp: 16 g, Rfl: 5     gabapentin (NEURONTIN) 300 MG capsule, TAKE TWO CAPSULES BY MOUTH THREE TIMES A DAY, Disp: 270 capsule, Rfl: 11     ipratropium - albuterol 0.5 mg/2.5 mg/3 mL (DUONEB) 0.5-2.5 (3) MG/3ML neb solution, Take 1 vial (3 mLs) by nebulization every 4 hours as needed for shortness of breath / dyspnea, Disp: 30 vial, Rfl: 0     methadone (DOLOPHINE) 10 MG tablet, Take 1 tablet (10 mg) by mouth every 8 hours as needed, Disp: 90 tablet, Rfl: 0     nystatin (MYCOSTATIN) 220844 UNIT/ML suspension, Take 5 mLs (500,000 Units) by  mouth 4 times daily, Disp: 280 mL, Rfl: 0     omeprazole (PRILOSEC) 20 MG CR capsule, TAKE 1 CAPSULE BY MOUTH DAILY, 30 TO 60 MINUTES BEFORE A MEAL., Disp: 30 capsule, Rfl: 10     oxyCODONE (OXY-IR) 5 MG capsule, Take 2 capsules (10 mg) by mouth every 4 hours as needed 2 caps q 4 hour prn pain up to 12 per day May fill 5/24/2012, Disp: 360 capsule, Rfl: 0     ranitidine (ZANTAC) 150 MG tablet, TAKE ONE TABLET BY MOUTH EVERY MORNING, Disp: 30 tablet, Rfl: 10     sildenafil (VIAGRA) 100 MG tablet, Take 1 tablet (100 mg) by mouth daily as needed 30 min to 4 hrs before sex. Do not use with nitroglycerin, terazosin or doxazosin., Disp: 4 tablet, Rfl: 0     traZODone (DESYREL) 100 MG tablet, Take 3 tablets (300 mg) by mouth At Bedtime, Disp: 90 tablet, Rfl: 3     VENTOLIN  (90 BASE) MCG/ACT Inhaler, INHALE 2 PUFFS INTO THE LUNGS EVERY 6 HOURS AS NEEDED FOR SHORTNESS OF BREATH, DIFFICULTY BREATHING OR WHEEZING., Disp: 18 g, Rfl: 3     zolpidem (AMBIEN) 10 MG tablet, Take 10 mg by mouth nightly as needed , Disp: , Rfl:     SOCIAL HISTORY:  Social History     Social History     Marital status: Single     Spouse name: N/A     Number of children: N/A     Years of education: N/A     Occupational History     umemployed      Social History Main Topics     Smoking status: Former Smoker     Packs/day: 1.00     Years: 30.00     Types: Cigars     Quit date: 12/19/2009     Smokeless tobacco: Former User     Quit date: 2012     Alcohol use No      Comment: HX OF ABUSE-IN REMISSION     Drug use: No     Sexual activity: Yes     Partners: Female     Other Topics Concern     Not on file     Social History Narrative    Used to work in a machine shop.           FAMILY HISTORY: (I personally reviewed this history with the patient at today's visit)  Family History   Problem Relation Age of Onset     Family History Negative No family hx of      C.A.D. No family hx of          ROS:    No fevers or chills  No weight loss  No blurry vision,  double vision or change in vision  No sore throat  No lymphadenopathy  No headache, paraesthesias, or weakness in a limb  No shortness of breath or wheezing  No chest pain or pressure  No arthralgias or myalgias  No rashes or skin changes  No odynophagia or dysphagia  No BRBPR, hematochezia, melena  No dysuria, frequency or urgency  No hot/cold intolerance or polyria  No anxiety or depression  PHYSICAL EXAMINATION:  Constitutional: aaox3, cooperative, pleasant, not dyspneic/diaphoretic, no acute distress, appears older than stated age  Vitals reviewed: BP 94/68 (BP Location: Left arm, Patient Position: Sitting, Cuff Size: Adult Regular)  Pulse 74  SpO2 97%  Wt:   Wt Readings from Last 2 Encounters:   07/27/18 77.1 kg (170 lb)   07/15/18 77.1 kg (170 lb)      Eyes: Sclera anicteric/injected  Ears/nose/mouth/throat: Normal oropharynx without ulcers or exudate, mucus membranes moist, hearing intact  Neck: supple, thyroid normal size  CV: No edema  Respiratory: Unlabored breathing  Lymph: No submandibular, supraclavicular or inguinal lymphadenopathy  Abd: Nondistended, no masses, +bs, no hepatosplenomegaly, nontender, no peritoneal signs  Skin: warm, perfused, no jaundice  Psych: Normal affect  MSK: Normal gait      PERTINENT STUDIES: (I personally reviewed these laboratory studies today)  Most recent CBC:  Recent Labs   Lab Test  07/27/18   1448  07/10/18   1347   WBC  11.4*  14.4*   HGB  13.6  13.7   HCT  42.6  42.2   PLT  247  199     Most recent hepatic panel:  Recent Labs   Lab Test  05/15/18   2030  01/06/17   1736   ALT  28  26   AST  24  25     Most recent creatinine:  Recent Labs   Lab Test  07/10/18   1347  05/15/18   2030   CR  1.00  1.03       Radiology    I have personally reviewed the images below  CT CHEST WITHOUT CONTRAST  8/3/2018 2:29 PM     HISTORY: Pulmonary nodules. History of gastroesophageal reflux  disease, hernia repair, and discectomy.     TECHNIQUE:  Scans obtained from the apices through  the diaphragm  without IV contrast.  Radiation dose for this scan was reduced using automated exposure  control, adjustment of the mA and/or kV according to patient size, or  iterative reconstruction technique.     COMPARISON:  Chest x-rays dated 7/15/2018, CT chest dated 4/9/2018 and  4/29/2016.     FINDINGS: Micronodular groundglass appearance of the right lung  including the upper lobe, middle lobe and lower lobe most  predominantly in the upper lobe and middle lobe are again noted. This  has significantly improved since the prior study. The nodules in the  left lung have mostly resolved. There is a nodular densities measuring  up to 0.5 cm along the posterior right hemithorax medially which could  represent pleural-based nodule which does not appear to be associated  with same process. Micronodules are less than 0.3 cm each. No  pneumothorax or significant pleural fluid collection. No subpleural  banding or traction bronchiectasis is seen. No definite fibrosis is  identified.     The heart is normal in size. No mediastinal, hilar, or axillary  lymphadenopathy is identified. There are coronary artery  calcifications. Some aortic calcifications are seen in the upper  abdominal aorta.     Incidental note of cholelithiasis with a gallstone measuring up to 1.3  cm in diameter. Visualized portions of the upper abdominal contents  are otherwise unremarkable.     No aggressive osseous lesions are seen. There is a small hiatal  hernia.         IMPRESSION:  1. Groundglass micronodular appearance of the right lung likely  represents an atypical infectious infiltrate. Given the patient's  history of gastroesophageal reflux disease this could represent  aspiration although this is more diffuse than expected for aspiration  and there is no evidence for the same type of process in the left  lung. Hypersensitivity pneumonitis is considered less likely because  the right lung is minimally affected. Other atypical infections  such  as mycobacterium avium intracellulare is considered a possibility. The  findings in lungs have significantly improved since the prior study  dated 4/9/2018 and 4/29/2016.  2. No evidence for pulmonary fibrosis is identified.  3. Small hiatal hernia.     REEMA CASTILLO MD    ASSESSMENT/PLAN:    Joselito Abarca is a 53 year old male who presents for a new problem of GERD unresponsive to PPI.  He has symptoms of chronic reflux which have not responded appropriately to once daily PPI therapy along with once daily H2-blocker.  Upper endoscopy is indicated for further evaluation of complications of chronic reflux.  We can also evaluate his hiatal hernia at this time.  Depending on the results of this exam, further evaluation with manometry or pH testing can be considered given that his reflux may contribute to his underlying lung issues.  He will continue his PPI and H2 blocker at the current dose until his endoscopy.  He was counseled to avoid Excedrin and other NSAIDs.    In addition to reflux he has chronic constipation.  Constipation is likely drug-induced secondary to opioids.  I recommended that he initiate twice daily MiraLAX.  He has also never had screening colonoscopy.  I have recommended that he undergo screening colonoscopy at the time of his upper endoscopy.  He is in agreement to proceed.  He will need an extended GoLYTELY bowel prep.    While it is noted that he tolerated moderate sedation with his recent bronchoscopy, we will consider MAC for his endoscopies given his significant narcotic dependence.    Additional evaluation is required at the present time.     Gastroesophageal reflux disease, esophagitis presence not specified  Drug-induced constipation  Special screening for malignant neoplasms, colon  Orders Placed This Encounter   Procedures     GASTROENTEROLOGY ADULT REF PROCEDURE ONLY [facility] [phone] ([place]); [facility] ([facility])       RTC 2 months    Thank you for this consultation.   It was a pleasure to participate in the care of this patient; please contact us with any further questions.      This note was created with voice recognition software, and while reviewed for accuracy, typos may remain.     Devin Pelayo MD  Adjunct  of Medicine  Division of Gastroenterology, Hepatology and Nutrition  [facility]  [phone]

## 2018-08-23 NOTE — MR AVS SNAPSHOT
After Visit Summary   8/23/2018    Joselito Abarca    MRN: [id number]           Patient Information     Date Of Birth          1965        Visit Information        Provider Department      8/23/2018 11:00 AM Devin Pelayo MD [facility]        Today's Diagnoses     Gastroesophageal reflux disease, esophagitis presence not specified    -  1    Drug-induced constipation        Special screening for malignant neoplasms, colon          Care Instructions    Start Miralax 1 scoop twice per day.    Schedule EGD and Colonoscopy same day with anesthesia.  Extended prep.    Continue omeprazole and zantac at current dosing.    Avoid Excedrin.          Follow-ups after your visit        Additional Services     GASTROENTEROLOGY ADULT REF PROCEDURE ONLY [facility] [phone] ([place]); [facility] ([facility])       Last Lab Result: Creatinine (mg/dL)       Date                     Value                 07/10/2018               1.00             ----------  There is no height or weight on file to calculate BMI.     Needed:  No  Language:  English    Patient will be contacted to schedule procedure.     Please be aware that coverage of these services is subject to the terms and limitations of your health insurance plan.  Call member services at your health plan with any benefit or coverage questions.  Any procedures must be performed at a [place] facility OR coordinated by your clinic's referral office.    Please bring the following with you to your appointment:    (1) Any X-Rays, CTs or MRIs which have been performed.  Contact the facility where they were done to arrange for  prior to your scheduled appointment.    (2) List of current medications   (3) This referral request   (4) Any documents/labs given to you for this referral                  Follow-up notes from your care team     Return in about 2 months (around 10/23/2018).      Your next 10 appointments  already scheduled     Oct 23, 2018  3:00 PM CDT   [facility] Visit with Devin [facility]ayo MD   [facility]    [address] [place] [address] [phone]              Who to contact     If you have questions or need follow up information about today's clinic visit or your schedule please contact [facility] directly at [phone].  Normal or non-critical lab and imaging results will be communicated to you by MyChart, letter or phone within 4 business days after the clinic has received the results. If you do not hear from us within 7 days, please contact the clinic through MyChart or phone. If you have a critical or abnormal lab result, we will notify you by phone as soon as possible.  Submit refill requests through [facility] or call your pharmacy and they will forward the refill request to us. Please allow 3 business days for your refill to be completed.          Additional Information About Your Visit        Care EveryWhere ID     This is your Care EveryWhere ID. This could be used by other organizations to access your [place] medical records  [id number]        Your Vitals Were     Pulse Pulse Oximetry                74 97%           Blood Pressure from Last 3 Encounters:   08/23/18 94/68   08/07/18 104/75   07/27/18 133/83    Weight from Last 3 Encounters:   07/27/18 77.1 kg (170 lb)   07/15/18 77.1 kg (170 lb)   07/10/18 78.2 kg (172 lb 6.4 oz)              We Performed the Following     GASTROENTEROLOGY ADULT REF PROCEDURE ONLY [facility] [phone] ([place]); [facility] ([facility]        Primary Care Provider Office Phone # Fax #    Gregory G Schoen, [phone] [address] [place] [address]        Equal Access to Services     [facility] FELICIA : Melvin anno Solev, waaxda luqadaha, qaybta kaalmada adeegyada, waxay carly moore. So [facility]  [phone].    ATENCIÓN: Si robert ulloa, tiene a alfaro disposición servicios gratuitos de asistencia lingüística. Bola marquis [phone].    We comply with applicable federal civil rights laws and [place] laws. We do not discriminate on the basis of race, color, national origin, age, disability, sex, sexual orientation, or gender identity.            Thank you!     Thank you for choosing [facility]  for your care. Our goal is always to provide you with excellent care. Hearing back from our patients is one way we can continue to improve our services. Please take a few minutes to complete the written survey that you may receive in the mail after your visit with us. Thank you!             Your Updated Medication List - Protect others around you: Learn how to safely use, store and throw away your medicines at www.disposemymeds.org.          This list is accurate as of 8/23/18 11:43 AM.  Always use your most recent med list.                   Brand Name Dispense Instructions for use Diagnosis    * albuterol (2.5 MG/3ML) 0.083% neb solution     90 mL    NEBULIZE CONTENTS OF ONE VIAL EVERY 4 HOURS AS NEEDED FOR SHORTNESS OF BREATH /DYSPNEA OR WHEEZING    Mild intermittent asthma with acute exacerbation       * VENTOLIN  (90 Base) MCG/ACT inhaler   Generic drug:  albuterol     18 g    INHALE 2 PUFFS INTO THE LUNGS EVERY 6 HOURS AS NEEDED FOR SHORTNESS OF BREATH, DIFFICULTY BREATHING OR WHEEZING.    Mild intermittent asthma with acute exacerbation       citalopram 20 MG tablet    celeXA    90 tablet    Take 1 tablet (20 mg) by mouth daily        clonazePAM 0.5 MG tablet    klonoPIN    90 tablet    Take 0.5-1 tablets (0.25-0.5 mg) by mouth 3 times daily as needed for anxiety    Generalized anxiety disorder       FLOVENT  MCG/ACT Inhaler   Generic drug:  fluticasone     36 g    INHALE 2 PUFFS INTO THE LUNGS TWICE DAILY    ILD (interstitial lung disease) (H)       fluticasone 50 MCG/ACT spray     FLONASE    16 g    SPRAY 2 SPRAYS IN EACH NOSTRIL EVERY DAY    Chronic seasonal allergic rhinitis due to fungal spores       gabapentin 300 MG capsule    NEURONTIN    270 capsule    TAKE TWO CAPSULES BY MOUTH THREE TIMES A DAY    Intervertebral cervical disc disorder with myelopathy, cervical region, Degeneration of cervical intervertebral disc       ipratropium - albuterol 0.5 mg/2.5 mg/3 mL 0.5-2.5 (3) MG/3ML neb solution    DUONEB    30 vial    Take 1 vial (3 mLs) by nebulization every 4 hours as needed for shortness of breath / dyspnea        methadone 10 MG tablet    DOLOPHINE    90 tablet    Take 1 tablet (10 mg) by mouth every 8 hours as needed    DDD (degenerative disc disease), cervical       nystatin [id number] UNIT/ML suspension    MYCOSTATIN    280 mL    Take 5 mLs (500,000 Units) by mouth 4 times daily    Thrush       omeprazole 20 MG CR capsule    priLOSEC    30 capsule    TAKE 1 CAPSULE BY MOUTH DAILY, 30 TO 60 MINUTES BEFORE A MEAL.    Esophageal reflux       oxyCODONE 5 MG capsule    OXY-IR    360 capsule    Take 2 capsules (10 mg) by mouth every 4 hours as needed 2 caps q 4 hour prn pain up to 12 per day May fill 5/24/2012    Intervertebral cervical disc disorder with myelopathy, cervical region       ranitidine 150 MG tablet    ZANTAC    30 tablet    TAKE ONE TABLET BY MOUTH EVERY MORNING    Esophageal reflux       sildenafil 100 MG tablet    VIAGRA    4 tablet    Take 1 tablet (100 mg) by mouth daily as needed 30 min to 4 hrs before sex. Do not use with nitroglycerin, terazosin or doxazosin.    Erectile dysfunction, unspecified erectile dysfunction type       traZODone 100 MG tablet    DESYREL    90 tablet    Take 3 tablets (300 mg) by mouth At Bedtime    Panic attack       vitamin D 2000 units tablet     100 tablet    TAKE ONE TABLET BY MOUTH EVERY DAY    Degeneration of cervical intervertebral disc       zolpidem 10 MG tablet    AMBIEN     Take 10 mg by mouth nightly as needed        * Notice:   This list has 2 medication(s) that are the same as other medications prescribed for you. Read the directions carefully, and ask your doctor or other care provider to review them with you.

## 2018-08-24 NOTE — TELEPHONE ENCOUNTER
Written script was walked down to [facility].  Maverick Casillas CMA

## 2018-08-28 DIAGNOSIS — M50.00 INTERVERTEBRAL CERVICAL DISC DISORDER WITH MYELOPATHY, CERVICAL REGION: ICD-10-CM

## 2018-08-28 DIAGNOSIS — F41.1 GENERALIZED ANXIETY DISORDER: ICD-10-CM

## 2018-08-28 RX ORDER — OXYCODONE HYDROCHLORIDE 5 MG/1
10 CAPSULE ORAL EVERY 4 HOURS PRN
Qty: 360 CAPSULE | Refills: 0 | Status: SHIPPED | OUTPATIENT
Start: 2018-08-28 | End: 2018-09-21

## 2018-08-28 RX ORDER — CLONAZEPAM 0.5 MG/1
0.25-0.5 TABLET ORAL 3 TIMES DAILY PRN
Qty: 90 TABLET | Refills: 0 | Status: SHIPPED | OUTPATIENT
Start: 2018-08-28 | End: 2018-09-21

## 2018-08-28 NOTE — TELEPHONE ENCOUNTER
Oxycodone       Last Written Prescription Date:  7/27/18  Last Fill Quantity: 360,   # refills: 0  Last Office Visit: 5/25/18  Future Office visit:    Next 5 appointments (look out 90 days)     Oct 23, 2018  3:00 PM CDT   [facility]    [address] [place] [id number] [phone]                   Routing refill request to provider for review/approval because:  Drug not on the [facility], [facility] or [facility] refill protocol or controlled substance  Clonazepam 0.5 MG       Last Written Prescription Date:  7-27-18  Last Fill Quantity: 90,   # refills: 0  Last Office Visit: 5/25/18  Future Office visit:    Next 5 appointments (look out 90 days)     Oct 23, 2018  3:00 PM CDT   [facility] Visit with [facility]    [address] [place] [id number] [phone]                   Routing refill request to provider for review/approval because:  Drug not on the [facility], [facility] or [facility] refill protocol or controlled substance

## 2018-09-04 ENCOUNTER — HOSPITAL ENCOUNTER (EMERGENCY)
Facility: CLINIC | Age: 53
Discharge: HOME | End: 2018-09-04
Attending: FAMILY MEDICINE | Admitting: FAMILY MEDICINE
Payer: COMMERCIAL

## 2018-09-04 VITALS
BODY MASS INDEX: 26.3 KG/M2 | WEIGHT: 168 LBS | SYSTOLIC BLOOD PRESSURE: 115 MMHG | OXYGEN SATURATION: 96 % | TEMPERATURE: 98.3 F | DIASTOLIC BLOOD PRESSURE: 85 MMHG | RESPIRATION RATE: 20 BRPM

## 2018-09-04 DIAGNOSIS — J40 BRONCHITIS: ICD-10-CM

## 2018-09-04 DIAGNOSIS — Z87.891 PERSONAL HISTORY OF TOBACCO USE, PRESENTING HAZARDS TO HEALTH: ICD-10-CM

## 2018-09-04 DIAGNOSIS — J98.01 ACUTE BRONCHOSPASM: ICD-10-CM

## 2018-09-04 LAB
FUNGUS SPEC CULT: NORMAL
SPECIMEN SOURCE: NORMAL

## 2018-09-04 PROCEDURE — 20553 NJX 1/MLT TRIGGER POINTS 3/>: CPT | Performed by: FAMILY MEDICINE

## 2018-09-04 PROCEDURE — 99284 EMERGENCY DEPT VISIT MOD MDM: CPT | Mod: 25 | Performed by: FAMILY MEDICINE

## 2018-09-04 PROCEDURE — 99283 EMERGENCY DEPT VISIT LOW MDM: CPT | Mod: 25 | Performed by: FAMILY MEDICINE

## 2018-09-04 PROCEDURE — 20553 NJX 1/MLT TRIGGER POINTS 3/>: CPT | Mod: Z6 | Performed by: FAMILY MEDICINE

## 2018-09-04 RX ORDER — BUPIVACAINE HYDROCHLORIDE 5 MG/ML
1 INJECTION, SOLUTION EPIDURAL; INTRACAUDAL ONCE
Status: DISCONTINUED | OUTPATIENT
Start: 2018-09-04 | End: 2018-09-04 | Stop reason: HOSPADM

## 2018-09-04 RX ORDER — PREDNISONE 10 MG/1
TABLET ORAL
Qty: 33 TABLET | Refills: 0 | Status: SHIPPED | OUTPATIENT
Start: 2018-09-04 | End: 2019-02-04

## 2018-09-04 NOTE — ED AVS SNAPSHOT
[facility] Emergency Department    [address] [place] [address]    Phone:  [phone]    Fax:  [phone]                                       Joselito Abarca   MRN: [id number]    Department:  [facility] Emergency Department   Date of Visit:  9/4/2018           Patient Information     Date Of Birth          1965        Your diagnoses for this visit were:     Bronchitis recurrentm ? related to reflux/aspiration    Acute bronchospasm        You were seen by Don Love MD.      Follow-up Information     Follow up with Yung Miranda MD.    Specialty:  Internal Medicine    Why:  or the [facility] at [phone]    Contact information:    [facility] [address] [phone]          Discharge Instructions       Call Dr Miranda to let him know that you got sick again and for further instructions.  Take the Augmentin twice a day as directed.  Continue with your nebs as needed.  Take the prednisone as directed.  If you are doing okay from a respiratory standpoint, you could taper it a little quicker so as not to upset your stomach as much.  Please return to the ED if you worsen or have any concerns.  Ice massage as previously demonstrated may be helpful for your neck/muscle pain.  It was a pleasure visiting with you again this evening.  I hope you feel better soon.    Thank you for choosing [facility]. We appreciate the opportunity to meet your urgent medical needs. Please let us know if we could have done anything to make your stay more satisfying.    After discharge, please closely monitor for any new or worsening symptoms. Return to the Emergency Department if you develop any acute worsening signs or symptoms.    If you had lab work, cultures or imaging studies done during your stay, the final results may still be pending. We will call you if your plan of care needs to change. However, if you are not  improving as expected, please follow up with your primary care provider or clinic.     Start any prescription medications that were prescribed to you and take them as directed.     Please see additional handouts that may be pertinent to your condition.        Your next 10 appointments already scheduled     Oct 23, 2018  3:00 PM CDT   Return Visit with Devin Pelayo MD   [facility]    [address] [place] [address] [phone]              24 Hour Appointment Hotline       To make an appointment at any [facility], call [phone] ([phone]). If you don't have a family doctor or clinic, we will help you find one. [facility] are conveniently located to serve the needs of you and your family.             Review of your medicines      START taking        Dose / Directions Last dose taken    amoxicillin-clavulanate 875-125 MG per tablet   Commonly known as:  AUGMENTIN   Dose:  1 tablet   Quantity:  20 tablet        Take 1 tablet by mouth 2 times daily for 10 days   Refills:  0        predniSONE 10 MG tablet   Commonly known as:  DELTASONE   Quantity:  33 tablet        Daily tapering dose:  20 bid x 5d, 10 bid x 5d, 10 daily x 2 d, 5 daily x 2 days   Refills:  0          Our records show that you are taking the medicines listed below. If these are incorrect, please call your family doctor or clinic.        Dose / Directions Last dose taken    citalopram 20 MG tablet   Commonly known as:  celeXA   Dose:  20 mg   Quantity:  90 tablet        Take 1 tablet (20 mg) by mouth daily   Refills:  3        clonazePAM 0.5 MG tablet   Commonly known as:  klonoPIN   Dose:  0.25-0.5 mg   Quantity:  90 tablet        Take 0.5-1 tablets (0.25-0.5 mg) by mouth 3 times daily as needed for anxiety   Refills:  0        FLOVENT  MCG/ACT Inhaler   Quantity:  36 g   Generic drug:  fluticasone        INHALE 2 PUFFS INTO THE LUNGS TWICE DAILY    Refills:  2        fluticasone 50 MCG/ACT spray   Commonly known as:  FLONASE   Quantity:  16 g        SPRAY 2 SPRAYS IN EACH NOSTRIL EVERY DAY   Refills:  5        gabapentin 300 MG capsule   Commonly known as:  NEURONTIN   Quantity:  270 capsule        TAKE TWO CAPSULES BY MOUTH THREE TIMES A DAY   Refills:  11        ipratropium - albuterol 0.5 mg/2.5 mg/3 mL 0.5-2.5 (3) MG/3ML neb solution   Commonly known as:  DUONEB   Dose:  1 vial   Quantity:  30 vial        Take 1 vial (3 mLs) by nebulization every 4 hours as needed for shortness of breath / dyspnea   Refills:  0        methadone 10 MG tablet   Commonly known as:  DOLOPHINE   Dose:  10 mg   Quantity:  90 tablet        Take 1 tablet (10 mg) by mouth every 8 hours as needed   Refills:  0        omeprazole 20 MG CR capsule   Commonly known as:  priLOSEC   Quantity:  30 capsule        TAKE 1 CAPSULE BY MOUTH DAILY, 30 TO 60 MINUTES BEFORE A MEAL.   Refills:  10        oxyCODONE 5 MG capsule   Commonly known as:  OXY-IR   Dose:  10 mg   Quantity:  360 capsule        Take 2 capsules (10 mg) by mouth every 4 hours as needed 2 caps q 4 hour prn pain up to 12 per day May fill 5/24/2012   Refills:  0        ranitidine 150 MG tablet   Commonly known as:  ZANTAC   Quantity:  30 tablet        TAKE ONE TABLET BY MOUTH EVERY MORNING   Refills:  10        sildenafil 100 MG tablet   Commonly known as:  VIAGRA   Dose:  100 mg   Quantity:  4 tablet        Take 1 tablet (100 mg) by mouth daily as needed 30 min to 4 hrs before sex. Do not use with nitroglycerin, terazosin or doxazosin.   Refills:  0        traZODone 100 MG tablet   Commonly known as:  DESYREL   Dose:  300 mg   Quantity:  90 tablet        Take 3 tablets (300 mg) by mouth At Bedtime   Refills:  3        VENTOLIN  (90 Base) MCG/ACT inhaler   Quantity:  18 g   Generic drug:  albuterol        INHALE 2 PUFFS INTO THE LUNGS EVERY 6 HOURS AS NEEDED FOR SHORTNESS OF BREATH, DIFFICULTY BREATHING OR WHEEZING.  "  Refills:  3        vitamin D 2000 units tablet   Quantity:  100 tablet        TAKE ONE TABLET BY MOUTH EVERY DAY   Refills:  3        zolpidem 10 MG tablet   Commonly known as:  AMBIEN   Dose:  10 mg        Take 10 mg by mouth nightly as needed   Refills:  0                Prescriptions were sent or printed at these locations (2 Prescriptions)                   [facility] - [place], [place] - [address]   [address], [facility] [address]    Telephone:  [phone]   Fax:  [phone]   Hours:                  E-Prescribed (2 of 2)         amoxicillin-clavulanate (AUGMENTIN) 875-125 MG per tablet               predniSONE (DELTASONE) 10 MG tablet                Orders Needing Specimen Collection     None      Pending Results     No orders found from 9/2/2018 to 9/5/2018.            Pending Culture Results     No orders found from 9/2/2018 to 9/5/2018.            Pending Results Instructions     If you had any lab results that were not finalized at the time of your Discharge, you can call the ED Lab Result RN at [phone]. You will be contacted by this team for any positive Lab results or changes in treatment. The nurses are available 7 days a week from 10A to 6:30P.  You can leave a message 24 hours per day and they will return your call.        Thank you for choosing [facility]       Thank you for choosing [facility] for your care. Our goal is always to provide you with excellent care. Hearing back from our patients is one way we can continue to improve our services. Please take a few minutes to complete the written survey that you may receive in the mail after you visit with us. Thank you!        [employer] Information     [employer] lets you send messages to your doctor, view your test results, renew your prescriptions, schedule appointments and more. To sign up, go to [email or web address] . Click on \"Log in\" on the left side of the screen, which will take you to the Welcome page. " "Then click on \"Sign up Now\" on the right side of the page.     You will be asked to enter the access code listed below, as well as some personal information. Please follow the directions to create your username and password.     Your access code is: [email or web address]  Expires: 12/3/2018  9:17 PM     Your access code will  in 90 days. If you need help or a new code, please call your [place] clinic or [phone].        Care EveryWhere ID     This is your Care EveryWhere ID. This could be used by other organizations to access your [place] medical records  [id number]        Equal Access to Services     [facility] : Melvin Hebert, damari garibay, blanca larios, omayra moore. So [facility] [phone].    ATENCIÓN: Si habla español, tiene a alfaro disposición servicios gratuitos de asistencia lingüística. Llame al [phone].    We comply with applicable federal civil rights laws and [place] laws. We do not discriminate on the basis of race, color, national origin, age, disability, sex, sexual orientation, or gender identity.            After Visit Summary       This is your record. Keep this with you and show to your community pharmacist(s) and doctor(s) at your next visit.                  "

## 2018-09-04 NOTE — ED AVS SNAPSHOT
[facility] Emergency Department    [address] [place] [address]    Phone:  [phone]    Fax:  [phone]                                       Joselito Abarca   MRN: [id number]    Department:  [facility] Emergency Department   Date of Visit:  9/4/2018           After Visit Summary Signature Page     I have received my discharge instructions, and my questions have been answered. I have discussed any challenges I see with this plan with the nurse or doctor.    ..........................................................................................................................................  Patient/Patient Representative Signature      ..........................................................................................................................................  Patient Representative Print Name and Relationship to Patient    ..................................................               ................................................  Date                                            Time    ..........................................................................................................................................  Reviewed by Signature/Title    ...................................................              ..............................................  Date                                                            Time          22EPIC Rev 08/18

## 2018-09-05 ENCOUNTER — PATIENT OUTREACH (OUTPATIENT)
Dept: CARE COORDINATION | Facility: CLINIC | Age: 53
End: 2018-09-05

## 2018-09-05 NOTE — ED PROVIDER NOTES
History     Chief Complaint   Patient presents with     Respiratory Problems     The history is provided by the patient.     Joselito Abarca is a 53 year old male who presents to the emergency department for respiratory problems. Patient reports having trouble breathing for the last several days. He states he has a cough with brownish-green phlegm, trouble breathing and wheezing. He states he has a long standing history of lung problems and is seeing a pulmonologist for this. He only uses his inhaler and nebs when he is sick. Patient is a non smoker, having quit years ago.    He had a bronchoscopy earlier this year.    Problem List:    Patient Active Problem List    Diagnosis Date Noted     Chronic seasonal allergic rhinitis due to fungal spores 06/07/2018     Priority: Medium     Status post cervical spinal arthrodesis 11/29/2017     Priority: Medium     Chronic pain syndrome 12/13/2016     Priority: Medium     Patient is followed by Gregory G. Schoen, MD for ongoing prescription of pain medication.  All refills should be approved by this provider, or covering partner.    Medication(s): Oxycodone 5 mg IR: Take 2 capsules (10 mg) by mouth every 4 hours as needed 2 caps q 4 hour prn pain up to 12 per day .   Methadone 10 mg three times daily, 90 per month  Clonazepam 0.5 mg tid, 90 per month   Clinic visit frequency required: Q 3 months     Controlled substance agreement:  Encounter-Level CSA - 2/27/15:               Controlled Substance Agreement - Scan on 3/14/2015  8:47 AM : Controlled Medication Agreement-02/27/15 (below)            Pain Clinic evaluation in the past: Yes    DIRE Total Score(s):  No flowsheet data found.    Last [facility] website verification:  10/30/2016     [email or web address]         Moderate persistent asthma without complication 11/02/2016     Priority: Medium     Acute respiratory failure with hypoxia (H) 04/29/2016     Priority: Medium     Nausea with vomiting 04/27/2016      Priority: Medium     Cough 03/06/2016     Priority: Medium     Leukocytosis 02/16/2016     Priority: Medium     Disease of bronchial airway (HCC) 02/12/2016     Priority: Medium     Degeneration of cervical intervertebral disc 01/26/2015     Priority: Medium     Health Care Home 09/30/2013     Priority: Medium     Status:  Accepted  Care Coordinator:    Melissa Behl BSN, RN, PHN  [facility] Care Coordinator  [phone]     See Letters for [facility] Care Plan  Date:  April 26, 2016            Arthrodesis status 06/15/2011     Priority: Medium     Neck pain 06/15/2011     Priority: Medium     CARDIOVASCULAR SCREENING; LDL GOAL LESS THAN 160 10/31/2010     Priority: Medium     Intervertebral cervical disc disorder with myelopathy, cervical region 11/12/2009     Priority: Medium     Patient is followed by TIARA JIMENEZ for ongoing prescription of narcotic pain medicine.  Med: methadone 10 mg tid. Taking oxycodone up to 8 per day, 120 per month  Maximum use per month: 90  Expected duration: ongoing  Narcotic agreement on file: YES  Clinic visit recommended: Q 3 months         Constipation 03/19/2008     Priority: Medium     Problem list name updated by automated process. Provider to review       Thoracic or lumbosacral neuritis or radiculitis, unspecified 03/19/2008     Priority: Medium     Esophageal reflux 07/09/2003     Priority: Medium     Generalized anxiety disorder 07/08/2003     Priority: Medium     Alcohol abuse, in remission 07/08/2003     Priority: Medium        Past Medical History:    Past Medical History:   Diagnosis Date     Allergic rhinitis      Anxiety      GERD (gastroesophageal reflux disease)      Neck pain 6/15/2011       Past Surgical History:    Past Surgical History:   Procedure Laterality Date     BRONCHOSCOPY (RIGID OR FLEXIBLE), DIAGNOSTIC N/A 8/7/2018    Procedure: COMBINED BRONCHOSCOPY (RIGID OR FLEXIBLE), LAVAGE;  Bronchoscopy with Lavage and Transbronchial Biopsy;  Surgeon: Ruben  Yung COYLE MD;  Location:  GI     DISCECTOMY LUMBAR POSTERIOR MICROSCOPIC ONE LEVEL  2/21/2012    Procedure:DISCECTOMY LUMBAR POSTERIOR MICROSCOPIC ONE LEVEL; LEFT T1-T2 THORASIC HEMILAMINECTOMY MICRODISCECTOMY WITH MEXTRIX II ; Surgeon:SHARON PURI; Location:[facility] OR     DISCECTOMY, FUSION CERVICAL ANTERIOR ONE LEVEL, COMBINED N/A 11/29/2017    Procedure: COMBINED DISCECTOMY, FUSION CERVICAL ANTERIOR ONE LEVEL;  Anterior Cervical Discectomy and Fusion Cervical Six - Cervical Seven, Exploration and Revision Cervical Four - Cervical Six with Hardware Removal;  Surgeon: Nikolas Vasques MD;  Location: [facility] OR     EXPLORE SPINE, REMOVE HARDWARE, COMBINED N/A 11/29/2017    Procedure: COMBINED EXPLORE SPINE, REMOVE HARDWARE;;  Surgeon: Nikolas Vasques MD;  Location: [facility] OR     FUSION CERVICAL ANTERIOR TWO LEVELS  1/29/2010     HC DRAIN/INJ MAJOR JOINT/BURSA W/O US  5/5/2008    Left sacroiliac joint injection.     HC INJ EPIDURAL LUMBAR/SACRAL W/WO CONTRAST  2009     HEAD & NECK SURGERY      2013     HERNIA REPAIR       INJECT BLOCK MEDIAL BRANCH CERVICAL/THORACIC/LUMBAR Bilateral 8/26/2015    Procedure: INJECT BLOCK MEDIAL BRANCH CERVICAL / THORACIC / LUMBAR;  Surgeon: Ronald Driscoll MD;  Location: [facility] OR     INJECT FACET JOINT Bilateral 5/27/2015    Procedure: INJECT FACET JOINT;  Surgeon: Ronald Driscoll MD;  Location: [facility] OR     NERVE BLOCK OCCIPITAL Bilateral 7/12/2018    Procedure: NERVE BLOCK OCCIPITAL;  bilateral occipital nerve blocks;  Surgeon: Ronald Driscoll MD;  Location: [facility] OR       Family History:    Family History   Problem Relation Age of Onset     Family History Negative No family hx of      C.A.D. No family hx of        Social History:  Marital Status:  Single [1]  Social History   Substance Use Topics     Smoking status: Former Smoker     Packs/day: 1.00     Years: 30.00     Types: Cigars     Quit date: 12/19/2009     Smokeless tobacco: Former User     Quit date: 2012     Alcohol use No       Comment: HX OF ABUSE-IN REMISSION        Medications:      amoxicillin-clavulanate (AUGMENTIN) 875-125 MG per tablet   Cholecalciferol (VITAMIN D) 2000 units tablet   citalopram (CELEXA) 20 MG tablet   clonazePAM (KLONOPIN) 0.5 MG tablet   FLOVENT  MCG/ACT Inhaler   fluticasone (FLONASE) 50 MCG/ACT spray   gabapentin (NEURONTIN) 300 MG capsule   ipratropium - albuterol 0.5 mg/2.5 mg/3 mL (DUONEB) 0.5-2.5 (3) MG/3ML neb solution   methadone (DOLOPHINE) 10 MG tablet   omeprazole (PRILOSEC) 20 MG CR capsule   oxyCODONE (OXY-IR) 5 MG capsule   predniSONE (DELTASONE) 10 MG tablet   ranitidine (ZANTAC) 150 MG tablet   sildenafil (VIAGRA) 100 MG tablet   traZODone (DESYREL) 100 MG tablet   VENTOLIN  (90 BASE) MCG/ACT Inhaler   zolpidem (AMBIEN) 10 MG tablet         Review of Systems   All other systems reviewed and are negative.      Physical Exam   BP: 115/85  Heart Rate: 74  Temp: 98.3  F (36.8  C)  Resp: 20  Weight: 76.2 kg (168 lb)  SpO2: 96 %      Physical Exam   Constitutional: He is oriented to person, place, and time. No distress.   HENT:   Head: Atraumatic.   Mouth/Throat: Oropharynx is clear and moist. No oropharyngeal exudate.   Eyes: Pupils are equal, round, and reactive to light. No scleral icterus.   Neck:   He is tender at the base the occiput bilaterally and into the paraspinous musculature and traps right greater than left.   Cardiovascular: Normal rate, regular rhythm, normal heart sounds and intact distal pulses.    Pulmonary/Chest: Effort normal. No respiratory distress. He has wheezes ( Moderate diffuse).   Abdominal: Soft. Bowel sounds are normal. There is no tenderness.   Musculoskeletal: He exhibits no edema or tenderness.   Neurological: He is alert and oriented to person, place, and time. No cranial nerve deficit.   Skin: Skin is warm. No rash noted. He is not diaphoretic.   Psychiatric: He has a normal mood and affect.   Nursing note and vitals reviewed.      ED Course  (with  Medical Decision Making)      53-year-old gentleman with a previous history of asthma is being followed by pulmonary for recurrent pneumonia/bronchitis.  Started wheezing again the past few days and now is a productive cough.  He declined a neb in the ED as he just took one at home and would like to wait until he gets home.  He just wants to get back on an antibiotic and his prednisone and plans on calling his pulmonologist to let them know that he is sick once again as pulmonary wanted to know when this happened for further evaluation.    He was on Omnicef back in July, Zithromax prior to that and Levaquin before that.  Question of reflux and he is scheduled to see GI in a couple of months.  Possible that he could be aspirating.  I am going to cover him with Augmentin this time and place him on a prednisone taper over the next couple of weeks.  Last time he was on 20 mg twice daily ×5 days, 10 mg twice daily ×5 days, 10 mg daily ×2 days, 5 mg daily ×2 days.  He is hoping that he does not have to take it this long as it does tend upset his stomach.  If he is feeling better from a respiratory standpoint, he could accelerate the taper in hopes of getting off it sooner because of his stomach but keeping in mind that his lungs may retaliate if he stops it too soon.  He did not want a chest x-ray done tonight either.  Clinically, he looks good other than the wheezing and he is trusted to follow-up.  He plans on calling pulmonary in the morning.    He was wondering if he could have some trigger point injections in his neck which has been quite helpful for him in the past.  Marcaine 0.5% plain was used for trigger points at bilateral occiput, bilateral paraspinous in the neck as well as bilateral trapezius muscles.  This gave him good relief.       ED Course     Procedures               Critical Care time:  none               No results found for this or any previous visit (from the past 24 hour(s)).    Medications - No  data to display    Assessments & Plan      I have reviewed the nursing notes.    I have reviewed the findings, diagnosis, plan and need for follow up with the patient.       Discharge Medication List as of 9/4/2018  9:17 PM      START taking these medications    Details   amoxicillin-clavulanate (AUGMENTIN) 875-125 MG per tablet Take 1 tablet by mouth 2 times daily for 10 days, Disp-20 tablet, R-0, E-Prescribe      predniSONE (DELTASONE) 10 MG tablet Daily tapering dose:  20 bid x 5d, 10 bid x 5d, 10 daily x 2 d, 5 daily x 2 days, Disp-33 tablet, R-0, E-Prescribe             Final diagnoses:   Bronchitis - recurrentm ? related to reflux/aspiration   Acute bronchospasm     This document serves as a record of services personally performed by Josh Love MD. It was created on their behalf by Lexii Erwin, a trained medical scribe. The creation of this record is based on the provider's personal observations and the statements of the patient. This document has been checked and approved by the attending provider.    Note: Chart documentation done in part with Dragon Voice Recognition software. Although reviewed after completion, some word and grammatical errors may remain.    9/4/2018   [facility] EMERGENCY DEPARTMENT     Don Love MD  09/05/18 0206

## 2018-09-05 NOTE — PROGRESS NOTES
Clinic Care Coordination Contact  [facility]/Voicemail    Referral Source: IP Report  Clinical Data: Care Coordinator Outreach  Outreach attempted x 1.  Left message on voicemail with call back information and requested return call.  Plan:  Care Coordinator will try to reach patient again in 1-2 business days.      Luisa GUTIÉRREZ, RN, PHN  Care Coordination    [facility]  [address] [place] [address]  Office: [phone]  Fax [phone]   [facility]  [address] [place] [address]  Office: [phone] Fax [phone]  [email or web address]   [email or web address]   Connect with [facility] on social media.

## 2018-09-05 NOTE — LETTER
September 10, 2018      Joselito Abarca  [address]   [facility] [id number]    Dear Joselito,    We have been trying to reach you to introduce you to [facility] Care Coordination program.  The goal of care coordination is to help you manage your health and improve access to the [facility] system in the most efficient manner.  The Care Coordinator is a nurse who understands the healthcare system and will assist you in improving your access to care.     As your Physician and Care Coordinator we partner to help you achieve your health care goals.     We will continue to reach out; however, if you are able to call your Care Coordinator at [phone], that would be appreciated.  We at [facility] are focused on providing you with the highest-quality healthcare experience possible.      It is a pleasure to partner with you as we work towards achieving your optimal state of wellness.        Sincerely,        Luisa GUTIÉRREZ, RN, PHN  Care Coordination    [facility]  [address] [place] [address]  Office: [phone]  Fax [phone]   [facility]  [address] [place] [address]  Office: [phone] Fax [phone]  [email or web address]   [email or web address]   Connect with [facility] on social media.

## 2018-09-05 NOTE — DISCHARGE INSTRUCTIONS
Call Dr Miranda to let him know that you got sick again and for further instructions.  Take the Augmentin twice a day as directed.  Continue with your nebs as needed.  Take the prednisone as directed.  If you are doing okay from a respiratory standpoint, you could taper it a little quicker so as not to upset your stomach as much.  Please return to the ED if you worsen or have any concerns.  Ice massage as previously demonstrated may be helpful for your neck/muscle pain.  It was a pleasure visiting with you again this evening.  I hope you feel better soon.    Thank you for choosing [facility]. We appreciate the opportunity to meet your urgent medical needs. Please let us know if we could have done anything to make your stay more satisfying.    After discharge, please closely monitor for any new or worsening symptoms. Return to the Emergency Department if you develop any acute worsening signs or symptoms.    If you had lab work, cultures or imaging studies done during your stay, the final results may still be pending. We will call you if your plan of care needs to change. However, if you are not improving as expected, please follow up with your primary care provider or clinic.     Start any prescription medications that were prescribed to you and take them as directed.     Please see additional handouts that may be pertinent to your condition.

## 2018-09-05 NOTE — ED TRIAGE NOTES
Pt presents with concerns that he has bronchitis.  Pt states that he has had trouble breathing for the last several days.  Pt has a cough and dark green/brown sputum.

## 2018-09-10 NOTE — PROGRESS NOTES
Clinic Care Coordination Contact  [facility]/Voicemail    Referral Source: IP Report  Clinical Data: Care Coordinator Outreach  Outreach attempted x 2.  Left message on voicemail with call back information and requested return call.  Plan: Care Coordinator will mail out care coordination introduction letter with care coordinator contact information and explanation of care coordination services. Care Coordinator will do no further outreaches at this time.      Luisa STROUDN, RN, PHN  Care Coordination    [facility]  [address] [place] [address]  Office: [phone]  Fax [phone]   [facility]  [address] [place] [address]  Office: [phone] Fax [phone]  [email or web address]   [email or web address]   Connect with [facility] on social media.

## 2018-09-12 ENCOUNTER — PATIENT OUTREACH (OUTPATIENT)
Dept: FAMILY MEDICINE | Facility: CLINIC | Age: 53
End: 2018-09-12

## 2018-09-12 NOTE — PROGRESS NOTES
Clinic Care Coordination Contact  Care Team Conversations    Pt called back and states he is working with Pulmonary and GI for reflux, hernia, and lung issues.  He is scheduled to have a colonoscopy and will actually need a pre-op.  He also needs to talk about his neck pain and getting injections again.  Pt was scheduled for 9/21 for preop.       Luisa GUTIÉRREZ, RN, PHN  Care Coordination    [facility]  [address] [place] [address]  Office: [phone]  Fax [phone]   [facility]  [address] [place] [address]  Office: [phone] Fax [phone]  [email or web address]   [email or web address]   Connect with [facility] on social media.

## 2018-09-18 DIAGNOSIS — M50.30 DDD (DEGENERATIVE DISC DISEASE), CERVICAL: ICD-10-CM

## 2018-09-19 RX ORDER — METHADONE HYDROCHLORIDE 10 MG/1
10 TABLET ORAL EVERY 8 HOURS PRN
Qty: 90 TABLET | Refills: 0 | Status: SHIPPED | OUTPATIENT
Start: 2018-09-19 | End: 2018-10-22

## 2018-09-19 NOTE — TELEPHONE ENCOUNTER
Methadone      Last Written Prescription Date:  8/23/2018  Last Fill Quantity: 90,   # refills: 0  Last Office Visit: 7/10/2018  Future Office visit:    Next 5 appointments (look out 90 days)     Sep 21, 2018  2:30 PM CDT   [facility] G [facility]    [address] [place] [id number]   [phone]            Oct 23, 2018  3:00 PM CDT   Return Visit with Devin Pelayo MD   [facility]    [address] [place] [id number] [phone]                   Routing refill request to provider for review/approval because:  Drug not on the [facility], [facility] or [facility] refill protocol or controlled substance

## 2018-09-21 ENCOUNTER — OFFICE VISIT (OUTPATIENT)
Dept: FAMILY MEDICINE | Facility: CLINIC | Age: 53
End: 2018-09-21
Payer: COMMERCIAL

## 2018-09-21 VITALS
WEIGHT: 169 LBS | OXYGEN SATURATION: 100 % | RESPIRATION RATE: 16 BRPM | SYSTOLIC BLOOD PRESSURE: 120 MMHG | HEART RATE: 110 BPM | TEMPERATURE: 97.5 F | BODY MASS INDEX: 26.46 KG/M2 | DIASTOLIC BLOOD PRESSURE: 78 MMHG

## 2018-09-21 DIAGNOSIS — J45.40 MODERATE PERSISTENT ASTHMA WITHOUT COMPLICATION: ICD-10-CM

## 2018-09-21 DIAGNOSIS — K21.9 GASTROESOPHAGEAL REFLUX DISEASE, ESOPHAGITIS PRESENCE NOT SPECIFIED: ICD-10-CM

## 2018-09-21 DIAGNOSIS — Z01.818 PREOP GENERAL PHYSICAL EXAM: Primary | ICD-10-CM

## 2018-09-21 DIAGNOSIS — F41.1 GENERALIZED ANXIETY DISORDER: ICD-10-CM

## 2018-09-21 DIAGNOSIS — Z12.11 SPECIAL SCREENING FOR MALIGNANT NEOPLASMS, COLON: ICD-10-CM

## 2018-09-21 DIAGNOSIS — M50.30 DDD (DEGENERATIVE DISC DISEASE), CERVICAL: ICD-10-CM

## 2018-09-21 DIAGNOSIS — M50.00 INTERVERTEBRAL CERVICAL DISC DISORDER WITH MYELOPATHY, CERVICAL REGION: ICD-10-CM

## 2018-09-21 PROCEDURE — 99214 OFFICE O/P EST MOD 30 MIN: CPT | Performed by: FAMILY MEDICINE

## 2018-09-21 RX ORDER — CLONAZEPAM 0.5 MG/1
0.25-0.5 TABLET ORAL 3 TIMES DAILY PRN
Qty: 90 TABLET | Refills: 0 | Status: SHIPPED | OUTPATIENT
Start: 2018-09-21 | End: 2018-10-22

## 2018-09-21 RX ORDER — OXYCODONE HYDROCHLORIDE 5 MG/1
10 CAPSULE ORAL EVERY 4 HOURS PRN
Qty: 360 CAPSULE | Refills: 0 | Status: SHIPPED | OUTPATIENT
Start: 2018-09-21 | End: 2018-10-22

## 2018-09-21 ASSESSMENT — PAIN SCALES - GENERAL: PAINLEVEL: MILD PAIN (3)

## 2018-09-21 NOTE — PROGRESS NOTES
[address] [address]  [phone]  Dept: [phone]    PRE-OP EVALUATION:  Today's date: 2018    Joselito Abarca (: 1965) presents for pre-operative evaluation assessment as requested by Dr. Pelayo.  He requires evaluation and anesthesia risk assessment prior to undergoing surgery/procedure for treatment of colonoscopy with co2 insufflation .    Fax number for surgical facility: [facility]  Primary Physician: Schoen, Gregory G  Type of Anesthesia Anticipated: conscious sedation    Patient has a Health Care Directive or Living Will:  NO    Preop Questions 2018   Who is doing your surgery? nat   What are you having done? stomach scope and colonopsty   Date of Surgery/Procedure:    Facility or Hospital where procedure/surgery will be performed: [facility]   1.  Do you have a history of Heart attack, stroke, stent, coronary bypass surgery, or other heart surgery? No   2.  Do you ever have any pain or discomfort in your chest? No   3.  Do you have a history of  Heart Failure? No   4.   Are you troubled by shortness of breath when:  walking on a level surface, or up a slight hill, or at night? YES -Has known respiratory issues and is following up with pulmonology   5.  Do you currently have a cold, bronchitis or other respiratory infection? No   6.  Do you have a cough, shortness of breath, or wheezing? No   7.  Do you sometimes get pains in the calves of your legs when you walk? No   8. Do you or anyone in your family have previous history of blood clots? No   9.  Do you or does anyone in your family have a serious bleeding problem such as prolonged bleeding following surgeries or cuts? No   10. Have you ever had problems with anemia or been told to take iron pills? No   11. Have you had any abnormal blood loss such as black, tarry or bloody stools? No   12. Have you ever had a blood transfusion? No   13. Have you or any of your  relatives ever had problems with anesthesia? No   14. Do you have sleep apnea, excessive snoring or daytime drowsiness? No   15. Do you have any prosthetic heart valves? No   16. Do you have prosthetic joints? No         HPI:     HPI related to upcoming procedure: Hiatal  hernia with reflux symptoms refractory to PPI + H2 blocker.  Also has some issues with constipation (on chronic narcotics for pain) and has never had any colon cancer screening. Therefore is going to have EGD and colonoscopy.       Chronic pain as above.  Due to daily use of klonopin for anxiety and daily use of methadone and oxycodone it is recommended that he receive propofol sedation for these procedures.     Interstitial lung disease with moderate severity.  On duonebs, flonase and flovent and is currently completing a course of antibiotics for recurrent bronchitis so has been off duonebs for several days.     MEDICAL HISTORY:     Patient Active Problem List    Diagnosis Date Noted     Chronic seasonal allergic rhinitis due to fungal spores 06/07/2018     Priority: Medium     Status post cervical spinal arthrodesis 11/29/2017     Priority: Medium     Chronic pain syndrome 12/13/2016     Priority: Medium     Patient is followed by Gregory G. Schoen, MD for ongoing prescription of pain medication.  All refills should be approved by this provider, or covering partner.    Medication(s): Oxycodone 5 mg IR: Take 2 capsules (10 mg) by mouth every 4 hours as needed 2 caps q 4 hour prn pain up to 12 per day .   Methadone 10 mg three times daily, 90 per month  Clonazepam 0.5 mg tid, 90 per month   Clinic visit frequency required: Q 3 months     Controlled substance agreement:  Encounter-Level CSA - 2/27/15:               Controlled Substance Agreement - Scan on 3/14/2015  8:47 AM : Controlled Medication Agreement-02/27/15 (below)            Pain Clinic evaluation in the past: Yes    DIRE Total Score(s):  No flowsheet data found.    Last [facility] website  verification:  10/30/2016     [email or web address]         Moderate persistent asthma without complication 11/02/2016     Priority: Medium     Acute respiratory failure with hypoxia (H) 04/29/2016     Priority: Medium     Nausea with vomiting 04/27/2016     Priority: Medium     Cough 03/06/2016     Priority: Medium     Leukocytosis 02/16/2016     Priority: Medium     Disease of bronchial airway (HCC) 02/12/2016     Priority: Medium     Degeneration of cervical intervertebral disc 01/26/2015     Priority: Medium     Health Care Home 09/30/2013     Priority: Medium     Status:  Accepted  Care Coordinator:    Melissa Behl BSN, RN, PHN  [facility] Care Coordinator  [phone]     See Letters for [facility] Care Plan  Date:  April 26, 2016            Arthrodesis status 06/15/2011     Priority: Medium     Neck pain 06/15/2011     Priority: Medium     CARDIOVASCULAR SCREENING; LDL GOAL LESS THAN 160 10/31/2010     Priority: Medium     Intervertebral cervical disc disorder with myelopathy, cervical region 11/12/2009     Priority: Medium     Patient is followed by TIARA JIMENEZ for ongoing prescription of narcotic pain medicine.  Med: methadone 10 mg tid. Taking oxycodone up to 8 per day, 120 per month  Maximum use per month: 90  Expected duration: ongoing  Narcotic agreement on file: YES  Clinic visit recommended: Q 3 months         Constipation 03/19/2008     Priority: Medium     Problem list name updated by automated process. Provider to review       Thoracic or lumbosacral neuritis or radiculitis, unspecified 03/19/2008     Priority: Medium     Esophageal reflux 07/09/2003     Priority: Medium     Generalized anxiety disorder 07/08/2003     Priority: Medium     Alcohol abuse, in remission 07/08/2003     Priority: Medium      Past Medical History:   Diagnosis Date     Allergic rhinitis      Anxiety      GERD (gastroesophageal reflux disease)      Neck pain 6/15/2011     Pneumonia      Uncomplicated asthma       Past Surgical History:   Procedure Laterality Date     BRONCHOSCOPY (RIGID OR FLEXIBLE), DIAGNOSTIC N/A 8/7/2018    Procedure: COMBINED BRONCHOSCOPY (RIGID OR FLEXIBLE), LAVAGE;  Bronchoscopy with Lavage and Transbronchial Biopsy;  Surgeon: Yung Miranda MD;  Location: [facility]     DISCECTOMY LUMBAR POSTERIOR MICROSCOPIC ONE LEVEL  2/21/2012    Procedure:DISCECTOMY LUMBAR POSTERIOR MICROSCOPIC ONE LEVEL; LEFT T1-T2 THORASIC HEMILAMINECTOMY MICRODISCECTOMY WITH MEXTRIX II ; Surgeon:SHARON PURI; Location:[facility]     DISCECTOMY, FUSION CERVICAL ANTERIOR ONE LEVEL, COMBINED N/A 11/29/2017    Procedure: COMBINED DISCECTOMY, FUSION CERVICAL ANTERIOR ONE LEVEL;  Anterior Cervical Discectomy and Fusion Cervical Six - Cervical Seven, Exploration and Revision Cervical Four - Cervical Six with Hardware Removal;  Surgeon: Nikolas Vasques MD;  Location: [facility]     EXPLORE SPINE, REMOVE HARDWARE, COMBINED N/A 11/29/2017    Procedure: COMBINED EXPLORE SPINE, REMOVE HARDWARE;;  Surgeon: Nikolas Vasques MD;  Location: [facility]     FUSION CERVICAL ANTERIOR TWO LEVELS  1/29/2010     HC DRAIN/INJ MAJOR JOINT/BURSA W/O US  5/5/2008    Left sacroiliac joint injection.     HC INJ EPIDURAL LUMBAR/SACRAL W/WO CONTRAST  2009     HEAD & NECK SURGERY      2013     HERNIA REPAIR       INJECT BLOCK MEDIAL BRANCH CERVICAL/THORACIC/LUMBAR Bilateral 8/26/2015    Procedure: INJECT BLOCK MEDIAL BRANCH CERVICAL / THORACIC / LUMBAR;  Surgeon: Ronald Driscoll MD;  Location: [facility]     INJECT FACET JOINT Bilateral 5/27/2015    Procedure: INJECT FACET JOINT;  Surgeon: Ronald Driscoll MD;  Location: [facility]     NERVE BLOCK OCCIPITAL Bilateral 7/12/2018    Procedure: NERVE BLOCK OCCIPITAL;  bilateral occipital nerve blocks;  Surgeon: Ronald Driscoll MD;  Location: [facility]     Current Outpatient Prescriptions   Medication Sig Dispense Refill     Cholecalciferol (VITAMIN D) 2000 units tablet TAKE ONE TABLET BY MOUTH EVERY  tablet 3      citalopram (CELEXA) 20 MG tablet Take 1 tablet (20 mg) by mouth daily 90 tablet 3     clonazePAM (KLONOPIN) 0.5 MG tablet Take 0.5-1 tablets (0.25-0.5 mg) by mouth 3 times daily as needed for anxiety 90 tablet 0     FLOVENT  MCG/ACT Inhaler INHALE 2 PUFFS INTO THE LUNGS TWICE DAILY 36 g 2     fluticasone (FLONASE) 50 MCG/ACT spray SPRAY 2 SPRAYS IN EACH NOSTRIL EVERY DAY 16 g 5     gabapentin (NEURONTIN) 300 MG capsule TAKE TWO CAPSULES BY MOUTH THREE TIMES A  capsule 11     ipratropium - albuterol 0.5 mg/2.5 mg/3 mL (DUONEB) 0.5-2.5 (3) MG/3ML neb solution Take 1 vial (3 mLs) by nebulization every 4 hours as needed for shortness of breath / dyspnea 30 vial 0     methadone (DOLOPHINE) 10 MG tablet Take 1 tablet (10 mg) by mouth every 8 hours as needed 90 tablet 0     omeprazole (PRILOSEC) 20 MG CR capsule TAKE 1 CAPSULE BY MOUTH DAILY, 30 TO 60 MINUTES BEFORE A MEAL. 30 capsule 10     oxyCODONE (OXY-IR) 5 MG capsule Take 2 capsules (10 mg) by mouth every 4 hours as needed 2 caps q 4 hour prn pain up to 12 per day May fill 5/24/2012 360 capsule 0     ranitidine (ZANTAC) 150 MG tablet TAKE ONE TABLET BY MOUTH EVERY MORNING 30 tablet 10     sildenafil (VIAGRA) 100 MG tablet Take 1 tablet (100 mg) by mouth daily as needed 30 min to 4 hrs before sex. Do not use with nitroglycerin, terazosin or doxazosin. 4 tablet 0     traZODone (DESYREL) 100 MG tablet Take 3 tablets (300 mg) by mouth At Bedtime 90 tablet 3     VENTOLIN  (90 BASE) MCG/ACT Inhaler INHALE 2 PUFFS INTO THE LUNGS EVERY 6 HOURS AS NEEDED FOR SHORTNESS OF BREATH, DIFFICULTY BREATHING OR WHEEZING. 18 g 3     zolpidem (AMBIEN) 10 MG tablet Take 10 mg by mouth nightly as needed        OTC products: None, except as noted above    Allergies   Allergen Reactions     Vicodin [Hydrocodone-Acetaminophen] Nausea     Other reaction(s): Diaphoresis, Vomiting  HEADACHE      Latex Allergy: NO    Social History   Substance Use Topics     Smoking status:  Former Smoker     Packs/day: 1.00     Years: 30.00     Types: Cigars     Quit date: 12/19/2009     Smokeless tobacco: Former User     Quit date: 2012     Alcohol use No      Comment: HX OF ABUSE-IN REMISSION     History   Drug Use No       REVIEW OF SYSTEMS:   CONSTITUTIONAL: NEGATIVE for fever, chills, change in weight  INTEGUMENTARY/SKIN: NEGATIVE for worrisome rashes, moles or lesions  EYES: NEGATIVE for vision changes or irritation  ENT/MOUTH: NEGATIVE for ear, mouth and throat problems  RESP: NEGATIVE for significant cough or SOB; just finishing a course of antibiotics for flare of bronchitis.   CV: NEGATIVE for chest pain, palpitations or peripheral edema  GI: NEGATIVE for nausea, abdominal pain, heartburn, or change in bowel habits  : NEGATIVE for frequency, dysuria, or hematuria  MUSCULOSKELETAL: chronic back and neck pains; injections in July   NEURO: NEGATIVE for weakness, dizziness or paresthesias  ENDOCRINE: NEGATIVE for temperature intolerance, skin/hair changes  HEME: NEGATIVE for bleeding problems  PSYCHIATRIC: stable on celexa and klonopin    EXAM:   /78 (Cuff Size: Adult Regular)  Pulse 110  Temp 97.5  F (36.4  C) (Temporal)  Resp 16  Wt 169 lb (76.7 kg)  SpO2 100%  BMI 26.46 kg/m2    GENERAL APPEARANCE: healthy, alert and no distress     EYES: EOMI,  PERRL     HENT: ear canals and TM's normal and nose and mouth without ulcers or lesions     NECK: no adenopathy, no asymmetry, masses, or scars and thyroid normal to palpation     RESP: lungs clear to auscultation - no rales, rhonchi  but does have slight end expiratory wheeze at the bases posteriorly     CV: regular rates and rhythm, normal S1 S2, no S3 or S4 and no murmur, click or rub     ABDOMEN:  soft, nontender, no HSM or masses and bowel sounds normal     MS: extremities normal- no gross deformities noted, no evidence of inflammation in joints, FROM in all extremities.     SKIN: no suspicious lesions or rashes     NEURO: Normal  strength and tone, sensory exam grossly normal, mentation intact and speech normal     PSYCH: mentation appears normal. and affect normal/bright     LYMPHATICS: No cervical adenopathy    DIAGNOSTICS:   EKG: Not indicated due to non-vascular surgery and low risk of event (age <65 and without cardiac risk factors)    Recent Labs   Lab Test  07/27/18   1448  07/10/18   1347   05/15/18   2030   03/15/17   2220   HGB  13.6  13.7   < >  13.1*   < >  14.4   PLT  247  199   < >  218   < >  150   INR  0.88   --    --    --    --    --    NA   --   140   --   137   < >  136   POTASSIUM   --   3.7   --   3.8   < >  2.7*   CR   --   1.00   --   1.03   < >  0.94   A1C   --    --    --    --    --   5.6    < > = values in this interval not displayed.        IMPRESSION:   Reason for surgery/procedure: persistent/refractory reflux with chronic respiratory issues which may be related. Also needs screening colonoscopy.   Diagnosis/reason for consult: Assessment for tolerance of anesthesia and procedure.     The proposed surgical procedure is considered LOW risk.    REVISED CARDIAC RISK INDEX  The patient has the following serious cardiovascular risks for perioperative complications such as (MI, PE, VFib and 3  AV Block):  No serious cardiac risks  INTERPRETATION: 0 risks: Class I (very low risk - 0.4% complication rate)    The patient has the following additional risks for perioperative complications:  High tolerance to opioid analgesics due to chronic daily use of methadone and oxycodone. He received 200 mcg of fentanyl IV and 3mg versed IV for a bronchoscopy which he says was effective.  Consider use of propofol in this patient.        ICD-10-CM    1. Preop general physical exam Z01.818        RECOMMENDATIONS:       Pulmonary Risk  Chronic lung disease with use of steroid and beta agonist inhalers.  He has not been using his duonebs the past few days as things have been going better since he started an antibiotic about 10 days ago.       He can take his usual am medication doses with sips of water on the day of the procedure.        He is able to tolerate greater than 4 METS activity without symptoms to suggest coronary disease.     APPROVAL GIVEN to proceed with proposed procedure, without further diagnostic evaluation       Signed Electronically by: Gregory G. Schoen, MD    Copy of this evaluation report is provided to requesting physician.    [facility] Preop Guidelines    Revised Cardiac Risk Index

## 2018-09-21 NOTE — MR AVS SNAPSHOT
After Visit Summary   9/21/2018    Joselito Abarca    MRN: [id number]           Patient Information     Date Of Birth          1965        Visit Information        Provider Department      9/21/2018 2:30 PM Schoen, Gregory G, MD [facility]        Today's Diagnoses     Preop general physical exam    -  1    Gastroesophageal reflux disease, esophagitis presence not specified        Moderate persistent asthma without complication        Intervertebral cervical disc disorder with myelopathy, cervical region        DDD (degenerative disc disease), cervical        Generalized anxiety disorder        Special screening for malignant neoplasms, colon          Care Instructions      Before Your Surgery      Call your surgeon if there is any change in your health. This includes signs of a cold or flu (such as a sore throat, runny nose, cough, rash or fever).    Do not smoke, drink alcohol or take over the counter medicine (unless your surgeon or primary care doctor tells you to) for the 24 hours before and after surgery.    If you take prescribed drugs: Follow your doctor s orders about which medicines to take and which to stop until after surgery.    Eating and drinking prior to surgery: follow the instructions from your surgeon    Take a shower or bath the night before surgery. Use the soap your surgeon gave you to gently clean your skin. If you do not have soap from your surgeon, use your regular soap. Do not shave or scrub the surgery site.  Wear clean pajamas and have clean sheets on your bed.           Follow-ups after your visit        Your next 10 appointments already scheduled     Sep 24, 2018   Procedure with Devin Pelayo MD   [facility] (--)    [address]  [facility] [address]   [phone]            Oct 23, 2018  3:00 PM CDT   Return Visit with Devin Pelayo MD   [facility]  " [address] [address] [place] [address] [phone]              Who to contact     If you have questions or need follow up information about today's clinic visit or your schedule please contact [facility] directly at [phone].  Normal or non-critical lab and imaging results will be communicated to you by MyChart, letter or phone within 4 business days after the clinic has received the results. If you do not hear from us within 7 days, please contact the clinic through MyChart or phone. If you have a critical or abnormal lab result, we will notify you by phone as soon as possible.  Submit refill requests through [employer] or call your pharmacy and they will forward the refill request to us. Please allow 3 business days for your refill to be completed.          Additional Information About Your Visit        MyChart Information     [employer] lets you send messages to your doctor, view your test results, renew your prescriptions, schedule appointments and more. To sign up, go to [email or web address] . Click on \"Log in\" on the left side of the screen, which will take you to the Welcome page. Then click on \"Sign up Now\" on the right side of the page.     You will be asked to enter the access code listed below, as well as some personal information. Please follow the directions to create your username and password.     Your access code is: [email or web address]  Expires: 12/3/2018  9:17 PM     Your access code will  in 90 days. If you need help or a new code, please call your [facility] clinic or [phone].        Care EveryWhere ID     This is your Care EveryWhere ID. This could be used by other organizations to access your [facility] medical records  [id number]        Your Vitals Were     Pulse Temperature Respirations Pulse Oximetry BMI (Body Mass Index)       110 97.5  F (36.4  C) (Temporal) 16 100% 26.46 kg/m2        Blood Pressure from Last 3 Encounters:   18 120/78   18 " 115/85   08/23/18 94/68    Weight from Last 3 Encounters:   09/21/18 169 lb (76.7 kg)   09/04/18 168 lb (76.2 kg)   07/27/18 170 lb (77.1 kg)              Today, you had the following     No orders found for display         Where to get your medicines      Some of these will need a paper prescription and others can be bought over the counter.  Ask your nurse if you have questions.     Bring a paper prescription for each of these medications     clonazePAM 0.5 MG tablet    oxyCODONE 5 MG capsule         Information about OPIOIDS     PRESCRIPTION OPIOIDS: WHAT YOU NEED TO KNOW   We gave you an opioid (narcotic) pain medicine. It is important to manage your pain, but opioids are not always the best choice. You should first try all the other options your care team gave you. Take this medicine for as short a time (and as few doses) as possible.    Some activities can increase your pain, such as bandage changes or therapy sessions. It may help to take your pain medicine 30 to 60 minutes before these activities. Reduce your stress by getting enough sleep, working on hobbies you enjoy and practicing relaxation or meditation. Talk to your care team about ways to manage your pain beyond prescription opioids.    These medicines have risks:    DO NOT drive when on new or higher doses of pain medicine. These medicines can affect your alertness and reaction times, and you could be arrested for driving under the influence (DUI). If you need to use opioids long-term, talk to your care team about driving.    DO NOT operate heavy machinery    DO NOT do any other dangerous activities while taking these medicines.    DO NOT drink any alcohol while taking these medicines.     If the opioid prescribed includes acetaminophen, DO NOT take with any other medicines that contain acetaminophen. Read all labels carefully. Look for the word  acetaminophen  or  Tylenol.  Ask your pharmacist if you have questions or are unsure.    You can get  addicted to pain medicines, especially if you have a history of addiction (chemical, alcohol or substance dependence). Talk to your care team about ways to reduce this risk.    All opioids tend to cause constipation. Drink plenty of water and eat foods that have a lot of fiber, such as fruits, vegetables, prune juice, apple juice and high-fiber cereal. Take a laxative (Miralax, milk of magnesia, Colace, Senna) if you don t move your bowels at least every other day. Other side effects include upset stomach, sleepiness, dizziness, throwing up, tolerance (needing more of the medicine to have the same effect), physical dependence and slowed breathing.    Store your pills in a secure place, locked if possible. We will not replace any lost or stolen medicine. If you don t finish your medicine, please throw away (dispose) as directed by your pharmacist. The [place] Pollution [employer] has more information about safe disposal: https://www.pca.[facility].[place].us/living-green/managing-unwanted-medications         Primary Care Provider Office Phone # Fax #    Gregory G Schoen, [phone] [address] [place] [address]        Equal Access to Services     DADA DUARTE : Hadii maddy johnston Solev, waaxda luhelga, qaybta kaalmada ric, omayra moore. So [facility] [phone].    ATENCIÓN: Si habla español, tiene a alfaro disposición servicios gratuitos de asistencia lingüística. Bola al [phone].    We comply with applicable federal civil rights laws and [place] laws. We do not discriminate on the basis of race, color, national origin, age, disability, sex, sexual orientation, or gender identity.            Thank you!     Thank you for choosing [facility]  for your care. Our goal is always to provide you with excellent care. Hearing back from our patients is one way we can continue to improve our services. Please take a few minutes to complete  the written survey that you may receive in the mail after your visit with us. Thank you!             Your Updated Medication List - Protect others around you: Learn how to safely use, store and throw away your medicines at www.[employer].org.          This list is accurate as of 9/21/18  3:31 PM.  Always use your most recent med list.                   Brand Name Dispense Instructions for use Diagnosis    citalopram 20 MG tablet    celeXA    90 tablet    Take 1 tablet (20 mg) by mouth daily        clonazePAM 0.5 MG tablet    klonoPIN    90 tablet    Take 0.5-1 tablets (0.25-0.5 mg) by mouth 3 times daily as needed for anxiety    Generalized anxiety disorder       FLOVENT  MCG/ACT Inhaler   Generic drug:  fluticasone     36 g    INHALE 2 PUFFS INTO THE LUNGS TWICE DAILY    ILD (interstitial lung disease) (H)       fluticasone 50 MCG/ACT spray    FLONASE    16 g    SPRAY 2 SPRAYS IN EACH NOSTRIL EVERY DAY    Chronic seasonal allergic rhinitis due to fungal spores       gabapentin 300 MG capsule    NEURONTIN    270 capsule    TAKE TWO CAPSULES BY MOUTH THREE TIMES A DAY    Intervertebral cervical disc disorder with myelopathy, cervical region, Degeneration of cervical intervertebral disc       ipratropium - albuterol 0.5 mg/2.5 mg/3 mL 0.5-2.5 (3) MG/3ML neb solution    DUONEB    30 vial    Take 1 vial (3 mLs) by nebulization every 4 hours as needed for shortness of breath / dyspnea        methadone 10 MG tablet    DOLOPHINE    90 tablet    Take 1 tablet (10 mg) by mouth every 8 hours as needed    DDD (degenerative disc disease), cervical       omeprazole 20 MG CR capsule    priLOSEC    30 capsule    TAKE 1 CAPSULE BY MOUTH DAILY, 30 TO 60 MINUTES BEFORE A MEAL.    Esophageal reflux       oxyCODONE 5 MG capsule    OXY-IR    360 capsule    Take 2 capsules (10 mg) by mouth every 4 hours as needed 2 caps q 4 hour prn pain up to 12 per day May fill 5/24/2012    Intervertebral cervical disc disorder with  myelopathy, cervical region       ranitidine 150 MG tablet    ZANTAC    30 tablet    TAKE ONE TABLET BY MOUTH EVERY MORNING    Esophageal reflux       sildenafil 100 MG tablet    VIAGRA    4 tablet    Take 1 tablet (100 mg) by mouth daily as needed 30 min to 4 hrs before sex. Do not use with nitroglycerin, terazosin or doxazosin.    Erectile dysfunction, unspecified erectile dysfunction type       traZODone 100 MG tablet    DESYREL    90 tablet    Take 3 tablets (300 mg) by mouth At Bedtime    Panic attack       VENTOLIN  (90 Base) MCG/ACT inhaler   Generic drug:  albuterol     18 g    INHALE 2 PUFFS INTO THE LUNGS EVERY 6 HOURS AS NEEDED FOR SHORTNESS OF BREATH, DIFFICULTY BREATHING OR WHEEZING.    Mild intermittent asthma with acute exacerbation       vitamin D 2000 units tablet     100 tablet    TAKE ONE TABLET BY MOUTH EVERY DAY    Degeneration of cervical intervertebral disc       zolpidem 10 MG tablet    AMBIEN     Take 10 mg by mouth nightly as needed

## 2018-09-24 ENCOUNTER — HOSPITAL ENCOUNTER (OUTPATIENT)
Facility: AMBULATORY SURGERY CENTER | Age: 53
Discharge: HOME | End: 2018-09-24
Attending: INTERNAL MEDICINE | Admitting: INTERNAL MEDICINE
Payer: COMMERCIAL

## 2018-09-24 ENCOUNTER — SURGERY (OUTPATIENT)
Age: 53
End: 2018-09-24

## 2018-09-24 VITALS
TEMPERATURE: 97.1 F | DIASTOLIC BLOOD PRESSURE: 83 MMHG | SYSTOLIC BLOOD PRESSURE: 100 MMHG | OXYGEN SATURATION: 94 % | RESPIRATION RATE: 16 BRPM

## 2018-09-24 LAB
COLONOSCOPY: NORMAL
UPPER GI ENDOSCOPY: NORMAL

## 2018-09-24 PROCEDURE — 45385 COLONOSCOPY W/LESION REMOVAL: CPT

## 2018-09-24 PROCEDURE — 43239 EGD BIOPSY SINGLE/MULTIPLE: CPT

## 2018-09-24 PROCEDURE — 43239 EGD BIOPSY SINGLE/MULTIPLE: CPT | Mod: 51 | Performed by: INTERNAL MEDICINE

## 2018-09-24 PROCEDURE — 45385 COLONOSCOPY W/LESION REMOVAL: CPT | Mod: 33 | Performed by: INTERNAL MEDICINE

## 2018-09-24 PROCEDURE — 88305 TISSUE EXAM BY PATHOLOGIST: CPT | Performed by: INTERNAL MEDICINE

## 2018-09-24 PROCEDURE — G8907 PT DOC NO EVENTS ON DISCHARG: HCPCS

## 2018-09-24 PROCEDURE — G8918 PT W/O PREOP ORDER IV AB PRO: HCPCS

## 2018-09-24 RX ORDER — LIDOCAINE 40 MG/G
CREAM TOPICAL
Status: DISCONTINUED | OUTPATIENT
Start: 2018-09-24 | End: 2018-09-25 | Stop reason: HOSPADM

## 2018-09-24 RX ORDER — DIPHENHYDRAMINE HYDROCHLORIDE 50 MG/ML
INJECTION INTRAMUSCULAR; INTRAVENOUS PRN
Status: DISCONTINUED | OUTPATIENT
Start: 2018-09-24 | End: 2018-09-24 | Stop reason: HOSPADM

## 2018-09-24 RX ORDER — FENTANYL CITRATE 50 UG/ML
INJECTION, SOLUTION INTRAMUSCULAR; INTRAVENOUS PRN
Status: DISCONTINUED | OUTPATIENT
Start: 2018-09-24 | End: 2018-09-24 | Stop reason: HOSPADM

## 2018-09-24 RX ORDER — ONDANSETRON 2 MG/ML
4 INJECTION INTRAMUSCULAR; INTRAVENOUS
Status: DISCONTINUED | OUTPATIENT
Start: 2018-09-24 | End: 2018-09-25 | Stop reason: HOSPADM

## 2018-09-24 RX ORDER — KETOROLAC TROMETHAMINE 30 MG/ML
15 INJECTION, SOLUTION INTRAMUSCULAR; INTRAVENOUS ONCE
Status: COMPLETED | OUTPATIENT
Start: 2018-09-24 | End: 2018-09-24

## 2018-09-24 RX ADMIN — FENTANYL CITRATE 50 MCG: 50 INJECTION, SOLUTION INTRAMUSCULAR; INTRAVENOUS at 13:54

## 2018-09-24 RX ADMIN — FENTANYL CITRATE 25 MCG: 50 INJECTION, SOLUTION INTRAMUSCULAR; INTRAVENOUS at 14:29

## 2018-09-24 RX ADMIN — FENTANYL CITRATE 25 MCG: 50 INJECTION, SOLUTION INTRAMUSCULAR; INTRAVENOUS at 13:58

## 2018-09-24 RX ADMIN — FENTANYL CITRATE 25 MCG: 50 INJECTION, SOLUTION INTRAMUSCULAR; INTRAVENOUS at 14:33

## 2018-09-24 RX ADMIN — DIPHENHYDRAMINE HYDROCHLORIDE 50 MG: 50 INJECTION INTRAMUSCULAR; INTRAVENOUS at 13:50

## 2018-09-24 RX ADMIN — KETOROLAC TROMETHAMINE 15 MG: 30 INJECTION, SOLUTION INTRAMUSCULAR; INTRAVENOUS at 12:17

## 2018-09-24 RX ADMIN — FENTANYL CITRATE 25 MCG: 50 INJECTION, SOLUTION INTRAMUSCULAR; INTRAVENOUS at 14:12

## 2018-09-24 RX ADMIN — FENTANYL CITRATE 50 MCG: 50 INJECTION, SOLUTION INTRAMUSCULAR; INTRAVENOUS at 13:46

## 2018-09-24 RX ADMIN — FENTANYL CITRATE 25 MCG: 50 INJECTION, SOLUTION INTRAMUSCULAR; INTRAVENOUS at 14:02

## 2018-09-26 LAB — COPATH REPORT: NORMAL

## 2018-09-28 ENCOUNTER — TELEPHONE (OUTPATIENT)
Dept: GASTROENTEROLOGY | Facility: CLINIC | Age: 53
End: 2018-09-28

## 2018-09-28 NOTE — TELEPHONE ENCOUNTER
Nurse left message to return call to go over results.    Shanice Razo RN, BSN, PHN  [facility]  GI/Gen Surg/Hepatology Care Coordinator

## 2018-09-28 NOTE — TELEPHONE ENCOUNTER
----- Message from Devin Pelayo MD sent at 9/28/2018  9:29 AM CDT -----  Can you let him know that esophagus biopsies confirm Olson esophagus but no precancerous or dysplastic changes.    Will discuss further at [facility].

## 2018-09-28 NOTE — TELEPHONE ENCOUNTER
Patient returned call and nurse went over results, he will discuss more with MD at follow up.    Shanice Razo RN, BSN, PHN  [facility]  GI/Gen Surg/Hepatology Care Coordinator

## 2018-10-03 LAB
MYCOBACTERIUM SPEC CULT: NORMAL
MYCOBACTERIUM SPEC CULT: NORMAL
SPECIMEN SOURCE: NORMAL

## 2018-10-22 DIAGNOSIS — M50.30 DDD (DEGENERATIVE DISC DISEASE), CERVICAL: ICD-10-CM

## 2018-10-22 DIAGNOSIS — F41.1 GENERALIZED ANXIETY DISORDER: ICD-10-CM

## 2018-10-22 DIAGNOSIS — M50.00 INTERVERTEBRAL CERVICAL DISC DISORDER WITH MYELOPATHY, CERVICAL REGION: ICD-10-CM

## 2018-10-23 ENCOUNTER — TELEPHONE (OUTPATIENT)
Dept: GASTROENTEROLOGY | Facility: CLINIC | Age: 53
End: 2018-10-23

## 2018-10-23 NOTE — TELEPHONE ENCOUNTER
----- Message from Devin Pelayo MD sent at 10/23/2018  3:21 PM CDT -----  Missed appt today.    Can you send message to the scheduling to see if he can reschedule?      Thanks, -Jax

## 2018-10-23 NOTE — TELEPHONE ENCOUNTER
Methadone 10 MG       Last Written Prescription Date:  9-19-18  Last Fill Quantity: 90,   # refills: 0  Last Office Visit: 9-21-18  Future Office visit:    Next 5 appointments (look out 90 days)     Oct 23, 2018  3:00 PM CDT   Return Visit with Devin Pelayo MD   [facility])    [address] [address] [place] [address] [phone]                   Routing refill request to provider for review/approval because:  Drug not on the [facility], [facility] or [facility] refill protocol or controlled substance  Oxycodone 5 MG       Last Written Prescription Date:  9-21-18  Last Fill Quantity: 360,   # refills: 0  Last Office Visit: 9-21-18  Future Office visit:    Next 5 appointments (look out 90 days)     Oct 23, 2018  3:00 PM CDT   Return Visit with Devin Pelayo MD   [facility])    [address] [place] [id number] [phone]                   Routing refill request to provider for review/approval because:  Drug not on the [facility], [facility] or [facility] refill protocol or controlled substance  Clonazepam 0.5 MG       Last Written Prescription Date:  9-21-18  Last Fill Quantity: 90,   # refills: 0  Last Office Visit: 9-21-18  Future Office visit:    Next 5 appointments (look out 90 days)     Oct 23, 2018  3:00 PM CDT   Return Visit with Devin Pelayo MD   [facility]    [address] [place] [address]   [phone]                   Routing refill request to provider for review/approval because:  Drug not on the [facility], [facility] or [facility] refill protocol or controlled substance

## 2018-10-23 NOTE — TELEPHONE ENCOUNTER
Pt rescheduled for 11/6/18.    Shanice Razo RN, BSN, PHN  [facility]  GI/Gen Surg/Hepatology Care Coordinator

## 2018-10-24 RX ORDER — METHADONE HYDROCHLORIDE 10 MG/1
10 TABLET ORAL EVERY 8 HOURS PRN
Qty: 90 TABLET | Refills: 0 | Status: SHIPPED | OUTPATIENT
Start: 2018-10-24 | End: 2018-11-26

## 2018-10-24 RX ORDER — CLONAZEPAM 0.5 MG/1
0.25-0.5 TABLET ORAL 3 TIMES DAILY PRN
Qty: 90 TABLET | Refills: 0 | Status: SHIPPED | OUTPATIENT
Start: 2018-10-24 | End: 2018-11-26

## 2018-10-24 RX ORDER — OXYCODONE HYDROCHLORIDE 5 MG/1
10 CAPSULE ORAL EVERY 4 HOURS PRN
Qty: 360 CAPSULE | Refills: 0 | Status: SHIPPED | OUTPATIENT
Start: 2018-10-24 | End: 2018-11-26

## 2018-10-25 ENCOUNTER — APPOINTMENT (OUTPATIENT)
Dept: GENERAL RADIOLOGY | Facility: CLINIC | Age: 53
End: 2018-10-25
Attending: FAMILY MEDICINE
Payer: COMMERCIAL

## 2018-10-25 ENCOUNTER — HOSPITAL ENCOUNTER (EMERGENCY)
Facility: CLINIC | Age: 53
Discharge: HOME | End: 2018-10-25
Attending: FAMILY MEDICINE | Admitting: FAMILY MEDICINE
Payer: COMMERCIAL

## 2018-10-25 VITALS
HEART RATE: 96 BPM | RESPIRATION RATE: 19 BRPM | HEIGHT: 67 IN | BODY MASS INDEX: 28.25 KG/M2 | OXYGEN SATURATION: 97 % | TEMPERATURE: 98.5 F | DIASTOLIC BLOOD PRESSURE: 76 MMHG | SYSTOLIC BLOOD PRESSURE: 113 MMHG | WEIGHT: 180 LBS

## 2018-10-25 DIAGNOSIS — J20.9 ACUTE BRONCHITIS, UNSPECIFIED ORGANISM: ICD-10-CM

## 2018-10-25 PROCEDURE — 99283 EMERGENCY DEPT VISIT LOW MDM: CPT | Mod: 25 | Performed by: FAMILY MEDICINE

## 2018-10-25 PROCEDURE — 71046 X-RAY EXAM CHEST 2 VIEWS: CPT | Mod: TC

## 2018-10-25 PROCEDURE — 99284 EMERGENCY DEPT VISIT MOD MDM: CPT | Mod: Z6 | Performed by: FAMILY MEDICINE

## 2018-10-25 RX ORDER — PREDNISONE 20 MG/1
TABLET ORAL
Qty: 9 TABLET | Refills: 0 | Status: SHIPPED | OUTPATIENT
Start: 2018-10-25 | End: 2019-02-04

## 2018-10-25 ASSESSMENT — ENCOUNTER SYMPTOMS
SHORTNESS OF BREATH: 1
FEVER: 0
WHEEZING: 1
CHILLS: 0

## 2018-10-25 NOTE — ED AVS SNAPSHOT
[facility]    [address] [place] [address]    Phone:  [phone]    Fax:  [phone]                                       Joselito Abarca   MRN: [id number]    Department:  [facility]   Date of Visit:  10/25/2018           After Visit Summary Signature Page     I have received my discharge instructions, and my questions have been answered. I have discussed any challenges I see with this plan with the nurse or doctor.    ..........................................................................................................................................  Patient/Patient Representative Signature      ..........................................................................................................................................  Patient Representative Print Name and Relationship to Patient    ..................................................               ................................................  Date                                   Time    ..........................................................................................................................................  Reviewed by Signature/Title    ...................................................              ..............................................  Date                                               Time          22EPIC Rev 08/18

## 2018-10-25 NOTE — TELEPHONE ENCOUNTER
Script brought to [facility] pharmacy.    Oxycodone 5 mg, Klonopin, Methadone     Mica GREY

## 2018-10-25 NOTE — ED AVS SNAPSHOT
[facility] Emergency Department    [address] [place] [address]    Phone:  [phone]    Fax:  [phone]                                       Joselito Abarca   MRN: [id number]    Department:  [facility] Emergency Department   Date of Visit:  10/25/2018           Patient Information     Date Of Birth          1965        Your diagnoses for this visit were:     Acute bronchitis, unspecified organism        You were seen by Kirk Jaramillo MD.      Follow-up Information     Follow up with Schoen, Gregory G, MD. Schedule an appointment as soon as possible for a visit in 5 days.    Specialty:  Family Practice    Why:  If not improving.    Contact information:    [address]   [place] [address] [phone]          Discharge Instructions         Acute Bronchitis  Your healthcare provider has told you that you have acute bronchitis. Bronchitis is infection or inflammation of the bronchial tubes (airways in the lungs). Normally, air moves easily in and out of the airways. Bronchitis narrows the airways, making it harder for air to flow in and out of the lungs. This causes symptoms such as shortness of breath, coughing up yellow or green mucus, and wheezing. Bronchitis can be acute or chronic. Acute means the condition comes on quickly and goes away in a short time, usually within 3 to 10 days. Chronic means a condition lasts a long time and often comes back.    What causes acute bronchitis?  Acute bronchitis almost always starts as a viral respiratory infection, such as a cold or the flu. Certain factors make it more likely for a cold or flu to turn into bronchitis. These include being very young, being elderly, having a heart or lung problem, or having a weak immune system. Cigarette smoking also makes bronchitis more likely.  When bronchitis develops, the airways become swollen. The airways may also become infected with bacteria. This is known as a secondary  infection.  Diagnosing acute bronchitis  Your healthcare provider will examine you and ask about your symptoms and health history. You may also have a sputum culture to test the fluid in your lungs. Chest X-rays may be done to look for infection in the lungs.  Treating acute bronchitis  Bronchitis usually clears up as the cold or flu goes away. You can help feel better faster by doing the following:    Take medicine as directed. You may be told to take ibuprofen or other over-the-counter medicines. These help relieve inflammation in your bronchial tubes. Your healthcare provider may prescribe an inhaler to help open up the bronchial tubes. Most of the time, acute bronchitis is caused by a viral infection. Antibiotics are usually not prescribed for viral infections.    Drink plenty of fluids, such as water, juice, or warm soup. Fluids loosen mucus so that you can cough it up. This helps you breathe more easily. Fluids also prevent dehydration.    Make sure you get plenty of rest.    Do not smoke. Do not allow anyone else to smoke in your home.  Recovery and follow-up  Follow up with your doctor as you are told. You will likely feel better in a week or two. But a dry cough can linger beyond that time. Let your doctor know if you still have symptoms (other than a dry cough) after 2 weeks, or if you re prone to getting bronchial infections. Take steps to protect yourself from future infections. These steps include stopping smoking and avoiding tobacco smoke, washing your hands often, and getting a yearly flu shot.  When to call your healthcare provider  Call the healthcare provider if you have any of the following:    Fever of 100.4 F (38.0 C) or higher, or as advised    Symptoms that get worse, or new symptoms    Trouble breathing    Symptoms that don t start to improve within a week, or within 3 days of taking antibiotics   Date Last Reviewed: 12/1/2016 2000-2017 [employer]. [address],  [place] [address]. All rights reserved. This information is not intended as a substitute for professional medical care. Always follow your healthcare professional's instructions.          Your next 10 appointments already scheduled     Nov 06, 2018  3:00 PM CST   [facility]    [address] [place] [address] [phone]              24 Hour Appointment Hotline       To make an appointment at any [facility], call [phone] ([phone]). If you don't have a family doctor or clinic, we will help you find one. [facility] are conveniently located to serve the needs of you and your family.             Review of your medicines      START taking        Dose / Directions Last dose taken    predniSONE 20 MG tablet   Commonly known as:  DELTASONE   Quantity:  9 tablet        2 tabs day 1-3, then 1 tab days 4-6, then 1/2 tab daily for 7-8 days   Refills:  0          Our records show that you are taking the medicines listed below. If these are incorrect, please call your family doctor or clinic.        Dose / Directions Last dose taken    citalopram 20 MG tablet   Commonly known as:  celeXA   Dose:  20 mg   Quantity:  90 tablet        Take 1 tablet (20 mg) by mouth daily   Refills:  3        clonazePAM 0.5 MG tablet   Commonly known as:  klonoPIN   Dose:  0.25-0.5 mg   Quantity:  90 tablet        Take 0.5-1 tablets (0.25-0.5 mg) by mouth 3 times daily as needed for anxiety   Refills:  0        FLOVENT  MCG/ACT Inhaler   Quantity:  36 g   Generic drug:  fluticasone        INHALE 2 PUFFS INTO THE LUNGS TWICE DAILY   Refills:  2        fluticasone 50 MCG/ACT spray   Commonly known as:  FLONASE   Quantity:  16 g        SPRAY 2 SPRAYS IN EACH NOSTRIL EVERY DAY   Refills:  5        gabapentin 300 MG capsule   Commonly known as:  NEURONTIN   Quantity:  270 capsule        TAKE TWO CAPSULES BY  MOUTH THREE TIMES A DAY   Refills:  11        ipratropium - albuterol 0.5 mg/2.5 mg/3 mL 0.5-2.5 (3) MG/3ML neb solution   Commonly known as:  DUONEB   Dose:  1 vial   Quantity:  30 vial        Take 1 vial (3 mLs) by nebulization every 4 hours as needed for shortness of breath / dyspnea   Refills:  0        methadone 10 MG tablet   Commonly known as:  DOLOPHINE   Dose:  10 mg   Quantity:  90 tablet        Take 1 tablet (10 mg) by mouth every 8 hours as needed   Refills:  0        omeprazole 20 MG CR capsule   Commonly known as:  priLOSEC   Quantity:  30 capsule        TAKE 1 CAPSULE BY MOUTH DAILY, 30 TO 60 MINUTES BEFORE A MEAL.   Refills:  10        oxyCODONE 5 MG capsule   Commonly known as:  OXY-IR   Dose:  10 mg   Quantity:  360 capsule        Take 2 capsules (10 mg) by mouth every 4 hours as needed 2 caps q 4 hour prn pain up to 12 per day May fill 5/24/2012   Refills:  0        ranitidine 150 MG tablet   Commonly known as:  ZANTAC   Quantity:  30 tablet        TAKE ONE TABLET BY MOUTH EVERY MORNING   Refills:  10        sildenafil 100 MG tablet   Commonly known as:  VIAGRA   Dose:  100 mg   Quantity:  4 tablet        Take 1 tablet (100 mg) by mouth daily as needed 30 min to 4 hrs before sex. Do not use with nitroglycerin, terazosin or doxazosin.   Refills:  0        traZODone 100 MG tablet   Commonly known as:  DESYREL   Dose:  300 mg   Quantity:  90 tablet        Take 3 tablets (300 mg) by mouth At Bedtime   Refills:  3        VENTOLIN  (90 Base) MCG/ACT inhaler   Quantity:  18 g   Generic drug:  albuterol        INHALE 2 PUFFS INTO THE LUNGS EVERY 6 HOURS AS NEEDED FOR SHORTNESS OF BREATH, DIFFICULTY BREATHING OR WHEEZING.   Refills:  3        vitamin D 2000 units tablet   Quantity:  100 tablet        TAKE ONE TABLET BY MOUTH EVERY DAY   Refills:  3        zolpidem 10 MG tablet   Commonly known as:  AMBIEN   Dose:  10 mg        Take 10 mg by mouth nightly as needed   Refills:  0               "  Prescriptions were sent or printed at these locations (1 Prescription)                   [facility] - [place], [place] - [address]   [address], [facility] [address]    Telephone:  [phone]   Fax:  [phone]   Hours:                  E-Prescribed (1 of 1)         predniSONE (DELTASONE) 20 MG tablet                Procedures and tests performed during your visit     XR Chest 2 Views      Orders Needing Specimen Collection     None      Pending Results     Date and Time Order Name Status Description    10/25/2018 2122 XR Chest 2 Views Preliminary             Pending Culture Results     No orders found from 10/23/2018 to 10/26/2018.            Pending Results Instructions     If you had any lab results that were not finalized at the time of your Discharge, you can call the ED Lab Result RN at [phone]. You will be contacted by this team for any positive Lab results or changes in treatment. The nurses are available 7 days a week from 10A to 6:30P.  You can leave a message 24 hours per day and they will return your call.        Thank you for choosing [facility]       Thank you for choosing [facility] for your care. Our goal is always to provide you with excellent care. Hearing back from our patients is one way we can continue to improve our services. Please take a few minutes to complete the written survey that you may receive in the mail after you visit with us. Thank you!        [employer] Information     [employer] lets you send messages to your doctor, view your test results, renew your prescriptions, schedule appointments and more. To sign up, go to [email or web address] . Click on \"Log in\" on the left side of the screen, which will take you to the Welcome page. Then click on \"Sign up Now\" on the right side of the page.     You will be asked to enter the access code listed below, as well as some personal information. Please follow the directions to create your username and " password.     Your access code is: [email or web address]  Expires: 12/3/2018  9:17 PM     Your access code will  in 90 days. If you need help or a new code, please call your [place] clinic or [phone].        Care EveryWhere ID     This is your Care EveryWhere ID. This could be used by other organizations to access your [place] medical records  [id number]        Equal Access to Services     DADA DUARTE : Hadii maddy redman hadasho Soomaali, waaxda luqadaha, qaybta kaalmada adeegyada, omayra clayin ruth hebert . So [facility] [phone].    ATENCIÓN: Si habla español, tiene a alfaro disposición servicios gratuitos de asistencia lingüística. Llame al [phone].    We comply with applicable federal civil rights laws and [place] laws. We do not discriminate on the basis of race, color, national origin, age, disability, sex, sexual orientation, or gender identity.            After Visit Summary       This is your record. Keep this with you and show to your community pharmacist(s) and doctor(s) at your next visit.

## 2018-10-26 ENCOUNTER — PATIENT OUTREACH (OUTPATIENT)
Dept: CARE COORDINATION | Facility: CLINIC | Age: 53
End: 2018-10-26

## 2018-10-26 NOTE — PROGRESS NOTES
Clinic Care Coordination Contact  [facility]/Voicemail       Clinical Data: Care Coordinator Outreach  Outreach attempted x 1.  Left message on voicemail with call back information and requested return call.  Plan:  Care Coordinator will try to reach patient again in 1-2 business days.

## 2018-10-26 NOTE — ED TRIAGE NOTES
States he gets pneumonia every other month.  Has been short of breath for a couple weeks but is getting worse.  Woke up in the middle of the night unable to catch his breath.  Taking his nebs and coughing up sputum

## 2018-10-26 NOTE — ED PROVIDER NOTES
History     Chief Complaint   Patient presents with     Shortness of Breath     The history is provided by the patient.     Joselito Abarca is a 53 year old male who presents to the ED for shortness of breath that started last night. Patient has chronic lung and stomach issues. For the last 4 years, he has had chronic double pneumonia that he gets as soon as he runs out of antibiotics. He is seeing a Pulmonologist. He is experiencing worsening breathing with wheezing for a couple of days. Patient went to bed this morning at 1:00am and woke up between 4:00am-5:00am with a burning sensation in his lungs. He could not breath, panicked, and then felt scared. Patient was able to go back to sleep. After moving around after waking up he could not catch his breath for 30 minutes. He tried a nebulizer treatment. He relates that cough drops help. The last time he was treated was for an infection. He last had prednisone in September. Patient was supposed to follow up with his Specialist, but he was not feeling well enough to go. He denies a fever or chills.       Patient Active Problem List   Diagnosis     Generalized anxiety disorder     Alcohol abuse, in remission     Esophageal reflux     Constipation     Thoracic or lumbosacral neuritis or radiculitis, unspecified     Intervertebral cervical disc disorder with myelopathy, cervical region     CARDIOVASCULAR SCREENING; LDL GOAL LESS THAN 160     Arthrodesis status     Neck pain     Health Care Home     Degeneration of cervical intervertebral disc     Disease of bronchial airway (HCC)     Leukocytosis     Cough     Nausea with vomiting     Acute respiratory failure with hypoxia (H)     Moderate persistent asthma without complication     Chronic pain syndrome     Status post cervical spinal arthrodesis     Chronic seasonal allergic rhinitis due to fungal spores     Gastroesophageal reflux disease, esophagitis presence not specified     Past Medical History:   Diagnosis  Date     Allergic rhinitis      Anxiety      GERD (gastroesophageal reflux disease)      Neck pain 6/15/2011     Pneumonia      Uncomplicated asthma        Past Surgical History:   Procedure Laterality Date     BRONCHOSCOPY (RIGID OR FLEXIBLE), DIAGNOSTIC N/A 8/7/2018    Procedure: COMBINED BRONCHOSCOPY (RIGID OR FLEXIBLE), LAVAGE;  Bronchoscopy with Lavage and Transbronchial Biopsy;  Surgeon: Yung Miranda MD;  Location: [facility]     COLONOSCOPY WITH CO2 INSUFFLATION N/A 9/24/2018    Procedure: COLONOSCOPY WITH CO2 INSUFFLATION;  Colon and upper endoscopy ;  Surgeon: Devin Pelayo MD;  Location: [facility]     COMBINED ESOPHAGOSCOPY, GASTROSCOPY, DUODENOSCOPY (EGD) WITH CO2 INSUFFLATION N/A 9/24/2018    Procedure: COMBINED ESOPHAGOSCOPY, GASTROSCOPY, DUODENOSCOPY (EGD) WITH CO2 INSUFFLATION;  Colon and upper endoscopy ;  Surgeon: Devin Pelayo MD;  Location: [facility]     DISCECTOMY LUMBAR POSTERIOR MICROSCOPIC ONE LEVEL  2/21/2012    Procedure:DISCECTOMY LUMBAR POSTERIOR MICROSCOPIC ONE LEVEL; LEFT T1-T2 THORASIC HEMILAMINECTOMY MICRODISCECTOMY WITH MEXTRIX II ; Surgeon:SHARON PURI; Location:[facility]     DISCECTOMY, FUSION CERVICAL ANTERIOR ONE LEVEL, COMBINED N/A 11/29/2017    Procedure: COMBINED DISCECTOMY, FUSION CERVICAL ANTERIOR ONE LEVEL;  Anterior Cervical Discectomy and Fusion Cervical Six - Cervical Seven, Exploration and Revision Cervical Four - Cervical Six with Hardware Removal;  Surgeon: Nikolas Vasques MD;  Location: [facility]     ESOPHAGOSCOPY, GASTROSCOPY, DUODENOSCOPY (EGD), COMBINED N/A 9/24/2018    Procedure: COMBINED ESOPHAGOSCOPY, GASTROSCOPY, DUODENOSCOPY (EGD), BIOPSY SINGLE OR MULTIPLE;;  Surgeon: Devin Pelayo MD;  Location: [facility]     EXPLORE SPINE, REMOVE HARDWARE, COMBINED N/A 11/29/2017    Procedure: COMBINED EXPLORE SPINE, REMOVE HARDWARE;;  Surgeon: Nikolas Vasques MD;  Location: [facility]     FUSION CERVICAL ANTERIOR TWO LEVELS  1/29/2010      HC DRAIN/INJ MAJOR JOINT/BURSA W/O US  5/5/2008    Left sacroiliac joint injection.     HC INJ EPIDURAL LUMBAR/SACRAL W/WO CONTRAST  2009     HEAD & NECK SURGERY      2013     HERNIA REPAIR       INJECT BLOCK MEDIAL BRANCH CERVICAL/THORACIC/LUMBAR Bilateral 8/26/2015    Procedure: INJECT BLOCK MEDIAL BRANCH CERVICAL / THORACIC / LUMBAR;  Surgeon: Ronald Driscoll MD;  Location: [facility]     INJECT FACET JOINT Bilateral 5/27/2015    Procedure: INJECT FACET JOINT;  Surgeon: Ronald Driscoll MD;  Location: [facility]     NERVE BLOCK OCCIPITAL Bilateral 7/12/2018    Procedure: NERVE BLOCK OCCIPITAL;  bilateral occipital nerve blocks;  Surgeon: Ronald Driscoll MD;  Location: [facility]       Family History   Problem Relation Age of Onset     Family History Negative No family hx of      C.A.D. No family hx of        Social History   Substance Use Topics     Smoking status: Former Smoker     Packs/day: 1.00     Years: 30.00     Types: Cigars     Quit date: 12/19/2009     Smokeless tobacco: Former User     Quit date: 2012     Alcohol use No      Comment: HX OF ABUSE-IN REMISSION        Immunization History   Administered Date(s) Administered     Influenza (IIV3) PF 01/28/2013     Influenza Vaccine IM 3yrs+ 4 Valent IIV4 02/16/2016, 11/17/2017     Pneumococcal 23 valent 04/28/2016     TDAP Vaccine (Adacel) 04/16/2009          Allergies   Allergen Reactions     Vicodin [Hydrocodone-Acetaminophen] Nausea     Other reaction(s): Diaphoresis, Vomiting  HEADACHE       Current Outpatient Prescriptions   Medication Sig Dispense Refill     Cholecalciferol (VITAMIN D) 2000 units tablet TAKE ONE TABLET BY MOUTH EVERY  tablet 3     citalopram (CELEXA) 20 MG tablet Take 1 tablet (20 mg) by mouth daily 90 tablet 3     clonazePAM (KLONOPIN) 0.5 MG tablet Take 0.5-1 tablets (0.25-0.5 mg) by mouth 3 times daily as needed for anxiety 90 tablet 0     FLOVENT  MCG/ACT Inhaler INHALE 2 PUFFS INTO THE LUNGS TWICE DAILY 36 g 2     fluticasone  "(FLONASE) 50 MCG/ACT spray SPRAY 2 SPRAYS IN EACH NOSTRIL EVERY DAY 16 g 5     gabapentin (NEURONTIN) 300 MG capsule TAKE TWO CAPSULES BY MOUTH THREE TIMES A  capsule 11     ipratropium - albuterol 0.5 mg/2.5 mg/3 mL (DUONEB) 0.5-2.5 (3) MG/3ML neb solution Take 1 vial (3 mLs) by nebulization every 4 hours as needed for shortness of breath / dyspnea 30 vial 0     methadone (DOLOPHINE) 10 MG tablet Take 1 tablet (10 mg) by mouth every 8 hours as needed 90 tablet 0     omeprazole (PRILOSEC) 20 MG CR capsule TAKE 1 CAPSULE BY MOUTH DAILY, 30 TO 60 MINUTES BEFORE A MEAL. 30 capsule 10     oxyCODONE (OXY-IR) 5 MG capsule Take 2 capsules (10 mg) by mouth every 4 hours as needed 2 caps q 4 hour prn pain up to 12 per day May fill 5/24/2012 360 capsule 0     ranitidine (ZANTAC) 150 MG tablet TAKE ONE TABLET BY MOUTH EVERY MORNING 30 tablet 10     sildenafil (VIAGRA) 100 MG tablet Take 1 tablet (100 mg) by mouth daily as needed 30 min to 4 hrs before sex. Do not use with nitroglycerin, terazosin or doxazosin. 4 tablet 0     traZODone (DESYREL) 100 MG tablet Take 3 tablets (300 mg) by mouth At Bedtime 90 tablet 3     VENTOLIN  (90 BASE) MCG/ACT Inhaler INHALE 2 PUFFS INTO THE LUNGS EVERY 6 HOURS AS NEEDED FOR SHORTNESS OF BREATH, DIFFICULTY BREATHING OR WHEEZING. 18 g 3     zolpidem (AMBIEN) 10 MG tablet Take 10 mg by mouth nightly as needed            Review of Systems   Constitutional: Negative for chills and fever.   Respiratory: Positive for shortness of breath and wheezing.         POSITIVE burning sensation in lungs   Psychiatric/Behavioral:        POSITIVE panic and scared   All other systems reviewed and are negative.      Physical Exam   BP: 123/66  Pulse: 96  Temp: 98.5  F (36.9  C)  Resp: 19  Height: 170.2 cm (5' 7\")  Weight: 81.6 kg (180 lb)  SpO2: 99 %      Physical Exam   Constitutional: He is oriented to person, place, and time. No distress.   HENT:   Head: Normocephalic and atraumatic.   Eyes: " Conjunctivae and EOM are normal.   Neck: Normal range of motion.   Cardiovascular: Normal rate, regular rhythm, normal heart sounds and intact distal pulses.    No murmur heard.  Pulmonary/Chest: Effort normal. No respiratory distress. He has wheezes (mild bilaterally). He has no rales. He exhibits no tenderness.   Abdominal: Soft. Bowel sounds are normal. He exhibits no distension. There is no tenderness.   Musculoskeletal: He exhibits no edema.   Neurological: He is alert and oriented to person, place, and time.   Skin: Skin is warm and dry. No rash noted. He is not diaphoretic. No pallor.   Psychiatric: He has a normal mood and affect. His behavior is normal.   Nursing note and vitals reviewed.      ED Course     ED Course     Procedures    Results for orders placed or performed during the hospital encounter of 10/25/18   XR Chest 2 Views    Narrative    XR CHEST TWO VIEWS   10/25/2018 9:34 PM     INDICATION: Cough, shortness of breath.      COMPARISON: 8/7/2018.      Impression    IMPRESSION: Interstitial changes seen previously in the right lower  lung have improved. No new focal airspace disease or other significant  change. Normal heart size. Postoperative changes lower C-spine.     X-ray showed no signs of pneumonia.  Patient has no fever and vitals are otherwise stable, I do not think this is a bacterial process.  Patient did have some wheezing so certainly his asthma could be acting up.  Again.  I think doing a very short course of prednisone with a quick taper would be indicated and might help get him over the hump with his breathing.  We will have the patient follow-up with his doctor if things are not improving by early next week.    Assessments & Plan (with Medical Decision Making)  Acute bronchitis     I have reviewed the nursing notes.    I have reviewed the findings, diagnosis, plan and need for follow up with the patient.            This document serves as a record of services personally performed  by Kirk Jaramillo MD. It was created on their behalf by Marge Pickett, a trained medical scribe. The creation of this record is based on the provider's personal observations and the statements of the patient. This document has been checked and approved by the attending provider.    Note: Chart documentation done in part with Dragon Voice Recognition software. Although reviewed after completion, some word and grammatical errors may remain.    10/25/2018   [facility] EMERGENCY DEPARTMENT     Kirk Jaramillo MD  10/25/18 7131

## 2018-10-26 NOTE — DISCHARGE INSTRUCTIONS

## 2018-11-06 ENCOUNTER — OFFICE VISIT (OUTPATIENT)
Dept: GASTROENTEROLOGY | Facility: CLINIC | Age: 53
End: 2018-11-06
Payer: COMMERCIAL

## 2018-11-06 VITALS
HEIGHT: 67 IN | OXYGEN SATURATION: 96 % | TEMPERATURE: 94 F | WEIGHT: 178.1 LBS | HEART RATE: 145 BPM | BODY MASS INDEX: 27.95 KG/M2 | RESPIRATION RATE: 20 BRPM

## 2018-11-06 DIAGNOSIS — J45.40 MODERATE PERSISTENT ASTHMA WITHOUT COMPLICATION: Primary | ICD-10-CM

## 2018-11-06 DIAGNOSIS — K21.9 GASTROESOPHAGEAL REFLUX DISEASE WITHOUT ESOPHAGITIS: ICD-10-CM

## 2018-11-06 PROCEDURE — 99214 OFFICE O/P EST MOD 30 MIN: CPT | Performed by: INTERNAL MEDICINE

## 2018-11-06 RX ORDER — OMEPRAZOLE 40 MG/1
40 CAPSULE, DELAYED RELEASE ORAL
Qty: 60 CAPSULE | Refills: 5 | Status: SHIPPED | OUTPATIENT
Start: 2018-11-06 | End: 2019-03-11

## 2018-11-06 NOTE — MR AVS SNAPSHOT
"              After Visit Summary   11/6/2018    Joselito Abarca    MRN: [id number]           Patient Information     Date Of Birth          1965        Visit Information        Provider Department      11/6/2018 3:00 PM Devin Pelayo MD [facility]        Today's Diagnoses     Gastroesophageal reflux disease without esophagitis          Care Instructions      For the Olson esophagus, I recommend that we take additional biopsies sometime within the next 1 year to make sure there are no pre-cancerous changes called \"dysplasia\" which need treatment.     At the time of the follow up endoscopy, we can do another colonoscopy to ensure we did not miss any polyps in the colon.    Increase omeprazole to twice daily.  Take 1 pill about 30 minutes prior to eating.    Use the ranitidine \"as needed\" if having heartburn.  You do not need to schedule this medication every day.    If breathing is worse, then seek care in the ED.  Otherwise, schedule an updated visit with your primary doctor or lung doctor to check on the breathing.    Schedule pH testing and esophageal manometry    Your lung doctor is Yung Miranda MD      What Is Olson Esophagus?          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 This means that there have been changes to the lining of the esophagus near the stomach. The changes may have been caused by the acid reflux that happens with GERD (gastroesophageal reflux disease). The changed lining is not cancerous, but may increase your chances of developing cancer later on.      When you have GERD  The esophagus is the tube that carries food and liquid from the mouth to the stomach. Your lower esophageal sphincter (LES) is a one-way valve at the top of the stomach. It keeps food and stomach acid from flowing backward. If the LES is weakened, food and stomach acid flow back (reflux) into your esophagus. If this happens often, the condition is called GERD.  Changes in the lining  The " stomach is kept safe from its own acid by a special lining. The esophagus isn t meant to contact stomach acid. With GERD, acid flows back into the esophagus often. This damages the esophagus. In response to the damage, new tissue forms that is not normal. This is Olson esophagus. The new tissue may keep changing. This is why it is more likely to become cancer in the future.  Preventing further damage  Your healthcare provider may suggest regular tests to keep track of changes in the esophagus. This usually includes an endoscopy, when a flexible lighted scope is placed through the mouth into the esophagus. Biopsies (tissue samples) can be taken of the abnormal areas. You are usually sedated with an IV medicine for comfort. He or she may also suggest ways for you to control GERD. This includes lifestyle changes, medicine, or even surgery. This should help keep your Olson esophagus from getting worse.  Symptoms of GERD  Symptoms include the following:    Heartburn    Sour-tasting fluid backing up into your mouth    Frequent burping or belching    Symptoms that get worse after you eat, bend over, or lie down    Coughing repeatedly to clear your throat    Hoarseness   Date Last Reviewed: 6/1/2016 2000-2018 [employer]. [address] [place] [address]. All rights reserved. This information is not intended as a substitute for professional medical care. Always follow your healthcare professional's instructions.              Follow-ups after your visit        Additional Services     GASTROENTEROLOGY ADULT REF PROCEDURE ONLY [facility] [phone]                 Follow-up notes from your care team     Return in about 3 months (around 2/6/2019).      Your next 10 appointments already scheduled     Feb 05, 2019  3:30 PM CST   Return Visit with Devin Pelayo MD   [facility]    [address] [address] [place] [id number]  "  [phone]              Future tests that were ordered for you today     Open Future Orders        Priority Expected Expires Ordered    GASTROENTEROLOGY ADULT REF PROCEDURE ONLY [facility] [phone] Routine  11/6/2019 11/6/2018            Who to contact     If you have questions or need follow up information about today's clinic visit or your schedule please contact [facility] directly at [phone].  Normal or non-critical lab and imaging results will be communicated to you by MyChart, letter or phone within 4 business days after the clinic has received the results. If you do not hear from us within 7 days, please contact the clinic through [employer] or phone. If you have a critical or abnormal lab result, we will notify you by phone as soon as possible.  Submit refill requests through [employer] or call your pharmacy and they will forward the refill request to us. Please allow 3 business days for your refill to be completed.          Additional Information About Your Visit        Care EveryWhere ID     This is your Care EveryWhere ID. This could be used by other organizations to access your [facility] medical records  [id number]        Your Vitals Were     Pulse Temperature Respirations Height Pulse Oximetry BMI (Body Mass Index)    145 94  F (34.4  C) 20 1.702 m (5' 7\") 96% 27.89 kg/m2       Blood Pressure from Last 3 Encounters:   10/25/18 113/76   09/24/18 100/83   09/21/18 120/78    Weight from Last 3 Encounters:   11/06/18 80.8 kg (178 lb 1.6 oz)   10/25/18 81.6 kg (180 lb)   09/21/18 76.7 kg (169 lb)                 Today's Medication Changes          These changes are accurate as of 11/6/18  3:44 PM.  If you have any questions, ask your nurse or doctor.               Start taking these medicines.        Dose/Directions    omeprazole 40 MG capsule   Commonly known as:  priLOSEC   Used for:  Gastroesophageal reflux disease without esophagitis        Dose:  40 mg   Take 1 capsule " (40 mg) by mouth 2 times daily (before meals)   Quantity:  60 capsule   Refills:  5            Where to get your medicines      These medications were sent to [facility] [place], [place] - [address] [facility] [address]     Phone:  [phone]     omeprazole 40 MG capsule                Primary Care Provider Office Phone # Fax #    Gregory G Schoen, [phone]       [address] [id number]        Equal Access to Services     [facility]: Hadii aad ku hadasho Soomaali, waaxda luqadaha, qaybta kaalmada adeegyada, waxay idiin hayaan adeeg khsonia hebert . So [facility] [phone].    ATENCIÓN: Si habla español, tiene a alfaro disposición servicios gratuitos de asistencia lingüística. [facility] [phone].    We comply with applicable federal civil rights laws and [place] laws. We do not discriminate on the basis of race, color, national origin, age, disability, sex, sexual orientation, or gender identity.            Thank you!     Thank you for choosing [facility]  for your care. Our goal is always to provide you with excellent care. Hearing back from our patients is one way we can continue to improve our services. Please take a few minutes to complete the written survey that you may receive in the mail after your visit with us. Thank you!             Your Updated Medication List - Protect others around you: Learn how to safely use, store and throw away your medicines at www.disposemymeds.org.          This list is accurate as of 11/6/18  3:44 PM.  Always use your most recent med list.                   Brand Name Dispense Instructions for use Diagnosis    citalopram 20 MG tablet    celeXA    90 tablet    Take 1 tablet (20 mg) by mouth daily        clonazePAM 0.5 MG tablet    klonoPIN    90 tablet    Take 0.5-1 tablets (0.25-0.5 mg) by mouth 3 times daily as needed for anxiety    Generalized anxiety disorder       FLOVENT HFA  220 MCG/ACT Inhaler   Generic drug:  fluticasone     36 g    INHALE 2 PUFFS INTO THE LUNGS TWICE DAILY    ILD (interstitial lung disease) (H)       fluticasone 50 MCG/ACT spray    FLONASE    16 g    SPRAY 2 SPRAYS IN EACH NOSTRIL EVERY DAY    Chronic seasonal allergic rhinitis due to fungal spores       gabapentin 300 MG capsule    NEURONTIN    270 capsule    TAKE TWO CAPSULES BY MOUTH THREE TIMES A DAY    Intervertebral cervical disc disorder with myelopathy, cervical region, Degeneration of cervical intervertebral disc       ipratropium - albuterol 0.5 mg/2.5 mg/3 mL 0.5-2.5 (3) MG/3ML neb solution    DUONEB    30 vial    Take 1 vial (3 mLs) by nebulization every 4 hours as needed for shortness of breath / dyspnea        methadone 10 MG tablet    DOLOPHINE    90 tablet    Take 1 tablet (10 mg) by mouth every 8 hours as needed    DDD (degenerative disc disease), cervical       omeprazole 40 MG capsule    priLOSEC    60 capsule    Take 1 capsule (40 mg) by mouth 2 times daily (before meals)    Gastroesophageal reflux disease without esophagitis       oxyCODONE 5 MG capsule    OXY-IR    360 capsule    Take 2 capsules (10 mg) by mouth every 4 hours as needed 2 caps q 4 hour prn pain up to 12 per day May fill 5/24/2012    Intervertebral cervical disc disorder with myelopathy, cervical region       ranitidine 150 MG tablet    ZANTAC    30 tablet    TAKE ONE TABLET BY MOUTH EVERY MORNING    Esophageal reflux       sildenafil 100 MG tablet    VIAGRA    4 tablet    Take 1 tablet (100 mg) by mouth daily as needed 30 min to 4 hrs before sex. Do not use with nitroglycerin, terazosin or doxazosin.    Erectile dysfunction, unspecified erectile dysfunction type       traZODone 100 MG tablet    DESYREL    90 tablet    Take 3 tablets (300 mg) by mouth At Bedtime    Panic attack       VENTOLIN  (90 Base) MCG/ACT inhaler   Generic drug:  albuterol     18 g    INHALE 2 PUFFS INTO THE LUNGS EVERY 6 HOURS AS NEEDED FOR SHORTNESS  OF BREATH, DIFFICULTY BREATHING OR WHEEZING.    Mild intermittent asthma with acute exacerbation       vitamin D 2000 units tablet     100 tablet    TAKE ONE TABLET BY MOUTH EVERY DAY    Degeneration of cervical intervertebral disc       zolpidem 10 MG tablet    AMBIEN     Take 10 mg by mouth nightly as needed

## 2018-11-06 NOTE — PATIENT INSTRUCTIONS
"  For the Olson esophagus, I recommend that we take additional biopsies sometime within the next 1 year to make sure there are no pre-cancerous changes called \"dysplasia\" which need treatment.     At the time of the follow up endoscopy, we can do another colonoscopy to ensure we did not miss any polyps in the colon.    Increase omeprazole to twice daily.  Take 1 pill about 30 minutes prior to eating.    Use the ranitidine \"as needed\" if having heartburn.  You do not need to schedule this medication every day.    If breathing is worse, then seek care in the ED.  Otherwise, schedule an updated visit with your primary doctor or lung doctor to check on the breathing.    Schedule pH testing and esophageal manometry    Your lung doctor is Yung Miranda MD      What Is Olson Esophagus?          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 This means that there have been changes to the lining of the esophagus near the stomach. The changes may have been caused by the acid reflux that happens with GERD (gastroesophageal reflux disease). The changed lining is not cancerous, but may increase your chances of developing cancer later on.      When you have GERD  The esophagus is the tube that carries food and liquid from the mouth to the stomach. Your lower esophageal sphincter (LES) is a one-way valve at the top of the stomach. It keeps food and stomach acid from flowing backward. If the LES is weakened, food and stomach acid flow back (reflux) into your esophagus. If this happens often, the condition is called GERD.  Changes in the lining  The stomach is kept safe from its own acid by a special lining. The esophagus isn t meant to contact stomach acid. With GERD, acid flows back into the esophagus often. This damages the esophagus. In response to the damage, new tissue forms that is not normal. This is Olson esophagus. The new tissue may keep changing. This is why it is more likely to become cancer in the future.  Preventing " further damage  Your healthcare provider may suggest regular tests to keep track of changes in the esophagus. This usually includes an endoscopy, when a flexible lighted scope is placed through the mouth into the esophagus. Biopsies (tissue samples) can be taken of the abnormal areas. You are usually sedated with an IV medicine for comfort. He or she may also suggest ways for you to control GERD. This includes lifestyle changes, medicine, or even surgery. This should help keep your Olson esophagus from getting worse.  Symptoms of GERD  Symptoms include the following:    Heartburn    Sour-tasting fluid backing up into your mouth    Frequent burping or belching    Symptoms that get worse after you eat, bend over, or lie down    Coughing repeatedly to clear your throat    Hoarseness   Date Last Reviewed: 6/1/2016 2000-2018 [employer]. [address] [place] [address], [place] [address]. All rights reserved. This information is not intended as a substitute for professional medical care. Always follow your healthcare professional's instructions.

## 2018-11-06 NOTE — NURSING NOTE
"Joselito Abarca's goals for this visit include: follow up  He requests these members of his care team be copied on today's visit information:     PCP: Schoen, Gregory G    Referring Provider:  No referring provider defined for this encounter.    Pulse 145  Temp 94  F (34.4  C)  Resp 20  Ht 1.702 m (5' 7\")  Wt 80.8 kg (178 lb 1.6 oz)  SpO2 96%  BMI 27.89 kg/m2    Do you need any medication refills at today's visit?   "

## 2018-11-07 ENCOUNTER — TELEPHONE (OUTPATIENT)
Dept: GASTROENTEROLOGY | Facility: CLINIC | Age: 53
End: 2018-11-07

## 2018-11-07 NOTE — PROGRESS NOTES
GASTROENTEROLOGY FOLLOW UP CLINIC VISIT    CC/REFERRING MD:    Schoen, Gregory G Nathaniel T Gaeckle    REASON FOR CONSULTATION:   No ref. provider found for   Chief Complaint   Patient presents with     Appointment     follow up       HISTORY OF PRESENT ILLNESS:    Joselito Abarca is 53 year old male who presents for follow up of GERD.  He was originally evaluated in the office August 23, 2018 for symptoms of GERD.  He was also noted to have significant pulmonary issues which were thought to potentially be related to GERD.  At the time of the last office visit, he was recommended to undergo EGD and colonoscopy.  These procedures were completed on 9/24/2018.  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  He did not have any significant hiatal hernia and the remainder of the stomach and small bowel are normal.  Colonoscopy was also completed with removal of a small polyp.  The colonoscopy prep was only fair and residual debris in the colon is not able to be removed with extensive cleaning.    The patient reports that he continues to have symptoms of ongoing GERD on once daily PPI with the evening H2 blocker therapy.  Symptoms are occurring daily.  He gets reflux and vomiting although he notes that he is now using a wedge pillow with some degree of improvement in his symptoms compared to previous.  He is avoiding caffeine, spicy foods.  He does note that he has had increased respiratory difficulty lately and has had an ED visit related to this.  He notes increased work of breathing today and did have a DuoNeb just prior to this appointment.      PERTINENT PAST MEDICAL HISTORY:  (I personally reviewed this history with the patient at today's visit)   Past Medical History:   Diagnosis Date     Allergic rhinitis      Anxiety      GERD (gastroesophageal reflux disease)      Neck pain 6/15/2011     Pneumonia       Uncomplicated asthma        PREVIOUS SURGERIES: (I personally reviewed this history with the patient at today's visit)   Past Surgical History:   Procedure Laterality Date     BRONCHOSCOPY (RIGID OR FLEXIBLE), DIAGNOSTIC N/A 8/7/2018    Procedure: COMBINED BRONCHOSCOPY (RIGID OR FLEXIBLE), LAVAGE;  Bronchoscopy with Lavage and Transbronchial Biopsy;  Surgeon: Yung Miranda MD;  Location: [facility]     COLONOSCOPY WITH CO2 INSUFFLATION N/A 9/24/2018    Procedure: COLONOSCOPY WITH CO2 INSUFFLATION;  Colon and upper endoscopy ;  Surgeon: Devin Pelayo MD;  Location: [facility]     COMBINED ESOPHAGOSCOPY, GASTROSCOPY, DUODENOSCOPY (EGD) WITH CO2 INSUFFLATION N/A 9/24/2018    Procedure: COMBINED ESOPHAGOSCOPY, GASTROSCOPY, DUODENOSCOPY (EGD) WITH CO2 INSUFFLATION;  Colon and upper endoscopy ;  Surgeon: Devin Pelayo MD;  Location: [facility]     DISCECTOMY LUMBAR POSTERIOR MICROSCOPIC ONE LEVEL  2/21/2012    Procedure:DISCECTOMY LUMBAR POSTERIOR MICROSCOPIC ONE LEVEL; LEFT T1-T2 THORASIC HEMILAMINECTOMY MICRODISCECTOMY WITH MEXTRIX II ; Surgeon:SHARON PURI; Location:[facility]     DISCECTOMY, FUSION CERVICAL ANTERIOR ONE LEVEL, COMBINED N/A 11/29/2017    Procedure: COMBINED DISCECTOMY, FUSION CERVICAL ANTERIOR ONE LEVEL;  Anterior Cervical Discectomy and Fusion Cervical Six - Cervical Seven, Exploration and Revision Cervical Four - Cervical Six with Hardware Removal;  Surgeon: Nikolas Vasques MD;  Location: [facility]     ESOPHAGOSCOPY, GASTROSCOPY, DUODENOSCOPY (EGD), COMBINED N/A 9/24/2018    Procedure: COMBINED ESOPHAGOSCOPY, GASTROSCOPY, DUODENOSCOPY (EGD), BIOPSY SINGLE OR MULTIPLE;;  Surgeon: Devin Pelayo MD;  Location: [facility]     EXPLORE SPINE, REMOVE HARDWARE, COMBINED N/A 11/29/2017    Procedure: COMBINED EXPLORE SPINE, REMOVE HARDWARE;;  Surgeon: Nikolas Vasques MD;  [facility]     FUSION CERVICAL ANTERIOR TWO LEVELS  1/29/2010     HC DRAIN/INJ MAJOR JOINT/BURSA  W/O US  5/5/2008    Left sacroiliac joint injection.     HC INJ EPIDURAL LUMBAR/SACRAL W/WO CONTRAST  2009     HEAD & NECK SURGERY      2013     HERNIA REPAIR       INJECT BLOCK MEDIAL BRANCH CERVICAL/THORACIC/LUMBAR Bilateral 8/26/2015    Procedure: INJECT BLOCK MEDIAL BRANCH CERVICAL / THORACIC / LUMBAR;  Surgeon: Ronald Driscoll MD;  [facility]     INJECT FACET JOINT Bilateral 5/27/2015    Procedure: INJECT FACET JOINT;  Surgeon: Ronald Driscoll MD;  [facility]     NERVE BLOCK OCCIPITAL Bilateral 7/12/2018    Procedure: NERVE BLOCK OCCIPITAL;  bilateral occipital nerve blocks;  Surgeon: Ronald Driscoll MD;  [facility]       ALLERGIES:     Allergies   Allergen Reactions     Vicodin [Hydrocodone-Acetaminophen] Nausea     Other reaction(s): Diaphoresis, Vomiting  HEADACHE       PERTINENT MEDICATIONS:    Current Outpatient Prescriptions:      Cholecalciferol (VITAMIN D) 2000 units tablet, TAKE ONE TABLET BY MOUTH EVERY DAY, Disp: 100 tablet, Rfl: 3     citalopram (CELEXA) 20 MG tablet, Take 1 tablet (20 mg) by mouth daily, Disp: 90 tablet, Rfl: 3     clonazePAM (KLONOPIN) 0.5 MG tablet, Take 0.5-1 tablets (0.25-0.5 mg) by mouth 3 times daily as needed for anxiety, Disp: 90 tablet, Rfl: 0     FLOVENT  MCG/ACT Inhaler, INHALE 2 PUFFS INTO THE LUNGS TWICE DAILY, Disp: 36 g, Rfl: 2     fluticasone (FLONASE) 50 MCG/ACT spray, SPRAY 2 SPRAYS IN EACH NOSTRIL EVERY DAY, Disp: 16 g, Rfl: 5     gabapentin (NEURONTIN) 300 MG capsule, TAKE TWO CAPSULES BY MOUTH THREE TIMES A DAY, Disp: 270 capsule, Rfl: 11     ipratropium - albuterol 0.5 mg/2.5 mg/3 mL (DUONEB) 0.5-2.5 (3) MG/3ML neb solution, Take 1 vial (3 mLs) by nebulization every 4 hours as needed for shortness of breath / dyspnea, Disp: 30 vial, Rfl: 0     methadone (DOLOPHINE) 10 MG tablet, Take 1 tablet (10 mg) by mouth every 8 hours as needed, Disp: 90 tablet, Rfl: 0     omeprazole (PRILOSEC) 40 MG capsule, Take 1 capsule (40 mg) by mouth 2 times  "daily (before meals), Disp: 60 capsule, Rfl: 5     oxyCODONE (OXY-IR) 5 MG capsule, Take 2 capsules (10 mg) by mouth every 4 hours as needed 2 caps q 4 hour prn pain up to 12 per day May fill 5/24/2012, Disp: 360 capsule, Rfl: 0     ranitidine (ZANTAC) 150 MG tablet, TAKE ONE TABLET BY MOUTH EVERY MORNING, Disp: 30 tablet, Rfl: 10     sildenafil (VIAGRA) 100 MG tablet, Take 1 tablet (100 mg) by mouth daily as needed 30 min to 4 hrs before sex. Do not use with nitroglycerin, terazosin or doxazosin., Disp: 4 tablet, Rfl: 0     traZODone (DESYREL) 100 MG tablet, Take 3 tablets (300 mg) by mouth At Bedtime, Disp: 90 tablet, Rfl: 3     VENTOLIN  (90 BASE) MCG/ACT Inhaler, INHALE 2 PUFFS INTO THE LUNGS EVERY 6 HOURS AS NEEDED FOR SHORTNESS OF BREATH, DIFFICULTY BREATHING OR WHEEZING., Disp: 18 g, Rfl: 3     zolpidem (AMBIEN) 10 MG tablet, Take 10 mg by mouth nightly as needed , Disp: , Rfl:     FAMILY HISTORY: (I personally reviewed this history with the patient at today's visit)  Family History   Problem Relation Age of Onset     Family History Negative No family hx of      C.A.D. No family hx of          ROS:    No fevers or chills  No weight loss  No blurry vision, double vision or change in vision  No sore throat  No lymphadenopathy  No headache, paraesthesias, or weakness in a limb  No shortness of breath or wheezing  No chest pain or pressure  No arthralgias or myalgias  No rashes or skin changes  No odynophagia or dysphagia  No BRBPR, hematochezia, melena  No dysuria, frequency or urgency  No hot/cold intolerance or polyria  No anxiety or depression  PHYSICAL EXAMINATION:  Constitutional: aaox3, cooperative, pleasant, not dyspneic/diaphoretic, no acute distress  Vitals reviewed: Pulse 145  Temp 94  F (34.4  C)  Resp 20  Ht 1.702 m (5' 7\")  Wt 80.8 kg (178 lb 1.6 oz)  SpO2 96%  BMI 27.89 kg/m2  Wt:   Wt Readings from Last 2 Encounters:   11/06/18 80.8 kg (178 lb 1.6 oz)   10/25/18 81.6 kg (180 lb)    "   Eyes: Sclera anicteric/injected  Ears/nose/mouth/throat: Normal oropharynx without ulcers or exudate, mucus membranes moist, hearing intact  Neck: supple, thyroid normal size  CV: No edema, tachycardic though regular.  Respiratory: Unlabored breathing  Lymph: No submandibular, supraclavicular or inguinal lymphadenopathy  Abd: Nondistended, no masses, +bs, no hepatosplenomegaly, nontender, no peritoneal signs  Skin: warm, perfused, no jaundice  Psych: Normal affect  MSK: Normal gait      PERTINENT STUDIES: (I personally reviewed these laboratory studies today)   Most recent CBC:  Recent Labs   Lab Test  07/27/18   1448  07/10/18   1347   WBC  11.4*  14.4*   HGB  13.6  13.7   HCT  42.6  42.2   PLT  247  199     Most recent hepatic panel:  Recent Labs   Lab Test  05/15/18   2030  01/06/17   1736   ALT  28  26   AST  24  25     Most recent creatinine:  Recent Labs   Lab Test  07/10/18   1347  05/15/18   2030   CR  1.00  1.03       ASSESSMENT/PLAN:    Joselito Abraca is a 53 year old male who presents for an established problem of GERD complicated by nondysplastic Olson esophagus.  He also has significant pulmonary issues which are thought to potentially be related to his reflux.  Objectively, he has long segment nondysplastic Olson esophagus on EGD without a significant hiatal hernia.  Clearly, he has long-standing reflux disease.  Given his complication of possible pulmonary issues and Olson esophagus, I have recommended that he go on twice daily high-dose PPI therapy followed by a pH/impedance study and esophageal manometry for further evaluation.  If he continues to have significantly pulmonary issues despite significant acid suppression, then perhaps we can consider referral for Nissen fundoplication.  He did get  High quality esophageal sampling of his esophagus during his recent endoscopy.  However, given he has long segment Olson esophagus, the potential for Ms. areas of esophageal dysplasia is not  insignificant and I do recommend a follow-up endoscopy within 1 year.  At the time of his follow-up endoscopy, we will plan to repeat a colonoscopy given that his prep was inadequate at the time of the last exam.    For today, the plan is to increase his PPI therapy to omeprazole 40 mg twice daily to be taken 30 minutes prior to each meal.  He will then obtain pH/impedance study with esophageal manometry.  He will then follow-up in the office in 3 months.  At the time of his follow-up, we will plan to schedule his next EGD and colonoscopy with [facility].    It was noted today that he was tachycardic on arrival.  He does note that he has had increasing pulmonary issues recently with a recent ED visit.  He did have a DuoNeb just prior to his arrival today which is the likely underlying factor in his tachycardia.  He was instructed to follow-up with his primary physician and his pulmonary physician for his breathing issues.  He should go to the ED should symptoms worsen prior to their evaluations.    Gastroesophageal reflux disease without esophagitis  Orders Placed This Encounter   Procedures     GASTROENTEROLOGY ADULT REF PROCEDURE ONLY [facility] [phone]       RTC 3 months    Thank you for this consultation.  It was a pleasure to participate in the care of this patient; please contact us with any further questions.      This note was created with voice recognition software, and while reviewed for accuracy, typos may remain.     Devin Pelayo MD  Adjunct  of Medicine  Division of Gastroenterology, Hepatology and Nutrition  [facility]  [phone]

## 2018-11-08 ENCOUNTER — PATIENT OUTREACH (OUTPATIENT)
Dept: CARE COORDINATION | Facility: CLINIC | Age: 53
End: 2018-11-08

## 2018-11-08 NOTE — LETTER
Health Care Home - Access Care Plan    About Me  Patient Name:  Joselito Matthews    YOB: 1965  Age:                             53 year old   [place] MRN:            [id number] Telephone Information:     Home Phone [phone]   Mobile [phone]       Address:    [address]  [place] [address] Email address:  No e-mail address on record      Emergency Contact(s)  Name Relationship Lgl Grd Work Phone Home Phone Mobile Phone   1. HARJIT MATTHEWS Brother   [phone]    2. CARLOS A MATTHEWS Father   [phone]             Health Maintenance:      My Access Plan  Medical Emergency 911   Questions or concerns during clinic hours Primary Clinic Line, I will call the clinic directly: [facility] - [phone]   24 Hour Appointment Line [phone] or  [phone] ([phone]) (toll free)   24 Hour Nurse Line [phone] (toll free)   Questions or concerns outside clinic hours 24 Hour Appointment Line, I will call the after-hours on-call line:   [facility] [phone] or [phone]) (toll-free)   Preferred Urgent Care     Preferred Hospital     Preferred Pharmacy [facility] [address] [place]      Behavioral Health Crisis Line The National Suicide Prevention Lifeline at 1-428.777.7318 or 915     My Care Team Members  Patient Care Team       Relationship Specialty Notifications Start End    Schoen, Gregory G, MD PCP - General Family Practice  3/9/15     Phone: [phone] Fax: [phone]         [address] [place] [address]    Shwetha Melara, RN Clinic Care Coordinator Primary Care - CC  10/26/18     Phone: [phone] Fax: [phone]               My Medical and Care Information  Problem List   Patient Active Problem List   Diagnosis     Generalized anxiety disorder     Alcohol abuse, in remission     Esophageal reflux     Constipation     Thoracic  or lumbosacral neuritis or radiculitis, unspecified     Intervertebral cervical disc disorder with myelopathy, cervical region     CARDIOVASCULAR SCREENING; LDL GOAL LESS THAN 160     Arthrodesis status     Neck pain     Health Care Home     Degeneration of cervical intervertebral disc     Disease of bronchial airway (HCC)     Leukocytosis     Cough     Nausea with vomiting     Acute respiratory failure with hypoxia (H)     Moderate persistent asthma without complication     Chronic pain syndrome     Status post cervical spinal arthrodesis     Chronic seasonal allergic rhinitis due to fungal spores     Gastroesophageal reflux disease, esophagitis presence not specified      Current Medications and Allergies:  See printed Medication Report

## 2018-11-08 NOTE — PROGRESS NOTES
Clinic Care Coordination Contact  [facility]/Voicemail       Clinical Data: Care Coordinator Outreach  Outreach attempted x 2.  Left message on voicemail with call back information and requested return call.  Plan: Care Coordinator will mail out care coordination introduction letter with care coordinator contact information and explanation of care coordination services. Care Coordinator will try to reach patient again in 3-5 business days.

## 2018-11-08 NOTE — LETTER
[place] CARE COORDINATION  [address] [place] [address]    November 8, 2018    Joselito Abarca  [address]   [place] [address]      Dear Joselito,    I am a clinic care coordinator who works with Gregory G. Schoen, MD. I wanted to introduce myself and provide you with my contact information for you to be able to call me with any questions or concerns. I wanted to introduce myself and provide you with my contact information so that you can call me with questions or concerns about your health care. Below is a description of clinic care coordination and how I can further assist you.     The clinic care coordinator is a registered nurse and/or  who understand the health care system. The goal of clinic care coordination is to help you manage your health and improve access to the [facility] system in the most efficient manner. The registered nurse can assist you in meeting your health care goals by providing education, coordinating services, and strengthening the communication among your providers. The  can assist you with financial, behavioral, psychosocial, chemical dependency, counseling, and/or psychiatric resources.    Please feel free to contact me at [phone], with any questions or concerns. We at [facility] are focused on providing you with the highest-quality healthcare experience possible and that all starts with you.     Sincerely,         Maribell Melara RN  Clinic Care Coordinator   [facility] [place] [place] [facility]   Phone: [phone]       Enclosed: I have enclosed a copy of a 24 Hour Access Plan. This has helpful phone numbers for you to call when needed. Please keep this in an easy to access place to use as needed.

## 2018-11-13 ENCOUNTER — TELEPHONE (OUTPATIENT)
Dept: FAMILY MEDICINE | Facility: CLINIC | Age: 53
End: 2018-11-13

## 2018-11-13 NOTE — TELEPHONE ENCOUNTER
Reason for Call:  Form, our goal is to have forms completed with 72 hours, however, some forms may require a visit or additional information.    Type of letter, form or note:  handicap    Who is the form from?: Patient    Where did the form come from: Patient or family brought in       What clinic location was the form placed at?: [facility]    Where the form was placed: 's Box    What number is listed as a contact on the form?: pts home        Additional comments: pt will  when form is complete - pt needs right away, pt got a ticket for an  sticker.  Please call when ready     Call taken on 2018 at 4:12 PM by Mica Miller

## 2018-11-14 ENCOUNTER — PATIENT OUTREACH (OUTPATIENT)
Dept: CARE COORDINATION | Facility: CLINIC | Age: 53
End: 2018-11-14

## 2018-11-14 NOTE — PROGRESS NOTES
Care Coordination Contact Attempt    Referral Source:  Emergency Department  Follow up     Clinical Data: Clinic Care Coordinator RN attempted to contact patient .  Unable to contact letter was sent to patient . There has been no response from patient.        Plan:  Case closed.

## 2018-11-26 ENCOUNTER — TELEPHONE (OUTPATIENT)
Dept: FAMILY MEDICINE | Facility: CLINIC | Age: 53
End: 2018-11-26

## 2018-11-26 DIAGNOSIS — M50.30 DDD (DEGENERATIVE DISC DISEASE), CERVICAL: ICD-10-CM

## 2018-11-26 DIAGNOSIS — M50.00 INTERVERTEBRAL CERVICAL DISC DISORDER WITH MYELOPATHY, CERVICAL REGION: ICD-10-CM

## 2018-11-26 DIAGNOSIS — J45.40 MODERATE PERSISTENT ASTHMA WITHOUT COMPLICATION: Primary | ICD-10-CM

## 2018-11-26 DIAGNOSIS — J45.21 MILD INTERMITTENT ASTHMA WITH ACUTE EXACERBATION: ICD-10-CM

## 2018-11-26 DIAGNOSIS — F41.1 GENERALIZED ANXIETY DISORDER: ICD-10-CM

## 2018-11-26 NOTE — TELEPHONE ENCOUNTER
Methadone  10 MG      Last Written Prescription Date:  10/24/18  Last Fill Quantity: 90,   # refills: 0  Last Office Visit: 9-21-18  Future Office visit:    Next 5 appointments (look out 90 days)     Feb 05, 2019  3:30 PM CST   Return Visit with Devin Pelayo MD   [facility] ([facility])    [id number] [place] [id number] [phone]                   Routing refill request to provider for review/approval because:  Drug not on the [facility], [facility] or [facility] refill protocol or controlled substance  Oxycodone 5 MG       Last Written Prescription Date:  10/24/18  Last Fill Quantity: 30,   # refills: 0  Last Office Visit: 9/21/18  Future Office visit:    Next 5 appointments (look out 90 days)     Feb 05, 2019  3:30 PM CST   Return Visit with Devin Pelayo MD   [facility] ([facility])    [address] [place] [id number] [phone]                   Routing refill request to provider for review/approval because:  Drug not on the [facility], [facility] or [facility] refill protocol or controlled substance

## 2018-11-27 RX ORDER — OXYCODONE HYDROCHLORIDE 5 MG/1
10 CAPSULE ORAL EVERY 4 HOURS PRN
Qty: 360 CAPSULE | Refills: 0 | Status: SHIPPED | OUTPATIENT
Start: 2018-11-27 | End: 2018-12-20

## 2018-11-27 RX ORDER — METHADONE HYDROCHLORIDE 10 MG/1
10 TABLET ORAL EVERY 8 HOURS PRN
Qty: 90 TABLET | Refills: 0 | Status: SHIPPED | OUTPATIENT
Start: 2018-11-27 | End: 2018-12-20

## 2018-11-27 NOTE — TELEPHONE ENCOUNTER
Clonazepam  0.5 MG      Last Written Prescription Date:  10/24/18  Last Fill Quantity: 90,   # refills: 0  Last Office Visit: 9-21-18  Future Office visit:    Next 5 appointments (look out 90 days)     Feb 05, 2019  3:30 PM CST   Return Visit with Devin Pelayo MD   [facility] ([facility])    [address] [place] [address] [phone]                   Routing refill request to provider for review/approval because:  Drug not on the [facility], [facility] or [facility] refill protocol or controlled substance

## 2018-11-28 NOTE — TELEPHONE ENCOUNTER
"Ventolin Inhaler  Last Written Prescription Date:  10/13/2017  Last Fill Quantity: 18g,  # refills: 3   Last office visit: 9/21/2018 with prescribing provider:  Schoen   Future Office Visit:   Next 5 appointments (look out 90 days)     Feb 05, 2019  3:30 PM CST   Return Visit with Devin Pelayo MD   [facility] ([facility])    [address] [place] [address] [phone]              Routing refill request to provider for review/approval because:  ACT score below 20.     Requested Prescriptions   Pending Prescriptions Disp Refills     VENTOLIN  (90 Base) MCG/ACT inhaler [Pharmacy Med Name: VENTOLIN HFA 108MCG/ACT AERS] 18 g 3     Sig: INHALE 2 PUFFS INTO THE LUNGS EVERY 6 HOURS AS NEEDED FOR SHORTNESS OF BREATH, DIFFICULTY BREATHING OR WHEEZING.    Asthma Maintenance Inhalers - Anticholinergics Failed    11/26/2018  7:45 PM       Failed - Asthma control assessment score within normal limits in last 6 months    Please review ACT score.        Passed - Patient is age 12 years or older       Passed - Recent (6 mo) or future (30 days) visit within the authorizing provider's specialty    Patient had office visit in the last 6 months or has a visit in the next 30 days with authorizing provider or within the authorizing provider's specialty.  See \"Patient Info\" tab in inbasket, or \"Choose Columns\" in Meds & Orders section of the refill encounter.          ACT Total Scores 3/17/2017 9/18/2017 7/10/2018   ACT TOTAL SCORE (Goal Greater than or Equal to 20) 17 12 19   In the past 12 months, how many times did you visit the emergency room for your asthma without being admitted to the hospital? 3 3 3   In the past 12 months, how many times were you hospitalized overnight because of your asthma? 1 0 0     Ailyn Tobias RN   "

## 2018-11-29 ENCOUNTER — HOSPITAL ENCOUNTER (EMERGENCY)
Facility: CLINIC | Age: 53
Discharge: HOME | End: 2018-11-29
Attending: NURSE PRACTITIONER | Admitting: NURSE PRACTITIONER
Payer: COMMERCIAL

## 2018-11-29 ENCOUNTER — APPOINTMENT (OUTPATIENT)
Dept: GENERAL RADIOLOGY | Facility: CLINIC | Age: 53
End: 2018-11-29
Attending: NURSE PRACTITIONER
Payer: COMMERCIAL

## 2018-11-29 VITALS
OXYGEN SATURATION: 94 % | TEMPERATURE: 98.5 F | HEIGHT: 67 IN | BODY MASS INDEX: 26.68 KG/M2 | DIASTOLIC BLOOD PRESSURE: 87 MMHG | WEIGHT: 170 LBS | SYSTOLIC BLOOD PRESSURE: 110 MMHG | RESPIRATION RATE: 18 BRPM | HEART RATE: 86 BPM

## 2018-11-29 DIAGNOSIS — R06.2 WHEEZING: ICD-10-CM

## 2018-11-29 PROCEDURE — 99284 EMERGENCY DEPT VISIT MOD MDM: CPT | Mod: Z6 | Performed by: NURSE PRACTITIONER

## 2018-11-29 PROCEDURE — 25000125 ZZHC RX 250: Performed by: NURSE PRACTITIONER

## 2018-11-29 PROCEDURE — 94640 AIRWAY INHALATION TREATMENT: CPT | Performed by: NURSE PRACTITIONER

## 2018-11-29 PROCEDURE — 71046 X-RAY EXAM CHEST 2 VIEWS: CPT | Mod: TC

## 2018-11-29 PROCEDURE — 99283 EMERGENCY DEPT VISIT LOW MDM: CPT | Mod: 25 | Performed by: NURSE PRACTITIONER

## 2018-11-29 RX ORDER — PREDNISONE 20 MG/1
40 TABLET ORAL ONCE
Status: COMPLETED | OUTPATIENT
Start: 2018-11-29 | End: 2018-11-29

## 2018-11-29 RX ORDER — IPRATROPIUM BROMIDE AND ALBUTEROL SULFATE 2.5; .5 MG/3ML; MG/3ML
3 SOLUTION RESPIRATORY (INHALATION) ONCE
Status: COMPLETED | OUTPATIENT
Start: 2018-11-29 | End: 2018-11-29

## 2018-11-29 RX ORDER — PREDNISONE 20 MG/1
TABLET ORAL
Qty: 10 TABLET | Refills: 0 | Status: SHIPPED | OUTPATIENT
Start: 2018-11-29 | End: 2019-02-15

## 2018-11-29 RX ORDER — ALBUTEROL SULFATE 90 UG/1
AEROSOL, METERED RESPIRATORY (INHALATION)
Qty: 18 G | Refills: 3 | Status: SHIPPED | OUTPATIENT
Start: 2018-11-29 | End: 2020-10-07

## 2018-11-29 RX ORDER — CLONAZEPAM 0.5 MG/1
0.25-0.5 TABLET ORAL 3 TIMES DAILY PRN
Qty: 90 TABLET | Refills: 0 | Status: SHIPPED | OUTPATIENT
Start: 2018-11-29 | End: 2018-12-29

## 2018-11-29 RX ADMIN — IPRATROPIUM BROMIDE AND ALBUTEROL SULFATE 3 ML: .5; 3 SOLUTION RESPIRATORY (INHALATION) at 21:09

## 2018-11-29 RX ADMIN — PREDNISONE 40 MG: 20 TABLET ORAL at 21:08

## 2018-11-29 ASSESSMENT — ENCOUNTER SYMPTOMS
WHEEZING: 1
COUGH: 1
SHORTNESS OF BREATH: 1

## 2018-11-29 NOTE — ED AVS SNAPSHOT
[facility] Emergency Department    [address]     [place] [address]    Phone:  [phone]    Fax:  [phone]                                       Joselito Abarca   MRN: [id number]    Department:  [facility] Emergency Department   Date of Visit:  11/29/2018           Patient Information     Date Of Birth          1965        Your diagnoses for this visit were:     Wheezing        You were seen by Ernestine Kinsey APRN CNP.      Follow-up Information     Follow up with Schoen, Gregory G, MD In 1 week.    Specialty:  Family Practice    Contact information:    [address] [facility]   [place] [address] [phone]          Follow up with [facility] Emergency Department.    Specialty:  EMERGENCY MEDICINE    Why:  If symptoms worsen    Contact information:    [address]   [place] [address] [phone]    Additional information:    From [address]: Exit at [address] on south side of [place]. Turn right on [address]. Turn left at stoplight on [address]. [facility] will be in view two blocks ahead        Discharge Instructions         Viral or Bacterial Bronchitis with Wheezing (Adult)    Bronchitis is an infection of the air passages. It often occurs during a cold and is usually caused by a virus. Symptoms include cough with mucus (phlegm) and low-grade fever. This illness is contagious during the first few days and is spread through the air by coughing and sneezing, or by direct contact (touching the sick person and then touching your own eyes, nose, or mouth).  If there is a lot of inflammation, air flow is restricted. The air passages may also go into spasm, especially if you have asthma. This causes wheezing and difficulty breathing even in people who do not have asthma.  Bronchitis usually lasts 7 to 14 days. The wheezing should improve with treatment during the first week. An inhaler is often prescribed to relax the air  passages and stop wheezing. Antibiotics will be prescribed if your doctor thinks there is also a secondary bacterial infection.  Home care    If symptoms are severe, rest at home for the first 2 to 3 days. When you go back to your usual activities, don't let yourself get too tired.    Dont s'moke. Also avoid being exposed to secondhand smoke.    You may use over-the-counter medicine to control fever or pain, unless another medicine was prescribed. Note: If you have chronic liver or kidney disease or have ever had a stomach ulcer or gastrointestinal bleeding, talk with your healthcare provider before using these medicines. Also talk to your provider if you are taking medicine to prevent blood clots.) Aspirin should never be given to anyone younger than 18 years of age who is ill with a viral infection or fever. It may cause severe liver or brain damage.    Your appetite may be poor, so a light diet is fine. Stay well hydrated by drinking 6 to 8 glasses of fluids per day (such as water, soft drinks, sports drinks, juices, tea, or soup). Extra fluids will help loosen secretions in the nose and lungs.    Over-the-counter cough, cold, and sore-throat medicines will not shorten the length of the illness, but they may be helpful to reduce symptoms. (Note: Don't use decongestants if you have high blood pressure.)    If you were given an inhaler, use it exactly as directed. If you need to use it more often than prescribed, your condition may be worsening. If this happens, contact your healthcare provider.    If prescribed, finish all antibiotic medicine, even if you are feeling better after only a few days.  Follow-up care  Follow up with your healthcare provider, or as advised. If you had an X-ray or ECG (electrocardiogram), a specialist will review it. You will be notified of any new findings that may affect your care.  If you are age 65 or older, or if you have a chronic lung disease or condition that affects your immune  system, or you smoke, ask your healthcare provider about getting a pneumococcal vaccine and a yearly flu shot (influenza vaccine).  When to seek medical advice  Call your healthcare provider right away if any of these occur:    Fever of 100.4 F (38 C) or higher, or as directed by your healthcare provider    Coughing up increasing amounts of colored sputum    Weakness, drowsiness, headache, facial pain, ear pain, or a stiff neck  Call 911  Call 911 if any of these occur.    Coughing up blood    Worsening weakness, drowsiness, headache, or stiff neck    Increased wheezing not helped with medication, shortness of breath, or pain with breathing   Date Last Reviewed: 6/1/2018 2000-2018 [employer]. [address] [place] [address], [place] [address]. All rights reserved. This information is not intended as a substitute for professional medical care. Always follow your healthcare professional's instructions.          Your next 10 appointments already scheduled     Dec 12, 2018   Procedure with Atif Oconnor MD   [facility], [facility] ([facility], [facility])    [address] [place] [address] [phone]           The [facility] is located on the corner of [address] and [address] on the [facility]. It is easily accessible from virtually any point in the [facility] area, via [employer] and [employer].            Feb 05, 2019  3:30 PM CST   Return Visit with Devin Pelayo MD   [facility]    [address] [place] [address] [phone]              24 Hour Appointment Hotline       To make an appointment at any [facility], call [phone] ([phone]). If you don't have a family doctor or clinic, we will help you find one. [facility] are conveniently located to serve the needs of you and your family.             Review of your  medicines      START taking        Dose / Directions Last dose taken    predniSONE 20 MG tablet   Commonly known as:  DELTASONE   Quantity:  10 tablet        Take two tablets (= 40mg) each day for 5 (five) days   Refills:  0          Our records show that you are taking the medicines listed below. If these are incorrect, please call your family doctor or clinic.        Dose / Directions Last dose taken    citalopram 20 MG tablet   Commonly known as:  celeXA   Dose:  20 mg   Quantity:  90 tablet        Take 1 tablet (20 mg) by mouth daily   Refills:  3        clonazePAM 0.5 MG tablet   Commonly known as:  klonoPIN   Dose:  0.25-0.5 mg   Quantity:  90 tablet        Take 0.5-1 tablets (0.25-0.5 mg) by mouth 3 times daily as needed for anxiety   Refills:  0        FLOVENT  MCG/ACT inhaler   Quantity:  36 g   Generic drug:  fluticasone        INHALE 2 PUFFS INTO THE LUNGS TWICE DAILY   Refills:  2        fluticasone 50 MCG/ACT nasal spray   Commonly known as:  FLONASE   Quantity:  16 g        SPRAY 2 SPRAYS IN EACH NOSTRIL EVERY DAY   Refills:  5        gabapentin 300 MG capsule   Commonly known as:  NEURONTIN   Quantity:  270 capsule        TAKE TWO CAPSULES BY MOUTH THREE TIMES A DAY   Refills:  11        ipratropium - albuterol 0.5 mg/2.5 mg/3 mL 0.5-2.5 (3) MG/3ML neb solution   Commonly known as:  DUONEB   Dose:  1 vial   Quantity:  30 vial        Take 1 vial (3 mLs) by nebulization every 4 hours as needed for shortness of breath / dyspnea   Refills:  0        methadone 10 MG tablet   Commonly known as:  DOLOPHINE   Dose:  10 mg   Quantity:  90 tablet        Take 1 tablet (10 mg) by mouth every 8 hours as needed   Refills:  0        omeprazole 40 MG DR capsule   Commonly known as:  priLOSEC   Dose:  40 mg   Quantity:  60 capsule        Take 1 capsule (40 mg) by mouth 2 times daily (before meals)   Refills:  5        order for DME   Quantity:  1 each        Equipment being ordered: Nebulizer mask and tubing    Refills:  1        oxyCODONE 5 MG capsule   Commonly known as:  OXY-IR   Dose:  10 mg   Quantity:  360 capsule        Take 2 capsules (10 mg) by mouth every 4 hours as needed 2 caps q 4 hour prn pain up to 12 per day May fill 5/24/2012   Refills:  0        ranitidine 150 MG tablet   Commonly known as:  ZANTAC   Quantity:  30 tablet        TAKE ONE TABLET BY MOUTH EVERY MORNING   Refills:  10        sildenafil 100 MG tablet   Commonly known as:  VIAGRA   Dose:  100 mg   Quantity:  4 tablet        Take 1 tablet (100 mg) by mouth daily as needed 30 min to 4 hrs before sex. Do not use with nitroglycerin, terazosin or doxazosin.   Refills:  0        traZODone 100 MG tablet   Commonly known as:  DESYREL   Dose:  300 mg   Quantity:  90 tablet        Take 3 tablets (300 mg) by mouth At Bedtime   Refills:  3        VENTOLIN  (90 Base) MCG/ACT inhaler   Quantity:  18 g   Generic drug:  albuterol        INHALE 2 PUFFS INTO THE LUNGS EVERY 6 HOURS AS NEEDED FOR SHORTNESS OF BREATH, DIFFICULTY BREATHING OR WHEEZING.   Refills:  3        vitamin D3 2000 units tablet   Commonly known as:  CHOLECALCIFEROL   Quantity:  100 tablet        TAKE ONE TABLET BY MOUTH EVERY DAY   Refills:  3        zolpidem 10 MG tablet   Commonly known as:  AMBIEN   Dose:  10 mg        Take 10 mg by mouth nightly as needed   Refills:  0                Prescriptions were sent or printed at these locations (1 Prescription)                   [facility]   [address] [place] [address]    Telephone:  [phone]   Fax:  [phone]   Hours:                  Printed at Department/Unit printer (1 of 1)         predniSONE (DELTASONE) 20 MG tablet                Procedures and tests performed during your visit     XR Chest 2 Views      Orders Needing Specimen Collection     None      Pending Results     No orders found from 11/27/2018 to 11/30/2018.            Pending Culture Results     No orders found from 11/27/2018 to  11/30/2018.            Pending Results Instructions     If you had any lab results that were not finalized at the time of your Discharge, you can call the ED Lab Result RN at [phone]. You will be contacted by this team for any positive Lab results or changes in treatment. The nurses are available 7 days a week from 10A to 6:30P.  You can leave a message 24 hours per day and they will return your call.        Thank you for choosing [facility]       Thank you for choosing [facility] for your care. Our goal is always to provide you with excellent care. Hearing back from our patients is one way we can continue to improve our services. Please take a few minutes to complete the written survey that you may receive in the mail after you visit with us. Thank you!        Care EveryWhere ID     This is your Care EveryWhere ID. This could be used by other organizations to access your [facility] medical records  [id number]        Equal Access to Services     DADA DUARTE : Melvin Hebert, damari garibay, omayra sandoval . So [facility] [phone].    ATENCIÓN: Si habla español, tiene a alfaro disposición servicios gratuitos de asistencia lingüística. Llame al [phone].    We comply with applicable federal civil rights laws and [place] laws. We do not discriminate on the basis of race, color, national origin, age, disability, sex, sexual orientation, or gender identity.            After Visit Summary       This is your record. Keep this with you and show to your community pharmacist(s) and doctor(s) at your next visit.

## 2018-11-29 NOTE — ED AVS SNAPSHOT
[facility] Emergency Department    [address] [place] [address]    Phone:  [phone]    Fax:  [phone]                                       Joselito Abarca   MRN: [id number]    Department:  [facility] Emergency Department   Date of Visit:  11/29/2018           After Visit Summary Signature Page     I have received my discharge instructions, and my questions have been answered. I have discussed any challenges I see with this plan with the nurse or doctor.    ..........................................................................................................................................  Patient/Patient Representative Signature      ..........................................................................................................................................  Patient Representative Print Name and Relationship to Patient    ..................................................               ................................................  Date                                   Time    ..........................................................................................................................................  Reviewed by Signature/Title    ...................................................              ..............................................  Date                                               Time          22EPIC Rev 08/18

## 2018-11-30 ENCOUNTER — PATIENT OUTREACH (OUTPATIENT)
Dept: CARE COORDINATION | Facility: CLINIC | Age: 53
End: 2018-11-30

## 2018-11-30 NOTE — ED TRIAGE NOTES
"Patient presents to the ED with ongoing shortness of breath. States, \"I've been short of breath for the last 2 months and I see a specialist but I can feel the fluid in my lungs.\"  Did use duo-neb and inhaler today multiple times. Patient not currently in distress.  "

## 2018-11-30 NOTE — DISCHARGE INSTRUCTIONS
Viral or Bacterial Bronchitis with Wheezing (Adult)    Bronchitis is an infection of the air passages. It often occurs during a cold and is usually caused by a virus. Symptoms include cough with mucus (phlegm) and low-grade fever. This illness is contagious during the first few days and is spread through the air by coughing and sneezing, or by direct contact (touching the sick person and then touching your own eyes, nose, or mouth).  If there is a lot of inflammation, air flow is restricted. The air passages may also go into spasm, especially if you have asthma. This causes wheezing and difficulty breathing even in people who do not have asthma.  Bronchitis usually lasts 7 to 14 days. The wheezing should improve with treatment during the first week. An inhaler is often prescribed to relax the air passages and stop wheezing. Antibiotics will be prescribed if your doctor thinks there is also a secondary bacterial infection.  Home care    If symptoms are severe, rest at home for the first 2 to 3 days. When you go back to your usual activities, don't let yourself get too tired.    Dont s'moke. Also avoid being exposed to secondhand smoke.    You may use over-the-counter medicine to control fever or pain, unless another medicine was prescribed. Note: If you have chronic liver or kidney disease or have ever had a stomach ulcer or gastrointestinal bleeding, talk with your healthcare provider before using these medicines. Also talk to your provider if you are taking medicine to prevent blood clots.) Aspirin should never be given to anyone younger than 18 years of age who is ill with a viral infection or fever. It may cause severe liver or brain damage.    Your appetite may be poor, so a light diet is fine. Stay well hydrated by drinking 6 to 8 glasses of fluids per day (such as water, soft drinks, sports drinks, juices, tea, or soup). Extra fluids will help loosen secretions in the nose and lungs.    Over-the-counter  cough, cold, and sore-throat medicines will not shorten the length of the illness, but they may be helpful to reduce symptoms. (Note: Don't use decongestants if you have high blood pressure.)    If you were given an inhaler, use it exactly as directed. If you need to use it more often than prescribed, your condition may be worsening. If this happens, contact your healthcare provider.    If prescribed, finish all antibiotic medicine, even if you are feeling better after only a few days.  Follow-up care  Follow up with your healthcare provider, or as advised. If you had an X-ray or ECG (electrocardiogram), a specialist will review it. You will be notified of any new findings that may affect your care.  If you are age 65 or older, or if you have a chronic lung disease or condition that affects your immune system, or you smoke, ask your healthcare provider about getting a pneumococcal vaccine and a yearly flu shot (influenza vaccine).  When to seek medical advice  Call your healthcare provider right away if any of these occur:    Fever of 100.4 F (38 C) or higher, or as directed by your healthcare provider    Coughing up increasing amounts of colored sputum    Weakness, drowsiness, headache, facial pain, ear pain, or a stiff neck  Call 911  Call 911 if any of these occur.    Coughing up blood    Worsening weakness, drowsiness, headache, or stiff neck    Increased wheezing not helped with medication, shortness of breath, or pain with breathing   Date Last Reviewed: 6/1/2018 2000-2018 [employer]. [address] [place] [address], [place] [address]. All rights reserved. This information is not intended as a substitute for professional medical care. Always follow your healthcare professional's instructions.

## 2018-11-30 NOTE — ED PROVIDER NOTES
History     Chief Complaint   Patient presents with     Shortness of Breath     HPI  Joselito Abarca is a 53 year old male who presents to the ED with progressive wheezing/sob for the last 2 days, been ongoing for the last two months, finished prednisone course beginning of the month.  Patient does not smoke. He has been using Duoneb's with little relief in his symptoms.  Patient endorses mildly productive cough, this is unchanged.  Patient denies any known fever/chills.  Patient denies any chest pain, abdominal pain, vomiting or diarrhea.     Problem List:    Patient Active Problem List    Diagnosis Date Noted     Gastroesophageal reflux disease, esophagitis presence not specified 09/21/2018     Priority: Medium     Chronic seasonal allergic rhinitis due to fungal spores 06/07/2018     Priority: Medium     Status post cervical spinal arthrodesis 11/29/2017     Priority: Medium     Chronic pain syndrome 12/13/2016     Priority: Medium     Patient is followed by Gregory G. Schoen, MD for ongoing prescription of pain medication.  All refills should be approved by this provider, or covering partner.    Medication(s): Oxycodone 5 mg IR: Take 2 capsules (10 mg) by mouth every 4 hours as needed 2 caps q 4 hour prn pain up to 12 per day .   Methadone 10 mg three times daily, 90 per month  Clonazepam 0.5 mg tid, 90 per month   Clinic visit frequency required: Q 3 months     Controlled substance agreement:  Encounter-Level CSA - 2/27/15:               Controlled Substance Agreement - Scan on 3/14/2015  8:47 AM : Controlled Medication Agreement-02/27/15 (below)            Pain Clinic evaluation in the past: Yes    DIRE Total Score(s):  No flowsheet data found.    Last [place] [facility] website verification:  10/30/2016     [email or web address]         Moderate persistent asthma without complication 11/02/2016     Priority: Medium     Acute respiratory failure with hypoxia (H) 04/29/2016     Priority: Medium     Nausea with  vomiting 04/27/2016     Priority: Medium     Cough 03/06/2016     Priority: Medium     Leukocytosis 02/16/2016     Priority: Medium     Disease of bronchial airway (HCC) 02/12/2016     Priority: Medium     Degeneration of cervical intervertebral disc 01/26/2015     Priority: Medium     Health Care Home 09/30/2013     Priority: Medium     Status:  Accepted  Care Coordinator:    Melissa Behl BSN, RN, PHN  [facility] Care Coordinator  [phone]     See Letters for [facility] Care Plan  Date:  April 26, 2016            Arthrodesis status 06/15/2011     Priority: Medium     Neck pain 06/15/2011     Priority: Medium     CARDIOVASCULAR SCREENING; LDL GOAL LESS THAN 160 10/31/2010     Priority: Medium     Intervertebral cervical disc disorder with myelopathy, cervical region 11/12/2009     Priority: Medium     Patient is followed by TIARA JIMENEZ for ongoing prescription of narcotic pain medicine.  Med: methadone 10 mg tid. Taking oxycodone up to 8 per day, 120 per month  Maximum use per month: 90  Expected duration: ongoing  Narcotic agreement on file: YES  Clinic visit recommended: Q 3 months         Constipation 03/19/2008     Priority: Medium     Problem list name updated by automated process. Provider to review       Thoracic or lumbosacral neuritis or radiculitis, unspecified 03/19/2008     Priority: Medium     Esophageal reflux 07/09/2003     Priority: Medium     Generalized anxiety disorder 07/08/2003     Priority: Medium     Alcohol abuse, in remission 07/08/2003     Priority: Medium        Past Medical History:    Past Medical History:   Diagnosis Date     Allergic rhinitis      Anxiety      GERD (gastroesophageal reflux disease)      Neck pain 6/15/2011     Pneumonia      Uncomplicated asthma        Past Surgical History:    Past Surgical History:   Procedure Laterality Date     BRONCHOSCOPY (RIGID OR FLEXIBLE), DIAGNOSTIC N/A 8/7/2018    Procedure: COMBINED BRONCHOSCOPY (RIGID OR FLEXIBLE), LAVAGE;   Bronchoscopy with Lavage and Transbronchial Biopsy;  Surgeon: Yung Miranda MD;  Location: [facility]     COLONOSCOPY WITH CO2 INSUFFLATION N/A 9/24/2018    Procedure: COLONOSCOPY WITH CO2 INSUFFLATION;  Colon and upper endoscopy ;  Surgeon: Devin Pelayo MD;  Location: [facility]     COMBINED ESOPHAGOSCOPY, GASTROSCOPY, DUODENOSCOPY (EGD) WITH CO2 INSUFFLATION N/A 9/24/2018    Procedure: COMBINED ESOPHAGOSCOPY, GASTROSCOPY, DUODENOSCOPY (EGD) WITH CO2 INSUFFLATION;  Colon and upper endoscopy ;  Surgeon: Devin Pelayo MD;  Location: [facility] OR     DISCECTOMY LUMBAR POSTERIOR MICROSCOPIC ONE LEVEL  2/21/2012    Procedure:DISCECTOMY LUMBAR POSTERIOR MICROSCOPIC ONE LEVEL; LEFT T1-T2 THORASIC HEMILAMINECTOMY MICRODISCECTOMY WITH MEXTRIX II ; Surgeon:SHARON PURI; Location:[facility]     DISCECTOMY, FUSION CERVICAL ANTERIOR ONE LEVEL, COMBINED N/A 11/29/2017    Procedure: COMBINED DISCECTOMY, FUSION CERVICAL ANTERIOR ONE LEVEL;  Anterior Cervical Discectomy and Fusion Cervical Six - Cervical Seven, Exploration and Revision Cervical Four - Cervical Six with Hardware Removal;  Surgeon: Nikolas Vasques MD;  Location: [facility]     ESOPHAGOSCOPY, GASTROSCOPY, DUODENOSCOPY (EGD), COMBINED N/A 9/24/2018    Procedure: COMBINED ESOPHAGOSCOPY, GASTROSCOPY, DUODENOSCOPY (EGD), BIOPSY SINGLE OR MULTIPLE;;  Surgeon: Devin Pelayo MD;  Location: [facility] OR     EXPLORE SPINE, REMOVE HARDWARE, COMBINED N/A 11/29/2017    Procedure: COMBINED EXPLORE SPINE, REMOVE HARDWARE;;  Surgeon: Nikolas Vasques MD;  Location: [facility]     FUSION CERVICAL ANTERIOR TWO LEVELS  1/29/2010     HC DRAIN/INJ MAJOR JOINT/BURSA W/O US  5/5/2008    Left sacroiliac joint injection.     HC INJ EPIDURAL LUMBAR/SACRAL W/WO CONTRAST  2009     HEAD & NECK SURGERY      2013     HERNIA REPAIR       INJECT BLOCK MEDIAL BRANCH CERVICAL/THORACIC/LUMBAR Bilateral 8/26/2015    Procedure: INJECT BLOCK MEDIAL BRANCH CERVICAL / THORACIC /  "LUMBAR;  Surgeon: Ronald Driscoll MD;  Location[facility]     INJECT FACET JOINT Bilateral 5/27/2015    Procedure: INJECT FACET JOINT;  Surgeon: Ronald Driscoll MD;  Location: [facility]     NERVE BLOCK OCCIPITAL Bilateral 7/12/2018    Procedure: NERVE BLOCK OCCIPITAL;  bilateral occipital nerve blocks;  Surgeon: Ronald Driscoll MD;  Location: [facility]       Family History:    Family History   Problem Relation Age of Onset     Family History Negative No family hx of      C.A.D. No family hx of        Social History:  Marital Status:  Single [1]  Social History   Substance Use Topics     Smoking status: Former Smoker     Packs/day: 1.00     Years: 30.00     Types: Cigars     Quit date: 12/19/2009     Smokeless tobacco: Former User     Quit date: 2012     Alcohol use No      Comment: HX OF ABUSE-IN REMISSION        Medications:      FLOVENT  MCG/ACT Inhaler   ipratropium - albuterol 0.5 mg/2.5 mg/3 mL (DUONEB) 0.5-2.5 (3) MG/3ML neb solution   methadone (DOLOPHINE) 10 MG tablet   Cholecalciferol (VITAMIN D) 2000 units tablet   citalopram (CELEXA) 20 MG tablet   clonazePAM (KLONOPIN) 0.5 MG tablet   fluticasone (FLONASE) 50 MCG/ACT spray   gabapentin (NEURONTIN) 300 MG capsule   omeprazole (PRILOSEC) 40 MG capsule   order for DME   oxyCODONE (OXY-IR) 5 MG capsule   ranitidine (ZANTAC) 150 MG tablet   sildenafil (VIAGRA) 100 MG tablet   traZODone (DESYREL) 100 MG tablet   VENTOLIN  (90 Base) MCG/ACT inhaler   zolpidem (AMBIEN) 10 MG tablet         Review of Systems   Respiratory: Positive for cough, shortness of breath and wheezing.    All other systems reviewed and are negative.      Physical Exam   BP: 116/80  Pulse: 87  Temp: 98.5  F (36.9  C)  Resp: 22  Height: 170.2 cm (5' 7\")  Weight: 77.1 kg (170 lb)  SpO2: 97 %      Physical Exam   Constitutional: He is oriented to person, place, and time. He appears well-developed and well-nourished. No distress.   HENT:   Head: Normocephalic.   Mouth/Throat: Oropharynx is " clear and moist.   Eyes: Conjunctivae are normal.   Neck: Normal range of motion. Neck supple.   Cardiovascular: Normal rate, regular rhythm and intact distal pulses.    Pulmonary/Chest: Effort normal. No respiratory distress. He has wheezes (expiratory throughout). He has no rales. He exhibits no tenderness.   Abdominal: Soft. Bowel sounds are normal.   Musculoskeletal: Normal range of motion.   Neurological: He is alert and oriented to person, place, and time.   Skin: Skin is warm and dry. He is not diaphoretic.   Psychiatric: He has a normal mood and affect.       ED Course     ED Course     Procedures      Results for orders placed or performed during the hospital encounter of 11/29/18 (from the past 24 hour(s))   XR Chest 2 Views    Narrative    CHEST TWO VIEWS 11/29/2018 9:39 PM     HISTORY: Cough, wheezing.    COMPARISON: October 25, 2018     FINDINGS: Minimal interstitial changes are stable. There are no acute  infiltrates. The cardiac silhouette is not enlarged. Pulmonary  vasculature is unremarkable.      Impression    IMPRESSION: No acute disease.    TRINITY VILLAGRAN MD       Medications   ipratropium - albuterol 0.5 mg/2.5 mg/3 mL (DUONEB) neb solution 3 mL (not administered)   predniSONE (DELTASONE) tablet 40 mg (not administered)       Assessments & Plan (with Medical Decision Making)  Wheezing, possible early bronchitis, no fever to indicate bacterial process and chest xray negative.  Wheezing resolved with Duo Neb here.  Prednisone burst x 5 days.  I do not feel antibiotics are warranted.   Patient instructed to follow up with pulmonary, he forgot the name and number, this was provided.    Reasons to return to the ED discussed with patient, he is agreeable and was discharged in stable condition.      I have reviewed the nursing notes.    I have reviewed the findings, diagnosis, plan and need for follow up with the patient.    New Prescriptions    PREDNISONE (DELTASONE) 20 MG TABLET    Take two tablets  (= 40mg) each day for 5 (five) days       Final diagnoses:   Wheezing       11/29/2018   [facility] EMERGENCY DEPARTMENT     Ernestine Kinsey, CORA CNP  11/29/18 [id number]

## 2018-12-03 NOTE — PROGRESS NOTES
Clinic Care Coordination Contact  Clinic Care Coordination Contact  OUTREACH    Referral Information:  Referral Source: ED Follow-Up            Universal Utilization:      Utilization    Last refreshed: 12/3/2018  9:06 AM:  No Show Count (past year) 3       Last refreshed: 12/3/2018  9:06 AM:  ED visits 9       Last refreshed: 12/3/2018  9:06 AM:  Hospital admissions 0          Current as of: 12/3/2018  9:06 AM             Clinical Concerns:  Current Medical Concerns:  Patient seen in Emergency Department  On 11/29/18 for sob   Assessments & Plan (with Medical Decision Making)  Wheezing, possible early bronchitis, no fever to indicate bacterial process and chest xray negative.  Wheezing resolved with Duo Neb here.  Prednisone burst x 5 days.  I do not feel antibiotics are warranted.   Patient instructed to follow up with pulmonary, he forgot the name and number, this was provided.    Reasons to return to the ED discussed with patient, he is agreeable and was discharged in stable condition.     Clinic Care Coordinator RN spoke with patient today.  He reports his breathing is improved.  He will complete prednisone this week.    Patient has not made appointment with pulmonary but agreed to make an appointment.   Patient declined further outreaches from clinic care coordination           Future Appointments              In 2 months Devin Pelayo MD [facility]          Plan: Patient will complete Prednisone.  Use inhalers as directed.   Patient will make appointment with Pulmonology.    Patient declined further outreaches from clinic care coordination

## 2018-12-11 ENCOUNTER — TELEPHONE (OUTPATIENT)
Dept: GASTROENTEROLOGY | Facility: CLINIC | Age: 53
End: 2018-12-11

## 2018-12-11 NOTE — TELEPHONE ENCOUNTER
Pt has questions regarding pre-Esoph. Imped. Study activities.  Questions answered to his satisfaction following review instructions.     Karissa Chan RN  [facility] Endoscopy

## 2018-12-12 ENCOUNTER — HOSPITAL ENCOUNTER (OUTPATIENT)
Facility: CLINIC | Age: 53
Discharge: HOME | End: 2018-12-12
Attending: INTERNAL MEDICINE | Admitting: INTERNAL MEDICINE
Payer: COMMERCIAL

## 2018-12-12 ENCOUNTER — TRANSFERRED RECORDS (OUTPATIENT)
Dept: HEALTH INFORMATION MANAGEMENT | Facility: CLINIC | Age: 53
End: 2018-12-12

## 2018-12-12 PROCEDURE — 91038 ESOPH IMPED FUNCT TEST > 1HR: CPT | Performed by: INTERNAL MEDICINE

## 2018-12-12 NOTE — OR NURSING
Pt here for manometry/ 24 hr ph. Procedure explained and consent signed. Manometry catheter placed easily via R nare to 52 cm for swallows. NS swallows given per protocol and pt tolerated very well. Catheter then removed. Ph catheter then placed to 37 cm for study. Diary and discharge instructions reviewed and given to pt. Pt will return tomorrow to have ph catheter removed. Pt discharged  to home without complaints.

## 2018-12-12 NOTE — DISCHARGE INSTRUCTIONS
Motility Discharge Instructions    1. Resume regular diet.  2. You may have a bloody nose or sore throat after the procedure.  3. If you had a 24 hour probe placed, return  the next day to have the probe removed.  Removal will take 5 minutes.  4. If you have questions call [phone] from 7:00am-4:30pm.  For after hours questions call GI doctor on call at [phone].    Kat Hawk RN

## 2018-12-19 DIAGNOSIS — M50.00 INTERVERTEBRAL CERVICAL DISC DISORDER WITH MYELOPATHY, CERVICAL REGION: ICD-10-CM

## 2018-12-19 DIAGNOSIS — M50.30 DDD (DEGENERATIVE DISC DISEASE), CERVICAL: ICD-10-CM

## 2018-12-20 ENCOUNTER — DOCUMENTATION ONLY (OUTPATIENT)
Dept: GASTROENTEROLOGY | Facility: CLINIC | Age: 53
End: 2018-12-20

## 2018-12-20 RX ORDER — OXYCODONE HYDROCHLORIDE 5 MG/1
10 CAPSULE ORAL EVERY 4 HOURS PRN
Qty: 360 CAPSULE | Refills: 0 | Status: SHIPPED | OUTPATIENT
Start: 2018-12-20 | End: 2019-01-22

## 2018-12-20 RX ORDER — METHADONE HYDROCHLORIDE 10 MG/1
10 TABLET ORAL EVERY 8 HOURS PRN
Qty: 90 TABLET | Refills: 0 | Status: SHIPPED | OUTPATIENT
Start: 2018-12-20 | End: 2019-01-22

## 2018-12-20 NOTE — TELEPHONE ENCOUNTER
Oxycodone  Last Written Prescription Date:  11/27/2018  Last Fill Quantity: 360,  # refills: 0   Last office visit: 9/21/2018 with prescribing provider:  Schoen Future Office Visit:   Next 5 appointments (look out 90 days)    Feb 05, 2019  3:30 PM CST  Return Visit with Devin Pelayo MD  [facility]) [address] [place] [id number]  [phone]      Routing refill request to provider for review/approval because:  Drug not on the [facility] refill protocol     Methadone  Last Written Prescription Date:  11/27/2018  Last Fill Quantity: 90,  # refills: 0   Last office visit: 9/21/2018 with prescribing provider:  Schoen Future Office Visit:   Next 5 appointments (look out 90 days)    Feb 05, 2019  3:30 PM CST  Return Visit with Devin Pelayo MD  [facility]) [address] [place] [id number]  [phone]      Routing refill request to provider for review/approval because:  Drug not on the [facility] refill protocol     Ailyn Tobias RN

## 2018-12-20 NOTE — PROGRESS NOTES
Results review:    Esophageal manometry and pH study was reviewed today.  The esophageal manometry shows decreased lower esophageal pressure with normal relaxation, 5 cm hiatal hernia, contractions with normal pressure but with a decreased latency, hypotensive upper esophageal sphincter.    PH study shows remarkable esophageal acid exposure in both the upright and supine positions suggesting either slippage of the catheter into the proximal stomach or common cavity effect between the stomach and esophagus.  There is low impedance throughout most of the esophagus particularly at night suggesting either fluid-filled esophagus and or diffuse mucosal disease such as severe esophagitis or long segment Olson esophagus    I reviewed the above findings today and I think that it is likely that he has esophageal reflux disease which is refractory to proton pump inhibitor therapy.  He also has complications of chronic reflux, notably long segment Olson esophagus.  He may benefit from a surgical referral in order to consider if he would benefit from an operation.  We will plan to have him come into the office in order to discuss options.    Devin Pelayo MD

## 2018-12-20 NOTE — PROGRESS NOTES
Can you let him know that his pH study showed that there was a significant amount of reflux.  I think that we should discuss in the office if we should change medical therapy or if he would benefit from a surgical evaluation.  He has follow-up scheduled in February.  Please let him know that we should plan to discuss in the office and we should look if his appointment can be moved any sooner in order to move things along.

## 2018-12-20 NOTE — PROGRESS NOTES
Nurse called and updated patient on results and recommendations from Dr. Pelayo. He rescheduled his appt to Jan 15th.    Shanice Razo RN, BSN, PHN  [facility]  GI/Gen Surg/Hepatology Care Coordinator

## 2018-12-29 DIAGNOSIS — F41.1 GENERALIZED ANXIETY DISORDER: ICD-10-CM

## 2018-12-31 RX ORDER — CLONAZEPAM 0.5 MG/1
0.25-0.5 TABLET ORAL 3 TIMES DAILY PRN
Qty: 90 TABLET | Refills: 0 | Status: SHIPPED | OUTPATIENT
Start: 2018-12-31 | End: 2019-01-22

## 2018-12-31 NOTE — TELEPHONE ENCOUNTER
Clonazepam  Last Written Prescription Date:  11/29/2018  Last Fill Quantity: 90,  # refills: 0   Last office visit: 9/21/2018 with prescribing provider:  Schoen   Future Office Visit:   Next 5 appointments (look out 90 days)    Constantin 15, 2019  1:00 PM CST  Return Visit with Devin Pelayo MD  [facility] [address] [place] [id number] [phone]      Routing refill request to provider for review/approval because:  Drug not on the [facility] refill protocol     Ailyn Tobias RN

## 2019-01-15 ENCOUNTER — OFFICE VISIT (OUTPATIENT)
Dept: GASTROENTEROLOGY | Facility: CLINIC | Age: 54
End: 2019-01-15
Payer: COMMERCIAL

## 2019-01-15 VITALS
SYSTOLIC BLOOD PRESSURE: 93 MMHG | OXYGEN SATURATION: 95 % | BODY MASS INDEX: 28.31 KG/M2 | DIASTOLIC BLOOD PRESSURE: 61 MMHG | WEIGHT: 180.4 LBS | HEIGHT: 67 IN | HEART RATE: 93 BPM

## 2019-01-15 DIAGNOSIS — Z86.0100 HISTORY OF COLONIC POLYPS: ICD-10-CM

## 2019-01-15 DIAGNOSIS — K21.9 GASTROESOPHAGEAL REFLUX DISEASE WITHOUT ESOPHAGITIS: Primary | ICD-10-CM

## 2019-01-15 DIAGNOSIS — K22.70 BARRETT'S ESOPHAGUS DETERMINED BY BIOPSY: ICD-10-CM

## 2019-01-15 PROCEDURE — 99214 OFFICE O/P EST MOD 30 MIN: CPT | Performed by: INTERNAL MEDICINE

## 2019-01-15 ASSESSMENT — PAIN SCALES - GENERAL: PAINLEVEL: MILD PAIN (3)

## 2019-01-15 ASSESSMENT — MIFFLIN-ST. JEOR: SCORE: 1631.92

## 2019-01-15 NOTE — NURSING NOTE
"Joselito Abarca's goals for this visit include:   Chief Complaint   Patient presents with     NAZANIN [place]       He requests these members of his care team be copied on today's visit information: yes    PCP: Schoen, Gregory G    Referring Provider:  No referring provider defined for this encounter.    BP 93/61   Pulse 93   Ht 1.702 m (5' 7\")   Wt 81.8 kg (180 lb 6.4 oz)   SpO2 95%   BMI 28.25 kg/m      Do you need any medication refills at today's visit? no    "

## 2019-01-15 NOTE — PATIENT INSTRUCTIONS
Schedule surgery evaluation with Dr. Hernandez to discuss possible Nissen fundoplication.    Ronald Hernandez MD  [place]  [phone]    Follow up in the office in 4-6 months (after you have completed surgery evaluation/treatment).   Then we will schedule EGD and colonoscopy.  We can use Suprep or Miralax prep with the next time.    Continue omeprazole twice daily.

## 2019-01-15 NOTE — PROGRESS NOTES
GASTROENTEROLOGY FOLLOW UP CLINIC VISIT    CC/REFERRING MD:    Schoen, Gregory G    REASON FOR CONSULTATION:   No ref. 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  He was originally evaluated in the office August 2018 and last seen by me November 2018.  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  EGD was performed September 2018 and he was noted to have long segment Olson esophagus C7 M2 without dysplasia.  At the time of his last visit, we recommended that he get 24-hour esophageal pH testing on twice daily PPI as well as esophageal manometry.  The pH study did show remarkable esophageal acid exposure in both upright and supine positions.  Manometry did show a low LES pressure, hypotensive UES and normal pressures with decreased latency on swallows.  The patient follows up today for further recommendations.  He continues to take high-dose omeprazole twice daily.  He still gets daily symptoms of reflux.  He notes that he is sleeping on a wedge pillow and this is helped him to have less breathing issues.  He is not on oxygen.  He does use nebulizers frequently.  He is not had any ED visits or hospitalizations recently related to his pulmonary issues.  He is having regular bowel movements.  He does continue to take chronic narcotics with both methadone and oxycodone.    PERTINENT PAST MEDICAL HISTORY:    Past Medical History:   Diagnosis Date     Allergic rhinitis      Anxiety      GERD (gastroesophageal reflux disease)      Neck pain 6/15/2011     Pneumonia      Uncomplicated asthma        PREVIOUS SURGERIES:   Past Surgical History:   Procedure Laterality Date     BRONCHOSCOPY (RIGID OR FLEXIBLE), DIAGNOSTIC N/A  8/7/2018    Procedure: COMBINED BRONCHOSCOPY (RIGID OR FLEXIBLE), LAVAGE;  Bronchoscopy with Lavage and Transbronchial Biopsy;  Surgeon: Yung Miranda MD;  Location: [facility]     COLONOSCOPY WITH CO2 INSUFFLATION N/A 9/24/2018    Procedure: COLONOSCOPY WITH CO2 INSUFFLATION;  Colon and upper endoscopy ;  Surgeon: Devin Pelayo MD;  Location: [facility]     COMBINED ESOPHAGOSCOPY, GASTROSCOPY, DUODENOSCOPY (EGD) WITH CO2 INSUFFLATION N/A 9/24/2018    Procedure: COMBINED ESOPHAGOSCOPY, GASTROSCOPY, DUODENOSCOPY (EGD) WITH CO2 INSUFFLATION;  Colon and upper endoscopy ;  Surgeon: Devin Pelayo MD;  Location: [facility]     DISCECTOMY LUMBAR POSTERIOR MICROSCOPIC ONE LEVEL  2/21/2012    Procedure:DISCECTOMY LUMBAR POSTERIOR MICROSCOPIC ONE LEVEL; LEFT T1-T2 THORASIC HEMILAMINECTOMY MICRODISCECTOMY WITH MEXTRIX II ; Surgeon:SHARON PURI; Location:[facility]     DISCECTOMY, FUSION CERVICAL ANTERIOR ONE LEVEL, COMBINED N/A 11/29/2017    Procedure: COMBINED DISCECTOMY, FUSION CERVICAL ANTERIOR ONE LEVEL;  Anterior Cervical Discectomy and Fusion Cervical Six - Cervical Seven, Exploration and Revision Cervical Four - Cervical Six with Hardware Removal;  Surgeon: Nikolas Vasques MD;  Location: [facility]     ESOPHAGEAL IMPEDENCE FUNCTION TEST WITH 24 HOUR PH GREATER THAN 1 HOUR N/A 12/12/2018    Procedure: ESOPHAGEAL IMPEDENCE FUNCTION TEST WITH 24 HOUR PH GREATER THAN 1 HOUR;  Surgeon: Atif Oconnor MD;  Location: [facility]     ESOPHAGOSCOPY, GASTROSCOPY, DUODENOSCOPY (EGD), COMBINED N/A 9/24/2018    Procedure: COMBINED ESOPHAGOSCOPY, GASTROSCOPY, DUODENOSCOPY (EGD), BIOPSY SINGLE OR MULTIPLE;;  Surgeon: Devin Pelayo MD;  Location: [facility]     EXPLORE SPINE, REMOVE HARDWARE, COMBINED N/A 11/29/2017    Procedure: COMBINED EXPLORE SPINE, REMOVE HARDWARE;;  Surgeon: Nikolas Vasques MD;  Location: [facility]     FUSION CERVICAL ANTERIOR TWO LEVELS  1/29/2010     HC DRAIN/INJ MAJOR  JOINT/BURSA W/O US  5/5/2008    Left sacroiliac joint injection.     HC INJ EPIDURAL LUMBAR/SACRAL W/WO CONTRAST  2009     HEAD & NECK SURGERY      2013     HERNIA REPAIR       INJECT BLOCK MEDIAL BRANCH CERVICAL/THORACIC/LUMBAR Bilateral 8/26/2015    Procedure: INJECT BLOCK MEDIAL BRANCH CERVICAL / THORACIC / LUMBAR;  Surgeon: Ronald Driscoll MD;  Location: [facility]     INJECT FACET JOINT Bilateral 5/27/2015    Procedure: INJECT FACET JOINT;  Surgeon: Ronald Driscoll MD;  Location: [facility]     NERVE BLOCK OCCIPITAL Bilateral 7/12/2018    Procedure: NERVE BLOCK OCCIPITAL;  bilateral occipital nerve blocks;  Surgeon: Ronald Driscoll MD;  Location: [facility]       ALLERGIES:     Allergies   Allergen Reactions     Vicodin [Hydrocodone-Acetaminophen] Nausea     Other reaction(s): Diaphoresis, Vomiting  HEADACHE       PERTINENT MEDICATIONS:    Current Outpatient Medications:      Cholecalciferol (VITAMIN D) 2000 units tablet, TAKE ONE TABLET BY MOUTH EVERY DAY, Disp: 100 tablet, Rfl: 3     citalopram (CELEXA) 20 MG tablet, Take 1 tablet (20 mg) by mouth daily, Disp: 90 tablet, Rfl: 3     clonazePAM (KLONOPIN) 0.5 MG tablet, Take 0.5-1 tablets (0.25-0.5 mg) by mouth 3 times daily as needed for anxiety, Disp: 90 tablet, Rfl: 0     FLOVENT  MCG/ACT Inhaler, INHALE 2 PUFFS INTO THE LUNGS TWICE DAILY, Disp: 36 g, Rfl: 2     fluticasone (FLONASE) 50 MCG/ACT spray, SPRAY 2 SPRAYS IN EACH NOSTRIL EVERY DAY, Disp: 16 g, Rfl: 5     gabapentin (NEURONTIN) 300 MG capsule, TAKE TWO CAPSULES BY MOUTH THREE TIMES A DAY, Disp: 270 capsule, Rfl: 11     ipratropium - albuterol 0.5 mg/2.5 mg/3 mL (DUONEB) 0.5-2.5 (3) MG/3ML neb solution, Take 1 vial (3 mLs) by nebulization every 4 hours as needed for shortness of breath / dyspnea, Disp: 30 vial, Rfl: 0     methadone (DOLOPHINE) 10 MG tablet, Take 1 tablet (10 mg) by mouth every 8 hours as needed, Disp: 90 tablet, Rfl: 0     omeprazole (PRILOSEC) 40 MG capsule, Take 1 capsule (40 mg) by mouth  2 times daily (before meals), Disp: 60 capsule, Rfl: 5     oxyCODONE (OXY-IR) 5 MG capsule, Take 2 capsules (10 mg) by mouth every 4 hours as needed 2 caps q 4 hour prn pain up to 12 per day May fill 5/24/2012, Disp: 360 capsule, Rfl: 0     ranitidine (ZANTAC) 150 MG tablet, TAKE ONE TABLET BY MOUTH EVERY MORNING, Disp: 30 tablet, Rfl: 10     sildenafil (VIAGRA) 100 MG tablet, Take 1 tablet (100 mg) by mouth daily as needed 30 min to 4 hrs before sex. Do not use with nitroglycerin, terazosin or doxazosin., Disp: 4 tablet, Rfl: 0     traZODone (DESYREL) 100 MG tablet, Take 3 tablets (300 mg) by mouth At Bedtime, Disp: 90 tablet, Rfl: 3     VENTOLIN  (90 Base) MCG/ACT inhaler, INHALE 2 PUFFS INTO THE LUNGS EVERY 6 HOURS AS NEEDED FOR SHORTNESS OF BREATH, DIFFICULTY BREATHING OR WHEEZING., Disp: 18 g, Rfl: 3     zolpidem (AMBIEN) 10 MG tablet, Take 10 mg by mouth nightly as needed , Disp: , Rfl:      order for DME, Equipment being ordered: Nebulizer mask and tubing (Patient not taking: Reported on 1/15/2019), Disp: 1 each, Rfl: 1     predniSONE (DELTASONE) 20 MG tablet, Take two tablets (= 40mg) each day for 5 (five) days (Patient not taking: Reported on 1/15/2019.), Disp: 10 tablet, Rfl: 0    FAMILY HISTORY:   Family History   Problem Relation Age of Onset     Family History Negative No family hx of      C.A.D. No family hx of           ROS:    No fevers or chills  No weight loss  No blurry vision, double vision or change in vision  No sore throat  No lymphadenopathy  No headache, paraesthesias, or weakness in a limb  No shortness of breath or wheezing  No chest pain or pressure  No arthralgias or myalgias  No rashes or skin changes  No odynophagia or dysphagia  No BRBPR, hematochezia, melena  No dysuria, frequency or urgency  No hot/cold intolerance or polyria  No anxiety or depression  PHYSICAL EXAMINATION:  Constitutional: aaox3, cooperative, pleasant, not dyspneic/diaphoretic, no acute distress  Vitals  "reviewed: BP 93/61   Pulse 93   Ht 1.702 m (5' 7\")   Wt 81.8 kg (180 lb 6.4 oz)   SpO2 95%   BMI 28.25 kg/m    Wt:   Wt Readings from Last 2 Encounters:   01/15/19 81.8 kg (180 lb 6.4 oz)   11/29/18 77.1 kg (170 lb)      Eyes: Sclera anicteric/injected  Ears/nose/mouth/throat: Normal oropharynx without ulcers or exudate, mucus membranes moist, hearing intact  Neck: supple, thyroid normal size  CV: No edema  Respiratory: Unlabored breathing  Lymph: No submandibular, supraclavicular or inguinal lymphadenopathy  Abd:  Nondistended, no masses, +bs, no hepatosplenomegaly, nontender, no peritoneal signs  Skin: warm, perfused, no jaundice  Psych: Normal affect  MSK: Normal gait      PERTINENT STUDIES:   Most recent CBC:  Recent Labs   Lab Test 07/27/18  1448 07/10/18  1347   WBC 11.4* 14.4*   HGB 13.6 13.7   HCT 42.6 42.2    199     Most recent hepatic panel:  Recent Labs   Lab Test 05/15/18  2030 01/06/17  1736   ALT 28 26   AST 24 25     Most recent creatinine:  Recent Labs   Lab Test 07/10/18  1347 05/15/18  2030   CR 1.00 1.03       ASSESSMENT/PLAN:    Joselito Abarca is a 53 year old male who presents for follow up of chronic GERD with complication of long segment Olson esophagus as well as 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    GERD with complications: We did discuss his chronic GERD in detail today.  He has a large amount of reflux despite high-dose PPI twice per day.  This is on objective pH testing.  In addition, his manometry shows a hypotensive lower esophageal sphincter and upper esophageal sphincter and I think it is likely that his reflux is contributing to his pulmonary issues.  I think that he should consider surgical options such as a Nissen fundoplication or LINX.  We will refer him to Dr. Hernandez for further evaluation to consider the surgical options.    Long segment Olson esophagus without dysplasia: He has recent diagnosis of long segment Olson esophagus, no dysplasia on " [place] protocol biopsies.  Given the increased risk of  dysplasia and carcinoma with long segment Olson esophagus, I do think it is reasonable to do a follow-up EGD within 1 year for additional biopsies in order to confirm that there is no dysplasia present.  This can be done after his surgical evaluation and/or therapy is complete as that takes priority at this point.    Colorectal cancer screening: Colonoscopy was completed September 2018 with one polyp noted, however the prep was inadequate due to retained food.  We will plan for him to do a colonoscopy with an extended prep along with his follow-up EGD sometime over the summer after his surgical evaluation and treatment for his chronic GERD is completed.  These endoscopic procedures should be done with monitored anesthesia care due to his chronic narcotic use.    RTC 4 months.    Thank you for this consultation.  It was a pleasure to participate in the care of this patient; please contact us with any further questions.      This note was created with voice recognition software, and while reviewed for accuracy, typos may remain.     Devin Pelayo MD  Adjunct  of Medicine  Division of Gastroenterology, Hepatology and Nutrition  [facility]  [phone]

## 2019-01-16 ENCOUNTER — OFFICE VISIT (OUTPATIENT)
Dept: SURGERY | Facility: CLINIC | Age: 54
End: 2019-01-16
Payer: COMMERCIAL

## 2019-01-16 ENCOUNTER — TELEPHONE (OUTPATIENT)
Dept: SURGERY | Facility: CLINIC | Age: 54
End: 2019-01-16

## 2019-01-16 VITALS
HEART RATE: 72 BPM | DIASTOLIC BLOOD PRESSURE: 72 MMHG | SYSTOLIC BLOOD PRESSURE: 134 MMHG | HEIGHT: 67 IN | BODY MASS INDEX: 27.47 KG/M2 | WEIGHT: 175 LBS

## 2019-01-16 DIAGNOSIS — K21.9 GASTROESOPHAGEAL REFLUX DISEASE WITHOUT ESOPHAGITIS: Primary | ICD-10-CM

## 2019-01-16 DIAGNOSIS — K22.70 BARRETT'S ESOPHAGUS WITHOUT DYSPLASIA: ICD-10-CM

## 2019-01-16 DIAGNOSIS — K44.9 HIATAL HERNIA: ICD-10-CM

## 2019-01-16 PROCEDURE — 99204 OFFICE O/P NEW MOD 45 MIN: CPT | Performed by: SURGERY

## 2019-01-16 ASSESSMENT — MIFFLIN-ST. JEOR: SCORE: 1607.42

## 2019-01-16 NOTE — LETTER
1/16/2019         RE: Joselito Abarca  [address]  [facility] [place] [facility] [id number]        Dear Colleague,    Thank you for referring your patient, Joselito Abarca, to the [facility]. Please see a copy of my visit note below.        Patient seen in consultation for GERD by Dr Pelayo    HPI:  Patient is a 53 year old male  with complaints of long history of reflux  The patient noticed the symptoms about 25 years ago.    Regurgitation of stomach acid when he wakes up in the morning  Has been using a wedge to elevate head when sleeping which helps keep the acid down. Has been using for a few months now  Thought that the day the pH was done was the best day hes had in awhile in terms of the acid  Uses tums throughout the day as well as BID omeprazole and daily zantac  Diet definitely effects the refux symptoms- tomato based foods (ketchup, spaghetti sauce) and spicy foods as well  Can get acid regurgitation up into mouth simply by laying down  Having lung issues- numerous pneumonias- may be due to reflux aspirations  Found out home had mold so had moved to different apartment which seemed to help a bit but only temporarily  Patient has family history of reflux problems in father and brother. No esophageal cancer in family    Review Of Systems    Skin: negative  Ears/Nose/Throat: negative  Respiratory: as above  Cardiovascular: negative  Gastrointestinal: as above, also some generalized abdominal pains off and on  Genitourinary: negative  Musculoskeletal: back pain and neck pain. Legs get sore after walking  Neurologic: negative  Hematologic/Lymphatic/Immunologic: negative  Endocrine: negative      Past Medical History:   Diagnosis Date     Allergic rhinitis      Anxiety      GERD (gastroesophageal reflux disease)      Neck pain 6/15/2011     Pneumonia      Uncomplicated asthma      Patient Active Problem List   Diagnosis     Generalized anxiety disorder     Alcohol abuse, in remission      Esophageal reflux     Constipation     Thoracic or lumbosacral neuritis or radiculitis, unspecified     Intervertebral cervical disc disorder with myelopathy, cervical region     CARDIOVASCULAR SCREENING; LDL GOAL LESS THAN 160     Arthrodesis status     Neck pain     Health Care Home     Degeneration of cervical intervertebral disc     Disease of bronchial airway (HCC)     Leukocytosis     Cough     Nausea with vomiting     Acute respiratory failure with hypoxia (H)     Moderate persistent asthma without complication     Chronic pain syndrome     Status post cervical spinal arthrodesis     Chronic seasonal allergic rhinitis due to fungal spores     Gastroesophageal reflux disease, esophagitis presence not specified         Past Surgical History:   Procedure Laterality Date     BRONCHOSCOPY (RIGID OR FLEXIBLE), DIAGNOSTIC N/A 8/7/2018    Procedure: COMBINED BRONCHOSCOPY (RIGID OR FLEXIBLE), LAVAGE;  Bronchoscopy with Lavage and Transbronchial Biopsy;  Surgeon: Yung Miranda MD;  Location: [facility]     COLONOSCOPY WITH CO2 INSUFFLATION N/A 9/24/2018    Procedure: COLONOSCOPY WITH CO2 INSUFFLATION;  Colon and upper endoscopy ;  Surgeon: Devin Pelayo MD;  Location: [facility]     COMBINED ESOPHAGOSCOPY, GASTROSCOPY, DUODENOSCOPY (EGD) WITH CO2 INSUFFLATION N/A 9/24/2018    Procedure: COMBINED ESOPHAGOSCOPY, GASTROSCOPY, DUODENOSCOPY (EGD) WITH CO2 INSUFFLATION;  Colon and upper endoscopy ;  Surgeon: Devin Pelayo MD;  Location: [facility]     DISCECTOMY LUMBAR POSTERIOR MICROSCOPIC ONE LEVEL  2/21/2012    Procedure:DISCECTOMY LUMBAR POSTERIOR MICROSCOPIC ONE LEVEL; LEFT T1-T2 THORASIC HEMILAMINECTOMY MICRODISCECTOMY WITH MEXTRIX II ; Surgeon:SHARON PURI; Location:[facility]     DISCECTOMY, FUSION CERVICAL ANTERIOR ONE LEVEL, COMBINED N/A 11/29/2017    Procedure: COMBINED DISCECTOMY, FUSION CERVICAL ANTERIOR ONE LEVEL;  Anterior Cervical Discectomy and Fusion Cervical Six - Cervical Seven,  Exploration and Revision Cervical Four - Cervical Six with Hardware Removal;  Surgeon: Nikolas Vasques MD;  Location: [facility]     ESOPHAGEAL IMPEDENCE FUNCTION TEST WITH 24 HOUR PH GREATER THAN 1 HOUR N/A 12/12/2018    Procedure: ESOPHAGEAL IMPEDENCE FUNCTION TEST WITH 24 HOUR PH GREATER THAN 1 HOUR;  Surgeon: Atif Oconnor MD;  Location: [facility]     ESOPHAGOSCOPY, GASTROSCOPY, DUODENOSCOPY (EGD), COMBINED N/A 9/24/2018    Procedure: COMBINED ESOPHAGOSCOPY, GASTROSCOPY, DUODENOSCOPY (EGD), BIOPSY SINGLE OR MULTIPLE;;  Surgeon: Deivn Pelayo MD;  Location: [facility]     EXPLORE SPINE, REMOVE HARDWARE, COMBINED N/A 11/29/2017    Procedure: COMBINED EXPLORE SPINE, REMOVE HARDWARE;;  Surgeon: Nikolas Vasques MD;  [facility]: [facility]     FUSION CERVICAL ANTERIOR TWO LEVELS  1/29/2010     HC DRAIN/INJ MAJOR JOINT/BURSA W/O US  5/5/2008    Left sacroiliac joint injection.     HC INJ EPIDURAL LUMBAR/SACRAL W/WO CONTRAST  2009     HEAD & NECK SURGERY      2013     HERNIA REPAIR       INJECT BLOCK MEDIAL BRANCH CERVICAL/THORACIC/LUMBAR Bilateral 8/26/2015    Procedure: INJECT BLOCK MEDIAL BRANCH CERVICAL / THORACIC / LUMBAR;  Surgeon: Ronald Driscoll MD;  [facility]     INJECT FACET JOINT Bilateral 5/27/2015    Procedure: INJECT FACET JOINT;  Surgeon: Ronald Driscoll MD;  [facility]     NERVE BLOCK OCCIPITAL Bilateral 7/12/2018    Procedure: NERVE BLOCK OCCIPITAL;  bilateral occipital nerve blocks;  Surgeon: Ronald Driscoll MD;  Location: [facility]   Right inguinal hernia repair    Social History     Socioeconomic History     Marital status: Single     Spouse name: Not on file     Number of children: Not on file     Years of education: Not on file     Highest education level: Not on file   Social Needs     Financial resource strain: Not on file     Food insecurity - worry: Not on file     Food insecurity - inability: Not on file     Transportation needs - medical: Not on file     Transportation needs -  non-medical: Not on file   Occupational History     Occupation: umemployed   Tobacco Use     Smoking status: Former Smoker     Packs/day: 1.00     Years: 30.00     Pack years: 30.00     Types: Cigars     Last attempt to quit: 2009     Years since quittin.0     Smokeless tobacco: Former User     Quit date:    Substance and Sexual Activity     Alcohol use: No     Alcohol/week: 0.0 oz     Comment: HX OF ABUSE-IN REMISSION     Drug use: No     Sexual activity: Yes     Partners: Female   Other Topics Concern     Parent/sibling w/ CABG, MI or angioplasty before 65F 55M? Not Asked   Social History Narrative    Used to work in a machine shop.       Current Outpatient Medications   Medication Sig Dispense Refill     Cholecalciferol (VITAMIN D) 2000 units tablet TAKE ONE TABLET BY MOUTH EVERY  tablet 3     citalopram (CELEXA) 20 MG tablet Take 1 tablet (20 mg) by mouth daily 90 tablet 3     clonazePAM (KLONOPIN) 0.5 MG tablet Take 0.5-1 tablets (0.25-0.5 mg) by mouth 3 times daily as needed for anxiety 90 tablet 0     FLOVENT  MCG/ACT Inhaler INHALE 2 PUFFS INTO THE LUNGS TWICE DAILY 36 g 2     fluticasone (FLONASE) 50 MCG/ACT spray SPRAY 2 SPRAYS IN EACH NOSTRIL EVERY DAY 16 g 5     gabapentin (NEURONTIN) 300 MG capsule TAKE TWO CAPSULES BY MOUTH THREE TIMES A  capsule 11     ipratropium - albuterol 0.5 mg/2.5 mg/3 mL (DUONEB) 0.5-2.5 (3) MG/3ML neb solution Take 1 vial (3 mLs) by nebulization every 4 hours as needed for shortness of breath / dyspnea 30 vial 0     methadone (DOLOPHINE) 10 MG tablet Take 1 tablet (10 mg) by mouth every 8 hours as needed 90 tablet 0     omeprazole (PRILOSEC) 40 MG capsule Take 1 capsule (40 mg) by mouth 2 times daily (before meals) 60 capsule 5     order for DME Equipment being ordered: Nebulizer mask and tubing (Patient not taking: Reported on 1/15/2019) 1 each 1     oxyCODONE (OXY-IR) 5 MG capsule Take 2 capsules (10 mg) by mouth every 4 hours as needed 2  "caps q 4 hour prn pain up to 12 per day May fill 5/24/2012 360 capsule 0     predniSONE (DELTASONE) 20 MG tablet Take two tablets (= 40mg) each day for 5 (five) days (Patient not taking: Reported on 1/15/2019.) 10 tablet 0     ranitidine (ZANTAC) 150 MG tablet TAKE ONE TABLET BY MOUTH EVERY MORNING 30 tablet 10     sildenafil (VIAGRA) 100 MG tablet Take 1 tablet (100 mg) by mouth daily as needed 30 min to 4 hrs before sex. Do not use with nitroglycerin, terazosin or doxazosin. 4 tablet 0     traZODone (DESYREL) 100 MG tablet Take 3 tablets (300 mg) by mouth At Bedtime 90 tablet 3     VENTOLIN  (90 Base) MCG/ACT inhaler INHALE 2 PUFFS INTO THE LUNGS EVERY 6 HOURS AS NEEDED FOR SHORTNESS OF BREATH, DIFFICULTY BREATHING OR WHEEZING. 18 g 3     zolpidem (AMBIEN) 10 MG tablet Take 10 mg by mouth nightly as needed          Medications and history reviewed    Physical exam:  Vitals: /72   Pulse 72   Ht 1.702 m (5' 7\")   Wt 79.4 kg (175 lb)   BMI 27.41 kg/m     BMI= Body mass index is 27.41 kg/m .    Constitutional: healthy, alert and no distress  Head: Normocephalic. No masses, lesions, tenderness or abnormalities  Cardiovascular: negative, PMI normal. No lifts, heaves, or thrills. RRR. No murmurs, clicks gallops or rub  Respiratory: negative, Percussion normal. Good diaphragmatic excursion. Lungs clear  Gastrointestinal: Abdomen soft,mildly tender diffusely. BS normal. No masses, organomegaly. Scar in RLQ  : Deferred  Musculoskeletal: extremities normal- no gross deformities noted, gait normal and normal muscle tone  Skin: no suspicious lesions or rashes  Psychiatric: mentation appears normal and affect normal/bright  Hematologic/Lymphatic/Immunologic: Normal cervical lymph nodes  Patient able to get up on table without difficulty.    Labs show:    EGD with Dr Pelayo  Findings:        Diffuse salmon-colored mucosa was present from 32 to 39 cm. Tongues were        present from 30-32cm. Squamous islands " were present from 29 to 30 cm.        Overall, possible Olson esophagus [place] C7,M2 with presence of        proximal island. Mucosa was biopsied with a cold forceps for histology        in a targeted manner and in 4 quadrants at intervals of 2 cm from 39cm        to 29cm. A total of 5 specimen bottles were sent to pathology.        The entire examined stomach was normal.        The cardia and gastric fundus were normal on retroflexion.        The duodenal bulb, first portion of the duodenum and second portion of        the duodenum were normal.   FINAL DIAGNOSIS:   A. ESOPHAGEAL BIOPSY, 37 39 CM:   - Metaplastic columnar epithelium with Paneth and goblet cells (complete   intestinal metaplasia), consistent   with Olson's esophagus   - Negative for dysplasia   - Esophageal mucosa with reflux changes     B. ESOPHAGEAL BIOPSY, 35 37 CM:   - Metaplastic columnar epithelium with Paneth and goblet cells (complete   intestinal metaplasia), consistent   with Olson's esophagus   - Negative for dysplasia   - Esophageal mucosa with reflux changes     C. ESOPHAGEAL BIOPSY, 33 35 CM:   - Metaplastic columnar epithelium with Paneth and goblet cells (complete   intestinal metaplasia), consistent   with Olson's esophagus   - Negative for dysplasia   - Esophageal mucosa with reflux changes     D. ESOPHAGEAL BIOPSY, 31 33 CM:   - Metaplastic columnar epithelium with Paneth and goblet cells (complete   intestinal metaplasia), consistent   with Olson's esophagus   - Negative for dysplasia   - Esophageal mucosa with reflux changes     E. ESOPHAGEAL BIOPSY, 29 31 CM:   - Metaplastic columnar epithelium with rare goblet cells consistent with   Olson's esophagus   - Negative for dysplasia   - Unremarkable esophageal squamous mucosa     F. COLON POLYP, ASCENDING, POLYPECTOMY:   - Tubular adenoma   - Negative for high-grade dysplasia      Imaging shows:  CT CHEST WITHOUT CONTRAST  8/3/2018 2:29 PM     HISTORY: Pulmonary nodules.  History of gastroesophageal reflux  disease, hernia repair, and discectomy.     TECHNIQUE:  Scans obtained from the apices through the diaphragm  without IV contrast.  Radiation dose for this scan was reduced using automated exposure  control, adjustment of the mA and/or kV according to patient size, or  iterative reconstruction technique.     COMPARISON:  Chest x-rays dated 7/15/2018, CT chest dated 4/9/2018 and  4/29/2016.     FINDINGS: Micronodular groundglass appearance of the right lung  including the upper lobe, middle lobe and lower lobe most  predominantly in the upper lobe and middle lobe are again noted. This  has significantly improved since the prior study. The nodules in the  left lung have mostly resolved. There is a nodular densities measuring  up to 0.5 cm along the posterior right hemithorax medially which could  represent pleural-based nodule which does not appear to be associated  with same process. Micronodules are less than 0.3 cm each. No  pneumothorax or significant pleural fluid collection. No subpleural  banding or traction bronchiectasis is seen. No definite fibrosis is  identified.     The heart is normal in size. No mediastinal, hilar, or axillary  lymphadenopathy is identified. There are coronary artery  calcifications. Some aortic calcifications are seen in the upper  abdominal aorta.     Incidental note of cholelithiasis with a gallstone measuring up to 1.3  cm in diameter. Visualized portions of the upper abdominal contents  are otherwise unremarkable.     No aggressive osseous lesions are seen. There is a small hiatal  hernia.                                                                      IMPRESSION:  1. Groundglass micronodular appearance of the right lung likely  represents an atypical infectious infiltrate. Given the patient's  history of gastroesophageal reflux disease this could represent  aspiration although this is more diffuse than expected for aspiration  and there is  no evidence for the same type of process in the left  lung. Hypersensitivity pneumonitis is considered less likely because  the right lung is minimally affected. Other atypical infections such  as mycobacterium avium intracellulare is considered a possibility. The  findings in lungs have significantly improved since the prior study  dated 4/9/2018 and 4/29/2016.  2. No evidence for pulmonary fibrosis is identified.  3. Small hiatal hernia.    24 hr pH  Demeester score 201 !  Based on tracings possible that catheter had slipped into stomach, in discussion with patient this was a time period where he was laying down sleeping (sleeps odd hours)    Manometry  Hypotensive, normally relaxing LES  Spastic esophagus (80% premature contractions)    Full reports scanned in system    Assessment:     ICD-10-CM    1. Gastroesophageal reflux disease without esophagitis K21.9 XR Esophagram w Upper GI     Judith-Operative Worksheet   2. Hiatal hernia K44.9 XR Esophagram w Upper GI     Judith-Operative Worksheet   3. 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 Long segment Olson's, no dysplasia on recent biopsies. Possible pulmonary complications from reflux related aspirations. Small, maybe few cm hiatal hernia based on previous imaging I can see. I will check esophagram to get a better look at that anatomy.  I recommended surgery for his reflux issues. Would plan on a toupet/partial wrap given manometry findings.  Described procedure details and overnight stay, post operative dietary restrictions as well as difficulty with belching and vomiting afterwards, possibility of increased flatulence. Risks of bleeding, infection, conversion to open, dysphagia, recurrence of hiatal hernia or reflux symptoms, injury to intra-abdominal or intra-thoracic organs discussed and questions answered.  Will work on scheduling the procedure  With chronic pain  issues/methadone and chronic prednisone use would plan on inpatient stay (at least 2 nights) post op.    Time spent with patient including review of previous testing 45 min >50% discussion of above surgical plan    Ronald Hernandez MD  Again, thank you for allowing me to participate in the care of your patient.        Sincerely,        Ronald Hernandez MD

## 2019-01-16 NOTE — PROGRESS NOTES
Patient seen in consultation for GERD by Dr Pelayo    HPI:  Patient is a 53 year old male  with complaints of long history of reflux  The patient noticed the symptoms about 25 years ago.    Regurgitation of stomach acid when he wakes up in the morning  Has been using a wedge to elevate head when sleeping which helps keep the acid down. Has been using for a few months now  Thought that the day the pH was done was the best day hes had in awhile in terms of the acid  Uses tums throughout the day as well as BID omeprazole and daily zantac  Diet definitely effects the refux symptoms- tomato based foods (ketchup, spaghetti sauce) and spicy foods as well  Can get acid regurgitation up into mouth simply by laying down  Having lung issues- numerous pneumonias- may be due to reflux aspirations  Found out home had mold so had moved to different apartment which seemed to help a bit but only temporarily  Patient has family history of reflux problems in father and brother. No esophageal cancer in family    Review Of Systems    Skin: negative  Ears/Nose/Throat: negative  Respiratory: as above  Cardiovascular: negative  Gastrointestinal: as above, also some generalized abdominal pains off and on  Genitourinary: negative  Musculoskeletal: back pain and neck pain. Legs get sore after walking  Neurologic: negative  Hematologic/Lymphatic/Immunologic: negative  Endocrine: negative      Past Medical History:   Diagnosis Date     Allergic rhinitis      Anxiety      GERD (gastroesophageal reflux disease)      Neck pain 6/15/2011     Pneumonia      Uncomplicated asthma      Patient Active Problem List   Diagnosis     Generalized anxiety disorder     Alcohol abuse, in remission     Esophageal reflux     Constipation     Thoracic or lumbosacral neuritis or radiculitis, unspecified     Intervertebral cervical disc disorder with myelopathy, cervical region     CARDIOVASCULAR SCREENING; LDL GOAL LESS THAN 160     Arthrodesis status     Neck  pain     Health Care Home     Degeneration of cervical intervertebral disc     Disease of bronchial airway (HCC)     Leukocytosis     Cough     Nausea with vomiting     Acute respiratory failure with hypoxia (H)     Moderate persistent asthma without complication     Chronic pain syndrome     Status post cervical spinal arthrodesis     Chronic seasonal allergic rhinitis due to fungal spores     Gastroesophageal reflux disease, esophagitis presence not specified         Past Surgical History:   Procedure Laterality Date     BRONCHOSCOPY (RIGID OR FLEXIBLE), DIAGNOSTIC N/A 8/7/2018    Procedure: COMBINED BRONCHOSCOPY (RIGID OR FLEXIBLE), LAVAGE;  Bronchoscopy with Lavage and Transbronchial Biopsy;  Surgeon: Yung Miranda MD;  Location: [facility]     COLONOSCOPY WITH CO2 INSUFFLATION N/A 9/24/2018    Procedure: COLONOSCOPY WITH CO2 INSUFFLATION;  Colon and upper endoscopy ;  Surgeon: Devin Pelayo MD;  Location: [facility]     COMBINED ESOPHAGOSCOPY, GASTROSCOPY, DUODENOSCOPY (EGD) WITH CO2 INSUFFLATION N/A 9/24/2018    Procedure: COMBINED ESOPHAGOSCOPY, GASTROSCOPY, DUODENOSCOPY (EGD) WITH CO2 INSUFFLATION;  Colon and upper endoscopy ;  Surgeon: Devin Pelayo MD;  Location: [facility]     DISCECTOMY LUMBAR POSTERIOR MICROSCOPIC ONE LEVEL  2/21/2012    Procedure:DISCECTOMY LUMBAR POSTERIOR MICROSCOPIC ONE LEVEL; LEFT T1-T2 THORASIC HEMILAMINECTOMY MICRODISCECTOMY WITH MEXTRIX II ; Surgeon:SHARON PURI; Location:[facility]     DISCECTOMY, FUSION CERVICAL ANTERIOR ONE LEVEL, COMBINED N/A 11/29/2017    Procedure: COMBINED DISCECTOMY, FUSION CERVICAL ANTERIOR ONE LEVEL;  Anterior Cervical Discectomy and Fusion Cervical Six - Cervical Seven, Exploration and Revision Cervical Four - Cervical Six with Hardware Removal;  Surgeon: Nikolas Vasques MD;  Location: [facility]     ESOPHAGEAL IMPEDENCE FUNCTION TEST WITH 24 HOUR PH GREATER THAN 1 HOUR N/A 12/12/2018    Procedure: ESOPHAGEAL IMPEDENCE  FUNCTION TEST WITH 24 HOUR PH GREATER THAN 1 HOUR;  Surgeon: Atif Oconnor MD;  Location: [facility]     ESOPHAGOSCOPY, GASTROSCOPY, DUODENOSCOPY (EGD), COMBINED N/A 9/24/2018    Procedure: COMBINED ESOPHAGOSCOPY, GASTROSCOPY, DUODENOSCOPY (EGD), BIOPSY SINGLE OR MULTIPLE;;  Surgeon: Devin Pelayo MD;  Location: [facility]     EXPLORE SPINE, REMOVE HARDWARE, COMBINED N/A 11/29/2017    Procedure: COMBINED EXPLORE SPINE, REMOVE HARDWARE;;  Surgeon: Nikolas Vasques MD;  [facility]     FUSION CERVICAL ANTERIOR TWO LEVELS  1/29/2010     HC DRAIN/INJ MAJOR JOINT/BURSA W/O US  5/5/2008    Left sacroiliac joint injection.     HC INJ EPIDURAL LUMBAR/SACRAL W/WO CONTRAST  2009     HEAD & NECK SURGERY      2013     HERNIA REPAIR       INJECT BLOCK MEDIAL BRANCH CERVICAL/THORACIC/LUMBAR Bilateral 8/26/2015    Procedure: INJECT BLOCK MEDIAL BRANCH CERVICAL / THORACIC / LUMBAR;  Surgeon: Ronald Driscoll MD;  [facility]     INJECT FACET JOINT Bilateral 5/27/2015    Procedure: INJECT FACET JOINT;  Surgeon: Ronald Driscoll MD;  [facility]     NERVE BLOCK OCCIPITAL Bilateral 7/12/2018    Procedure: NERVE BLOCK OCCIPITAL;  bilateral occipital nerve blocks;  Surgeon: Ronald Driscoll MD;  Location: [facility]   Right inguinal hernia repair    Social History     Socioeconomic History     Marital status: Single     Spouse name: Not on file     Number of children: Not on file     Years of education: Not on file     Highest education level: Not on file   Social Needs     Financial resource strain: Not on file     Food insecurity - worry: Not on file     Food insecurity - inability: Not on file     Transportation needs - medical: Not on file     Transportation needs - non-medical: Not on file   Occupational History     Occupation: umemployed   Tobacco Use     Smoking status: Former Smoker     Packs/day: 1.00     Years: 30.00     Pack years: 30.00     Types: Cigars     Last attempt to quit: 12/19/2009     Years since  quittin.0     Smokeless tobacco: Former User     Quit date:    Substance and Sexual Activity     Alcohol use: No     Alcohol/week: 0.0 oz     Comment: HX OF ABUSE-IN REMISSION     Drug use: No     Sexual activity: Yes     Partners: Female   Other Topics Concern     Parent/sibling w/ CABG, MI or angioplasty before 65F 55M? Not Asked   Social History Narrative    Used to work in a machine shop.       Current Outpatient Medications   Medication Sig Dispense Refill     Cholecalciferol (VITAMIN D) 2000 units tablet TAKE ONE TABLET BY MOUTH EVERY  tablet 3     citalopram (CELEXA) 20 MG tablet Take 1 tablet (20 mg) by mouth daily 90 tablet 3     clonazePAM (KLONOPIN) 0.5 MG tablet Take 0.5-1 tablets (0.25-0.5 mg) by mouth 3 times daily as needed for anxiety 90 tablet 0     FLOVENT  MCG/ACT Inhaler INHALE 2 PUFFS INTO THE LUNGS TWICE DAILY 36 g 2     fluticasone (FLONASE) 50 MCG/ACT spray SPRAY 2 SPRAYS IN EACH NOSTRIL EVERY DAY 16 g 5     gabapentin (NEURONTIN) 300 MG capsule TAKE TWO CAPSULES BY MOUTH THREE TIMES A  capsule 11     ipratropium - albuterol 0.5 mg/2.5 mg/3 mL (DUONEB) 0.5-2.5 (3) MG/3ML neb solution Take 1 vial (3 mLs) by nebulization every 4 hours as needed for shortness of breath / dyspnea 30 vial 0     methadone (DOLOPHINE) 10 MG tablet Take 1 tablet (10 mg) by mouth every 8 hours as needed 90 tablet 0     omeprazole (PRILOSEC) 40 MG capsule Take 1 capsule (40 mg) by mouth 2 times daily (before meals) 60 capsule 5     order for DME Equipment being ordered: Nebulizer mask and tubing (Patient not taking: Reported on 1/15/2019) 1 each 1     oxyCODONE (OXY-IR) 5 MG capsule Take 2 capsules (10 mg) by mouth every 4 hours as needed 2 caps q 4 hour prn pain up to 12 per day May fill 2012 360 capsule 0     predniSONE (DELTASONE) 20 MG tablet Take two tablets (= 40mg) each day for 5 (five) days (Patient not taking: Reported on 1/15/2019.) 10 tablet 0     ranitidine (ZANTAC) 150 MG  "tablet TAKE ONE TABLET BY MOUTH EVERY MORNING 30 tablet 10     sildenafil (VIAGRA) 100 MG tablet Take 1 tablet (100 mg) by mouth daily as needed 30 min to 4 hrs before sex. Do not use with nitroglycerin, terazosin or doxazosin. 4 tablet 0     traZODone (DESYREL) 100 MG tablet Take 3 tablets (300 mg) by mouth At Bedtime 90 tablet 3     VENTOLIN  (90 Base) MCG/ACT inhaler INHALE 2 PUFFS INTO THE LUNGS EVERY 6 HOURS AS NEEDED FOR SHORTNESS OF BREATH, DIFFICULTY BREATHING OR WHEEZING. 18 g 3     zolpidem (AMBIEN) 10 MG tablet Take 10 mg by mouth nightly as needed          Medications and history reviewed    Physical exam:  Vitals: /72   Pulse 72   Ht 1.702 m (5' 7\")   Wt 79.4 kg (175 lb)   BMI 27.41 kg/m    BMI= Body mass index is 27.41 kg/m .    Constitutional: healthy, alert and no distress  Head: Normocephalic. No masses, lesions, tenderness or abnormalities  Cardiovascular: negative, PMI normal. No lifts, heaves, or thrills. RRR. No murmurs, clicks gallops or rub  Respiratory: negative, Percussion normal. Good diaphragmatic excursion. Lungs clear  Gastrointestinal: Abdomen soft,mildly tender diffusely. BS normal. No masses, organomegaly. Scar in RLQ  : Deferred  Musculoskeletal: extremities normal- no gross deformities noted, gait normal and normal muscle tone  Skin: no suspicious lesions or rashes  Psychiatric: mentation appears normal and affect normal/bright  Hematologic/Lymphatic/Immunologic: Normal cervical lymph nodes  Patient able to get up on table without difficulty.    Labs show:    EGD with Dr Pelayo  Findings:        Diffuse salmon-colored mucosa was present from 32 to 39 cm. Tongues were        present from 30-32cm. Squamous islands were present from 29 to 30 cm.        Overall, possible Olson esophagus [place] C7,M2 with presence of        proximal island. Mucosa was biopsied with a cold forceps for histology        in a targeted manner and in 4 quadrants at intervals of 2 cm from " 39cm        to 29cm. A total of 5 specimen bottles were sent to pathology.        The entire examined stomach was normal.        The cardia and gastric fundus were normal on retroflexion.        The duodenal bulb, first portion of the duodenum and second portion of        the duodenum were normal.   FINAL DIAGNOSIS:   A. ESOPHAGEAL BIOPSY, 37 39 CM:   - Metaplastic columnar epithelium with Paneth and goblet cells (complete   intestinal metaplasia), consistent   with Olson's esophagus   - Negative for dysplasia   - Esophageal mucosa with reflux changes     B. ESOPHAGEAL BIOPSY, 35 37 CM:   - Metaplastic columnar epithelium with Paneth and goblet cells (complete   intestinal metaplasia), consistent   with Olson's esophagus   - Negative for dysplasia   - Esophageal mucosa with reflux changes     C. ESOPHAGEAL BIOPSY, 33 35 CM:   - Metaplastic columnar epithelium with Paneth and goblet cells (complete   intestinal metaplasia), consistent   with Olson's esophagus   - Negative for dysplasia   - Esophageal mucosa with reflux changes     D. ESOPHAGEAL BIOPSY, 31 33 CM:   - Metaplastic columnar epithelium with Paneth and goblet cells (complete   intestinal metaplasia), consistent   with Olson's esophagus   - Negative for dysplasia   - Esophageal mucosa with reflux changes     E. ESOPHAGEAL BIOPSY, 29 31 CM:   - Metaplastic columnar epithelium with rare goblet cells consistent with   Olson's esophagus   - Negative for dysplasia   - Unremarkable esophageal squamous mucosa     F. COLON POLYP, ASCENDING, POLYPECTOMY:   - Tubular adenoma   - Negative for high-grade dysplasia      Imaging shows:  CT CHEST WITHOUT CONTRAST  8/3/2018 2:29 PM     HISTORY: Pulmonary nodules. History of gastroesophageal reflux  disease, hernia repair, and discectomy.     TECHNIQUE:  Scans obtained from the apices through the diaphragm  without IV contrast.  Radiation dose for this scan was reduced using automated exposure  control, adjustment  of the mA and/or kV according to patient size, or  iterative reconstruction technique.     COMPARISON:  Chest x-rays dated 7/15/2018, CT chest dated 4/9/2018 and  4/29/2016.     FINDINGS: Micronodular groundglass appearance of the right lung  including the upper lobe, middle lobe and lower lobe most  predominantly in the upper lobe and middle lobe are again noted. This  has significantly improved since the prior study. The nodules in the  left lung have mostly resolved. There is a nodular densities measuring  up to 0.5 cm along the posterior right hemithorax medially which could  represent pleural-based nodule which does not appear to be associated  with same process. Micronodules are less than 0.3 cm each. No  pneumothorax or significant pleural fluid collection. No subpleural  banding or traction bronchiectasis is seen. No definite fibrosis is  identified.     The heart is normal in size. No mediastinal, hilar, or axillary  lymphadenopathy is identified. There are coronary artery  calcifications. Some aortic calcifications are seen in the upper  abdominal aorta.     Incidental note of cholelithiasis with a gallstone measuring up to 1.3  cm in diameter. Visualized portions of the upper abdominal contents  are otherwise unremarkable.     No aggressive osseous lesions are seen. There is a small hiatal  hernia.                                                                      IMPRESSION:  1. Groundglass micronodular appearance of the right lung likely  represents an atypical infectious infiltrate. Given the patient's  history of gastroesophageal reflux disease this could represent  aspiration although this is more diffuse than expected for aspiration  and there is no evidence for the same type of process in the left  lung. Hypersensitivity pneumonitis is considered less likely because  the right lung is minimally affected. Other atypical infections such  as mycobacterium avium intracellulare is considered a  possibility. The  findings in lungs have significantly improved since the prior study  dated 4/9/2018 and 4/29/2016.  2. No evidence for pulmonary fibrosis is identified.  3. Small hiatal hernia.    24 hr pH  Demeester score 201 !  Based on tracings possible that catheter had slipped into stomach, in discussion with patient this was a time period where he was laying down sleeping (sleeps odd hours)    Manometry  Hypotensive, normally relaxing LES  Spastic esophagus (80% premature contractions)    Full reports scanned in system    Assessment:     ICD-10-CM    1. Gastroesophageal reflux disease without esophagitis K21.9 XR Esophagram w Upper GI     Judith-Operative Worksheet   2. Hiatal hernia K44.9 XR Esophagram w Upper GI     Judith-Operative Worksheet   3. 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 Long segment Olson's, no dysplasia on recent biopsies. Possible pulmonary complications from reflux related aspirations. Small, maybe few cm hiatal hernia based on previous imaging I can see. I will check esophagram to get a better look at that anatomy.  I recommended surgery for his reflux issues. Would plan on a toupet/partial wrap given manometry findings.  Described procedure details and overnight stay, post operative dietary restrictions as well as difficulty with belching and vomiting afterwards, possibility of increased flatulence. Risks of bleeding, infection, conversion to open, dysphagia, recurrence of hiatal hernia or reflux symptoms, injury to intra-abdominal or intra-thoracic organs discussed and questions answered.  Will work on scheduling the procedure  With chronic pain issues/methadone and chronic prednisone use would plan on inpatient stay (at least 2 nights) post op.    Time spent with patient including review of previous testing 45 min >50% discussion of above surgical plan    Ronald Hernandez MD

## 2019-01-17 NOTE — TELEPHONE ENCOUNTER
Type of surgery: robotic assisted laparoscopic hiatal hernia repair and toupet fundoplication  CPT 62532  Gastroesophageal reflux disease without esophagitis [K21.9]  - Primary       Hiatal hernia [K44.9]       Olson's esophagus without dysplasia [K22.70]         Location of surgery: [facility]  Date and time of surgery: 2-21-19  8:30am  Surgeon: Dr Hernandez  Pre-Op Appt Date: 2-4-19  Post-Op Appt Date: 3-11-19   Packet sent out: Yes  Pre-cert/Authorization completed:no pre cert needed, per [facility] online list    Date: 01/18/2019

## 2019-01-21 ENCOUNTER — ANCILLARY PROCEDURE (OUTPATIENT)
Dept: GENERAL RADIOLOGY | Facility: CLINIC | Age: 54
End: 2019-01-21
Payer: COMMERCIAL

## 2019-01-21 DIAGNOSIS — K44.9 HIATAL HERNIA: ICD-10-CM

## 2019-01-21 DIAGNOSIS — K21.9 GASTROESOPHAGEAL REFLUX DISEASE WITHOUT ESOPHAGITIS: ICD-10-CM

## 2019-01-21 PROCEDURE — 74240 X-RAY XM UPR GI TRC 1CNTRST: CPT

## 2019-01-22 DIAGNOSIS — F41.1 GENERALIZED ANXIETY DISORDER: ICD-10-CM

## 2019-01-22 DIAGNOSIS — M50.30 DDD (DEGENERATIVE DISC DISEASE), CERVICAL: ICD-10-CM

## 2019-01-22 DIAGNOSIS — M50.00 INTERVERTEBRAL CERVICAL DISC DISORDER WITH MYELOPATHY, CERVICAL REGION: ICD-10-CM

## 2019-01-22 NOTE — TELEPHONE ENCOUNTER
I faxed paperwork to [facility].    Thank you,   Laurita Miguel   [facility] Department  [phone]

## 2019-01-23 RX ORDER — CLONAZEPAM 0.5 MG/1
0.25-0.5 TABLET ORAL 3 TIMES DAILY PRN
Qty: 90 TABLET | Refills: 0 | Status: SHIPPED | OUTPATIENT
Start: 2019-01-23 | End: 2019-02-21

## 2019-01-23 RX ORDER — METHADONE HYDROCHLORIDE 10 MG/1
10 TABLET ORAL EVERY 8 HOURS PRN
Qty: 90 TABLET | Refills: 0 | Status: SHIPPED | OUTPATIENT
Start: 2019-01-23 | End: 2019-02-21

## 2019-01-23 RX ORDER — OXYCODONE HYDROCHLORIDE 5 MG/1
10 CAPSULE ORAL EVERY 4 HOURS PRN
Qty: 360 CAPSULE | Refills: 0 | Status: SHIPPED | OUTPATIENT
Start: 2019-01-23 | End: 2019-02-21

## 2019-01-23 NOTE — TELEPHONE ENCOUNTER
Methadone      Last Written Prescription Date:  12/20/2018  Last Fill Quantity: 90,   # refills: 0  Last Office Visit: 9/21/2018  Future Office visit:    Next 5 appointments (look out 90 days)    Feb 04, 2019  4:10 PM CST  Pre-Op physical with Gregory G Schoen, MD  [address] [address]  [phone]           Routing refill request to provider for review/approval because:  Drug not on the [facility], [facility] or [facility] refill protocol or controlled substance    Clonazepam      Last Written Prescription Date:  12/31/2018  Last Fill Quantity: 90,   # refills: 0  Last Office Visit: 9/21/2018  Future Office visit:    Next 5 appointments (look out 90 days)    Feb 04, 2019  4:10 PM CST  Pre-Op physical with Gregory G Schoen, MD  [facility] ([address] [address]  [phone]           Routing refill request to provider for review/approval because:  Drug not on the [facility], [facility] or [facility] refill protocol or controlled substance    Oxycodone      Last Written Prescription Date:  12/20/2018  Last Fill Quantity: 360,   # refills: 0  Last Office Visit: 9/21/2018  Future Office visit:    Next 5 appointments (look out 90 days)    Feb 04, 2019  4:10 PM CST  Pre-Op physical with Gregory G Schoen, MD  [address] [address]  [phone]           Routing refill request to provider for review/approval because:  Drug not on the [facility], [facility] or [facility] refill protocol or controlled substance

## 2019-01-24 ENCOUNTER — TELEPHONE (OUTPATIENT)
Dept: FAMILY MEDICINE | Facility: CLINIC | Age: 54
End: 2019-01-24

## 2019-01-24 NOTE — TELEPHONE ENCOUNTER
Letter received from [facility] of coverage approval on Methadone. [facility] pharmacy notified. Gloria Devine LPN

## 2019-02-04 ENCOUNTER — OFFICE VISIT (OUTPATIENT)
Dept: FAMILY MEDICINE | Facility: CLINIC | Age: 54
End: 2019-02-04
Payer: COMMERCIAL

## 2019-02-04 VITALS
WEIGHT: 178 LBS | HEART RATE: 80 BPM | DIASTOLIC BLOOD PRESSURE: 76 MMHG | TEMPERATURE: 97.8 F | RESPIRATION RATE: 16 BRPM | BODY MASS INDEX: 27.88 KG/M2 | SYSTOLIC BLOOD PRESSURE: 120 MMHG | OXYGEN SATURATION: 98 %

## 2019-02-04 DIAGNOSIS — J47.9 DISEASE OF BRONCHIAL AIRWAY (H): ICD-10-CM

## 2019-02-04 DIAGNOSIS — G89.4 CHRONIC PAIN SYNDROME: ICD-10-CM

## 2019-02-04 DIAGNOSIS — Z01.818 PREOP GENERAL PHYSICAL EXAM: Primary | ICD-10-CM

## 2019-02-04 DIAGNOSIS — K21.9 GASTROESOPHAGEAL REFLUX DISEASE, ESOPHAGITIS PRESENCE NOT SPECIFIED: ICD-10-CM

## 2019-02-04 LAB
ANION GAP SERPL CALCULATED.3IONS-SCNC: 5 MMOL/L (ref 3–14)
BASOPHILS # BLD AUTO: 0.1 10E9/L (ref 0–0.2)
BASOPHILS NFR BLD AUTO: 2.1 %
BUN SERPL-MCNC: 9 MG/DL (ref 7–30)
CALCIUM SERPL-MCNC: 9 MG/DL (ref 8.5–10.1)
CHLORIDE SERPL-SCNC: 102 MMOL/L (ref 94–109)
CO2 SERPL-SCNC: 31 MMOL/L (ref 20–32)
CREAT SERPL-MCNC: 1.01 MG/DL (ref 0.66–1.25)
DIFFERENTIAL METHOD BLD: NORMAL
EOSINOPHIL NFR BLD AUTO: 3.5 %
ERYTHROCYTE [DISTWIDTH] IN BLOOD BY AUTOMATED COUNT: 13.3 % (ref 10–15)
GFR SERPL CREATININE-BSD FRML MDRD: 85 ML/MIN/{1.73_M2}
GLUCOSE SERPL-MCNC: 99 MG/DL (ref 70–99)
HCT VFR BLD AUTO: 42.1 % (ref 40–53)
HGB BLD-MCNC: 13.3 G/DL (ref 13.3–17.7)
IMM GRANULOCYTES # BLD: 0 10E9/L (ref 0–0.4)
IMM GRANULOCYTES NFR BLD: 0.2 %
LYMPHOCYTES # BLD AUTO: 1.9 10E9/L (ref 0.8–5.3)
LYMPHOCYTES NFR BLD AUTO: 33.5 %
MCH RBC QN AUTO: 29.1 PG (ref 26.5–33)
MCHC RBC AUTO-ENTMCNC: 31.6 G/DL (ref 31.5–36.5)
MCV RBC AUTO: 92 FL (ref 78–100)
MONOCYTES # BLD AUTO: 0.4 10E9/L (ref 0–1.3)
MONOCYTES NFR BLD AUTO: 7.6 %
NEUTROPHILS # BLD AUTO: 3.1 10E9/L (ref 1.6–8.3)
NEUTROPHILS NFR BLD AUTO: 53.1 %
NRBC # BLD AUTO: 0 10*3/UL
NRBC BLD AUTO-RTO: 0 /100
PLATELET # BLD AUTO: 206 10E9/L (ref 150–450)
POTASSIUM SERPL-SCNC: 4.2 MMOL/L (ref 3.4–5.3)
RBC # BLD AUTO: 4.57 10E12/L (ref 4.4–5.9)
SODIUM SERPL-SCNC: 138 MMOL/L (ref 133–144)
WBC # BLD AUTO: 5.8 10E9/L (ref 4–11)

## 2019-02-04 PROCEDURE — 93000 ELECTROCARDIOGRAM COMPLETE: CPT | Performed by: FAMILY MEDICINE

## 2019-02-04 PROCEDURE — 99215 OFFICE O/P EST HI 40 MIN: CPT | Performed by: FAMILY MEDICINE

## 2019-02-04 PROCEDURE — 85025 COMPLETE CBC W/AUTO DIFF WBC: CPT | Performed by: FAMILY MEDICINE

## 2019-02-04 PROCEDURE — 36415 COLL VENOUS BLD VENIPUNCTURE: CPT | Performed by: FAMILY MEDICINE

## 2019-02-04 PROCEDURE — 80048 BASIC METABOLIC PNL TOTAL CA: CPT | Performed by: FAMILY MEDICINE

## 2019-02-04 ASSESSMENT — PAIN SCALES - GENERAL: PAINLEVEL: SEVERE PAIN (6)

## 2019-02-04 NOTE — PROGRESS NOTES
[address] [address]  [phone]  Dept: [phone]    PRE-OP EVALUATION:  Today's date: 2019    Joselito Abarca (: 1965) presents for pre-operative evaluation assessment as requested by Dr. Hernandez.  He requires evaluation and anesthesia risk assessment prior to undergoing surgery/procedure for treatment of ulcer repair .    Fax number for surgical facility: [facility]  Primary Physician: Schoen, Gregory G  Type of Anesthesia Anticipated: General    Patient has a Health Care Directive or Living Will:  NO    Preop Questions 2019   Who is doing your surgery? not sure   What are you having done? ulcer repair   Date of Surgery/Procedure:    Facility or Hospital where procedure/surgery will be performed: [facility]   1.  Do you have a history of Heart attack, stroke, stent, coronary bypass surgery, or other heart surgery? No   2.  Do you ever have any pain or discomfort in your chest? No   3.  Do you have a history of  Heart Failure? No   4.   Are you troubled by shortness of breath when:  walking on a level surface, or up a slight hill, or at night? YES - Baseline lung function; gets winded easily. No acute symptoms at present time.   5.  Do you currently have a cold, bronchitis or other respiratory infection? No   6.  Do you have a cough, shortness of breath, or wheezing? No   7.  Do you sometimes get pains in the calves of your legs when you walk? YES - Good pulses in feet; does have lumbar stenosis and may be pseudoclaudication   8. Do you or anyone in your family have previous history of blood clots? No   9.  Do you or does anyone in your family have a serious bleeding problem such as prolonged bleeding following surgeries or cuts? No   10. Have you ever had problems with anemia or been told to take iron pills? No   11. Have you had any abnormal blood loss such as black, tarry or bloody stools? No   12. Have you ever had  a blood transfusion? No   13. Have you or any of your relatives ever had problems with anesthesia? No   14. Do you have sleep apnea, excessive snoring or daytime drowsiness? No   15. Do you have any prosthetic heart valves? No   16. Do you have prosthetic joints? No         HPI:     HPI related to upcoming procedure: Please see his preoperative investigation. Chronic reflux with Olson''s changes and longstanding history of asthma/wheezing issues know to be sensitive to mold and GERD.  CXR findings are also consistent with possible acid reflux with aspiration in right lung. He is on longstanding inhaler use with flovent  duonebs prn.     History of chronic pain related to back, neck and headaches for which he is taking chronic narcotics.     MEDICAL HISTORY:     Patient Active Problem List    Diagnosis Date Noted     Gastroesophageal reflux disease, esophagitis presence not specified 09/21/2018     Priority: Medium     Chronic seasonal allergic rhinitis due to fungal spores 06/07/2018     Priority: Medium     Status post cervical spinal arthrodesis 11/29/2017     Priority: Medium     Chronic pain syndrome 12/13/2016     Priority: Medium     Patient is followed by Gregory G. Schoen, MD for ongoing prescription of pain medication.  All refills should be approved by this provider, or covering partner.    Medication(s): Oxycodone 5 mg IR: Take 2 capsules (10 mg) by mouth every 4 hours as needed 2 caps q 4 hour prn pain up to 12 per day .   Methadone 10 mg three times daily, 90 per month  Clonazepam 0.5 mg tid, 90 per month   Clinic visit frequency required: Q 3 months     Controlled substance agreement:  Encounter-Level CSA - 2/27/15:               Controlled Substance Agreement - Scan on 3/14/2015  8:47 AM : Controlled Medication Agreement-02/27/15 (below)            Pain Clinic evaluation in the past: Yes    DIRE Total Score(s):  No flowsheet data found.    Last [facility] website verification:  10/30/2016      [email or web address]         Moderate persistent asthma without complication 11/02/2016     Priority: Medium     Acute respiratory failure with hypoxia (H) 04/29/2016     Priority: Medium     Nausea with vomiting 04/27/2016     Priority: Medium     Cough 03/06/2016     Priority: Medium     Leukocytosis 02/16/2016     Priority: Medium     Disease of bronchial airway (HCC) 02/12/2016     Priority: Medium     Degeneration of cervical intervertebral disc 01/26/2015     Priority: Medium     Health Care Home 09/30/2013     Priority: Medium     Status:  Accepted  Care Coordinator:    Melissa Behl BSN, RN, PHN  [facility] Care Coordinator  [phone]     See Letters for [facility] Care Plan  Date:  April 26, 2016            Arthrodesis status 06/15/2011     Priority: Medium     Neck pain 06/15/2011     Priority: Medium     CARDIOVASCULAR SCREENING; LDL GOAL LESS THAN 160 10/31/2010     Priority: Medium     Intervertebral cervical disc disorder with myelopathy, cervical region 11/12/2009     Priority: Medium     Patient is followed by TIARA JIMENEZ for ongoing prescription of narcotic pain medicine.  Med: methadone 10 mg tid. Taking oxycodone up to 8 per day, 120 per month  Maximum use per month: 90  Expected duration: ongoing  Narcotic agreement on file: YES  Clinic visit recommended: Q 3 months         Constipation 03/19/2008     Priority: Medium     Problem list name updated by automated process. Provider to review       Thoracic or lumbosacral neuritis or radiculitis, unspecified 03/19/2008     Priority: Medium     Esophageal reflux 07/09/2003     Priority: Medium     Generalized anxiety disorder 07/08/2003     Priority: Medium     Alcohol abuse, in remission 07/08/2003     Priority: Medium      Past Medical History:   Diagnosis Date     Allergic rhinitis      Anxiety      GERD (gastroesophageal reflux disease)      Neck pain 6/15/2011     Pneumonia      Uncomplicated asthma      Past Surgical History:    Procedure Laterality Date     BRONCHOSCOPY (RIGID OR FLEXIBLE), DIAGNOSTIC N/A 8/7/2018    Procedure: COMBINED BRONCHOSCOPY (RIGID OR FLEXIBLE), LAVAGE;  Bronchoscopy with Lavage and Transbronchial Biopsy;  Surgeon: Yung Miranda MD;  Location: [facility]     COLONOSCOPY WITH CO2 INSUFFLATION N/A 9/24/2018    Procedure: COLONOSCOPY WITH CO2 INSUFFLATION;  Colon and upper endoscopy ;  Surgeon: Devin Pelayo MD;  Location: [facility]     COMBINED ESOPHAGOSCOPY, GASTROSCOPY, DUODENOSCOPY (EGD) WITH CO2 INSUFFLATION N/A 9/24/2018    Procedure: COMBINED ESOPHAGOSCOPY, GASTROSCOPY, DUODENOSCOPY (EGD) WITH CO2 INSUFFLATION;  Colon and upper endoscopy ;  Surgeon: Devin Pelayo MD;  Location: [facility]     DISCECTOMY LUMBAR POSTERIOR MICROSCOPIC ONE LEVEL  2/21/2012    Procedure:DISCECTOMY LUMBAR POSTERIOR MICROSCOPIC ONE LEVEL; LEFT T1-T2 THORASIC HEMILAMINECTOMY MICRODISCECTOMY WITH MEXTRIX II ; Surgeon:SHARON PURI; Location:[facility]     DISCECTOMY, FUSION CERVICAL ANTERIOR ONE LEVEL, COMBINED N/A 11/29/2017    Procedure: COMBINED DISCECTOMY, FUSION CERVICAL ANTERIOR ONE LEVEL;  Anterior Cervical Discectomy and Fusion Cervical Six - Cervical Seven, Exploration and Revision Cervical Four - Cervical Six with Hardware Removal;  Surgeon: Nikolas Vasques MD;  Location: [facility]     ESOPHAGEAL IMPEDENCE FUNCTION TEST WITH 24 HOUR PH GREATER THAN 1 HOUR N/A 12/12/2018    Procedure: ESOPHAGEAL IMPEDENCE FUNCTION TEST WITH 24 HOUR PH GREATER THAN 1 HOUR;  Surgeon: Atif Oconnor MD;  Location: [facility]     ESOPHAGOSCOPY, GASTROSCOPY, DUODENOSCOPY (EGD), COMBINED N/A 9/24/2018    Procedure: COMBINED ESOPHAGOSCOPY, GASTROSCOPY, DUODENOSCOPY (EGD), BIOPSY SINGLE OR MULTIPLE;;  Surgeon: Devin Pelayo MD;  Location: [facility]     EXPLORE SPINE, REMOVE HARDWARE, COMBINED N/A 11/29/2017    Procedure: COMBINED EXPLORE SPINE, REMOVE HARDWARE;;  Surgeon: Nikolas Vasques MD;  [facility]      FUSION CERVICAL ANTERIOR TWO LEVELS  1/29/2010     HC DRAIN/INJ MAJOR JOINT/BURSA W/O US  5/5/2008    Left sacroiliac joint injection.     HC INJ EPIDURAL LUMBAR/SACRAL W/WO CONTRAST  2009     HEAD & NECK SURGERY      2013     HERNIA REPAIR       INJECT BLOCK MEDIAL BRANCH CERVICAL/THORACIC/LUMBAR Bilateral 8/26/2015    Procedure: INJECT BLOCK MEDIAL BRANCH CERVICAL / THORACIC / LUMBAR;  Surgeon: Ronald Driscoll MD;  [facility]     INJECT FACET JOINT Bilateral 5/27/2015    Procedure: INJECT FACET JOINT;  Surgeon: Ronald Driscoll MD;  [facility]     NERVE BLOCK OCCIPITAL Bilateral 7/12/2018    Procedure: NERVE BLOCK OCCIPITAL;  bilateral occipital nerve blocks;  Surgeon: Ronald Driscoll MD;  Location: [facility]     Current Outpatient Medications   Medication Sig Dispense Refill     Cholecalciferol (VITAMIN D) 2000 units tablet TAKE ONE TABLET BY MOUTH EVERY  tablet 3     citalopram (CELEXA) 20 MG tablet Take 1 tablet (20 mg) by mouth daily 90 tablet 3     clonazePAM (KLONOPIN) 0.5 MG tablet Take 0.5-1 tablets (0.25-0.5 mg) by mouth 3 times daily as needed for anxiety 90 tablet 0     FLOVENT  MCG/ACT Inhaler INHALE 2 PUFFS INTO THE LUNGS TWICE DAILY 36 g 2     fluticasone (FLONASE) 50 MCG/ACT spray SPRAY 2 SPRAYS IN EACH NOSTRIL EVERY DAY 16 g 5     gabapentin (NEURONTIN) 300 MG capsule TAKE TWO CAPSULES BY MOUTH THREE TIMES A  capsule 11     ipratropium - albuterol 0.5 mg/2.5 mg/3 mL (DUONEB) 0.5-2.5 (3) MG/3ML neb solution Take 1 vial (3 mLs) by nebulization every 4 hours as needed for shortness of breath / dyspnea 30 vial 0     methadone (DOLOPHINE) 10 MG tablet Take 1 tablet (10 mg) by mouth every 8 hours as needed 90 tablet 0     omeprazole (PRILOSEC) 40 MG capsule Take 1 capsule (40 mg) by mouth 2 times daily (before meals) 60 capsule 5     order for DME Equipment being ordered: Nebulizer mask and tubing (Patient not taking: Reported on 1/15/2019) 1 each 1     oxyCODONE (OXY-IR) 5  MG capsule Take 2 capsules (10 mg) by mouth every 4 hours as needed 2 caps q 4 hour prn pain up to 12 per day May fill 2012 360 capsule 0     predniSONE (DELTASONE) 20 MG tablet Take two tablets (= 40mg) each day for 5 (five) days (Patient not taking: Reported on 1/15/2019.) 10 tablet 0     ranitidine (ZANTAC) 150 MG tablet TAKE ONE TABLET BY MOUTH EVERY MORNING 30 tablet 10     sildenafil (VIAGRA) 100 MG tablet Take 1 tablet (100 mg) by mouth daily as needed 30 min to 4 hrs before sex. Do not use with nitroglycerin, terazosin or doxazosin. 4 tablet 0     traZODone (DESYREL) 100 MG tablet Take 3 tablets (300 mg) by mouth At Bedtime 90 tablet 3     VENTOLIN  (90 Base) MCG/ACT inhaler INHALE 2 PUFFS INTO THE LUNGS EVERY 6 HOURS AS NEEDED FOR SHORTNESS OF BREATH, DIFFICULTY BREATHING OR WHEEZING. 18 g 3     zolpidem (AMBIEN) 10 MG tablet Take 10 mg by mouth nightly as needed        OTC products: None, except as noted above    Allergies   Allergen Reactions     Vicodin [Hydrocodone-Acetaminophen] Nausea     Other reaction(s): Diaphoresis, Vomiting  HEADACHE      Latex Allergy: NO    Social History     Tobacco Use     Smoking status: Former Smoker     Packs/day: 1.00     Years: 30.00     Pack years: 30.00     Types: Cigars     Last attempt to quit: 2009     Years since quittin.1     Smokeless tobacco: Former User     Quit date:    Substance Use Topics     Alcohol use: No     Alcohol/week: 0.0 oz     Comment: HX OF ABUSE-IN REMISSION     History   Drug Use No       REVIEW OF SYSTEMS:   CONSTITUTIONAL: NEGATIVE for fever, chills, change in weight  INTEGUMENTARY/SKIN: NEGATIVE for worrisome rashes, moles or lesions  EYES: NEGATIVE for vision changes or irritation  ENT/MOUTH: NEGATIVE for ear, mouth and throat problems  RESP: NEGATIVE for significant cough or SOB  RESP:Chronic cough, easily fatigued with activity.   CV: NEGATIVE for chest pain, palpitations or peripheral edema  GI: NEGATIVE for  nausea, abdominal pain, heartburn, or change in bowel habits  : NEGATIVE for frequency, dysuria, or hematuria  MUSCULOSKELETAL:Chronic neck and back pain, s/p cervical fusion and lumbar discectomy and subsequent fusion   NEURO: NEGATIVE for weakness, dizziness or paresthesias  ENDOCRINE: NEGATIVE for temperature intolerance, skin/hair changes  HEME: NEGATIVE for bleeding problems  PSYCHIATRIC: NEGATIVE for changes in mood or affect    EXAM:   /76 (Cuff Size: Adult Regular)   Pulse 80   Temp 97.8  F (36.6  C) (Temporal)   Resp 16   Wt 80.7 kg (178 lb)   SpO2 98%   BMI 27.88 kg/m      GENERAL APPEARANCE: healthy, alert and no distress     EYES: EOMI,  PERRL     HENT: ear canals and TM's normal and nose and mouth without ulcers or lesions.      NECK: no adenopathy, no asymmetry, masses, or scars and thyroid normal to palpation     RESP: lungs clear to auscultation - no rales, rhonchi or wheezes     CV: regular rates and rhythm, normal S1 S2, no S3 or S4 and no murmur, click or rub     ABDOMEN:  soft, nontender, no HSM or masses and bowel sounds normal     MS: neck with rigid posture secondary to fusion.  Moves all 4 extremities equally and well.      SKIN: no suspicious lesions or rashes     NEURO: Normal strength and tone, sensory exam grossly normal, mentation intact and speech normal     PSYCH: mentation appears normal. and affect normal/bright     LYMPHATICS: No cervical adenopathy    DIAGNOSTICS:     EKG: to my review appears normal, NSR, normal axis, normal intervals, no acute ST/T changes c/w ischemia, no LVH by voltage criteria, unchanged from previous tracings    Labs Resulted Today:   Results for orders placed or performed in visit on 02/04/19   CBC with platelets differential   Result Value Ref Range    WBC 5.8 4.0 - 11.0 10e9/L    RBC Count 4.57 4.4 - 5.9 10e12/L    Hemoglobin 13.3 13.3 - 17.7 g/dL    Hematocrit 42.1 40.0 - 53.0 %    MCV 92 78 - 100 fl    MCH 29.1 26.5 - 33.0 pg    MCHC 31.6  31.5 - 36.5 g/dL    RDW 13.3 10.0 - 15.0 %    Platelet Count 206 150 - 450 10e9/L    Diff Method Automated Method     % Neutrophils 53.1 %    % Lymphocytes 33.5 %    % Monocytes 7.6 %    % Eosinophils 3.5 %    % Basophils 2.1 %    % Immature Granulocytes 0.2 %    Nucleated RBCs 0 0 /100    Absolute Neutrophil 3.1 1.6 - 8.3 10e9/L    Absolute Lymphocytes 1.9 0.8 - 5.3 10e9/L    Absolute Monocytes 0.4 0.0 - 1.3 10e9/L    Absolute Basophils 0.1 0.0 - 0.2 10e9/L    Abs Immature Granulocytes 0.0 0 - 0.4 10e9/L    Absolute Nucleated RBC 0.0    Basic metabolic panel   Result Value Ref Range    Sodium 138 133 - 144 mmol/L    Potassium 4.2 3.4 - 5.3 mmol/L    Chloride 102 94 - 109 mmol/L    Carbon Dioxide 31 20 - 32 mmol/L    Anion Gap 5 3 - 14 mmol/L    Glucose 99 70 - 99 mg/dL    Urea Nitrogen 9 7 - 30 mg/dL    Creatinine 1.01 0.66 - 1.25 mg/dL    GFR Estimate 85 >60 mL/min/[1.73_m2]    GFR Estimate If Black >90 >60 mL/min/[1.73_m2]    Calcium 9.0 8.5 - 10.1 mg/dL       Recent Labs   Lab Test 07/27/18  1448 07/10/18  1347  05/15/18  2030  03/15/17  2220   HGB 13.6 13.7   < > 13.1*   < > 14.4    199   < > 218   < > 150   INR 0.88  --   --   --   --   --    NA  --  140  --  137   < > 136   POTASSIUM  --  3.7  --  3.8   < > 2.7*   CR  --  1.00  --  1.03   < > 0.94   A1C  --   --   --   --   --  5.6    < > = values in this interval not displayed.        IMPRESSION:   Reason for surgery/procedure: persistent GERD with Olson's changes, evidence of possible aspiration.  Diagnosis/reason for consult: assessment for tolerance of anesthesia.     The proposed surgical procedure is considered INTERMEDIATE risk.    REVISED CARDIAC RISK INDEX  The patient has the following serious cardiovascular risks for perioperative complications such as (MI, PE, VFib and 3  AV Block):  No serious cardiac risks  INTERPRETATION: 0 risks: Class I (very low risk - 0.4% complication rate)    The patient has the following additional risks for  perioperative complications:  High tolerance to opioid analgesics due to chronic use of opioids for back/neck pain.  Stable pulmonary disease on regimen noted above.       ICD-10-CM    1. Preop general physical exam Z01.818        RECOMMENDATIONS:     --Consult hospital rounder / IM to assist post-op medical management    Pulmonary Risk  Incentive spirometry post op  Respiratory Therapy (Respiratory Care IP Consult)  post op      --Patient is to take all scheduled medications on the day of surgery and to notify anesthesia of meds taken.    APPROVAL GIVEN to proceed with proposed procedure, without further diagnostic evaluation.        Signed Electronically by: Gregory G. Schoen, MD    Copy of this evaluation report is provided to requesting physician.    [facility] Preop Guidelines    Revised Cardiac Risk Index

## 2019-02-05 ASSESSMENT — ASTHMA QUESTIONNAIRES: ACT_TOTALSCORE: 16

## 2019-02-06 ENCOUNTER — TELEPHONE (OUTPATIENT)
Dept: FAMILY MEDICINE | Facility: CLINIC | Age: 54
End: 2019-02-06

## 2019-02-06 NOTE — TELEPHONE ENCOUNTER
I have attempted to contact this patient by phone with the following results: left message to return my call on answering machine.  Please relay results and close.    Piper Brooks, [facility]

## 2019-02-06 NOTE — TELEPHONE ENCOUNTER
----- Message from Gregory G Schoen, MD sent at 2/5/2019  5:43 PM CST -----  Please inform that labs were excellent with normal sodium/potassium/calcium, no diabetes, normal kidney function and normal blood count.    Electronically signed by Greg Schoen, MD

## 2019-02-15 ENCOUNTER — APPOINTMENT (OUTPATIENT)
Dept: GENERAL RADIOLOGY | Facility: CLINIC | Age: 54
End: 2019-02-15
Attending: FAMILY MEDICINE
Payer: COMMERCIAL

## 2019-02-15 ENCOUNTER — HOSPITAL ENCOUNTER (EMERGENCY)
Facility: CLINIC | Age: 54
Discharge: HOME | End: 2019-02-15
Attending: FAMILY MEDICINE | Admitting: FAMILY MEDICINE
Payer: COMMERCIAL

## 2019-02-15 VITALS
SYSTOLIC BLOOD PRESSURE: 121 MMHG | OXYGEN SATURATION: 97 % | RESPIRATION RATE: 20 BRPM | HEART RATE: 107 BPM | DIASTOLIC BLOOD PRESSURE: 73 MMHG | BODY MASS INDEX: 26.63 KG/M2 | TEMPERATURE: 99 F | WEIGHT: 170 LBS

## 2019-02-15 DIAGNOSIS — R91.1 LUNG NODULE: ICD-10-CM

## 2019-02-15 DIAGNOSIS — R06.09 DYSPNEA ON EXERTION: ICD-10-CM

## 2019-02-15 LAB
ANION GAP SERPL CALCULATED.3IONS-SCNC: 8 MMOL/L (ref 3–14)
APTT PPP: 29 SEC (ref 22–37)
BASE EXCESS BLDV CALC-SCNC: 3.8 MMOL/L
BASOPHILS # BLD AUTO: 0 10E9/L (ref 0–0.2)
BASOPHILS NFR BLD AUTO: 0.2 %
BUN SERPL-MCNC: 11 MG/DL (ref 7–30)
CALCIUM SERPL-MCNC: 8.7 MG/DL (ref 8.5–10.1)
CHLORIDE SERPL-SCNC: 97 MMOL/L (ref 94–109)
CO2 SERPL-SCNC: 29 MMOL/L (ref 20–32)
CREAT SERPL-MCNC: 0.9 MG/DL (ref 0.66–1.25)
D DIMER PPP FEU-MCNC: 0.5 UG/ML FEU (ref 0–0.5)
DIFFERENTIAL METHOD BLD: ABNORMAL
EOSINOPHIL NFR BLD AUTO: 1.3 %
ERYTHROCYTE [DISTWIDTH] IN BLOOD BY AUTOMATED COUNT: 13.2 % (ref 10–15)
GFR SERPL CREATININE-BSD FRML MDRD: >90 ML/MIN/{1.73_M2}
GLUCOSE SERPL-MCNC: 113 MG/DL (ref 70–99)
HCO3 BLDV-SCNC: 30 MMOL/L (ref 21–28)
HCT VFR BLD AUTO: 40.4 % (ref 40–53)
HGB BLD-MCNC: 12.9 G/DL (ref 13.3–17.7)
IMM GRANULOCYTES # BLD: 0 10E9/L (ref 0–0.4)
IMM GRANULOCYTES NFR BLD: 0.2 %
INR PPP: 0.94 (ref 0.86–1.14)
LACTATE BLD-SCNC: 2.1 MMOL/L (ref 0.7–2)
LYMPHOCYTES # BLD AUTO: 1.2 10E9/L (ref 0.8–5.3)
LYMPHOCYTES NFR BLD AUTO: 11.6 %
MCH RBC QN AUTO: 28.9 PG (ref 26.5–33)
MCHC RBC AUTO-ENTMCNC: 31.9 G/DL (ref 31.5–36.5)
MCV RBC AUTO: 91 FL (ref 78–100)
MONOCYTES # BLD AUTO: 0.6 10E9/L (ref 0–1.3)
MONOCYTES NFR BLD AUTO: 5.2 %
NEUTROPHILS # BLD AUTO: 8.6 10E9/L (ref 1.6–8.3)
NEUTROPHILS NFR BLD AUTO: 81.5 %
NRBC # BLD AUTO: 0 10*3/UL
NRBC BLD AUTO-RTO: 0 /100
NT-PROBNP SERPL-MCNC: 35 PG/ML (ref 0–900)
O2/TOTAL GAS SETTING VFR VENT: ABNORMAL %
PCO2 BLDV: 52 MM HG (ref 40–50)
PH BLDV: 7.38 PH (ref 7.32–7.43)
PLATELET # BLD AUTO: 169 10E9/L (ref 150–450)
PO2 BLDV: 33 MM HG (ref 25–47)
POTASSIUM SERPL-SCNC: 3.5 MMOL/L (ref 3.4–5.3)
RBC # BLD AUTO: 4.46 10E12/L (ref 4.4–5.9)
SODIUM SERPL-SCNC: 134 MMOL/L (ref 133–144)
TROPONIN I SERPL-MCNC: <0.015 UG/L (ref 0–0.04)
WBC # BLD AUTO: 10.5 10E9/L (ref 4–11)

## 2019-02-15 PROCEDURE — 84484 ASSAY OF TROPONIN QUANT: CPT | Performed by: FAMILY MEDICINE

## 2019-02-15 PROCEDURE — 71046 X-RAY EXAM CHEST 2 VIEWS: CPT | Mod: TC

## 2019-02-15 PROCEDURE — 82803 BLOOD GASES ANY COMBINATION: CPT | Performed by: FAMILY MEDICINE

## 2019-02-15 PROCEDURE — 83605 ASSAY OF LACTIC ACID: CPT | Performed by: FAMILY MEDICINE

## 2019-02-15 PROCEDURE — 85730 THROMBOPLASTIN TIME PARTIAL: CPT | Performed by: FAMILY MEDICINE

## 2019-02-15 PROCEDURE — 80048 BASIC METABOLIC PNL TOTAL CA: CPT | Performed by: FAMILY MEDICINE

## 2019-02-15 PROCEDURE — 85025 COMPLETE CBC W/AUTO DIFF WBC: CPT | Performed by: FAMILY MEDICINE

## 2019-02-15 PROCEDURE — 85610 PROTHROMBIN TIME: CPT | Performed by: FAMILY MEDICINE

## 2019-02-15 PROCEDURE — 93005 ELECTROCARDIOGRAM TRACING: CPT | Performed by: FAMILY MEDICINE

## 2019-02-15 PROCEDURE — 99285 EMERGENCY DEPT VISIT HI MDM: CPT | Mod: 25 | Performed by: FAMILY MEDICINE

## 2019-02-15 PROCEDURE — 93010 ELECTROCARDIOGRAM REPORT: CPT | Mod: Z6 | Performed by: FAMILY MEDICINE

## 2019-02-15 PROCEDURE — 85379 FIBRIN DEGRADATION QUANT: CPT | Performed by: FAMILY MEDICINE

## 2019-02-15 PROCEDURE — 83880 ASSAY OF NATRIURETIC PEPTIDE: CPT | Performed by: FAMILY MEDICINE

## 2019-02-15 RX ORDER — AZITHROMYCIN 250 MG/1
TABLET, FILM COATED ORAL
Qty: 6 TABLET | Refills: 0 | Status: SHIPPED | OUTPATIENT
Start: 2019-02-15 | End: 2019-05-07

## 2019-02-16 NOTE — ED NOTES
D-Dimer 2.1 and Dr Love aware. Patient is on Cardiac,BP,oximetry. Report to Maryanne GOSS RN and care transferred.

## 2019-02-16 NOTE — ED PROVIDER NOTES
"  History     Chief Complaint   Patient presents with     Shortness of Breath     The history is provided by the patient.     Joselito Abarca is a 53 year old male who presents to the emergency department with complaints of shortness of breath. The patient is chronically short of breath, but says that it has worsened over the last 3 days. He says that he is not able to walk 10 feet without feeling like he is out of breath. Patient has tried using nebulizer treatments and albuterol and steroid inhalers, but has minimal relief. He is not wheezing. He does not have a cough.     The patient was here in the ED last year, in July with similar symptoms, and was given a 5 day course of Prednisone which he reports helped his breathing significantly. Patient denies any chest pain.     He does not have any leg swelling. He has not been on any long car trips or plane rides.  No prolonged immobility.  Patient was a previous smoker, but quit about 10 years ago.    Patient is scheduled to have abdominal surgery on Thursday, 6 days from now, to help his GERD. He is nervous about taking any medications today that might effect his surgery, but he does not want to be short of breath anymore.  He is hoping for a \"quick fix\" of antibiotics in hopes of improving so he can still have his surgery.      Allergies:  Allergies   Allergen Reactions     Vicodin [Hydrocodone-Acetaminophen] Nausea     Other reaction(s): Diaphoresis, Vomiting  HEADACHE       Problem List:    Patient Active Problem List    Diagnosis Date Noted     Gastroesophageal reflux disease, esophagitis presence not specified 09/21/2018     Priority: Medium     Chronic seasonal allergic rhinitis due to fungal spores 06/07/2018     Priority: Medium     Status post cervical spinal arthrodesis 11/29/2017     Priority: Medium     Chronic pain syndrome 12/13/2016     Priority: Medium     Patient is followed by Gregory G. Schoen, MD for ongoing prescription of pain medication.  " All refills should be approved by this provider, or covering partner.    Medication(s): Oxycodone 5 mg IR: Take 2 capsules (10 mg) by mouth every 4 hours as needed 2 caps q 4 hour prn pain up to 12 per day .   Methadone 10 mg three times daily, 90 per month  Clonazepam 0.5 mg tid, 90 per month   Clinic visit frequency required: Q 3 months     Controlled substance agreement:  Encounter-Level CSA - 2/27/15:               Controlled Substance Agreement - Scan on 3/14/2015  8:47 AM : Controlled Medication Agreement-02/27/15 (below)            Pain Clinic evaluation in the past: Yes    DIRE Total Score(s):  No flowsheet data found.    Last [facility] website verification:  10/30/2016     [email or web address]         Moderate persistent asthma without complication 11/02/2016     Priority: Medium     Acute respiratory failure with hypoxia (H) 04/29/2016     Priority: Medium     Nausea with vomiting 04/27/2016     Priority: Medium     Cough 03/06/2016     Priority: Medium     Leukocytosis 02/16/2016     Priority: Medium     Disease of bronchial airway (HCC) 02/12/2016     Priority: Medium     Degeneration of cervical intervertebral disc 01/26/2015     Priority: Medium     Health Care Home 09/30/2013     Priority: Medium     Status:  Accepted  Care Coordinator:    Melissa Behl BSN, RN, PHN  [facility] Care Coordinator  [phone]     See Letters for [facility] Care Plan  Date:  April 26, 2016            Arthrodesis status 06/15/2011     Priority: Medium     Neck pain 06/15/2011     Priority: Medium     CARDIOVASCULAR SCREENING; LDL GOAL LESS THAN 160 10/31/2010     Priority: Medium     Intervertebral cervical disc disorder with myelopathy, cervical region 11/12/2009     Priority: Medium     Patient is followed by TIARA JIMENEZ for ongoing prescription of narcotic pain medicine.  Med: methadone 10 mg tid. Taking oxycodone up to 8 per day, 120 per month  Maximum use per month: 90  Expected duration: ongoing  Narcotic  agreement on file: YES  Clinic visit recommended: Q 3 months         Constipation 03/19/2008     Priority: Medium     Problem list name updated by automated process. Provider to review       Thoracic or lumbosacral neuritis or radiculitis, unspecified 03/19/2008     Priority: Medium     Esophageal reflux 07/09/2003     Priority: Medium     Generalized anxiety disorder 07/08/2003     Priority: Medium     Alcohol abuse, in remission 07/08/2003     Priority: Medium        Past Medical History:    Past Medical History:   Diagnosis Date     Allergic rhinitis      Anxiety      GERD (gastroesophageal reflux disease)      Neck pain 6/15/2011     Pneumonia      Uncomplicated asthma        Past Surgical History:    Past Surgical History:   Procedure Laterality Date     BRONCHOSCOPY (RIGID OR FLEXIBLE), DIAGNOSTIC N/A 8/7/2018    Procedure: COMBINED BRONCHOSCOPY (RIGID OR FLEXIBLE), LAVAGE;  Bronchoscopy with Lavage and Transbronchial Biopsy;  Surgeon: Yung Miranda MD;  Location: [facility]     COLONOSCOPY WITH CO2 INSUFFLATION N/A 9/24/2018    Procedure: COLONOSCOPY WITH CO2 INSUFFLATION;  Colon and upper endoscopy ;  Surgeon: Devin Pelayo MD;  Location: [facility]     COMBINED ESOPHAGOSCOPY, GASTROSCOPY, DUODENOSCOPY (EGD) WITH CO2 INSUFFLATION N/A 9/24/2018    Procedure: COMBINED ESOPHAGOSCOPY, GASTROSCOPY, DUODENOSCOPY (EGD) WITH CO2 INSUFFLATION;  Colon and upper endoscopy ;  Surgeon: Devin Pelayo MD;  Location: [facility]     DISCECTOMY LUMBAR POSTERIOR MICROSCOPIC ONE LEVEL  2/21/2012    Procedure:DISCECTOMY LUMBAR POSTERIOR MICROSCOPIC ONE LEVEL; LEFT T1-T2 THORASIC HEMILAMINECTOMY MICRODISCECTOMY WITH MEXTRIX II ; Surgeon:SHARON PURI; Location:[facility]     DISCECTOMY, FUSION CERVICAL ANTERIOR ONE LEVEL, COMBINED N/A 11/29/2017    Procedure: COMBINED DISCECTOMY, FUSION CERVICAL ANTERIOR ONE LEVEL;  Anterior Cervical Discectomy and Fusion Cervical Six - Cervical Seven, Exploration and  Revision Cervical Four - Cervical Six with Hardware Removal;  Surgeon: Nikolas Vasques MD;  Location: [facility]     ESOPHAGEAL IMPEDENCE FUNCTION TEST WITH 24 HOUR PH GREATER THAN 1 HOUR N/A 2018    Procedure: ESOPHAGEAL IMPEDENCE FUNCTION TEST WITH 24 HOUR PH GREATER THAN 1 HOUR;  Surgeon: Atif Oconnor MD;  Location: [facility]     ESOPHAGOSCOPY, GASTROSCOPY, DUODENOSCOPY (EGD), COMBINED N/A 2018    Procedure: COMBINED ESOPHAGOSCOPY, GASTROSCOPY, DUODENOSCOPY (EGD), BIOPSY SINGLE OR MULTIPLE;;  Surgeon: Devin Pelayo MD;  Location: [facility]     EXPLORE SPINE, REMOVE HARDWARE, COMBINED N/A 2017    Procedure: COMBINED EXPLORE SPINE, REMOVE HARDWARE;;  Surgeon: Nikolas Vasques MD;  [facility]     FUSION CERVICAL ANTERIOR TWO LEVELS  2010     HC DRAIN/INJ MAJOR JOINT/BURSA W/O US  2008    Left sacroiliac joint injection.     HC INJ EPIDURAL LUMBAR/SACRAL W/WO CONTRAST       HEAD & NECK SURGERY           HERNIA REPAIR       INJECT BLOCK MEDIAL BRANCH CERVICAL/THORACIC/LUMBAR Bilateral 2015    Procedure: INJECT BLOCK MEDIAL BRANCH CERVICAL / THORACIC / LUMBAR;  Surgeon: Ronald Driscoll MD;  [facility]     INJECT FACET JOINT Bilateral 2015    Procedure: INJECT FACET JOINT;  Surgeon: Ronald Driscoll MD;  [facility]     NERVE BLOCK OCCIPITAL Bilateral 2018    Procedure: NERVE BLOCK OCCIPITAL;  bilateral occipital nerve blocks;  Surgeon: Ronald Driscoll MD;  Location: [facility]       Family History:    Family History   Problem Relation Age of Onset     Family History Negative No family hx of      C.A.D. No family hx of        Social History:  Marital Status:  Single [1]  Social History     Tobacco Use     Smoking status: Former Smoker     Packs/day: 1.00     Years: 30.00     Pack years: 30.00     Types: Cigars     Last attempt to quit: 2009     Years since quittin.1     Smokeless tobacco: Former User     Quit date:    Substance Use  Topics     Alcohol use: No     Alcohol/week: 0.0 oz     Comment: HX OF ABUSE-IN REMISSION     Drug use: No        Medications:      azithromycin (ZITHROMAX Z-SAMRA) 250 MG tablet   Cholecalciferol (VITAMIN D) 2000 units tablet   citalopram (CELEXA) 20 MG tablet   clonazePAM (KLONOPIN) 0.5 MG tablet   FLOVENT  MCG/ACT Inhaler   fluticasone (FLONASE) 50 MCG/ACT spray   gabapentin (NEURONTIN) 300 MG capsule   ipratropium - albuterol 0.5 mg/2.5 mg/3 mL (DUONEB) 0.5-2.5 (3) MG/3ML neb solution   methadone (DOLOPHINE) 10 MG tablet   omeprazole (PRILOSEC) 40 MG capsule   order for DME   oxyCODONE (OXY-IR) 5 MG capsule   ranitidine (ZANTAC) 150 MG tablet   sildenafil (VIAGRA) 100 MG tablet   traZODone (DESYREL) 100 MG tablet   VENTOLIN  (90 Base) MCG/ACT inhaler   zolpidem (AMBIEN) 10 MG tablet         Review of Systems   All other systems reviewed and are negative.      Physical Exam   BP: 119/74  Heart Rate: 109  Temp: 99  F (37.2  C)  Resp: 19  Weight: 77.1 kg (170 lb)  SpO2: 96 %      Physical Exam   Constitutional: He is oriented to person, place, and time. He appears well-developed and well-nourished. No distress.   HENT:   Head: Normocephalic.   Mouth/Throat: Oropharynx is clear and moist.   Eyes: EOM are normal.   Neck: Neck supple.   Cardiovascular: Regular rhythm and intact distal pulses. Tachycardia present.   Pulmonary/Chest: Effort normal and breath sounds normal. No respiratory distress. He has no wheezes. He has no rales.   Musculoskeletal: Normal range of motion. He exhibits no edema or tenderness.   Neurological: He is alert and oriented to person, place, and time.   Skin: Skin is warm and dry.   Psychiatric: He has a normal mood and affect.       ED Course  (with Medical Decision Making)    53-year-old gentleman with a history of moderate persistent asthma with chronic shortness of breath.  Also has severe reflux and is scheduled for surgery this next week for reflux and small hiatal hernia.   Has had increasing dyspnea on exertion the last 3 days but denies any fevers, cough, wheezing or chest pain.  He has tried his nebs and inhalers without relief.  He felt like this last summer and improved with a course of prednisone and antibiotics and is hoping for the same in order to feel better and hopefully still be able to have his surgery.    On exam, his lungs sound good.  He has no wheezing.  Is able to speak in full sentences his O2 sats are 96% on room air.  He is a bit tachycardic.  No lower extremity edema or calf/thigh tenderness.    I think a PE is unlikely but we certainly do not want to miss one leading up to his upcoming surgery.  He is little tachycardic and has a shortness of breath with clear lungs and no response to his nebs.  IV placed.  Labs drawn including a d-dimer, BNP, CBC, basic, Troponin and repeat his EKG.  If his d-dimer is negative, will get a PA and lateral chest x-ray.  If it is elevated, will proceed to CT scan.    I did send a lactic acid in case his white count comes back elevated because then he would meet SIRS criteria.  If his white count is normal, he would not meet SIRS criteria with the only vital sign abnormality being mild tachycardia.    D-dimer normal at 0.5.  White count was normal at 10.5.  VBG was unremarkable.  Troponin was undetectable.  BNP normal at 35.  Lactic acid up to slightly at 2.1.  I do not think he is septic.  This is probably up because of all the albuterol he has been using today.    Chest x-ray ordered.  It shows a small nodular opacity in the right mid lung laterally.  Follow-up is recommended.  Otherwise the lungs are clear.    EKG showed a sinus tachycardia at 109 bpm.  Otherwise normal EKG.    He sitting comfortably in the ED room in no acute distress.  He is breathing easily.  Sats are in the upper 90s on room air.  His workup is really unremarkable.  He is very concerned about getting more ill before his upcoming surgery. If he didn't have that  coming up next week, I'd have him just wait and see how things progress.  Since he has surgery next week, I agreed to give him a Zpak to cover any potential bronchitis that could be starting to brew as he feels this has helped in the past.  I told him since his lungs sound good and he has no wheezing we should hold off on prednisone since that may slow the healing process after his surgery.  He is much more comfortable with this plan.      He kindly and politely asked if we could speed his discharge up because he has a date tonight that he is late for.  He is up and around the ED without difficulty.  He does have a small opacity in the right lateral mid lung field that will need follow-up as an outpatient.  I asked him to follow-up with Dr. Schoen in that regard.  He just had a CT scan in August of last year.          Procedures               EKG Interpretation:      Interpreted by Don Love  Time reviewed: 2028  Symptoms at time of EKG: VEGA   Rhythm: Sinus tachycardia  Rate: 109 bpm  Axis: normal  Ectopy: none  Conduction: normal  ST Segments/ T Waves: No ST-T wave changes  Q Waves: none  Comparison to prior: Unchanged from 2/4/2019    Clinical Impression: Sinus tachycardia at 109 bpm.  Otherwise normal EKG.  No change from previous.          Critical Care time:  none               Results for orders placed or performed during the hospital encounter of 02/15/19 (from the past 24 hour(s))   CBC with platelets differential   Result Value Ref Range    WBC 10.5 4.0 - 11.0 10e9/L    RBC Count 4.46 4.4 - 5.9 10e12/L    Hemoglobin 12.9 (L) 13.3 - 17.7 g/dL    Hematocrit 40.4 40.0 - 53.0 %    MCV 91 78 - 100 fl    MCH 28.9 26.5 - 33.0 pg    MCHC 31.9 31.5 - 36.5 g/dL    RDW 13.2 10.0 - 15.0 %    Platelet Count 169 150 - 450 10e9/L    Diff Method Automated Method     % Neutrophils 81.5 %    % Lymphocytes 11.6 %    % Monocytes 5.2 %    % Eosinophils 1.3 %    % Basophils 0.2 %    % Immature Granulocytes 0.2 %     Nucleated RBCs 0 0 /100    Absolute Neutrophil 8.6 (H) 1.6 - 8.3 10e9/L    Absolute Lymphocytes 1.2 0.8 - 5.3 10e9/L    Absolute Monocytes 0.6 0.0 - 1.3 10e9/L    Absolute Basophils 0.0 0.0 - 0.2 10e9/L    Abs Immature Granulocytes 0.0 0 - 0.4 10e9/L    Absolute Nucleated RBC 0.0    Basic metabolic panel   Result Value Ref Range    Sodium 134 133 - 144 mmol/L    Potassium 3.5 3.4 - 5.3 mmol/L    Chloride 97 94 - 109 mmol/L    Carbon Dioxide 29 20 - 32 mmol/L    Anion Gap 8 3 - 14 mmol/L    Glucose 113 (H) 70 - 99 mg/dL    Urea Nitrogen 11 7 - 30 mg/dL    Creatinine 0.90 0.66 - 1.25 mg/dL    GFR Estimate >90 >60 mL/min/[1.73_m2]    GFR Estimate If Black >90 >60 mL/min/[1.73_m2]    Calcium 8.7 8.5 - 10.1 mg/dL   Lactic acid whole blood   Result Value Ref Range    Lactic Acid 2.1 (H) 0.7 - 2.0 mmol/L   D dimer quantitative   Result Value Ref Range    D Dimer 0.5 0.0 - 0.50 ug/ml FEU   INR   Result Value Ref Range    INR 0.94 0.86 - 1.14   Partial thromboplastin time   Result Value Ref Range    PTT 29 22 - 37 sec   Blood gas venous   Result Value Ref Range    Ph Venous 7.38 7.32 - 7.43 pH    PCO2 Venous 52 (H) 40 - 50 mm Hg    PO2 Venous 33 25 - 47 mm Hg    Bicarbonate Venous 30 (H) 21 - 28 mmol/L    Base Excess Venous 3.8 mmol/L    FIO2 21%    Nt probnp inpatient (BNP)   Result Value Ref Range    N-Terminal Pro BNP Inpatient 35 0 - 900 pg/mL   Troponin I   Result Value Ref Range    Troponin I ES <0.015 0.000 - 0.045 ug/L   XR Chest 2 Views    Narrative    CHEST TWO VIEWS February 15, 2019 8:25 PM     HISTORY: Dyspnea on exertion.     COMPARISON: 11/29/2018.      Impression    IMPRESSION: Small nodular opacity in the right mid lung laterally.  Follow-up recommended. Otherwise unremarkable.       Medications - No data to display    Assessments & Plan     I have reviewed the nursing notes.    I have reviewed the findings, diagnosis, plan and need for follow up with the patient.          Medication List      Started     azithromycin 250 MG tablet  Commonly known as:  ZITHROMAX Z-SAMRA  2 tabs on Day 1, then 1 tab daily on Days 2-5            Final diagnoses:   Dyspnea on exertion   Lung nodule - opacity, right mid lung field, laterally, needs follow up     This document serves as a record of services personally performed by Don Love,*. It was created on their behalf by Giovanna Wolf, a trained medical scribe. The creation of this record is based on the provider's personal observations and the statements of the patient. This document has been checked and approved by the attending provider.  Note: Chart documentation done in part with Dragon Voice Recognition software. Although reviewed after completion, some word and grammatical errors may remain.    2/15/2019   [facility] EMERGENCY DEPARTMENT     Don Love MD  02/15/19 [id number]

## 2019-02-16 NOTE — ED TRIAGE NOTES
"Pt presents with concerns of shortness of breath.  Pt states that it started a few days ago.  Denies cough.  States that he has been \"getting headaches, maybe due to lack of oxygen\".  Pt able to talk full sentences without needing to stop to breath.  Pt is having surgery on Thursday, abdominal surgery.  Pt states that he would like a quick fix for this due to the surgery, ie \"maybe 5 days of antibiotics\".   "

## 2019-02-16 NOTE — DISCHARGE INSTRUCTIONS
Your blood work was reassuring tonight.  Your chest x-ray did not show any pneumonia but there is a small spot on the right lung laterally that will need follow-up as we discussed.  Dr. Schoen can coordinate this.  Take the Zithromax as directed.  Continue the rest of your current medications.  It was nice visiting with you again tonight.   I hope you feel better soon.   Good luck with your upcoming surgery and recovery.    Thank you for choosing [facility]. We appreciate the opportunity to meet your urgent medical needs. Please let us know if we could have done anything to make your stay more satisfying.    After discharge, please closely monitor for any new or worsening symptoms. Return to the Emergency Department if you develop any acute worsening signs or symptoms.    If you had lab work, cultures or imaging studies done during your stay, the final results may still be pending. We will call you if your plan of care needs to change. However, if you are not improving as expected, please follow up with your primary care provider or clinic.     Start any prescription medications that were prescribed to you and take them as directed.     Please see additional handouts that may be pertinent to your condition.

## 2019-02-18 ENCOUNTER — TELEPHONE (OUTPATIENT)
Dept: GASTROENTEROLOGY | Facility: CLINIC | Age: 54
End: 2019-02-18

## 2019-02-18 ENCOUNTER — PATIENT OUTREACH (OUTPATIENT)
Dept: CARE COORDINATION | Facility: CLINIC | Age: 54
End: 2019-02-18

## 2019-02-18 NOTE — TELEPHONE ENCOUNTER
1st attempt to reach the patient and schedule a 4 month follow up, due May 2019, with Dr. Pelayo; [facility] and sent reminder letter.    Elsa Rucker  [facility]  Adult Med Spec/Surg Spec   [phone]

## 2019-02-18 NOTE — PROGRESS NOTES
Clinic Care Coordination Contact  [facility]/Voicemail    Referral Source: ED Follow-Up  SOB. (Pt is scheduled for surgery this week per chart review.  No pre op scheduled. In voicemail asked if patient needs at pre op, if so, advised to call clinic)   Clinical Data: Care Coordinator Outreach  Outreach attempted x 1.  Left message on voicemail with call back information and requested return call.  Plan:  Care Coordinator will try to reach patient again in 1-2 business days.

## 2019-02-20 ENCOUNTER — PATIENT OUTREACH (OUTPATIENT)
Dept: CARE COORDINATION | Facility: CLINIC | Age: 54
End: 2019-02-20

## 2019-02-20 DIAGNOSIS — F41.1 GENERALIZED ANXIETY DISORDER: ICD-10-CM

## 2019-02-20 DIAGNOSIS — M50.30 DDD (DEGENERATIVE DISC DISEASE), CERVICAL: ICD-10-CM

## 2019-02-20 DIAGNOSIS — M50.00 INTERVERTEBRAL CERVICAL DISC DISORDER WITH MYELOPATHY, CERVICAL REGION: ICD-10-CM

## 2019-02-20 NOTE — LETTER
[place] CARE COORDINATION  [address] [place] [address]    February 20, 2019    Joselito Abarca  [address]   [place] [address]      Dear Joselito,    I am a clinic care coordinator who works with Gregory G. Schoen, MD.  I wanted to introduce myself and provide you with my contact information for you to be able to call me with any questions or concerns. I wanted to introduce myself and provide you with my contact information so that you can call me with questions or concerns about your health care. Below is a description of clinic care coordination and how I can further assist you.     The clinic care coordinator is a registered nurse and/or  who understand the health care system. The goal of clinic care coordination is to help you manage your health and improve access to the [facility] system in the most efficient manner. The registered nurse can assist you in meeting your health care goals by providing education, coordinating services, and strengthening the communication among your providers. The  can assist you with financial, behavioral, psychosocial, chemical dependency, counseling, and/or psychiatric resources.    Please feel free to contact me at [phone], with any questions or concerns. We at [facility] are focused on providing you with the highest-quality healthcare experience possible and that all starts with you.     Sincerely,       Maribell Melara RN  Clinic Care Coordinator   [facility] [place], [place] and [facility]   Phone: [phone]

## 2019-02-21 ENCOUNTER — TRANSFERRED RECORDS (OUTPATIENT)
Dept: HEALTH INFORMATION MANAGEMENT | Facility: CLINIC | Age: 54
End: 2019-02-21

## 2019-02-21 RX ORDER — OXYCODONE HYDROCHLORIDE 5 MG/1
10 CAPSULE ORAL EVERY 4 HOURS PRN
Qty: 360 CAPSULE | Refills: 0 | Status: SHIPPED | OUTPATIENT
Start: 2019-02-21 | End: 2019-03-21

## 2019-02-21 RX ORDER — METHADONE HYDROCHLORIDE 10 MG/1
10 TABLET ORAL EVERY 8 HOURS PRN
Qty: 90 TABLET | Refills: 0 | Status: SHIPPED | OUTPATIENT
Start: 2019-02-21 | End: 2019-03-21

## 2019-02-21 RX ORDER — CLONAZEPAM 0.5 MG/1
0.25-0.5 TABLET ORAL 3 TIMES DAILY PRN
Qty: 90 TABLET | Refills: 0 | Status: SHIPPED | OUTPATIENT
Start: 2019-02-21 | End: 2019-04-01

## 2019-02-21 NOTE — TELEPHONE ENCOUNTER
Requested Prescriptions   Pending Prescriptions Disp Refills     clonazePAM (KLONOPIN) 0.5 MG tablet 90 tablet 0     Sig: Take 0.5-1 tablets (0.25-0.5 mg) by mouth 3 times daily as needed for anxiety    There is no refill protocol information for this order   Last Written Prescription Date:  1/23/19  Last Fill Quantity: 90,  # refills: 0   Last office visit: 2/4/2019 with prescribing provider:     Future Office Visit:   Next 5 appointments (look out 90 days)    May 07, 2019  3:30 PM CDT  Return Visit with Devin Pelayo MD  [facility]) [address] [place] [id number]  [phone]       Routing refill request to provider for review/approval because:  Drug not on the [facility] refill protocol                    oxyCODONE (OXY-IR) 5 MG capsule 360 capsule 0     Sig: Take 2 capsules (10 mg) by mouth every 4 hours as needed 2 caps q 4 hour prn pain up to 12 per day May fill 5/24/2012    There is no refill protocol information for this order   Last Written Prescription Date:  1/23/19  Last Fill Quantity: 360,  # refills: 0   Last office visit: 2/4/2019 with prescribing provider:     Future Office Visit:   Next 5 appointments (look out 90 days)    May 07, 2019  3:30 PM CDT  Return Visit with Devin Pelayo MD  [facility]) [address] [place] [id number]  [phone]       Routing refill request to provider for review/approval because:  Drug not on the [facility] refill protocol                    methadone (DOLOPHINE) 10 MG tablet 90 tablet 0     Sig: Take 1 tablet (10 mg) by mouth every 8 hours as needed    There is no refill protocol information for this order      Last Written Prescription Date:  1/23/19  Last Fill Quantity: 90,  # refills: 0   Last office visit: 2/4/2019 with prescribing provider:     Future Office Visit:   Next 5 appointments (look out 90 days)    May 07, 2019   3:30 PM CDT  Return Visit with [facility]in [facility]o MD  [facility] [address] [place] [id number] [phone]       Routing refill request to provider for review/approval because:  Drug not on the [facility] refill protocol     Ana Lazo RN

## 2019-02-22 LAB
CREAT SERPL-MCNC: 1.02 MG/DL (ref 0.7–1.3)
GFR SERPL CREATININE-BSD FRML MDRD: >60 ML/MIN/1.73M2
GLUCOSE SERPL-MCNC: 90 MG/DL (ref 70–110)
POTASSIUM SERPL-SCNC: 4.1 MMOL/L (ref 3.5–5.1)

## 2019-03-04 ENCOUNTER — TELEPHONE (OUTPATIENT)
Dept: SURGERY | Facility: CLINIC | Age: 54
End: 2019-03-04

## 2019-03-04 NOTE — TELEPHONE ENCOUNTER
Patient left a message on 3-1-19 @ 6:30 PM wanting to know when he could start drinking carbonated water post surgery. Please call to advise. Thank you.

## 2019-03-04 NOTE — TELEPHONE ENCOUNTER
Called and left a vm recommending that patient hold off carbonated beverages until his post-op apt on 3/11/19. Provided him with call back number if he has additional questions.    Nick Sequeira RN....3/4/2019 8:58 AM

## 2019-03-06 ENCOUNTER — PATIENT OUTREACH (OUTPATIENT)
Dept: CARE COORDINATION | Facility: CLINIC | Age: 54
End: 2019-03-06

## 2019-03-06 NOTE — PROGRESS NOTES
Clinic Care Coordination Contact    Care Coordination Contact Attempt        Clinical Data: Clinic Care Coordinator RN attempted to contact patient .  Unable to contact letter was sent to patient . There has been no response from patient.        Plan:  Case closed.

## 2019-03-11 ENCOUNTER — OFFICE VISIT (OUTPATIENT)
Dept: SURGERY | Facility: CLINIC | Age: 54
End: 2019-03-11
Payer: COMMERCIAL

## 2019-03-11 VITALS
WEIGHT: 170 LBS | HEART RATE: 58 BPM | SYSTOLIC BLOOD PRESSURE: 91 MMHG | BODY MASS INDEX: 26.68 KG/M2 | HEIGHT: 67 IN | DIASTOLIC BLOOD PRESSURE: 60 MMHG

## 2019-03-11 DIAGNOSIS — Z98.890 S/P LAPAROSCOPIC FUNDOPLICATION: Primary | ICD-10-CM

## 2019-03-11 PROCEDURE — 99024 POSTOP FOLLOW-UP VISIT: CPT | Performed by: SURGERY

## 2019-03-11 ASSESSMENT — MIFFLIN-ST. JEOR: SCORE: 1584.74

## 2019-03-11 NOTE — PROGRESS NOTES
"General Surgery Post Op    Pt returns for follow up visit s/p robotic hiatal hernia + toupet fundoplication on 2/21/19.    Patient has been doing well, tolerating soft diet. Bowels moving well. Pain controlled. No issues with wound healing/redness/drainage. No fevers.  No dysphagia  Basically no heartburn (felt like maybe the start of some a few times and took some tums only). Much improved compared to pre-op  Able to sleep flat on bed without the wedge  Not using the Prilosec or zantac since surgery    Physical exam: Vitals: BP 91/60   Pulse 58   Ht 1.702 m (5' 7\")   Wt 77.1 kg (170 lb)   BMI 26.63 kg/m    BMI= Body mass index is 26.63 kg/m .    Exam:  Constitutional: healthy, alert and no distress  Cardiovascular: negative, PMI normal. No lifts, heaves, or thrills. RRR. No murmurs, clicks gallops or rub  Respiratory: negative, Percussion normal. Good diaphragmatic excursion. Lungs clear  Gastrointestinal: Abdomen soft, non-tender. BS normal. No masses, organomegaly  Incisions healing well      Assessment:     ICD-10-CM    1. S/P laparoscopic fundoplication Z98.890      Plan: Doing well after surgery. Will stay on soft foods now about 1 more month. Follow up with me in 1 month to recheck and likely advance to regular foods. Advised to make sure he follows up with Dr Pelayo for ongoing Olson's monitoring, he should have appointment mid May.    Ronald Hernandez MD      "

## 2019-03-11 NOTE — LETTER
"    3/11/2019         RE: Joselito Abarca  [address]  [facility] [address]        Dear Colleague,    Thank you for referring your patient, Joselito Abarca, to the [facility]. Please see a copy of my visit note below.    General Surgery Post Op    Pt returns for follow up visit s/p robotic hiatal hernia + toupet fundoplication on 2/21/19.    Patient has been doing well, tolerating soft diet. Bowels moving well. Pain controlled. No issues with wound healing/redness/drainage. No fevers.  No dysphagia  Basically no heartburn (felt like maybe the start of some a few times and took some tums only). Much improved compared to pre-op  Able to sleep flat on bed without the wedge  Not using the Prilosec or zantac since surgery    Physical exam: Vitals: BP 91/60   Pulse 58   Ht 1.702 m (5' 7\")   Wt 77.1 kg (170 lb)   BMI 26.63 kg/m     BMI= Body mass index is 26.63 kg/m .    Exam:  Constitutional: healthy, alert and no distress  Cardiovascular: negative, PMI normal. No lifts, heaves, or thrills. RRR. No murmurs, clicks gallops or rub  Respiratory: negative, Percussion normal. Good diaphragmatic excursion. Lungs clear  Gastrointestinal: Abdomen soft, non-tender. BS normal. No masses, organomegaly  Incisions healing well      Assessment:     ICD-10-CM    1. S/P laparoscopic fundoplication Z98.890      Plan: Doing well after surgery. Will stay on soft foods now about 1 more month. Follow up with me in 1 month to recheck and likely advance to regular foods. Advised to make sure he follows up with Dr Pelayo for ongoing Olson's monitoring, he should have appointment mid May.    Ronald Hernandez MD        Again, thank you for allowing me to participate in the care of your patient.        Sincerely,        Ronald Hernandez MD    "

## 2019-03-20 DIAGNOSIS — M50.30 DDD (DEGENERATIVE DISC DISEASE), CERVICAL: ICD-10-CM

## 2019-03-20 DIAGNOSIS — M50.00 INTERVERTEBRAL CERVICAL DISC DISORDER WITH MYELOPATHY, CERVICAL REGION: ICD-10-CM

## 2019-03-20 NOTE — TELEPHONE ENCOUNTER
Oxycodone 5 MG       Last Written Prescription Date:  2/21/19  Last Fill Quantity: 360,   # refills: 0  Last Office Visit: 2/4/19  Future Office visit:    Next 5 appointments (look out 90 days)    May 07, 2019  3:30 PM CDT  [facility] Visit with Devin [facility]  [facility] [address] [place] [id number] [phone]           Routing refill request to provider for review/approval because:  Drug not on the [facility], [facility] or [facility] refill protocol or controlled substance

## 2019-03-20 NOTE — TELEPHONE ENCOUNTER
Methadone 10 MG       Last Written Prescription Date:  2/21/19  Last Fill Quantity: 90,   # refills: 0  Last Office Visit: 2/4/19  Future Office visit:    Next 5 appointments (look out 90 days)    May 07, 2019  3:30 PM CDT  Return Visit with Devin Pelayo MD  [facility] ([facility]) [address] [place] [address] [phone]           Routing refill request to provider for review/approval because:  Drug not on the [facility], [facility] or [facility] refill protocol or controlled substance

## 2019-03-21 RX ORDER — METHADONE HYDROCHLORIDE 10 MG/1
10 TABLET ORAL EVERY 8 HOURS PRN
Qty: 90 TABLET | Refills: 0 | Status: SHIPPED | OUTPATIENT
Start: 2019-03-21 | End: 2019-04-23

## 2019-03-21 RX ORDER — OXYCODONE HYDROCHLORIDE 5 MG/1
10 CAPSULE ORAL EVERY 4 HOURS PRN
Qty: 360 CAPSULE | Refills: 0 | Status: SHIPPED | OUTPATIENT
Start: 2019-03-21 | End: 2019-04-23

## 2019-03-29 DIAGNOSIS — F41.1 GENERALIZED ANXIETY DISORDER: ICD-10-CM

## 2019-03-29 NOTE — TELEPHONE ENCOUNTER
Clonazepam  Last Written Prescription Date:  02/21/2019  Last Fill Quantity: 90,  # refills: 0   Last office visit: 2/4/2019 with prescribing provider:  Schoen   Future Office Visit:   Next 5 appointments (look out 90 days)    May 07, 2019  3:30 PM CDT  Return Visit with Devin Pelayo MD  [email or web address] ([email or web address] [address] [place] [address] [phone]      Routing refill request to provider for review/approval because:  Drug not on the [facility] refill protocol     Ailyn Tobias RN

## 2019-04-01 RX ORDER — CLONAZEPAM 0.5 MG/1
0.25-0.5 TABLET ORAL 3 TIMES DAILY PRN
Qty: 90 TABLET | Refills: 0 | Status: SHIPPED | OUTPATIENT
Start: 2019-04-01 | End: 2019-05-03

## 2019-04-22 DIAGNOSIS — M50.00 INTERVERTEBRAL CERVICAL DISC DISORDER WITH MYELOPATHY, CERVICAL REGION: ICD-10-CM

## 2019-04-22 DIAGNOSIS — M50.30 DDD (DEGENERATIVE DISC DISEASE), CERVICAL: ICD-10-CM

## 2019-04-23 RX ORDER — OXYCODONE HYDROCHLORIDE 5 MG/1
10 CAPSULE ORAL EVERY 4 HOURS PRN
Qty: 360 CAPSULE | Refills: 0 | Status: SHIPPED | OUTPATIENT
Start: 2019-04-23 | End: 2019-05-20

## 2019-04-23 RX ORDER — METHADONE HYDROCHLORIDE 10 MG/1
10 TABLET ORAL EVERY 8 HOURS PRN
Qty: 90 TABLET | Refills: 0 | Status: SHIPPED | OUTPATIENT
Start: 2019-04-23 | End: 2019-05-20

## 2019-04-23 NOTE — TELEPHONE ENCOUNTER
Methadone  Last Written Prescription Date:  03/21/2019  Last Fill Quantity: 90,  # refills: 0   Last office visit: 2/4/2019 with prescribing provider:  Schoen Future Office Visit:   Next 5 appointments (look out 90 days)    May 07, 2019  3:30 PM CDT  Return Visit with Devin Pelayo MD  [facility] ([facility]) [address] [place] [id number]  [phone]         Oxycodone  Last Written Prescription Date:  03/21/2019  Last Fill Quantity: 360,  # refills: 0   Last office visit: 2/4/2019 with prescribing provider:  Schoen Future Office Visit:   Next 5 appointments (look out 90 days)    May 07, 2019  3:30 PM CDT  Return Visit with Devin Pelayo MD  [facility]) [address] [place] [id number]  [phone]         Routing refill request to provider for review/approval because:  Drug not on the [facility] refill protocol     Ailyn Tobias RN

## 2019-05-02 DIAGNOSIS — F41.1 GENERALIZED ANXIETY DISORDER: ICD-10-CM

## 2019-05-03 RX ORDER — CLONAZEPAM 0.5 MG/1
0.25-0.5 TABLET ORAL 3 TIMES DAILY PRN
Qty: 90 TABLET | Refills: 0 | Status: SHIPPED | OUTPATIENT
Start: 2019-05-03 | End: 2019-06-03

## 2019-05-03 NOTE — TELEPHONE ENCOUNTER
Klonopin  Last Written Prescription Date:  04/01/2019  Last Fill Quantity: 90,  # refills: 0   Last office visit: 2/4/2019 with prescribing provider:  Schoen   Future Office Visit:   Next 5 appointments (look out 90 days)    May 07, 2019  3:30 PM CDT  Return Visit with Devin Pelayo MD  [facility] ([facility]) [address] [place] [address] [phone]         Routing refill request to provider for review/approval because:  Drug not on the [facility] refill protocol     Ailyn Tobias RN

## 2019-05-06 DIAGNOSIS — M50.30 DEGENERATION OF CERVICAL INTERVERTEBRAL DISC: ICD-10-CM

## 2019-05-07 ENCOUNTER — OFFICE VISIT (OUTPATIENT)
Dept: GASTROENTEROLOGY | Facility: CLINIC | Age: 54
End: 2019-05-07
Payer: COMMERCIAL

## 2019-05-07 VITALS
HEART RATE: 67 BPM | BODY MASS INDEX: 26.1 KG/M2 | DIASTOLIC BLOOD PRESSURE: 66 MMHG | SYSTOLIC BLOOD PRESSURE: 95 MMHG | HEIGHT: 67 IN | WEIGHT: 166.3 LBS | OXYGEN SATURATION: 99 %

## 2019-05-07 DIAGNOSIS — K22.70 BARRETT'S ESOPHAGUS WITHOUT DYSPLASIA: Primary | ICD-10-CM

## 2019-05-07 DIAGNOSIS — K59.09 CHRONIC CONSTIPATION: ICD-10-CM

## 2019-05-07 PROCEDURE — 99214 OFFICE O/P EST MOD 30 MIN: CPT | Performed by: INTERNAL MEDICINE

## 2019-05-07 RX ORDER — CHOLECALCIFEROL (VITAMIN D3) 50 MCG
TABLET ORAL
Qty: 100 TABLET | Refills: 3 | Status: SHIPPED | OUTPATIENT
Start: 2019-05-07 | End: 2020-07-10

## 2019-05-07 ASSESSMENT — MIFFLIN-ST. JEOR: SCORE: 1567.96

## 2019-05-07 ASSESSMENT — PAIN SCALES - GENERAL: PAINLEVEL: NO PAIN (0)

## 2019-05-07 NOTE — NURSING NOTE
"Joselito Abarca's goals for this visit include:   Chief Complaint   Patient presents with     RECHECK     4 month follow up       He requests these members of his care team be copied on today's visit information: yes    PCP: Schoen, Gregory G    Referring Provider:  No referring provider defined for this encounter.    BP 95/66   Pulse 67   Ht 1.702 m (5' 7\")   Wt 75.4 kg (166 lb 4.8 oz)   SpO2 99%   BMI 26.05 kg/m      Do you need any medication refills at today's visit? No    Franca Riggs CMA      "

## 2019-05-07 NOTE — TELEPHONE ENCOUNTER
"Vitamin D3  Last Written Prescription Date:  04/30/2018  Last Fill Quantity: 100,  # refills: 3   Last office visit: 2/4/2019 with prescribing provider:  schoen   Future Office Visit:   Next 5 appointments (look out 90 days)    May 07, 2019  3:30 PM CDT  Return Visit with Devin Pelayo MD  [facility] ([facility]) [address] [place] [address] [phone]      Prescription approved per [facility] Refill Protocol.    Requested Prescriptions   Pending Prescriptions Disp Refills     vitamin D3 (CHOLECALCIFEROL) 2000 units tablet [Pharmacy Med Name: VITAMIN D3 TAB 2000UNIT] 100 tablet 3     Sig: TAKE ONE TABLET BY MOUTH EVERY DAY       Vitamin Supplements (Adult) Protocol Passed - 5/6/2019  5:17 PM        Passed - High dose Vitamin D not ordered        Passed - Recent (12 mo) or future (30 days) visit within the authorizing provider's specialty     Patient had office visit in the last 12 months or has a visit in the next 30 days with authorizing provider or within the authorizing provider's specialty.  See \"Patient Info\" tab in inbasket, or \"Choose Columns\" in Meds & Orders section of the refill encounter.          Passed - Medication is active on med list      Ailyn Tobias RN   "

## 2019-05-07 NOTE — PATIENT INSTRUCTIONS
Increase to 2 capfuls of miralax per day    If not bowel in 3 days, take 1-2 senna tabs    Schedule EGD and colonoscopy with MAC in 2-3 months.  Do extended prep.

## 2019-05-08 NOTE — PROGRESS NOTES
GASTROENTEROLOGY FOLLOW UP CLINIC VISIT    CC/REFERRING MD:    Schoen, Gregory G Nathaniel T Gaeckle    REASON FOR CONSULTATION:   No ref. 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  He previously was seen in this office.  Since his last, he underwent robotic hiatal hernia repair plus toupee fundoplication in February.  His surgery was uncomplicated and he is feeling very well.  He currently has no symptoms of reflux.  He feels that his shortness of breath has improved.  He is off PPI and H2 blockers.  He has taken Tums as needed a few times.  He still has some symptoms of bloating and air trapping but there are minor nature.  He is not having any significant dysphagia.  He is still having significant constipation issues with bowel movements every few weeks.  He is taking MiraLAX 1 scoop per day.  He remains on chronic narcotics.    PERTINENT PAST MEDICAL HISTORY:    Past Medical History:   Diagnosis Date     Allergic rhinitis      Anxiety      GERD (gastroesophageal reflux disease)      Neck pain 6/15/2011     Pneumonia      Uncomplicated asthma        PREVIOUS SURGERIES:   Past Surgical History:   Procedure Laterality Date     BRONCHOSCOPY (RIGID OR FLEXIBLE), DIAGNOSTIC N/A 8/7/2018    Procedure: COMBINED BRONCHOSCOPY (RIGID OR FLEXIBLE), LAVAGE;  Bronchoscopy with Lavage and Transbronchial Biopsy;  Surgeon: Yung Miranda MD;  [facility]     COLONOSCOPY WITH CO2 INSUFFLATION N/A 9/24/2018    Procedure: COLONOSCOPY WITH CO2 INSUFFLATION;  Colon and upper endoscopy ;  Surgeon: Devin Pelayo MD;  Location:  OR     COMBINED ESOPHAGOSCOPY, GASTROSCOPY, DUODENOSCOPY (EGD) WITH CO2 INSUFFLATION N/A 9/24/2018    Procedure: COMBINED ESOPHAGOSCOPY,  GASTROSCOPY, DUODENOSCOPY (EGD) WITH CO2 INSUFFLATION;  Colon and upper endoscopy ;  Surgeon: Devin Pelayo MD;  Location: [facility]     DISCECTOMY LUMBAR POSTERIOR MICROSCOPIC ONE LEVEL  2/21/2012    Procedure:DISCECTOMY LUMBAR POSTERIOR MICROSCOPIC ONE LEVEL; LEFT T1-T2 THORASIC HEMILAMINECTOMY MICRODISCECTOMY WITH MEXTRIX II ; Surgeon:SHARON PURI; Location:[facility]     DISCECTOMY, FUSION CERVICAL ANTERIOR ONE LEVEL, COMBINED N/A 11/29/2017    Procedure: COMBINED DISCECTOMY, FUSION CERVICAL ANTERIOR ONE LEVEL;  Anterior Cervical Discectomy and Fusion Cervical Six - Cervical Seven, Exploration and Revision Cervical Four - Cervical Six with Hardware Removal;  Surgeon: Nikolas Vasques MD;  Location: [facility]     ESOPHAGEAL IMPEDENCE FUNCTION TEST WITH 24 HOUR PH GREATER THAN 1 HOUR N/A 12/12/2018    Procedure: ESOPHAGEAL IMPEDENCE FUNCTION TEST WITH 24 HOUR PH GREATER THAN 1 HOUR;  Surgeon: Atif Oconnor MD;  [facility]     ESOPHAGOSCOPY, GASTROSCOPY, DUODENOSCOPY (EGD), COMBINED N/A 9/24/2018    Procedure: COMBINED ESOPHAGOSCOPY, GASTROSCOPY, DUODENOSCOPY (EGD), BIOPSY SINGLE OR MULTIPLE;;  Surgeon: Devin Pelayo MD;  Location: [facility]     EXPLORE SPINE, REMOVE HARDWARE, COMBINED N/A 11/29/2017    Procedure: COMBINED EXPLORE SPINE, REMOVE HARDWARE;;  Surgeon: Nikolas Vasques MD;  [facility]: [facility]     FUSION CERVICAL ANTERIOR TWO LEVELS  1/29/2010     HC DRAIN/INJ MAJOR JOINT/BURSA W/O US  5/5/2008    Left sacroiliac joint injection.     HC INJ EPIDURAL LUMBAR/SACRAL W/WO CONTRAST  2009     HEAD & NECK SURGERY      2013     HERNIA REPAIR       INJECT BLOCK MEDIAL BRANCH CERVICAL/THORACIC/LUMBAR Bilateral 8/26/2015    Procedure: INJECT BLOCK MEDIAL BRANCH CERVICAL / THORACIC / LUMBAR;  Surgeon: Ronald Driscoll MD;  [facility]     INJECT FACET JOINT Bilateral 5/27/2015    Procedure: INJECT FACET JOINT;  Surgeon: Ronald Driscoll MD;  [facility]     NERVE BLOCK  OCCIPITAL Bilateral 7/12/2018    Procedure: NERVE BLOCK OCCIPITAL;  bilateral occipital nerve blocks;  Surgeon: Ronald Driscoll MD;  [facility]       ALLERGIES:     Allergies   Allergen Reactions     Vicodin [Hydrocodone-Acetaminophen] Nausea     Other reaction(s): Diaphoresis, Vomiting  HEADACHE       PERTINENT MEDICATIONS:    Current Outpatient Medications:      citalopram (CELEXA) 20 MG tablet, Take 1 tablet (20 mg) by mouth daily, Disp: 90 tablet, Rfl: 3     clonazePAM (KLONOPIN) 0.5 MG tablet, Take 0.5-1 tablets (0.25-0.5 mg) by mouth 3 times daily as needed for anxiety, Disp: 90 tablet, Rfl: 0     FLOVENT  MCG/ACT Inhaler, INHALE 2 PUFFS INTO THE LUNGS TWICE DAILY, Disp: 36 g, Rfl: 2     fluticasone (FLONASE) 50 MCG/ACT spray, SPRAY 2 SPRAYS IN EACH NOSTRIL EVERY DAY, Disp: 16 g, Rfl: 5     gabapentin (NEURONTIN) 300 MG capsule, TAKE TWO CAPSULES BY MOUTH THREE TIMES A DAY, Disp: 270 capsule, Rfl: 11     ipratropium - albuterol 0.5 mg/2.5 mg/3 mL (DUONEB) 0.5-2.5 (3) MG/3ML neb solution, Take 1 vial (3 mLs) by nebulization every 4 hours as needed for shortness of breath / dyspnea, Disp: 30 vial, Rfl: 0     methadone (DOLOPHINE) 10 MG tablet, Take 1 tablet (10 mg) by mouth every 8 hours as needed, Disp: 90 tablet, Rfl: 0     order for DME, Equipment being ordered: Nebulizer mask and tubing, Disp: 1 each, Rfl: 1     oxyCODONE (OXY-IR) 5 MG capsule, Take 2 capsules (10 mg) by mouth every 4 hours as needed 2 caps q 4 hour prn pain up to 12 per day May fill 5/24/2012, Disp: 360 capsule, Rfl: 0     sildenafil (VIAGRA) 100 MG tablet, Take 1 tablet (100 mg) by mouth daily as needed 30 min to 4 hrs before sex. Do not use with nitroglycerin, terazosin or doxazosin., Disp: 4 tablet, Rfl: 0     traZODone (DESYREL) 100 MG tablet, Take 3 tablets (300 mg) by mouth At Bedtime, Disp: 90 tablet, Rfl: 3     VENTOLIN  (90 Base) MCG/ACT inhaler, INHALE 2 PUFFS INTO THE LUNGS EVERY 6 HOURS AS NEEDED FOR SHORTNESS  "OF BREATH, DIFFICULTY BREATHING OR WHEEZING., Disp: 18 g, Rfl: 3     vitamin D3 (CHOLECALCIFEROL) 2000 units tablet, TAKE ONE TABLET BY MOUTH EVERY DAY, Disp: 100 tablet, Rfl: 3     zolpidem (AMBIEN) 10 MG tablet, Take 10 mg by mouth nightly as needed , Disp: , Rfl:     FAMILY HISTORY:   Family History   Problem Relation Age of Onset     Family History Negative No family hx of      C.A.D. No family hx of           ROS:    No fevers or chills  No weight loss  No blurry vision, double vision or change in vision  No sore throat  No lymphadenopathy  No headache, paraesthesias, or weakness in a limb  No shortness of breath or wheezing  No chest pain or pressure  No arthralgias or myalgias  No rashes or skin changes  No odynophagia or dysphagia  No BRBPR, hematochezia, melena  No dysuria, frequency or urgency  No hot/cold intolerance or polyria  No anxiety or depression  PHYSICAL EXAMINATION:  Constitutional: aaox3, cooperative, pleasant, not dyspneic/diaphoretic, no acute distress  Vitals reviewed: BP 95/66   Pulse 67   Ht 1.702 m (5' 7\")   Wt 75.4 kg (166 lb 4.8 oz)   SpO2 99%   BMI 26.05 kg/m    Wt:   Wt Readings from Last 2 Encounters:   05/07/19 75.4 kg (166 lb 4.8 oz)   03/11/19 77.1 kg (170 lb)      Eyes: Sclera anicteric/injected  Ears/nose/mouth/throat: Normal oropharynx without ulcers or exudate, mucus membranes moist, hearing intact  Neck: supple, thyroid normal size  CV: No edema  Respiratory: Unlabored breathing  Lymph: No submandibular, supraclavicular or inguinal lymphadenopathy  Abd:  Nondistended, no masses, +bs, no hepatosplenomegaly, nontender, no peritoneal signs  Skin: warm, perfused, no jaundice  Psych: Normal affect  MSK: Normal gait      PERTINENT STUDIES:   Most recent CBC:  Recent Labs   Lab Test 02/15/19  1944 02/04/19  1714   WBC 10.5 5.8   HGB 12.9* 13.3   HCT 40.4 42.1    206     Most recent hepatic panel:  Recent Labs   Lab Test 05/15/18  2030 01/06/17  1736   ALT 28 26   AST 24 " 25     Most recent creatinine:  Recent Labs   Lab Test 02/22/19 02/15/19  1944   CR 1.02 0.90         ASSESSMENT/PLAN:    Joselito Abarca is a 53 year old male who presents for follow up of GERD, long segment Olson esophagus and constipation.    Olson esophagus and GERD: He is status post hiatal hernia repair with toupe fundoplication and is now off PPI therapy and doing well.  He has a long segment C7, M2 Olson's esophagus and there is no dysplasia on prior biopsies from September 2018.  Given the increased risk of dysplasia and sampling error with long segment Olson esophagus, I recommend a repeat endoscopy in 2 to 3 months time in order to reassess the area and obtain additional biopsies.  If there is no dysplasia on these, then we may consider moving to the usual surveillance of every 3 years.  The procedure should be scheduled with MAC.    Chronic constipation: He is having infrequent bowel movements approximately every 2 weeks.  I have suggested that he increase MiraLAX to twice daily.  He can use stimulant laxatives with senna if no bowel movement in 3 days.  He should schedule another colonoscopy at time of his EGD given an inadequate prep previously.  This should be with extended prep.  As noted, the procedure should be scheduled at the same time as the EGD it should be with MAC.       Olson's esophagus without dysplasia  Chronic constipation      RTC 6 months    Thank you for this consultation.  It was a pleasure to participate in the care of this patient; please contact us with any further questions.      This note was created with voice recognition software, and while reviewed for accuracy, typos may remain.     Devin Pelayo MD  Adjunct  of Medicine  Division of Gastroenterology, Hepatology and Nutrition  [facility]  [phone]

## 2019-05-20 DIAGNOSIS — M50.30 DDD (DEGENERATIVE DISC DISEASE), CERVICAL: ICD-10-CM

## 2019-05-20 DIAGNOSIS — G89.4 CHRONIC PAIN SYNDROME: Primary | ICD-10-CM

## 2019-05-20 DIAGNOSIS — M50.00 INTERVERTEBRAL CERVICAL DISC DISORDER WITH MYELOPATHY, CERVICAL REGION: ICD-10-CM

## 2019-05-22 RX ORDER — OXYCODONE HYDROCHLORIDE 5 MG/1
10 CAPSULE ORAL EVERY 4 HOURS PRN
Qty: 360 CAPSULE | Refills: 0 | Status: SHIPPED | OUTPATIENT
Start: 2019-05-22 | End: 2019-06-21

## 2019-05-22 RX ORDER — METHADONE HYDROCHLORIDE 10 MG/1
10 TABLET ORAL EVERY 8 HOURS PRN
Qty: 90 TABLET | Refills: 0 | Status: SHIPPED | OUTPATIENT
Start: 2019-05-22 | End: 2019-06-21

## 2019-05-22 NOTE — TELEPHONE ENCOUNTER
Oxycodone  Last Written Prescription Date:  4/23/2019  Last Fill Quantity: 360,  # refills: 0   Last office visit: 2/4/2019 with prescribing provider:  Schoen   methadone  Last Written Prescription Date:  4/23/2019  Last Fill Quantity: 90,  # refills: 0   Last office visit: 2/4/2019 with prescribing provider:  Schoen   Future Office Visit:      Requested Prescriptions   Pending Prescriptions Disp Refills     oxyCODONE (OXY-IR) 5 MG capsule 360 capsule 0     Sig: Take 2 capsules (10 mg) by mouth every 4 hours as needed 2 caps q 4 hour prn pain up to 12 per day May fill 5/24/2012       There is no refill protocol information for this order        methadone (DOLOPHINE) 10 MG tablet 90 tablet 0     Sig: Take 1 tablet (10 mg) by mouth every 8 hours as needed       There is no refill protocol information for this order        Routing refill request to provider for review/approval because:  Drug not on the [facility] refill protocol     Jennifer Barber RN on 5/22/2019 at 9:49 AM

## 2019-05-22 NOTE — TELEPHONE ENCOUNTER
Please inform that he is due for a three month follow up visit so needs to schedule an appointment before his next refill.  Electronically signed by Greg Schoen, MD

## 2019-05-24 ENCOUNTER — HOSPITAL ENCOUNTER (EMERGENCY)
Facility: CLINIC | Age: 54
Discharge: HOME | End: 2019-05-24
Attending: NURSE PRACTITIONER | Admitting: NURSE PRACTITIONER
Payer: COMMERCIAL

## 2019-05-24 VITALS
DIASTOLIC BLOOD PRESSURE: 72 MMHG | WEIGHT: 170.9 LBS | RESPIRATION RATE: 18 BRPM | BODY MASS INDEX: 26.77 KG/M2 | OXYGEN SATURATION: 98 % | SYSTOLIC BLOOD PRESSURE: 111 MMHG | TEMPERATURE: 97.3 F

## 2019-05-24 DIAGNOSIS — T50.901A ACCIDENTAL MEDICATION ERROR, INITIAL ENCOUNTER: ICD-10-CM

## 2019-05-24 PROCEDURE — 99283 EMERGENCY DEPT VISIT LOW MDM: CPT | Performed by: NURSE PRACTITIONER

## 2019-05-24 PROCEDURE — 99283 EMERGENCY DEPT VISIT LOW MDM: CPT | Mod: Z6 | Performed by: NURSE PRACTITIONER

## 2019-05-24 NOTE — TELEPHONE ENCOUNTER
Scripts walked to [facility] pharmacy. Left message for patient to return call. Please assist in scheduling a follow up appointment. He needs to be seen before his next refill.

## 2019-05-24 NOTE — ED AVS SNAPSHOT
[facility] Emergency Department  [address] [place] [address]  Phone:  [phone]  Fax:  [phone]                                    Joselito Abarca   MRN: [id number]    Department:  [facility] Emergency Department   Date of Visit:  5/24/2019           After Visit Summary Signature Page    I have received my discharge instructions, and my questions have been answered. I have discussed any challenges I see with this plan with the nurse or doctor.    ..........................................................................................................................................  Patient/Patient Representative Signature      ..........................................................................................................................................  Patient Representative Print Name and Relationship to Patient    ..................................................               ................................................  Date                                   Time    ..........................................................................................................................................  Reviewed by Signature/Title    ...................................................              ..............................................  Date                                               Time          22EPIC Rev 08/18

## 2019-05-25 NOTE — ED PROVIDER NOTES
"  History     Chief Complaint   Patient presents with     Medication Reaction     HPI  Joselito Abarca is a 53 year old male who presents to the emergency department today via EMS after having dizziness and being diaphoretic after accidentally inhaling his Narcan inhaler.  Patient is on chronic pain medications including methadone and oxycodone.  Patient reports that he thought he was using his nasal spray, Flonase, and did not realize it was his Narcan nasal spray.  Peripheral IV was started by medics, patient was given a liter of normal saline and arrives here reporting he is feeling \"fine\".  Patient denies any loss of consciousness, current headache, chest pain, shortness of breath.  Patient reports that his back pain is a 9 out of 10 at present time but does not request anything for his pain.  Patient has never used Narcan in the past.    Allergies:  Allergies   Allergen Reactions     Vicodin [Hydrocodone-Acetaminophen] Nausea     Other reaction(s): Diaphoresis, Vomiting  HEADACHE       Problem List:    Patient Active Problem List    Diagnosis Date Noted     Gastroesophageal reflux disease, esophagitis presence not specified 09/21/2018     Priority: Medium     Chronic seasonal allergic rhinitis due to fungal spores 06/07/2018     Priority: Medium     Status post cervical spinal arthrodesis 11/29/2017     Priority: Medium     Chronic, continuous use of opioids 12/13/2016     Priority: Medium     Patient is followed by Gregory G. Schoen, MD for ongoing prescription of pain medication.  All refills should be approved by this provider, or covering partner.    Medication(s): Oxycodone 5 mg IR: Take 2 capsules (10 mg) by mouth every 4 hours as needed 2 caps q 4 hour prn pain up to 12 per day .   Methadone 10 mg three times daily, 90 per month  Clonazepam 0.5 mg tid, 90 per month   Clinic visit frequency required: Q 3 months     Controlled substance agreement:  Encounter-Level CSA - 2/27/15:               Controlled " Substance Agreement - Scan on 3/14/2015  8:47 AM : Controlled Medication Agreement-02/27/15 (below)            Pain Clinic evaluation in the past: Yes    DIRE Total Score(s):  No flowsheet data found.    Last [facility] website verification:  10/30/2016     [email or web address]         Moderate persistent asthma without complication 11/02/2016     Priority: Medium     Acute respiratory failure with hypoxia (H) 04/29/2016     Priority: Medium     Nausea with vomiting 04/27/2016     Priority: Medium     Cough 03/06/2016     Priority: Medium     Leukocytosis 02/16/2016     Priority: Medium     Disease of bronchial airway (HCC) 02/12/2016     Priority: Medium     Degeneration of cervical intervertebral disc 01/26/2015     Priority: Medium     Health Care Home 09/30/2013     Priority: Medium     Status:  Accepted  Care Coordinator:    Melissa Behl BSN, RN, PHN  [facility] Care Coordinator  [phone]     See Letters for [facility] Care Plan  Date:  April 26, 2016            Arthrodesis status 06/15/2011     Priority: Medium     Neck pain 06/15/2011     Priority: Medium     CARDIOVASCULAR SCREENING; LDL GOAL LESS THAN 160 10/31/2010     Priority: Medium     Intervertebral cervical disc disorder with myelopathy, cervical region 11/12/2009     Priority: Medium     Patient is followed by TIARA JIMENEZ for ongoing prescription of narcotic pain medicine.  Med: methadone 10 mg tid. Taking oxycodone up to 8 per day, 120 per month  Maximum use per month: 90  Expected duration: ongoing  Narcotic agreement on file: YES  Clinic visit recommended: Q 3 months         Constipation 03/19/2008     Priority: Medium     Problem list name updated by automated process. Provider to review       Thoracic or lumbosacral neuritis or radiculitis, unspecified 03/19/2008     Priority: Medium     Esophageal reflux 07/09/2003     Priority: Medium     Generalized anxiety disorder 07/08/2003     Priority: Medium     Alcohol abuse, in remission  07/08/2003     Priority: Medium        Past Medical History:    Past Medical History:   Diagnosis Date     Allergic rhinitis      Anxiety      GERD (gastroesophageal reflux disease)      Neck pain 6/15/2011     Pneumonia      Uncomplicated asthma        Past Surgical History:    Past Surgical History:   Procedure Laterality Date     BRONCHOSCOPY (RIGID OR FLEXIBLE), DIAGNOSTIC N/A 8/7/2018    Procedure: COMBINED BRONCHOSCOPY (RIGID OR FLEXIBLE), LAVAGE;  Bronchoscopy with Lavage and Transbronchial Biopsy;  Surgeon: Yung Miranda MD;  [facility]     COLONOSCOPY WITH CO2 INSUFFLATION N/A 9/24/2018    Procedure: COLONOSCOPY WITH CO2 INSUFFLATION;  Colon and upper endoscopy ;  Surgeon: Devin Pelayo MD;  [facility]     COMBINED ESOPHAGOSCOPY, GASTROSCOPY, DUODENOSCOPY (EGD) WITH CO2 INSUFFLATION N/A 9/24/2018    Procedure: COMBINED ESOPHAGOSCOPY, GASTROSCOPY, DUODENOSCOPY (EGD) WITH CO2 INSUFFLATION;  Colon and upper endoscopy ;  Surgeon: Devin Pelayo MD;  [facility]     DISCECTOMY LUMBAR POSTERIOR MICROSCOPIC ONE LEVEL  2/21/2012    Procedure:DISCECTOMY LUMBAR POSTERIOR MICROSCOPIC ONE LEVEL; LEFT T1-T2 THORASIC HEMILAMINECTOMY MICRODISCECTOMY WITH MEXTRIX II ; Surgeon:SHARON PURI; Location:[facility]     DISCECTOMY, FUSION CERVICAL ANTERIOR ONE LEVEL, COMBINED N/A 11/29/2017    Procedure: COMBINED DISCECTOMY, FUSION CERVICAL ANTERIOR ONE LEVEL;  Anterior Cervical Discectomy and Fusion Cervical Six - Cervical Seven, Exploration and Revision Cervical Four - Cervical Six with Hardware Removal;  Surgeon: Nikolas Vasques MD;  [facility]     ESOPHAGEAL IMPEDENCE FUNCTION TEST WITH 24 HOUR PH GREATER THAN 1 HOUR N/A 12/12/2018    Procedure: ESOPHAGEAL IMPEDENCE FUNCTION TEST WITH 24 HOUR PH GREATER THAN 1 HOUR;  Surgeon: Atif Oconnor MD;  [facility]     ESOPHAGOSCOPY, GASTROSCOPY, DUODENOSCOPY (EGD), COMBINED N/A 9/24/2018    Procedure:  COMBINED ESOPHAGOSCOPY, GASTROSCOPY, DUODENOSCOPY (EGD), BIOPSY SINGLE OR MULTIPLE;;  Surgeon: Devin Pelayo MD;  [facility]     EXPLORE SPINE, REMOVE HARDWARE, COMBINED N/A 2017    Procedure: COMBINED EXPLORE SPINE, REMOVE HARDWARE;;  Surgeon: Nikolas Vasques MD;  [facility]     FUSION CERVICAL ANTERIOR TWO LEVELS  2010     HC DRAIN/INJ MAJOR JOINT/BURSA W/O US  2008    Left sacroiliac joint injection.     HC INJ EPIDURAL LUMBAR/SACRAL W/WO CONTRAST       HEAD & NECK SURGERY           HERNIA REPAIR       INJECT BLOCK MEDIAL BRANCH CERVICAL/THORACIC/LUMBAR Bilateral 2015    Procedure: INJECT BLOCK MEDIAL BRANCH CERVICAL / THORACIC / LUMBAR;  Surgeon: Ronald Driscoll MD;  [facility]     INJECT FACET JOINT Bilateral 2015    Procedure: INJECT FACET JOINT;  Surgeon: Ronald Driscoll MD;  [facility]     NERVE BLOCK OCCIPITAL Bilateral 2018    Procedure: NERVE BLOCK OCCIPITAL;  bilateral occipital nerve blocks;  Surgeon: Ronald Driscoll MD;  [facility]       Family History:    Family History   Problem Relation Age of Onset     Family History Negative No family hx of      C.A.D. No family hx of        Social History:  Marital Status:  Single [1]  Social History     Tobacco Use     Smoking status: Former Smoker     Packs/day: 1.00     Years: 30.00     Pack years: 30.00     Types: Cigars     Last attempt to quit: 2009     Years since quittin.4     Smokeless tobacco: Former User     Quit date:    Substance Use Topics     Alcohol use: No     Alcohol/week: 0.0 oz     Comment: HX OF ABUSE-IN REMISSION     Drug use: No        Medications:      clonazePAM (KLONOPIN) 0.5 MG tablet   FLOVENT  MCG/ACT Inhaler   fluticasone (FLONASE) 50 MCG/ACT spray   gabapentin (NEURONTIN) 300 MG capsule   ipratropium - albuterol 0.5 mg/2.5 mg/3 mL (DUONEB) 0.5-2.5 (3) MG/3ML neb solution   methadone (DOLOPHINE) 10 MG tablet   naloxone (NARCAN) 4  MG/0.1ML nasal spray   oxyCODONE (OXY-IR) 5 MG capsule   sildenafil (VIAGRA) 100 MG tablet   traZODone (DESYREL) 100 MG tablet   VENTOLIN  (90 Base) MCG/ACT inhaler   zolpidem (AMBIEN) 10 MG tablet   citalopram (CELEXA) 20 MG tablet   order for DME   vitamin D3 (CHOLECALCIFEROL) 2000 units tablet         Review of Systems   All other systems reviewed and are negative.      Physical Exam   BP: 111/72  Heart Rate: 64  Temp: 97.3  F (36.3  C)  Resp: 18  Weight: 77.5 kg (170 lb 14.4 oz)  SpO2: 98 %      Physical Exam   Constitutional: He is oriented to person, place, and time. He appears well-developed and well-nourished. No distress.   HENT:   Head: Normocephalic.   Eyes: EOM are normal.   Neck: Normal range of motion. Neck supple.   Cardiovascular: Normal rate and normal heart sounds.   Pulmonary/Chest: Effort normal and breath sounds normal.   Neurological: He is alert and oriented to person, place, and time.   Skin: Skin is warm and dry. He is not diaphoretic.   Psychiatric: He has a normal mood and affect.       ED Course        Procedures    No results found for this or any previous visit (from the past 24 hour(s)).    Medications - No data to display    Assessments & Plan (with Medical Decision Making)  Spenser is a 53-year-old male, history of chronic pain, presents to the emergency department today via EMS after accidentally inhaling his Narcan inhaler which he thought was his Flonase.  Please refer to HPI and focused exam.  Patient arrives here hemodynamically stable and asymptomatic.  Patient shows no signs of acute withdrawal here, no seizure activity noted, patient was discharged in stable condition, educated to make sure he is reading his medication labels properly, to avoid similar issues in the future.   Reasons to return to the emergency room discussed in detail, patient agreeable to plan of care and was discharged in stable condition.      I have reviewed the nursing notes.    I have reviewed the  findings, diagnosis, plan and need for follow up with the patient.       Medication List      There are no discharge medications for this visit.         Final diagnoses:   Accidental medication error, initial encounter       5/24/2019   [facility] EMERGENCY DEPARTMENT     Ernestine Kinsey APRN CNP  05/24/19 [id number]

## 2019-05-25 NOTE — ED TRIAGE NOTES
Did not know what the narcan was for, took 4mg, and started sweating, called EMS, they put an IV in gave him a liter of fluid and now the patient is doing better. States back pain is doing ok, states the pain is 9/10

## 2019-05-27 ENCOUNTER — HOSPITAL ENCOUNTER (EMERGENCY)
Facility: CLINIC | Age: 54
Discharge: HOME | End: 2019-05-27
Attending: EMERGENCY MEDICINE | Admitting: EMERGENCY MEDICINE
Payer: COMMERCIAL

## 2019-05-27 VITALS
OXYGEN SATURATION: 99 % | SYSTOLIC BLOOD PRESSURE: 120 MMHG | RESPIRATION RATE: 18 BRPM | DIASTOLIC BLOOD PRESSURE: 68 MMHG | HEART RATE: 75 BPM | TEMPERATURE: 98.2 F

## 2019-05-27 DIAGNOSIS — M54.2 NECK PAIN: ICD-10-CM

## 2019-05-27 PROCEDURE — 99284 EMERGENCY DEPT VISIT MOD MDM: CPT | Mod: 25 | Performed by: EMERGENCY MEDICINE

## 2019-05-27 PROCEDURE — 20553 NJX 1/MLT TRIGGER POINTS 3/>: CPT | Performed by: EMERGENCY MEDICINE

## 2019-05-27 PROCEDURE — 99283 EMERGENCY DEPT VISIT LOW MDM: CPT | Mod: 25 | Performed by: EMERGENCY MEDICINE

## 2019-05-27 PROCEDURE — 20553 NJX 1/MLT TRIGGER POINTS 3/>: CPT | Mod: Z6 | Performed by: EMERGENCY MEDICINE

## 2019-05-27 RX ORDER — TRIAMCINOLONE ACETONIDE 40 MG/ML
40 INJECTION, SUSPENSION INTRA-ARTICULAR; INTRAMUSCULAR ONCE
Status: DISCONTINUED | OUTPATIENT
Start: 2019-05-27 | End: 2019-05-28 | Stop reason: HOSPADM

## 2019-05-27 RX ORDER — LIDOCAINE HYDROCHLORIDE AND EPINEPHRINE 10; 10 MG/ML; UG/ML
1 INJECTION, SOLUTION INFILTRATION; PERINEURAL ONCE
Status: DISCONTINUED | OUTPATIENT
Start: 2019-05-27 | End: 2019-05-28 | Stop reason: HOSPADM

## 2019-05-27 NOTE — ED AVS SNAPSHOT
[facility] Emergency Department  [address] [place] [address]  Phone:  [phone]  Fax:  [phone]                                    Joselito Abarca   MRN: [id number]    Department:  [facility] Emergency Department   Date of Visit:  5/27/2019           After Visit Summary Signature Page    I have received my discharge instructions, and my questions have been answered. I have discussed any challenges I see with this plan with the nurse or doctor.    ..........................................................................................................................................  Patient/Patient Representative Signature      ..........................................................................................................................................  Patient Representative Print Name and Relationship to Patient    ..................................................               ................................................  Date                                   Time    ..........................................................................................................................................  Reviewed by Signature/Title    ...................................................              ..............................................  Date                                               Time          22EPIC Rev 08/18

## 2019-05-28 NOTE — ED NOTES
Pt states that he has a long hx of chronic neck/back pain and occ has to come in for injections.  For last 3 days he has noted increasing pain.  Same pain as his usual, just too much to manage with his home pain regimen/.

## 2019-05-28 NOTE — ED TRIAGE NOTES
Pt with back of neck and head pain for 2 days. Would like a trigger point injection. Took pain killers, didn't help. No injury.

## 2019-05-28 NOTE — ED PROVIDER NOTES
"  History     Chief Complaint   Patient presents with     Neck Pain     The history is provided by the patient.     Joselito Abarca is a 53 year old male who presents to the emergency department for neck pain. Patient reports having worse neck and back pain for 3 days. He states he went through some \"serious seizures\" on 5/24/19 and since then his neck and back pain have been worse along with nausea. He reports having a history of chronic neck and back pain. He has had 3 surgeries on his neck and 1 on his upper back. He tried his pain killers at home with no relief. He states when things get this back, he comes in to get trigger point injections which does help.    Allergies:  Allergies   Allergen Reactions     Vicodin [Hydrocodone-Acetaminophen] Nausea     Other reaction(s): Diaphoresis, Vomiting  HEADACHE       Problem List:    Patient Active Problem List    Diagnosis Date Noted     Gastroesophageal reflux disease, esophagitis presence not specified 09/21/2018     Priority: Medium     Chronic seasonal allergic rhinitis due to fungal spores 06/07/2018     Priority: Medium     Status post cervical spinal arthrodesis 11/29/2017     Priority: Medium     Chronic, continuous use of opioids 12/13/2016     Priority: Medium     Patient is followed by Gregory G. Schoen, MD for ongoing prescription of pain medication.  All refills should be approved by this provider, or covering partner.    Medication(s): Oxycodone 5 mg IR: Take 2 capsules (10 mg) by mouth every 4 hours as needed 2 caps q 4 hour prn pain up to 12 per day .   Methadone 10 mg three times daily, 90 per month  Clonazepam 0.5 mg tid, 90 per month   Clinic visit frequency required: Q 3 months     Controlled substance agreement:  Encounter-Level CSA - 2/27/15:               Controlled Substance Agreement - Scan on 3/14/2015  8:47 AM : Controlled Medication Agreement-02/27/15 (below)            Pain Clinic evaluation in the past: Yes    DIRE Total Score(s):  No " flowsheet data found.    Last [facility] website verification:  10/30/2016     [email or web address]         Moderate persistent asthma without complication 11/02/2016     Priority: Medium     Acute respiratory failure with hypoxia (H) 04/29/2016     Priority: Medium     Nausea with vomiting 04/27/2016     Priority: Medium     Cough 03/06/2016     Priority: Medium     Leukocytosis 02/16/2016     Priority: Medium     Disease of bronchial airway (HCC) 02/12/2016     Priority: Medium     Degeneration of cervical intervertebral disc 01/26/2015     Priority: Medium     Health Care Home 09/30/2013     Priority: Medium     Status:  Accepted  Care Coordinator:    Melissa Behl BSN, RN, PHN  [facility] Care Coordinator  [phone]     See Letters for [facility] Care Plan  Date:  April 26, 2016            Arthrodesis status 06/15/2011     Priority: Medium     Neck pain 06/15/2011     Priority: Medium     CARDIOVASCULAR SCREENING; LDL GOAL LESS THAN 160 10/31/2010     Priority: Medium     Intervertebral cervical disc disorder with myelopathy, cervical region 11/12/2009     Priority: Medium     Patient is followed by TIARA JIMENEZ for ongoing prescription of narcotic pain medicine.  Med: methadone 10 mg tid. Taking oxycodone up to 8 per day, 120 per month  Maximum use per month: 90  Expected duration: ongoing  Narcotic agreement on file: YES  Clinic visit recommended: Q 3 months         Constipation 03/19/2008     Priority: Medium     Problem list name updated by automated process. Provider to review       Thoracic or lumbosacral neuritis or radiculitis, unspecified 03/19/2008     Priority: Medium     Esophageal reflux 07/09/2003     Priority: Medium     Generalized anxiety disorder 07/08/2003     Priority: Medium     Alcohol abuse, in remission 07/08/2003     Priority: Medium        Past Medical History:    Past Medical History:   Diagnosis Date     Allergic rhinitis      Anxiety      GERD (gastroesophageal reflux disease)       Neck pain 6/15/2011     Pneumonia      Uncomplicated asthma        Past Surgical History:    Past Surgical History:   Procedure Laterality Date     BRONCHOSCOPY (RIGID OR FLEXIBLE), DIAGNOSTIC N/A 8/7/2018    Procedure: COMBINED BRONCHOSCOPY (RIGID OR FLEXIBLE), LAVAGE;  Bronchoscopy with Lavage and Transbronchial Biopsy;  Surgeon: Yung Miranda MD;  Location: [facility]     COLONOSCOPY WITH CO2 INSUFFLATION N/A 9/24/2018    Procedure: COLONOSCOPY WITH CO2 INSUFFLATION;  Colon and upper endoscopy ;  Surgeon: Devin Pelayo MD;  [facility]     COMBINED ESOPHAGOSCOPY, GASTROSCOPY, DUODENOSCOPY (EGD) WITH CO2 INSUFFLATION N/A 9/24/2018    Procedure: COMBINED ESOPHAGOSCOPY, GASTROSCOPY, DUODENOSCOPY (EGD) WITH CO2 INSUFFLATION;  Colon and upper endoscopy ;  Surgeon: Devin Pelayo MD;  [facility]: [facility]     DISCECTOMY LUMBAR POSTERIOR MICROSCOPIC ONE LEVEL  2/21/2012    Procedure:DISCECTOMY LUMBAR POSTERIOR MICROSCOPIC ONE LEVEL; LEFT T1-T2 THORASIC HEMILAMINECTOMY MICRODISCECTOMY WITH MEXTRIX II ; Surgeon:SHARON PURI; Location[facility]     DISCECTOMY, FUSION CERVICAL ANTERIOR ONE LEVEL, COMBINED N/A 11/29/2017    Procedure: COMBINED DISCECTOMY, FUSION CERVICAL ANTERIOR ONE LEVEL;  Anterior Cervical Discectomy and Fusion Cervical Six - Cervical Seven, Exploration and Revision Cervical Four - Cervical Six with Hardware Removal;  Surgeon: Nikolas Vasques MD;  [facility]     ESOPHAGEAL IMPEDENCE FUNCTION TEST WITH 24 HOUR PH GREATER THAN 1 HOUR N/A 12/12/2018    Procedure: ESOPHAGEAL IMPEDENCE FUNCTION TEST WITH 24 HOUR PH GREATER THAN 1 HOUR;  Surgeon: Atif Oconnor MD;  [facility]: [facility]     ESOPHAGOSCOPY, GASTROSCOPY, DUODENOSCOPY (EGD), COMBINED N/A 9/24/2018    Procedure: COMBINED ESOPHAGOSCOPY, GASTROSCOPY, DUODENOSCOPY (EGD), BIOPSY SINGLE OR MULTIPLE;;  Surgeon: Devin Pelayo MD;  [facility]     EXPLORE SPINE, REMOVE HARDWARE, COMBINED  N/A 2017    Procedure: COMBINED EXPLORE SPINE, REMOVE HARDWARE;;  Surgeon: Nikolas Vasques MD;  [facility]     FUSION CERVICAL ANTERIOR TWO LEVELS  2010     HC DRAIN/INJ MAJOR JOINT/BURSA W/O US  2008    Left sacroiliac joint injection.     HC INJ EPIDURAL LUMBAR/SACRAL W/WO CONTRAST  2009     HEAD & NECK SURGERY           HERNIA REPAIR       INJECT BLOCK MEDIAL BRANCH CERVICAL/THORACIC/LUMBAR Bilateral 2015    Procedure: INJECT BLOCK MEDIAL BRANCH CERVICAL / THORACIC / LUMBAR;  Surgeon: Ronald Driscoll MD;  [facility]     INJECT FACET JOINT Bilateral 2015    Procedure: INJECT FACET JOINT;  Surgeon: Ronald Driscoll MD;  [facility]     NERVE BLOCK OCCIPITAL Bilateral 2018    Procedure: NERVE BLOCK OCCIPITAL;  bilateral occipital nerve blocks;  Surgeon: Ronald Driscoll MD;  [facility]       Family History:    Family History   Problem Relation Age of Onset     Family History Negative No family hx of      C.A.D. No family hx of        Social History:  Marital Status:  Single [1]  Social History     Tobacco Use     Smoking status: Former Smoker     Packs/day: 1.00     Years: 30.00     Pack years: 30.00     Types: Cigars     Last attempt to quit: 2009     Years since quittin.4     Smokeless tobacco: Former User     Quit date:    Substance Use Topics     Alcohol use: No     Alcohol/week: 0.0 oz     Comment: HX OF ABUSE-IN REMISSION     Drug use: No        Medications:      citalopram (CELEXA) 20 MG tablet   clonazePAM (KLONOPIN) 0.5 MG tablet   FLOVENT  MCG/ACT Inhaler   fluticasone (FLONASE) 50 MCG/ACT spray   gabapentin (NEURONTIN) 300 MG capsule   ipratropium - albuterol 0.5 mg/2.5 mg/3 mL (DUONEB) 0.5-2.5 (3) MG/3ML neb solution   methadone (DOLOPHINE) 10 MG tablet   naloxone (NARCAN) 4 MG/0.1ML nasal spray   order for DME   oxyCODONE (OXY-IR) 5 MG capsule   sildenafil (VIAGRA) 100 MG tablet   traZODone (DESYREL) 100 MG tablet   VENTOLIN HFA  108 (90 Base) MCG/ACT inhaler   vitamin D3 (CHOLECALCIFEROL) 2000 units tablet   zolpidem (AMBIEN) 10 MG tablet         Review of Systems   All other systems reviewed and are negative.      Physical Exam   BP: 118/70  Pulse: 75  Temp: 98.2  F (36.8  C)  Resp: 20  SpO2: 99 %      Physical Exam   Constitutional: He is oriented to person, place, and time. He appears well-developed and well-nourished. No distress.   HENT:   Head: Normocephalic and atraumatic.   Eyes: Pupils are equal, round, and reactive to light. No scleral icterus.   Neck: Neck supple. Muscular tenderness present. No spinous process tenderness present. No edema and no erythema present.   Some areas of maximal tenderness at the base of the occiput bilaterally.   Cardiovascular: Normal rate and regular rhythm.   Pulmonary/Chest: Effort normal and breath sounds normal.   Musculoskeletal: Normal range of motion.   Strength intact in upper extremities.   Neurological: He is alert and oriented to person, place, and time.   Skin: Skin is warm and dry. No rash noted. He is not diaphoretic.   Psychiatric: He has a normal mood and affect. His behavior is normal. Thought content normal.   Nursing note and vitals reviewed.      ED Course        Procedures  Trigger point injections:  Patient requested trigger point injections in the neck.  Discussed risks and benefit including infection and bleeding.  He has had these before, been helpful and would like to proceed with these.  A total of 40 mg of Kenalog with 2 cc of 1% lidocaine with epinephrine is used to inject the 3 most tender locations the posterior neck after prepping the skin with alcohol.  He tolerated these well without immediate complication.  He was observed for period of 20 minutes without evidence of concern.             Critical Care time:  none               No results found for this or any previous visit (from the past 24 hour(s)).    Medications   triamcinolone (KENALOG-40) injection 40 mg (has  no administration in time range)   lidocaine 1% with EPINEPHrine 1:100,000 injection 1 mL (has no administration in time range)       Assessments & Plan (with Medical Decision Making)  53-year-old male with recurrent neck pain.  He is requested triggerpoints due to an exacerbation of pain.  This is accomplished as described above without complication.  May return to normal medications.  Follow-up with primary care as needed.  Return to the emergency department if condition worsens or other concern     I have reviewed the nursing notes.    I have reviewed the findings, diagnosis, plan and need for follow up with the patient.          Medication List      There are no discharge medications for this visit.         Final diagnoses:   Neck pain     This document serves as a record of services personally performed by Enzo Kinsey MD. It was created on their behalf by Lexii Erwin, a trained medical scribe. The creation of this record is based on the provider's personal observations and the statements of the patient. This document has been checked and approved by the attending provider.    Note: Chart documentation done in part with Dragon Voice Recognition software. Although reviewed after completion, some word and grammatical errors may remain.    5/27/2019   [facility] EMERGENCY DEPARTMENT     Enzo Kinsey MD  05/27/19 [id number]

## 2019-05-29 ENCOUNTER — PATIENT OUTREACH (OUTPATIENT)
Dept: CARE COORDINATION | Facility: CLINIC | Age: 54
End: 2019-05-29

## 2019-05-29 NOTE — LETTER
[employer] CARE COORDINATION    Darline 3, 2019    Joselito Abarca  [address]   [facility] [address]      Dear Joselito,    I am a clinic care coordinator who works with Gregory G. Schoen, MD at [facility]. I have been trying to reach you recently to introduce Clinic Care Coordination and to see if there was anything I could assist you with.  I wanted to introduce myself and provide you with my contact information so that you can call me with questions or concerns about your health care. Below is a description of clinic care coordination and how I can further assist you.     The clinic care coordinator is a registered nurse and/or  who understand the health care system. The goal of clinic care coordination is to help you manage your health and improve access to the [facility] system in the most efficient manner. The registered nurse can assist you in meeting your health care goals by providing education, coordinating services, and strengthening the communication among your providers. The  can assist you with financial, behavioral, psychosocial, chemical dependency, counseling, and/or psychiatric resources.    Please feel free to contact me at [phone], with any questions or concerns. We at [facility] are focused on providing you with the highest-quality healthcare experience possible and that all starts with you.     Sincerely,         Luisa GUTIÉRREZ, RN, PHN  Care Coordination    [facility]  [address] [place] [address]  Office: [phone]  Fax [phone]   [facility]  [address] [place] [address]  Office: [phone] Fax [phone]  [email or web address]   [email or web address]   Connect with [facility] on social media.

## 2019-05-29 NOTE — TELEPHONE ENCOUNTER
I left a message asking patient to return our call.  Please inform patient of the message below.  Marika Seymour, CMA

## 2019-05-29 NOTE — TELEPHONE ENCOUNTER
Patient called back and I gave him the message. He has an appt scheduled next month for his medication follow up.    Thank you,  Pamella Blanton   for [facility]

## 2019-05-31 NOTE — PROGRESS NOTES
Clinic Care Coordination Contact  [facility]/Voicemail       Clinical Data: Care Coordinator Outreach  Outreach attempted x 1.  Left message on voicemail with call back information and requested return call.  Plan: Care Coordinator will try to reach patient again in 1-2 business days.      Luisa GUTIÉRREZ, RN, PHN  Care Coordination    [facility]  [address] [place] [address]  Office: [phone]  Fax [phone]   [facility]  [address] [place] [address]  Office: [phone] Fax [phone]  [email or web address]   [email or web address]   Connect with [facility] on social media.

## 2019-06-03 DIAGNOSIS — F41.1 GENERALIZED ANXIETY DISORDER: ICD-10-CM

## 2019-06-03 RX ORDER — CLONAZEPAM 0.5 MG/1
0.25-0.5 TABLET ORAL 3 TIMES DAILY PRN
Qty: 90 TABLET | Refills: 0 | Status: SHIPPED | OUTPATIENT
Start: 2019-06-03 | End: 2019-07-01

## 2019-06-03 NOTE — TELEPHONE ENCOUNTER
Klonopin  Last Written Prescription Date:  05/03/2019  Last Fill Quantity: 90,  # refills: 0   Last office visit: 2/4/2019 with prescribing provider:  Schoen   Future Office Visit:   Next 5 appointments (look out 90 days)    Jun 21, 2019  4:30 PM CDT  Office Visit with Gregory G Schoen, MD  [facility] ([facility]) [address] [place] [id number] [phone]         Routing refill request to provider for review/approval because:  Drug not on the [facility] refill protocol     Ailyn Tobias RN

## 2019-06-03 NOTE — PROGRESS NOTES
Clinic Care Coordination Contact  [facility]/Voicemail    Referral Source: IP Report  Clinical Data: Care Coordinator Outreach  Outreach attempted x 2.  Left message on voicemail with call back information and requested return call.  Plan: Care Coordinator will mail out care coordination introduction letter with care coordinator contact information and explanation of care coordination services. Care Coordinator will do no further outreaches at this time.      Luisa STROUDN, RN, PHN  Care Coordination    [facility]  [address] [place] [address]  Office: [phone]  Fax [phone]   [facility]  [address] [place] [address]  Office: [phone] Fax [phone]  [email or web address]   [email or web address]   Connect with [facility] on social media.

## 2019-06-14 DIAGNOSIS — B37.0 THRUSH: ICD-10-CM

## 2019-06-17 RX ORDER — NYSTATIN 100000/ML
SUSPENSION, ORAL (FINAL DOSE FORM) ORAL
Qty: 280 ML | Refills: 0 | Status: SHIPPED | OUTPATIENT
Start: 2019-06-17 | End: 2019-08-29

## 2019-06-17 NOTE — TELEPHONE ENCOUNTER
Requested Prescriptions   Pending Prescriptions Disp Refills     nystatin (MYCOSTATIN) 298722 UNIT/ML suspension [Pharmacy Med Name: NYSTATIN [id number]/ML SUSP] 280 mL 0     Sig: TAKE 5 MLS BY MOUTH FOUR TIMES A DAY       There is no refill protocol information for this order        Last Written Prescription Date:  NA  Last Fill Quantity: NA,  # refills: NA   Last office visit: 2/4/2019 with prescribing provider:  Schoen- preop   Future Office Visit:   Next 5 appointments (look out 90 days)    Jun 21, 2019  4:30 PM CDT  Office Visit with Gregory G [facility]en, MD  [facility] [address] [place] [id number] [phone]           Routing refill request to provider for review/approval because:  Drug not on the [facility] refill protocol   Drug not active on patient's medication list    Ana Lazo RN

## 2019-06-21 ENCOUNTER — OFFICE VISIT (OUTPATIENT)
Dept: FAMILY MEDICINE | Facility: CLINIC | Age: 54
End: 2019-06-21
Payer: COMMERCIAL

## 2019-06-21 VITALS
RESPIRATION RATE: 16 BRPM | HEART RATE: 99 BPM | BODY MASS INDEX: 25.22 KG/M2 | OXYGEN SATURATION: 99 % | DIASTOLIC BLOOD PRESSURE: 60 MMHG | SYSTOLIC BLOOD PRESSURE: 110 MMHG | WEIGHT: 161 LBS | TEMPERATURE: 97.1 F

## 2019-06-21 DIAGNOSIS — M50.00 INTERVERTEBRAL CERVICAL DISC DISORDER WITH MYELOPATHY, CERVICAL REGION: ICD-10-CM

## 2019-06-21 DIAGNOSIS — F11.90 CHRONIC, CONTINUOUS USE OF OPIOIDS: Primary | ICD-10-CM

## 2019-06-21 DIAGNOSIS — M50.30 DDD (DEGENERATIVE DISC DISEASE), CERVICAL: ICD-10-CM

## 2019-06-21 DIAGNOSIS — Z23 NEED FOR VACCINATION: ICD-10-CM

## 2019-06-21 PROCEDURE — 99214 OFFICE O/P EST MOD 30 MIN: CPT | Mod: 25 | Performed by: FAMILY MEDICINE

## 2019-06-21 PROCEDURE — 90715 TDAP VACCINE 7 YRS/> IM: CPT | Performed by: FAMILY MEDICINE

## 2019-06-21 PROCEDURE — 90471 IMMUNIZATION ADMIN: CPT | Performed by: FAMILY MEDICINE

## 2019-06-21 RX ORDER — METHADONE HYDROCHLORIDE 10 MG/1
10 TABLET ORAL EVERY 8 HOURS PRN
Qty: 90 TABLET | Refills: 0 | Status: SHIPPED | OUTPATIENT
Start: 2019-06-21 | End: 2019-07-16

## 2019-06-21 RX ORDER — OXYCODONE HYDROCHLORIDE 5 MG/1
10 CAPSULE ORAL EVERY 4 HOURS PRN
Qty: 360 CAPSULE | Refills: 0 | Status: SHIPPED | OUTPATIENT
Start: 2019-06-21 | End: 2019-07-16

## 2019-06-21 ASSESSMENT — ANXIETY QUESTIONNAIRES
3. WORRYING TOO MUCH ABOUT DIFFERENT THINGS: NOT AT ALL
7. FEELING AFRAID AS IF SOMETHING AWFUL MIGHT HAPPEN: SEVERAL DAYS
GAD7 TOTAL SCORE: 6
1. FEELING NERVOUS, ANXIOUS, OR ON EDGE: SEVERAL DAYS
2. NOT BEING ABLE TO STOP OR CONTROL WORRYING: NOT AT ALL
IF YOU CHECKED OFF ANY PROBLEMS ON THIS QUESTIONNAIRE, HOW DIFFICULT HAVE THESE PROBLEMS MADE IT FOR YOU TO DO YOUR WORK, TAKE CARE OF THINGS AT HOME, OR GET ALONG WITH OTHER PEOPLE: SOMEWHAT DIFFICULT
6. BECOMING EASILY ANNOYED OR IRRITABLE: SEVERAL DAYS
5. BEING SO RESTLESS THAT IT IS HARD TO SIT STILL: MORE THAN HALF THE DAYS

## 2019-06-21 ASSESSMENT — PATIENT HEALTH QUESTIONNAIRE - PHQ9
5. POOR APPETITE OR OVEREATING: SEVERAL DAYS
SUM OF ALL RESPONSES TO PHQ QUESTIONS 1-9: 2

## 2019-06-21 ASSESSMENT — PAIN SCALES - GENERAL: PAINLEVEL: SEVERE PAIN (6)

## 2019-06-21 NOTE — PROGRESS NOTES
"Subjective     Joselito Abarca is a 53 year old male who presents to clinic today for the following health issues:    HPI   Chronic Pain Follow-Up       Type / Location of Pain: head, neck, back  Analgesia/pain control:       Recent changes:  worse lately. Getting daily headaches in the occipital area and migrate across the front.       Overall control: Tolerable with discomfort  Activity level/function:      Daily activities:  Can do most things most days, with some rest    Work:  not applicable  Adverse effects:  No  Adherance    Taking medication as directed?  Yes    Participating in other treatments: not applicable  Risk Factors:    Sleep:  Fair    Mood/anxiety:  Worsened anxiety    Recent family or social stressors:  none noted    Other aggravating factors: prolonged sitting and prolonged standing  PHQ-9 SCORE 11/2/2016 12/12/2016 12/22/2017   PHQ-9 Total Score 3 1 3     NICOLETTE-7 SCORE 11/2/2016 12/12/2016 12/22/2017   Total Score 7 6 2     Encounter-Level CSA - 02/27/2015:    Controlled Substance Agreement - Scan on 3/14/2015  8:47 AM: Controlled Medication Agreement-02/27/15 (below)       Patient-Level CSA:    There are no patient-level csa.         Amount of exercise or physical activity: \"always up and moving around\"    Problems taking medications regularly: No    Medication side effects: see comments below regarding narcotic withdrawal when used narcan inadvertently.     Diet: regular (no restrictions)    States he wishes to have  a referral back to Dr. Driscoll for severe occipital pain and headaches. He did have some moderate relief from his injection about a year ago and notes that in the past, after a series of injections he went almost 5 years without much pain.  He notes he gets benefit from his meds but at times wonders if they are no longer effective. Seems meds wear off around midnight and has a rough rest of the night.     He also got a letter from Medicare stating they would no longer cover " methadone and he doesn't really want to make any changes.     In review of his history he recently had a couple ER visits.  He states the first visit was on 5/24 at which time he confused his Narcan with Flonase and when he took this he started having narcotic withdrawal seizures.  [facility] ambulance was called and he states he was brought to the [facility] emergency room where he was evaluated and released.  He was confused that  nothing was done during his evaluation and was simply discharged home.  He reports that with the seizures he was having significant posturing which contributed to significant cervical and thoracic pain, where he has had previous surgeries and this ended up and and having to go back to the ER on 5/27 to address his pain.  The ER note on 5/24 states that he arrived hemodynamically stable and asymptomatic in the emergency room that he showed no signs of acute withdrawal or seizure activity.  I was unable to identify an ambulance report in his medical record that would indicate their acknowledgment or observation of any seizure activity and posturing which the patient describes.    Review of Systems   Const: Negative for fever, chills  HEENT: Occipital headaches but otherwise negative  RESP: Feels like lungs are doing well at this time on his current therapies.  CVR: Negative for chest pains  MSK: Still having significant thoracic pain in particular since his reported seizure event and also cervical spine and occipital pain.       Objective    /60 (Cuff Size: Adult Regular)   Pulse 99   Temp 97.1  F (36.2  C) (Temporal)   Resp 16   Wt 73 kg (161 lb)   SpO2 99%   BMI 25.22 kg/m    Body mass index is 25.22 kg/m .  Physical Exam   Alert and in no distress.  Lungs - Clear to auscultation anteriorly and posteriorly without rales, rhonchi or wheezes.    Heart - Regular rate and rhythm, normal S1 and S2 without murmurs, clicks, rubs or gallops.    Neck exam shows limited range of motion  "status post fusion.  There is tenderness to palpation diffusely throughout the neck posteriorly, both over the spine and the paracervical musculature.  This extends down into the upper back and trapezius.  He also has tenderness in between the shoulder blades in his upper thoracic area.    He has an equal grasp with 5/5 strength but does appear to have a strong 3+ biceps reflex on the right and I am unable to detect a biceps reflex on the left.  There is otherwise no obvious asymmetry in function.    ASSESSMENT:     Chronic, continuous use of opioids  DDD (degenerative disc disease), cervical  Intervertebral cervical disc disorder with myelopathy, cervical region  Need for vaccination    PLAN:  Spenser states that he is having significant pain issues and his main reason for presenting today was his concern that his insurance would no longer cover methadone.  He questions whether the methadone does have much good as he feels he always needs to use the oxycodone in order to get relief.  He then relates that he \"supposes\" that it must be helping because there are times he did not have a dose and his pain significantly worsened.  We had a long discussion about chronic opioid use and the negative consequences of this.  We discussed that his pain may actually be worsened to the continuous use of higher doses of narcotics, and that while he is having more pain at this time I was not going to increase them.  In fact I discussed that it would be beneficial for him to be seen in our pain clinic to look at nonnarcotic alternatives as a means of better pain management and that we should reduce the amount of narcotics he is taking and he in fact may feel better.  He obviously had significant discomfort with this but I informed him that I was going to make a referral and he did need to follow through and that we would adhere to plans as recommended.  He agreed to do so reluctantly.    I do not know what to make of his claim of " significant seizures and withdrawal with the use of Narcan but it does seem possible/possible.  We will try to obtain his ambulance transportation records to determine their history as well as their observation.  Regardless of this, the patient has significant cervical and thoracic degenerative disease and is status post surgical intervention and likely does have chronic pain related to this.  A better approach to pain management and his current plan is recommended to help alleviate his narcotic dependence.    We also noted that he is due for Tdap booster and he did agree to have this today.  I did refill his current pain medications but anticipate that he should be seen in our pain clinic prior to needing next refill and that we will hand over management of any transition in his pain plan until he reaches stabilization at which time I would be happy to resume prescribing.    30 minutes spent face to face, over half of which was counseling regarding his ER visits and pain plan.    Electronically signed by Greg Schoen, MD

## 2019-06-22 ASSESSMENT — ANXIETY QUESTIONNAIRES: GAD7 TOTAL SCORE: 6

## 2019-06-22 ASSESSMENT — ASTHMA QUESTIONNAIRES: ACT_TOTALSCORE: 22

## 2019-06-26 NOTE — PROGRESS NOTES
[facility] Consultation      This patient is being seen in consultation at the request of his provider Dr. Schoen, Gregory G, MD.    Primary Care Provider is Schoen, Gregory G.    The current opiate pain medications are being prescribed by: PCP    Please see the [facility] health questionnaire which the patient completed and reviewed with me in detail    CHIEF COMPLAINT:  Pain  -neck pain    HISTORY OF PRESENT ILLNESS:  Joselito Abarca is a 53 year old male with history of {PAIN PROBLEM:911678}       Pain Information:   Onset/Progression:  Pain started ***.   Pain quality: {PAIN DESCRIPTION:011591}    Pain timing: {PAIN quality:919812}     Pain rating: intensity ranges from {PAIN SCALE:621558} to {PAIN  SCALE:920026}, and averages {PAIN SCALE:165324} on a 0-10 scale.   Aggravating factors include: ***   Relieving factors include: ***      Past Pain Treatments:   Pain Clinic:   Yes ***   PT: Yes  ***  Psychologist: Yes***  Relaxation techniques/biofeedback: Yes***  Chiropractor: Yes***  Acupuncture: Yes***  Pharmacotherapy:    Opioids: Yes***     Non-opioids:  Yes***   TENs Unit:Yes***  Injections: Yes, bilateral occipital nerve blocks 07/12/2018, Cervical medial branch blocks 08/26/2015 and 5/27/2015.  Self-care:   Yes ***  Surgeries related to pain: Yes,  1. anterior cervical discectomy and fusion C6-7, exploration and revision C4-6 with hardwar removal 11/29/2017,   2. Left T1-T2 thoracic hemilaminectomy, microdiscectomy 02/21/2012,   3. Removal of C4 through C6 anterior cervical plate, Exploration of C4-C5 and C5-C6 anterior cervical fusion, C4-C5 and C5-C6 arthrodesis, C4 through C6 anterior cervical instrumentation, and [place] of right iliac hip graft.  03/15/2011  4. C4-C5 and C5-C6 anterior cervical discectomy and fusion with instrumentation and neural electrophysiologic monitoring 01/26/2010    Current Pain Relevant Medications:    Clonazepam 0.5mg  Gabapentin  300mg  Methadone 10mg  Oxycodone 5mg    THE 4 As OF OPIOID MAINTENANCE ANALGESIA    Analgesia: Is pain relief clinically significant? {YES/NO:729862}   Activity: Is patient functional and able to perform Activities of Daily Living? {YES/NO:905938}   Adverse effects: Is patient free from adverse side effects from opiates? {YES/NO:030102}   Adherence to Rx protocol: Is patient adhering to Controlled Substance Agreement and taking medications ONLY as ordered? {YES/NO:943653}    Is Narcan prescribed for opiate use >50 MME daily? YES    Total Daily MME: ***    Previous Pain Relevant Medications: (H--helped; HI--Helped initially; [facility]--Somewhat helpful; NH--No help; W--worse; SE--side effects; ?--Unsure if helpful)   NOTE: This medication information taken from patient's intake form, not medical records.   Opiates: ***  NSAIDS: ***  Anti-migraine mediations: ***  Muscle Relaxants: ***  Neuropathics: ***  Anti-depressants: ***  Anxiolytics: ***  Topicals: ***  Sleep aids:***  Other medications not covered above: ***    FAMILY MEDICAL HISTORY:  Chronic pain: Yes ***  Family history of headaches:  Yes  ***      PAST MEDICAL HISTORY:   Past Medical History:   Diagnosis Date     Allergic rhinitis      Anxiety      GERD (gastroesophageal reflux disease)      Neck pain 6/15/2011     Pneumonia      Uncomplicated asthma          HEALTH AND LIFESTYLE PRACTICES:  Have you ever had any problems with alcohol or drug use? ***  Have you ever felt you should cut down your use of alcohol/drugs?***  Have people ever annoyed you by criticizing your alcohol/drug use? ***  Have you ever felt bad or guilty about your alcohol/drug use? ***  Have you ever had a drink or taken a drug first thing in the morning for an eye-opener/hangover? ***    SLEEP:  Do you snore heavily? ***  Do you wake up feeling rested? ***  How many hours of sleep do you average per day? ***  What keep you from sleep? ***  Have you been diagnosed with sleep apnea? ***  Do  you wear a CPAP? ***      ALLERGIES:  Allergies   Allergen Reactions     Vicodin [Hydrocodone-Acetaminophen] Nausea     Other reaction(s): Diaphoresis, Vomiting  HEADACHE       MEDICATIONS:  Current Outpatient Medications   Medication Sig Dispense Refill     citalopram (CELEXA) 20 MG tablet Take 1 tablet (20 mg) by mouth daily 90 tablet 3     clonazePAM (KLONOPIN) 0.5 MG tablet Take 0.5-1 tablets (0.25-0.5 mg) by mouth 3 times daily as needed for anxiety 90 tablet 0     FLOVENT  MCG/ACT Inhaler INHALE 2 PUFFS INTO THE LUNGS TWICE DAILY 36 g 2     fluticasone (FLONASE) 50 MCG/ACT spray SPRAY 2 SPRAYS IN EACH NOSTRIL EVERY DAY 16 g 5     gabapentin (NEURONTIN) 300 MG capsule TAKE TWO CAPSULES BY MOUTH THREE TIMES A  capsule 11     ipratropium - albuterol 0.5 mg/2.5 mg/3 mL (DUONEB) 0.5-2.5 (3) MG/3ML neb solution Take 1 vial (3 mLs) by nebulization every 4 hours as needed for shortness of breath / dyspnea (Patient not taking: Reported on 6/21/2019) 30 vial 0     methadone (DOLOPHINE) 10 MG tablet Take 1 tablet (10 mg) by mouth every 8 hours as needed 90 tablet 0     naloxone (NARCAN) 4 MG/0.1ML nasal spray Spray 1 spray (4 mg) into one nostril alternating nostrils as needed for opioid reversal every 2-3 minutes until assistance arrives 0.2 mL 1     nystatin (MYCOSTATIN) 429541 UNIT/ML suspension TAKE 5 MLS BY MOUTH FOUR TIMES A DAY (Patient not taking: Reported on 6/21/2019) 280 mL 0     order for DME Equipment being ordered: Nebulizer mask and tubing 1 each 1     oxyCODONE (OXY-IR) 5 MG capsule Take 2 capsules (10 mg) by mouth every 4 hours as needed 2 caps q 4 hour prn pain up to 12 per day May fill 5/24/2012 360 capsule 0     sildenafil (VIAGRA) 100 MG tablet Take 1 tablet (100 mg) by mouth daily as needed 30 min to 4 hrs before sex. Do not use with nitroglycerin, terazosin or doxazosin. 4 tablet 0     traZODone (DESYREL) 100 MG tablet Take 3 tablets (300 mg) by mouth At Bedtime 90 tablet 3      VENTOLIN  (90 Base) MCG/ACT inhaler INHALE 2 PUFFS INTO THE LUNGS EVERY 6 HOURS AS NEEDED FOR SHORTNESS OF BREATH, DIFFICULTY BREATHING OR WHEEZING. (Patient not taking: Reported on 6/21/2019) 18 g 3     vitamin D3 (CHOLECALCIFEROL) 2000 units tablet TAKE ONE TABLET BY MOUTH EVERY  tablet 3     zolpidem (AMBIEN) 10 MG tablet Take 10 mg by mouth nightly as needed            REVIEW OF SYSTEMS:   Constitutional:  {PAIN  CONSTITUTIONAL:851946}  Eyes/Head: {PAIN  EYES/HEAD:675495}  Ears/Nose/Throat: {PAIN EAR/NOSE/THROAT:316782}  Allergy/Immune: {PAIN ALLERGY/IMMUNE:467867}  Skin:{PAIN SKIN:149527}  Hematologic/Lymphatic/Immunologic:{PAIN HEMATOLOGIC:412673}  Respiratory: {PAIN RESPIRATORY:622525}  Cardiovascular: {PAIN CARDIOVASCULAR:482709}  Gastrointestinal: {PAIN GASTRO:798656}  Endocrine: {PAIN ENDOCRINE:987418}  Musculoskeletal: {PAIN MUSCULOSKELETAL:708063}  Urinary:  {PAIN URINARY:177227}   Any bowel or bladder incontinence: Denies ***  Neurologic: {PAIN NEUROLOGIC:003398}  Psychiatric: {PAIN MENTAL HEALTH:844150}    CURRENT FAMILY/SOCIAL SITUATION:  Living situation: ***  Support system: ***  Occupation: ***  Current stressors: ***  Safety concerns: ***    ABUSE/ASSAULT HISTORY:   Physical: ***  Emotional:  ***  Sexual: ***  Childhood Sexual Abuse: ***    SUBSTANCE USE:  Any illicit drug use: ***  EtOH use: ***  Caffeine use: ***  Nicotine use: ***  Any use of prescriptions other than how they were prescribed:***    PHYSICAL EXAM    Vitals: ***    Appearance:     A&O. Patient {IS/IS NOT:958695} appropriate.   Patient is in NAD.   Patient {IS/IS NOT:744205} well groomed and appears stated age.     HEENT:   Normocephalic, atraumatic, sclera, conjunctiva and pharynx normal. Pupils {ARE/ARE NOT:9034} equal, round and reactive to light. Hearing is adequate for exam. Uvula rises with phonation. ***  Neck: Supple. No deformities or adenopathy  Cardiovascular:  Heart has a regular rate and rhythm. Audible  "S1 and S2. No murmurs auscultated. No edema on bilateral lower extremities. ***  Respiratory: Lungs are clear to auscultation bilaterally. No wheezes or crackles.  Abdominal/Pelvic:   Soft, non-tender with good bowel sounds, no palpable organomegaly.  Skin:  No rashes, erythema, breakdowns, lesions to exposed skin.   Hematologic:  No bruises, petechiae or ecchymosis to exposed areas.  Psychiatric:  mentation appears normal., affect and mood normal  Musculoskeletal:  Posture upright, shoulders and pelvis are leveled.   Deltoid: R: ***/5 L: ***/5  Biceps: R: ***/5 L: ***/5  Triceps: R: ***/5 L: ***/5  Intrinsic hand: R: ***/5   L: ***/5  Hip flexion: R: ***/5 L: ***/5  Knee ext: R: ***/5 L: ***/5  Knee flex: R: ***/5 L: ***/5  Dorsiflexion: R: ***/5 L: ***/5  Plantarflexion: R: ***/5 L: ***/5    Cervical spine:   Flex:  *** degrees, {PAINFUL/PAIN FREE:820390}   Ext: *** degrees, {PAINFUL/PAIN FREE:797297}   Rotation to right: *** degrees, {PAINFUL/PAIN FREE:182958}   Rotation to left: *** degrees, {PAINFUL/PAIN FREE:819968}   Tenderness in the cervical spine at midline. {YES NO:175927::\"See Hospital Course information above.\"}   Tenderness in the cervical paraspinal muscles. {YES NO:889407::\"See Hospital Course information above.\"}  Thoracic spine:    Tenderness in the thoracic spine at midline. {YES NO:905747::\"See Hospital Course information above.\"}   Tenderness in the thoracic paraspinal muscles. {YES NO:303408::\"See Hospital Course information above.\"}  Lumbar/Sacral spine:   Flexion:  *** degrees, {PAINFUL/PAIN FREE:419307}   Ext: *** degrees, {PAINFUL/PAIN FREE:292993}   Rotation/ext to right: {PAINFUL/PAIN FREE:528899}   Rotation/ext to left: {PAINFUL/PAIN FREE:134771}   Tenderness in the lumbar spine at midline. {YES NO:534608::\"See Hospital Course information above.\"}   Tenderness in the lumbar paraspinal muscles. {YES NO:857096::\"See Hospital Course information above.\"}   Straight leg exam:    Right: " {Pos/Neg/Not done:773262}    Left: {Pos/Neg/Not done:207433}   Leslie/Eligio's test:      Right: {Pos/Neg/Not done:280897}     Left:  {Pos/Neg/Not done:129658}   Tenderness over SI joint:      Right: {Pos/Neg/Not done:630672}     Left:  {Pos/Neg/Not done:536755}   Tenderness over piriformis:     Right: {Pos/Neg/Not done:106287}    Left:  {Pos/Neg/Not done:440380}   Tenderness over Trochanteric Bursa:     Right: {Pos/Neg/Not done:306262}    Left: {Pos/Neg/Not done:733207}    Gait pattern:  Able to walk on the heels and toes. Patient has antalgic gait favoring the { :606281} side.     Neurological:   Deep Tendon Reflex exam:***   Biceps:     R:  2/4   L: 2/4   Brachioradialis   R:  2/4   L: 2/4:   Patella:  R:  2/4   L: 2/4   Achilles:  R:  2/4   L: 2/4    Sensory exam:***   Light touch: normal bilateral upper and lower extremities    Vibration: normal in bilateral upper extremities and bilateral lower extremities   Sharp: normal in bilateral upper extremities and bilateral lower extremities   No allodynia, dysesthesia, or hyperalgesia.          Screening Tools:  DIRE Score for ongoing opioid management is calculated as follows:    Diagnosis = {Dire Diagnosis:128783}    Intractability = {DIRE Intractability:673964}    Risk        Psych = {DIRE psych:918761}         Chem Hlth = {DIRE chem hlth:486769}       Reliability = {DIRE reliability:270535}       Social = {DIRE social support:710754}       (Psych + Chem hlth + Reliability + Social) = ***    Efficacy = {DIRE efficacy:992853}    DIRE Score = ***        7-13: likely NOT suitable candidate for long-term opioid analgesia       14-21: may be a suitable candidate for long-term opioid analgesia      Previous Diagnostic Tests:   Imaging Studies:   MRI Cervical Spine 09/25/2017  IMPRESSION:    1. Stable postoperative changes of anterior spinal fusion from C4 to C6.  2. Degenerative changes at C6-C7 where disc bulge, facet hypertrophy and uncinate spurring results in  moderate spinal canal narrowing and severe bilateral neural foraminal narrowing. Compared to prior MR 2012, the disc bulge and spinal canal narrowing have progressed.  3. Additional degenerative changes in the cervical spine as described above, not significantly changed.     MRI Thoracic Spine 2017  IMPRESSION:    1. Stable mild anterior wedging of the T8 vertebral body. No  suspicious osseous lesions or evidence of acute cervical spine  fracture or subluxation.  2. Mild posterior disc bulges at T7-T8 and T8-T9 without significant spinal canal narrowing.  3. Left neural foraminal narrowing from C7-T1 through T3-T4 due to left-sided facet hypertrophy.    Other Testing (labs, diagnostics) reviewed:   EK2019 normal sinus rhythm   LABS: Creatinine 1.02    [place] Board of Pharmacy Data Base Reviewed:    {YES/NO:749749}; No concern for abuse or misuse of controlled medications based on this report. ***    Outside records reviewed***    ASSESSMENT:   Joselito Abarca is a 53 year old male who presents today with the complaints of:    1. ***  2. ***  3. ***  4. ***    PLAN:    Diagnosis reviewed, treatment option addressed, and risk/benefits discussed.  Self-care instructions given.  I am recommending a multidisciplinary treatment plan to help this patient better manage his pain.       1. Physical Therapy:  ***  2. Clinical Health Psychologist to address issues of relaxation, behavorial change, coping style, and other factors important to improvement: ***  3. Diagnostic Studies:  ***  4. Medication Management: ***   5. Urine toxicology screen today ***  6. Potential procedures: ***  7. Other treatments: ***   8. Recommendations to PCP: ***    Follow up: {Pain Discharge time:524881}     TIME SPENT:   A total of {MINUTES:226788}  minutes was spent on the patient today, greater than 50% of that time was spent on face to face counseling and care coordination regarding diagnoses and treatment options as  mentioned above.    I would like to thank  *** for allowing me to participate in the management of this patient.     Ashley Salgado PA-C  [facility]

## 2019-06-28 ENCOUNTER — OFFICE VISIT (OUTPATIENT)
Dept: PALLIATIVE MEDICINE | Facility: OTHER | Age: 54
End: 2019-06-28
Payer: COMMERCIAL

## 2019-06-28 ENCOUNTER — TELEPHONE (OUTPATIENT)
Dept: FAMILY MEDICINE | Facility: OTHER | Age: 54
End: 2019-06-28

## 2019-06-28 VITALS
WEIGHT: 160.25 LBS | BODY MASS INDEX: 25.15 KG/M2 | SYSTOLIC BLOOD PRESSURE: 112 MMHG | TEMPERATURE: 96.9 F | DIASTOLIC BLOOD PRESSURE: 66 MMHG | HEIGHT: 67 IN

## 2019-06-28 DIAGNOSIS — M54.2 CERVICALGIA: Primary | ICD-10-CM

## 2019-06-28 DIAGNOSIS — F11.90 CHRONIC, CONTINUOUS USE OF OPIOIDS: ICD-10-CM

## 2019-06-28 DIAGNOSIS — G89.4 CHRONIC PAIN SYNDROME: ICD-10-CM

## 2019-06-28 DIAGNOSIS — M54.6 MIDLINE THORACIC BACK PAIN, UNSPECIFIED CHRONICITY: ICD-10-CM

## 2019-06-28 DIAGNOSIS — J30.2 CHRONIC SEASONAL ALLERGIC RHINITIS DUE TO FUNGAL SPORES: ICD-10-CM

## 2019-06-28 DIAGNOSIS — F41.1 GENERALIZED ANXIETY DISORDER: ICD-10-CM

## 2019-06-28 DIAGNOSIS — M79.18 MYOFASCIAL PAIN: ICD-10-CM

## 2019-06-28 PROCEDURE — 99000 SPECIMEN HANDLING OFFICE-LAB: CPT | Performed by: PHYSICIAN ASSISTANT

## 2019-06-28 PROCEDURE — 80307 DRUG TEST PRSMV CHEM ANLYZR: CPT | Mod: 90 | Performed by: PHYSICIAN ASSISTANT

## 2019-06-28 PROCEDURE — 99205 OFFICE O/P NEW HI 60 MIN: CPT | Performed by: PHYSICIAN ASSISTANT

## 2019-06-28 RX ORDER — FLUTICASONE PROPIONATE 50 MCG
SPRAY, SUSPENSION (ML) NASAL
Qty: 16 G | Refills: 5 | Status: SHIPPED | OUTPATIENT
Start: 2019-06-28 | End: 2020-10-07

## 2019-06-28 ASSESSMENT — PAIN SCALES - GENERAL: PAINLEVEL: SEVERE PAIN (6)

## 2019-06-28 ASSESSMENT — MIFFLIN-ST. JEOR: SCORE: 1540.52

## 2019-06-28 NOTE — TELEPHONE ENCOUNTER
Clonazepam  Last Written Prescription Date:  06/03/2019  Last Fill Quantity: 90,  # refills: 0   Last office visit: 6/21/2019 with prescribing provider:  Schoen   Future Office Visit:   Next 5 appointments (look out 90 days)    Jul 16, 2019  4:00 PM CDT  Return Visit with Ashley Salgado PA-C  [facility] [address]  [place] [place] [address]  [phone]         Routing refill request to provider for review/approval because:  Drug not on the [facility] refill protocol     Ailyn Tobias RN
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
## 2019-06-28 NOTE — PATIENT INSTRUCTIONS
After Visit Instructions:     Thank you for coming to [facility] for your care. It is my goal to partner with you to help you reach your optimal state of health.     I am recommending multidisciplinary care at this time.  The focus of care will be to continue gradual rehabilitation and pain management with medication adjustments as needed.    Continue daily self-care, identifying contributing factors, and monitoring variations in pain level. Continue to integrate self-care into your life.        Schedule pain psychology assessment/visit: would strongly encourage and we'll discuss again at your next visit    Schedule physical therapy assessment/visit: we can consider and talk about it again in the future    Schedule follow-up with Ashley Salgado PA-C in 3 weeks. You will need to make this appointment. See me the week of July 15th.     Imaging: MRI cervical spine and thoracic spine-we will talk about the results at your next visit    Labs: urine drug screen     Procedures recommended: We will talk about this again after your MRI     Medication recommendations:     We would work on slowly tapering you off of the narcotics. We would work together on this, to get you as low as possible to maybe off of them.     Gabapentin: 3 pill in the AM, 3 pills in the afternoon 3 pills at bedtime. It is important to take this one consistently    Talk to Dr. Schoen about changing from Celexa to Cymbalta.       Ashley Salgado PA-C  [facility]  [facility]    Contact information: [facility]  Clinic Number:  [phone]     Call with any questions about your care and for scheduling assistance.     Calls are returned Monday through Friday between 8 AM and 4:30 PM. We usually get back to you within 2 business days depending on the issue/request.    If we are prescribing your medications:    For opioid medication refills, call the clinic or send a MyChart  message 7 days in advance.  Please include:    Name of requested medication    Name of the pharmacy.    For non-opioid medications, call your pharmacy directly to request a refill. Please allow 3-4 days to be processed.     Per [place] State Law:    All controlled substance prescriptions must be filled within 30 days of being written.      For those controlled substances allowing refills, pickup must occur within 30 days of last fill.      We believe regular attendance is key to your success in our program!      Any time you are unable to keep your appointment we ask that you call us at least 24 hours in advance to cancel.This will allow us to offer the appointment time to another patient.   Multiple missed appointments may lead to dismissal from the clinic.

## 2019-06-28 NOTE — TELEPHONE ENCOUNTER
"Flonase  Last Written Prescription Date:  06/07/2018  Last Fill Quantity: 16g,  # refills: 5   Last office visit: 6/21/2019 with prescribing provider:  Schoen   Future Office Visit:   Next 5 appointments (look out 90 days)    Jul 16, 2019  4:00 PM CDT  [facility] Visit [facility] Salgado PA-C  [facility] [address]  [address]  [place] [address]  [phone]      Prescription approved per G Refill Protocol.    Requested Prescriptions   Pending Prescriptions Disp Refills     fluticasone (FLONASE) 50 MCG/ACT nasal spray [Pharmacy Med Name: FLUTICASONE NASAL SPRAY] 16 g 5     Sig: SPRAY 2 SPRAYS IN EACH NOSTRIL EVERY DAY       Inhaled Steroids Protocol Passed - 6/28/2019  2:08 PM        Passed - Patient is age 12 or older        Passed - Asthma control assessment score within normal limits in last 6 months     Please review ACT score.         Passed - Medication is active on med list        Passed - Recent (6 mo) or future (30 days) visit within the authorizing provider's specialty     Patient had office visit in the last 6 months or has a visit in the next 30 days with authorizing provider or within the authorizing provider's specialty.  See \"Patient Info\" tab in inbasket, or \"Choose Columns\" in Meds & Orders section of the refill encounter.        Ailyn Tobias RN   "

## 2019-06-28 NOTE — TELEPHONE ENCOUNTER
THIS PATIENT LEFT A URINE WITH DRUG SHEET CAN YOU PLEASE SIGN THE CDAU SO WE CAN CATCH THE  THAT WILL BE HERE SOON.    THANK YOU,  CELIO

## 2019-06-29 ENCOUNTER — HOSPITAL ENCOUNTER (EMERGENCY)
Facility: CLINIC | Age: 54
Discharge: HOME | End: 2019-06-29
Attending: NURSE PRACTITIONER | Admitting: NURSE PRACTITIONER
Payer: COMMERCIAL

## 2019-06-29 VITALS
SYSTOLIC BLOOD PRESSURE: 118 MMHG | HEART RATE: 68 BPM | BODY MASS INDEX: 25.06 KG/M2 | WEIGHT: 160 LBS | OXYGEN SATURATION: 98 % | TEMPERATURE: 98 F | RESPIRATION RATE: 20 BRPM | DIASTOLIC BLOOD PRESSURE: 86 MMHG

## 2019-06-29 DIAGNOSIS — M54.2 CHRONIC NECK PAIN: ICD-10-CM

## 2019-06-29 DIAGNOSIS — G89.29 CHRONIC NECK PAIN: ICD-10-CM

## 2019-06-29 DIAGNOSIS — M54.2 TRIGGER POINT OF NECK: ICD-10-CM

## 2019-06-29 PROBLEM — Z98.890 S/P LAPAROSCOPIC FUNDOPLICATION: Status: ACTIVE | Noted: 2019-02-21

## 2019-06-29 PROBLEM — M54.9 BACK PAIN, CHRONIC: Status: ACTIVE | Noted: 2019-02-21

## 2019-06-29 PROBLEM — K22.70 LONG-SEGMENT BARRETT'S ESOPHAGUS: Status: ACTIVE | Noted: 2019-02-21

## 2019-06-29 PROCEDURE — 99284 EMERGENCY DEPT VISIT MOD MDM: CPT | Mod: 25 | Performed by: FAMILY MEDICINE

## 2019-06-29 PROCEDURE — 20552 NJX 1/MLT TRIGGER POINT 1/2: CPT

## 2019-06-29 PROCEDURE — 20552 NJX 1/MLT TRIGGER POINT 1/2: CPT | Mod: Z6 | Performed by: FAMILY MEDICINE

## 2019-06-29 PROCEDURE — 25000125 ZZHC RX 250: Performed by: FAMILY MEDICINE

## 2019-06-29 PROCEDURE — 99284 EMERGENCY DEPT VISIT MOD MDM: CPT | Mod: 25

## 2019-06-29 RX ADMIN — LIDOCAINE HYDROCHLORIDE 5 ML: 10 INJECTION, SOLUTION EPIDURAL; INFILTRATION; INTRACAUDAL; PERINEURAL at 19:52

## 2019-06-29 ASSESSMENT — ENCOUNTER SYMPTOMS
EYES NEGATIVE: 1
HEADACHES: 1
CHILLS: 0
RESPIRATORY NEGATIVE: 1
NECK STIFFNESS: 1
PSYCHIATRIC NEGATIVE: 1
NUMBNESS: 1
FEVER: 0
NECK PAIN: 1
CARDIOVASCULAR NEGATIVE: 1
GASTROINTESTINAL NEGATIVE: 1

## 2019-06-29 NOTE — ED AVS SNAPSHOT
[facility] Emergency Department  [address] [place] [address]  Phone:  [phone]  Fax:  [phone]                                    Joselito Abarca   MRN: [id number]    Department:  [facility] Emergency Department   Date of Visit:  6/29/2019           After Visit Summary Signature Page    I have received my discharge instructions, and my questions have been answered. I have discussed any challenges I see with this plan with the nurse or doctor.    ..........................................................................................................................................  Patient/Patient Representative Signature      ..........................................................................................................................................  Patient Representative Print Name and Relationship to Patient    ..................................................               ................................................  Date                                   Time    ..........................................................................................................................................  Reviewed by Signature/Title    ...................................................              ..............................................  Date                                               Time          22EPIC Rev 08/18

## 2019-06-30 NOTE — ED TRIAGE NOTES
"Patient states he has had severe neck pain and headache since having seizures 2 weeks ago. \"I need the novocain  Injections in my neck\". He states he doesn't have any memory of his last ED visit except for at discharge when he states he was told \"you have to go to the lobby. We need the room\".  "

## 2019-06-30 NOTE — ED PROVIDER NOTES
"  History     Chief Complaint   Patient presents with     Neck Pain     The history is provided by the patient.     Joselito Abarca is a 53 year old male who presents to the emergency department for neck pain. Patient reports having severe neck pain and headache since having seizures 2 weeks ago. He states he accidentally used his Narcan instead of the Flonase and had a seizure from the Narcan (patient is on chronic Oxycodone and Methadone for 15 years) and went into \"full blown withdrawal. His last ED visit he was given steriod injections in his neck which he is requesting again for today. He reports having a headache \"all over and everything hurts, really bad this time, the worst\". He states feeling \"so sick\" this morning. He has tried a \"traction pillow once for his neck but states it was uncomfortable\" so he doesn't use it anymore. His pain is worse in the morning. He uses 2 pillows to sleep at night and sleeps on his side. He states he gets pain between his shoulder blades that wake him up every hour during the night. He denies any fever or chills. He was just recently seen by a pain clinic to develop a plan for his chronic pain management.  He states that he scheduled for MRI scans of his neck as well as has an appointment with the surgeon for his neck pain.    Allergies:  Allergies   Allergen Reactions     Vicodin [Hydrocodone-Acetaminophen] Nausea     Other reaction(s): Diaphoresis, Vomiting  HEADACHE       Problem List:    Patient Active Problem List    Diagnosis Date Noted     Back pain, chronic 02/21/2019     Priority: Medium     S/P laparoscopic fundoplication 02/21/2019     Priority: Medium     Long-segment Olson's esophagus 02/21/2019     Priority: Medium     Gastroesophageal reflux disease, esophagitis presence not specified 09/21/2018     Priority: Medium     Chronic seasonal allergic rhinitis due to fungal spores 06/07/2018     Priority: Medium     Status post cervical spinal arthrodesis " 11/29/2017     Priority: Medium     Chronic, continuous use of opioids 12/13/2016     Priority: Medium     Patient is followed by Gregory G. Schoen, MD for ongoing prescription of pain medication.  All refills should be approved by this provider, or covering partner.    Medication(s): Oxycodone 5 mg IR: Take 2 capsules (10 mg) by mouth every 4 hours as needed 2 caps q 4 hour prn pain up to 12 per day .   Methadone 10 mg three times daily, 90 per month  Clonazepam 0.5 mg tid, 90 per month   Clinic visit frequency required: Q 3 months     Controlled substance agreement:  Encounter-Level CSA - 2/27/15:               Controlled Substance Agreement - Scan on 3/14/2015  8:47 AM : Controlled Medication Agreement-02/27/15 (below)            Pain Clinic evaluation in the past: Yes    DIRE Total Score(s):  No flowsheet data found.    Last [facility] website verification:  10/30/2016     [email or web address]         Moderate persistent asthma without complication 11/02/2016     Priority: Medium     Acute respiratory failure with hypoxia (H) 04/29/2016     Priority: Medium     Nausea with vomiting 04/27/2016     Priority: Medium     Cough 03/06/2016     Priority: Medium     Tobacco dependence syndrome 02/17/2016     Priority: Medium     Leukocytosis 02/16/2016     Priority: Medium     Disease of bronchial airway (HCC) 02/12/2016     Priority: Medium     Bronchiolectasis (H) 02/12/2016     Priority: Medium     Degeneration of cervical intervertebral disc 01/26/2015     Priority: Medium     Health Care Home 09/30/2013     Priority: Medium     Status:  Accepted  Care Coordinator:    Melissa Behl BSN, RN, N  [facility] Care Coordinator  [phone]     See Letters for [facility] Care Plan  Date:  April 26, 2016            Persons encountering health services in other specified circumstances 09/30/2013     Priority: Medium     Overview:   Overview:   Status:  Accepted  Care Coordinator:    Melissa Behl BSN, RN, PHN  [facility] Care  Coordinator  [phone]     See Letters for [facility] Care Plan  Date:  April 26, 2016       Arthrodesis status 06/15/2011     Priority: Medium     Neck pain 06/15/2011     Priority: Medium     History of arthrodesis 06/15/2011     Priority: Medium     CARDIOVASCULAR SCREENING; LDL GOAL LESS THAN 160 10/31/2010     Priority: Medium     Encounter for screening for cardiovascular disorders 10/31/2010     Priority: Medium     Intervertebral cervical disc disorder with myelopathy, cervical region 11/12/2009     Priority: Medium     Patient is followed by TIARA JIMENEZ for ongoing prescription of narcotic pain medicine.  Med: methadone 10 mg tid. Taking oxycodone up to 8 per day, 120 per month  Maximum use per month: 90  Expected duration: ongoing  Narcotic agreement on file: YES  Clinic visit recommended: Q 3 months         Intervertebral disc disorder of cervical region with myelopathy 11/12/2009     Priority: Medium     Overview:   Overview:   Patient is followed by TIARA JIMENEZ for ongoing prescription of narcotic pain medicine.  Med: methadone 10 mg tid. Taking oxycodone up to 8 per day, 120 per month  Maximum use per month: 90  Expected duration: ongoing  Narcotic agreement on file: YES  Clinic visit recommended: Q 3 months       Constipation 03/19/2008     Priority: Medium     Problem list name updated by automated process. Provider to review       Thoracic or lumbosacral neuritis or radiculitis, unspecified 03/19/2008     Priority: Medium     Esophageal reflux 07/09/2003     Priority: Medium     Generalized anxiety disorder 07/08/2003     Priority: Medium     Alcohol abuse, in remission 07/08/2003     Priority: Medium     Nondependent alcohol abuse, in remission 07/08/2003     Priority: Medium        Past Medical History:    Past Medical History:   Diagnosis Date     Allergic rhinitis      Anxiety      GERD (gastroesophageal reflux disease)      Neck pain 6/15/2011     Pneumonia      Uncomplicated  asthma        Past Surgical History:    Past Surgical History:   Procedure Laterality Date     BRONCHOSCOPY (RIGID OR FLEXIBLE), DIAGNOSTIC N/A 8/7/2018    Procedure: COMBINED BRONCHOSCOPY (RIGID OR FLEXIBLE), LAVAGE;  Bronchoscopy with Lavage and Transbronchial Biopsy;  Surgeon: Yung Miranda MD;  Location: [facility]     COLONOSCOPY WITH CO2 INSUFFLATION N/A 9/24/2018    Procedure: COLONOSCOPY WITH CO2 INSUFFLATION;  Colon and upper endoscopy ;  Surgeon: Devin Pelayo MD;  Location: [facility]     COMBINED ESOPHAGOSCOPY, GASTROSCOPY, DUODENOSCOPY (EGD) WITH CO2 INSUFFLATION N/A 9/24/2018    Procedure: COMBINED ESOPHAGOSCOPY, GASTROSCOPY, DUODENOSCOPY (EGD) WITH CO2 INSUFFLATION;  Colon and upper endoscopy ;  Surgeon: Devin Pelayo MD;  Location: [facility]     DISCECTOMY LUMBAR POSTERIOR MICROSCOPIC ONE LEVEL  2/21/2012    Procedure:DISCECTOMY LUMBAR POSTERIOR MICROSCOPIC ONE LEVEL; LEFT T1-T2 THORASIC HEMILAMINECTOMY MICRODISCECTOMY WITH MEXTRIX II ; Surgeon:SHARON PURI; Location:[facility]     DISCECTOMY, FUSION CERVICAL ANTERIOR ONE LEVEL, COMBINED N/A 11/29/2017    Procedure: COMBINED DISCECTOMY, FUSION CERVICAL ANTERIOR ONE LEVEL;  Anterior Cervical Discectomy and Fusion Cervical Six - Cervical Seven, Exploration and Revision Cervical Four - Cervical Six with Hardware Removal;  Surgeon: Nikolas Vasques MD;  Location: [facility]     ESOPHAGEAL IMPEDENCE FUNCTION TEST WITH 24 HOUR PH GREATER THAN 1 HOUR N/A 12/12/2018    Procedure: ESOPHAGEAL IMPEDENCE FUNCTION TEST WITH 24 HOUR PH GREATER THAN 1 HOUR;  Surgeon: Atif Oconnor MD;  Location: [facility]     ESOPHAGOSCOPY, GASTROSCOPY, DUODENOSCOPY (EGD), COMBINED N/A 9/24/2018    Procedure: COMBINED ESOPHAGOSCOPY, GASTROSCOPY, DUODENOSCOPY (EGD), BIOPSY SINGLE OR MULTIPLE;;  Surgeon: Devin Pelayo MD;  Location: [facility]     EXPLORE SPINE, REMOVE HARDWARE, COMBINED N/A 11/29/2017    Procedure: COMBINED EXPLORE SPINE,  REMOVE HARDWARE;;  Surgeon: Nikolas Vasques MD;  [facility]: [facility]     FUSION CERVICAL ANTERIOR TWO LEVELS  2010     HC DRAIN/INJ MAJOR JOINT/BURSA W/O US  2008    Left sacroiliac joint injection.     HC INJ EPIDURAL LUMBAR/SACRAL W/WO CONTRAST  2009     HEAD & NECK SURGERY           HERNIA REPAIR       INJECT BLOCK MEDIAL BRANCH CERVICAL/THORACIC/LUMBAR Bilateral 2015    Procedure: INJECT BLOCK MEDIAL BRANCH CERVICAL / THORACIC / LUMBAR;  Surgeon: Ronald Driscoll MD;  Location: [facility]     INJECT FACET JOINT Bilateral 2015    Procedure: INJECT FACET JOINT;  Surgeon: Ronald Driscoll MD;  Location: [facility]     NERVE BLOCK OCCIPITAL Bilateral 2018    Procedure: NERVE BLOCK OCCIPITAL;  bilateral occipital nerve blocks;  Surgeon: Ronald Driscoll MD;  Location: [facility]       Family History:    Family History   Problem Relation Age of Onset     Family History Negative No family hx of      C.A.D. No family hx of        Social History:  Marital Status:  Single [1]  Social History     Tobacco Use     Smoking status: Former Smoker     Packs/day: 1.00     Years: 30.00     Pack years: 30.00     Types: Cigars     Last attempt to quit: 2009     Years since quittin.5     Smokeless tobacco: Former User     Quit date:    Substance Use Topics     Alcohol use: No     Alcohol/week: 0.0 oz     Comment: HX OF ABUSE-IN REMISSION     Drug use: No        Medications:      citalopram (CELEXA) 20 MG tablet   clonazePAM (KLONOPIN) 0.5 MG tablet   FLOVENT  MCG/ACT Inhaler   fluticasone (FLONASE) 50 MCG/ACT nasal spray   gabapentin (NEURONTIN) 300 MG capsule   ipratropium - albuterol 0.5 mg/2.5 mg/3 mL (DUONEB) 0.5-2.5 (3) MG/3ML neb solution   methadone (DOLOPHINE) 10 MG tablet   naloxone (NARCAN) 4 MG/0.1ML nasal spray   nystatin (MYCOSTATIN) 159874 UNIT/ML suspension   order for DME   oxyCODONE (OXY-IR) 5 MG capsule   sildenafil (VIAGRA) 100 MG tablet   traZODone (DESYREL) 100 MG tablet   VENTOLIN  " (90 Base) MCG/ACT inhaler   vitamin D3 (CHOLECALCIFEROL) 2000 units tablet   zolpidem (AMBIEN) 10 MG tablet         Review of Systems   Constitutional: Negative for chills and fever.   HENT: Negative.    Eyes: Negative.    Respiratory: Negative.    Cardiovascular: Negative.    Gastrointestinal: Negative.    Genitourinary: Negative.    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   Skin: Negative.    Neurological: Positive for numbness (left arm on and off x years) and headaches (\"all over head\").   Psychiatric/Behavioral: Negative.    All other systems reviewed and are negative.      Physical Exam   BP: 118/86  Pulse: 68  Temp: 98  F (36.7  C)  Resp: 20  Weight: 72.6 kg (160 lb)  SpO2: 98 %      Physical Exam   Constitutional: He is oriented to person, place, and time. He appears well-developed and well-nourished.   HENT:   Head: Atraumatic.   Eyes: Pupils are equal, round, and reactive to light. Conjunctivae and EOM are normal.   Neck: Trachea normal and phonation normal. No JVD present. Muscular tenderness (Multiple areas of muscle tenderness.) present. No spinous process tenderness present. No neck rigidity. No tracheal deviation, no edema, no erythema and normal range of motion present. No thyroid mass present.       Cardiovascular: Normal rate.   Pulmonary/Chest: Effort normal and breath sounds normal.   Abdominal: Soft. Bowel sounds are normal.   Musculoskeletal: He exhibits tenderness (See diagram).   Neurological: He is alert and oriented to person, place, and time. No sensory deficit. He exhibits normal muscle tone.   Skin: Skin is warm. Capillary refill takes less than 2 seconds. No rash noted.   Psychiatric: He has a normal mood and affect. His behavior is normal. Judgment and thought content normal.   Nursing note and vitals reviewed.      ED Course        Procedures  Procedure: 6ml of 0.25% " lidocaine was divided equally among the 6 trigger points as diagramed above. Cleansing of the skin was performed with alcohol. Joselito was given informed consent as to the possible side effects of the injection to include: allergic reaction, infection, and possible puncture of lung. Joselito agrees to proceed with the trigger point injection(s). A timeout was observed prior to injecting the trigger points. The trigger points areas were injected without complication. Joselito was watched for signs of allergic reaction and none were noted. Joselito was instructed to ice the trigger point areas and continue the gentle stretching exercises. He is encouraged to watch for evidence of infection at the trigger point injection site(s) and return to the ER or his clinic should infection be suspected.               Critical Care time:  none            Medications   lidocaine 1 % 5 mL (5 mLs Intradermal Given by Other Clinician 6/29/19 1952)       Assessments & Plan (with Medical Decision Making)  53-year-old male to the ER secondary to concerns of exacerbation of his chronic neck pain and headaches with request for trigger point injections to his posterior neck musculature.  Patient is well-known to me from multiple ER visits in the past for his chronic neck pain.  Exam findings consistent with multiple myofascial tender points as documented above.  Trigger point injections performed as noted above.  Patient tolerated well and stated he had good relief of his symptoms with the injection therapy.  He was given instructions about the use of ice therapy, stretching exercises, walking, swimming, and gentle muscle strengthening.  He is encouraged to follow-up with his chronic pain clinic and surgeon and is scheduled MRI.      I have reviewed the nursing notes.    I have reviewed the findings, diagnosis, plan and need for follow up with the patient.            I verbally discussed the findings of the evaluation today in the ER. I have  verbally discussed with Joselito the suggested treatment(s) as described in the discharge instructions and handouts.    I have verbally suggested he follow-up in his clinic or return to the ER for increased symptoms. See the follow-up recommendations documented  in the after visit summary in this visit's EPIC chart.      Final diagnoses:   Chronic neck pain   Trigger point of neck     This document serves as a record of services personally performed by Rylan Mccarty DO. It was created on their behalf by Lexii Erwin, a trained medical scribe. The creation of this record is based on the provider's personal observations and the statements of the patient. This document has been checked and approved by the attending provider.    Note: Chart documentation done in part with Dragon Voice Recognition software. Although reviewed after completion, some word and grammatical errors may remain.    6/29/2019   [facility] EMERGENCY DEPARTMENT     Jung Mccarty DO  06/29/19 [id number]

## 2019-07-01 LAB — PAIN DRUG SCR UR W RPTD MEDS: NORMAL

## 2019-07-01 RX ORDER — CLONAZEPAM 0.5 MG/1
0.25-0.5 TABLET ORAL 3 TIMES DAILY PRN
Qty: 90 TABLET | Refills: 0 | Status: SHIPPED | OUTPATIENT
Start: 2019-07-01 | End: 2019-07-29

## 2019-07-05 ENCOUNTER — HOSPITAL ENCOUNTER (OUTPATIENT)
Dept: MRI IMAGING | Facility: CLINIC | Age: 54
Discharge: HOME | End: 2019-07-05
Attending: PHYSICIAN ASSISTANT | Admitting: PHYSICIAN ASSISTANT
Payer: COMMERCIAL

## 2019-07-05 ENCOUNTER — HOSPITAL ENCOUNTER (OUTPATIENT)
Dept: MRI IMAGING | Facility: CLINIC | Age: 54
End: 2019-07-05
Attending: PHYSICIAN ASSISTANT
Payer: COMMERCIAL

## 2019-07-05 DIAGNOSIS — M54.2 CERVICALGIA: ICD-10-CM

## 2019-07-05 DIAGNOSIS — G89.4 CHRONIC PAIN SYNDROME: ICD-10-CM

## 2019-07-05 DIAGNOSIS — M79.18 MYOFASCIAL PAIN: ICD-10-CM

## 2019-07-05 DIAGNOSIS — F11.90 CHRONIC, CONTINUOUS USE OF OPIOIDS: ICD-10-CM

## 2019-07-05 DIAGNOSIS — M54.6 MIDLINE THORACIC BACK PAIN, UNSPECIFIED CHRONICITY: ICD-10-CM

## 2019-07-05 PROCEDURE — 72141 MRI NECK SPINE W/O DYE: CPT

## 2019-07-05 PROCEDURE — 72146 MRI CHEST SPINE W/O DYE: CPT

## 2019-07-08 ENCOUNTER — TELEPHONE (OUTPATIENT)
Dept: PALLIATIVE MEDICINE | Facility: CLINIC | Age: 54
End: 2019-07-08

## 2019-07-08 NOTE — TELEPHONE ENCOUNTER
Please call patient and let him know that I reviewed his MRIs. They are showing some arthritic changes in the cervical and thoracic spine. I would not refer to surgery at this time. We will discuss the results in more depth at his appointment with me on 7/16/2019.    Ashley Salgado PA-C on 7/8/2019 at 4:55 PM

## 2019-07-09 NOTE — TELEPHONE ENCOUNTER
Patient returned call. Discussed provider's comments and recommendations. Let him know that the results will be fully reviewed at next week's appointment.    He conveyed some concerns regarding his lack of reflexes discovered at a recent visit in Primary care.    Simona Escalona CMA

## 2019-07-15 NOTE — PROGRESS NOTES
[facility]    CHIEF COMPLAINT:   Pain  -neck pain    INTERVAL HISTORY:  Last seen on 6/28/19.        Recommendations/plan at the last visit included:  1. Physical Therapy: Not at this time but we could consider in the future  2. Clinical Health Psychologist to address issues of relaxation, behavorial change, coping style, and other factors important to improvement: Would strongly encourage the patient to consider this option.  I will revisit this again at his next visit  3. Diagnostic Studies: With an MRI of his cervical and thoracic spine  4. Medication Management:   1. Discussed the importance of him taking his gabapentin as it is prescribed.  2. We will plan on tapering him off of his methadone first.  I typically do this by about 10% weekly.  We can discuss going a bit slower at this at first while we are working on other interventions as well.  I did discuss with the patient however that we will be continuing to go down on his medications.  After we get him lowered on the methadone we will work on trying to lower the oxycodone.  I do think he would probably benefit more from Belbuca or Butrans however given the dosing that he is at right now we would not get any pain relief from those medications.  3. I strongly encourage the patient to talk to his primary care provider about switching from Celexa to Cymbalta  5. Urine toxicology screen today   6. Potential procedures: We will discuss again after we get the MRIs.   7. Recommendations to PCP: I will plan on taking over the patient's narcotic pain medications pending his urine drug screen.  The urine drug screen does take 7 to 10 days to get back.  If the patient needs a refill of his narcotic pain medications before I get the urine drug screen he will need to discuss this with his primary care provider.     Follow up: 3 Weeks     Since his last visit, Joselito Abarca reports:  - He continues to have a lot of pain. Overal his activity level  has been slight more, and his mood has been stable. He states that the morning and evening are the worst times for him during the day. No new medical conditions since his last visit.     He was evaluated in the ER on 6/29/2019 due to neck pain. He was given 6 trigger point injections.    Pain Information:   Pain quality: Unbearable    Pain rating: intensity ranges from 10/10 to 10/10, and averages 10/10 on a 0-10 scale.   Pain today 9/10    SELF CARE:   How often do you practice SELF-CARE (relaxing, stretching, pacing, monitoring posture, taking mini-breaks) in a typical day:  Spent yesterday (all night) at the [employer] with a friend    Annual Controlled Substance Agreement: 7/16/2019  UDS: 6/28/2019    CURRENT RELEVANT PAIN MEDICATIONS:   Clonazepam 0.5mg-takes 3-4/day  Gabapentin 300mg-takes 3 capsules three times a day  Methadone 10mg-takes 1 tablet three times a day  Oxycodone 5mg-takes 3 tabs 4 times a day    Patient is using the medication as prescribed:  YES  Is your medication helpful? NO   Medication side effects? no side effect    Previous Medications: (H--helped; HI--Helped initially; SWH-- somewhat helpful, NH--No help; W--worse; SE--side effects)   Opiates: hydrocodone SE allergic, fever, sweaty, headache, methadone H, Oxycodone H  NSAIDS: Ibuprofen NH, Aleve NH  Anti-migraine mediations: Fiorinal H  Muscle Relaxants: Valium H  Neuropathics: Gabapentin H  Anti-depressants: none  Anxiolytics: Klonopin H  Topicals: Lidocaine H  Other medications not covered above: Tylenol NH    Past Pain Treatments:  Pain Clinic:   Yes, he was previously seen at [facility] and [facility]   PT: Yes, has done many times  Psychologist: No  Relaxation techniques/biofeedback: No  Chiropractor: Yes, feels that it made it worse  Acupuncture: Yes, just did one session  Pharmacotherapy:               Opioids: Yes                Non-opioids:    Yes   TENs Unit:Yes, aggravates the pain  Injections: Yes, bilateral occipital nerve blocks  07/12/2018, Cervical medial branch blocks 08/26/2015 and 5/27/2015. Has had low back pain injections at [facility]  Self-care:   Yes, hot tubs, ice packs, heating pads  Surgeries related to pain: Yes,  1. anterior cervical discectomy and fusion C6-7, exploration and revision C4-6 with hardwar removal 11/29/2017,   2. Left T1-T2 thoracic hemilaminectomy, microdiscectomy 02/21/2012,   3. Removal of C4 through C6 anterior cervical plate, Exploration of C4-C5 and C5-C6 anterior cervical fusion, C4-C5 and C5-C6 arthrodesis, C4 through C6 anterior cervical instrumentation, and Marbury of right iliac hip graft.  03/15/2011  4. C4-C5 and C5-C6 anterior cervical discectomy and fusion with instrumentation and neural electrophysiologic monitoring 01/26/2010    [place] Board of Pharmacy Data Base Reviewed:    YES; As expected, no concern for misuse/abuse of controlled medications based on this report.        THE 4 As OF OPIOID MAINTENANCE ANALGESIA    Analgesia: Is pain relief clinically significant? NO   Activity: Is patient functional and able to perform Activities of Daily Living? YES   Adverse effects: Is patient free from adverse side effects from opiates? YES   Adherence to Rx protocol: Is patient adhering to Controlled Substance Agreement and taking medications ONLY as ordered? YES       Is Narcan prescribed for opiate use >50 MME daily? YES      Daily MME: 330    Medications:  Current Outpatient Medications   Medication Sig Dispense Refill     citalopram (CELEXA) 20 MG tablet Take 1 tablet (20 mg) by mouth daily 90 tablet 3     clonazePAM (KLONOPIN) 0.5 MG tablet Take 0.5-1 tablets (0.25-0.5 mg) by mouth 3 times daily as needed for anxiety 90 tablet 0     FLOVENT  MCG/ACT Inhaler INHALE 2 PUFFS INTO THE LUNGS TWICE DAILY 36 g 2     fluticasone (FLONASE) 50 MCG/ACT nasal spray SPRAY 2 SPRAYS IN EACH NOSTRIL EVERY DAY 16 g 5     gabapentin (NEURONTIN) 300 MG capsule TAKE TWO CAPSULES BY MOUTH THREE TIMES A   capsule 11     ipratropium - albuterol 0.5 mg/2.5 mg/3 mL (DUONEB) 0.5-2.5 (3) MG/3ML neb solution Take 1 vial (3 mLs) by nebulization every 4 hours as needed for shortness of breath / dyspnea (Patient not taking: Reported on 6/21/2019) 30 vial 0     methadone (DOLOPHINE) 10 MG tablet Take 1 tablet (10 mg) by mouth every 8 hours as needed 90 tablet 0     naloxone (NARCAN) 4 MG/0.1ML nasal spray Spray 1 spray (4 mg) into one nostril alternating nostrils as needed for opioid reversal every 2-3 minutes until assistance arrives 0.2 mL 1     nystatin (MYCOSTATIN) 607823 UNIT/ML suspension TAKE 5 MLS BY MOUTH FOUR TIMES A DAY (Patient not taking: Reported on 6/21/2019) 280 mL 0     order for DME Equipment being ordered: Nebulizer mask and tubing 1 each 1     oxyCODONE (OXY-IR) 5 MG capsule Take 2 capsules (10 mg) by mouth every 4 hours as needed 2 caps q 4 hour prn pain up to 12 per day May fill 5/24/2012 360 capsule 0     sildenafil (VIAGRA) 100 MG tablet Take 1 tablet (100 mg) by mouth daily as needed 30 min to 4 hrs before sex. Do not use with nitroglycerin, terazosin or doxazosin. 4 tablet 0     traZODone (DESYREL) 100 MG tablet Take 3 tablets (300 mg) by mouth At Bedtime 90 tablet 3     VENTOLIN  (90 Base) MCG/ACT inhaler INHALE 2 PUFFS INTO THE LUNGS EVERY 6 HOURS AS NEEDED FOR SHORTNESS OF BREATH, DIFFICULTY BREATHING OR WHEEZING. (Patient not taking: Reported on 6/21/2019) 18 g 3     vitamin D3 (CHOLECALCIFEROL) 2000 units tablet TAKE ONE TABLET BY MOUTH EVERY  tablet 3     zolpidem (AMBIEN) 10 MG tablet Take 10 mg by mouth nightly as needed          Review of Systems: A 10-point review of systems was negative, with the exception of chronic pain issues, headache, dizziness and vision changes.      Social History: Reviewed; unchanged from previous consultation.      Family history: Reviewed; unchanged from previous consultation.     PHYSICAL EXAM:     Vitals:   /66   Temp 97  F (36.1  C)  "(Temporal)   Ht 1.702 m (5' 7\")   Wt 72.5 kg (159 lb 12 oz)   BMI 25.02 kg/m    Body mass index is 25.02 kg/m .  5' 7\"  159 lbs 12 oz      Constitutional: healthy, alert and no distress  HEENT: Head atraumatic, normocephalic. Eyes without conjunctival injection or jaundice. Neck supple. No obvious neck masses.  Skin: No rash, lesions, or petechiae of exposed skin. Psychiatric/mental status: Alert, without lethargy or stupor. Appropriate affect. Mood normal.       DIAGNOSTIC TESTS:  Imaging Studies:   MRI OF THE CERVICAL SPINE WITHOUT CONTRAST 7/5/2019 5:45 PM  IMPRESSION:    1. Solid interbody fusion at C4-C5 and C5-C6.  2. Disc arthroplasty at C6-C7.  3. Moderate to severe left and severe right foraminal stenosis at  C6-C7 due to uncal spurring and facet arthropathy    MRI OF THE THORACIC SPINE WITHOUT CONTRAST  7/5/2019 5:41 PM   IMPRESSION:  1. No acute thoracic spinal abnormality.  2. Chronic T8 compression fracture with mild anterior wedging is  unchanged.  3. Mild multilevel degenerative disc disease. No significant spinal  canal or foraminal compromise.    Assessment:  Joselito Abarca is a 54 year old male who presents today for follow up regarding his:      1. Arthropathy of cervical facet joint  2. Midline thoracic back pain  3. Myofascial pain  4. Chronic pain syndrome  5. Chronic use of opioids    We reviewed the results of his MRI. We discussed cervical facet joint injections versus cervical medial branch blocks preceding to a radiofrequency ablation. He would like to try the blocks. We discussed that with the arthritis he has in his facet joints, it may be technically difficult to get exactly in the right spot. If that is the case we could discuss with neurosurgery to see if they feel surgery is appropriate.     We are going to work on tapering his pain medications. Patient is agreeable to this. We discussed that he will likely start to feel better with coming off of the amount of medications he is " taking. He should continue Gabapentin and we could consider increasing the dose if needed.     Plan:    Diagnosis reviewed, treatment option addressed, and risk/benifits discussed.  Self-care instructions given.  I am recommending a multidisciplinary treatment plan to help this patient better manage pain.      1. Physical Therapy:  NO   2. Clinical Health Psychologist:  NO    3. Diagnostic Studies:  None at this time  4. Medication Management:    1. Reduce Methadone from 10mg TID to 10mg in AM, 7.5mg in afternoon and 10mg at bedtime. Discussed with the patient that he should spread the dosing out by 8 hours.   2. Continue Oxycodone at current dosing, but encouraged him to reduce if he is not having severe pain.   3. Continue Gabapentin at 900mg TID.   5. Further procedures recommended: cervical medial branch blocks preceding to a RFA  6. Recommendations to PCP. I have taken over the patient's pain medications  7. Opioid contract today.       Follow up with this provider:  2 Weeks     Total time spent face to face was 25 minutes and more than 50% of face to face time was spent in counseling and/or coordination of care regarding the diagnosis and recommendations above.      Ashley Salgado PA-C   [facility]

## 2019-07-16 ENCOUNTER — OFFICE VISIT (OUTPATIENT)
Dept: PALLIATIVE MEDICINE | Facility: OTHER | Age: 54
End: 2019-07-16
Payer: COMMERCIAL

## 2019-07-16 ENCOUNTER — TELEPHONE (OUTPATIENT)
Dept: FAMILY MEDICINE | Facility: CLINIC | Age: 54
End: 2019-07-16

## 2019-07-16 ENCOUNTER — TELEPHONE (OUTPATIENT)
Dept: FAMILY MEDICINE | Facility: OTHER | Age: 54
End: 2019-07-16

## 2019-07-16 VITALS
BODY MASS INDEX: 25.07 KG/M2 | DIASTOLIC BLOOD PRESSURE: 66 MMHG | HEIGHT: 67 IN | WEIGHT: 159.75 LBS | SYSTOLIC BLOOD PRESSURE: 104 MMHG | TEMPERATURE: 97 F

## 2019-07-16 DIAGNOSIS — G89.4 CHRONIC PAIN SYNDROME: ICD-10-CM

## 2019-07-16 DIAGNOSIS — M79.18 MYOFASCIAL PAIN: ICD-10-CM

## 2019-07-16 DIAGNOSIS — M54.6 CHRONIC MIDLINE THORACIC BACK PAIN: ICD-10-CM

## 2019-07-16 DIAGNOSIS — G89.29 CHRONIC MIDLINE THORACIC BACK PAIN: ICD-10-CM

## 2019-07-16 DIAGNOSIS — M47.812 ARTHROPATHY OF CERVICAL FACET JOINT: Primary | ICD-10-CM

## 2019-07-16 DIAGNOSIS — F11.90 CHRONIC, CONTINUOUS USE OF OPIOIDS: ICD-10-CM

## 2019-07-16 PROCEDURE — 99214 OFFICE O/P EST MOD 30 MIN: CPT | Performed by: PHYSICIAN ASSISTANT

## 2019-07-16 RX ORDER — METHADONE HYDROCHLORIDE 5 MG/1
TABLET ORAL
Qty: 21 TABLET | Refills: 0 | Status: SHIPPED | OUTPATIENT
Start: 2019-07-16 | End: 2019-07-30

## 2019-07-16 RX ORDER — OXYCODONE HYDROCHLORIDE 5 MG/1
10 CAPSULE ORAL EVERY 4 HOURS PRN
Qty: 180 CAPSULE | Refills: 0 | Status: SHIPPED | OUTPATIENT
Start: 2019-07-16 | End: 2019-07-30

## 2019-07-16 RX ORDER — METHADONE HYDROCHLORIDE 10 MG/1
TABLET ORAL
Qty: 30 TABLET | Refills: 0 | Status: SHIPPED | OUTPATIENT
Start: 2019-07-16 | End: 2019-07-30

## 2019-07-16 ASSESSMENT — MIFFLIN-ST. JEOR: SCORE: 1533.25

## 2019-07-16 ASSESSMENT — PAIN SCALES - GENERAL: PAINLEVEL: EXTREME PAIN (9)

## 2019-07-16 NOTE — TELEPHONE ENCOUNTER
The PA Team will need a separate telephone encounter created for each medication that needs a prior authorization. This is for tracking purposes. Thanks!

## 2019-07-16 NOTE — TELEPHONE ENCOUNTER
Prior Authorization Retail Medication Request    Medication/Dose: methadone 10 mg  ICD code (if different than what is on RX):    Previously Tried and Failed:    Rationale:        Insurance: [employer] [place] plan  Insur phone: [phone]  Patient ID: [id number]

## 2019-07-16 NOTE — LETTER
[facility]  07/16/19    Patient: Joselito Abarca  YOB: 1965  Medical Record Number: [id number]                                                                  Opioid / Opioid Plus Controlled Substance Agreement    I understand that my care provider has prescribed an opioid (narcotic) controlled substance to help manage my condition(s). I am taking this medicine to help me function or work. I know this is strong medicine, and that it can cause serious side effects. Opioid medicine can be sedating, addicting and may cause a dependency on the drug. They can affect my ability to drive or think, and cause depression. They need to be taken exactly as prescribed. Combining opioids with certain medicines or chemicals (such as cocaine, sedatives and tranquilizers, sleeping pills, meth) can be dangerous or even fatal. Also, if I stop opioids suddenly, I may have severe withdrawal symptoms. Last, I understand that opioids do not work for all types of pain nor for all patients. If not helpful, I may be asked to stop them.    I am also being prescribed a benzodiazepine (tranquilizer) controlled substance.   I understand this type of medication is sedating, and can increase the risk of death when taken together with opioids.  I have talked to my care team about the option of having a prescription for Narcan to use to reverse the opioid medicine, in case I get too sleepy. I will be very careful to take my medicines only as directed.    The risks, benefits, and side effects of these medicine(s) were explained to me. I agree that:    1. I will take part in other treatments as advised by my care team. This may be psychiatry or counseling, physical therapy, behavioral therapy, group treatment or a referral to a pain clinic. I will reduce or stop my medicine when my care team tells me to do so.  2. I will take my medicines as prescribed. I will not change the dose or schedule unless my care team tells me  to. There will be no refills if I  run out early.   I may be contactedwithout warning and asked to complete a urine drug test or pill count at any time.   3. I will keep all my appointments, and understand this is part of the monitoring of opioids. My care team may require an office visit for EVERY opioid/controlled substance refill. If I miss appointments or don t follow instructions, my care team may stop my medicine.  4. I will not ask other providers to prescribe controlled substances, and I will not accept controlled substances from other people. If I need another prescribed controlled substance for a new reason, I will tell my care team within 1 business day.  5. I will use one pharmacy to fill all of my controlled substance prescriptions, and it is up to me to make sure that I do not run out of my medicines on weekends or holidays. If my care team is willing to refill my opioid prescription without a visit, I must request refills only during office hours, refills may take up to 3 days to process, and it may take up to 5 to 7 days for my medicine to be mailed and ready at my pharmacy. Prescriptions will not be mailed anywhere except my pharmacy.        [id number]  Rev 12/18         Registration to scan to EHR                             Page 1 of 2               Controlled Substance Agreement Opioid        [facility]  07/16/19  Patient: Joselito Abarca  YOB: 1965  Medical Record Number: [id number]                                                                  6. I am responsible for my prescriptions. If the medicine/prescription is lost or stolen, it will not be replaced. I also agree not to share controlled substance medicines with anyone.  7. I agree to not use ANY illegal or recreational drugs. This includes marijuana, cocaine, bath salts or other drugs. I agree not to use alcohol unless my care team says I may.          I agree to give urine samples whenever asked. If I don t  give a urine sample, the care team may stop my medicine.    8. If I enroll in the [place] Medical Marijuana program, I will tell my care team. I will also sign an agreement to share my medical records with my care team.   9. I will bring in my list of medicines (or my medicine bottles) each time I come to the clinic.   10. I will tell my care team right away if I become pregnant or have a new medical problem treated outside of my regular clinic.  11. I understand that this medicine can affect my thinking and judgment. It may be unsafe for me to drive, use machinery and do dangerous tasks. I will not do any of these things until I know how the medicine affects me. If my dose changes, I will wait to see how it affects me. I will contact my care team if I have concerns about medicine side effects.    I understand that if I do not follow any of the conditions above, my prescriptions or treatment may be stopped.      I agree that my provider, clinic care team, and pharmacy may work with any city, state or federal law enforcement agency that investigates the misuse, sale, or other diversion of my controlled medicine. I will allow my provider to discuss my care with or share a copy of this agreement with any other treating provider, pharmacy or emergency room where I receive care. I agree to give up (waive) any right of privacy or confidentiality with respect to these consents.     I have read this agreement and have asked questions about anything I did not understand.      ________________________________________________________________________  Patient signature - Date/Time -  Joselito Abarca                                      ________________________________________________________________________  Witness signature                                                            ________________________________________________________________________  Provider gracy Salgado PA-C      [id number]  Rev  12/18         Registration to scan to EHR                         Page 2 of 2                   Controlled Substance Agreement Opioid           Page 1 of 2  Opioid Pain Medicines (also known as Narcotics)  What You Need to Know    What are opioids?   Opioids are pain medicines that must be prescribed by a doctor.  They are also known as narcotics.    Examples are:     morphine (MS Contin, Zulema)    oxycodone (Oxycontin)    oxycodone and acetaminophen (Percocet)    hydrocodone and acetaminophen (Vicodin, Norco)     fentanyl patch (Duragesic)     hydromorphone (Dilaudid)     methadone     What do opioids do well?   Opioids are best for short-term pain after a surgery or injury. They also work well for cancer pain. Unlike other pain medicines, they do not cause liver or kidney failure or ulcers. They may help some people with long-lasting (chronic) pain.     What do opioids NOT do well?   Opioids never get rid of pain entirely, and they do not work well for most patients with chronic pain. Opioids do not reduce swelling, one of the causes of pain. They also don t work well for nerve pain.                           For informational purposes only.  Not to replace the advice of your care provider.  Copyright 201 [facility]. All right reserved. [employer] 631455-Ank 02/18.      Page 2 of 2    Risks and side effects   Talk to your doctor before you start or decide to keep taking one of these medicines. Side effects include:    Lowering your breathing rate enough to cause death    Overdose, including death, especially if taking higher than prescribed doses    Long-term opioid use    Worse depression symptoms; less pleasure in things you usually enjoy    Feeling tired or sluggish    Slower thoughts or cloudy thinking    Being more sensitive to pain over time; pain is harder to control    Trouble sleeping or restless sleep    Changes in hormone levels (for example, less testosterone)    Changes in sex drive or  ability to have sex    Constipation    Unsafe driving    Itching and sweating    Feeling dizzy    Nausea, vomiting and dry mouth    What else should I know about opioids?  When someone takes opioids for too long or too often, they become dependent. This means that if you stop or reduce the medicine too quickly, you will have withdrawal symptoms.    Dependence is not the same as addiction. Addiction is when people keep using a substance that harms their body, their mind or their relations with others. If you have a history of drug or alcohol abuse, taking opioids can cause a relapse.    Over time, opioids don t work as well. Most people will need higher and higher doses. The higher the dose, the more serious the side effects. We don t know the long-term effects of opioids.      Prescribed opioids aren't the best way to manage chronic pain    Other ways to manage pain include:      Ibuprofen or acetaminophen.  You should always try this first.      Treat health problems that may be causing pain.      acupuncture or massage, deep breathing, meditation, visual imagery, aromatherapy.      Use heat or ice at the pain site      Physical therapy and exercise      Stop smoking      See a counselor or therapist                                                  People who have used opioids for a long time may have a lower quality of life, worse depression, higher levels of pain and more visits to doctors.    Never share your opioids with others. Be sure to store opioids in a secure place, locked if possible.Young children can easily swallow them and overdose.     You can overdose on opioids.  Signs of overdose include decrease or loss of consciousness, slowed breathing, trouble waking and blue lips.  If someone is worried about overdose, they should call 911.    If you are at risk for overdose, you may get naloxone (Narcan, a medicine that reverses the effects of opioids.  If you overdose, a friend or family member can give you  Narcan while waiting for the ambulance.  They need to know the signs of overdose and how to give Narcan.    While you're taking opioids:    Don't use alcohol or street drugs. Taking them together can cause death.    Don't take any of these medicines unless your doctor says its okay.  Taking these with opioids can cause death.    Benzodiazepines (such as lorazepam         or diazepam)    Muscle relaxers (such as cyclobenzaprine)    sleeping pills    other opioids    Safe disposal of opioids  Find your area drug take-back program, your pharmacy mail-back program, buy a special disposal bag (such as Deterra) from your pharmacy or flush them down the toilet.  Use the guidelines at:  www.fda.gov/drugs/resourcesforyou

## 2019-07-16 NOTE — TELEPHONE ENCOUNTER
"Rxs written for Methadone 5mg tabs and Methadone 10mg tabs.  Both are coming up \"Product not on Formulary\".    Please contact insurance for PA.     PA needed for: Methadone 5mg tabs AND Methadone 10mg tabs  Insurance: [employer] [place] plan  Insur phone: [phone]  Patient ID: [id number]     -Jennifer Helm Pharm.D., [facility], [phone]    "

## 2019-07-16 NOTE — PATIENT INSTRUCTIONS
After Visit Instructions:     Thank you for coming to [facility] for your care. It is my goal to partner with you to help you reach your optimal state of health.     I am recommending multidisciplinary care at this time.  The focus of care will be to continue gradual rehabilitation and pain management with medication adjustments as needed.    Continue daily self-care, identifying contributing factors, and monitoring variations in pain level. Continue to integrate self-care into your life.          Schedule follow-up with Ashley Salgado PA-C in 2 weeks. You will need to make this appointment.   Procedures recommended: cervical medial branch block. To call and schedule your procedure, you can call: [phone]    Medication recommendations:     Methadone: reduce to 10mg in AM, 7.5mg in afternoon (8 hours after the AM dose) and 10mg at bedtime (8 hours after the afternoon dose)    Oxycodone: continue at current dosing. Reduce if having decreased pain.     Gabapentin: continue at 900mg three times a day      Ashley Salgado PA-C  [facility]  [facility]    Contact information: [facility]  Clinic Number:  [phone]     Call with any questions about your care and for scheduling assistance.     Calls are returned Monday through Friday between 8 AM and 4:30 PM. We usually get back to you within 2 business days depending on the issue/request.    If we are prescribing your medications:    For opioid medication refills, call the clinic or send a [employer] message 7 days in advance.  Please include:    Name of requested medication    Name of the pharmacy.    For non-opioid medications, call your pharmacy directly to request a refill. Please allow 3-4 days to be processed.     Per [place] State Law:    All controlled substance prescriptions must be filled within 30 days of being written.      For those controlled substances allowing refills, pickup must  occur within 30 days of last fill.      We believe regular attendance is key to your success in our program!      Any time you are unable to keep your appointment we ask that you call us at least 24 hours in advance to cancel.This will allow us to offer the appointment time to another patient.   Multiple missed appointments may lead to dismissal from the clinic.

## 2019-07-16 NOTE — TELEPHONE ENCOUNTER
Prior Authorization Retail Medication Request    Medication/Dose methadone: 5 mg  ICD code (if different than what is on RX):    Previously Tried and Failed:    Rationale:        Insurance: [employer] [place] plan  Insur phone: [phone]  Patient ID: [id number]      Pharmacy Information (if different than what is on RX)  Name:    Phone:

## 2019-07-17 ENCOUNTER — TELEPHONE (OUTPATIENT)
Dept: SURGERY | Facility: CLINIC | Age: 54
End: 2019-07-17

## 2019-07-19 ENCOUNTER — OFFICE VISIT (OUTPATIENT)
Dept: FAMILY MEDICINE | Facility: CLINIC | Age: 54
End: 2019-07-19
Payer: COMMERCIAL

## 2019-07-19 VITALS
OXYGEN SATURATION: 98 % | SYSTOLIC BLOOD PRESSURE: 110 MMHG | DIASTOLIC BLOOD PRESSURE: 64 MMHG | BODY MASS INDEX: 25.69 KG/M2 | WEIGHT: 164 LBS | HEART RATE: 99 BPM | RESPIRATION RATE: 16 BRPM | TEMPERATURE: 97.3 F

## 2019-07-19 DIAGNOSIS — N52.9 ERECTILE DYSFUNCTION, UNSPECIFIED ERECTILE DYSFUNCTION TYPE: Primary | ICD-10-CM

## 2019-07-19 DIAGNOSIS — F10.11 HISTORY OF ALCOHOL ABUSE: ICD-10-CM

## 2019-07-19 DIAGNOSIS — F41.1 GENERALIZED ANXIETY DISORDER: ICD-10-CM

## 2019-07-19 DIAGNOSIS — R73.9 ELEVATED RANDOM BLOOD GLUCOSE LEVEL: ICD-10-CM

## 2019-07-19 DIAGNOSIS — G89.4 CHRONIC PAIN SYNDROME: ICD-10-CM

## 2019-07-19 LAB
ALBUMIN SERPL-MCNC: 3.8 G/DL (ref 3.4–5)
ALP SERPL-CCNC: 67 U/L (ref 40–150)
ALT SERPL W P-5'-P-CCNC: 39 U/L (ref 0–70)
ANION GAP SERPL CALCULATED.3IONS-SCNC: 4 MMOL/L (ref 3–14)
AST SERPL W P-5'-P-CCNC: 28 U/L (ref 0–45)
BILIRUB SERPL-MCNC: 0.3 MG/DL (ref 0.2–1.3)
BUN SERPL-MCNC: 9 MG/DL (ref 7–30)
CALCIUM SERPL-MCNC: 9.2 MG/DL (ref 8.5–10.1)
CHLORIDE SERPL-SCNC: 104 MMOL/L (ref 94–109)
CO2 SERPL-SCNC: 31 MMOL/L (ref 20–32)
CREAT SERPL-MCNC: 1.03 MG/DL (ref 0.66–1.25)
GFR SERPL CREATININE-BSD FRML MDRD: 83 ML/MIN/{1.73_M2}
GLUCOSE SERPL-MCNC: 83 MG/DL (ref 70–99)
POTASSIUM SERPL-SCNC: 3.6 MMOL/L (ref 3.4–5.3)
PROT SERPL-MCNC: 7.7 G/DL (ref 6.8–8.8)
SODIUM SERPL-SCNC: 139 MMOL/L (ref 133–144)

## 2019-07-19 PROCEDURE — 80053 COMPREHEN METABOLIC PANEL: CPT | Performed by: FAMILY MEDICINE

## 2019-07-19 PROCEDURE — 99214 OFFICE O/P EST MOD 30 MIN: CPT | Performed by: FAMILY MEDICINE

## 2019-07-19 PROCEDURE — 36415 COLL VENOUS BLD VENIPUNCTURE: CPT | Performed by: FAMILY MEDICINE

## 2019-07-19 RX ORDER — DULOXETIN HYDROCHLORIDE 30 MG/1
30 CAPSULE, DELAYED RELEASE ORAL 2 TIMES DAILY
Qty: 60 CAPSULE | Refills: 1 | Status: SHIPPED | OUTPATIENT
Start: 2019-07-19 | End: 2019-07-20

## 2019-07-19 ASSESSMENT — PAIN SCALES - GENERAL: PAINLEVEL: EXTREME PAIN (9)

## 2019-07-19 NOTE — PATIENT INSTRUCTIONS
1. We will try to transition you from celexa/citalopram to cymbalta/duloxetine.  We need to do that through tapering one and starting the other.    For one week, will have you take 1/2 celexa (10 mg) along with one tablet of duloxetine 30 mg (can take both in the am).  After one week, stop taking celexa completely and increase duloxetine to 30  mg twice daily.     2. Stop by lab for blood tests and will call you with results.

## 2019-07-19 NOTE — LETTER
[address] [id number] [phone]        July 22, 2019    Joselito Abarca  [address]   [facility] [address]          Dear Joselito,    LAB RESULTS:     The results of your recent Tests were chemistry test showed normal kidney function, liver function, blood sugar was normal,sodium, potassium and calcium were also normal.  If you have any further questions or problems, please contact our office at [phone].        Sincerely,        Gregory G. Schoen, M.D.

## 2019-07-19 NOTE — PROGRESS NOTES
"Subjective     Joselito Abarca is a 54 year old male who presents to clinic today for the following health issues:    HPI   Would like kidney and liver tests done. Had alcohol poisoning at age 32, and wants to make sure everything is all clear.    Inorgasmia for about 6 years. Does have occasional achievement but each time there is significant burning.  Used to have pain in right testicle frequently in the past but not so much recently. He notes that he does not have any pain/burning with urination.  He has ED and needs two viagra in order to get an erection along with the inorgasmia.    He is being followed now for his pain management in our local pain clinic and I have been requested to look into transitioning his Celexa to Cymbalta to better support pain management and diminishing his dependence on narcotics.      Objective    /64 (Cuff Size: Adult Regular)   Pulse 99   Temp 97.3  F (36.3  C) (Temporal)   Resp 16   Wt 74.4 kg (164 lb)   SpO2 98%   BMI 25.69 kg/m    Body mass index is 25.69 kg/m .  Physical Exam   Alert and oriented, in no acute distress.  Lungs are clear.   Heart is regular without murmurs.   Male genital exam shows both testicles to be descended and normal.  No masses or tenderness are noted.  Cord structures were normal.  Penis was normal and circumcised without evidence of any meatal irritation.  No hernia was noted but palpation of the right inguinal ring cause some discomfort.    ASSESSMENT:   Erectile dysfunction, unspecified erectile dysfunction type  History of alcohol abuse  Chronic pain syndrome  Generalized anxiety disorder  Elevated random blood glucose level    PLAN:  Spenser has a history of erectile dysfunction for which she has been using Viagra.  I was unaware until this visit that he was also experiencing anorgasmia and he stated that having erections at least allowed him to \"keep the lady happy\".  However he has been able to achieve to orgasms more recently, of which " she is pleased however he has had intense burning with each episode.  There is no clinical evidence of abnormality on his exam today and he has no such burning symptoms with urination.  And is therefore doubtful that he has any sort of urethritis.  We discussed that this may improve over time and that he should consider assessing ejaculate for any abnormal appearance such as bleeding for which we would refer him to urology.  At this point he will continue with observation and let me know if this persists and he would wish to pursue urology consultation.    We also discussed historically we have seen normal liver profiles and basic profile was but did agree to repeat the studies to reassure him in regard to his prior alcohol use and potential harm to his organs.  He has not used alcohol for over 30 years and raise the question about being able to have a drink now and then.  Based on his history it does appear he suffers from alcoholism and I encouraged him to not reinitiate alcohol consumption.    We discussed his visits with the pain clinic and the recommendation which he appears to have an open mind to reduction in his narcotics and procedural recommendations.  I agreed to support that by trying to transition him from Celexa to Cymbalta.  We will start this with taking 30 mg of Cymbalta and 1/2 tablet or 10 mg of Celexa for 1 week, then will discontinue the Celexa and moved to Cymbalta 60 mg tablet daily thereafter.  He will call if he is having issues with increase in anxiety or depressive symptoms or other side effects of the change but will otherwise see me back in clinic in 1 month.    Electronically signed by Greg Schoen, MD

## 2019-07-20 RX ORDER — DULOXETIN HYDROCHLORIDE 60 MG/1
60 CAPSULE, DELAYED RELEASE ORAL DAILY
Qty: 30 CAPSULE | Refills: 3 | Status: SHIPPED | OUTPATIENT
Start: 2019-07-20 | End: 2020-03-25

## 2019-07-20 RX ORDER — DULOXETIN HYDROCHLORIDE 30 MG/1
30 CAPSULE, DELAYED RELEASE ORAL DAILY
Qty: 7 CAPSULE | Refills: 0 | Status: SHIPPED | OUTPATIENT
Start: 2019-07-20 | End: 2020-02-05 | Stop reason: DRUGHIGH

## 2019-07-24 NOTE — TELEPHONE ENCOUNTER
Prior Authorization Approval    Authorization Effective Date: 6/16/2019  Authorization Expiration Date: 10/15/2019  Medication: methadone 5 mg  Approved Dose/Quantity:    Reference #:     Insurance Company: [employer] - Phone [phone] Fax [phone]  Expected CoPay:       CoPay Card Available:      Foundation Assistance Needed:    Which Pharmacy is filling the prescription (Not needed for infusion/clinic administered): [facility] [place] [address]  Pharmacy Notified: Yes  Patient Notified: Yes

## 2019-07-24 NOTE — TELEPHONE ENCOUNTER
Prior Authorization Approval    Authorization Effective Date: 6/16/2019  Authorization Expiration Date: 10/15/2019  Medication: methadone 10 mg  Approved Dose/Quantity:    Reference #:     Insurance Company: [employer] - Phone [phone] Fax [phone]  Expected CoPay:       CoPay Card Available:      Foundation Assistance Needed:    Which Pharmacy is filling the prescription (Not needed for infusion/clinic administered): [facility] [place] - [address]  Pharmacy Notified: Yes  Patient Notified: Yes

## 2019-07-24 NOTE — PROGRESS NOTES
[facility]    CHIEF COMPLAINT:   Pain  -neck pain    INTERVAL HISTORY:  Last seen on 7/16/19.        Recommendations/plan at the last visit included:  1. Physical Therapy:  NO   2. Clinical Health Psychologist:  NO    3. Diagnostic Studies:  None at this time  4. Medication Management:    1. Reduce Methadone from 10mg TID to 10mg in AM, 7.5mg in afternoon and 10mg at bedtime. Discussed with the patient that he should spread the dosing out by 8 hours.   2. Continue Oxycodone at current dosing, but encouraged him to reduce if he is not having severe pain.   3. Continue Gabapentin at 900mg TID.   5. Further procedures recommended: cervical medial branch blocks preceding to a RFA  6. Recommendations to PCP. I have taken over the patient's pain medications  7. Opioid contract today.       Follow up with this provider:  2 Weeks    Since his last visit, Joselito Abarca reports:    Since Thursday, he reports feeling ill. He states that today he is feeling better. He was having stomach issues. For the last week he reports increased pain at the base of his skull causing a headache. He did state that he took a 3 hour motorcycle ride the day before. He states that he has just been sitting in his recliner. He used a traction like pillow yesterday and did find this to be helpful. He is working with Dr. Schoen on Cymbalta. He just started the Cymbalta yesterday.    He is getting the cervical nerve blocks on 8/8/2019.     Pain Information:   Pain quality:aching, throbbing, penetrating, miserable and nagging   Pain rating: intensity ranges from 7/10 to 10/10, and averages 10/10 on a 0-10 scale.   Pain today 9/10    SELF CARE:   How often do you practice SELF-CARE (relaxing, stretching, pacing, monitoring posture, taking mini-breaks) in a typical day:  Has been less active due to stomach upset     Annual Controlled Substance Agreement: 7/16/2019  UDS: 6/28/2019    CURRENT RELEVANT PAIN MEDICATIONS:   Cymbalta  30mg daily  Clonazepam 0.5mg-takes 3-4/day  Gabapentin 300mg-takes 3 capsules three times a day  Methadone 10mg-takes 1 tablet three times a day  Oxycodone 5mg-takes 3 tabs 4 times a day    Patient is using the medication as prescribed:  YES  Is your medication helpful? NO   Medication side effects? no side effect    Previous Medications: (H--helped; HI--Helped initially; SWH-- somewhat helpful, NH--No help; W--worse; SE--side effects)   Opiates: hydrocodone SE allergic, fever, sweaty, headache, methadone H, Oxycodone H  NSAIDS: Ibuprofen NH, Aleve NH  Anti-migraine mediations: Fiorinal H  Muscle Relaxants: Valium H  Neuropathics: Gabapentin H  Anti-depressants: none  Anxiolytics: Klonopin H  Topicals: Lidocaine H  Other medications not covered above: Tylenol NH    Past Pain Treatments:  Pain Clinic:   Yes, he was previously seen at [facility] and [facility]   PT: Yes, has done many times  Psychologist: No  Relaxation techniques/biofeedback: No  Chiropractor: Yes, feels that it made it worse  Acupuncture: Yes, just did one session  Pharmacotherapy:               Opioids: Yes                Non-opioids:    Yes   TENs Unit:Yes, aggravates the pain  Injections: Yes, bilateral occipital nerve blocks 07/12/2018, Cervical medial branch blocks 08/26/2015 and 5/27/2015. Has had low back pain injections at [facility]  Self-care:   Yes, hot tubs, ice packs, heating pads  Surgeries related to pain: Yes,  1. anterior cervical discectomy and fusion C6-7, exploration and revision C4-6 with hardwar removal 11/29/2017,   2. Left T1-T2 thoracic hemilaminectomy, microdiscectomy 02/21/2012,   3. Removal of C4 through C6 anterior cervical plate, Exploration of C4-C5 and C5-C6 anterior cervical fusion, C4-C5 and C5-C6 arthrodesis, C4 through C6 anterior cervical instrumentation, and [place] of right iliac hip graft.  03/15/2011  4. C4-C5 and C5-C6 anterior cervical discectomy and fusion with instrumentation and neural electrophysiologic monitoring  01/26/2010    [place] Board of Pharmacy Data Base Reviewed:    YES; As expected, no concern for misuse/abuse of controlled medications based on this report.        THE 4 As OF OPIOID MAINTENANCE ANALGESIA    Analgesia: Is pain relief clinically significant? NO   Activity: Is patient functional and able to perform Activities of Daily Living? YES   Adverse effects: Is patient free from adverse side effects from opiates? YES   Adherence to Rx protocol: Is patient adhering to Controlled Substance Agreement and taking medications ONLY as ordered? YES       Is Narcan prescribed for opiate use >50 MME daily? YES      Daily MME: 310    Medications:  Current Outpatient Medications   Medication Sig Dispense Refill     citalopram (CELEXA) 20 MG tablet Take 1 tablet (20 mg) by mouth daily 90 tablet 3     clonazePAM (KLONOPIN) 0.5 MG tablet Take 0.5-1 tablets (0.25-0.5 mg) by mouth 3 times daily as needed for anxiety 90 tablet 0     DULoxetine (CYMBALTA) 60 MG capsule Take 1 capsule (60 mg) by mouth daily 30 capsule 3     FLOVENT  MCG/ACT Inhaler INHALE 2 PUFFS INTO THE LUNGS TWICE DAILY 36 g 2     fluticasone (FLONASE) 50 MCG/ACT nasal spray SPRAY 2 SPRAYS IN EACH NOSTRIL EVERY DAY 16 g 5     gabapentin (NEURONTIN) 300 MG capsule TAKE TWO CAPSULES BY MOUTH THREE TIMES A  capsule 11     ipratropium - albuterol 0.5 mg/2.5 mg/3 mL (DUONEB) 0.5-2.5 (3) MG/3ML neb solution Take 1 vial (3 mLs) by nebulization every 4 hours as needed for shortness of breath / dyspnea 30 vial 0     methadone (DOLOPHINE) 10 MG tablet Take 10mg in AM and 10mg at bedtime (this dose should be 8 hours after the 7.5mg dose). Okay to fill 7/16/2019. Start 7/21/2019. To last until 8/04/2019. 30 tablet 0     methadone (DOLOPHINE) 5 MG tablet Take 1.5 tablets in the afternoon (8 hours after the 10mg morning dose). Okay to fill 7/16/2019. Start 7/21/2019. To last until 8/04/2019 21 tablet 0     nystatin (MYCOSTATIN) 733982 UNIT/ML suspension TAKE  "5 MLS BY MOUTH FOUR TIMES A  mL 0     order for DME Equipment being ordered: Nebulizer mask and tubing 1 each 1     oxyCODONE (OXY-IR) 5 MG capsule Take 2 capsules (10 mg) by mouth every 4 hours as needed for severe pain 2 caps q 4 hour prn pain up to 12 per day. Okay to fill 7/16/2019. Start 7/21/2019. To last until 8/04/2019. 180 capsule 0     sildenafil (VIAGRA) 100 MG tablet Take 1 tablet (100 mg) by mouth daily as needed 30 min to 4 hrs before sex. Do not use with nitroglycerin, terazosin or doxazosin. 4 tablet 0     traZODone (DESYREL) 100 MG tablet Take 3 tablets (300 mg) by mouth At Bedtime 90 tablet 3     VENTOLIN  (90 Base) MCG/ACT inhaler INHALE 2 PUFFS INTO THE LUNGS EVERY 6 HOURS AS NEEDED FOR SHORTNESS OF BREATH, DIFFICULTY BREATHING OR WHEEZING. 18 g 3     vitamin D3 (CHOLECALCIFEROL) 2000 units tablet TAKE ONE TABLET BY MOUTH EVERY  tablet 3     zolpidem (AMBIEN) 10 MG tablet Take 10 mg by mouth nightly as needed        DULoxetine (CYMBALTA) 30 MG capsule Take 1 capsule (30 mg) by mouth daily for 7 days 7 capsule 0     naloxone (NARCAN) 4 MG/0.1ML nasal spray Spray 1 spray (4 mg) into one nostril alternating nostrils as needed for opioid reversal every 2-3 minutes until assistance arrives (Patient not taking: Reported on 7/30/2019) 0.2 mL 1       Review of Systems: A 10-point review of systems was negative, with the exception of chronic pain issues, headache, nausea and anxiety.      Social History: Reviewed; unchanged from previous consultation.      Family history: Reviewed; unchanged from previous consultation.     PHYSICAL EXAM:     Vitals:   /68   Temp 97  F (36.1  C) (Temporal)   Ht 1.702 m (5' 7\")   Wt 72.3 kg (159 lb 8 oz)   BMI 24.98 kg/m    Body mass index is 24.98 kg/m .  5' 7\"  159 lbs 8 oz      Constitutional: healthy, alert and no distress  HEENT: Head atraumatic, normocephalic. Eyes without conjunctival injection or jaundice. Neck supple. No obvious neck " masses.  Skin: No rash, lesions, or petechiae of exposed skin. Psychiatric/mental status: Alert, without lethargy or stupor. Appropriate affect. Mood normal.       DIAGNOSTIC TESTS:  Imaging Studies:   No new imaging to review today.    Assessment:  Joselito Abarca is a 54 year old male who presents today for follow up regarding his:      1. Arthropathy of cervical facet joint  2. Midline thoracic back pain  3. Myofascial pain  4. Chronic pain syndrome  5. Chronic use of opioids    Patient would prefer to not make any changes to his pain medications. Discussed with the patient that he is likely having increased sensitivity to pain due to the high doses of narcotics. It is important that we continue to slowly decrease his narcotic usage. He would prefer to keep the Methadone the same. Will decrease the Oxycodone. He should keep his injection appointment as scheduled. Continue Cymbalta as recommended by his primary care provider.       Plan:    Diagnosis reviewed, treatment option addressed, and risk/benifits discussed.  Self-care instructions given.  I am recommending a multidisciplinary treatment plan to help this patient better manage pain.      1. Physical Therapy:  NO   2. Clinical Health Psychologist:  NO    3. Diagnostic Studies:  None at this time  4. Medication Management:    1. continue Methadone at 10mg in AM, 7.5mg in afternoon and 10mg at bedtime.   2. Decrease Oxycodone to 170 capsules for 2 weeks from 180  Capsules. We again reviewed that he should not be taking these if he is having less than severe pain.  3. Continue Gabapentin at 900mg TID.   4.  Continue Cymbalta as recommended by his PCP.  5. Further procedures recommended:  cervical medial branch blocks preceding to a RFA.  6. Recommendations to PCP. See above      Follow up with this provider:  2 Weeks     Total time spent face to face was 25 minutes and more than 50% of face to face time was spent in counseling and/or coordination of care  regarding the diagnosis and recommendations above.      Ashley Salgado PA-C   [facility]

## 2019-07-28 DIAGNOSIS — F41.1 GENERALIZED ANXIETY DISORDER: ICD-10-CM

## 2019-07-29 RX ORDER — CLONAZEPAM 0.5 MG/1
0.25-0.5 TABLET ORAL 3 TIMES DAILY PRN
Qty: 90 TABLET | Refills: 0 | Status: SHIPPED | OUTPATIENT
Start: 2019-07-29 | End: 2019-09-03

## 2019-07-29 NOTE — TELEPHONE ENCOUNTER
Clonazepam  Last Written Prescription Date:  07/01/2019  Last Fill Quantity: 90,  # refills: 0   Last office visit: 7/19/2019 with prescribing provider:  Schoen Future Office Visit:   Next 5 appointments (look out 90 days)    Jul 30, 2019  4:00 PM CDT  Return Visit with Ashley Salgado PA-C  [facility] [address]  [facility] [id number] [phone]         Requested Prescriptions   Pending Prescriptions Disp Refills     clonazePAM (KLONOPIN) 0.5 MG tablet 90 tablet 0     Sig: Take 0.5-1 tablets (0.25-0.5 mg) by mouth 3 times daily as needed for anxiety       There is no refill protocol information for this order        Routing refill request to provider for review/approval because:  Drug not on the [facility] [place] protocol     Ailyn Tobias RN

## 2019-07-30 ENCOUNTER — OFFICE VISIT (OUTPATIENT)
Dept: PALLIATIVE MEDICINE | Facility: OTHER | Age: 54
End: 2019-07-30
Payer: COMMERCIAL

## 2019-07-30 VITALS
SYSTOLIC BLOOD PRESSURE: 100 MMHG | BODY MASS INDEX: 25.03 KG/M2 | WEIGHT: 159.5 LBS | DIASTOLIC BLOOD PRESSURE: 68 MMHG | HEIGHT: 67 IN | TEMPERATURE: 97 F

## 2019-07-30 DIAGNOSIS — M47.812 ARTHROPATHY OF CERVICAL FACET JOINT: ICD-10-CM

## 2019-07-30 DIAGNOSIS — M54.6 CHRONIC MIDLINE THORACIC BACK PAIN: Primary | ICD-10-CM

## 2019-07-30 DIAGNOSIS — F11.90 CHRONIC, CONTINUOUS USE OF OPIOIDS: ICD-10-CM

## 2019-07-30 DIAGNOSIS — G89.29 CHRONIC MIDLINE THORACIC BACK PAIN: Primary | ICD-10-CM

## 2019-07-30 DIAGNOSIS — M79.18 MYOFASCIAL PAIN: ICD-10-CM

## 2019-07-30 DIAGNOSIS — G89.4 CHRONIC PAIN SYNDROME: ICD-10-CM

## 2019-07-30 PROCEDURE — 99214 OFFICE O/P EST MOD 30 MIN: CPT | Performed by: PHYSICIAN ASSISTANT

## 2019-07-30 RX ORDER — METHADONE HYDROCHLORIDE 10 MG/1
TABLET ORAL
Qty: 30 TABLET | Refills: 0 | Status: SHIPPED | OUTPATIENT
Start: 2019-07-30 | End: 2019-08-13

## 2019-07-30 RX ORDER — OXYCODONE HYDROCHLORIDE 5 MG/1
CAPSULE ORAL
Qty: 170 CAPSULE | Refills: 0 | Status: SHIPPED | OUTPATIENT
Start: 2019-07-30 | End: 2019-08-13

## 2019-07-30 RX ORDER — METHADONE HYDROCHLORIDE 5 MG/1
TABLET ORAL
Qty: 21 TABLET | Refills: 0 | Status: SHIPPED | OUTPATIENT
Start: 2019-07-30 | End: 2019-08-13

## 2019-07-30 ASSESSMENT — PAIN SCALES - GENERAL: PAINLEVEL: EXTREME PAIN (9)

## 2019-07-30 ASSESSMENT — MIFFLIN-ST. JEOR: SCORE: 1532.12

## 2019-07-30 NOTE — PATIENT INSTRUCTIONS
After Visit Instructions:     Thank you for coming to [facility] for your care. It is my goal to partner with you to help you reach your optimal state of health.     I am recommending multidisciplinary care at this time.  The focus of care will be to continue gradual rehabilitation and pain management with medication adjustments as needed.    Continue daily self-care, identifying contributing factors, and monitoring variations in pain level. Continue to integrate self-care into your life.          Schedule follow-up with Ashley Salgado PA-C in 2 weeks, the week of 8/12/2019. You will need to make this appointment. Bring in your pill bottles to this appointment.    Procedures recommended: keep medial branch blocks as scheduled     Medication recommendations:     Continue Methadone at 10mg in AM, 7.5mg in afternoon and 10mg at bedtime    Oxycodone: 5mg-2 capsules every 4 hours. MAX of 12/day. Reduce to 170 tablets for 2 weeks. This means that you should reduce to 11 tabs/day unless you're having severe pain, then occasionally you can take 12 tabs.       Ashley Salgado PA-C  [facility]    Contact information: [facility]  Clinic Number:  [phone]     Call with any questions about your care and for scheduling assistance.     Calls are returned Monday through Friday between 8 AM and 4:30 PM. We usually get back to you within 2 business days depending on the issue/request.    If we are prescribing your medications:    For opioid medication refills, call the clinic or send a [employer] message 7 days in advance.  Please include:    Name of requested medication    Name of the pharmacy.    For non-opioid medications, call your pharmacy directly to request a refill. Please allow 3-4 days to be processed.     Per [place] State Law:    All controlled substance prescriptions must be filled within 30 days of being written.      For those  controlled substances allowing refills, pickup must occur within 30 days of last fill.      We believe regular attendance is key to your success in our program!      Any time you are unable to keep your appointment we ask that you call us at least 24 hours in advance to cancel.This will allow us to offer the appointment time to another patient.   Multiple missed appointments may lead to dismissal from the clinic.

## 2019-08-08 ENCOUNTER — HOSPITAL ENCOUNTER (OUTPATIENT)
Facility: CLINIC | Age: 54
Discharge: HOME | End: 2019-08-08
Attending: ANESTHESIOLOGY | Admitting: ANESTHESIOLOGY
Payer: COMMERCIAL

## 2019-08-08 ENCOUNTER — HOSPITAL ENCOUNTER (OUTPATIENT)
Dept: GENERAL RADIOLOGY | Facility: CLINIC | Age: 54
End: 2019-08-08
Attending: ANESTHESIOLOGY | Admitting: ANESTHESIOLOGY
Payer: COMMERCIAL

## 2019-08-08 VITALS
DIASTOLIC BLOOD PRESSURE: 80 MMHG | OXYGEN SATURATION: 95 % | RESPIRATION RATE: 16 BRPM | SYSTOLIC BLOOD PRESSURE: 103 MMHG | TEMPERATURE: 97.7 F

## 2019-08-08 DIAGNOSIS — M47.812 ARTHROPATHY OF CERVICAL FACET JOINT: ICD-10-CM

## 2019-08-08 PROCEDURE — 64491 INJ PARAVERT F JNT C/T 2 LEV: CPT | Mod: 50

## 2019-08-08 PROCEDURE — 64491 INJ PARAVERT F JNT C/T 2 LEV: CPT | Performed by: ANESTHESIOLOGY

## 2019-08-08 PROCEDURE — 64450 NJX AA&/STRD OTHER PN/BRANCH: CPT

## 2019-08-08 PROCEDURE — 64491 INJ PARAVERT F JNT C/T 2 LEV: CPT | Mod: 50 | Performed by: ANESTHESIOLOGY

## 2019-08-08 PROCEDURE — 25000128 H RX IP 250 OP 636: Performed by: ANESTHESIOLOGY

## 2019-08-08 PROCEDURE — 77003 FLUOROGUIDE FOR SPINE INJECT: CPT | Mod: TC

## 2019-08-08 PROCEDURE — 64492 INJ PARAVERT F JNT C/T 3 LEV: CPT | Performed by: ANESTHESIOLOGY

## 2019-08-08 PROCEDURE — 64490 INJ PARAVERT F JNT C/T 1 LEV: CPT | Performed by: ANESTHESIOLOGY

## 2019-08-08 PROCEDURE — 64490 INJ PARAVERT F JNT C/T 1 LEV: CPT | Mod: 50 | Performed by: ANESTHESIOLOGY

## 2019-08-08 RX ORDER — BUPIVACAINE HYDROCHLORIDE 7.5 MG/ML
INJECTION, SOLUTION EPIDURAL; RETROBULBAR PRN
Status: DISCONTINUED | OUTPATIENT
Start: 2019-08-08 | End: 2019-08-08 | Stop reason: HOSPADM

## 2019-08-08 RX ORDER — LIDOCAINE 40 MG/G
CREAM TOPICAL
Status: DISCONTINUED | OUTPATIENT
Start: 2019-08-08 | End: 2019-08-08 | Stop reason: HOSPADM

## 2019-08-08 RX ORDER — IOPAMIDOL 612 MG/ML
INJECTION, SOLUTION INTRATHECAL PRN
Status: DISCONTINUED | OUTPATIENT
Start: 2019-08-08 | End: 2019-08-08 | Stop reason: HOSPADM

## 2019-08-08 NOTE — OP NOTE
CHIEF COMPLAINT:  Neck pain secondary to cervical spondylosis  INTERVAL HISTORY:     The history was discussed with the patient. I agree with above. Risks were discussed including risks of infection, bleeding, nerve injury, no help , or worse pain and they were willing to take these risks and proceed. They also understand this is a strictly diagnostic block.    PROCEDURE:  Cervical Medial branch blocks of the   Bilateral C3 and C4 and lateral third occipital nerves   utilizing fluoroscopic guidance with contrast dye.   PROCEDURE DETAILS: After written informed consent was obtained from the patient, the patient was escorted to the procedure room.  The patient was placed in the sitting position. A time out was conducted to verify the patient identity, procedure to be performed, side, site, allergies and any special requirements.  The skin over the neck was prepped and draped in normal sterile fashion. Fluoroscopy was used to identify the mid point of the articular pillar with a lateral view.   The skin was anesthetized with 0.5 mL of 1% lidocaine with bicarbonate buffer.  A 25-gauge 3.5 inch Quincke needle was advanced under fluoroscopic guidance.  <0.3 cc of Omnipaque contrast dye was injected without evidence of intrathecal or intravascular spread.  A- 0.5 mL solution of 0.75% bupivacaine was injected at each articular pillar.  Then, the needle was removed.   The patient did not receive sedation during the procedure. The patient was monitored with blood pressure and pulse oximetry machines with the assistance of an RN throughout the procedure.  The patient was alert and responsive to questions throughout the procedure.   The patient tolerated the procedure well and was observed in the post-procedural area.  The patient was dismissed without apparent complications.     DIAGNOSIS:  1. Neck pain secondary to cervical spondylosis  PLAN:  1. Performed bilateral third occipital nerve blocks and bilateral C3 and C4  medial  branch blocks to treat the   facet joints with 0.75% bupivacaine.  We will have the patient fill out an injection report. If there is benefit with medial branch block with 0.75% bupivacaine, will use 4% lidocaine for the 2nd diagnostic medial branch block.  If both blocks are diagnostically positive, I will proceed with radiofrequency neurolysis.  For the screening test with bupivacaine we are looking for % pain relief for at least 2 hours to be considered diagnostically positive.  For a lidocaine challenge confirmatory block we are looking for 1 hour in the % range.  2. The patient will fax or mail in their pain relief form to the [facility] to determine the next step in their care.     Ronald Driscoll MD  Diplomate of the American Board of Anesthesiology, Pain Medicine

## 2019-08-08 NOTE — DISCHARGE INSTRUCTIONS
Home Care Instructions     Please fax or mail this form to the [facility] fax: [phone]. The mailing address is : [facility], [address], [place] [address]            Procedure:  Diagnostic Block (which includes medial branch blocks, SI joint injection blocks, and nerve root injections)    Activity:  The injection was used to identify the cause of your pain:    Resume normal activity  You are to engage in activity that would have normally caused you discomfort to determine the effectiveness of the block/injection.  It is imperative to evaluate and rate your pain the first 2 hours after the injection.     Pain:    You may experience soreness at the injection site for one or two days    You may use an ice pack for 20 minutes every 2 hours for the first 24 hours    You may use a heating pad after the first 24 hours    You may use Tylenol  (acetaminophen) every 4 hours or other pain medicines as directed by your physician after your block wears off.    Safety  You may experience numbness radiating into your legs or arms, (depending on the procedure location)  This numbness may last several hours.  Until the numb sensation returns to normal please use caution in walking, climbing stairs, stepping out of your vehicle, etc.    Please contact us if you have:  Severe pain   Fever more than 101.5 degrees Fahrenheit  Signs of infection (redness, swelling or drainage)       If you need immediate attention we recommend that you go to a hospital emergency room or dial 911.    _____ Please contact our office one week after your injection to report your percent of pain relief.   See attached One Week Procedure Injection Report  __X__ Please return your Nerve Block Pain Relief Form the day after your injection.     See attached Nerve Block Pain Relief Form     Please fax or mail this form to the [facility] fax: [phone]. Address [address], [place] [address].      (If you do not have access to a fax machine, return form by mail to the [facility])        ## PLEASE SEND NERVE BLOCK PAIN RELIEF REPORT WITH PATIENT ##

## 2019-08-12 NOTE — PROGRESS NOTES
[facility]    CHIEF COMPLAINT:   Pain  -neck pain    INTERVAL HISTORY:  Last seen on 7/30/19.        Recommendations/plan at the last visit included:  1. Physical Therapy:  NO   2. Clinical Health Psychologist:  NO    3. Diagnostic Studies:  None at this time  4. Medication Management:    1. continue Methadone at 10mg in AM, 7.5mg in afternoon and 10mg at bedtime.   2. Decrease Oxycodone to 170 capsules for 2 weeks from 180  Capsules. We again reviewed that he should not be taking these if he is having less than severe pain.  3. Continue Gabapentin at 900mg TID.   4.  Continue Cymbalta as recommended by his PCP.  5. Further procedures recommended: cervical medial branch blocks preceding to a RFA.  6. Recommendations to PCP. See above      Follow up with this provider:  2 Weeks    Since his last visit, Joselito Abarca reports:    He feels that he is overall in more pain.     He had bilateral C2, C3 and C4 medial branch blocks with Dr. Driscoll on 08/08/2019. He reported 80% pain relief from 2:15-4:45. He is looking forward to getting the neck procedure so that he can get the burning procedure done as he found this to be very helpful.       Pain Information:   Pain quality: throbbing and penetrating   Pain rating: intensity ranges from 4/10 to 10/10, and averages 10/10 on a 0-10 scale.   Pain today 10/10    SELF CARE:   How often do you practice SELF-CARE (relaxing, stretching, pacing, monitoring posture, taking mini-breaks) in a typical day:  Has been less active     Annual Controlled Substance Agreement: 7/16/2019  UDS: 6/28/2019    CURRENT RELEVANT PAIN MEDICATIONS:   Cymbalta 30mg daily  Clonazepam 0.5mg-takes 3-4/day  Gabapentin 300mg-takes 3 capsules three times a day  Methadone 10mg-takes 1 tablet three times a day  Oxycodone 5mg-takes 3 tabs 4 times a day    Patient is using the medication as prescribed:  YES  Is your medication helpful? NO   Medication side effects? no side  effect    Previous Medications: (H--helped; HI--Helped initially; SWH-- somewhat helpful, NH--No help; W--worse; SE--side effects)   Opiates: hydrocodone SE allergic, fever, sweaty, headache, methadone H, Oxycodone H  NSAIDS: Ibuprofen NH, Aleve NH  Anti-migraine mediations: Fiorinal H  Muscle Relaxants: Valium H  Neuropathics: Gabapentin H  Anti-depressants: none  Anxiolytics: Klonopin H  Topicals: Lidocaine H  Other medications not covered above: Tylenol NH    Past Pain Treatments:  Pain Clinic:   Yes, he was previously seen at [facility] and [facility]   PT: Yes, has done many times  Psychologist: No  Relaxation techniques/biofeedback: No  Chiropractor: Yes, feels that it made it worse  Acupuncture: Yes, just did one session  Pharmacotherapy:               Opioids: Yes                Non-opioids:    Yes   TENs Unit:Yes, aggravates the pain  Injections: Yes, bilateral occipital nerve blocks 07/12/2018, Cervical medial branch blocks 08/26/2015 and 5/27/2015. Has had low back pain injections at [facility]  Self-care:   Yes, hot tubs, ice packs, heating pads  Surgeries related to pain: Yes,  1. anterior cervical discectomy and fusion C6-7, exploration and revision C4-6 with hardwar removal 11/29/2017,   2. Left T1-T2 thoracic hemilaminectomy, microdiscectomy 02/21/2012,   3. Removal of C4 through C6 anterior cervical plate, Exploration of C4-C5 and C5-C6 anterior cervical fusion, C4-C5 and C5-C6 arthrodesis, C4 through C6 anterior cervical instrumentation, and Verdig[place] of right iliac hip graft.  03/15/2011  4. C4-C5 and C5-C6 anterior cervical discectomy and fusion with instrumentation and neural electrophysiologic monitoring 01/26/2010    [place] Board of Pharmacy Data Base Reviewed:    YES; As expected, no concern for misuse/abuse of controlled medications based on this report.        THE 4 As OF OPIOID MAINTENANCE ANALGESIA    Analgesia: Is pain relief clinically significant? NO   Activity: Is patient functional and able to  perform Activities of Daily Living? YES   Adverse effects: Is patient free from adverse side effects from opiates? YES   Adherence to Rx protocol: Is patient adhering to Controlled Substance Agreement and taking medications ONLY as ordered? YES       Is Narcan prescribed for opiate use >50 MME daily? YES      Daily MME: 310    Medications:  Current Outpatient Medications   Medication Sig Dispense Refill     citalopram (CELEXA) 20 MG tablet Take 1 tablet (20 mg) by mouth daily 90 tablet 3     clonazePAM (KLONOPIN) 0.5 MG tablet Take 0.5-1 tablets (0.25-0.5 mg) by mouth 3 times daily as needed for anxiety 90 tablet 0     DULoxetine (CYMBALTA) 60 MG capsule Take 1 capsule (60 mg) by mouth daily 30 capsule 3     FLOVENT  MCG/ACT Inhaler INHALE 2 PUFFS INTO THE LUNGS TWICE DAILY 36 g 2     fluticasone (FLONASE) 50 MCG/ACT nasal spray SPRAY 2 SPRAYS IN EACH NOSTRIL EVERY DAY 16 g 5     gabapentin (NEURONTIN) 300 MG capsule TAKE TWO CAPSULES BY MOUTH THREE TIMES A  capsule 11     ipratropium - albuterol 0.5 mg/2.5 mg/3 mL (DUONEB) 0.5-2.5 (3) MG/3ML neb solution Take 1 vial (3 mLs) by nebulization every 4 hours as needed for shortness of breath / dyspnea 30 vial 0     methadone (DOLOPHINE) 10 MG tablet Take 10mg in AM and 10mg at bedtime (this dose should be 8 hours after the 7.5mg dose). Okay to fill 8/2/2019. Start 8/4/2019. To last until 8/18/2019. 30 tablet 0     methadone (DOLOPHINE) 5 MG tablet Take 1.5 tablets in the afternoon (8 hours after the 10mg morning dose). Okay to fill 8/2/2019. Start 8/4/2019. To last until 8/18/2019 21 tablet 0     nystatin (MYCOSTATIN) 581003 UNIT/ML suspension TAKE 5 MLS BY MOUTH FOUR TIMES A  mL 0     order for DME Equipment being ordered: Nebulizer mask and tubing 1 each 1     oxyCODONE (OXY-IR) 5 MG capsule 2 caps q 4 hour prn pain up to 12 per day. Okay to fill 8/2/2019. Start 8/4/2019. To last until 8/18/2019. 170 capsule 0     sildenafil (VIAGRA) 100 MG tablet  "Take 1 tablet (100 mg) by mouth daily as needed 30 min to 4 hrs before sex. Do not use with nitroglycerin, terazosin or doxazosin. 4 tablet 0     traZODone (DESYREL) 100 MG tablet Take 3 tablets (300 mg) by mouth At Bedtime 90 tablet 3     VENTOLIN  (90 Base) MCG/ACT inhaler INHALE 2 PUFFS INTO THE LUNGS EVERY 6 HOURS AS NEEDED FOR SHORTNESS OF BREATH, DIFFICULTY BREATHING OR WHEEZING. 18 g 3     vitamin D3 (CHOLECALCIFEROL) 2000 units tablet TAKE ONE TABLET BY MOUTH EVERY  tablet 3     zolpidem (AMBIEN) 10 MG tablet Take 10 mg by mouth nightly as needed        DULoxetine (CYMBALTA) 30 MG capsule Take 1 capsule (30 mg) by mouth daily for 7 days 7 capsule 0     naloxone (NARCAN) 4 MG/0.1ML nasal spray Spray 1 spray (4 mg) into one nostril alternating nostrils as needed for opioid reversal every 2-3 minutes until assistance arrives (Patient not taking: Reported on 7/30/2019) 0.2 mL 1       Review of Systems: A 10-point review of systems was negative, with the exception of chronic pain issues, headache, stress and anxiety.      Social History: Reviewed; unchanged from previous consultation.      Family history: Reviewed; unchanged from previous consultation.     PHYSICAL EXAM:     Vitals:   /80   Temp 97.6  F (36.4  C) (Temporal)   Ht 1.702 m (5' 7\")   Wt 72.8 kg (160 lb 8 oz)   BMI 25.14 kg/m    Body mass index is 25.14 kg/m .  5' 7\"  160 lbs 8 oz    Constitutional: healthy, alert and no distress  HEENT: Head atraumatic, normocephalic. Eyes without conjunctival injection or jaundice. Neck supple. No obvious neck masses.  Skin: No rash, lesions, or petechiae of exposed skin. Psychiatric/mental status: Alert, without lethargy or stupor. Appropriate affect. Mood normal.       DIAGNOSTIC TESTS:  Imaging Studies:   No new imaging to review today.    Assessment:  Joselito Abarca is a 54 year old male who presents today for follow up regarding his:      1. Arthropathy of cervical facet " joint  2. Midline thoracic back pain  3. Myofascial pain  4. Chronic pain syndrome  5. Chronic use of opioids      Will continue to work on a slow taper. He did get significant pain relief with the medial branch blocks. Will place order to proceed with 2nd block. He would prefer to continue on the current dose of Methadone. Will decrease his Oxycodone today.         Plan:    Diagnosis reviewed, treatment option addressed, and risk/benifits discussed.  Self-care instructions given.  I am recommending a multidisciplinary treatment plan to help this patient better manage pain.      1. Physical Therapy:  Yes, he will need this prior to getting the ablation done.    2. Clinical Health Psychologist:  NO    3. Diagnostic Studies:  None at this time  4. Medication Management:    1. continue Methadone at 10mg in AM,  7.5mg in afternoon and 10mg at bedtime.   2. Decrease Oxycodone to 165 capsules for 2 weeks.    3. Continue Gabapentin at 900mg TID.   4.  Continue Cymbalta as recommended by his PCP.  5. Further procedures recommended:  cervical medial branch blocks preceding to a RFA.  6. Recommendations to PCP. See above      Follow up with this provider:  2 Weeks     Total time spent face to face was 10 minutes and more than 50% of face to face time was spent in counseling and/or coordination of care regarding the diagnosis and recommendations above.      Ashley Salgado PA-C   [facility]

## 2019-08-13 ENCOUNTER — OFFICE VISIT (OUTPATIENT)
Dept: PALLIATIVE MEDICINE | Facility: OTHER | Age: 54
End: 2019-08-13
Payer: COMMERCIAL

## 2019-08-13 VITALS
DIASTOLIC BLOOD PRESSURE: 80 MMHG | WEIGHT: 160.5 LBS | SYSTOLIC BLOOD PRESSURE: 114 MMHG | TEMPERATURE: 97.6 F | HEIGHT: 67 IN | BODY MASS INDEX: 25.19 KG/M2

## 2019-08-13 DIAGNOSIS — G89.29 CHRONIC MIDLINE THORACIC BACK PAIN: Primary | ICD-10-CM

## 2019-08-13 DIAGNOSIS — M79.18 MYOFASCIAL PAIN: ICD-10-CM

## 2019-08-13 DIAGNOSIS — F11.90 CHRONIC, CONTINUOUS USE OF OPIOIDS: ICD-10-CM

## 2019-08-13 DIAGNOSIS — M47.812 ARTHROPATHY OF CERVICAL FACET JOINT: ICD-10-CM

## 2019-08-13 DIAGNOSIS — G89.4 CHRONIC PAIN SYNDROME: ICD-10-CM

## 2019-08-13 DIAGNOSIS — M54.6 CHRONIC MIDLINE THORACIC BACK PAIN: Primary | ICD-10-CM

## 2019-08-13 PROCEDURE — 99214 OFFICE O/P EST MOD 30 MIN: CPT | Performed by: PHYSICIAN ASSISTANT

## 2019-08-13 RX ORDER — METHADONE HYDROCHLORIDE 5 MG/1
TABLET ORAL
Qty: 21 TABLET | Refills: 0 | Status: SHIPPED | OUTPATIENT
Start: 2019-08-13 | End: 2019-08-30

## 2019-08-13 RX ORDER — METHADONE HYDROCHLORIDE 10 MG/1
TABLET ORAL
Qty: 30 TABLET | Refills: 0 | Status: SHIPPED | OUTPATIENT
Start: 2019-08-13 | End: 2019-08-30

## 2019-08-13 RX ORDER — OXYCODONE HYDROCHLORIDE 5 MG/1
CAPSULE ORAL
Qty: 165 CAPSULE | Refills: 0 | Status: SHIPPED | OUTPATIENT
Start: 2019-08-13 | End: 2019-08-13

## 2019-08-13 RX ORDER — METHADONE HYDROCHLORIDE 10 MG/1
TABLET ORAL
Qty: 30 TABLET | Refills: 0 | Status: SHIPPED | OUTPATIENT
Start: 2019-08-13 | End: 2019-08-13

## 2019-08-13 RX ORDER — METHADONE HYDROCHLORIDE 5 MG/1
TABLET ORAL
Qty: 21 TABLET | Refills: 0 | Status: SHIPPED | OUTPATIENT
Start: 2019-08-13 | End: 2019-08-13

## 2019-08-13 RX ORDER — OXYCODONE HYDROCHLORIDE 5 MG/1
CAPSULE ORAL
Qty: 165 CAPSULE | Refills: 0 | Status: SHIPPED | OUTPATIENT
Start: 2019-08-13 | End: 2019-08-30

## 2019-08-13 ASSESSMENT — PAIN SCALES - GENERAL: PAINLEVEL: WORST PAIN (10)

## 2019-08-13 ASSESSMENT — MIFFLIN-ST. JEOR: SCORE: 1536.65

## 2019-08-13 NOTE — PATIENT INSTRUCTIONS
After Visit Instructions:     Thank you for coming to [facility] for your care. It is my goal to partner with you to help you reach your optimal state of health.     I am recommending multidisciplinary care at this time.  The focus of care will be to continue gradual rehabilitation and pain management with medication adjustments as needed.    Continue daily self-care, identifying contributing factors, and monitoring variations in pain level. Continue to integrate self-care into your life.        Schedule physical therapy assessment/visit: [facility]. You need 4 sessions over 4-6 weeks before you can get the ablation    Schedule follow-up with Ashley Salgado PA-C in 2 weeks. You will need to make this appointment.   Procedures recommended: cervical medial branch blocks #2. To call and schedule your procedure, you can call: [phone]    Medication recommendations:     Decrease your Oxycodone. 165 capsules to last 2 weeks.     Continue Methadone at 10mg in AM, 7.5mg in afternoon and 10mg at bedtime      Ashley Salgado PA-C  [facility]    Contact information: [facility]  Clinic Number:  [phone]     Call with any questions about your care and for scheduling assistance.     Calls are returned Monday through Friday between 8 AM and 4:30 PM. We usually get back to you within 2 business days depending on the issue/request.    If we are prescribing your medications:    For opioid medication refills, call the clinic or send a [employer] message 7 days in advance.  Please include:    Name of requested medication    Name of the pharmacy.    For non-opioid medications, call your pharmacy directly to request a refill. Please allow 3-4 days to be processed.     Per [place] State Law:    All controlled substance prescriptions must be filled within 30 days of being written.      For those controlled  substances allowing refills, pickup must occur within 30 days of last fill.      We believe regular attendance is key to your success in our program!      Any time you are unable to keep your appointment we ask that you call us at least 24 hours in advance to cancel.This will allow us to offer the appointment time to another patient.   Multiple missed appointments may lead to dismissal from the clinic.

## 2019-08-15 ENCOUNTER — TELEPHONE (OUTPATIENT)
Dept: SURGERY | Facility: CLINIC | Age: 54
End: 2019-08-15

## 2019-08-16 NOTE — TELEPHONE ENCOUNTER
Contacted patient to schedule MBB  Date:9/13/19  Time:1:30pm  Dr. Driscoll    Instructed pt to have a  for procedure.

## 2019-08-27 NOTE — PROGRESS NOTES
[facility]    CHIEF COMPLAINT:   Pain  -neck pain    INTERVAL HISTORY:  Last seen on 8/13/19.        Recommendations/plan at the last visit included:  1. Physical Therapy:  Yes, he will need this prior to getting the ablation done.    2. Clinical Health Psychologist:  NO    3. Diagnostic Studies:  None at this time  4. Medication Management:    1. continue Methadone at 10mg in AM, 7.5mg in afternoon and 10mg at bedtime.   2. Decrease Oxycodone to 165 capsules for 2 weeks.    3. Continue Gabapentin at 900mg TID.   4.  Continue Cymbalta as recommended by his PCP.  5. Further procedures recommended: cervical medial branch blocks preceding to a RFA.  6. Recommendations to PCP. See above      Follow up with this provider:  2 Weeks    Since his last visit, Joselito Abarca reports:    He states that he started out doing okay, not too bad for the first 3-4 days. He then had his house checked for cleanliness, so due to having to do all the cleaning, he had an increase in his pain. He states that his why he had to reschedule his appointment on Tuesday.    He is scheduled for his second medial branch blocks at C2, C3, and C4 with Dr. Driscoll on 9/13/2019.      Pain Information:   Pain quality: miserable and nagging   Pain rating: intensity ranges from 1/10 to 10/10, and averages 9/10 on a 0-10 scale.   Pain today 8/10    SELF CARE:   How often do you practice SELF-CARE (relaxing, stretching, pacing, monitoring posture, taking mini-breaks) in a typical day:  Has been busy and started PT    Annual Controlled Substance Agreement: 7/16/2019  UDS: 6/28/2019    CURRENT RELEVANT PAIN MEDICATIONS:   Cymbalta 60mg daily  Clonazepam 0.5mg-takes 3-4/day  Gabapentin 300mg-takes 3 capsules three times a day  Methadone 10mg in AM and HS and 7.5mg in afternoon  Oxycodone 5mg-takes 3 tabs 4 times a day    Patient is using the medication as prescribed:  YES  Is your medication helpful? NO   Medication side effects? no  side effect    Previous Medications: (H--helped; HI--Helped initially; SWH-- somewhat helpful, NH--No help; W--worse; SE--side effects)   Opiates: hydrocodone SE allergic, fever, sweaty, headache, methadone H, Oxycodone H  NSAIDS: Ibuprofen NH, Aleve NH  Anti-migraine mediations: Fiorinal H  Muscle Relaxants: Valium H  Neuropathics: Gabapentin H  Anti-depressants: none  Anxiolytics: Klonopin H  Topicals: Lidocaine H  Other medications not covered above: Tylenol NH    Past Pain Treatments:  Pain Clinic:   Yes, he was previously seen at [facility] and [facility]   PT: Yes, has done many times  Psychologist: No  Relaxation techniques/biofeedback: No  Chiropractor: Yes, feels that it made it worse  Acupuncture: Yes, just did one session  Pharmacotherapy:               Opioids: Yes                Non-opioids:    Yes   TENs Unit:Yes, aggravates the pain  Injections: Yes, bilateral occipital nerve blocks 07/12/2018, Cervical medial branch blocks 08/26/2015 and 5/27/2015. Has had low back pain injections at [facility]  Self-care:   Yes, hot tubs, ice packs, heating pads  Surgeries related to pain: Yes,  1. anterior cervical discectomy and fusion C6-7, exploration and revision C4-6 with hardwar removal 11/29/2017,   2. Left T1-T2 thoracic hemilaminectomy, microdiscectomy 02/21/2012,   3. Removal of C4 through C6 anterior cervical plate, Exploration of C4-C5 and C5-C6 anterior cervical fusion, C4-C5 and C5-C6 arthrodesis, C4 through C6 anterior cervical instrumentation, and [place] of right iliac hip graft.  03/15/2011  4. C4-C5 and C5-C6 anterior cervical discectomy and fusion with instrumentation and neural electrophysiologic monitoring 01/26/2010    [place] Board of Pharmacy Data Base Reviewed:    YES; As expected, no concern for misuse/abuse of controlled medications based on this report.        THE 4 As OF OPIOID MAINTENANCE ANALGESIA    Analgesia: Is pain relief clinically significant? NO   Activity: Is patient functional and able to  perform Activities of Daily Living? YES   Adverse effects: Is patient free from adverse side effects from opiates? YES   Adherence to Rx protocol: Is patient adhering to Controlled Substance Agreement and taking medications ONLY as ordered? YES       Is Narcan prescribed for opiate use >50 MME daily? YES      Daily MME: 310    Medications:  Current Outpatient Medications   Medication Sig Dispense Refill     citalopram (CELEXA) 20 MG tablet Take 1 tablet (20 mg) by mouth daily 90 tablet 3     clonazePAM (KLONOPIN) 0.5 MG tablet Take 0.5-1 tablets (0.25-0.5 mg) by mouth 3 times daily as needed for anxiety 90 tablet 0     DULoxetine (CYMBALTA) 60 MG capsule Take 1 capsule (60 mg) by mouth daily 30 capsule 3     FLOVENT  MCG/ACT Inhaler INHALE 2 PUFFS INTO THE LUNGS TWICE DAILY 36 g 2     fluticasone (FLONASE) 50 MCG/ACT nasal spray SPRAY 2 SPRAYS IN EACH NOSTRIL EVERY DAY 16 g 5     gabapentin (NEURONTIN) 300 MG capsule TAKE TWO CAPSULES BY MOUTH THREE TIMES A  capsule 11     ipratropium - albuterol 0.5 mg/2.5 mg/3 mL (DUONEB) 0.5-2.5 (3) MG/3ML neb solution Take 1 vial (3 mLs) by nebulization every 4 hours as needed for shortness of breath / dyspnea 30 vial 0     methadone (DOLOPHINE) 10 MG tablet Take 10mg in AM and 10mg at bedtime (this dose should be 8 hours after the 7.5mg dose). Okay to fill 8/16/2019. Start 8/18/2019. To last until 9/1/2019 30 tablet 0     methadone (DOLOPHINE) 5 MG tablet Take 1.5 tablets in the afternoon (8 hours after the 10mg morning dose). Okay to fill 8/16/2019. Start 8/18/2019. To last until 9//2019 21 tablet 0     nystatin (MYCOSTATIN) 265131 UNIT/ML suspension TAKE 5 MLS BY MOUTH FOUR TIMES A  mL 0     order for DME Equipment being ordered: Nebulizer mask and tubing 1 each 1     oxyCODONE (OXY-IR) 5 MG capsule 2 caps q 4 hour prn pain up to 12 per day. Okay to fill 8/16/2019. Start 8/18/2019. To last until 9/1/2019. 165 capsule 0     sildenafil (VIAGRA) 100 MG  "tablet Take 1 tablet (100 mg) by mouth daily as needed 30 min to 4 hrs before sex. Do not use with nitroglycerin, terazosin or doxazosin. 4 tablet 0     traZODone (DESYREL) 100 MG tablet Take 3 tablets (300 mg) by mouth At Bedtime 90 tablet 3     VENTOLIN  (90 Base) MCG/ACT inhaler INHALE 2 PUFFS INTO THE LUNGS EVERY 6 HOURS AS NEEDED FOR SHORTNESS OF BREATH, DIFFICULTY BREATHING OR WHEEZING. 18 g 3     vitamin D3 (CHOLECALCIFEROL) 2000 units tablet TAKE ONE TABLET BY MOUTH EVERY  tablet 3     zolpidem (AMBIEN) 10 MG tablet Take 10 mg by mouth nightly as needed        DULoxetine (CYMBALTA) 30 MG capsule Take 1 capsule (30 mg) by mouth daily for 7 days 7 capsule 0     naloxone (NARCAN) 4 MG/0.1ML nasal spray Spray 1 spray (4 mg) into one nostril alternating nostrils as needed for opioid reversal every 2-3 minutes until assistance arrives (Patient not taking: Reported on 7/30/2019) 0.2 mL 1       Review of Systems: A 10-point review of systems was negative, with the exception of chronic pain issues, headache,  and anxiety.      Social History: Reviewed; unchanged from previous consultation.      Family history: Reviewed; unchanged from previous consultation.     PHYSICAL EXAM:     Vitals:   /80   Temp 97.4  F (36.3  C) (Temporal)   Ht 1.702 m (5' 7\")   Wt 73.3 kg (161 lb 8 oz)   BMI 25.29 kg/m    Body mass index is 25.29 kg/m .  5' 7\"  161 lbs 8 oz    Constitutional: healthy, alert and no distress  HEENT: Head atraumatic, normocephalic. Eyes without conjunctival injection or jaundice. Neck supple. No obvious neck masses.  Skin: No rash, lesions, or petechiae of exposed skin. Psychiatric/mental status: Alert, without lethargy or stupor. Appropriate affect. Mood normal.       DIAGNOSTIC TESTS:  Imaging Studies:   No new imaging to review today.    Assessment:  Joselito Abarca is a 54 year old male who presents today for follow up regarding his:      1. Arthropathy of cervical facet " joint  2. Midline thoracic back pain  3. Myofascial pain  4. Chronic pain syndrome  5. Chronic use of opioids      Will continue to work on a slow taper. He will keep his appointment for the second medial branch blocks. He will continue with PT as well.        Plan:    Diagnosis reviewed, treatment option addressed, and risk/benifits discussed.  Self-care instructions given.  I am recommending a multidisciplinary treatment plan to help this patient better manage pain.      1. Physical Therapy:  Yes, continue current plan  2. Clinical Health Psychologist:  NO    3. Diagnostic Studies:  None at this time  4. Medication Management:      Reduce Methadone to 10mg in and 7.5mg in afternoon and at HS    Continue Oxycodone, 240 capsules for 3 weeks    Continue Gabapentin at 900mg TID    Continue Cymbalta as recommended by PCP  5. Further procedures recommended: cervical medial branch blocks preceding to a RFA, keep 2nd block as scheduled.  6. Recommendations to PCP. See above      Follow up with this provider:  3 Weeks     Total time spent face to face was 10 minutes and more than 50% of face to face time was spent in counseling and/or coordination of care regarding the diagnosis and recommendations above.      Ashley Salgado PA-C   [facility]

## 2019-08-28 ENCOUNTER — THERAPY VISIT (OUTPATIENT)
Dept: PHYSICAL THERAPY | Facility: CLINIC | Age: 54
End: 2019-08-28
Attending: PHYSICIAN ASSISTANT
Payer: COMMERCIAL

## 2019-08-28 DIAGNOSIS — M47.812 ARTHROPATHY OF CERVICAL FACET JOINT: ICD-10-CM

## 2019-08-28 DIAGNOSIS — M54.2 CERVICAL PAIN: ICD-10-CM

## 2019-08-28 PROCEDURE — 97110 THERAPEUTIC EXERCISES: CPT | Mod: GP | Performed by: PHYSICAL THERAPIST

## 2019-08-28 PROCEDURE — 97161 PT EVAL LOW COMPLEX 20 MIN: CPT | Mod: GP | Performed by: PHYSICAL THERAPIST
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
## 2019-08-29 DIAGNOSIS — B37.0 THRUSH: ICD-10-CM

## 2019-08-29 DIAGNOSIS — F41.1 GENERALIZED ANXIETY DISORDER: ICD-10-CM

## 2019-08-30 ENCOUNTER — OFFICE VISIT (OUTPATIENT)
Dept: PALLIATIVE MEDICINE | Facility: CLINIC | Age: 54
End: 2019-08-30
Payer: COMMERCIAL

## 2019-08-30 ENCOUNTER — TELEPHONE (OUTPATIENT)
Dept: FAMILY MEDICINE | Facility: CLINIC | Age: 54
End: 2019-08-30

## 2019-08-30 VITALS
SYSTOLIC BLOOD PRESSURE: 102 MMHG | DIASTOLIC BLOOD PRESSURE: 80 MMHG | WEIGHT: 161.5 LBS | HEIGHT: 67 IN | TEMPERATURE: 97.4 F | BODY MASS INDEX: 25.35 KG/M2

## 2019-08-30 DIAGNOSIS — M79.18 MYOFASCIAL PAIN: ICD-10-CM

## 2019-08-30 DIAGNOSIS — M54.6 CHRONIC MIDLINE THORACIC BACK PAIN: ICD-10-CM

## 2019-08-30 DIAGNOSIS — G89.29 CHRONIC MIDLINE THORACIC BACK PAIN: ICD-10-CM

## 2019-08-30 DIAGNOSIS — M47.812 ARTHROPATHY OF CERVICAL FACET JOINT: Primary | ICD-10-CM

## 2019-08-30 DIAGNOSIS — G89.4 CHRONIC PAIN SYNDROME: ICD-10-CM

## 2019-08-30 DIAGNOSIS — F11.90 CHRONIC, CONTINUOUS USE OF OPIOIDS: ICD-10-CM

## 2019-08-30 PROCEDURE — 99213 OFFICE O/P EST LOW 20 MIN: CPT | Performed by: PHYSICIAN ASSISTANT

## 2019-08-30 RX ORDER — METHADONE HYDROCHLORIDE 10 MG/1
TABLET ORAL
Qty: 15 TABLET | Refills: 0 | Status: SHIPPED | OUTPATIENT
Start: 2019-08-30 | End: 2019-08-30

## 2019-08-30 RX ORDER — OXYCODONE HYDROCHLORIDE 5 MG/1
CAPSULE ORAL
Qty: 240 CAPSULE | Refills: 0 | Status: SHIPPED | OUTPATIENT
Start: 2019-08-30 | End: 2019-09-20

## 2019-08-30 RX ORDER — OXYCODONE HYDROCHLORIDE 5 MG/1
CAPSULE ORAL
Qty: 165 CAPSULE | Refills: 0 | Status: SHIPPED | OUTPATIENT
Start: 2019-08-30 | End: 2019-08-30

## 2019-08-30 RX ORDER — METHADONE HYDROCHLORIDE 5 MG/1
TABLET ORAL
Qty: 63 TABLET | Refills: 0 | Status: SHIPPED | OUTPATIENT
Start: 2019-08-30 | End: 2019-09-20

## 2019-08-30 RX ORDER — METHADONE HYDROCHLORIDE 10 MG/1
TABLET ORAL
Qty: 21 TABLET | Refills: 0 | Status: SHIPPED | OUTPATIENT
Start: 2019-08-30 | End: 2019-09-24 | Stop reason: DRUGHIGH

## 2019-08-30 RX ORDER — METHADONE HYDROCHLORIDE 5 MG/1
TABLET ORAL
Qty: 45 TABLET | Refills: 0 | Status: SHIPPED | OUTPATIENT
Start: 2019-08-30 | End: 2019-08-30

## 2019-08-30 ASSESSMENT — MIFFLIN-ST. JEOR: SCORE: 1541.19

## 2019-08-30 ASSESSMENT — PAIN SCALES - GENERAL: PAINLEVEL: EXTREME PAIN (8)

## 2019-08-30 NOTE — TELEPHONE ENCOUNTER
Joselito's insurance only allowed us to fill 195 of the 240 tablets his prescription was written for, so the rest of the Rx has been voided. It looks like in order to dispense the full 240 tablets we would need a prior authorization to exceed the insurance's plan limitations. Please call the [facility] if you have any questions.[phone] Thanks    Shwetha Cowan [facility] Float Tech.  [facility]

## 2019-08-30 NOTE — PATIENT INSTRUCTIONS
After Visit Instructions:     Thank you for coming to [facility] for your care. It is my goal to partner with you to help you reach your optimal state of health.     I am recommending multidisciplinary care at this time.  The focus of care will be to continue gradual rehabilitation and pain management with medication adjustments as needed.    Continue daily self-care, identifying contributing factors, and monitoring variations in pain level. Continue to integrate self-care into your life.        Schedule physical therapy assessment/visit: continue current plan    Schedule follow-up with Ashley Salgado PA-C in 3 weeks. You will need to make this appointment.     Procedures recommended: keep medial branch block appointment as scheduled     Medication recommendations:     Reduce Methadone to 10mg in AM, 7.5mg in afternoon, 7.5mg at bedtime    Continue Oxycodone, 165 capsules for 2 weeks    Continue Gabapentin at 900mg twice daily    Continue Cymbalta as recommended by PCP      Ashley Salgado PA-C  [facility]  [place]    Contact information: [facility]  Clinic Number:  [phone]     Call with any questions about your care and for scheduling assistance.     Calls are returned Monday through Friday between 8 AM and 4:30 PM. We usually get back to you within 2 business days depending on the issue/request.    If we are prescribing your medications:    For opioid medication refills, call the clinic or send a [employer] message 7 days in advance.  Please include:    Name of requested medication    Name of the pharmacy.    For non-opioid medications, call your pharmacy directly to request a refill. Please allow 3-4 days to be processed.     Per [place] State Law:    All controlled substance prescriptions must be filled within 30 days of being written.      For those controlled substances allowing refills, pickup must occur within 30 days of last  fill.      We believe regular attendance is key to your success in our program!      Any time you are unable to keep your appointment we ask that you call us at least 24 hours in advance to cancel.This will allow us to offer the appointment time to another patient.   Multiple missed appointments may lead to dismissal from the clinic.

## 2019-08-30 NOTE — TELEPHONE ENCOUNTER
Requested Prescriptions   Pending Prescriptions Disp Refills     nystatin (MYCOSTATIN) [id number] UNIT/ML suspension [Pharmacy Med Name: NYSTATIN [id number]/ML SUSP] 280 mL 0     Sig: TAKE 5 MLS BY MOUTH FOUR TIMES A DAY       There is no refill protocol information for this order        Last Written Prescription Date:  6/17/2019  Last Fill Quantity: 280 ml,  # refills: 0   Last office visit: 7/19/2019 with prescribing provider:     Future Office Visit:   Next 5 appointments (look out 90 days)    Aug 30, 2019  4:00 PM CDT  [facility] Visit with Ashley Salgado PA-C  [facility] [address] [place] [id number] [phone]         Thrush [B37.0]   Routing refill request to provider for review/approval because:  Drug not on the [facility] refill protocol

## 2019-08-30 NOTE — TELEPHONE ENCOUNTER
Clonazepam  Last Written Prescription Date:  07/29/2019  Last Fill Quantity: 90,  # refills: 0   Last office visit: 7/19/2019 with prescribing provider:  Schoen Future Office Visit:   Next 5 appointments (look out 90 days)    Aug 30, 2019  4:00 PM CDT  Return Visit with Ashley Salgado PA-C  [facility] ([facility]) [address] [place] [id number] [phone]         Routing refill request to provider for review/approval because:  Drug not on the [facility] refill protocol     Ailyn Tobias RN

## 2019-09-03 RX ORDER — NYSTATIN 100000/ML
SUSPENSION, ORAL (FINAL DOSE FORM) ORAL
Qty: 280 ML | Refills: 0 | Status: SHIPPED | OUTPATIENT
Start: 2019-09-03 | End: 2020-02-09

## 2019-09-03 RX ORDER — CLONAZEPAM 0.5 MG/1
0.25-0.5 TABLET ORAL 3 TIMES DAILY PRN
Qty: 90 TABLET | Refills: 0 | Status: SHIPPED | OUTPATIENT
Start: 2019-09-03 | End: 2019-10-02

## 2019-09-04 ENCOUNTER — THERAPY VISIT (OUTPATIENT)
Dept: PHYSICAL THERAPY | Facility: CLINIC | Age: 54
End: 2019-09-04
Payer: COMMERCIAL

## 2019-09-04 DIAGNOSIS — M54.2 CERVICAL PAIN: ICD-10-CM

## 2019-09-04 PROCEDURE — 97140 MANUAL THERAPY 1/> REGIONS: CPT | Mod: GP | Performed by: PHYSICAL THERAPIST

## 2019-09-04 PROCEDURE — 97110 THERAPEUTIC EXERCISES: CPT | Mod: GP | Performed by: PHYSICAL THERAPIST

## 2019-09-04 NOTE — TELEPHONE ENCOUNTER
Med note added to oxycodone on pt's medication list. Called pharmacy and obtained insurance information. Will route to central PA team for assistance.     Prior Authorization Retail Medication Request    Medication/Dose: oxycodone 5mg  ICD code (if different than what is on RX):    Previously Tried and Failed:    Rationale:      Insurance Name:  [employer] BIN [id number]  Insurance ID:  [id number]  Group: [id number]  PCN: [phone]    Pharmacy Information (if different than what is on RX)  Name:    Phone:      BROCK Apodaca, RN-BC  Patient Care Supervisor/Care Coordinator  [facility]

## 2019-09-09 NOTE — TELEPHONE ENCOUNTER
Central Prior Authorization Team   Phone: [phone]    PA Initiation    Medication: oxycodone  Insurance Company: [employer] - Phone [phone] Fax [phone]  Pharmacy Filling the Rx: [address]  Filling Pharmacy Phone: [phone]  Filling Pharmacy Fax:    Start Date: 9/9/2019    Joselito Abarca (Paul: [id number])

## 2019-09-10 NOTE — TELEPHONE ENCOUNTER
Central Prior Authorization Team   Phone: [phone]    Prior Authorization Not Needed per Insurance    Medication: oxycodone  Insurance Company: [employer] - Phone [phone] Fax [phone]  Expected CoPay:      Pharmacy Filling the Rx: [facility] [address]   Pharmacy Notified: Yes  Patient Notified: Yes    PA not needed, the prescribed limit is below the plan limit. Patient is allowed #360 tabs every rolling 23 days. He is getting the medication filled too soon so insurance is only allowing so many tablets per fill. He can get the full #240 tablets per 20 days filled on 9/11/19. He will be back on track with insurance then, I would recommend not letting him get the medication filled until 9/11.

## 2019-09-11 ENCOUNTER — THERAPY VISIT (OUTPATIENT)
Dept: PHYSICAL THERAPY | Facility: CLINIC | Age: 54
End: 2019-09-11
Payer: COMMERCIAL

## 2019-09-11 DIAGNOSIS — M54.2 CERVICAL PAIN: ICD-10-CM

## 2019-09-11 PROCEDURE — 97110 THERAPEUTIC EXERCISES: CPT | Mod: GP | Performed by: PHYSICAL THERAPIST

## 2019-09-11 PROCEDURE — 97140 MANUAL THERAPY 1/> REGIONS: CPT | Mod: GP | Performed by: PHYSICAL THERAPIST

## 2019-09-11 PROCEDURE — 97012 MECHANICAL TRACTION THERAPY: CPT | Mod: GP | Performed by: PHYSICAL THERAPIST

## 2019-09-13 ENCOUNTER — HOSPITAL ENCOUNTER (OUTPATIENT)
Dept: GENERAL RADIOLOGY | Facility: CLINIC | Age: 54
End: 2019-09-13
Attending: ANESTHESIOLOGY | Admitting: ANESTHESIOLOGY
Payer: COMMERCIAL

## 2019-09-13 ENCOUNTER — HOSPITAL ENCOUNTER (OUTPATIENT)
Facility: CLINIC | Age: 54
Discharge: HOME | End: 2019-09-13
Attending: ANESTHESIOLOGY | Admitting: ANESTHESIOLOGY
Payer: COMMERCIAL

## 2019-09-13 VITALS
OXYGEN SATURATION: 96 % | DIASTOLIC BLOOD PRESSURE: 75 MMHG | SYSTOLIC BLOOD PRESSURE: 112 MMHG | RESPIRATION RATE: 16 BRPM | HEART RATE: 68 BPM | TEMPERATURE: 97.7 F

## 2019-09-13 DIAGNOSIS — M47.812 ARTHROPATHY OF CERVICAL FACET JOINT: ICD-10-CM

## 2019-09-13 PROCEDURE — 64490 INJ PARAVERT F JNT C/T 1 LEV: CPT | Mod: 50 | Performed by: ANESTHESIOLOGY

## 2019-09-13 PROCEDURE — 25000128 H RX IP 250 OP 636: Performed by: ANESTHESIOLOGY

## 2019-09-13 PROCEDURE — 64490 INJ PARAVERT F JNT C/T 1 LEV: CPT | Performed by: ANESTHESIOLOGY

## 2019-09-13 PROCEDURE — 64491 INJ PARAVERT F JNT C/T 2 LEV: CPT | Mod: 50 | Performed by: ANESTHESIOLOGY

## 2019-09-13 PROCEDURE — 25000125 ZZHC RX 250: Performed by: ANESTHESIOLOGY

## 2019-09-13 PROCEDURE — 64492 INJ PARAVERT F JNT C/T 3 LEV: CPT | Mod: 50 | Performed by: ANESTHESIOLOGY

## 2019-09-13 PROCEDURE — 40000277 XR FLUORO NEEDLE PLACEMENT SPINE (WITH PROCEDURE)

## 2019-09-13 RX ORDER — LIDOCAINE HYDROCHLORIDE 40 MG/ML
INJECTION, SOLUTION RETROBULBAR PRN
Status: DISCONTINUED | OUTPATIENT
Start: 2019-09-13 | End: 2019-09-13 | Stop reason: HOSPADM

## 2019-09-13 RX ORDER — IOPAMIDOL 612 MG/ML
INJECTION, SOLUTION INTRATHECAL PRN
Status: DISCONTINUED | OUTPATIENT
Start: 2019-09-13 | End: 2019-09-13 | Stop reason: HOSPADM

## 2019-09-13 RX ORDER — TRIAMCINOLONE ACETONIDE 40 MG/ML
INJECTION, SUSPENSION INTRA-ARTICULAR; INTRAMUSCULAR PRN
Status: DISCONTINUED | OUTPATIENT
Start: 2019-09-13 | End: 2019-09-13 | Stop reason: HOSPADM

## 2019-09-13 NOTE — DISCHARGE INSTRUCTIONS
Home Care Instructions     Please fax or mail this form to the [facility] fax: [phone]. The mailing address is : [facility], [address], [place] [address]            Procedure:  Diagnostic Block (which includes medial branch blocks, SI joint injection blocks, and nerve root injections)    Activity:  The injection was used to identify the cause of your pain:    Resume normal activity  You are to engage in activity that would have normally caused you discomfort to determine the effectiveness of the block/injection.  It is imperative to evaluate and rate your pain the first 2 hours after the injection.     Pain:    You may experience soreness at the injection site for one or two days    You may use an ice pack for 20 minutes every 2 hours for the first 24 hours    You may use a heating pad after the first 24 hours    You may use Tylenol  (acetaminophen) every 4 hours or other pain medicines as directed by your physician after your block wears off.    Safety  You may experience numbness radiating into your legs or arms, (depending on the procedure location)  This numbness may last several hours.  Until the numb sensation returns to normal please use caution in walking, climbing stairs, stepping out of your vehicle, etc.    Please contact us if you have:  Severe pain   Fever more than 101.5 degrees Fahrenheit  Signs of infection (redness, swelling or drainage)       If you need immediate attention we recommend that you go to a hospital emergency room or dial 911.    _____ Please contact our office one week after your injection to report your percent of pain relief.   See attached One Week Procedure Injection Report  __X__ Please return your Nerve Block Pain Relief Form the day after your injection.     See attached Nerve Block Pain Relief Form     Please fax or mail this form to the [facility] fax: [phone]. Address [address], [place] [address].      (If you do not have access to a fax machine, return form by mail to the [facility])        ## PLEASE SEND NERVE BLOCK PAIN RELIEF REPORT WITH PATIENT ##

## 2019-09-13 NOTE — OP NOTE
CHIEF COMPLAINT:  Neck pain secondary to cervical spondylosis  INTERVAL HISTORY:     The history was discussed with the patient. I agree with above. Risks were discussed including risks of infection, bleeding, nerve injury, no help , or worse pain and they were willing to take these risks and proceed. They also understand this is a strictly diagnostic block.    PROCEDURE:  Cervical Medial branch blocks of the   Left C3, Left C4, Right C3, Right C4, and the bilateral third occipital nerves   utilizing fluoroscopic guidance with contrast dye.   PROCEDURE DETAILS: After written informed consent was obtained from the patient, the patient was escorted to the procedure room.  The patient was placed in the sitting position. A time out was conducted to verify the patient identity, procedure to be performed, side, site, allergies and any special requirements.  The skin over the neck was prepped and draped in normal sterile fashion. Fluoroscopy was used to identify the mid point of the articular pillar with a lateral view.   The skin was anesthetized with 0.5 mL of 1% lidocaine with bicarbonate buffer.  A 25-gauge 3.5 inch Quincke needle was advanced under fluoroscopic guidance.  <0.3 cc of Omnipaque contrast dye was injected without evidence of intrathecal or intravascular spread.  A- 0.5 mL solution of 4% lidocaine was injected at each articular pillar.  Then, the needle was removed.   The patient did not receive sedation during the procedure. The patient was monitored with blood pressure and pulse oximetry machines with the assistance of an RN throughout the procedure.  The patient was alert and responsive to questions throughout the procedure.   The patient tolerated the procedure well and was observed in the post-procedural area.  The patient was dismissed without apparent complications.     DIAGNOSIS:  1. Neck pain secondary to cervical spondylosis  PLAN:  1. Performed   medial branch blocks to treat the   facet joints  with 4% lidocaine for the 2nd diagnostic medial branch block.  If both blocks are diagnostically positive, I will proceed with radiofrequency neurolysis.  2. The patient will fax in injection report form to Ashley PALOMINO to make a diagnostic interpretation of the injection today.  I think it is important to also take into account that I had a long discussion with Spenser prior to the procedure today.  Apparently he had gone to a physical therapist here at [facility] and they had utilized the traction machine on his neck and that flared his pain up to the point that now he has significantly worse pain and a different type of pain than he had prior to the last medial branch block.  It may be very difficult for him to determine if the underlying facet mediated pain is gone versus the new acute pain that he has from the physical therapist.  I did let him know that he may be a situation where we would have to let his pain calm down in regards to the pain that was caused from the traction device and then bring him back and do a repeat medial branch block a third time with lidocaine in order to get a more clear picture of whether or not he has a facet syndrome.

## 2019-09-18 ENCOUNTER — THERAPY VISIT (OUTPATIENT)
Dept: PHYSICAL THERAPY | Facility: CLINIC | Age: 54
End: 2019-09-18
Payer: COMMERCIAL

## 2019-09-18 DIAGNOSIS — M54.2 CERVICAL PAIN: ICD-10-CM

## 2019-09-18 PROCEDURE — 97140 MANUAL THERAPY 1/> REGIONS: CPT | Mod: GP | Performed by: PHYSICAL THERAPIST

## 2019-09-18 PROCEDURE — 97110 THERAPEUTIC EXERCISES: CPT | Mod: GP | Performed by: PHYSICAL THERAPIST

## 2019-09-18 NOTE — PROGRESS NOTES
[facility]    CHIEF COMPLAINT:   Pain  -neck pain    INTERVAL HISTORY:  Last seen on 8/30/19.        Recommendations/plan at the last visit included:  1. Physical Therapy:  Yes, continue current plan  2. Clinical Health Psychologist:  NO    3. Diagnostic Studies:  None at this time  4. Medication Management:      Reduce Methadone to 10mg in and 7.5mg in afternoon and at HS    Continue Oxycodone, 240 capsules for 3 weeks    Continue Gabapentin at 900mg TID    Continue Cymbalta as recommended by PCP  5. Further procedures recommended: cervical medial branch blocks preceding to a RFA, keep 2nd block as scheduled.  6. Recommendations to PCP. See above      Follow up with this provider:  3 Weeks    Since his last visit, Joselito Abarca reports:    He had his second medial branch blocks bilateral C3, and C4 and bilateral 3rd occipital nerves with Dr. Driscoll on 9/13/2019. He believes that it helped. He will bring the diary in so the RFA can get scheduled.    He states that he had traction in PT and this caused an increase in his pain. He states that it caused swelling and increased pain. He states that it was starting to calm down but then he took his motorcycle for a ride and the pain returned.      Pain Information:   Pain quality: unbearable   Pain rating: intensity ranges from 7/10 to 10/10, and averages 9/10 on a 0-10 scale.   Pain today 7/10    SELF CARE:   How often do you practice SELF-CARE (relaxing, stretching, pacing, monitoring posture, taking mini-breaks) in a typical day:  Has been busy and started PT    Annual Controlled Substance Agreement: 7/16/2019  UDS: 6/28/2019    CURRENT RELEVANT PAIN MEDICATIONS:   Cymbalta 60mg daily  Clonazepam 0.5mg-takes 3-4/day  Gabapentin 300mg-takes 3 capsules three times a day  Methadone 10mg in AM and 7.5mg in afternoon and 7.5mg at bedtime  Oxycodone 5mg-takes 3 tabs 4 times a day-occasional days can only take 11 tabs    Patient is using the  medication as prescribed:  YES  Is your medication helpful? NO   Medication side effects? no side effect    Previous Medications: (H--helped; HI--Helped initially; SWH-- somewhat helpful, NH--No help; W--worse; SE--side effects)   Opiates: hydrocodone SE allergic, fever, sweaty, headache, methadone H, Oxycodone H  NSAIDS: Ibuprofen NH, Aleve NH  Anti-migraine mediations: Fiorinal H  Muscle Relaxants: Valium H  Neuropathics: Gabapentin H  Anti-depressants: none  Anxiolytics: Klonopin H  Topicals: Lidocaine H  Other medications not covered above: Tylenol NH    Past Pain Treatments:  Pain Clinic:   Yes, he was previously seen at [facility] and [facility]   PT: Yes, has done many times  Psychologist: No  Relaxation techniques/biofeedback: No  Chiropractor: Yes, feels that it made it worse  Acupuncture: Yes, just did one session  Pharmacotherapy:               Opioids: Yes                Non-opioids:    Yes   TENs Unit:Yes, aggravates the pain  Injections: Yes, bilateral occipital nerve blocks 07/12/2018, Cervical medial branch blocks 08/26/2015 and 5/27/2015. Has had low back pain injections at [facility]  Self-care:   Yes, hot tubs, ice packs, heating pads  Surgeries related to pain: Yes,  1. anterior cervical discectomy and fusion C6-7, exploration and revision C4-6 with hardwar removal 11/29/2017,   2. Left T1-T2 thoracic hemilaminectomy, microdiscectomy 02/21/2012,   3. Removal of C4 through C6 anterior cervical plate, Exploration of C4-C5 and C5-C6 anterior cervical fusion, C4-C5 and C5-C6 arthrodesis, C4 through C6 anterior cervical instrumentation, and Baker of right iliac hip graft.  03/15/2011  4. C4-C5 and C5-C6 anterior cervical discectomy and fusion with instrumentation and neural electrophysiologic monitoring 01/26/2010    [place] Board of Pharmacy Data Base Reviewed:    YES; As expected, no concern for misuse/abuse of controlled medications based on this report.    THE 4 As OF OPIOID MAINTENANCE ANALGESIA     Analgesia: Is pain relief clinically significant? NO   Activity: Is patient functional and able to perform Activities of Daily Living? YES   Adverse effects: Is patient free from adverse side effects from opiates? YES   Adherence to Rx protocol: Is patient adhering to Controlled Substance Agreement and taking medications ONLY as ordered? YES       Is Narcan prescribed for opiate use >50 MME daily? YES      Daily MME: 290    Medications:  Current Outpatient Medications   Medication Sig Dispense Refill     citalopram (CELEXA) 20 MG tablet Take 1 tablet (20 mg) by mouth daily 90 tablet 3     clonazePAM (KLONOPIN) 0.5 MG tablet Take 0.5-1 tablets (0.25-0.5 mg) by mouth 3 times daily as needed for anxiety 90 tablet 0     DULoxetine (CYMBALTA) 60 MG capsule Take 1 capsule (60 mg) by mouth daily 30 capsule 3     FLOVENT  MCG/ACT Inhaler INHALE 2 PUFFS INTO THE LUNGS TWICE DAILY 36 g 2     fluticasone (FLONASE) 50 MCG/ACT nasal spray SPRAY 2 SPRAYS IN EACH NOSTRIL EVERY DAY 16 g 5     gabapentin (NEURONTIN) 300 MG capsule TAKE TWO CAPSULES BY MOUTH THREE TIMES A  capsule 11     ipratropium - albuterol 0.5 mg/2.5 mg/3 mL (DUONEB) 0.5-2.5 (3) MG/3ML neb solution Take 1 vial (3 mLs) by nebulization every 4 hours as needed for shortness of breath / dyspnea 30 vial 0     methadone (DOLOPHINE) 10 MG tablet Take 10mg in AM. Okay to fill 8/30/2019. Start 9/1/2019. To last until 9/22/2019 21 tablet 0     methadone (DOLOPHINE) 5 MG tablet Take 1.5 tablets in the afternoon (8 hours after the 10mg morning dose) and 1.5 tablets at bedtime (8 hours after the afternoon dose). Okay to fill 8/30/2019. Start 9/1/2019. To last until 9/22/2019. 63 tablet 0     naloxone (NARCAN) 4 MG/0.1ML nasal spray Spray 1 spray (4 mg) into one nostril alternating nostrils as needed for opioid reversal every 2-3 minutes until assistance arrives 0.2 mL 1     nystatin (MYCOSTATIN) 051137 UNIT/ML suspension TAKE 5 MLS BY MOUTH FOUR TIMES A DAY  "280 mL 0     order for DME Equipment being ordered: Nebulizer mask and tubing 1 each 1     oxyCODONE (OXY-IR) 5 MG capsule 2 caps q 4 hour prn pain up to 12 per day. Okay to fill 8/30/2019. Start 9/1/2019. To last until 9/22/2019. 240 capsule 0     sildenafil (VIAGRA) 100 MG tablet Take 1 tablet (100 mg) by mouth daily as needed 30 min to 4 hrs before sex. Do not use with nitroglycerin, terazosin or doxazosin. 4 tablet 0     traZODone (DESYREL) 100 MG tablet Take 3 tablets (300 mg) by mouth At Bedtime 90 tablet 3     VENTOLIN  (90 Base) MCG/ACT inhaler INHALE 2 PUFFS INTO THE LUNGS EVERY 6 HOURS AS NEEDED FOR SHORTNESS OF BREATH, DIFFICULTY BREATHING OR WHEEZING. 18 g 3     vitamin D3 (CHOLECALCIFEROL) 2000 units tablet TAKE ONE TABLET BY MOUTH EVERY  tablet 3     zolpidem (AMBIEN) 10 MG tablet Take 10 mg by mouth nightly as needed        DULoxetine (CYMBALTA) 30 MG capsule Take 1 capsule (30 mg) by mouth daily for 7 days 7 capsule 0       Review of Systems: A 10-point review of systems was negative, with the exception of chronic pain issues, headache, weakness, stress and anxiety.      Social History: Reviewed; unchanged from previous consultation.      Family history: Reviewed; unchanged from previous consultation.     PHYSICAL EXAM:     Vitals:   /68   Temp 97.6  F (36.4  C) (Temporal)   Ht 1.702 m (5' 7\")   Wt 72.6 kg (160 lb)   BMI 25.06 kg/m    Body mass index is 25.06 kg/m .  5' 7\"  160 lbs 0 oz    Constitutional: healthy, alert and no distress  HEENT: Head atraumatic, normocephalic. Eyes without conjunctival injection or jaundice. Neck supple. No obvious neck masses.  Skin: No rash, lesions, or petechiae of exposed skin. Psychiatric/mental status: Alert, without lethargy or stupor. Appropriate affect. Mood normal.       DIAGNOSTIC TESTS:  Imaging Studies:   No new imaging to review today.    Assessment:  Joselito Abarca is a 54 year old male who presents today for follow up " regarding his:      1. Arthropathy of cervical facet joint  2. Midline thoracic back pain  3. Myofascial pain  4. Chronic pain syndrome  5. Chronic use of opioids      Will continue to work on a slow taper. With today's plan we will bring his MME down to 262.5-270 (he started at 330).He will bring me his pain diary so that we can get him scheduled for the medial branch blocks. The increased pain from the traction is likely musculoskeletal.  I recommend that he try biofreeze to this area.      Plan:    Diagnosis reviewed, treatment option addressed, and risk/benifits discussed.  Self-care instructions given.  I am recommending a multidisciplinary treatment plan to help this patient better manage pain.      1. Physical Therapy:  Yes, continue current plan  2. Clinical Health Psychologist:  NO    3. Diagnostic Studies:  None at this time  4. Medication Management:    1. Reduce Methadone to 7.5mg every 8 hours   2. Continue Oxycodone, 240 capsules for 3 weeks  3. Continue Gabapentin at 900mg TID  4. Continue Cymbalta as recommended by  PCP  5. Try Biofreeze to neck muscles-follow package instructions  5. Further procedures recommended: cervical RFA after I get his pain diary  6. Recommendations to PCP. See above      Follow up with this provider:  3 Weeks     Total time spent face to face was 12 minutes and more than 50% of face to face time was spent in counseling and/or coordination of care regarding the diagnosis and recommendations above.      Ashley Salgado PA-C   [facility]

## 2019-09-18 NOTE — PROGRESS NOTES
Subjective:  HPI                    Objective:  System    Physical Exam    General     ROS    Assessment/Plan:    DISCHARGE REPORT    Progress reporting period is from 8/28/19 to 9/18/19.       SUBJECTIVE  Patient reports from the traction he started getting right cervical pain and seemed like his neck swelled up and neck really stiffened up. Bending head to the right just feel blocked. Hard to tell if medial branch injection helped because his neck was sore.     Current Pain level: 8/10.     Initial Pain level: 3/10.   Changes in function:  None  Adverse reaction to treatment or activity: None    OBJECTIVE  Changes noted in objective findings:  None  Objective: 35 deg flex, 52 deg extension, right SB 16 deg, left SB 22 deg with a lot pain on right side of his neck, right rotation 46 deg left 47 deg. myotomes and dermatomes normal. signficant pain with palpation throughout right erector spinae and left Upper trapzius muscle. No change with repeated movements as all of them aggravated his symptoms. traction worsened symptoms and light stretch increased  pt symptoms. At this point pt symptoms are worsening with therapy.      ASSESSMENT/PLAN  Updated problem list and treatment plan: Diagnosis 1:  Chronic cervical spine pain  Pain -  home program  Decreased ROM/flexibility - home program  Decreased joint mobility - home program  Decreased function - home program  Impaired posture - home program  STG/LTGs have been met or progress has been made towards goals:  None  Assessment of Progress: The patient's condition has exacerbated.  Self Management Plans:  Patient has been instructed in a home treatment program.  I have re-evaluated this patient and find that the nature, scope, duration and intensity of the therapy is appropriate for the medical condition of the patient.  Joselito continues to require the following intervention to meet STG and LTG's:  followup with MD    Recommendations:  This patient would benefit from  further evaluation.    Please refer to the daily flowsheet for treatment today, total treatment time and time spent performing 1:1 timed codes.

## 2019-09-20 ENCOUNTER — OFFICE VISIT (OUTPATIENT)
Dept: PALLIATIVE MEDICINE | Facility: CLINIC | Age: 54
End: 2019-09-20
Payer: COMMERCIAL

## 2019-09-20 ENCOUNTER — TELEPHONE (OUTPATIENT)
Dept: FAMILY MEDICINE | Facility: CLINIC | Age: 54
End: 2019-09-20

## 2019-09-20 VITALS
HEIGHT: 67 IN | SYSTOLIC BLOOD PRESSURE: 110 MMHG | BODY MASS INDEX: 25.11 KG/M2 | DIASTOLIC BLOOD PRESSURE: 68 MMHG | WEIGHT: 160 LBS | TEMPERATURE: 97.6 F

## 2019-09-20 DIAGNOSIS — M54.6 CHRONIC MIDLINE THORACIC BACK PAIN: Primary | ICD-10-CM

## 2019-09-20 DIAGNOSIS — G89.29 CHRONIC MIDLINE THORACIC BACK PAIN: Primary | ICD-10-CM

## 2019-09-20 DIAGNOSIS — F11.90 CHRONIC, CONTINUOUS USE OF OPIOIDS: ICD-10-CM

## 2019-09-20 DIAGNOSIS — G89.4 CHRONIC PAIN SYNDROME: ICD-10-CM

## 2019-09-20 DIAGNOSIS — M47.812 ARTHROPATHY OF CERVICAL FACET JOINT: ICD-10-CM

## 2019-09-20 DIAGNOSIS — M79.18 MYOFASCIAL PAIN: ICD-10-CM

## 2019-09-20 PROCEDURE — 99213 OFFICE O/P EST LOW 20 MIN: CPT | Performed by: PHYSICIAN ASSISTANT

## 2019-09-20 RX ORDER — OXYCODONE HYDROCHLORIDE 5 MG/1
CAPSULE ORAL
Qty: 240 CAPSULE | Refills: 0 | Status: SHIPPED | OUTPATIENT
Start: 2019-09-20 | End: 2019-09-24

## 2019-09-20 RX ORDER — METHADONE HYDROCHLORIDE 5 MG/1
TABLET ORAL
Qty: 95 TABLET | Refills: 0 | Status: SHIPPED | OUTPATIENT
Start: 2019-09-20 | End: 2019-09-24

## 2019-09-20 ASSESSMENT — MIFFLIN-ST. JEOR: SCORE: 1534.39

## 2019-09-20 NOTE — TELEPHONE ENCOUNTER
Wanted to let you know we filled the Oxycodone that was written for qty 240 for only qty 165 and the Methadone 5mg tablets was written for qty 95 but dispensed as qty 57 per insurance. If we are going to fill the full quantities looks like it will need a prior auth.  Thank you,  Venus Collado CPhT  [facility]  [phone]

## 2019-09-20 NOTE — PATIENT INSTRUCTIONS
After Visit Instructions:     Thank you for coming to [facility] for your care. It is my goal to partner with you to help you reach your optimal state of health.     I am recommending multidisciplinary care at this time.  The focus of care will be to continue gradual rehabilitation and pain management with medication adjustments as needed.    Continue daily self-care, identifying contributing factors, and monitoring variations in pain level. Continue to integrate self-care into your life.        Schedule physical therapy assessment/visit: continue current plan    Schedule follow-up with Ashley Salgado PA-C in 3 weeks. You will need to make this appointment.     Procedures recommended: bring in your pain diary, then I can place the order for the radiofrequency ablation     Medication recommendations:     Methadone: decrease to 7.5mg every 8 hours    Oxycodone: continue 2 capsules every 4 hours. Max of 12/day.    Try Biofreeze to your neck-follow package instructions      Ashley Salgado PA-C  [facility]  [facility]    Contact information: [facility]  Clinic Number:  [phone]     Call with any questions about your care and for scheduling assistance.     Calls are returned Monday through Friday between 8 AM and 4:30 PM. We usually get back to you within 2 business days depending on the issue/request.    If we are prescribing your medications:    For opioid medication refills, call the clinic or send a 9GAG message 7 days in advance.  Please include:    Name of requested medication    Name of the pharmacy.    For non-opioid medications, call your pharmacy directly to request a refill. Please allow 3-4 days to be processed.     Per [place] State Law:    All controlled substance prescriptions must be filled within 30 days of being written.      For those controlled substances allowing refills, pickup must occur within 30 days of last fill.       We believe regular attendance is key to your success in our program!      Any time you are unable to keep your appointment we ask that you call us at least 24 hours in advance to cancel.This will allow us to offer the appointment time to another patient.   Multiple missed appointments may lead to dismissal from the clinic.

## 2019-09-20 NOTE — TELEPHONE ENCOUNTER
Please initiate prior authorization for Methadone and Oxycodone.    Ashley Salgado PA-C on 9/20/2019 at 4:15 PM

## 2019-09-23 ENCOUNTER — TELEPHONE (OUTPATIENT)
Dept: FAMILY MEDICINE | Facility: OTHER | Age: 54
End: 2019-09-23

## 2019-09-23 NOTE — TELEPHONE ENCOUNTER
Prior Authorization Retail Medication Request    Medication/Dose: methadone (DOLOPHINE) 5 MG   oxyCODONE (OXY-IR) 5 MG      ICD code (if different than what is on RX):  [M47.812]   Previously Tried and Failed:   Rationale:      Insurance Name:  [facility]  Insurance ID:  [id number]      Pharmacy Information (if different than what is on RX)    [facility]  [address] - [address] [place] [address]  Phone: [phone] Fax: [phone]

## 2019-09-23 NOTE — TELEPHONE ENCOUNTER
PA started in new encounter         Jackelyn Guevara [facility]  September 23, 2019

## 2019-09-24 ENCOUNTER — TELEPHONE (OUTPATIENT)
Dept: PALLIATIVE MEDICINE | Facility: OTHER | Age: 54
End: 2019-09-24

## 2019-09-24 DIAGNOSIS — M47.812 ARTHROPATHY OF CERVICAL FACET JOINT: Primary | ICD-10-CM

## 2019-09-24 RX ORDER — METHADONE HYDROCHLORIDE 5 MG/1
TABLET ORAL
Qty: 38 TABLET | Refills: 0 | Status: SHIPPED | OUTPATIENT
Start: 2019-09-24 | End: 2019-10-10

## 2019-09-24 RX ORDER — OXYCODONE HYDROCHLORIDE 5 MG/1
CAPSULE ORAL
Qty: 75 CAPSULE | Refills: 0 | Status: SHIPPED | OUTPATIENT
Start: 2019-09-24 | End: 2019-09-26

## 2019-09-24 NOTE — TELEPHONE ENCOUNTER
Prescription for remaining medications (#75 tabs of Oxycodone and #38 tabs of Methadone) sent to [facility].    Ashley Salgado PA-C on 9/24/2019 at 3:01 PM

## 2019-09-24 NOTE — TELEPHONE ENCOUNTER
Received pain diary. He reports 80% relief from 2nd MBB from 2:45-3:45 indicating a positive response. Will place order for cervical radiofrequency ablation.     Please call patient (after 3pm per his request) and let him know that I am aware that he was not given the full prescription as his insurance is requiring a prior authorization. We are working on that prior authorization. I can send a prescription for the rest of the quantities he needs. Which pharmacy would he like me to send those to?    Ashley Salgado PA-C on 9/24/2019 at 9:08 AM

## 2019-09-24 NOTE — TELEPHONE ENCOUNTER
Spoke to patient and informed him of resutls and provider's recommendations. He would ;like medicatin to be sent to [facility] [place].    He was very appreciative of the call.  Simona Escalona CMA

## 2019-09-25 ENCOUNTER — TELEPHONE (OUTPATIENT)
Dept: FAMILY MEDICINE | Facility: OTHER | Age: 54
End: 2019-09-25

## 2019-09-25 NOTE — TELEPHONE ENCOUNTER
Spoke to [facility] representative. She stated that the Pharmacy, on 9/20/19, should have gotten assistance from the help desk instead of reducing the qty. And prescribing a partial Rx. She is working on an override in order for the patient to be able to receive the remainder of his medication.  Simona Escalona, CMA

## 2019-09-25 NOTE — TELEPHONE ENCOUNTER
Prior Authorization Approval    Authorization Effective Date: 8/26/2019  Authorization Expiration Date: 12/24/2019  Medication: Methadone 5mg  Approved Dose/Quantity:    Reference #: [id number]   Insurance Company: [employer] - Phone [phone] Fax [phone]  Expected CoPay:       CoPay Card Available:      Foundation Assistance Needed:    Which Pharmacy is filling the prescription (Not needed for infusion/clinic administered): [facility] [place] [address] [place] [address]  Pharmacy Notified: Yes  Patient Notified: Yes **Instructed pharmacy to notify patient when script is ready to /ship.**

## 2019-09-25 NOTE — TELEPHONE ENCOUNTER
Central Prior Authorization Team   Phone: [phone]      PA Initiation    Medication: Methadone 5mg  Insurance Company: [employer] - Phone [phone] Fax [phone]  Pharmacy Filling the Rx: [facility] [place] - [address] [place] [address]  Filling Pharmacy Phone: [phone]  Filling Pharmacy Fax:    Start Date: 9/25/2019

## 2019-09-25 NOTE — TELEPHONE ENCOUNTER
Prior Authorization Not Needed per Insurance    Medication: oxyCODONE (OXY-IR) 5 MG  Insurance Company: [employer] - Phone [phone] Fax [phone]  Expected CoPay:      Pharmacy Filling the Rx: [facility] [place] - [address] [place] [address]  Pharmacy Notified:  Yes  Patient Notified:  Yes

## 2019-09-25 NOTE — TELEPHONE ENCOUNTER
[facility] has questions about the Oxy that was given to PT.  Can someone please call them back as they are waiting at the Pharmacy.  Capsules or Tablets?  [phone] Case ID # [id number]

## 2019-09-26 ENCOUNTER — TELEPHONE (OUTPATIENT)
Dept: SURGERY | Facility: CLINIC | Age: 54
End: 2019-09-26

## 2019-09-26 RX ORDER — OXYCODONE HYDROCHLORIDE 5 MG/1
CAPSULE ORAL
Qty: 75 CAPSULE | Refills: 0 | Status: SHIPPED | OUTPATIENT
Start: 2019-09-26 | End: 2019-10-10

## 2019-09-26 NOTE — TELEPHONE ENCOUNTER
Spoke to pharmacist. She recommended he wait until 10/2/19 to  remainder of medication. Doing so will reset the Rx's and everything will be able to start over with the next full medication prescriptions.  Simona Escalona, CMA

## 2019-09-26 NOTE — TELEPHONE ENCOUNTER
Per pharmacy request, new prescription for the remainder of his Oxycodone was sent to [facility].    Ashley Salgado PA-C on 9/26/2019 at 7:52 AM

## 2019-10-01 NOTE — TELEPHONE ENCOUNTER
Contacted patient to schedule RFA  Date:10/18/19  Time:8560  Dr. Driscoll    Instructed pt to have H&P and  for procedure.

## 2019-10-02 DIAGNOSIS — F41.1 GENERALIZED ANXIETY DISORDER: ICD-10-CM

## 2019-10-02 RX ORDER — CLONAZEPAM 0.5 MG/1
0.25-0.5 TABLET ORAL 3 TIMES DAILY PRN
Qty: 90 TABLET | Refills: 0 | Status: SHIPPED | OUTPATIENT
Start: 2019-10-02 | End: 2019-10-28

## 2019-10-02 NOTE — TELEPHONE ENCOUNTER
Routing refill request to provider for review/approval because:  Drug not on the [facility] refill protocol     Clonazepam  Last Written Prescription Date:  9/3/2019  Last Fill Quantity: 90,  # refills: 3   Last office visit: 7/19/2019 with prescribing provider:  Schoen Future Office Visit:   Next 5 appointments (look out 90 days)    Oct 10, 2019 11:30 AM CDT  Return Visit with Ashley Salgado PA-C  [facility] [address] [place] [id number] [phone]   Oct 15, 2019  3:50 PM CDT  Pre-Op physical with Gregory G Schoen, MD  [facility] ([address] [id number] [phone]           Requested Prescriptions   Pending Prescriptions Disp Refills     clonazePAM (KLONOPIN) 0.5 MG tablet 90 tablet 0     Sig: Take 0.5-1 tablets (0.25-0.5 mg) by mouth 3 times daily as needed for anxiety       There is no refill protocol information for this order        Jennifer Barber RN on 10/2/2019 at 2:13 PM

## 2019-10-09 NOTE — PROGRESS NOTES
[facility]    CHIEF COMPLAINT:   Pain  -neck pain    INTERVAL HISTORY:  Last seen on 9/20/19.        Recommendations/plan at the last visit included:  1. Physical Therapy:  Yes, continue current plan  2. Clinical Health Psychologist:  NO    3. Diagnostic Studies:  None at this time  4. Medication Management:    1. Reduce Methadone to 7.5mg every 8 hours   2. Continue Oxycodone, 240 capsules for 3 weeks  3. Continue Gabapentin at 900mg TID  4. Continue Cymbalta as recommended by PCP  5. Try Biofreeze to neck muscles-follow package instructions  5. Further procedures recommended: cervical RFA after I get his pain diary  6. Recommendations to PCP. See above      Follow up with this provider:  3 Weeks    Since his last visit, Joselito Abarca reports:    He is scheduled for his cervical radiofrequency with Dr. Driscoll on 10/18/2019.    He states that at the surgical site, he has had some pain. He states that he has had this since they did traction in PT.    He has been tapering off of the Gabapentin due to stomach pain. He is down to 5-6/day. He states that he previously had a stomach surgery and is going to try to slowly get back up to 900mg three times a day.      Pain Information:   Pain quality: miserable   Pain rating: intensity ranges from 2/10 to 10/10, and averages 7/10 on a 0-10 scale.   Pain today 9/10    SELF CARE:   How often do you practice SELF-CARE (relaxing, stretching, pacing, monitoring posture, taking mini-breaks) in a typical day:  Has been busy and started PT    Annual Controlled Substance Agreement: 7/16/2019  UDS: 6/28/2019    CURRENT RELEVANT PAIN MEDICATIONS:   Cymbalta 60mg daily  Clonazepam 0.5mg-takes 3-4/day  Gabapentin 300mg-takes 2 capsules three times a day  Methadone 7.5mg in AM and 7.5mg in afternoon and 7.5mg at bedtime  Oxycodone 5mg-takes 3 tabs 4 times a day-occasional days can only take 11 tabs    Patient is using the medication as prescribed:  YES  Is your  medication helpful? NO   Medication side effects? no side effect    Previous Medications: (H--helped; HI--Helped initially; SWH-- somewhat helpful, NH--No help; W--worse; SE--side effects)   Opiates: hydrocodone SE allergic, fever, sweaty, headache, methadone H, Oxycodone H  NSAIDS: Ibuprofen NH, Aleve NH  Anti-migraine mediations: Fiorinal H  Muscle Relaxants: Valium H  Neuropathics: Gabapentin H  Anti-depressants: none  Anxiolytics: Klonopin H  Topicals: Lidocaine H  Other medications not covered above: Tylenol NH    Past Pain Treatments:  Pain Clinic:   Yes, he was previously seen at [facility] and [facility]   PT: Yes, has done many times  Psychologist: No  Relaxation techniques/biofeedback: No  Chiropractor: Yes, feels that it made it worse  Acupuncture: Yes, just did one session  Pharmacotherapy:               Opioids: Yes                Non-opioids:    Yes   TENs Unit:Yes, aggravates the pain  Injections: Yes, bilateral occipital nerve blocks 07/12/2018, Cervical medial branch blocks 08/26/2015 and 5/27/2015. Has had low back pain injections at [facility]  Self-care:   Yes, hot tubs, ice packs, heating pads  Surgeries related to pain: Yes,  1. anterior cervical discectomy and fusion C6-7, exploration and revision C4-6 with hardwar removal 11/29/2017,   2. Left T1-T2 thoracic hemilaminectomy, microdiscectomy 02/21/2012,   3. Removal of C4 through C6 anterior cervical plate, Exploration of C4-C5 and C5-C6 anterior cervical fusion, C4-C5 and C5-C6 arthrodesis, C4 through C6 anterior cervical instrumentation, and [place] of right iliac hip graft.  03/15/2011  4. C4-C5 and C5-C6 anterior cervical discectomy and fusion with instrumentation and neural electrophysiologic monitoring 01/26/2010    [place] Board of Pharmacy Data Base Reviewed:    YES; As expected, no concern for misuse/abuse of controlled medications based on this report.    THE 4 As OF OPIOID MAINTENANCE ANALGESIA    Analgesia: Is pain relief clinically significant?  NO   Activity: Is patient functional and able to perform Activities of Daily Living? YES   Adverse effects: Is patient free from adverse side effects from opiates? YES   Adherence to Rx protocol: Is patient adhering to Controlled Substance Agreement and taking medications ONLY as ordered? YES       Is Narcan prescribed for opiate use >50 MME daily? YES      Daily MME: 290    Medications:  Current Outpatient Medications   Medication Sig Dispense Refill     citalopram (CELEXA) 20 MG tablet Take 1 tablet (20 mg) by mouth daily 90 tablet 3     clonazePAM (KLONOPIN) 0.5 MG tablet Take 0.5-1 tablets (0.25-0.5 mg) by mouth 3 times daily as needed for anxiety 90 tablet 0     DULoxetine (CYMBALTA) 60 MG capsule Take 1 capsule (60 mg) by mouth daily 30 capsule 3     FLOVENT  MCG/ACT Inhaler INHALE 2 PUFFS INTO THE LUNGS TWICE DAILY 36 g 2     fluticasone (FLONASE) 50 MCG/ACT nasal spray SPRAY 2 SPRAYS IN EACH NOSTRIL EVERY DAY 16 g 5     gabapentin (NEURONTIN) 300 MG capsule TAKE TWO CAPSULES BY MOUTH THREE TIMES A  capsule 11     ipratropium - albuterol 0.5 mg/2.5 mg/3 mL (DUONEB) 0.5-2.5 (3) MG/3ML neb solution Take 1 vial (3 mLs) by nebulization every 4 hours as needed for shortness of breath / dyspnea 30 vial 0     methadone (DOLOPHINE) 5 MG tablet Take 1.5 tablets every 8 hours. Fill 9/24/2019. To last until 10/13/2019. 38 tablet 0     nystatin (MYCOSTATIN) 389849 UNIT/ML suspension TAKE 5 MLS BY MOUTH FOUR TIMES A  mL 0     order for DME Equipment being ordered: Nebulizer mask and tubing 1 each 1     oxyCODONE (OXY-IR) 5 MG capsule 2 caps q 4 hour prn pain up to 12 per day. Okay to fill 9/27/2019. To last until 10/13/2019. 75 capsule 0     sildenafil (VIAGRA) 100 MG tablet Take 1 tablet (100 mg) by mouth daily as needed 30 min to 4 hrs before sex. Do not use with nitroglycerin, terazosin or doxazosin. 4 tablet 0     traZODone (DESYREL) 100 MG tablet Take 3 tablets (300 mg) by mouth At Bedtime 90  "tablet 3     VENTOLIN  (90 Base) MCG/ACT inhaler INHALE 2 PUFFS INTO THE LUNGS EVERY 6 HOURS AS NEEDED FOR SHORTNESS OF BREATH, DIFFICULTY BREATHING OR WHEEZING. 18 g 3     vitamin D3 (CHOLECALCIFEROL) 2000 units tablet TAKE ONE TABLET BY MOUTH EVERY  tablet 3     zolpidem (AMBIEN) 10 MG tablet Take 10 mg by mouth nightly as needed        DULoxetine (CYMBALTA) 30 MG capsule Take 1 capsule (30 mg) by mouth daily for 7 days 7 capsule 0     naloxone (NARCAN) 4 MG/0.1ML nasal spray Spray 1 spray (4 mg) into one nostril alternating nostrils as needed for opioid reversal every 2-3 minutes until assistance arrives (Patient not taking: Reported on 10/10/2019) 0.2 mL 1       Review of Systems: A 10-point review of systems was negative, with the exception of chronic pain issues, headache, dizziness, shortness of breath, anxiety, and stress.    Social History: Reviewed; unchanged from previous consultation.      Family history: Reviewed; unchanged from previous consultation.     PHYSICAL EXAM:     Vitals:   /72   Temp 97.1  F (36.2  C) (Temporal)   Ht 1.702 m (5' 7\")   Wt 74.6 kg (164 lb 8 oz)   BMI 25.76 kg/m    Body mass index is 25.76 kg/m .  5' 7\"  164 lbs 8 oz        Constitutional: healthy, alert and no distress  HEENT: Head atraumatic, normocephalic. Eyes without conjunctival injection or jaundice. Neck supple. No obvious neck masses.  Skin: No rash, lesions, or petechiae of exposed skin. Psychiatric/mental status: Alert, without lethargy or stupor. Appropriate affect. Mood normal.       DIAGNOSTIC TESTS:  Imaging Studies:   No new imaging to review today.    Assessment:  Joselito Abarca is a 54 year old male who presents today for follow up regarding his:      1. Arthropathy of cervical facet joint  2. cervicalgia  3. Midline thoracic back pain  4. Myofascial pain  5. Chronic pain syndrome  6. Chronic use of opioids      Will continue to work on a slow taper. With today's plan we will bring " his MME down to 255 (he started at 330).     He will keep his RFA as scheduled.     Given the increased pain at the surgical site after traction was done, we will get an xray to make sure that his previous hardware is still intact.       Plan:    Diagnosis reviewed, treatment option addressed, and risk/benifits discussed.  Self-care instructions given.  I am recommending a multidisciplinary treatment plan to help this patient better manage pain.      1. Physical Therapy:  Yes, continue current plan  2. Clinical Health Psychologist:  NO    3. Diagnostic Studies:  Xray cervical spine to look at hardware.  4. Medication Management:    1. Continue Methadone to 7.5mg every 8 hours   2. Taper Oxycodone to 5mg: take 3 capsules in AM, 2 capsules in mid afternoon, 2 capsules in evening and 3 capsules at bedtime, max of 10 capsules/day.  3. Continue Gabapentin at 600mg TID  4. Continue Cymbalta as recommended by PCP  5. Further procedures recommended: cervical RFA as scheduled  6. 6. Recommendations to PCP. See above      Follow up with this provider:  3 Weeks     Total time spent face to face was 11 minutes and more than 50% of face to face time was spent in counseling and/or coordination of care regarding the diagnosis and recommendations above.      Ashley Salgado PA-C   [facility]

## 2019-10-10 ENCOUNTER — OFFICE VISIT (OUTPATIENT)
Dept: PALLIATIVE MEDICINE | Facility: CLINIC | Age: 54
End: 2019-10-10
Payer: COMMERCIAL

## 2019-10-10 ENCOUNTER — TELEPHONE (OUTPATIENT)
Dept: FAMILY MEDICINE | Facility: CLINIC | Age: 54
End: 2019-10-10

## 2019-10-10 ENCOUNTER — HOSPITAL ENCOUNTER (OUTPATIENT)
Dept: GENERAL RADIOLOGY | Facility: CLINIC | Age: 54
End: 2019-10-10
Attending: PHYSICIAN ASSISTANT
Payer: COMMERCIAL

## 2019-10-10 VITALS
HEIGHT: 67 IN | TEMPERATURE: 97.1 F | BODY MASS INDEX: 25.82 KG/M2 | SYSTOLIC BLOOD PRESSURE: 108 MMHG | WEIGHT: 164.5 LBS | DIASTOLIC BLOOD PRESSURE: 72 MMHG

## 2019-10-10 DIAGNOSIS — F11.90 CHRONIC, CONTINUOUS USE OF OPIOIDS: ICD-10-CM

## 2019-10-10 DIAGNOSIS — M79.18 MYOFASCIAL PAIN: ICD-10-CM

## 2019-10-10 DIAGNOSIS — M47.812 ARTHROPATHY OF CERVICAL FACET JOINT: Primary | ICD-10-CM

## 2019-10-10 DIAGNOSIS — M54.2 CERVICALGIA: ICD-10-CM

## 2019-10-10 DIAGNOSIS — M54.6 CHRONIC MIDLINE THORACIC BACK PAIN: ICD-10-CM

## 2019-10-10 DIAGNOSIS — G89.4 CHRONIC PAIN SYNDROME: ICD-10-CM

## 2019-10-10 DIAGNOSIS — G89.29 CHRONIC MIDLINE THORACIC BACK PAIN: ICD-10-CM

## 2019-10-10 PROCEDURE — 99213 OFFICE O/P EST LOW 20 MIN: CPT | Performed by: PHYSICIAN ASSISTANT

## 2019-10-10 PROCEDURE — 72040 X-RAY EXAM NECK SPINE 2-3 VW: CPT | Mod: TC

## 2019-10-10 RX ORDER — METHADONE HYDROCHLORIDE 5 MG/1
TABLET ORAL
Qty: 95 TABLET | Refills: 0 | Status: SHIPPED | OUTPATIENT
Start: 2019-10-10 | End: 2019-10-17

## 2019-10-10 RX ORDER — OXYCODONE HYDROCHLORIDE 5 MG/1
CAPSULE ORAL
Qty: 210 CAPSULE | Refills: 0 | Status: SHIPPED | OUTPATIENT
Start: 2019-10-10 | End: 2019-11-19

## 2019-10-10 ASSESSMENT — PAIN SCALES - GENERAL: PAINLEVEL: EXTREME PAIN (9)

## 2019-10-10 ASSESSMENT — MIFFLIN-ST. JEOR: SCORE: 1554.8

## 2019-10-10 NOTE — PATIENT INSTRUCTIONS
After Visit Instructions:     Thank you for coming to [facility] for your care. It is my goal to partner with you to help you reach your optimal state of health.     I am recommending multidisciplinary care at this time.  The focus of care will be to continue gradual rehabilitation and pain management with medication adjustments as needed.    Continue daily self-care, identifying contributing factors, and monitoring variations in pain level. Continue to integrate self-care into your life.        Schedule follow-up with Ashley Salgado PA-C in 3 weeks. You will need to make this appointment.     Imaging: xray of the neck    Procedures recommended: keep radiofrequency ablation as scheduled     Medication recommendations:     Continue Methadone at 7.5mg three times a day    Reduce Oxycodone to 3 caps in AM, 2 capsules in mid afternoon, 2 capsules in evening and 3 capsules before bed; this will equal 10 capsules/day.      Ashley Salgado PA-C  [facility]    Contact information: [facility]  Clinic Number:  [phone]     Call with any questions about your care and for scheduling assistance.     Calls are returned Monday through Friday between 8 AM and 4:30 PM. We usually get back to you within 2 business days depending on the issue/request.    If we are prescribing your medications:    For opioid medication refills, call the clinic or send a TokBox message 7 days in advance.  Please include:    Name of requested medication    Name of the pharmacy.    For non-opioid medications, call your pharmacy directly to request a refill. Please allow 3-4 days to be processed.     Per [place] State Law:    All controlled substance prescriptions must be filled within 30 days of being written.      For those controlled substances allowing refills, pickup must occur within 30 days of last fill.      We believe regular attendance is key to your  success in our program!      Any time you are unable to keep your appointment we ask that you call us at least 24 hours in advance to cancel.This will allow us to offer the appointment time to another patient.   Multiple missed appointments may lead to dismissal from the clinic.

## 2019-10-10 NOTE — TELEPHONE ENCOUNTER
I wanted to let you know Spenser came to the pharmacy and dropped off his rx's to be filled today.  I did call and talk to insurance because they were unable to be processed with the quantities written.  The Oxycodone 5mg we could fill today for qty 120 and the Methadone we can fill qty 25 on the 17th.  When I talked to insurance they have limits of 360 tablets in 23 days for Oxycodone and 120 qty in 23 days for the Methadone.  They did also state a PA would not be granted to do larger qtys or days supply. Hope this information helps.  Thank you,  Venus Collado, Michael  [facility]  [phone]

## 2019-10-10 NOTE — Clinical Note
Patient with a high MME that I have been seeing and tapering. I cannot remember if I have sent you his information before.  Ashley

## 2019-10-14 ENCOUNTER — TELEPHONE (OUTPATIENT)
Dept: PALLIATIVE MEDICINE | Facility: CLINIC | Age: 54
End: 2019-10-14

## 2019-10-14 NOTE — TELEPHONE ENCOUNTER
Please call patient and let him know that his Xray looked good. No concerns for his surgical hardware were seen.     Ashley Salgado PA-C on 10/14/2019 at 1:34 PM

## 2019-10-15 ENCOUNTER — OFFICE VISIT (OUTPATIENT)
Dept: FAMILY MEDICINE | Facility: CLINIC | Age: 54
End: 2019-10-15
Payer: COMMERCIAL

## 2019-10-15 VITALS
HEART RATE: 80 BPM | DIASTOLIC BLOOD PRESSURE: 76 MMHG | OXYGEN SATURATION: 100 % | BODY MASS INDEX: 25.53 KG/M2 | WEIGHT: 163 LBS | TEMPERATURE: 96.7 F | RESPIRATION RATE: 16 BRPM | SYSTOLIC BLOOD PRESSURE: 116 MMHG

## 2019-10-15 DIAGNOSIS — M50.00 INTERVERTEBRAL DISC DISORDER OF CERVICAL REGION WITH MYELOPATHY: Primary | ICD-10-CM

## 2019-10-15 PROCEDURE — 99214 OFFICE O/P EST MOD 30 MIN: CPT | Performed by: FAMILY MEDICINE

## 2019-10-15 ASSESSMENT — PAIN SCALES - GENERAL: PAINLEVEL: NO PAIN (0)

## 2019-10-15 NOTE — TELEPHONE ENCOUNTER
Patient called returning call to go over results.      Christine Rodriguez    [facility]

## 2019-10-15 NOTE — PROGRESS NOTES
[address] [address]  [phone]  Dept: [phone]    PRE-OP EVALUATION:  Today's date: 10/15/2019    Joselito Abarca (: 1965) presents for pre-operative evaluation assessment as requested by Dr. Driscoll.  He requires evaluation and anesthesia risk assessment prior to undergoing surgery/procedure for treatment of neck .    Fax number for surgical facility:   Primary Physician: Schoen, Gregory G  Type of Anesthesia Anticipated: Monitor Anethesia Care    Patient has a Health Care Directive or Living Will:  YES     Preop Questions 10/15/2019   Who is doing your surgery? dr murray   What are you having done? neck abrasion   Date of Surgery/Procedure: [id number]   Facility or Hospital where procedure/surgery will be performed: [facility]   1.  Do you have a history of Heart attack, stroke, stent, coronary bypass surgery, or other heart surgery? No   2.  Do you ever have any pain or discomfort in your chest? No   3.  Do you have a history of  Heart Failure? No   4.   Are you troubled by shortness of breath when:  walking on a level surface, or up a slight hill, or at night? No   5.  Do you currently have a cold, bronchitis or other respiratory infection? No   6.  Do you have a cough, shortness of breath, or wheezing? No   7.  Do you sometimes get pains in the calves of your legs when you walk? No   8. Do you or anyone in your family have previous history of blood clots? No   9.  Do you or does anyone in your family have a serious bleeding problem such as prolonged bleeding following surgeries or cuts? No   10. Have you ever had problems with anemia or been told to take iron pills? No   11. Have you had any abnormal blood loss such as black, tarry or bloody stools? No   12. Have you ever had a blood transfusion? No   13. Have you or any of your relatives ever had problems with anesthesia? No   14. Do you have sleep apnea, excessive snoring or  daytime drowsiness? No   15. Do you have any prosthetic heart valves? No   16. Do you have prosthetic joints? YES - several neck surgeries         HPI:     HPI related to upcoming procedure: Chronic neck pain s/p surgical intervention including fusion of c4-5, 5-6  and 6-7 with disc insertion.  He continued to have pain and had good results from medial branch block x 2 and now is set up for RF ablation.     He notes there is confusion between his pain clinic provider and pharmacy about his medication refills. Unable to access [facility] website as our internet access is down today to confirm what he has been given.             MEDICAL HISTORY:     Patient Active Problem List    Diagnosis Date Noted     Back pain, chronic 02/21/2019     Priority: Medium     S/P laparoscopic fundoplication 02/21/2019     Priority: Medium     Long-segment Olson's esophagus 02/21/2019     Priority: Medium     Gastroesophageal reflux disease, esophagitis presence not specified 09/21/2018     Priority: Medium     Chronic seasonal allergic rhinitis due to fungal spores 06/07/2018     Priority: Medium     Status post cervical spinal arthrodesis 11/29/2017     Priority: Medium     Chronic, continuous use of opioids 12/13/2016     Priority: Medium     Patient is followed by Gregory G. Schoen, MD for ongoing prescription of pain medication.  All refills should be approved by this provider, or covering partner.    Medication(s): Oxycodone 5 mg IR: Take 2 capsules (10 mg) by mouth every 4 hours as needed 2 caps q 4 hour prn pain up to 12 per day .   Methadone 10 mg three times daily, 90 per month  Clonazepam 0.5 mg tid, 90 per month   Clinic visit frequency required: Q 3 months     Controlled substance agreement:  Encounter-Level CSA - 2/27/15:               Controlled Substance Agreement - Scan on 3/14/2015  8:47 AM : Controlled Medication Agreement-02/27/15 (below)            Pain Clinic evaluation in the past: Yes    DIRE Total Score(s):  No  flowsheet data found.    Last [facility] website verification:  10/30/2016     [email or web address]         Moderate persistent asthma without complication 11/02/2016     Priority: Medium     Acute respiratory failure with hypoxia (H) 04/29/2016     Priority: Medium     Nausea with vomiting 04/27/2016     Priority: Medium     Cough 03/06/2016     Priority: Medium     Tobacco dependence syndrome 02/17/2016     Priority: Medium     Leukocytosis 02/16/2016     Priority: Medium     Disease of bronchial airway (HCC) 02/12/2016     Priority: Medium     Bronchiolectasis (H) 02/12/2016     Priority: Medium     Degeneration of cervical intervertebral disc 01/26/2015     Priority: Medium     Health Care Home 09/30/2013     Priority: Medium     Status:  Accepted  Care Coordinator:    Melissa Behl BSN, RN, N  [facility] Care Coordinator  [phone]     See Letters for [facility] Care Plan  Date:  April 26, 2016            Persons encountering health services in other specified circumstances 09/30/2013     Priority: Medium     Overview:   Overview:   Status:  Accepted  Care Coordinator:    Melissa Behl BSN, RN, [facility] Care Coordinator  [phone]     See Letters for [facility] Care Plan  Date:  April 26, 2016       Arthrodesis status 06/15/2011     Priority: Medium     Neck pain 06/15/2011     Priority: Medium     History of arthrodesis 06/15/2011     Priority: Medium     CARDIOVASCULAR SCREENING; LDL GOAL LESS THAN 160 10/31/2010     Priority: Medium     Encounter for screening for cardiovascular disorders 10/31/2010     Priority: Medium     Intervertebral cervical disc disorder with myelopathy, cervical region 11/12/2009     Priority: Medium     Patient is followed by TIARA JIMENEZ for ongoing prescription of narcotic pain medicine.  Med: methadone 10 mg tid. Taking oxycodone up to 8 per day, 120 per month  Maximum use per month: 90  Expected duration: ongoing  Narcotic agreement on file: YES  Clinic visit  recommended: Q 3 months         Intervertebral disc disorder of cervical region with myelopathy 11/12/2009     Priority: Medium     Overview:   Overview:   Patient is followed by TIARA JIMENEZ for ongoing prescription of narcotic pain medicine.  Med: methadone 10 mg tid. Taking oxycodone up to 8 per day, 120 per month  Maximum use per month: 90  Expected duration: ongoing  Narcotic agreement on file: YES  Clinic visit recommended: Q 3 months       Constipation 03/19/2008     Priority: Medium     Problem list name updated by automated process. Provider to review       Thoracic or lumbosacral neuritis or radiculitis, unspecified 03/19/2008     Priority: Medium     Esophageal reflux 07/09/2003     Priority: Medium     Generalized anxiety disorder 07/08/2003     Priority: Medium     Alcohol abuse, in remission 07/08/2003     Priority: Medium     Nondependent alcohol abuse, in remission 07/08/2003     Priority: Medium      Past Medical History:   Diagnosis Date     Allergic rhinitis      Anxiety      GERD (gastroesophageal reflux disease)      Neck pain 6/15/2011     Pneumonia      Uncomplicated asthma      Past Surgical History:   Procedure Laterality Date     BRONCHOSCOPY (RIGID OR FLEXIBLE), DIAGNOSTIC N/A 8/7/2018    Procedure: COMBINED BRONCHOSCOPY (RIGID OR FLEXIBLE), LAVAGE;  Bronchoscopy with Lavage and Transbronchial Biopsy;  Surgeon: Yung Miranda MD;  [facility]     COLONOSCOPY WITH CO2 INSUFFLATION N/A 9/24/2018    Procedure: COLONOSCOPY WITH CO2 INSUFFLATION;  Colon and upper endoscopy ;  Surgeon: Devin Pelayo MD;  Location:  OR     COMBINED ESOPHAGOSCOPY, GASTROSCOPY, DUODENOSCOPY (EGD) WITH CO2 INSUFFLATION N/A 9/24/2018    Procedure: COMBINED ESOPHAGOSCOPY, GASTROSCOPY, DUODENOSCOPY (EGD) WITH CO2 INSUFFLATION;  Colon and upper endoscopy ;  Surgeon: Devin Pelayo MD;  Location:  OR     DISCECTOMY LUMBAR POSTERIOR MICROSCOPIC ONE LEVEL  2/21/2012     Procedure:DISCECTOMY LUMBAR POSTERIOR MICROSCOPIC ONE LEVEL; LEFT T1-T2 THORASIC HEMILAMINECTOMY MICRODISCECTOMY WITH MEXTRIX II ; Surgeon:SHARON PURI; Location:[facility]     DISCECTOMY, FUSION CERVICAL ANTERIOR ONE LEVEL, COMBINED N/A 11/29/2017    Procedure: COMBINED DISCECTOMY, FUSION CERVICAL ANTERIOR ONE LEVEL;  Anterior Cervical Discectomy and Fusion Cervical Six - Cervical Seven, Exploration and Revision Cervical Four - Cervical Six with Hardware Removal;  Surgeon: Nikolas Vasques MD;  Location: [facility]     ESOPHAGEAL IMPEDENCE FUNCTION TEST WITH 24 HOUR PH GREATER THAN 1 HOUR N/A 12/12/2018    Procedure: ESOPHAGEAL IMPEDENCE FUNCTION TEST WITH 24 HOUR PH GREATER THAN 1 HOUR;  Surgeon: Atif Oconnor MD;  [facility]     ESOPHAGOSCOPY, GASTROSCOPY, DUODENOSCOPY (EGD), COMBINED N/A 9/24/2018    Procedure: COMBINED ESOPHAGOSCOPY, GASTROSCOPY, DUODENOSCOPY (EGD), BIOPSY SINGLE OR MULTIPLE;;  Surgeon: Devin Pelayo MD;  Location: [facility]     EXPLORE SPINE, REMOVE HARDWARE, COMBINED N/A 11/29/2017    Procedure: COMBINED EXPLORE SPINE, REMOVE HARDWARE;;  Surgeon: Nikolas Vasques MD;  Location[facility]     FUSION CERVICAL ANTERIOR TWO LEVELS  1/29/2010     HC DRAIN/INJ MAJOR JOINT/BURSA W/O US  5/5/2008    Left sacroiliac joint injection.     HC INJ EPIDURAL LUMBAR/SACRAL W/WO CONTRAST  2009     HEAD & NECK SURGERY      2013     HERNIA REPAIR       INJECT BLOCK MEDIAL BRANCH CERVICAL/THORACIC/LUMBAR Bilateral 8/26/2015    Procedure: INJECT BLOCK MEDIAL BRANCH CERVICAL / THORACIC / LUMBAR;  Surgeon: Ronald Driscoll MD;  [facility]     INJECT BLOCK MEDIAL BRANCH CERVICAL/THORACIC/LUMBAR N/A 8/8/2019    Procedure: BLOCK, NERVE, FACET JOINT, MEDIAL BRANCH, DIAGNOSTIC Bilateral Cervical 2,3,4;  Surgeon: Ronald Driscoll MD;  [facility]     INJECT BLOCK MEDIAL BRANCH CERVICAL/THORACIC/LUMBAR N/A 9/13/2019    Procedure: Medial Branch Block Bilateral Cervical 2,3, and 4;  Surgeon:  Ronald Driscoll MD;  [facility]     INJECT FACET JOINT Bilateral 5/27/2015    Procedure: INJECT FACET JOINT;  Surgeon: Ronald Driscoll MD;  [facility]     NERVE BLOCK OCCIPITAL Bilateral 7/12/2018    Procedure: NERVE BLOCK OCCIPITAL;  bilateral occipital nerve blocks;  Surgeon: Ronald Driscoll MD;  [facility]: [facility]     PROCEDURE PLACEHOLDER GENERAL N/A 02/21/2019    Kellen Ward at [facility] [place] [facility]     Current Outpatient Medications   Medication Sig Dispense Refill     citalopram (CELEXA) 20 MG tablet Take 1 tablet (20 mg) by mouth daily 90 tablet 3     clonazePAM (KLONOPIN) 0.5 MG tablet Take 0.5-1 tablets (0.25-0.5 mg) by mouth 3 times daily as needed for anxiety 90 tablet 0     DULoxetine (CYMBALTA) 60 MG capsule Take 1 capsule (60 mg) by mouth daily 30 capsule 3     FLOVENT  MCG/ACT Inhaler INHALE 2 PUFFS INTO THE LUNGS TWICE DAILY 36 g 2     fluticasone (FLONASE) 50 MCG/ACT nasal spray SPRAY 2 SPRAYS IN EACH NOSTRIL EVERY DAY 16 g 5     gabapentin (NEURONTIN) 300 MG capsule TAKE TWO CAPSULES BY MOUTH THREE TIMES A  capsule 11     ipratropium - albuterol 0.5 mg/2.5 mg/3 mL (DUONEB) 0.5-2.5 (3) MG/3ML neb solution Take 1 vial (3 mLs) by nebulization every 4 hours as needed for shortness of breath / dyspnea 30 vial 0     methadone (DOLOPHINE) 5 MG tablet Take 1.5 tablets every 8 hours. Fill 10/10/2019. Start 10/13/2019. 21 day script. 95 tablet 0     naloxone (NARCAN) 4 MG/0.1ML nasal spray Spray 1 spray (4 mg) into one nostril alternating nostrils as needed for opioid reversal every 2-3 minutes until assistance arrives 0.2 mL 1     nystatin (MYCOSTATIN) 528968 UNIT/ML suspension TAKE 5 MLS BY MOUTH FOUR TIMES A  mL 0     order for DME Equipment being ordered: Nebulizer mask and tubing 1 each 1     oxyCODONE (OXY-IR) 5 MG capsule 2 caps q 6 hour prn pain up to 10 per day. Fill 10/10/2019. Start 10/13/19. 21 day script. 210 capsule 0     sildenafil (VIAGRA) 100 MG tablet Take 1 tablet (100 mg) by  mouth daily as needed 30 min to 4 hrs before sex. Do not use with nitroglycerin, terazosin or doxazosin. 4 tablet 0     traZODone (DESYREL) 100 MG tablet Take 3 tablets (300 mg) by mouth At Bedtime 90 tablet 3     VENTOLIN  (90 Base) MCG/ACT inhaler INHALE 2 PUFFS INTO THE LUNGS EVERY 6 HOURS AS NEEDED FOR SHORTNESS OF BREATH, DIFFICULTY BREATHING OR WHEEZING. 18 g 3     vitamin D3 (CHOLECALCIFEROL) 2000 units tablet TAKE ONE TABLET BY MOUTH EVERY  tablet 3     zolpidem (AMBIEN) 10 MG tablet Take 10 mg by mouth nightly as needed        DULoxetine (CYMBALTA) 30 MG capsule Take 1 capsule (30 mg) by mouth daily for 7 days 7 capsule 0     OTC products: excedrin for tension headaches daily     Allergies   Allergen Reactions     Vicodin [Hydrocodone-Acetaminophen] Nausea     Other reaction(s): Diaphoresis, Vomiting  HEADACHE      Latex Allergy: NO    Social History     Tobacco Use     Smoking status: Former Smoker     Packs/day: 1.00     Years: 30.00     Pack years: 30.00     Types: Cigars     Last attempt to quit: 2009     Years since quittin.8     Smokeless tobacco: Former User     Quit date:    Substance Use Topics     Alcohol use: No     Alcohol/week: 0.0 standard drinks     Comment: HX OF ABUSE-IN REMISSION     History   Drug Use No       REVIEW OF SYSTEMS:   Constitutional, neuro, ENT, endocrine, pulmonary, cardiac, gastrointestinal, genitourinary, musculoskeletal, integument and psychiatric systems are negative, except as otherwise noted.    EXAM:   /76   Pulse 80   Temp 96.7  F (35.9  C) (Temporal)   Resp 16   Wt 73.9 kg (163 lb)   SpO2 100%   BMI 25.53 kg/m      GENERAL APPEARANCE: healthy, alert and no distress     EYES: EOMI,  PERRL     HENT: ear canals and TM's normal and nose and mouth without ulcers or lesions     NECK: no adenopathy, no asymmetry, masses, or scars and thyroid normal to palpation     NECK: rigid posture from prior fusion/surgery.      RESP: lungs clear  to auscultation - no rales, rhonchi or wheezes     CV: regular rates and rhythm, normal S1 S2, no S3 or S4 and no murmur, click or rub     ABDOMEN:  soft, nontender, no HSM or masses and bowel sounds normal     MS: extremities normal- no gross deformities noted, no evidence of inflammation in joints, FROM in all extremities.     SKIN: no suspicious lesions or rashes     NEURO: Normal strength and tone, sensory exam grossly normal, mentation intact and speech normal     PSYCH: mentation appears normal. and affect normal/bright     LYMPHATICS: No cervical adenopathy    DIAGNOSTICS:   No labs or EKG required for low risk surgery (cataract, skin procedure, breast biopsy, etc)    Recent Labs   Lab Test 07/19/19  1629 02/22/19 02/15/19  1944 02/04/19  1714 07/27/18  1448  03/15/17  2220   HGB  --   --  12.9* 13.3 13.6   < > 14.4   PLT  --   --  169 206 247   < > 150   INR  --   --  0.94  --  0.88  --   --      --  134 138  --    < > 136   POTASSIUM 3.6 4.1 3.5 4.2  --    < > 2.7*   CR 1.03 1.02 0.90 1.01  --    < > 0.94   A1C  --   --   --   --   --   --  5.6    < > = values in this interval not displayed.        IMPRESSION:   Reason for surgery/procedure: chronic cervicalgia and headaches with excellent response to medial branch blocks, appropriate for ablation.   Diagnosis/reason for consult: Assessment for tolerance of anesthesia and procedure.     The proposed surgical procedure is considered LOW risk.    REVISED CARDIAC RISK INDEX  The patient has the following serious cardiovascular risks for perioperative complications such as (MI, PE, VFib and 3  AV Block):  No serious cardiac risks  INTERPRETATION: 0 risks: Class I (very low risk - 0.4% complication rate)    The patient has the following additional risks for perioperative complications:  No identified additional risks    No diagnosis found.    RECOMMENDATIONS:         --Patient is to take all scheduled medications on the day of surgery that he normally  takes but at least 4 hours prior to procedure with sips of water.   APPROVAL GIVEN to proceed with proposed procedure, without further diagnostic evaluation       Signed Electronically by: Gregory G. Schoen, MD    Copy of this evaluation report is provided to requesting physician.    [facility] Preop Guidelines    Revised Cardiac Risk Index

## 2019-10-16 NOTE — TELEPHONE ENCOUNTER
I will not increase his Methadone dose. We are working on tapering down. We could change him from every 8 hours to every 12 hours and he could take 10mg in AM and 10mg at bedtime. If we do that, then we can do the 10mg tablets.     We can change the Oxycodone to 10mg tablets, his instructions would be 1 tablet every 6 hours as needed.     Ashley Salgado PA-C on 10/16/2019 at 3:24 PM

## 2019-10-16 NOTE — TELEPHONE ENCOUNTER
Patient calling to explain that he has spoke to the pharmacy and his insurance company. They told him that they go by the pill qty. And do not take into consideration the dosage of each pill. He was wondering if he could change the morphine from 5mg to 10mg in order to get enough pills to last more than a couple weeks.  Please advise.  Simona Escalona, CMA

## 2019-10-17 ENCOUNTER — TELEPHONE (OUTPATIENT)
Dept: FAMILY MEDICINE | Facility: OTHER | Age: 54
End: 2019-10-17

## 2019-10-17 DIAGNOSIS — M47.812 ARTHROPATHY OF CERVICAL FACET JOINT: ICD-10-CM

## 2019-10-17 RX ORDER — METHADONE HYDROCHLORIDE 10 MG/1
TABLET ORAL
Qty: 28 TABLET | Refills: 0 | Status: SHIPPED | OUTPATIENT
Start: 2019-10-17 | End: 2019-10-22

## 2019-10-17 NOTE — TELEPHONE ENCOUNTER
Pt would like a script for Methadone 10mg, 1 tablet every 12 hours, 1 in the am and 1 in the pm. He would prefer this to the 5mg tablets. He has been out for the last couple days and wondering if he could get the script right away.    On behalf of [facility]  Thank You~  Emily Murrieta [facility]

## 2019-10-18 ENCOUNTER — HOSPITAL ENCOUNTER (OUTPATIENT)
Facility: CLINIC | Age: 54
Discharge: HOME | End: 2019-10-18
Attending: ANESTHESIOLOGY | Admitting: ANESTHESIOLOGY
Payer: COMMERCIAL

## 2019-10-18 ENCOUNTER — ANESTHESIA (OUTPATIENT)
Dept: SURGERY | Facility: CLINIC | Age: 54
End: 2019-10-18
Payer: COMMERCIAL

## 2019-10-18 ENCOUNTER — ANESTHESIA EVENT (OUTPATIENT)
Dept: SURGERY | Facility: CLINIC | Age: 54
End: 2019-10-18
Payer: COMMERCIAL

## 2019-10-18 ENCOUNTER — HOSPITAL ENCOUNTER (OUTPATIENT)
Dept: GENERAL RADIOLOGY | Facility: CLINIC | Age: 54
End: 2019-10-18
Attending: ANESTHESIOLOGY | Admitting: ANESTHESIOLOGY
Payer: COMMERCIAL

## 2019-10-18 VITALS
TEMPERATURE: 98 F | SYSTOLIC BLOOD PRESSURE: 119 MMHG | HEART RATE: 80 BPM | OXYGEN SATURATION: 98 % | RESPIRATION RATE: 16 BRPM | DIASTOLIC BLOOD PRESSURE: 83 MMHG

## 2019-10-18 DIAGNOSIS — M47.812 ARTHROPATHY OF CERVICAL FACET JOINT: ICD-10-CM

## 2019-10-18 PROCEDURE — 25800030 ZZH RX IP 258 OP 636: Performed by: NURSE ANESTHETIST, CERTIFIED REGISTERED

## 2019-10-18 PROCEDURE — 37000009 ZZH ANESTHESIA TECHNICAL FEE, EACH ADDTL 15 MIN: Performed by: ANESTHESIOLOGY

## 2019-10-18 PROCEDURE — 25000125 ZZHC RX 250: Performed by: NURSE ANESTHETIST, CERTIFIED REGISTERED

## 2019-10-18 PROCEDURE — 36000046 ZZH SURGERY LEVEL 1 EA 15 ADDTL MIN: Performed by: ANESTHESIOLOGY

## 2019-10-18 PROCEDURE — 37000008 ZZH ANESTHESIA TECHNICAL FEE, 1ST 30 MIN: Performed by: ANESTHESIOLOGY

## 2019-10-18 PROCEDURE — 25000125 ZZHC RX 250: Performed by: ANESTHESIOLOGY

## 2019-10-18 PROCEDURE — 40000277 XR SURGERY CARM FLUORO LESS THAN 5 MIN W STILLS: Mod: TC

## 2019-10-18 PROCEDURE — 25000128 H RX IP 250 OP 636: Performed by: NURSE ANESTHETIST, CERTIFIED REGISTERED

## 2019-10-18 PROCEDURE — 64634 DESTROY C/TH FACET JNT ADDL: CPT | Mod: RT | Performed by: ANESTHESIOLOGY

## 2019-10-18 PROCEDURE — 36000048 ZZH SURGERY LEVEL 1 W FLUORO 1ST 30 MIN: Performed by: ANESTHESIOLOGY

## 2019-10-18 PROCEDURE — 64633 DESTROY CERV/THOR FACET JNT: CPT | Mod: RT | Performed by: ANESTHESIOLOGY

## 2019-10-18 PROCEDURE — 71000027 ZZH RECOVERY PHASE 2 EACH 15 MINS: Performed by: ANESTHESIOLOGY

## 2019-10-18 PROCEDURE — 25000128 H RX IP 250 OP 636: Performed by: ANESTHESIOLOGY

## 2019-10-18 PROCEDURE — 40000305 ZZH STATISTIC PRE PROC ASSESS I: Performed by: ANESTHESIOLOGY

## 2019-10-18 RX ORDER — LIDOCAINE HYDROCHLORIDE 20 MG/ML
INJECTION, SOLUTION INFILTRATION; PERINEURAL PRN
Status: DISCONTINUED | OUTPATIENT
Start: 2019-10-18 | End: 2019-10-18

## 2019-10-18 RX ORDER — KETOROLAC TROMETHAMINE 30 MG/ML
30 INJECTION, SOLUTION INTRAMUSCULAR; INTRAVENOUS ONCE
Status: COMPLETED | OUTPATIENT
Start: 2019-10-18 | End: 2019-10-18

## 2019-10-18 RX ORDER — LIDOCAINE 40 MG/G
CREAM TOPICAL
Status: DISCONTINUED | OUTPATIENT
Start: 2019-10-18 | End: 2019-10-18 | Stop reason: HOSPADM

## 2019-10-18 RX ORDER — SODIUM CHLORIDE, SODIUM LACTATE, POTASSIUM CHLORIDE, CALCIUM CHLORIDE 600; 310; 30; 20 MG/100ML; MG/100ML; MG/100ML; MG/100ML
INJECTION, SOLUTION INTRAVENOUS CONTINUOUS
Status: DISCONTINUED | OUTPATIENT
Start: 2019-10-18 | End: 2019-10-18 | Stop reason: HOSPADM

## 2019-10-18 RX ORDER — PROPOFOL 10 MG/ML
INJECTION, EMULSION INTRAVENOUS PRN
Status: DISCONTINUED | OUTPATIENT
Start: 2019-10-18 | End: 2019-10-18

## 2019-10-18 RX ORDER — LIDOCAINE HYDROCHLORIDE 20 MG/ML
INJECTION, SOLUTION INFILTRATION; PERINEURAL PRN
Status: DISCONTINUED | OUTPATIENT
Start: 2019-10-18 | End: 2019-10-18 | Stop reason: HOSPADM

## 2019-10-18 RX ADMIN — MIDAZOLAM 1 MG: 1 INJECTION INTRAMUSCULAR; INTRAVENOUS at 13:52

## 2019-10-18 RX ADMIN — PROPOFOL 10 MG: 10 INJECTION, EMULSION INTRAVENOUS at 13:54

## 2019-10-18 RX ADMIN — KETOROLAC TROMETHAMINE 30 MG: 30 INJECTION, SOLUTION INTRAMUSCULAR at 14:52

## 2019-10-18 RX ADMIN — LIDOCAINE HYDROCHLORIDE 2 ML: 20 INJECTION, SOLUTION INFILTRATION; PERINEURAL at 13:54

## 2019-10-18 RX ADMIN — LIDOCAINE HYDROCHLORIDE 1 ML: 10 INJECTION, SOLUTION EPIDURAL; INFILTRATION; INTRACAUDAL; PERINEURAL at 13:13

## 2019-10-18 RX ADMIN — SODIUM CHLORIDE, POTASSIUM CHLORIDE, SODIUM LACTATE AND CALCIUM CHLORIDE: 600; 310; 30; 20 INJECTION, SOLUTION INTRAVENOUS at 13:47

## 2019-10-18 ASSESSMENT — LIFESTYLE VARIABLES: TOBACCO_USE: 1

## 2019-10-18 NOTE — DISCHARGE INSTRUCTIONS
"Home Care Instructions                Procedure: Radiofrequency ablation    Activity:    Rest today    Do not work today    Resume normal activity tomorrow    Pain:    You may experience soreness at the injection site for 1 to 4 weeks.  Most patients described the postoperative pain as a \"sunburn feeling\".  This will go away over 1 to 4 weeks.    You may use an ice pack for 20 minutes every 2 hours for the first 24 hours    You may use a heating pad after the first 24 hours    You may use Tylenol  (acetaminophen) every 4 hours or other pain medicines as directed by your physician    Safety  Sedation medicine, if given may remain active for many hours.    It is important for the next 24 hours that you do not:    Drive a car    Operate machines or power tools    Consume alcohol, including beer    Sign any important papers or legal documents    You may experience numbness radiating into your legs or arms, (depending on the procedure location)  This numbness may last several hours.  Until the numb sensation returns to normal please use caution in walking, climbing stairs, stepping out of your vehicle, etc.    Common side effects of steroids:  Not everyone will experience corticosteroid side effects. If side effects are experienced they will gradually subside in the 7-10 day period following an injection.    Most common side effects include:    Flushed face and/or chest    Feeling of warmth, particularly in face but could be overall feeling of warmth    Increased blood sugar in diabetic patients    Menstrual irregularities may occur.  If taking hormone based birth control an alternate method of birth control is recommended    Sleep disturbances and/or mood swings are possible    Leg cramps    Please contact us if you have:  Severe pain   Fever more than 101.5 degrees Fahrenheit  Signs of infection (redness, swelling or drainage)      If you have questions during normal business hours (8am-5pm Monday-Friday) contact the " [facility] at [phone]. If you need help after hours, we recommend that you go to a hospital emergency room or dial 911.

## 2019-10-18 NOTE — ANESTHESIA CARE TRANSFER NOTE
Patient: Joselito Abarca    Procedure(s):  CERVICAL RADIOFREQUENCY ABLATION  Cervical 2,3,4    Diagnosis: Arthropathy of cervical facet joint [M47.812]  Diagnosis Additional Information: No value filed.    Anesthesia Type:   MAC     Note:  Airway :Room Air  Patient transferred to:Phase II  Handoff Report: Identifed the Patient, Identified the Reponsible Provider, Reviewed the pertinent medical history, Discussed the surgical course, Reviewed Intra-OP anesthesia mangement and issues during anesthesia, Set expectations for post-procedure period and Allowed opportunity for questions and acknowledgement of understanding      Vitals: (Last set prior to Anesthesia Care Transfer)    CRNA VITALS  10/18/2019 1405 - 10/18/2019 1435      10/18/2019             SpO2:  97 %    Resp Rate (observed):  (!) 4                Electronically Signed By: CORA Jin CRNA  October 18, 2019  2:35 PM

## 2019-10-18 NOTE — ANESTHESIA POSTPROCEDURE EVALUATION
Patient: Joselito Abarca    Procedure(s):  CERVICAL RADIOFREQUENCY ABLATION  Cervical 2,3,4    Diagnosis:Arthropathy of cervical facet joint [M47.812]  Diagnosis Additional Information: No value filed.    Anesthesia Type:  MAC    Note:  Anesthesia Post Evaluation    Patient location during evaluation: Phase 2  Patient participation: Able to fully participate in evaluation  Level of consciousness: awake  Pain management: adequate  Airway patency: patent  Cardiovascular status: acceptable and hemodynamically stable  Respiratory status: acceptable, room air and nonlabored ventilation  Hydration status: stable  PONV: none     Anesthetic complications: None    Comments: Patient was happy with the anesthesia care received and no anesthesia related complications were noted.  I will follow up with the patient again if it is needed.        Last vitals:  Vitals:    10/18/19 1435 10/18/19 1440 10/18/19 1450   BP: (!) 120/91 114/84 119/83   Pulse:  80 80   Resp: 16     Temp:      SpO2:  98%          Electronically Signed By: CORA Jin CRNA  October 18, 2019  3:24 PM

## 2019-10-18 NOTE — ANESTHESIA PREPROCEDURE EVALUATION
Anesthesia Pre-Procedure Evaluation    Patient: Joselito Abarca   MRN: [id number] : 1965          Preoperative Diagnosis: Arthropathy of cervical facet joint [M47.812]    Procedure(s):  CERVICAL RADIOFREQUENCY ABLATION BILATERAL Cervical 2,3,4    Past Medical History:   Diagnosis Date     Allergic rhinitis      Anxiety      GERD (gastroesophageal reflux disease)      Neck pain 6/15/2011     Pneumonia      Uncomplicated asthma      Past Surgical History:   Procedure Laterality Date     BRONCHOSCOPY (RIGID OR FLEXIBLE), DIAGNOSTIC N/A 2018    Procedure: COMBINED BRONCHOSCOPY (RIGID OR FLEXIBLE), LAVAGE;  Bronchoscopy with Lavage and Transbronchial Biopsy;  Surgeon: Yung Miranda MD;  Location: [facility]     COLONOSCOPY WITH CO2 INSUFFLATION N/A 2018    Procedure: COLONOSCOPY WITH CO2 INSUFFLATION;  Colon and upper endoscopy ;  Surgeon: Devin Pelayo MD;  Location: [facility]     COMBINED ESOPHAGOSCOPY, GASTROSCOPY, DUODENOSCOPY (EGD) WITH CO2 INSUFFLATION N/A 2018    Procedure: COMBINED ESOPHAGOSCOPY, GASTROSCOPY, DUODENOSCOPY (EGD) WITH CO2 INSUFFLATION;  Colon and upper endoscopy ;  Surgeon: Devin Pelayo MD;  Location: [facility]     DISCECTOMY LUMBAR POSTERIOR MICROSCOPIC ONE LEVEL  2012    Procedure:DISCECTOMY LUMBAR POSTERIOR MICROSCOPIC ONE LEVEL; LEFT T1-T2 THORASIC HEMILAMINECTOMY MICRODISCECTOMY WITH MEXTRIX II ; Surgeon:SHARON PURI; Location:[facility]     DISCECTOMY, FUSION CERVICAL ANTERIOR ONE LEVEL, COMBINED N/A 2017    Procedure: COMBINED DISCECTOMY, FUSION CERVICAL ANTERIOR ONE LEVEL;  Anterior Cervical Discectomy and Fusion Cervical Six - Cervical Seven, Exploration and Revision Cervical Four - Cervical Six with Hardware Removal;  Surgeon: Nikolas Vasques MD;  Location: [facility]     ESOPHAGEAL IMPEDENCE FUNCTION TEST WITH 24 HOUR PH GREATER THAN 1 HOUR N/A 2018    Procedure: ESOPHAGEAL IMPEDENCE FUNCTION TEST WITH 24 HOUR PH GREATER  THAN 1 HOUR;  Surgeon: Atif Oconnor MD;  Location: [facility]     ESOPHAGOSCOPY, GASTROSCOPY, DUODENOSCOPY (EGD), COMBINED N/A 9/24/2018    Procedure: COMBINED ESOPHAGOSCOPY, GASTROSCOPY, DUODENOSCOPY (EGD), BIOPSY SINGLE OR MULTIPLE;;  Surgeon: Devin Pelayo MD;  Location: [facility]     EXPLORE SPINE, REMOVE HARDWARE, COMBINED N/A 11/29/2017    Procedure: COMBINED EXPLORE SPINE, REMOVE HARDWARE;;  Surgeon: Nikolas Vasques MD;  [facility]     FUSION CERVICAL ANTERIOR TWO LEVELS  1/29/2010     HC DRAIN/INJ MAJOR JOINT/BURSA W/O US  5/5/2008    Left sacroiliac joint injection.     HC INJ EPIDURAL LUMBAR/SACRAL W/WO CONTRAST  2009     HEAD & NECK SURGERY      2013     HERNIA REPAIR       INJECT BLOCK MEDIAL BRANCH CERVICAL/THORACIC/LUMBAR Bilateral 8/26/2015    Procedure: INJECT BLOCK MEDIAL BRANCH CERVICAL / THORACIC / LUMBAR;  Surgeon: Ronald Driscoll MD;  [facility]     INJECT BLOCK MEDIAL BRANCH CERVICAL/THORACIC/LUMBAR N/A 8/8/2019    Procedure: BLOCK, NERVE, FACET JOINT, MEDIAL BRANCH, DIAGNOSTIC Bilateral Cervical 2,3,4;  Surgeon: Ronald Driscoll MD;  [facility]     INJECT BLOCK MEDIAL BRANCH CERVICAL/THORACIC/LUMBAR N/A 9/13/2019    Procedure: Medial Branch Block Bilateral Cervical 2,3, and 4;  Surgeon: Ronald Driscoll MD;  [facility]     INJECT FACET JOINT Bilateral 5/27/2015    Procedure: INJECT FACET JOINT;  Surgeon: Ronald Driscoll MD;  [facility]     NERVE BLOCK OCCIPITAL Bilateral 7/12/2018    Procedure: NERVE BLOCK OCCIPITAL;  bilateral occipital nerve blocks;  Surgeon: Ronald Driscoll MD;  [facility]     PROCEDURE PLACEHOLDER GENERAL N/A 02/21/2019    Kellen Ward at [facility] [place]       Anesthesia Evaluation     . Pt has had prior anesthetic. Type: General           ROS/MED HX    ENT/Pulmonary:     (+)tobacco use, Current use Moderate Persistent asthma Treatment: Nebulizer prn and Inhaler prn,  , . .    Neurologic:     (+)neuropathy - Cervical-related, migraines,      Cardiovascular:     (+) Dyslipidemia, ----. : . . . :. . Previous cardiac testing Echodate:5/1/16results:EF 65Stress Testdate:1/15/09 results:NormalECG reviewed date:11/17/17 results: date: results:          METS/Exercise Tolerance:  >4 METS   Hematologic:  - neg hematologic  ROS       Musculoskeletal:   (+)  other musculoskeletal-       GI/Hepatic: Comment: History of ETOH abuse    (+) GERD Asymptomatic on medication, Other GI/Hepatic long segment Olson's esophagus      Renal/Genitourinary:  - ROS Renal section negative       Endo:  - neg endo ROS       Psychiatric:     (+) psychiatric history anxiety      Infectious Disease:  - neg infectious disease ROS       Malignancy:      - no malignancy   Other:    (+) H/O Chronic Pain,H/O chronic opiod use ,                         Physical Exam  Normal systems: cardiovascular, pulmonary and dental    Airway   Mallampati: II  TM distance: >3 FB  Neck ROM: full    Dental     Cardiovascular   Rhythm and rate: regular and normal      Pulmonary    breath sounds clear to auscultation            Lab Results   Component Value Date    WBC 10.5 02/15/2019    HGB 12.9 (L) 02/15/2019    HCT 40.4 02/15/2019     02/15/2019    .0 (H) 03/15/2017     07/19/2019    POTASSIUM 3.6 07/19/2019    CHLORIDE 104 07/19/2019    CO2 31 07/19/2019    BUN 9 07/19/2019    CR 1.03 07/19/2019    GLC 83 07/19/2019    LINDSEY 9.2 07/19/2019    ALBUMIN 3.8 07/19/2019    PROTTOTAL 7.7 07/19/2019    ALT 39 07/19/2019    AST 28 07/19/2019    ALKPHOS 67 07/19/2019    BILITOTAL 0.3 07/19/2019    LIPASE 142 01/06/2017    PTT 29 02/15/2019    INR 0.94 02/15/2019    TSH 0.51 05/23/2008       Preop Vitals  BP Readings from Last 3 Encounters:   10/15/19 116/76   10/10/19 108/72   09/20/19 110/68    Pulse Readings from Last 3 Encounters:   10/15/19 80   09/13/19 68   07/19/19 99      Resp Readings from Last 3 Encounters:   10/15/19 16   09/13/19 16   08/08/19 16    SpO2 Readings from Last 3  "Encounters:   10/15/19 100%   09/13/19 96%   08/08/19 95%      Temp Readings from Last 1 Encounters:   10/15/19 96.7  F (35.9  C) (Temporal)    Ht Readings from Last 1 Encounters:   10/10/19 1.702 m (5' 7\")      Wt Readings from Last 1 Encounters:   10/15/19 73.9 kg (163 lb)    Estimated body mass index is 25.53 kg/m  as calculated from the following:    Height as of 10/10/19: 1.702 m (5' 7\").    Weight as of 10/15/19: 73.9 kg (163 lb).       Anesthesia Plan      History & Physical Review  History and physical reviewed and following examination; no interval change.    ASA Status:  2 .    NPO Status:  > 6 hours    Plan for MAC with Intravenous induction. Maintenance will be TIVA.  Reason for MAC:  Deep or markedly invasive procedure (G8)    All available and pertinent medical records and test results reviewed.    Patient evaluated and examined prior to procedure, questions, concerns addressed and answered.        Postoperative Care      Consents  Anesthetic plan, risks, benefits and alternatives discussed with:  Patient.  Use of blood products discussed: No .   .                 CORA Chung CRNA  "

## 2019-10-18 NOTE — OP NOTE
CHIEF COMPLAINT:  Neck pain secondary to cervical spondylosis  INTERVAL HISTORY:     The history was discussed with the patient. I agree with above. Risks were discussed including risks of infection, bleeding, nerve injury, no help , or worse pain and they were willing to take these risks and proceed.  They understand the purpose of the procedure is to try and provide pain relief.    Anesthesia: Monitored anesthetic care    PROCEDURE: Right-sided radiofrequency ablation at the right third occipital nerve, right C3 medial branch nerve, and the right C4 medial branch nerve.  PROCEDURE DETAILS: After written informed consent was obtained from the patient, the patient was escorted to the procedure room.  The patient was placed in the sitting position. A time out was conducted to verify the patient identity, procedure to be performed, side, site, allergies and any special requirements.  The skin over the neck was prepped and draped in normal sterile fashion utilizing ChloraPrep. Fluoroscopy was used to identify the mid point of the articular pillar with a AP view.   The skin was anesthetized with 0.5 mL of 1% lidocaine with bicarbonate buffer.  I advanced from the right para sagittal approach 18-gauge 100 mm Randolph venom RFA needles with 10 mm active tips into the junction of the C2-3 facet joint line as well as the articular waist at the right C3 and C4 levels.  AP lateral and oblique films show proper needle placement away from the nerve roots and spinal cord.  Motor stimulation to 2 V at 2 Hz ruled out nerve root involvement.  I then anesthetize each nerve with 1 cc of 2% lidocaine and did to slightly overlapping lesions from the 0 degree and 20 degree ipsilateral oblique angles.  By doing the lesions from 2 different angles I ablated the longest length of nerve possible.  Each nerve was lesion and 4 different times at 80  C for 60 seconds.  They tolerated the procedure well with no apparent complications.  Once the  "procedure was completed the needles were removed and sterile bandages placed over the needle insertion site and they were brought to the recovery room in stable condition.  DIAGNOSIS:  1. Neck pain secondary to cervical spondylosis  PLAN:  1.  I performed a right-sided radiofrequency ablation fully denervating the right C2-3 and C3-4 facet joints.  They were discharged home with precautions for infection and bleeding and told to seek my medical attention should any of the symptoms develop.  They also understand that it is normal to experience 1 to 4 weeks of postoperative neuritis after this particular procedure.  Most patients described this as a \"sunburn feeling\".    For time, he is already on narcotics as well as taking Neurontin.  Those are my typical medications that I do prescribe for postoperative pain management.  I would not recommend adding any more opioids or neuropathic medications to his medical regimen.  He should be scheduled to come back to have his left side done in 2 to 4 weeks.      "

## 2019-10-22 ENCOUNTER — OFFICE VISIT (OUTPATIENT)
Dept: PALLIATIVE MEDICINE | Facility: OTHER | Age: 54
End: 2019-10-22
Payer: COMMERCIAL

## 2019-10-22 VITALS
TEMPERATURE: 96.9 F | HEIGHT: 67 IN | DIASTOLIC BLOOD PRESSURE: 66 MMHG | SYSTOLIC BLOOD PRESSURE: 104 MMHG | BODY MASS INDEX: 25.58 KG/M2 | WEIGHT: 163 LBS

## 2019-10-22 DIAGNOSIS — G89.4 CHRONIC PAIN SYNDROME: ICD-10-CM

## 2019-10-22 DIAGNOSIS — M54.2 CERVICALGIA: Primary | ICD-10-CM

## 2019-10-22 DIAGNOSIS — G89.29 CHRONIC MIDLINE THORACIC BACK PAIN: ICD-10-CM

## 2019-10-22 DIAGNOSIS — F11.90 CHRONIC, CONTINUOUS USE OF OPIOIDS: ICD-10-CM

## 2019-10-22 DIAGNOSIS — M79.18 MYOFASCIAL PAIN: ICD-10-CM

## 2019-10-22 DIAGNOSIS — M54.6 CHRONIC MIDLINE THORACIC BACK PAIN: ICD-10-CM

## 2019-10-22 DIAGNOSIS — M47.812 ARTHROPATHY OF CERVICAL FACET JOINT: ICD-10-CM

## 2019-10-22 PROCEDURE — 99214 OFFICE O/P EST MOD 30 MIN: CPT | Performed by: PHYSICIAN ASSISTANT

## 2019-10-22 RX ORDER — OXYCODONE HYDROCHLORIDE 10 MG/1
TABLET ORAL
Qty: 120 TABLET | Refills: 0 | Status: SHIPPED | OUTPATIENT
Start: 2019-10-22 | End: 2019-11-19

## 2019-10-22 RX ORDER — METHADONE HYDROCHLORIDE 10 MG/1
TABLET ORAL
Qty: 60 TABLET | Refills: 0 | Status: SHIPPED | OUTPATIENT
Start: 2019-10-22 | End: 2019-11-19

## 2019-10-22 ASSESSMENT — MIFFLIN-ST. JEOR: SCORE: 1547.99

## 2019-10-22 ASSESSMENT — PAIN SCALES - GENERAL: PAINLEVEL: EXTREME PAIN (9)

## 2019-10-22 NOTE — PATIENT INSTRUCTIONS
After Visit Instructions:     Thank you for coming to [facility] for your care. It is my goal to partner with you to help you reach your optimal state of health.     I am recommending multidisciplinary care at this time.  The focus of care will be to continue gradual rehabilitation and pain management with medication adjustments as needed.    Continue daily self-care, identifying contributing factors, and monitoring variations in pain level. Continue to integrate self-care into your life.        Schedule follow-up with Ashley Salgado PA-C in 4 weeks. You will need to make this appointment.     Medication recommendations:     Methadone 10mg every 12 hours    Oxycodone 10mg every 6 hours. Max of 4/day.      Ashley Salgado PA-C  [facility]    Contact information: [facility]  Clinic Number:  [phone]     Call with any questions about your care and for scheduling assistance.     Calls are returned Monday through Friday between 8 AM and 4:30 PM. We usually get back to you within 2 business days depending on the issue/request.    If we are prescribing your medications:    For opioid medication refills, call the clinic or send a Evergig message 7 days in advance.  Please include:    Name of requested medication    Name of the pharmacy.    For non-opioid medications, call your pharmacy directly to request a refill. Please allow 3-4 days to be processed.     Per [place] State Law:    All controlled substance prescriptions must be filled within 30 days of being written.      For those controlled substances allowing refills, pickup must occur within 30 days of last fill.      We believe regular attendance is key to your success in our program!      Any time you are unable to keep your appointment we ask that you call us at least 24 hours in advance to cancel.This will allow us to offer the appointment time to another patient.   Multiple  missed appointments may lead to dismissal from the clinic.
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
## 2019-10-25 ENCOUNTER — TELEPHONE (OUTPATIENT)
Dept: SURGERY | Facility: CLINIC | Age: 54
End: 2019-10-25

## 2019-10-25 NOTE — TELEPHONE ENCOUNTER
Contacted patient to schedule RFA  Date:11/14/19  Time:2:30pm  Dr. Driscoll    Instructed pt to have H&P and  for procedure.

## 2019-11-14 ENCOUNTER — ANESTHESIA EVENT (OUTPATIENT)
Dept: SURGERY | Facility: CLINIC | Age: 54
End: 2019-11-14
Payer: COMMERCIAL

## 2019-11-14 ENCOUNTER — HOSPITAL ENCOUNTER (OUTPATIENT)
Facility: CLINIC | Age: 54
Discharge: HOME | End: 2019-11-14
Attending: ANESTHESIOLOGY | Admitting: ANESTHESIOLOGY
Payer: COMMERCIAL

## 2019-11-14 ENCOUNTER — HOSPITAL ENCOUNTER (OUTPATIENT)
Dept: GENERAL RADIOLOGY | Facility: CLINIC | Age: 54
End: 2019-11-14
Attending: ANESTHESIOLOGY | Admitting: ANESTHESIOLOGY
Payer: COMMERCIAL

## 2019-11-14 ENCOUNTER — ANESTHESIA (OUTPATIENT)
Dept: SURGERY | Facility: CLINIC | Age: 54
End: 2019-11-14
Payer: COMMERCIAL

## 2019-11-14 VITALS
SYSTOLIC BLOOD PRESSURE: 114 MMHG | BODY MASS INDEX: 25.58 KG/M2 | TEMPERATURE: 97.7 F | HEART RATE: 68 BPM | WEIGHT: 163 LBS | RESPIRATION RATE: 16 BRPM | DIASTOLIC BLOOD PRESSURE: 82 MMHG | OXYGEN SATURATION: 98 % | HEIGHT: 67 IN

## 2019-11-14 DIAGNOSIS — M47.812 ARTHROPATHY OF CERVICAL FACET JOINT: ICD-10-CM

## 2019-11-14 PROCEDURE — 36000048 ZZH SURGERY LEVEL 1 W FLUORO 1ST 30 MIN: Performed by: ANESTHESIOLOGY

## 2019-11-14 PROCEDURE — 36000046 ZZH SURGERY LEVEL 1 EA 15 ADDTL MIN: Performed by: ANESTHESIOLOGY

## 2019-11-14 PROCEDURE — 27211024 ZZHC OR SUPPLY OTHER OPNP: Performed by: ANESTHESIOLOGY

## 2019-11-14 PROCEDURE — 40000306 ZZH STATISTIC PRE PROC ASSESS II: Performed by: ANESTHESIOLOGY

## 2019-11-14 PROCEDURE — 64634 DESTROY C/TH FACET JNT ADDL: CPT | Mod: LT | Performed by: ANESTHESIOLOGY

## 2019-11-14 PROCEDURE — 37000008 ZZH ANESTHESIA TECHNICAL FEE, 1ST 30 MIN: Performed by: ANESTHESIOLOGY

## 2019-11-14 PROCEDURE — 25800030 ZZH RX IP 258 OP 636: Performed by: NURSE ANESTHETIST, CERTIFIED REGISTERED

## 2019-11-14 PROCEDURE — 71000027 ZZH RECOVERY PHASE 2 EACH 15 MINS: Performed by: ANESTHESIOLOGY

## 2019-11-14 PROCEDURE — 25000125 ZZHC RX 250: Performed by: NURSE ANESTHETIST, CERTIFIED REGISTERED

## 2019-11-14 PROCEDURE — 40000277 XR SURGERY CARM FLUORO LESS THAN 5 MIN W STILLS: Mod: TC

## 2019-11-14 PROCEDURE — 25000128 H RX IP 250 OP 636: Performed by: NURSE ANESTHETIST, CERTIFIED REGISTERED

## 2019-11-14 PROCEDURE — 25000125 ZZHC RX 250: Performed by: ANESTHESIOLOGY

## 2019-11-14 PROCEDURE — 37000009 ZZH ANESTHESIA TECHNICAL FEE, EACH ADDTL 15 MIN: Performed by: ANESTHESIOLOGY

## 2019-11-14 PROCEDURE — 64633 DESTROY CERV/THOR FACET JNT: CPT | Mod: LT | Performed by: ANESTHESIOLOGY

## 2019-11-14 RX ORDER — LIDOCAINE 40 MG/G
CREAM TOPICAL
Status: DISCONTINUED | OUTPATIENT
Start: 2019-11-14 | End: 2019-11-14 | Stop reason: HOSPADM

## 2019-11-14 RX ORDER — LIDOCAINE HYDROCHLORIDE 20 MG/ML
INJECTION, SOLUTION INFILTRATION; PERINEURAL PRN
Status: DISCONTINUED | OUTPATIENT
Start: 2019-11-14 | End: 2019-11-14

## 2019-11-14 RX ORDER — LIDOCAINE HYDROCHLORIDE 20 MG/ML
INJECTION, SOLUTION INFILTRATION; PERINEURAL PRN
Status: DISCONTINUED | OUTPATIENT
Start: 2019-11-14 | End: 2019-11-14 | Stop reason: HOSPADM

## 2019-11-14 RX ORDER — PROPOFOL 10 MG/ML
INJECTION, EMULSION INTRAVENOUS PRN
Status: DISCONTINUED | OUTPATIENT
Start: 2019-11-14 | End: 2019-11-14

## 2019-11-14 RX ORDER — SODIUM CHLORIDE, SODIUM LACTATE, POTASSIUM CHLORIDE, CALCIUM CHLORIDE 600; 310; 30; 20 MG/100ML; MG/100ML; MG/100ML; MG/100ML
INJECTION, SOLUTION INTRAVENOUS CONTINUOUS
Status: DISCONTINUED | OUTPATIENT
Start: 2019-11-14 | End: 2019-11-14 | Stop reason: HOSPADM

## 2019-11-14 RX ORDER — FENTANYL CITRATE 50 UG/ML
INJECTION, SOLUTION INTRAMUSCULAR; INTRAVENOUS PRN
Status: DISCONTINUED | OUTPATIENT
Start: 2019-11-14 | End: 2019-11-14

## 2019-11-14 RX ADMIN — PROPOFOL 10 MG: 10 INJECTION, EMULSION INTRAVENOUS at 15:20

## 2019-11-14 RX ADMIN — PROPOFOL 10 MG: 10 INJECTION, EMULSION INTRAVENOUS at 15:15

## 2019-11-14 RX ADMIN — MIDAZOLAM 1 MG: 1 INJECTION INTRAMUSCULAR; INTRAVENOUS at 14:56

## 2019-11-14 RX ADMIN — SODIUM CHLORIDE, POTASSIUM CHLORIDE, SODIUM LACTATE AND CALCIUM CHLORIDE: 600; 310; 30; 20 INJECTION, SOLUTION INTRAVENOUS at 13:53

## 2019-11-14 RX ADMIN — PROPOFOL 10 MG: 10 INJECTION, EMULSION INTRAVENOUS at 15:07

## 2019-11-14 RX ADMIN — PROPOFOL 10 MG: 10 INJECTION, EMULSION INTRAVENOUS at 15:10

## 2019-11-14 RX ADMIN — LIDOCAINE HYDROCHLORIDE 1 ML: 10 INJECTION, SOLUTION EPIDURAL; INFILTRATION; INTRACAUDAL; PERINEURAL at 13:52

## 2019-11-14 RX ADMIN — FENTANYL CITRATE 50 MCG: 50 INJECTION, SOLUTION INTRAMUSCULAR; INTRAVENOUS at 15:04

## 2019-11-14 RX ADMIN — LIDOCAINE HYDROCHLORIDE 60 MG: 20 INJECTION, SOLUTION INFILTRATION; PERINEURAL at 15:02

## 2019-11-14 RX ADMIN — SODIUM CHLORIDE, POTASSIUM CHLORIDE, SODIUM LACTATE AND CALCIUM CHLORIDE: 600; 310; 30; 20 INJECTION, SOLUTION INTRAVENOUS at 15:20

## 2019-11-14 ASSESSMENT — MIFFLIN-ST. JEOR: SCORE: 1547.99

## 2019-11-14 ASSESSMENT — LIFESTYLE VARIABLES: TOBACCO_USE: 1

## 2019-11-14 NOTE — DISCHARGE INSTRUCTIONS
"Home Care Instructions                Procedure: Radiofrequency ablation    Activity:    Rest today    Do not work today    Resume normal activity tomorrow    Pain:    You may experience soreness at the injection site for 1 to 4 weeks.  Most patients described the postoperative pain as a \"sunburn feeling\".  This will go away over 1 to 4 weeks.    You may use an ice pack for 20 minutes every 2 hours for the first 24 hours    You may use a heating pad after the first 24 hours    You may use Tylenol  (acetaminophen) every 4 hours or other pain medicines as directed by your physician    Safety  Sedation medicine, if given may remain active for many hours.    It is important for the next 24 hours that you do not:    Drive a car    Operate machines or power tools    Consume alcohol, including beer    Sign any important papers or legal documents    You may experience numbness radiating into your legs or arms, (depending on the procedure location)  This numbness may last several hours.  Until the numb sensation returns to normal please use caution in walking, climbing stairs, stepping out of your vehicle, etc.    Common side effects of steroids:  Not everyone will experience corticosteroid side effects. If side effects are experienced they will gradually subside in the 7-10 day period following an injection.    Most common side effects include:    Flushed face and/or chest    Feeling of warmth, particularly in face but could be overall feeling of warmth    Increased blood sugar in diabetic patients    Menstrual irregularities may occur.  If taking hormone based birth control an alternate method of birth control is recommended    Sleep disturbances and/or mood swings are possible    Leg cramps    Please contact us if you have:  Severe pain   Fever more than 101.5 degrees Fahrenheit  Signs of infection (redness, swelling or drainage)      If you have questions during normal business hours (8am-5pm Monday-Friday) contact the " [facility] at [phone]. If you need help after hours, we recommend that you go to a hospital emergency room or dial 911.

## 2019-11-14 NOTE — ANESTHESIA POSTPROCEDURE EVALUATION
Patient: Joselito Abarca    Procedure(s):  Left Cervical 2, 3, 4 Radiofreqency Ablation    Diagnosis:Arthropathy of cervical facet joint [M47.812]  Diagnosis Additional Information: No value filed.    Anesthesia Type:  MAC    Note:  Anesthesia Post Evaluation    Patient location during evaluation: Phase 2 and Bedside  Patient participation: Able to fully participate in evaluation  Level of consciousness: awake and alert  Pain management: satisfactory to patient  Airway patency: patent  Cardiovascular status: stable  Respiratory status: spontaneous ventilation and room air  Hydration status: stable  PONV: none     Anesthetic complications: None    Comments: Appear to tolerate MAC with IV sedation well without anesthesia related problems / complications noted.  Pain level satisfactory per patient. No N  /  V.  No complaints per patient.  Will follow as needed.        Last vitals:  Vitals:    11/14/19 1338 11/14/19 1540 11/14/19 1541   BP: 111/75 (!) 121/93    Pulse:  81    Resp: 16     Temp: 97.7  F (36.5  C)     SpO2: 98%  95%         Electronically Signed By: CORA Robins CRNA  November 14, 2019  3:46 PM

## 2019-11-14 NOTE — ANESTHESIA PREPROCEDURE EVALUATION
Anesthesia Pre-Procedure Evaluation    Patient: Joselito Abarca   MRN: [id number] : 1965          Preoperative Diagnosis: Arthropathy of cervical facet joint [M47.812]    Procedure(s):  Cervical Radiofreqency Ablation    Past Medical History:   Diagnosis Date     Allergic rhinitis      Anxiety      GERD (gastroesophageal reflux disease)      Neck pain 6/15/2011     Pneumonia      Uncomplicated asthma      Past Surgical History:   Procedure Laterality Date     BRONCHOSCOPY (RIGID OR FLEXIBLE), DIAGNOSTIC N/A 2018    Procedure: COMBINED BRONCHOSCOPY (RIGID OR FLEXIBLE), LAVAGE;  Bronchoscopy with Lavage and Transbronchial Biopsy;  Surgeon: Yung Miranda MD;  Location: [facility]     COLONOSCOPY WITH CO2 INSUFFLATION N/A 2018    Procedure: COLONOSCOPY WITH CO2 INSUFFLATION;  Colon and upper endoscopy ;  Surgeon: Devin Pelayo MD;  Location: [facility]     COMBINED ESOPHAGOSCOPY, GASTROSCOPY, DUODENOSCOPY (EGD) WITH CO2 INSUFFLATION N/A 2018    Procedure: COMBINED ESOPHAGOSCOPY, GASTROSCOPY, DUODENOSCOPY (EGD) WITH CO2 INSUFFLATION;  Colon and upper endoscopy ;  Surgeon: Devin Pelayo MD;  Location: [facility]     DISCECTOMY LUMBAR POSTERIOR MICROSCOPIC ONE LEVEL  2012    Procedure:DISCECTOMY LUMBAR POSTERIOR MICROSCOPIC ONE LEVEL; LEFT T1-T2 THORASIC HEMILAMINECTOMY MICRODISCECTOMY WITH MEXTRIX II ; Surgeon:SHARON PURI; Location:[facility]     DISCECTOMY, FUSION CERVICAL ANTERIOR ONE LEVEL, COMBINED N/A 2017    Procedure: COMBINED DISCECTOMY, FUSION CERVICAL ANTERIOR ONE LEVEL;  Anterior Cervical Discectomy and Fusion Cervical Six - Cervical Seven, Exploration and Revision Cervical Four - Cervical Six with Hardware Removal;  Surgeon: Nikolas Vasques MD;  Location: [facility]     ESOPHAGEAL IMPEDENCE FUNCTION TEST WITH 24 HOUR PH GREATER THAN 1 HOUR N/A 2018    Procedure: ESOPHAGEAL IMPEDENCE FUNCTION TEST WITH 24 HOUR PH GREATER THAN 1 HOUR;  Surgeon:  Atif Oconnor MD;  [facility]     ESOPHAGOSCOPY, GASTROSCOPY, DUODENOSCOPY (EGD), COMBINED N/A 9/24/2018    Procedure: COMBINED ESOPHAGOSCOPY, GASTROSCOPY, DUODENOSCOPY (EGD), BIOPSY SINGLE OR MULTIPLE;;  Surgeon: Devin Pelayo MD;  Location:  OR     EXPLORE SPINE, REMOVE HARDWARE, COMBINED N/A 11/29/2017    Procedure: COMBINED EXPLORE SPINE, REMOVE HARDWARE;;  Surgeon: Nikolas Vasques MD;  [facility]     FUSION CERVICAL ANTERIOR TWO LEVELS  1/29/2010     HC DRAIN/INJ MAJOR JOINT/BURSA W/O US  5/5/2008    Left sacroiliac joint injection.     HC INJ EPIDURAL LUMBAR/SACRAL W/WO CONTRAST  2009     HEAD & NECK SURGERY      2013     HERNIA REPAIR       INJECT BLOCK MEDIAL BRANCH CERVICAL/THORACIC/LUMBAR Bilateral 8/26/2015    Procedure: INJECT BLOCK MEDIAL BRANCH CERVICAL / THORACIC / LUMBAR;  Surgeon: Ronald Driscoll MD;  [facility]     INJECT BLOCK MEDIAL BRANCH CERVICAL/THORACIC/LUMBAR N/A 8/8/2019    Procedure: BLOCK, NERVE, FACET JOINT, MEDIAL BRANCH, DIAGNOSTIC Bilateral Cervical 2,3,4;  Surgeon: Ronald Driscoll MD;  [facility]     INJECT BLOCK MEDIAL BRANCH CERVICAL/THORACIC/LUMBAR N/A 9/13/2019    Procedure: Medial Branch Block Bilateral Cervical 2,3, and 4;  Surgeon: Ronald Driscoll MD;  [facility]     INJECT FACET JOINT Bilateral 5/27/2015    Procedure: INJECT FACET JOINT;  Surgeon: Ronald Driscoll MD;  [facility]     NERVE BLOCK OCCIPITAL Bilateral 7/12/2018    Procedure: NERVE BLOCK OCCIPITAL;  bilateral occipital nerve blocks;  Surgeon: Ronald Driscoll MD;  [facility]     PROCEDURE PLACEHOLDER GENERAL N/A 02/21/2019    Lap Ed at [facility]     RADIO FREQUENCY ABLATION PULSED CERVICAL Right 10/18/2019    Procedure: CERVICAL RADIOFREQUENCY ABLATION  Cervical 2,3,4;  Surgeon: Ronald Driscoll MD;  Location: [facility]       Anesthesia Evaluation     . Pt has had prior anesthetic. Type: General           ROS/MED HX    ENT/Pulmonary:     (+)tobacco use,  Current use Moderate Persistent asthma Treatment: Nebulizer prn and Inhaler prn,  , . .    Neurologic:     (+)neuropathy - Cervical-related, migraines,     Cardiovascular:     (+) Dyslipidemia, ----. : . . . :. . Previous cardiac testing Echodate:5/1/16results:EF 65Stress Testdate:1/15/09 results:NormalECG reviewed date:11/17/17 results: date: results:          METS/Exercise Tolerance:  >4 METS   Hematologic:  - neg hematologic  ROS       Musculoskeletal:   (+)  other musculoskeletal-       GI/Hepatic: Comment: History of ETOH abuse    (+) GERD Asymptomatic on medication, Other GI/Hepatic long segment Olson's esophagus      Renal/Genitourinary:  - ROS Renal section negative       Endo:  - neg endo ROS       Psychiatric:     (+) psychiatric history anxiety      Infectious Disease:  - neg infectious disease ROS       Malignancy:      - no malignancy   Other:    (+) H/O Chronic Pain,H/O chronic opiod use ,                         Physical Exam  Normal systems: cardiovascular, pulmonary and dental    Airway   Mallampati: II  TM distance: >3 FB  Neck ROM: full    Dental     Cardiovascular   Rhythm and rate: regular and normal      Pulmonary    breath sounds clear to auscultation            Lab Results   Component Value Date    WBC 10.5 02/15/2019    HGB 12.9 (L) 02/15/2019    HCT 40.4 02/15/2019     02/15/2019    .0 (H) 03/15/2017     07/19/2019    POTASSIUM 3.6 07/19/2019    CHLORIDE 104 07/19/2019    CO2 31 07/19/2019    BUN 9 07/19/2019    CR 1.03 07/19/2019    GLC 83 07/19/2019    LINDSEY 9.2 07/19/2019    ALBUMIN 3.8 07/19/2019    PROTTOTAL 7.7 07/19/2019    ALT 39 07/19/2019    AST 28 07/19/2019    ALKPHOS 67 07/19/2019    BILITOTAL 0.3 07/19/2019    LIPASE 142 01/06/2017    PTT 29 02/15/2019    INR 0.94 02/15/2019    TSH 0.51 05/23/2008       Preop Vitals  BP Readings from Last 3 Encounters:   10/22/19 104/66   10/18/19 119/83   10/15/19 116/76    Pulse Readings from Last 3 Encounters:   10/18/19  "80   10/15/19 80   09/13/19 68      Resp Readings from Last 3 Encounters:   10/18/19 16   10/15/19 16   09/13/19 16    SpO2 Readings from Last 3 Encounters:   10/18/19 98%   10/15/19 100%   09/13/19 96%      Temp Readings from Last 1 Encounters:   10/22/19 96.9  F (36.1  C) (Temporal)    Ht Readings from Last 1 Encounters:   10/22/19 1.702 m (5' 7\")      Wt Readings from Last 1 Encounters:   10/22/19 73.9 kg (163 lb)    Estimated body mass index is 25.53 kg/m  as calculated from the following:    Height as of 10/22/19: 1.702 m (5' 7\").    Weight as of 10/22/19: 73.9 kg (163 lb).       Anesthesia Plan      History & Physical Review  History and physical reviewed and following examination; no interval change.    ASA Status:  2 .    NPO Status:  > 6 hours    Plan for MAC with Intravenous induction. Maintenance will be TIVA.  Reason for MAC:  Deep or markedly invasive procedure (G8)    All available and pertinent medical records and test results reviewed.    Patient evaluated and examined prior to procedure, questions, concerns addressed and answered.        Postoperative Care      Consents  Anesthetic plan, risks, benefits and alternatives discussed with:  Patient.  Use of blood products discussed: No .   .                 CORA Robins CRNA  "

## 2019-11-14 NOTE — OP NOTE
CHIEF COMPLAINT:  Neck pain secondary to cervical spondylosis  INTERVAL HISTORY:     The history was discussed with the patient. I agree with above. Risks were discussed including risks of infection, bleeding, nerve injury, no help , or worse pain and they were willing to take these risks and proceed.    PROCEDURE: Radiofrequency ablation of the left third occipital nerve, left C3 medial branch nerve and left C4 medial branch nerve all under fluoroscopic guidance.  PROCEDURE DETAILS: After written informed consent was obtained from the patient, the patient was escorted to the procedure room.  The patient was placed in the sitting position. A time out was conducted to verify the patient identity, procedure to be performed, side, site, allergies and any special requirements.  The skin over the neck was prepped and draped in normal sterile fashion with ChloraPrep.  I then advanced 100 mm radiofrequency ablation needles with 10 mm active tips into the junction of the left C2-3 facet joint line as well as the articular pillars of C3 and C4.  Needles were placed from the 0 degree and 20 degree ipsilateral oblique angles from the left parasagittal approach.  2 different needle angles were utilized to lesion the longest length of nerve possible.  AP and lateral films showed proper needle placement away from the spinal cord and nerve roots.  Motor stimulation at 2 Hz up to 2 V at each location showed no nerve root or spinal cord stimulation.  I then anesthetize each nerve with 1 cc of 2% lidocaine and did to slightly overlapping lesions from each angle therefore each nerve was lesion for 4 separate lesions at 80  C for 60 seconds.  After the lesions were created the needles were then removed and sterile bandages placed over the needle insertion site.    DIAGNOSIS:  1. Neck pain secondary to cervical spondylosis  PLAN:  1. Performed a radiofrequency ablation procedure for full denervation of the left C2-3 and left C3-4 facet  joints.  He has already had his right side treated.  Therefore, he understands the expected postoperative dysesthesias that he will go through last usually 1 weeks.  He did say that he has opioids for expected postoperative pain that he normally takes each day.  He should realize the full benefit of the procedure 4 weeks postoperatively.  If he is still having significant pain after the procedure he could also have a C1-2 facet nerve block above the levels that we already treated.  That supplies the sympathetic innervation to the anterior aspect of the C0 through C2 facet joints.  My only concern about being that aggressive would be that his pain tolerance is very low.  I need to have a very calm patient who could communicate effectively with me in the procedure.      Ronald Driscoll MD  Diplomate of the American Board of Anesthesiology, Pain Medicine

## 2019-11-14 NOTE — ANESTHESIA CARE TRANSFER NOTE
Patient: Joselito Abarca    Procedure(s):  Left Cervical 2, 3, 4 Radiofreqency Ablation    Diagnosis: Arthropathy of cervical facet joint [M47.812]  Diagnosis Additional Information: No value filed.    Anesthesia Type:   MAC     Note:  Airway :Room Air  Patient transferred to:Phase II  Handoff Report: Identifed the Patient, Identified the Reponsible Provider, Reviewed the pertinent medical history, Discussed the surgical course, Reviewed Intra-OP anesthesia mangement and issues during anesthesia, Set expectations for post-procedure period and Allowed opportunity for questions and acknowledgement of understanding      Vitals: (Last set prior to Anesthesia Care Transfer)    CRNA VITALS  11/14/2019 1508 - 11/14/2019 1543      11/14/2019             SpO2:  99 %                Electronically Signed By: CORA Robins CRNA  November 14, 2019  3:43 PM

## 2019-11-19 ENCOUNTER — OFFICE VISIT (OUTPATIENT)
Dept: PALLIATIVE MEDICINE | Facility: OTHER | Age: 54
End: 2019-11-19
Payer: COMMERCIAL

## 2019-11-19 VITALS
DIASTOLIC BLOOD PRESSURE: 80 MMHG | BODY MASS INDEX: 25.43 KG/M2 | WEIGHT: 162 LBS | TEMPERATURE: 96.9 F | HEIGHT: 67 IN | SYSTOLIC BLOOD PRESSURE: 118 MMHG

## 2019-11-19 DIAGNOSIS — M54.6 CHRONIC MIDLINE THORACIC BACK PAIN: ICD-10-CM

## 2019-11-19 DIAGNOSIS — G89.29 CHRONIC MIDLINE THORACIC BACK PAIN: ICD-10-CM

## 2019-11-19 DIAGNOSIS — F11.90 CHRONIC, CONTINUOUS USE OF OPIOIDS: ICD-10-CM

## 2019-11-19 DIAGNOSIS — G89.4 CHRONIC PAIN SYNDROME: ICD-10-CM

## 2019-11-19 DIAGNOSIS — M79.18 MYOFASCIAL PAIN: ICD-10-CM

## 2019-11-19 DIAGNOSIS — M54.2 CERVICALGIA: ICD-10-CM

## 2019-11-19 DIAGNOSIS — M47.812 ARTHROPATHY OF CERVICAL FACET JOINT: ICD-10-CM

## 2019-11-19 PROCEDURE — 99213 OFFICE O/P EST LOW 20 MIN: CPT | Performed by: PHYSICIAN ASSISTANT

## 2019-11-19 RX ORDER — OXYCODONE HYDROCHLORIDE 10 MG/1
TABLET ORAL
Qty: 115 TABLET | Refills: 0 | Status: SHIPPED | OUTPATIENT
Start: 2019-11-19 | End: 2019-12-17

## 2019-11-19 RX ORDER — METHADONE HYDROCHLORIDE 10 MG/1
TABLET ORAL
Qty: 60 TABLET | Refills: 0 | Status: SHIPPED | OUTPATIENT
Start: 2019-11-19 | End: 2019-12-17

## 2019-11-19 RX ORDER — GABAPENTIN 300 MG/1
900 CAPSULE ORAL 2 TIMES DAILY
Qty: 540 CAPSULE | Refills: 0 | Status: SHIPPED | OUTPATIENT
Start: 2019-11-19 | End: 2020-02-11

## 2019-11-19 ASSESSMENT — PAIN SCALES - GENERAL: PAINLEVEL: EXTREME PAIN (8)

## 2019-11-19 ASSESSMENT — MIFFLIN-ST. JEOR: SCORE: 1543.46

## 2019-11-19 NOTE — PROGRESS NOTES
[facility]    CHIEF COMPLAINT:   Pain  -neck pain    INTERVAL HISTORY:  Last seen on 10/22/19.        Recommendations/plan at the last visit included:  1. Physical Therapy:  Yes, continue current plan  2. Clinical Health Psychologist:  NO    3. Diagnostic Studies: none at this time  4. Medication Management:    1. Continue Methadone 10mg every 12 hours   2. Taper Oxycodone to 10mg every 6 hours as needed for severe pain. Max of 4/day. :  3. Continue Gabapentin at 900mg in evening. Recommend that he try taking 300mg in the AM as this can help with the increased pain from the RFA.  4. Continue Cymbalta as recommended by PCP  5. Further procedures recommended: cervical RFA on left. Order placed  6. Recommendations to PCP. See above      Follow up with this provider:  4 Weeks     Since his last visit, Joselito Abarca reports:    Patient had his left cervical RFA on 11/14/2019. He has been having burning since the procedure. He states that the right side is still sore, but it is starting to get better overall. He has been using ice packs on his neck and this seems to cool his neck down. He has not tried any lidocaine on it.      He did not try taking the Gabapentin in the morning. He states that he was not eating in the morning so he did not take them.     Pain Information:   Pain quality: Burning    Pain rating: intensity ranges from 3/10 to 9/10, and averages 8/10 on a 0-10 scale.   Pain today 8/10    SELF CARE:   How often do you practice SELF-CARE (relaxing, stretching, pacing, monitoring posture, taking mini-breaks) in a typical day:  Doing what he is able    Annual Controlled Substance Agreement: 7/16/2019  UDS: 6/28/2019    CURRENT RELEVANT PAIN MEDICATIONS:   Cymbalta 60mg daily  Clonazepam 0.5mg-takes 3-4/day  Gabapentin 300mg-taking 3 twice daily  Methadone 10mg twice daily  Oxycodone 10mg every 6 hours max of 4/day    Patient is using the medication as prescribed:  YES  Is your  medication helpful? NO   Medication side effects? no side effect    Previous Medications: (H--helped; HI--Helped initially; S[facility]-- somewhat helpful, NH--No help; W--worse; SE--side effects)   Opiates: hydrocodone SE allergic, fever, sweaty, headache, methadone H, Oxycodone H  NSAIDS: Ibuprofen NH, Aleve NH  Anti-migraine mediations: Fiorinal H  Muscle Relaxants: Valium H  Neuropathics: Gabapentin H  Anti-depressants: none  Anxiolytics: Klonopin H  Topicals: Lidocaine H  Other medications not covered above: Tylenol NH    Past Pain Treatments:  Pain Clinic:   Yes, he was previously seen at [facility] and [facility]   PT: Yes, has done many times  Psychologist: No  Relaxation techniques/biofeedback: No  Chiropractor: Yes, feels that it made it worse  Acupuncture: Yes, just did one session  Pharmacotherapy:               Opioids: Yes                Non-opioids:    Yes   TENs Unit:Yes, aggravates the pain  Injections: Yes, bilateral occipital nerve blocks 07/12/2018, Cervical medial branch blocks 08/26/2015 and 5/27/2015. Has had low back pain injections at [facility]. Right cervical RFA 10/18/2019, left cervical RFA 11/14/2019.  Self-care:   Yes, hot tubs, ice packs, heating pads  Surgeries related to pain: Yes,  1. anterior cervical discectomy and fusion C6-7, exploration and revision C4-6 with hardwar removal 11/29/2017,   2. Left T1-T2 thoracic hemilaminectomy, microdiscectomy 02/21/2012,   3. Removal of C4 through C6 anterior cervical plate, Exploration of C4-C5 and C5-C6 anterior cervical fusion, C4-C5 and C5-C6 arthrodesis, C4 through C6 anterior cervical instrumentation, and Henderson of right iliac hip graft.  03/15/2011  4. C4-C5 and C5-C6 anterior cervical discectomy and fusion with instrumentation and neural electrophysiologic monitoring 01/26/2010    [place] Board of Pharmacy Data Base Reviewed:    YES; As expected, no concern for misuse/abuse of controlled medications based on this report.    THE 4 As OF OPIOID MAINTENANCE  ANALGESIA    Analgesia: Is pain relief clinically significant? NO   Activity: Is patient functional and able to perform Activities of Daily Living? YES   Adverse effects: Is patient free from adverse side effects from opiates? YES   Adherence to Rx protocol: Is patient adhering to Controlled Substance Agreement and taking medications ONLY as ordered? YES       Is Narcan prescribed for opiate use >50 MME daily? YES      Daily MME: 140    Medications:  Current Outpatient Medications   Medication Sig Dispense Refill     citalopram (CELEXA) 20 MG tablet Take 1 tablet (20 mg) by mouth daily 90 tablet 3     clonazePAM (KLONOPIN) 0.5 MG tablet TAKE ONE-HALF TO ONE TABLET BY MOUTH THREE TIMES A DAY AS NEEDED FOR ANXIETY 90 tablet 0     DULoxetine (CYMBALTA) 30 MG capsule Take 1 capsule (30 mg) by mouth daily for 7 days 7 capsule 0     DULoxetine (CYMBALTA) 60 MG capsule Take 1 capsule (60 mg) by mouth daily 30 capsule 3     FLOVENT  MCG/ACT Inhaler INHALE 2 PUFFS INTO THE LUNGS TWICE DAILY 36 g 2     fluticasone (FLONASE) 50 MCG/ACT nasal spray SPRAY 2 SPRAYS IN EACH NOSTRIL EVERY DAY 16 g 5     gabapentin (NEURONTIN) 300 MG capsule TAKE TWO CAPSULES BY MOUTH THREE TIMES A  capsule 11     ipratropium - albuterol 0.5 mg/2.5 mg/3 mL (DUONEB) 0.5-2.5 (3) MG/3ML neb solution Take 1 vial (3 mLs) by nebulization every 4 hours as needed for shortness of breath / dyspnea 30 vial 0     methadone (DOLOPHINE) 10 MG tablet Take 1 tablet every 12 hours. Fill 10/31/2019. Start 11/1/2019. 30 day script. 60 tablet 0     naloxone (NARCAN) 4 MG/0.1ML nasal spray Spray 1 spray (4 mg) into one nostril alternating nostrils as needed for opioid reversal every 2-3 minutes until assistance arrives 0.2 mL 1     nystatin (MYCOSTATIN) 056047 UNIT/ML suspension TAKE 5 MLS BY MOUTH FOUR TIMES A  mL 0     order for DME Equipment being ordered: Nebulizer mask and tubing 1 each 1     oxyCODONE (OXY-IR) 5 MG capsule 2 caps q 6 hour prn  "pain up to 10 per day. Fill 10/10/2019. Start 10/13/19. 21 day script. 210 capsule 0     oxyCODONE IR (ROXICODONE) 10 MG tablet Take 1 tablet every 6 hours as needed for breakthrough pain. Max of 4/day. Fill 10/22/2019. Start 10/23/2019. 30 day script 120 tablet 0     sildenafil (VIAGRA) 100 MG tablet Take 1 tablet (100 mg) by mouth daily as needed 30 min to 4 hrs before sex. Do not use with nitroglycerin, terazosin or doxazosin. 4 tablet 0     traZODone (DESYREL) 100 MG tablet Take 3 tablets (300 mg) by mouth At Bedtime 90 tablet 3     VENTOLIN  (90 Base) MCG/ACT inhaler INHALE 2 PUFFS INTO THE LUNGS EVERY 6 HOURS AS NEEDED FOR SHORTNESS OF BREATH, DIFFICULTY BREATHING OR WHEEZING. 18 g 3     vitamin D3 (CHOLECALCIFEROL) 2000 units tablet TAKE ONE TABLET BY MOUTH EVERY  tablet 3     zolpidem (AMBIEN) 10 MG tablet Take 10 mg by mouth nightly as needed          Review of Systems: A 10-point review of systems was negative, with the exception of chronic pain issues, headache, weakness, depression and anxiety.    Social History: Reviewed; unchanged from previous consultation.      Family history: Reviewed; unchanged from previous consultation.     PHYSICAL EXAM:     Vitals:   /80   Temp 96.9  F (36.1  C) (Temporal)   Ht 1.702 m (5' 7\")   Wt 73.5 kg (162 lb)   BMI 25.37 kg/m    Body mass index is 25.37 kg/m .  5' 7\"  162 lbs 0 oz      Constitutional: healthy, alert and no distress  HEENT: Head atraumatic, normocephalic. Eyes without conjunctival injection or jaundice. Neck supple. No obvious neck masses.  Skin: No rash, lesions, or petechiae of exposed skin.   Psychiatric/mental status: Alert, without lethargy or stupor. Appropriate affect. Mood normal.       DIAGNOSTIC TESTS:  Imaging Studies:   No new imaging to review today.    Assessment:  Joselito Abarca is a 54 year old male who presents today for follow up regarding his:      1. Arthropathy of cervical facet " joint  2. cervicalgia  3. Midline thoracic back pain  4. Myofascial pain  5. Chronic pain syndrome  6. Chronic use of opioids      Will continue to work on a slow taper. Patient requested that we keep everything the same for this month. Discussed with the patient that we should continue to taper, but we can slow down. Discussed that we will continue the Methadone at its current dose of 10mg every 12 hours. With his Oxycodone, we will keep it at 10mg, but will reduce the number of tablets from 120 to 115. Educated the patient that this means he will have 5 days where he is only to take 3 tablets/day instead of 4. I would anticipate this will be easier to do at the end of the month as the ablation sites should be less sensitive.     He should continue his Gabapentin. If he is having stomach upset with it, we can discuss tapering off of it.     Plan:    Diagnosis reviewed, treatment option addressed, and risk/benifits discussed.  Self-care instructions given.  I am recommending a multidisciplinary treatment plan to help this patient better manage pain.      1. Physical Therapy:  Yes, continue current plan  2. Clinical Health Psychologist:  NO    3. Diagnostic Studies: none at this time  4. Medication Management:    1. Continue Methadone 10mg every 12 hours   2. Taper Oxycodone to 10mg every 6 hours as needed for severe pain. Max of 4/day.   3. Continue Gabapentin at 900mg BID as he has been doing since his last visit. g  4. Continue Cymbalta as recommended by PCP  5. Further procedures recommended: none at this time  6. Recommendations to PCP. See above      Follow up with this provider:  4 Weeks     Total time spent face to face was 13 minutes and more than 50% of face to face time was spent in counseling and/or coordination of care regarding the diagnosis and recommendations above.      Ashley Salgado PA-C   [facility]

## 2019-11-19 NOTE — PATIENT INSTRUCTIONS
After Visit Instructions:     Thank you for coming to [facility] for your care. It is my goal to partner with you to help you reach your optimal state of health.     I am recommending multidisciplinary care at this time.  The focus of care will be to continue gradual rehabilitation and pain management with medication adjustments as needed.    Continue daily self-care, identifying contributing factors, and monitoring variations in pain level. Continue to integrate self-care into your life.        Schedule follow-up with Ashley Salgado PA-C in 4 weeks. You will need to make this appointment.     Medication recommendations:     Continue Methadone 10mg every 12 hours    Continue Oxycodone 10mg every 6 hours as needed for severe pain. Max of 4/day. 115 tablets to last 30 days. This means that for 5 days of the month, you can only take a max of 3 tablets/day    Continue Gabapentin 900mg twice daily      Ashley Salgado PA-C  [facility]  [facility]    Contact information: [facility]  Clinic Number:  [phone]     Call with any questions about your care and for scheduling assistance.     Calls are returned Monday through Friday between 8 AM and 4:30 PM. We usually get back to you within 2 business days depending on the issue/request.    If we are prescribing your medications:    For opioid medication refills, call the clinic or send a crowdSPRING message 7 days in advance.  Please include:    Name of requested medication    Name of the pharmacy.    For non-opioid medications, call your pharmacy directly to request a refill. Please allow 3-4 days to be processed.     Per [place] State Law:    All controlled substance prescriptions must be filled within 30 days of being written.      For those controlled substances allowing refills, pickup must occur within 30 days of last fill.      We believe regular attendance is key to your success in our program!       Any time you are unable to keep your appointment we ask that you call us at least 24 hours in advance to cancel.This will allow us to offer the appointment time to another patient.   Multiple missed appointments may lead to dismissal from the clinic.

## 2019-12-16 DIAGNOSIS — F41.1 GENERALIZED ANXIETY DISORDER: ICD-10-CM

## 2019-12-16 RX ORDER — CLONAZEPAM 0.5 MG/1
TABLET ORAL
Qty: 90 TABLET | Refills: 0 | Status: SHIPPED | OUTPATIENT
Start: 2019-12-16 | End: 2020-01-15

## 2019-12-16 NOTE — PROGRESS NOTES
[facility]    CHIEF COMPLAINT:   Pain  -neck pain    INTERVAL HISTORY:  Last seen on 11/19/19.        Recommendations/plan at the last visit included:  1. Physical Therapy:  Yes, continue current plan  2. Clinical Health Psychologist:  NO    3. Diagnostic Studies: none at this time  4. Medication Management:    1. Continue Methadone 10mg every 12 hours   2. Taper Oxycodone to 10mg every 6 hours as needed for severe pain. Max of 4/day.   3. Continue Gabapentin at 900mg BID as he has been doing since his last visit. g  4. Continue Cymbalta as recommended by PCP  5. Further procedures recommended: none at this time  6. Recommendations to PCP. See above      Follow up with this provider:  4 Weeks    Since his last visit, Joselito Abarca reports:  He states that he is still having some pain from the left RFA. He states that he is unable to sleep at night. He states that when he falls asleep on one side, he'll wake up with pain on the side that he sleeps on. He states that by the morning he is in severe pain. He states that he is taking the Gabapentin at night.     He states that for the last couple of months he has been noticing more shortness of breath. He states that he has started using his inhalers recently.     Pain Information:   Pain quality: Burning    Pain rating: intensity ranges from 3/10 to 9/10, and averages 8/10 on a 0-10 scale.   Pain today 10/10    SELF CARE:   How often do you practice SELF-CARE (relaxing, stretching, pacing, monitoring posture, taking mini-breaks) in a typical day:  Doing what he is able    Annual Controlled Substance Agreement: 7/16/2019  UDS: 6/28/2019    CURRENT RELEVANT PAIN MEDICATIONS:   Cymbalta 60mg daily  Clonazepam 0.5mg-takes 3-4/day  Gabapentin 300mg-taking 3 twice daily  Methadone 10mg twice daily  Oxycodone 10mg every 6 hours max of 4/day    Patient is using the medication as prescribed:  YES  Is your medication helpful? NO   Medication side effects?  no side effect    Previous Medications: (H--helped; HI--Helped initially; SWH-- somewhat helpful, NH--No help; W--worse; SE--side effects)   Opiates: hydrocodone SE allergic, fever, sweaty, headache, methadone H, Oxycodone H  NSAIDS: Ibuprofen NH, Aleve NH  Anti-migraine mediations: Fiorinal H  Muscle Relaxants: Valium H  Neuropathics: Gabapentin H  Anti-depressants: none  Anxiolytics: Klonopin H  Topicals: Lidocaine H  Other medications not covered above: Tylenol NH    Past Pain Treatments:  Pain Clinic:   Yes, he was previously seen at [facility] and [facility]   PT: Yes, has done many times  Psychologist: No  Relaxation techniques/biofeedback: No  Chiropractor: Yes, feels that it made it worse  Acupuncture: Yes, just did one session  Pharmacotherapy:               Opioids: Yes                Non-opioids:    Yes   TENs Unit:Yes, aggravates the pain  Injections: Yes, bilateral occipital nerve blocks 07/12/2018, Cervical medial branch blocks 08/26/2015 and 5/27/2015. Has had low back pain injections at [facility]. Right cervical RFA 10/18/2019, left cervical RFA 11/14/2019.  Self-care:   Yes, hot tubs, ice packs, heating pads  Surgeries related to pain: Yes,  1. anterior cervical discectomy and fusion C6-7, exploration and revision C4-6 with hardwar removal 11/29/2017,   2. Left T1-T2 thoracic hemilaminectomy, microdiscectomy 02/21/2012,   3. Removal of C4 through C6 anterior cervical plate, Exploration of C4-C5 and C5-C6 anterior cervical fusion, C4-C5 and C5-C6 arthrodesis, C4 through C6 anterior cervical instrumentation, and [place] of right iliac hip graft.  03/15/2011  4. C4-C5 and C5-C6 anterior cervical discectomy and fusion with instrumentation and neural electrophysiologic monitoring 01/26/2010    [place] Board of Pharmacy Data Base Reviewed:    YES; As expected, no concern for misuse/abuse of controlled medications based on this report.    THE 4 As OF OPIOID MAINTENANCE ANALGESIA    Analgesia: Is pain relief clinically  significant? NO   Activity: Is patient functional and able to perform Activities of Daily Living? YES   Adverse effects: Is patient free from adverse side effects from opiates? YES   Adherence to Rx protocol: Is patient adhering to Controlled Substance Agreement and taking medications ONLY as ordered? YES       Is Narcan prescribed for opiate use >50 MME daily? YES      Daily MME: 140    Medications:  Current Outpatient Medications   Medication Sig Dispense Refill     citalopram (CELEXA) 20 MG tablet Take 1 tablet (20 mg) by mouth daily 90 tablet 3     clonazePAM (KLONOPIN) 0.5 MG tablet TAKE ONE-HALF TO  ONE TABLET THREE TIMES A DAY AS NEEDED FOR  ANXIETY 90 tablet 0     DULoxetine (CYMBALTA) 30 MG capsule Take 1 capsule (30 mg) by mouth daily for 7 days 7 capsule 0     DULoxetine (CYMBALTA) 60 MG capsule Take 1 capsule (60 mg) by mouth daily 30 capsule 3     FLOVENT  MCG/ACT Inhaler INHALE 2 PUFFS INTO THE LUNGS TWICE DAILY 36 g 2     fluticasone (FLONASE) 50 MCG/ACT nasal spray SPRAY 2 SPRAYS IN EACH NOSTRIL EVERY DAY 16 g 5     gabapentin (NEURONTIN) 300 MG capsule Take 3 capsules (900 mg) by mouth 2 times daily 540 capsule 0     ipratropium - albuterol 0.5 mg/2.5 mg/3 mL (DUONEB) 0.5-2.5 (3) MG/3ML neb solution Take 1 vial (3 mLs) by nebulization every 4 hours as needed for shortness of breath / dyspnea 30 vial 0     methadone (DOLOPHINE) 10 MG tablet Take 1 tablet every 12 hours. Fill 11/29/2019. Start 12/1/2019. 30 day script. 60 tablet 0     naloxone (NARCAN) 4 MG/0.1ML nasal spray Spray 1 spray (4 mg) into one nostril alternating nostrils as needed for opioid reversal every 2-3 minutes until assistance arrives 0.2 mL 1     nystatin (MYCOSTATIN) 069769 UNIT/ML suspension TAKE 5 MLS BY MOUTH FOUR TIMES A  mL 0     order for DME Equipment being ordered: Nebulizer mask and tubing 1 each 1     oxyCODONE IR (ROXICODONE) 10 MG tablet Take 1 tablet every 6 hours as needed for breakthrough pain. Max of  "4/day. Fill 11/21/2019. Start 11/22/2019. 30 day script 115 tablet 0     sildenafil (VIAGRA) 100 MG tablet Take 1 tablet (100 mg) by mouth daily as needed 30 min to 4 hrs before sex. Do not use with nitroglycerin, terazosin or doxazosin. 4 tablet 0     traZODone (DESYREL) 100 MG tablet Take 3 tablets (300 mg) by mouth At Bedtime 90 tablet 3     VENTOLIN  (90 Base) MCG/ACT inhaler INHALE 2 PUFFS INTO THE LUNGS EVERY 6 HOURS AS NEEDED FOR SHORTNESS OF BREATH, DIFFICULTY BREATHING OR WHEEZING. 18 g 3     vitamin D3 (CHOLECALCIFEROL) 2000 units tablet TAKE ONE TABLET BY MOUTH EVERY  tablet 3     zolpidem (AMBIEN) 10 MG tablet Take 10 mg by mouth nightly as needed          Review of Systems: A 10-point review of systems was negative, with the exception of chronic pain issues, cough, shortness of breath, depression and anxiety.    Social History: Reviewed; unchanged from previous consultation.      Family history: Reviewed; unchanged from previous consultation.     PHYSICAL EXAM:     Vitals:   /80   Ht 1.702 m (5' 7\")   Wt 73.7 kg (162 lb 8 oz)   BMI 25.45 kg/m    Body mass index is 25.45 kg/m .  5' 7\"  162 lbs 8 oz      Constitutional: healthy, alert and no distress  HEENT: Head atraumatic, normocephalic. Eyes without conjunctival injection or jaundice. Neck supple. No obvious neck masses.  Skin: No rash, lesions, or petechiae of exposed skin.   Psychiatric/mental status: Alert, without lethargy or stupor. Appropriate affect. Mood normal.       DIAGNOSTIC TESTS:  Imaging Studies:   No new imaging to review today.    Assessment:  Joselito Abarca is a 54 year old male who presents today for follow up regarding his:      1. Arthropathy of cervical facet joint  2. cervicalgia  3. Midline thoracic back pain  4. Myofascial pain  5. Chronic pain syndrome  6. Chronic use of opioids    Patient states that he is continuing to have increased pain as we taper of the medications.  I again had a discussion " with the patient that I cannot support the dosing of opioids that he is at.  I did agree to continue his current dosing of medication for this 1 month only however we will resume the taper the next month.  If the patient decides to continue following with me I will taper by 10% with every visit.  I did discuss with the patient that he always has the option of discussing his options with his primary care provider versus seeing another pain clinic.    I did recommend that the patient try Aspercreme with 4% lidocaine to the radiofrequency ablation area.  This should continue to improve with time.      Plan:    Diagnosis reviewed, treatment option addressed, and risk/benifits discussed.  Self-care instructions given.  I am recommending a multidisciplinary treatment plan to help this patient better manage pain.      1. Physical Therapy:  Yes, continue current plan  2. Clinical Health Psychologist:  NO    3. Diagnostic Studies: none at this time  4. Medication Management:    1. Continue Methadone 10mg every 12 hours   2. Continue Oxycodone to 10mg every 6 hours as needed for  severe pain. Max of 4/day.   3. Continue Gabapentin at 900mg BID as he has been doing  since his last visit.   4. Continue Cymbalta as recommended by PCP  5. Try Aspercreme with 4% lidocaine to the neck area  5. Further procedures recommended: none at this time  6. Recommendations to PCP. See above      Follow up with this provider:  4 Weeks     Total time spent face to face was 12 minutes and more than 50% of face to face time was spent in counseling and/or coordination of care regarding the diagnosis and recommendations above.      Ashley Salgado PA-C   [facility]

## 2019-12-16 NOTE — TELEPHONE ENCOUNTER
Clonazepam 0.5 MG       Last Written Prescription Date:  10/28/19  Last Fill Quantity: 90,   # refills: 0  Last Office Visit: 10/15/19  Future Office visit:    Next 5 appointments (look out 90 days)    Dec 17, 2019  3:00 PM CST  Return Visit with Ashley Salgado PA-C  [facility] [address]  [facility] [id number] [phone]           Routing refill request to provider for review/approval because:  Drug not on the [facility], [facility] or [facility] refill protocol or controlled substance

## 2019-12-17 ENCOUNTER — OFFICE VISIT (OUTPATIENT)
Dept: PALLIATIVE MEDICINE | Facility: OTHER | Age: 54
End: 2019-12-17
Payer: COMMERCIAL

## 2019-12-17 VITALS
BODY MASS INDEX: 25.51 KG/M2 | SYSTOLIC BLOOD PRESSURE: 116 MMHG | WEIGHT: 162.5 LBS | DIASTOLIC BLOOD PRESSURE: 80 MMHG | HEIGHT: 67 IN

## 2019-12-17 DIAGNOSIS — G89.4 CHRONIC PAIN SYNDROME: ICD-10-CM

## 2019-12-17 DIAGNOSIS — M54.2 CERVICALGIA: ICD-10-CM

## 2019-12-17 DIAGNOSIS — G89.29 CHRONIC MIDLINE THORACIC BACK PAIN: ICD-10-CM

## 2019-12-17 DIAGNOSIS — M54.6 CHRONIC MIDLINE THORACIC BACK PAIN: ICD-10-CM

## 2019-12-17 DIAGNOSIS — M79.18 MYOFASCIAL PAIN: ICD-10-CM

## 2019-12-17 DIAGNOSIS — M47.812 ARTHROPATHY OF CERVICAL FACET JOINT: ICD-10-CM

## 2019-12-17 DIAGNOSIS — F11.90 CHRONIC, CONTINUOUS USE OF OPIOIDS: ICD-10-CM

## 2019-12-17 PROCEDURE — 99214 OFFICE O/P EST MOD 30 MIN: CPT | Performed by: PHYSICIAN ASSISTANT

## 2019-12-17 RX ORDER — METHADONE HYDROCHLORIDE 10 MG/1
TABLET ORAL
Qty: 60 TABLET | Refills: 0 | Status: SHIPPED | OUTPATIENT
Start: 2019-12-17 | End: 2020-01-14

## 2019-12-17 RX ORDER — OXYCODONE HYDROCHLORIDE 10 MG/1
TABLET ORAL
Qty: 115 TABLET | Refills: 0 | Status: SHIPPED | OUTPATIENT
Start: 2019-12-17 | End: 2020-01-14

## 2019-12-17 ASSESSMENT — MIFFLIN-ST. JEOR: SCORE: 1545.73

## 2019-12-17 ASSESSMENT — PAIN SCALES - GENERAL: PAINLEVEL: WORST PAIN (10)

## 2019-12-17 NOTE — PATIENT INSTRUCTIONS
After Visit Instructions:     Thank you for coming to [facility] for your care. It is my goal to partner with you to help you reach your optimal state of health.     I am recommending multidisciplinary care at this time.  The focus of care will be to continue gradual rehabilitation and pain management with medication adjustments as needed.    Continue daily self-care, identifying contributing factors, and monitoring variations in pain level. Continue to integrate self-care into your life.        Schedule follow-up with Ashely Salgado PA-C in 4 weeks. You will need to make this appointment.     Medication recommendations:     Methadone 10mg every 12 hours     Oxycodone 10mg every 6 hours as needed. Max 4/day. 115 tablets to last 30 days.     Try Aspercreme with 4% lidocaine to the neck area. Follow package instructions.       Ashley Salgado PA-C  [facility]    Contact information: [facility]  Clinic Number:  [phone]     Call with any questions about your care and for scheduling assistance.     Calls are returned Monday through Friday between 8 AM and 4:30 PM. We usually get back to you within 2 business days depending on the issue/request.    If we are prescribing your medications:    For opioid medication refills, call the clinic or send a [facility] message 7 days in advance.  Please include:    Name of requested medication    Name of the pharmacy.    For non-opioid medications, call your pharmacy directly to request a refill. Please allow 3-4 days to be processed.     Per [place] State Law:    All controlled substance prescriptions must be filled within 30 days of being written.      For those controlled substances allowing refills, pickup must occur within 30 days of last fill.      We believe regular attendance is key to your success in our program!      Any time you are unable to keep your appointment we ask that you call  us at least 24 hours in advance to cancel.This will allow us to offer the appointment time to another patient.   Multiple missed appointments may lead to dismissal from the clinic.

## 2019-12-30 ENCOUNTER — TELEPHONE (OUTPATIENT)
Dept: FAMILY MEDICINE | Facility: CLINIC | Age: 54
End: 2019-12-30

## 2019-12-30 NOTE — TELEPHONE ENCOUNTER
Prior Authorization Retail Medication Request    Medication/Dose: methadone  ICD code (if different than what is on RX):     Previously Tried and Failed:     Rationale:       Insurance Name:  [employer]  Insurance ID:  [id number]      Pharmacy Information (if different than what is on RX)  Name:     Phone:

## 2019-12-31 NOTE — TELEPHONE ENCOUNTER
Prior Authorization Approval    Authorization Effective Date: 12/1/2019  Authorization Expiration Date: 3/30/2020  Medication: methadone-APPROVED  Approved Dose/Quantity:   Reference #:     Insurance Company: [employer] - Phone [phone] Fax [phone]  Expected CoPay:       CoPay Card Available:      Foundation Assistance Needed:    Which Pharmacy is filling the prescription (Not needed for infusion/clinic administered): [facility] [address]   Pharmacy Notified: Yes  Patient Notified: No    Pharmacy will notify patient when medication is ready.

## 2019-12-31 NOTE — TELEPHONE ENCOUNTER
Central Prior Authorization Team   Phone: [phone]      PA Initiation    Medication: methadone-Initiated  Insurance Company: [employer] - Phone [phone] Fax [phone]  Pharmacy Filling the Rx: [place], [place] - [address] [place] [address]   Filling Pharmacy Phone: [phone]  Filling Pharmacy Fax:    Start Date: 12/31/2019

## 2020-01-01 ENCOUNTER — HOSPITAL ENCOUNTER (EMERGENCY)
Facility: CLINIC | Age: 55
Discharge: HOME | End: 2020-01-01
Attending: PHYSICIAN ASSISTANT | Admitting: PHYSICIAN ASSISTANT
Payer: COMMERCIAL

## 2020-01-01 VITALS
RESPIRATION RATE: 16 BRPM | TEMPERATURE: 97.7 F | OXYGEN SATURATION: 96 % | DIASTOLIC BLOOD PRESSURE: 99 MMHG | SYSTOLIC BLOOD PRESSURE: 140 MMHG

## 2020-01-01 DIAGNOSIS — M54.2 CHRONIC NECK PAIN: ICD-10-CM

## 2020-01-01 DIAGNOSIS — G89.29 CHRONIC NECK PAIN: ICD-10-CM

## 2020-01-01 DIAGNOSIS — M62.838 NECK MUSCLE SPASM: ICD-10-CM

## 2020-01-01 PROCEDURE — 20553 NJX 1/MLT TRIGGER POINTS 3/>: CPT | Performed by: PHYSICIAN ASSISTANT

## 2020-01-01 PROCEDURE — 99284 EMERGENCY DEPT VISIT MOD MDM: CPT | Mod: 25 | Performed by: PHYSICIAN ASSISTANT

## 2020-01-01 PROCEDURE — 25000128 H RX IP 250 OP 636: Performed by: PHYSICIAN ASSISTANT

## 2020-01-01 PROCEDURE — 20553 NJX 1/MLT TRIGGER POINTS 3/>: CPT | Mod: Z6 | Performed by: PHYSICIAN ASSISTANT

## 2020-01-01 PROCEDURE — 99283 EMERGENCY DEPT VISIT LOW MDM: CPT | Mod: 25 | Performed by: PHYSICIAN ASSISTANT

## 2020-01-01 RX ORDER — BUPIVACAINE HYDROCHLORIDE 5 MG/ML
10 INJECTION, SOLUTION EPIDURAL; INTRACAUDAL ONCE
Status: COMPLETED | OUTPATIENT
Start: 2020-01-01 | End: 2020-01-01

## 2020-01-01 RX ADMIN — BUPIVACAINE HYDROCHLORIDE 50 MG: 5 INJECTION, SOLUTION EPIDURAL; INTRACAUDAL at 22:28

## 2020-01-01 ASSESSMENT — ENCOUNTER SYMPTOMS
ARTHRALGIAS: 0
COLOR CHANGE: 0
NECK STIFFNESS: 0
ABDOMINAL PAIN: 0
MYALGIAS: 0
FEVER: 0
HEADACHES: 0
NECK PAIN: 1
CONFUSION: 0
SHORTNESS OF BREATH: 0
EYE REDNESS: 0
DIFFICULTY URINATING: 0

## 2020-01-01 NOTE — ED AVS SNAPSHOT
[facility] Emergency Department  [address] [place] [address]  Phone:  [phone]  Fax:  [phone]                                    Joselito Abarca   MRN: [id number]    Department:  [facility] Emergency Department   Date of Visit:  1/1/2020           After Visit Summary Signature Page    I have received my discharge instructions, and my questions have been answered. I have discussed any challenges I see with this plan with the nurse or doctor.    ..........................................................................................................................................  Patient/Patient Representative Signature      ..........................................................................................................................................  Patient Representative Print Name and Relationship to Patient    ..................................................               ................................................  Date                                   Time    ..........................................................................................................................................  Reviewed by Signature/Title    ...................................................              ..............................................  Date                                               Time          22EPIC Rev 08/18

## 2020-01-02 NOTE — ED TRIAGE NOTES
Pt presents with concerns of chronic neck pain.  Pt states that when it gets to this point he gets injections to help it calm down.  Pt states that it has been bad for about a week.  Pt took oxycodone and methadone for pain today, minimal change in the discomfort.

## 2020-01-02 NOTE — DISCHARGE INSTRUCTIONS
It was a pleasure working with you today!  I hope your condition improves rapidly!     Please keep using the heating pad for 20 minutes every hour for the next 24 hours to help with the muscle discomfort and spasm.  Hopefully the trigger point injections kick in to give you some relief.

## 2020-01-02 NOTE — ED PROVIDER NOTES
History     Chief Complaint   Patient presents with     Neck Pain     The history is provided by the patient.     Joselito Abarca is a 54 year old male with a history of chronic neck pain who presents to the ED with neck pain that has been increasing over the past couple weeks. Patient stated that he had a procedure a month ago on his neck. He stated that the right side of his neck is more painful that the left. His left arm has been going weak but there is no numbness or tingling, he has not been dropping any items that are being held. Patient is rating his pain as greater than a 10/10, he has been taking his pain medication with no relief.       Allergies:  Allergies   Allergen Reactions     Vicodin [Hydrocodone-Acetaminophen] Nausea     Other reaction(s): Diaphoresis, Vomiting  HEADACHE       Problem List:    Patient Active Problem List    Diagnosis Date Noted     Back pain, chronic 02/21/2019     Priority: Medium     S/P laparoscopic fundoplication 02/21/2019     Priority: Medium     Long-segment Olson's esophagus 02/21/2019     Priority: Medium     Gastroesophageal reflux disease, esophagitis presence not specified 09/21/2018     Priority: Medium     Chronic seasonal allergic rhinitis due to fungal spores 06/07/2018     Priority: Medium     Status post cervical spinal arthrodesis 11/29/2017     Priority: Medium     Chronic, continuous use of opioids 12/13/2016     Priority: Medium     Patient is followed by Gregory G. Schoen, MD for ongoing prescription of pain medication.  All refills should be approved by this provider, or covering partner.    Medication(s): Oxycodone 5 mg IR: Take 2 capsules (10 mg) by mouth every 4 hours as needed 2 caps q 4 hour prn pain up to 12 per day .   Methadone 10 mg three times daily, 90 per month  Clonazepam 0.5 mg tid, 90 per month   Clinic visit frequency required: Q 3 months     Controlled substance agreement:  Encounter-Level CSA - 2/27/15:               Controlled  Substance Agreement - Scan on 3/14/2015  8:47 AM : Controlled Medication Agreement-02/27/15 (below)            Pain Clinic evaluation in the past: Yes    DIRE Total Score(s):  No flowsheet data found.    Last [facility] website verification:  10/30/2016     [email or web address]         Moderate persistent asthma without complication 11/02/2016     Priority: Medium     Acute respiratory failure with hypoxia (H) 04/29/2016     Priority: Medium     Nausea with vomiting 04/27/2016     Priority: Medium     Cough 03/06/2016     Priority: Medium     Tobacco dependence syndrome 02/17/2016     Priority: Medium     Leukocytosis 02/16/2016     Priority: Medium     Disease of bronchial airway (HCC) 02/12/2016     Priority: Medium     Bronchiolectasis (H) 02/12/2016     Priority: Medium     Degeneration of cervical intervertebral disc 01/26/2015     Priority: Medium     Health Care Home 09/30/2013     Priority: Medium     Status:  Accepted  Care Coordinator:    Melissa Behl BSN, RN, PHN  [facility] Care Coordinator  [phone]     See Letters for [facility] Care Plan  Date:  April 26, 2016            Persons encountering health services in other specified circumstances 09/30/2013     Priority: Medium     Overview:   Overview:   Status:  Accepted  Care Coordinator:    Melissa Behl BSN, RN, PHN  [facility] Care Coordinator  [phone]     See Letters for [facility] Care Plan  Date:  April 26, 2016       Arthrodesis status 06/15/2011     Priority: Medium     Neck pain 06/15/2011     Priority: Medium     History of arthrodesis 06/15/2011     Priority: Medium     CARDIOVASCULAR SCREENING; LDL GOAL LESS THAN 160 10/31/2010     Priority: Medium     Encounter for screening for cardiovascular disorders 10/31/2010     Priority: Medium     Intervertebral cervical disc disorder with myelopathy, cervical region 11/12/2009     Priority: Medium     Patient is followed by TIARA JIMENEZ for ongoing prescription of narcotic pain medicine.  Med:  methadone 10 mg tid. Taking oxycodone up to 8 per day, 120 per month  Maximum use per month: 90  Expected duration: ongoing  Narcotic agreement on file: YES  Clinic visit recommended: Q 3 months         Intervertebral disc disorder of cervical region with myelopathy 11/12/2009     Priority: Medium     Overview:   Overview:   Patient is followed by TIARA JIMENEZ for ongoing prescription of narcotic pain medicine.  Med: methadone 10 mg tid. Taking oxycodone up to 8 per day, 120 per month  Maximum use per month: 90  Expected duration: ongoing  Narcotic agreement on file: YES  Clinic visit recommended: Q 3 months       Constipation 03/19/2008     Priority: Medium     Problem list name updated by automated process. Provider to review       Thoracic or lumbosacral neuritis or radiculitis, unspecified 03/19/2008     Priority: Medium     Esophageal reflux 07/09/2003     Priority: Medium     Generalized anxiety disorder 07/08/2003     Priority: Medium     Alcohol abuse, in remission 07/08/2003     Priority: Medium     Nondependent alcohol abuse, in remission 07/08/2003     Priority: Medium        Past Medical History:    Past Medical History:   Diagnosis Date     Allergic rhinitis      Anxiety      GERD (gastroesophageal reflux disease)      Neck pain 6/15/2011     Pneumonia      Uncomplicated asthma        Past Surgical History:    Past Surgical History:   Procedure Laterality Date     BRONCHOSCOPY (RIGID OR FLEXIBLE), DIAGNOSTIC N/A 8/7/2018    Procedure: COMBINED BRONCHOSCOPY (RIGID OR FLEXIBLE), LAVAGE;  Bronchoscopy with Lavage and Transbronchial Biopsy;  Surgeon: Yung Miranda MD;  [facility]     COLONOSCOPY WITH CO2 INSUFFLATION N/A 9/24/2018    Procedure: COLONOSCOPY WITH CO2 INSUFFLATION;  Colon and upper endoscopy ;  Surgeon: Devin Pelayo MD;  Location:  OR     COMBINED ESOPHAGOSCOPY, GASTROSCOPY, DUODENOSCOPY (EGD) WITH CO2 INSUFFLATION N/A 9/24/2018    Procedure: COMBINED  ESOPHAGOSCOPY, GASTROSCOPY, DUODENOSCOPY (EGD) WITH CO2 INSUFFLATION;  Colon and upper endoscopy ;  Surgeon: Devin Pelayo MD;  Location: [facility]     DISCECTOMY LUMBAR POSTERIOR MICROSCOPIC ONE LEVEL  2/21/2012    Procedure:DISCECTOMY LUMBAR POSTERIOR MICROSCOPIC ONE LEVEL; LEFT T1-T2 THORASIC HEMILAMINECTOMY MICRODISCECTOMY WITH MEXTRIX II ; Surgeon:SHARON PURI; Location:[facility]     DISCECTOMY, FUSION CERVICAL ANTERIOR ONE LEVEL, COMBINED N/A 11/29/2017    Procedure: COMBINED DISCECTOMY, FUSION CERVICAL ANTERIOR ONE LEVEL;  Anterior Cervical Discectomy and Fusion Cervical Six - Cervical Seven, Exploration and Revision Cervical Four - Cervical Six with Hardware Removal;  Surgeon: Nikolas Vasques MD;  Location: [facility]     ESOPHAGEAL IMPEDENCE FUNCTION TEST WITH 24 HOUR PH GREATER THAN 1 HOUR N/A 12/12/2018    Procedure: ESOPHAGEAL IMPEDENCE FUNCTION TEST WITH 24 HOUR PH GREATER THAN 1 HOUR;  Surgeon: Atif Oconnor MD;  [facility]     ESOPHAGOSCOPY, GASTROSCOPY, DUODENOSCOPY (EGD), COMBINED N/A 9/24/2018    Procedure: COMBINED ESOPHAGOSCOPY, GASTROSCOPY, DUODENOSCOPY (EGD), BIOPSY SINGLE OR MULTIPLE;;  Surgeon: Devin Pelayo MD;  Location: [facility]     EXPLORE SPINE, REMOVE HARDWARE, COMBINED N/A 11/29/2017    Procedure: COMBINED EXPLORE SPINE, REMOVE HARDWARE;;  Surgeon: Nikolas Vasques MD;  [facility]: [facility]     FUSION CERVICAL ANTERIOR TWO LEVELS  1/29/2010     HC DRAIN/INJ MAJOR JOINT/BURSA W/O US  5/5/2008    Left sacroiliac joint injection.     HC INJ EPIDURAL LUMBAR/SACRAL W/WO CONTRAST  2009     HEAD & NECK SURGERY      2013     HERNIA REPAIR       INJECT BLOCK MEDIAL BRANCH CERVICAL/THORACIC/LUMBAR Bilateral 8/26/2015    Procedure: INJECT BLOCK MEDIAL BRANCH CERVICAL / THORACIC / LUMBAR;  Surgeon: Ronald Driscoll MD;  [facility]     INJECT BLOCK MEDIAL BRANCH CERVICAL/THORACIC/LUMBAR N/A 8/8/2019    Procedure: BLOCK, NERVE, FACET JOINT, MEDIAL BRANCH,  DIAGNOSTIC Bilateral Cervical 2,3,4;  Surgeon: Ronald Driscoll MD;  [facility]     INJECT BLOCK MEDIAL BRANCH CERVICAL/THORACIC/LUMBAR N/A 9/13/2019    Procedure: Medial Branch Block Bilateral Cervical 2,3, and 4;  Surgeon: Ronald Driscoll MD;  [facility]     INJECT FACET JOINT Bilateral 5/27/2015    Procedure: INJECT FACET JOINT;  Surgeon: Ronald Driscoll MD;  [facility]: [facility]     NERVE BLOCK OCCIPITAL Bilateral 7/12/2018    Procedure: NERVE BLOCK OCCIPITAL;  bilateral occipital nerve blocks;  Surgeon: Ronald Driscoll MD;  [facility]: [facility]     PROCEDURE PLACEHOLDER GENERAL N/A 02/21/2019    Lap Ed at [facility]     RADIO FREQUENCY ABLATION PULSED CERVICAL Right 10/18/2019    Procedure: CERVICAL RADIOFREQUENCY ABLATION  Cervical 2,3,4;  Surgeon: Ronald Driscoll MD;  [facility]     RADIO FREQUENCY ABLATION PULSED CERVICAL Left 11/14/2019    Procedure: Left Cervical 2, 3, 4 Radiofreqency Ablation;  Surgeon: Ronald Driscoll MD;  Location: [facility]       Family History:    Family History   Problem Relation Age of Onset     Family History Negative No family hx of      C.A.D. No family hx of        Social History:  Marital Status:  Single [1]  Social History     Tobacco Use     Smoking status: Former Smoker     Packs/day: 1.00     Years: 30.00     Pack years: 30.00     Types: Cigars     Last attempt to quit: 12/19/2009     Years since quitting: 10.0     Smokeless tobacco: Former User     Quit date: 2012   Substance Use Topics     Alcohol use: No     Alcohol/week: 0.0 standard drinks     Comment: HX OF ABUSE-IN REMISSION     Drug use: No        Medications:    citalopram (CELEXA) 20 MG tablet  clonazePAM (KLONOPIN) 0.5 MG tablet  DULoxetine (CYMBALTA) 30 MG capsule  DULoxetine (CYMBALTA) 60 MG capsule  FLOVENT  MCG/ACT Inhaler  fluticasone (FLONASE) 50 MCG/ACT nasal spray  gabapentin (NEURONTIN) 300 MG capsule  ipratropium - albuterol 0.5 mg/2.5 mg/3 mL (DUONEB) 0.5-2.5 (3) MG/3ML neb solution  methadone  (DOLOPHINE) 10 MG tablet  naloxone (NARCAN) 4 MG/0.1ML nasal spray  nystatin (MYCOSTATIN) 704605 UNIT/ML suspension  order for DME  oxyCODONE IR (ROXICODONE) 10 MG tablet  sildenafil (VIAGRA) 100 MG tablet  traZODone (DESYREL) 100 MG tablet  VENTOLIN  (90 Base) MCG/ACT inhaler  vitamin D3 (CHOLECALCIFEROL) 2000 units tablet  zolpidem (AMBIEN) 10 MG tablet          Review of Systems   Constitutional: Negative for fever.   HENT: Negative for congestion.    Eyes: Negative for redness.   Respiratory: Negative for shortness of breath.    Cardiovascular: Negative for chest pain.   Gastrointestinal: Negative for abdominal pain.   Genitourinary: Negative for difficulty urinating.   Musculoskeletal: Positive for neck pain. Negative for arthralgias, myalgias and neck stiffness.   Skin: Negative for color change.   Neurological: Negative for headaches.   Psychiatric/Behavioral: Negative for confusion.   All other systems reviewed and are negative.      Physical Exam   BP: (!) 140/99  Heart Rate: 67  Temp: 97.7  F (36.5  C)  Resp: 16  SpO2: 96 %      Physical Exam  Vitals signs and nursing note reviewed.   Constitutional:       General: He is not in acute distress.     Appearance: Normal appearance. He is not toxic-appearing.   HENT:      Head: Normocephalic and atraumatic.      Right Ear: External ear normal.      Left Ear: External ear normal.      Nose: Nose normal.      Mouth/Throat:      Mouth: Mucous membranes are moist.      Pharynx: No oropharyngeal exudate or posterior oropharyngeal erythema.   Eyes:      General: No scleral icterus.     Extraocular Movements: Extraocular movements intact.   Neck:      Musculoskeletal: Normal range of motion.   Cardiovascular:      Rate and Rhythm: Normal rate and regular rhythm.      Heart sounds: Normal heart sounds.   Pulmonary:      Effort: Pulmonary effort is normal. No respiratory distress.      Breath sounds: Normal breath sounds. No stridor. No wheezing, rhonchi or  rales.   Abdominal:      General: There is no distension.      Tenderness: There is no abdominal tenderness.   Musculoskeletal:      Comments: Neck:  FROM with pain.  No abnormality on inspection.  Tender along the paraspinal muscles of the cervical  spine.  Trapezius muscles nontender.  No pain with axial loading. Upper extremities with FROM and no pain.  Strength is equal and appropriate.  Bicep, tricep, and brachioradialis DTR's 2/4 without clonus.  Sensation intact to light touch.  Distal pulses normal.      Skin:     General: Skin is warm and dry.      Coloration: Skin is not jaundiced.      Findings: No erythema.   Neurological:      General: No focal deficit present.      Mental Status: He is alert and oriented to person, place, and time. Mental status is at baseline.      Cranial Nerves: No cranial nerve deficit.      Motor: No weakness.   Psychiatric:         Mood and Affect: Mood normal.         Behavior: Behavior normal.         Thought Content: Thought content normal.         ED Course        8 trigger points were identified in the paravertebral musculature of the cervical spine and the base of the occiput bilaterally.  Each of them were cleansed with alcohol swab aggressively and then injected with 1.25 mL of 0.5% bupivacaine without epinephrine without complication.      Procedures               Critical Care time:  none               No results found. However, due to the size of the patient record, not all encounters were searched. Please check Results Review for a complete set of results.    Medications   bupivacaine (MARCAINE) 0.5% preservative free injection (50 mg Intradermal Given 1/1/20 2228)       Assessments & Plan (with Medical Decision Making)     Chronic neck pain  Neck muscle spasm     54 year old male with a history of chronic neck pain who presents for evaluation of exacerbation of his symptoms with muscle spasm noted.  He presents for trigger point injections, as this has worked very  well for him in the past when he gets an exacerbation like this not responding to his typical home medication regimen.  Denies any recent injury.  No fevers or chills.  8 trigger points in the bilateral paravertebral cervical musculature identified by the patient which I washed with an alcohol swab and injected with 1.25 mL of 0.5% bupivacaine without epinephrine.  Heat applied.  Patient reported significant improvement in his discomfort and requested discharge.  He states that he always gets relief from these trigger point injections.  Possible side effects discussed.  Indications for return reviewed.  He was in agreement with this plan and was suitable for discharge.     I have reviewed the nursing notes.    I have reviewed the findings, diagnosis, plan and need for follow up with the patient.       Discharge Medication List as of 1/1/2020 10:25 PM          Final diagnoses:   Chronic neck pain   Neck muscle spasm   This document serves as a record of services personally performed by Rajnedra Ojeda PA.  It was created on their behalf by Piper Carter, a trained medical scribe. The creation of this record is based on the provider's personal observations and the statements of the patient. This document has been checked and approved by the attending provider.    Disclaimer : This note consists of symbols derived from keyboarding, dictation and/or voice recognition software. As a result, there may be errors in the script that have gone undetected. Please consider this when interpreting information found in this chart.      1/1/2020   Rajendra Ojeda PA-C   [facility] EMERGENCY DEPARTMENT     Rajendra Ojeda PA-C  01/02/20 0019

## 2020-01-13 NOTE — PROGRESS NOTES
"[facility]    CHIEF COMPLAINT:   Pain  -neck pain    INTERVAL HISTORY:  Last seen on 12/17/19.        Recommendations/plan at the last visit included:  1. Physical Therapy:  Yes, continue current plan  2. Clinical Health Psychologist:  NO    3. Diagnostic Studies: none at this time  4. Medication Management:    1. Continue Methadone 10mg every 12 hours   2. Continue Oxycodone to 10mg every 6 hours as needed for  severe pain. Max of 4/day.   3. Continue Gabapentin at 900mg BID as he has been doing  since his last visit.   4. Continue Cymbalta as recommended by PCP  5. Try Aspercreme with 4% lidocaine to the neck area  5. Further procedures recommended: none at this time  6. Recommendations to PCP. See above      Follow up with this provider:  4 Weeks    Since his last visit, Joselito Abarca reports:    Patient presented to the ER on 1/1/2020 due to his chronic neck pain. He received 8 trigger point injections with 0.5% bupivacaine in bilateral paravertebral cervical musculature. This helped calm things down. He states that things are slowly getting intense again.     He stopped his Gabapentin about 1 month ago. He states that he stopped it due to breathing problems. He states that since he stopped it his breathing issues improved. He states that his pain is worse with stopping the Gabapentin. He states that he struggles through the night. He states that he wakes up every hour due to his pain. He states that his muscles are \"hard as a rock\".     He does not feel that the ablation has helped him. He states in the last 2 weeks, when he turns his head, he feels like he is going to faint/pass out. He states that he gets really lightheaded. It doesn't matter which way he turns his head. This does not happen every time he moves his head.     He states that he is starting to have some low back pain.       Pain Information:   Pain quality: throbbing    Pain rating: intensity ranges from 8/10 to 10/10, " and averages 9/10 on a 0-10 scale.   Pain today 8/10    SELF CARE:   How often do you practice SELF-CARE (relaxing, stretching, pacing, monitoring posture, taking mini-breaks) in a typical day:  Doing what he is able    Annual Controlled Substance Agreement: 7/16/2019  UDS: 6/28/2019    CURRENT RELEVANT PAIN MEDICATIONS:   Cymbalta 60mg daily  Clonazepam 0.5mg-takes 3-4/day  Methadone 10mg twice daily  Oxycodone 10mg every 6 hours max of 4/day    Patient is using the medication as prescribed:  YES  Is your medication helpful? NO   Medication side effects? no side effect    Previous Medications: (H--helped; HI--Helped initially; SWH-- somewhat helpful, NH--No help; W--worse; SE--side effects)   Opiates: hydrocodone SE allergic, fever, sweaty, headache, methadone H, Oxycodone H  NSAIDS: Ibuprofen NH, Aleve NH  Anti-migraine mediations: Fiorinal H  Muscle Relaxants: Valium H  Neuropathics: Gabapentin H SE breathing issues  Anti-depressants: none  Anxiolytics: Klonopin H  Topicals: Lidocaine H  Other medications not covered above: Tylenol NH    Past Pain Treatments:  Pain Clinic:   Yes, he was previously seen at [facility] and [facility]   PT: Yes, has done many times  Psychologist: No  Relaxation techniques/biofeedback: No  Chiropractor: Yes, feels that it made it worse  Acupuncture: Yes, just did one session  Pharmacotherapy:               Opioids: Yes                Non-opioids:    Yes   TENs Unit:Yes, aggravates the pain  Injections: Yes, bilateral occipital nerve blocks 07/12/2018, Cervical medial branch blocks 08/26/2015 and 5/27/2015. Has had low back pain injections at [facility]. Right cervical RFA 10/18/2019, left cervical RFA 11/14/2019.  Self-care:   Yes, hot tubs, ice packs, heating pads  Surgeries related to pain: Yes,  1. anterior cervical discectomy and fusion C6-7, exploration and revision C4-6 with hardwar removal 11/29/2017,   2. Left T1-T2 thoracic hemilaminectomy, microdiscectomy 02/21/2012,   3. Removal of C4  through C6 anterior cervical plate, Exploration of C4-C5 and C5-C6 anterior cervical fusion, C4-C5 and C5-C6 arthrodesis, C4 through C6 anterior cervical instrumentation, and [place] of right iliac hip graft.  03/15/2011  4. C4-C5 and C5-C6 anterior cervical discectomy and fusion with instrumentation and neural electrophysiologic monitoring 01/26/2010    [place] Board of Pharmacy Data Base Reviewed:    YES; As expected, no concern for misuse/abuse of controlled medications based on this report.    THE 4 As OF OPIOID MAINTENANCE ANALGESIA    Analgesia: Is pain relief clinically significant? NO   Activity: Is patient functional and able to perform Activities of Daily Living? YES   Adverse effects: Is patient free from adverse side effects from opiates? YES   Adherence to Rx protocol: Is patient adhering to Controlled Substance Agreement and taking medications ONLY as ordered? YES       Is Narcan prescribed for opiate use >50 MME daily? YES      Daily MME: 140    Medications:  Current Outpatient Medications   Medication Sig Dispense Refill     citalopram (CELEXA) 20 MG tablet Take 1 tablet (20 mg) by mouth daily 90 tablet 3     clonazePAM (KLONOPIN) 0.5 MG tablet TAKE ONE-HALF TO  ONE TABLET THREE TIMES A DAY AS NEEDED FOR  ANXIETY 90 tablet 0     DULoxetine (CYMBALTA) 60 MG capsule Take 1 capsule (60 mg) by mouth daily 30 capsule 3     FLOVENT  MCG/ACT Inhaler INHALE 2 PUFFS INTO THE LUNGS TWICE DAILY 36 g 2     fluticasone (FLONASE) 50 MCG/ACT nasal spray SPRAY 2 SPRAYS IN EACH NOSTRIL EVERY DAY 16 g 5     ipratropium - albuterol 0.5 mg/2.5 mg/3 mL (DUONEB) 0.5-2.5 (3) MG/3ML neb solution Take 1 vial (3 mLs) by nebulization every 4 hours as needed for shortness of breath / dyspnea 30 vial 0     methadone (DOLOPHINE) 10 MG tablet Take 1 tablet every 12 hours. Fill 12/30/2019. Start 12/31/2019. 30 day script. 60 tablet 0     nystatin (MYCOSTATIN) 324364 UNIT/ML suspension TAKE 5 MLS BY MOUTH FOUR TIMES A DAY  "280 mL 0     order for DME Equipment being ordered: Nebulizer mask and tubing 1 each 1     oxyCODONE IR (ROXICODONE) 10 MG tablet Take 1 tablet every 6 hours as needed for breakthrough pain. Max of 4/day. Fill 12/20/2019. Start 12/22/2019. 30 day script 115 tablet 0     sildenafil (VIAGRA) 100 MG tablet Take 1 tablet (100 mg) by mouth daily as needed 30 min to 4 hrs before sex. Do not use with nitroglycerin, terazosin or doxazosin. 4 tablet 0     traZODone (DESYREL) 100 MG tablet Take 3 tablets (300 mg) by mouth At Bedtime 90 tablet 3     VENTOLIN  (90 Base) MCG/ACT inhaler INHALE 2 PUFFS INTO THE LUNGS EVERY 6 HOURS AS NEEDED FOR SHORTNESS OF BREATH, DIFFICULTY BREATHING OR WHEEZING. 18 g 3     vitamin D3 (CHOLECALCIFEROL) 2000 units tablet TAKE ONE TABLET BY MOUTH EVERY  tablet 3     zolpidem (AMBIEN) 10 MG tablet Take 10 mg by mouth nightly as needed        DULoxetine (CYMBALTA) 30 MG capsule Take 1 capsule (30 mg) by mouth daily for 7 days 7 capsule 0     gabapentin (NEURONTIN) 300 MG capsule Take 3 capsules (900 mg) by mouth 2 times daily (Patient not taking: Reported on 1/14/2020) 540 capsule 0     naloxone (NARCAN) 4 MG/0.1ML nasal spray Spray 1 spray (4 mg) into one nostril alternating nostrils as needed for opioid reversal every 2-3 minutes until assistance arrives (Patient not taking: Reported on 1/14/2020) 0.2 mL 1       Review of Systems: A 10-point review of systems was negative, with the exception of chronic pain issues, headache, dizziness, depression, and anxiety.    Social History: Reviewed; unchanged from previous consultation.      Family history: Reviewed; unchanged from previous consultation.     PHYSICAL EXAM:     Vitals:   /80   Temp 97.6  F (36.4  C) (Temporal)   Ht 1.702 m (5' 7\")   Wt 75.3 kg (166 lb)   BMI 26.00 kg/m    Body mass index is 26 kg/m .  5' 7\"  166 lbs 0 oz      Constitutional: healthy, alert and no distress  HEENT: Head atraumatic, normocephalic. Eyes " without conjunctival injection or jaundice. Neck supple. No obvious neck masses.  Skin: No rash, lesions, or petechiae of exposed skin.   Psychiatric/mental status: Alert, without lethargy or stupor. Appropriate affect. Mood normal.       DIAGNOSTIC TESTS:  Imaging Studies:   No new imaging to review today.    Assessment:  Joselito Abarca is a 54 year old male who presents today for follow up regarding his:      1. Arthropathy of cervical facet joint  2. cervicalgia  3. Midline thoracic back pain  4. Myofascial pain  5. Chronic pain syndrome  6. Chronic use of opioids  7. Chronic low back pain    Discussed with the patient that we could refer him for a lumbar epidural steroid injection.  Would like to proceed with that for his low back pain as he has had these before and found them to be helpful.    With regards to his medications I gave him 2 options for a 10% taper, we could reduce his methadone to 7.5 mg in the morning and 10 mg at bedtime continue the oxycodone 10 mg up to 4 times a day versus continuing the methadone at 10 mg twice daily and reducing his oxycodone.  Patient elected to reduce his methadone.  He was very frustrated at having to reduce his medications further.  We again had a discussion that I do not support him being on this high dosing of opiate pain medications.  I did discuss with him that he has reduced significantly since we started working together however he is still at high doses. This change today will bring his MME from 140 to 130.     He does report a lot of muscle tension/spasms.  Discussed trying a muscle relaxant which she would like to try.  Will do tizanidine 2 mg with instructions to take 1 to 2 tablets 3 times daily as needed.  Side effects discussed with the patient.        Plan:    Diagnosis reviewed, treatment option addressed, and risk/benifits discussed.  Self-care instructions given.  I am recommending a multidisciplinary treatment plan to help this patient better manage  pain.      1. Physical Therapy:  Yes, continue current plan  2. Clinical Health Psychologist:  NO    3. Diagnostic Studies: none at this time  4. Medication Management:    1. Reduce Methadone 7.5mg in AM and 10mg at bedtime, space dosing by 12 hours   2. Continue Oxycodone to 10mg every 6 hours as needed for  severe pain. Max of 4/day.   3. Start Tizanidine 2mg 1-2 tablets TID PRN   4. Continue Cymbalta as recommended by PCP  5. Further procedures recommended: referred for LESI  6. Recommendations to PCP. See above      Follow up with this provider:  4 Weeks     Total time spent face to face was 25 minutes and more than 50% of face to face time was spent in counseling and/or coordination of care regarding the diagnosis and recommendations, including injection therapy and medication management.      Ashley Salgado PA-C   [facility]

## 2020-01-14 ENCOUNTER — OFFICE VISIT (OUTPATIENT)
Dept: PALLIATIVE MEDICINE | Facility: OTHER | Age: 55
End: 2020-01-14
Payer: COMMERCIAL

## 2020-01-14 VITALS
SYSTOLIC BLOOD PRESSURE: 116 MMHG | HEIGHT: 67 IN | WEIGHT: 166 LBS | TEMPERATURE: 97.6 F | BODY MASS INDEX: 26.06 KG/M2 | DIASTOLIC BLOOD PRESSURE: 80 MMHG

## 2020-01-14 DIAGNOSIS — F11.90 CHRONIC, CONTINUOUS USE OF OPIOIDS: ICD-10-CM

## 2020-01-14 DIAGNOSIS — G89.29 CHRONIC BILATERAL LOW BACK PAIN WITHOUT SCIATICA: Primary | ICD-10-CM

## 2020-01-14 DIAGNOSIS — M54.2 CERVICALGIA: ICD-10-CM

## 2020-01-14 DIAGNOSIS — G89.29 CHRONIC MIDLINE THORACIC BACK PAIN: ICD-10-CM

## 2020-01-14 DIAGNOSIS — M54.6 CHRONIC MIDLINE THORACIC BACK PAIN: ICD-10-CM

## 2020-01-14 DIAGNOSIS — M54.50 CHRONIC BILATERAL LOW BACK PAIN WITHOUT SCIATICA: Primary | ICD-10-CM

## 2020-01-14 DIAGNOSIS — F41.1 GENERALIZED ANXIETY DISORDER: ICD-10-CM

## 2020-01-14 DIAGNOSIS — M79.18 MYOFASCIAL PAIN: ICD-10-CM

## 2020-01-14 DIAGNOSIS — G89.4 CHRONIC PAIN SYNDROME: ICD-10-CM

## 2020-01-14 DIAGNOSIS — M47.812 ARTHROPATHY OF CERVICAL FACET JOINT: ICD-10-CM

## 2020-01-14 PROCEDURE — 99214 OFFICE O/P EST MOD 30 MIN: CPT | Performed by: PHYSICIAN ASSISTANT

## 2020-01-14 RX ORDER — TIZANIDINE 2 MG/1
TABLET ORAL
Qty: 60 TABLET | Refills: 0 | Status: SHIPPED | OUTPATIENT
Start: 2020-01-14 | End: 2020-02-03

## 2020-01-14 RX ORDER — METHADONE HYDROCHLORIDE 5 MG/1
TABLET ORAL
Qty: 105 TABLET | Refills: 0 | Status: SHIPPED | OUTPATIENT
Start: 2020-01-14 | End: 2020-02-11

## 2020-01-14 RX ORDER — OXYCODONE HYDROCHLORIDE 10 MG/1
TABLET ORAL
Qty: 115 TABLET | Refills: 0 | Status: SHIPPED | OUTPATIENT
Start: 2020-01-14 | End: 2020-02-11

## 2020-01-14 RX ORDER — METHADONE HYDROCHLORIDE 5 MG/1
TABLET ORAL
Qty: 105 TABLET | Refills: 0 | Status: SHIPPED | OUTPATIENT
Start: 2020-01-14 | End: 2020-01-14

## 2020-01-14 ASSESSMENT — PAIN SCALES - GENERAL: PAINLEVEL: EXTREME PAIN (8)

## 2020-01-14 ASSESSMENT — MIFFLIN-ST. JEOR: SCORE: 1561.6

## 2020-01-14 NOTE — PATIENT INSTRUCTIONS
After Visit Instructions:     Thank you for coming to [facility] for your care. It is my goal to partner with you to help you reach your optimal state of health.     I am recommending multidisciplinary care at this time.  The focus of care will be to continue gradual rehabilitation and pain management with medication adjustments as needed.    Continue daily self-care, identifying contributing factors, and monitoring variations in pain level. Continue to integrate self-care into your life.        Schedule follow-up with Ashley Salgado PA-C in 4 weeks. You will need to make this appointment.   Procedures recommended: referred for a lumbar epidural steroid injection. To call and schedule your procedure, you can call: [phone], then hit option 2     Medication recommendations:     Reduce Methadone to 7.5mg (1.5 tablets) in AM and 10mg (2 tablets) at bedtime. Space dosing by 12 hours    Continue Oxycodone 10mg every 6 hours as needed for breakthrough pain. Max of 4/day    Start Tizanidine 2mg: take 1-2 tablets three times daily as needed for muscle spasms/tension. Do not take within 6 hours of clonazepam.      Ashley Salgado PA-C  [facility]  [facility]    Contact information: [facility] [place]  Clinic Number:  [phone]     Call with any questions about your care and for scheduling assistance.     Calls are returned Monday through Friday between 8 AM and 4:30 PM. We usually get back to you within 2 business days depending on the issue/request.    If we are prescribing your medications:    For opioid medication refills, call the clinic or send a [employer] message 7 days in advance.  Please include:    Name of requested medication    Name of the pharmacy.    For non-opioid medications, call your pharmacy directly to request a refill. Please allow 3-4 days to be processed.     Per [place] State Law:    All controlled substance prescriptions must be  filled within 30 days of being written.      For those controlled substances allowing refills, pickup must occur within 30 days of last fill.      We believe regular attendance is key to your success in our program!      Any time you are unable to keep your appointment we ask that you call us at least 24 hours in advance to cancel.This will allow us to offer the appointment time to another patient.   Multiple missed appointments may lead to dismissal from the clinic.

## 2020-01-15 ENCOUNTER — TELEPHONE (OUTPATIENT)
Dept: SURGERY | Facility: CLINIC | Age: 55
End: 2020-01-15

## 2020-01-15 RX ORDER — CLONAZEPAM 0.5 MG/1
TABLET ORAL
Qty: 90 TABLET | Refills: 0 | Status: SHIPPED | OUTPATIENT
Start: 2020-01-15 | End: 2020-02-14

## 2020-01-15 NOTE — TELEPHONE ENCOUNTER
clonazepam      Last Written Prescription Date:  12/16/19  Last Fill Quantity: 90,   # refills: 0  Last Office Visit: 10/15/19  Future Office visit:    Next 5 appointments (look out 90 days)    Feb 14, 2020  4:30 PM CST  Office Visit with Gregory G Schoen, MD  [facility] [address] [place] [id number]  [phone]           Routing refill request to provider for review/approval because:  Drug not on the [facility], [facility] or [facility] refill protocol or controlled substance

## 2020-01-22 NOTE — TELEPHONE ENCOUNTER
Pt scheduled for LESI  Date: 2/13/20  Time: 2:30pm  Dr. Driscoll    Instructed pt to have H&P and  for procedure.

## 2020-01-31 DIAGNOSIS — M79.18 MYOFASCIAL PAIN: ICD-10-CM

## 2020-02-03 RX ORDER — TIZANIDINE 2 MG/1
TABLET ORAL
Qty: 60 TABLET | Refills: 0 | Status: SHIPPED | OUTPATIENT
Start: 2020-02-03 | End: 2020-03-05

## 2020-02-03 NOTE — TELEPHONE ENCOUNTER
Please accept or deny in Dr. Schoen's absence as he is out of the clinic until Monday 02/10/2020  Thank you   Mica GREY

## 2020-02-03 NOTE — TELEPHONE ENCOUNTER
Routing refill request to provider for review/approval because:  Drug not on the [facility] refill protocol     Zanaflex  Last Written Prescription Date:  1/14/2020  Last Fill Quantity: 60,  # refills: 0   Last office visit: 10/15/2019 with prescribing provider:  Schoen   Future Office Visit:   Next 5 appointments (look out 90 days)    Feb 05, 2020  4:30 PM CST  Pre-Op physical with Jung Jean MD  [address] [address]  [phone]   Feb 14, 2020  4:30 PM CST  Office Visit with Gregory G Schoen, MD  [address] [address]  [phone]           Requested Prescriptions   Pending Prescriptions Disp Refills     tiZANidine (ZANAFLEX) 2 MG tablet 60 tablet 0     Sig: Take 1-2 tablets three times daily as needed for muscle spasms/tension       There is no refill protocol information for this order        Jennifer Barber RN on 2/3/2020 at 8:52 AM

## 2020-02-05 ENCOUNTER — OFFICE VISIT (OUTPATIENT)
Dept: FAMILY MEDICINE | Facility: CLINIC | Age: 55
End: 2020-02-05
Payer: COMMERCIAL

## 2020-02-05 VITALS
OXYGEN SATURATION: 98 % | SYSTOLIC BLOOD PRESSURE: 96 MMHG | BODY MASS INDEX: 26.53 KG/M2 | WEIGHT: 169.4 LBS | RESPIRATION RATE: 16 BRPM | HEART RATE: 88 BPM | DIASTOLIC BLOOD PRESSURE: 66 MMHG | TEMPERATURE: 97 F

## 2020-02-05 DIAGNOSIS — B37.0 THRUSH: ICD-10-CM

## 2020-02-05 DIAGNOSIS — Z01.818 PREOP GENERAL PHYSICAL EXAM: Primary | ICD-10-CM

## 2020-02-05 LAB
ANION GAP SERPL CALCULATED.3IONS-SCNC: 4 MMOL/L (ref 3–14)
BASOPHILS # BLD AUTO: 0.1 10E9/L (ref 0–0.2)
BASOPHILS NFR BLD AUTO: 1 %
BUN SERPL-MCNC: 5 MG/DL (ref 7–30)
CALCIUM SERPL-MCNC: 9 MG/DL (ref 8.5–10.1)
CHLORIDE SERPL-SCNC: 99 MMOL/L (ref 94–109)
CO2 SERPL-SCNC: 32 MMOL/L (ref 20–32)
CREAT SERPL-MCNC: 0.99 MG/DL (ref 0.66–1.25)
DIFFERENTIAL METHOD BLD: NORMAL
EOSINOPHIL NFR BLD AUTO: 4.1 %
ERYTHROCYTE [DISTWIDTH] IN BLOOD BY AUTOMATED COUNT: 12.4 % (ref 10–15)
GFR SERPL CREATININE-BSD FRML MDRD: 87 ML/MIN/{1.73_M2}
GLUCOSE SERPL-MCNC: 94 MG/DL (ref 70–99)
HCT VFR BLD AUTO: 44.2 % (ref 40–53)
HGB BLD-MCNC: 14.7 G/DL (ref 13.3–17.7)
IMM GRANULOCYTES # BLD: 0 10E9/L (ref 0–0.4)
IMM GRANULOCYTES NFR BLD: 0.3 %
LYMPHOCYTES # BLD AUTO: 2.1 10E9/L (ref 0.8–5.3)
LYMPHOCYTES NFR BLD AUTO: 35.1 %
MCH RBC QN AUTO: 32 PG (ref 26.5–33)
MCHC RBC AUTO-ENTMCNC: 33.3 G/DL (ref 31.5–36.5)
MCV RBC AUTO: 96 FL (ref 78–100)
MONOCYTES # BLD AUTO: 0.3 10E9/L (ref 0–1.3)
MONOCYTES NFR BLD AUTO: 5.3 %
NEUTROPHILS # BLD AUTO: 3.3 10E9/L (ref 1.6–8.3)
NEUTROPHILS NFR BLD AUTO: 54.2 %
NRBC # BLD AUTO: 0 10*3/UL
NRBC BLD AUTO-RTO: 0 /100
PLATELET # BLD AUTO: 184 10E9/L (ref 150–450)
POTASSIUM SERPL-SCNC: 4.1 MMOL/L (ref 3.4–5.3)
RBC # BLD AUTO: 4.6 10E12/L (ref 4.4–5.9)
SODIUM SERPL-SCNC: 135 MMOL/L (ref 133–144)
WBC # BLD AUTO: 6.1 10E9/L (ref 4–11)

## 2020-02-05 PROCEDURE — 99214 OFFICE O/P EST MOD 30 MIN: CPT | Performed by: FAMILY MEDICINE

## 2020-02-05 PROCEDURE — 85025 COMPLETE CBC W/AUTO DIFF WBC: CPT | Performed by: FAMILY MEDICINE

## 2020-02-05 PROCEDURE — 36415 COLL VENOUS BLD VENIPUNCTURE: CPT | Performed by: FAMILY MEDICINE

## 2020-02-05 PROCEDURE — 80048 BASIC METABOLIC PNL TOTAL CA: CPT | Performed by: FAMILY MEDICINE

## 2020-02-05 ASSESSMENT — PAIN SCALES - GENERAL: PAINLEVEL: MODERATE PAIN (4)

## 2020-02-05 NOTE — PROGRESS NOTES
[address] [address]  [phone]  Dept: [phone]    PRE-OP EVALUATION:  Today's date: 2020    Joselito Abarca (: 1965) presents for pre-operative evaluation assessment as requested by Dr. Driscoll.  He requires evaluation and anesthesia risk assessment prior to undergoing surgery/procedure for treatment of low back pain .    Proposed Surgery/ Procedure: Lumbar Epidural Injection  Date of Surgery/ Procedure: 20  Time of Surgery/ Procedure: 2:30 pm  Hospital/Surgical Facility: [facility]  Primary Physician: Schoen, Gregory G  Type of Anesthesia Anticipated: Local with MAC    Patient has a Health Care Directive or Living Will:  NO    1. NO - Do you have a history of heart attack, stroke, stent, bypass or surgery on an artery in the head, neck, heart or legs?  2. NO - Do you ever have any pain or discomfort in your chest?  3. NO - Do you have a history of  Heart Failure?  4. NO - Are you troubled by shortness of breath when: walking on the level, up a slight hill or at night?  5. NO - Do you currently have a cold, bronchitis or other respiratory infection?  6. NO - Do you have a cough, shortness of breath or wheezing?  7. NO - Do you sometimes get pains in the calves of your legs when you walk?  8. NO - Do you or anyone in your family have previous history of blood clots?  9. NO - Do you or does anyone in your family have a serious bleeding problem such as prolonged bleeding following surgeries or cuts?  10. NO - Have you ever had problems with anemia or been told to take iron pills?  11. NO - Have you had any abnormal blood loss such as black, tarry or bloody stools, or abnormal vaginal bleeding?  12. NO - Have you ever had a blood transfusion?  13. NO - Have you or any of your relatives ever had problems with anesthesia?  14. NO - Do you have sleep apnea, excessive snoring or daytime drowsiness?  15. NO - Do you have any prosthetic  heart valves?  16. NO - Do you have prosthetic joints?  17. NO - Is there any chance that you may be pregnant?      HPI:     HPI related to upcoming procedure: LESI for chronic back pain      See problem list for active medical problems.  Problems all longstanding and stable, except as noted/documented.  See ROS for pertinent symptoms related to these conditions.      MEDICAL HISTORY:     Patient Active Problem List    Diagnosis Date Noted     Back pain, chronic 02/21/2019     Priority: Medium     S/P laparoscopic fundoplication 02/21/2019     Priority: Medium     Long-segment Olson's esophagus 02/21/2019     Priority: Medium     Gastroesophageal reflux disease, esophagitis presence not specified 09/21/2018     Priority: Medium     Chronic seasonal allergic rhinitis due to fungal spores 06/07/2018     Priority: Medium     Status post cervical spinal arthrodesis 11/29/2017     Priority: Medium     Chronic, continuous use of opioids 12/13/2016     Priority: Medium     Patient is followed by Gregory G. Schoen, MD for ongoing prescription of pain medication.  All refills should be approved by this provider, or covering partner.    Medication(s): Oxycodone 5 mg IR: Take 2 capsules (10 mg) by mouth every 4 hours as needed 2 caps q 4 hour prn pain up to 12 per day .   Methadone 10 mg three times daily, 90 per month  Clonazepam 0.5 mg tid, 90 per month   Clinic visit frequency required: Q 3 months     Controlled substance agreement:  Encounter-Level CSA - 2/27/15:               Controlled Substance Agreement - Scan on 3/14/2015  8:47 AM : Controlled Medication Agreement-02/27/15 (below)            Pain Clinic evaluation in the past: Yes    DIRE Total Score(s):  No flowsheet data found.    Last [facility] website verification:  10/30/2016     [email or web address]         Moderate persistent asthma without complication 11/02/2016     Priority: Medium     Acute respiratory failure with hypoxia (H) 04/29/2016     Priority:  Medium     Nausea with vomiting 04/27/2016     Priority: Medium     Cough 03/06/2016     Priority: Medium     Tobacco dependence syndrome 02/17/2016     Priority: Medium     Leukocytosis 02/16/2016     Priority: Medium     Disease of bronchial airway (HCC) 02/12/2016     Priority: Medium     Bronchiolectasis (H) 02/12/2016     Priority: Medium     Degeneration of cervical intervertebral disc 01/26/2015     Priority: Medium     Health Care Home 09/30/2013     Priority: Medium     Status:  Accepted  Care Coordinator:    Melissa Behl BSN, RN, N  [facility] Care Coordinator  [phone]     See Letters for [facility] Care Plan  Date:  April 26, 2016            Persons encountering health services in other specified circumstances 09/30/2013     Priority: Medium     Overview:   Overview:   Status:  Accepted  Care Coordinator:    Melissa Behl BSN, RN, N  [facility] Care Coordinator  [phone]     See Letters for [facility] Care Plan  Date:  April 26, 2016       Arthrodesis status 06/15/2011     Priority: Medium     Neck pain 06/15/2011     Priority: Medium     History of arthrodesis 06/15/2011     Priority: Medium     CARDIOVASCULAR SCREENING; LDL GOAL LESS THAN 160 10/31/2010     Priority: Medium     Encounter for screening for cardiovascular disorders 10/31/2010     Priority: Medium     Intervertebral cervical disc disorder with myelopathy, cervical region 11/12/2009     Priority: Medium     Patient is followed by TIARA JIMENEZ for ongoing prescription of narcotic pain medicine.  Med: methadone 10 mg tid. Taking oxycodone up to 8 per day, 120 per month  Maximum use per month: 90  Expected duration: ongoing  Narcotic agreement on file: YES  Clinic visit recommended: Q 3 months         Intervertebral disc disorder of cervical region with myelopathy 11/12/2009     Priority: Medium     Overview:   Overview:   Patient is followed by TIARA JIMENEZ for ongoing prescription of narcotic pain medicine.  Med: methadone 10  mg tid. Taking oxycodone up to 8 per day, 120 per month  Maximum use per month: 90  Expected duration: ongoing  Narcotic agreement on file: YES  Clinic visit recommended: Q 3 months       Constipation 03/19/2008     Priority: Medium     Problem list name updated by automated process. Provider to review       Thoracic or lumbosacral neuritis or radiculitis, unspecified 03/19/2008     Priority: Medium     Esophageal reflux 07/09/2003     Priority: Medium     Generalized anxiety disorder 07/08/2003     Priority: Medium     Alcohol abuse, in remission 07/08/2003     Priority: Medium     Nondependent alcohol abuse, in remission 07/08/2003     Priority: Medium      Past Medical History:   Diagnosis Date     Allergic rhinitis      Anxiety      GERD (gastroesophageal reflux disease)      Neck pain 6/15/2011     Pneumonia      Uncomplicated asthma      Past Surgical History:   Procedure Laterality Date     BRONCHOSCOPY (RIGID OR FLEXIBLE), DIAGNOSTIC N/A 8/7/2018    Procedure: COMBINED BRONCHOSCOPY (RIGID OR FLEXIBLE), LAVAGE;  Bronchoscopy with Lavage and Transbronchial Biopsy;  Surgeon: Yung Miranda MD;  Location: [facility]     COLONOSCOPY WITH CO2 INSUFFLATION N/A 9/24/2018    Procedure: COLONOSCOPY WITH CO2 INSUFFLATION;  Colon and upper endoscopy ;  Surgeon: Devin Pelayo MD;  Location: [facility]     COMBINED ESOPHAGOSCOPY, GASTROSCOPY, DUODENOSCOPY (EGD) WITH CO2 INSUFFLATION N/A 9/24/2018    Procedure: COMBINED ESOPHAGOSCOPY, GASTROSCOPY, DUODENOSCOPY (EGD) WITH CO2 INSUFFLATION;  Colon and upper endoscopy ;  Surgeon: Devin Pelayo MD;  Location: [facility]     DISCECTOMY LUMBAR POSTERIOR MICROSCOPIC ONE LEVEL  2/21/2012    Procedure:DISCECTOMY LUMBAR POSTERIOR MICROSCOPIC ONE LEVEL; LEFT T1-T2 THORASIC HEMILAMINECTOMY MICRODISCECTOMY WITH MEXTRIX II ; Surgeon:SHARON PURI; Location:[facility]     DISCECTOMY, FUSION CERVICAL ANTERIOR ONE LEVEL, COMBINED N/A 11/29/2017    Procedure:  COMBINED DISCECTOMY, FUSION CERVICAL ANTERIOR ONE LEVEL;  Anterior Cervical Discectomy and Fusion Cervical Six - Cervical Seven, Exploration and Revision Cervical Four - Cervical Six with Hardware Removal;  Surgeon: Nikolas Vasques MD;  Location: [facility]     ESOPHAGEAL IMPEDENCE FUNCTION TEST WITH 24 HOUR PH GREATER THAN 1 HOUR N/A 12/12/2018    Procedure: ESOPHAGEAL IMPEDENCE FUNCTION TEST WITH 24 HOUR PH GREATER THAN 1 HOUR;  Surgeon: Atif Oconnor MD;  Location: [facility]     ESOPHAGOSCOPY, GASTROSCOPY, DUODENOSCOPY (EGD), COMBINED N/A 9/24/2018    Procedure: COMBINED ESOPHAGOSCOPY, GASTROSCOPY, DUODENOSCOPY (EGD), BIOPSY SINGLE OR MULTIPLE;;  Surgeon: Devin Pelayo MD;  Location: [facility]     EXPLORE SPINE, REMOVE HARDWARE, COMBINED N/A 11/29/2017    Procedure: COMBINED EXPLORE SPINE, REMOVE HARDWARE;;  Surgeon: Nikolas Vasques MD;  [facility]     FUSION CERVICAL ANTERIOR TWO LEVELS  1/29/2010     HC DRAIN/INJ MAJOR JOINT/BURSA W/O US  5/5/2008    Left sacroiliac joint injection.     HC INJ EPIDURAL LUMBAR/SACRAL W/WO CONTRAST  2009     HEAD & NECK SURGERY      2013     HERNIA REPAIR       INJECT BLOCK MEDIAL BRANCH CERVICAL/THORACIC/LUMBAR Bilateral 8/26/2015    Procedure: INJECT BLOCK MEDIAL BRANCH CERVICAL / THORACIC / LUMBAR;  Surgeon: Ronald Driscoll MD;  [facility]     INJECT BLOCK MEDIAL BRANCH CERVICAL/THORACIC/LUMBAR N/A 8/8/2019    Procedure: BLOCK, NERVE, FACET JOINT, MEDIAL BRANCH, DIAGNOSTIC Bilateral Cervical 2,3,4;  Surgeon: Ronald Driscoll MD;  [facility]     INJECT BLOCK MEDIAL BRANCH CERVICAL/THORACIC/LUMBAR N/A 9/13/2019    Procedure: Medial Branch Block Bilateral Cervical 2,3, and 4;  Surgeon: Ronald Driscoll MD;  [facility]     INJECT FACET JOINT Bilateral 5/27/2015    Procedure: INJECT FACET JOINT;  Surgeon: Ronald Driscoll MD;  [facility]     NERVE BLOCK OCCIPITAL Bilateral 7/12/2018    Procedure: NERVE BLOCK OCCIPITAL;  bilateral occipital  nerve blocks;  Surgeon: Ronald Driscoll MD;  [facility]     PROCEDURE PLACEHOLDER GENERAL N/A 02/21/2019    Kellen Ward at [facility] [place]     RADIO FREQUENCY ABLATION PULSED CERVICAL Right 10/18/2019    Procedure: CERVICAL RADIOFREQUENCY ABLATION  Cervical 2,3,4;  Surgeon: Ronald Driscoll MD;  [facility]     RADIO FREQUENCY ABLATION PULSED CERVICAL Left 11/14/2019    Procedure: Left Cervical 2, 3, 4 Radiofreqency Ablation;  Surgeon: Ronald Driscoll MD;  [facility]     Current Outpatient Medications   Medication Sig Dispense Refill     clonazePAM (KLONOPIN) 0.5 MG tablet TAKE ONE-HALF TO ONE TABLET BY MOUTH THREE TIMES A DAY AS NEEDED FOR ANXIETY 90 tablet 0     DULoxetine (CYMBALTA) 60 MG capsule Take 1 capsule (60 mg) by mouth daily 30 capsule 3     FLOVENT  MCG/ACT Inhaler INHALE 2 PUFFS INTO THE LUNGS TWICE DAILY 36 g 2     fluticasone (FLONASE) 50 MCG/ACT nasal spray SPRAY 2 SPRAYS IN EACH NOSTRIL EVERY DAY 16 g 5     ipratropium - albuterol 0.5 mg/2.5 mg/3 mL (DUONEB) 0.5-2.5 (3) MG/3ML neb solution Take 1 vial (3 mLs) by nebulization every 4 hours as needed for shortness of breath / dyspnea 30 vial 0     methadone (DOLOPHINE) 5 MG tablet Take 1.5 tablets in the morning and take 2 tablets at bedtime. Space your dosing by 12 hours. Fill 1/29/2020. Start 1/30/2020. 105 tablet 0     order for DME Equipment being ordered: Nebulizer mask and tubing 1 each 1     oxyCODONE IR (ROXICODONE) 10 MG tablet Take 1 tablet every 6 hours as needed for breakthrough pain. Max of 4/day. Fill 1/20/2020. Start 1/21/2019. 30 day script 115 tablet 0     tiZANidine (ZANAFLEX) 2 MG tablet Take 1-2 tablets three times daily as needed for muscle spasms/tension 60 tablet 0     traZODone (DESYREL) 100 MG tablet Take 3 tablets (300 mg) by mouth At Bedtime 90 tablet 3     VENTOLIN  (90 Base) MCG/ACT inhaler INHALE 2 PUFFS INTO THE LUNGS EVERY 6 HOURS AS NEEDED FOR SHORTNESS OF BREATH, DIFFICULTY BREATHING OR WHEEZING. 18 g 3      vitamin D3 (CHOLECALCIFEROL) 2000 units tablet TAKE ONE TABLET BY MOUTH EVERY  tablet 3     zolpidem (AMBIEN) 10 MG tablet Take 10 mg by mouth nightly as needed        gabapentin (NEURONTIN) 300 MG capsule Take 3 capsules (900 mg) by mouth 2 times daily (Patient not taking: Reported on 1/14/2020) 540 capsule 0     nystatin (MYCOSTATIN) 633353 UNIT/ML suspension TAKE 5 MLS BY MOUTH FOUR TIMES A  mL 0     sildenafil (VIAGRA) 100 MG tablet Take 1 tablet (100 mg) by mouth daily as needed 30 min to 4 hrs before sex. Do not use with nitroglycerin, terazosin or doxazosin. 4 tablet 0     OTC products: None, except as noted above    Allergies   Allergen Reactions     Vicodin [Hydrocodone-Acetaminophen] Nausea     Other reaction(s): Diaphoresis, Vomiting  HEADACHE      Latex Allergy: NO    Social History     Tobacco Use     Smoking status: Former Smoker     Packs/day: 1.00     Years: 30.00     Pack years: 30.00     Types: Cigars     Last attempt to quit: 12/19/2009     Years since quitting: 10.1     Smokeless tobacco: Former User     Quit date: 2012   Substance Use Topics     Alcohol use: No     Alcohol/week: 0.0 standard drinks     Comment: HX OF ABUSE-IN REMISSION     History   Drug Use No       REVIEW OF SYSTEMS:   Constitutional, neuro, ENT, endocrine, pulmonary, cardiac, gastrointestinal, genitourinary, musculoskeletal, integument and psychiatric systems are negative, except as otherwise noted. Chronic MSK issues, hx asthma, but currently asymptomatic    EXAM:   BP 96/66   Pulse 88   Temp 97  F (36.1  C) (Temporal)   Resp 16   Wt 76.8 kg (169 lb 6.4 oz)   BMI 26.53 kg/m      GENERAL APPEARANCE: healthy, alert and no distress     EYES: EOMI,  PERRL     HENT: ear canals and TM's normal and nose and mouth without ulcers or lesions     NECK: no adenopathy, no asymmetry, masses, or scars and thyroid normal to palpation     RESP: lungs clear to auscultation - no rales, rhonchi or wheezes     CV: regular  rates and rhythm, normal S1 S2, no S3 or S4 and no murmur, click or rub     ABDOMEN:  soft, nontender, no HSM or masses and bowel sounds normal     MS: extremities normal- no gross deformities noted, no evidence of inflammation in joints, FROM in all extremities.     SKIN: no suspicious lesions or rashes     NEURO: grossly Normal     PSYCH: mentation appears normal. and affect normal     LYMPHATICS: No cervical adenopathy    DIAGNOSTICS:     No labs or EKG required for low risk surgery (cataract, skin procedure, breast biopsy, etc)  Labs Drawn : cbc, bmp all normal      Recent Labs   Lab Test 07/19/19  1629 02/22/19 02/15/19  1944 02/04/19  1714 07/27/18  1448  03/15/17  2220   HGB  --   --  12.9* 13.3 13.6   < > 14.4   PLT  --   --  169 206 247   < > 150   INR  --   --  0.94  --  0.88  --   --      --  134 138  --    < > 136   POTASSIUM 3.6 4.1 3.5 4.2  --    < > 2.7*   CR 1.03 1.02 0.90 1.01  --    < > 0.94   A1C  --   --   --   --   --   --  5.6    < > = values in this interval not displayed.        IMPRESSION:   Reason for surgery/procedure: chronic back pain  Diagnosis/reason for consult: anesth/procedure clearance    The proposed surgical procedure is considered LOW risk.    REVISED CARDIAC RISK INDEX  The patient has the following serious cardiovascular risks for perioperative complications such as (MI, PE, VFib and 3  AV Block):  No serious cardiac risks  INTERPRETATION: 0 risks: Class I (very low risk - 0.4% complication rate)    The patient has the following additional risks for perioperative complications:      Polypharmacy with longstanding use of narcotics    NORMAL PREOP EXAM  Chronic back pain    SEE PROBLEM LIST    RECOMMENDATIONS:         --Patient will hold all scheduled medications on the day of surgery, as he has done in the past    APPROVAL GIVEN to proceed with proposed procedure, without further diagnostic evaluation       Signed Electronically by: Jung Jean MD

## 2020-02-06 NOTE — TELEPHONE ENCOUNTER
Nystatin 218550 unit       Last Written Prescription Date:  9-3-19  Last Fill Quantity: 280 mL,   # refills: 0  Last Office Visit: 10/15/19  Future Office visit:    Next 5 appointments (look out 90 days)    Feb 11, 2020  4:00 PM CST  [facility] [address]  [facility] [id number] [phone]   Feb 14, 2020  4:30 PM CST  Office Visit with Gregory G Schoen, MD  [facility] [address] [id number] [phone]           Routing refill request to provider for review/approval because:  Drug not on the [facility], [facility] or  Health refill protocol or controlled substance

## 2020-02-09 RX ORDER — NYSTATIN 100000/ML
SUSPENSION, ORAL (FINAL DOSE FORM) ORAL
Qty: 280 ML | Refills: 0 | Status: SHIPPED | OUTPATIENT
Start: 2020-02-09 | End: 2020-04-22

## 2020-02-11 ENCOUNTER — OFFICE VISIT (OUTPATIENT)
Dept: PALLIATIVE MEDICINE | Facility: OTHER | Age: 55
End: 2020-02-11
Payer: COMMERCIAL

## 2020-02-11 VITALS
DIASTOLIC BLOOD PRESSURE: 70 MMHG | BODY MASS INDEX: 26.25 KG/M2 | WEIGHT: 167.25 LBS | SYSTOLIC BLOOD PRESSURE: 92 MMHG | HEIGHT: 67 IN

## 2020-02-11 DIAGNOSIS — M79.18 MYOFASCIAL PAIN: ICD-10-CM

## 2020-02-11 DIAGNOSIS — G89.4 CHRONIC PAIN SYNDROME: ICD-10-CM

## 2020-02-11 DIAGNOSIS — F11.90 CHRONIC, CONTINUOUS USE OF OPIOIDS: ICD-10-CM

## 2020-02-11 DIAGNOSIS — M54.6 CHRONIC MIDLINE THORACIC BACK PAIN: ICD-10-CM

## 2020-02-11 DIAGNOSIS — M47.812 ARTHROPATHY OF CERVICAL FACET JOINT: Primary | ICD-10-CM

## 2020-02-11 DIAGNOSIS — G89.29 CHRONIC MIDLINE THORACIC BACK PAIN: ICD-10-CM

## 2020-02-11 DIAGNOSIS — M54.2 CERVICALGIA: ICD-10-CM

## 2020-02-11 DIAGNOSIS — M54.50 CHRONIC BILATERAL LOW BACK PAIN WITHOUT SCIATICA: ICD-10-CM

## 2020-02-11 DIAGNOSIS — G89.29 CHRONIC BILATERAL LOW BACK PAIN WITHOUT SCIATICA: ICD-10-CM

## 2020-02-11 PROCEDURE — 99213 OFFICE O/P EST LOW 20 MIN: CPT | Performed by: PHYSICIAN ASSISTANT

## 2020-02-11 RX ORDER — OXYCODONE HYDROCHLORIDE 10 MG/1
TABLET ORAL
Qty: 115 TABLET | Refills: 0 | Status: SHIPPED | OUTPATIENT
Start: 2020-02-11 | End: 2020-03-13

## 2020-02-11 RX ORDER — METHADONE HYDROCHLORIDE 5 MG/1
TABLET ORAL
Qty: 90 TABLET | Refills: 0 | Status: SHIPPED | OUTPATIENT
Start: 2020-02-11 | End: 2020-03-13

## 2020-02-11 ASSESSMENT — MIFFLIN-ST. JEOR: SCORE: 1567.27

## 2020-02-11 ASSESSMENT — PAIN SCALES - GENERAL: PAINLEVEL: MILD PAIN (2)

## 2020-02-11 NOTE — PROGRESS NOTES
[facility]    CHIEF COMPLAINT:   Pain  -neck pain    INTERVAL HISTORY:  Last seen on 1/14/20.        Recommendations/plan at the last visit included:  1. Physical Therapy:  Yes, continue current plan  2. Clinical Health Psychologist:  NO    3. Diagnostic Studies: none at this time  4. Medication Management:    1.   Reduce Methadone 7.5mg in AM and 10mg at bedtime, space dosing by 12 hours   2.   Continue Oxycodone to 10mg every 6 hours as needed for severe pain. Max of 4/day.   3.   Start Tizanidine 2mg 1-2 tablets TID PRN   4.   Continue Cymbalta as recommended by PCP  5. Further procedures recommended: referred for LESI  6. Recommendations to PCP. See above         Follow up with this provider:  4 Weeks     Since his last visit, Joselito Abarca reports:    Overall he is doing about the same. He states that the muscle relaxer is helping. He states that when he first started it, he lost a lot of ambition. He states that now, he is only 1 at a time and this is getting better. He states that his muscles are easing up. He states that his neck is doing better.     He is scheduled for a lumbar epidural steroid injection on 2/13/2020.    He denies any unusual shortness of breath, dizziness or chest pain/pressure.     Pain Information:   Pain quality:  throbbing    Pain rating: intensity ranges from 2/10 to 10/10, and averages 5/10 on a 0-10 scale.   Pain today 2/10    SELF CARE:   How often do you practice SELF-CARE (relaxing, stretching, pacing, monitoring posture, taking mini-breaks) in a typical day: He states that he cannot do activities    Annual Controlled Substance Agreement: 7/16/2019  UDS: 6/28/2019    CURRENT RELEVANT PAIN MEDICATIONS:   Cymbalta 60mg daily  Clonazepam 0.5mg-takes 3-4/day  Methadone 7.5mg in AM and 10mg at HS  Oxycodone 10mg every 6 hours max of 4/day  Tizanidine 2mg-1 tablet 2-3 times daily    Patient is using the medication as prescribed:  YES  Is your  medication helpful? somewhat  Medication side effects? no side effect    Previous Medications: (H--helped; HI--Helped initially; SWH-- somewhat helpful, NH--No help; W--worse; SE--side effects)   Opiates: hydrocodone SE allergic, fever, sweaty, headache, methadone H, Oxycodone H  NSAIDS: Ibuprofen NH, Aleve NH  Anti-migraine mediations: Fiorinal H  Muscle Relaxants: Valium H  Neuropathics: Gabapentin H SE breathing issues  Anti-depressants: none  Anxiolytics: Klonopin H  Topicals: Lidocaine H  Other medications not covered above: Tylenol NH    Past Pain Treatments:  Pain Clinic:   Yes, he was previously seen at [facility] and [facility]   PT: Yes, has done many times  Psychologist: No  Relaxation techniques/biofeedback: No  Chiropractor: Yes, feels that it made it worse  Acupuncture: Yes, just did one session  Pharmacotherapy:               Opioids: Yes                Non-opioids:    Yes   TENs Unit:Yes, aggravates the pain  Injections: Yes, bilateral occipital nerve blocks 07/12/2018, Cervical medial branch blocks 08/26/2015 and 5/27/2015. Has had low back pain injections at [facility]. Right cervical RFA 10/18/2019, left cervical RFA 11/14/2019.  Self-care:   Yes, hot tubs, ice packs, heating pads  Surgeries related to pain: Yes,  1. anterior cervical discectomy and fusion C6-7, exploration and revision C4-6 with hardwar removal 11/29/2017,   2. Left T1-T2 thoracic hemilaminectomy, microdiscectomy 02/21/2012,   3. Removal of C4 through C6 anterior cervical plate, Exploration of C4-C5 and C5-C6 anterior cervical fusion, C4-C5 and C5-C6 arthrodesis, C4 through C6 anterior cervical instrumentation, and [place] of right iliac hip graft.  03/15/2011  4. C4-C5 and C5-C6 anterior cervical discectomy and fusion with instrumentation and neural electrophysiologic monitoring 01/26/2010    [place] Board of Pharmacy Data Base Reviewed:    YES; As expected, no concern for misuse/abuse of controlled medications based on this report.    THE 4  As OF OPIOID MAINTENANCE ANALGESIA    Analgesia: Is pain relief clinically significant? NO   Activity: Is patient functional and able to perform Activities of Daily Living? YES   Adverse effects: Is patient free from adverse side effects from opiates? YES   Adherence to Rx protocol: Is patient adhering to Controlled Substance Agreement and taking medications ONLY as ordered? YES       Is Narcan prescribed for opiate use >50 MME daily? YES      Daily MME: 130    Medications:  Current Outpatient Medications   Medication Sig Dispense Refill     clonazePAM (KLONOPIN) 0.5 MG tablet TAKE ONE-HALF TO ONE TABLET BY MOUTH THREE TIMES A DAY AS NEEDED FOR ANXIETY 90 tablet 0     DULoxetine (CYMBALTA) 60 MG capsule Take 1 capsule (60 mg) by mouth daily 30 capsule 3     FLOVENT  MCG/ACT Inhaler INHALE 2 PUFFS INTO THE LUNGS TWICE DAILY 36 g 2     methadone (DOLOPHINE) 5 MG tablet Take 1.5 tablets every 12 hours.  Fill 2/28/2020. Start 2/29/2020. 90 tablet 0     naloxone (NARCAN) 4 MG/0.1ML nasal spray Spray 4 mg into one nostril alternating nostrils once as needed for opioid reversal every 2-3 minutes until assistance arrives       nystatin (MYCOSTATIN) 266354 UNIT/ML suspension TAKE 5 MLS BY MOUTH FOUR TIMES A  mL 0     order for DME Equipment being ordered: Nebulizer mask and tubing 1 each 1     oxyCODONE IR (ROXICODONE) 10 MG tablet Take 1 tablet every 6 hours as needed for breakthrough pain. Max of 4/day. Fill 2/19/2020. Start 2/22/2020. 30 day script 115 tablet 0     sildenafil (VIAGRA) 100 MG tablet Take 1 tablet (100 mg) by mouth daily as needed 30 min to 4 hrs before sex. Do not use with nitroglycerin, terazosin or doxazosin. 4 tablet 0     tiZANidine (ZANAFLEX) 2 MG tablet Take 1-2 tablets three times daily as needed for muscle spasms/tension 60 tablet 0     traZODone (DESYREL) 100 MG tablet Take 3 tablets (300 mg) by mouth At Bedtime 90 tablet 3     VENTOLIN  (90 Base) MCG/ACT inhaler INHALE 2  "PUFFS INTO THE LUNGS EVERY 6 HOURS AS NEEDED FOR SHORTNESS OF BREATH, DIFFICULTY BREATHING OR WHEEZING. 18 g 3     vitamin D3 (CHOLECALCIFEROL) 2000 units tablet TAKE ONE TABLET BY MOUTH EVERY  tablet 3     zolpidem (AMBIEN) 10 MG tablet Take 10 mg by mouth nightly as needed        fluticasone (FLONASE) 50 MCG/ACT nasal spray SPRAY 2 SPRAYS IN EACH NOSTRIL EVERY DAY (Patient not taking: Reported on 2/11/2020) 16 g 5     ipratropium - albuterol 0.5 mg/2.5 mg/3 mL (DUONEB) 0.5-2.5 (3) MG/3ML neb solution Take 1 vial (3 mLs) by nebulization every 4 hours as needed for shortness of breath / dyspnea (Patient not taking: Reported on 2/11/2020) 30 vial 0       Review of Systems: A 10-point review of systems was negative, with the exception of chronic pain issues,  headache, dizziness, weakness, and anxiety    Social History: Reviewed; unchanged from previous consultation.      Family history: Reviewed; unchanged from previous consultation.     PHYSICAL EXAM:     Vitals:   BP 92/70   Ht 1.702 m (5' 7\")   Wt 75.9 kg (167 lb 4 oz)   BMI 26.20 kg/m    Body mass index is 26.2 kg/m .  5' 7\"  167 lbs 4 oz      Constitutional: healthy, alert and no distress  HEENT: Head atraumatic, normocephalic. Eyes without conjunctival injection or jaundice. Neck supple. No obvious neck masses.  Skin: No rash, lesions, or petechiae of exposed skin.   Psychiatric/mental status: Alert, without lethargy or stupor. Appropriate affect. Mood normal.       DIAGNOSTIC TESTS:  Imaging Studies:   No new imaging to review today.    Assessment:  Joselito Abarca is a 54 year old male who presents today for follow up regarding his:      1. Arthropathy of cervical facet joint  2. cervicalgia  3. Midline thoracic back pain  4. Myofascial pain  5. Chronic pain syndrome  6. Chronic use of opioids  7. Chronic low back pain      Patient to keep his epidural as scheduled. Will continue to taper medications. Will change his Methadone to 7.5mg every 12 " hours and continue Oxycodone 10mg every 6 hours, max of 4/day. This will reduce his MME from 130 to 120. Continue Tizanidine.      Plan:    Diagnosis reviewed, treatment option addressed, and risk/benifits discussed.  Self-care instructions given.  I am recommending a multidisciplinary treatment plan to help this patient better manage pain.      1. Physical Therapy:  Yes, continue current plan  2. Clinical Health Psychologist:  NO    3. Diagnostic Studies: none at this time  4. Medication Management:    1. Reduce Methadone 7.5mg every 12 hours   2. Continue Oxycodone to 10mg every 6 hours as needed for  severe pain. Max of 4/day.   3. Continue Tizanidine 2mg 1-2 tablets TID PRN   4. Continue Cymbalta as recommended by PCP  5. Further procedures recommended: keep appointment for LESI  6. Recommendations to PCP. See above      Follow up with this provider:   4 Weeks     Total time spent face to face was 11 minutes and more than 50% of face to face time was spent in counseling and/or coordination of care regarding the diagnosis and recommendations, including injection therapy and medication management.      Ashley Salgado PA-C   [facility]

## 2020-02-11 NOTE — PATIENT INSTRUCTIONS
After Visit Instructions:     Thank you for coming to [facility] for your care. It is my goal to partner with you to help you reach your optimal state of health.     I am recommending multidisciplinary care at this time.  The focus of care will be to continue gradual rehabilitation and pain management with medication adjustments as needed.    Continue daily self-care, identifying contributing factors, and monitoring variations in pain level. Continue to integrate self-care into your life.        Schedule follow-up with Ashley Salgado PA-C in 4 weeks. You will need to make this appointment.     Procedures recommended: keep low back injection as scheduled     Medication recommendations:     Reduce Methadone to 7.5mg (1.5 tablets) every 12 hours (twice daily).     Continue Oxycodone 10mg every 6 hours as needed. Max of 4/day. 115 tablets to last 30 days.      Ashley Salgado PA-C  [facility]    Contact information: [facility]  Clinic Number:  [phone]     Call with any questions about your care and for scheduling assistance.     Calls are returned Monday through Friday between 8 AM and 4:30 PM. We usually get back to you within 2 business days depending on the issue/request.    If we are prescribing your medications:    For opioid medication refills, call the clinic or send a [place] message 7 days in advance.  Please include:    Name of requested medication    Name of the pharmacy.    For non-opioid medications, call your pharmacy directly to request a refill. Please allow 3-4 days to be processed.     Per [place] State Law:    All controlled substance prescriptions must be filled within 30 days of being written.      For those controlled substances allowing refills, pickup must occur within 30 days of last fill.      We believe regular attendance is key to your success in our program!      Any time you are unable to keep your  appointment we ask that you call us at least 24 hours in advance to cancel.This will allow us to offer the appointment time to another patient.   Multiple missed appointments may lead to dismissal from the clinic.

## 2020-02-12 ENCOUNTER — ANESTHESIA EVENT (OUTPATIENT)
Dept: SURGERY | Facility: CLINIC | Age: 55
End: 2020-02-12
Payer: COMMERCIAL

## 2020-02-13 ENCOUNTER — ANESTHESIA (OUTPATIENT)
Dept: SURGERY | Facility: CLINIC | Age: 55
End: 2020-02-13
Payer: COMMERCIAL

## 2020-02-13 ENCOUNTER — HOSPITAL ENCOUNTER (OUTPATIENT)
Dept: GENERAL RADIOLOGY | Facility: CLINIC | Age: 55
End: 2020-02-13
Attending: ANESTHESIOLOGY | Admitting: ANESTHESIOLOGY
Payer: COMMERCIAL

## 2020-02-13 ENCOUNTER — HOSPITAL ENCOUNTER (OUTPATIENT)
Facility: CLINIC | Age: 55
Discharge: HOME | End: 2020-02-13
Attending: ANESTHESIOLOGY | Admitting: ANESTHESIOLOGY
Payer: COMMERCIAL

## 2020-02-13 VITALS
DIASTOLIC BLOOD PRESSURE: 86 MMHG | TEMPERATURE: 97 F | SYSTOLIC BLOOD PRESSURE: 136 MMHG | RESPIRATION RATE: 16 BRPM | OXYGEN SATURATION: 98 %

## 2020-02-13 DIAGNOSIS — M54.50 LUMBAR PAIN: ICD-10-CM

## 2020-02-13 PROCEDURE — 40000277 XR SURGERY CARM FLUORO LESS THAN 5 MIN W STILLS: Mod: TC

## 2020-02-13 PROCEDURE — 37000008 ZZH ANESTHESIA TECHNICAL FEE, 1ST 30 MIN: Performed by: ANESTHESIOLOGY

## 2020-02-13 PROCEDURE — 25000128 H RX IP 250 OP 636: Performed by: ANESTHESIOLOGY

## 2020-02-13 PROCEDURE — 64483 NJX AA&/STRD TFRM EPI L/S 1: CPT | Performed by: ANESTHESIOLOGY

## 2020-02-13 PROCEDURE — 62323 NJX INTERLAMINAR LMBR/SAC: CPT | Performed by: ANESTHESIOLOGY

## 2020-02-13 PROCEDURE — 25000125 ZZHC RX 250: Performed by: NURSE ANESTHETIST, CERTIFIED REGISTERED

## 2020-02-13 PROCEDURE — 25000125 ZZHC RX 250: Performed by: ANESTHESIOLOGY

## 2020-02-13 PROCEDURE — 25000128 H RX IP 250 OP 636: Performed by: NURSE ANESTHETIST, CERTIFIED REGISTERED

## 2020-02-13 PROCEDURE — 64483 NJX AA&/STRD TFRM EPI L/S 1: CPT | Mod: 50 | Performed by: ANESTHESIOLOGY

## 2020-02-13 RX ORDER — PROPOFOL 10 MG/ML
INJECTION, EMULSION INTRAVENOUS PRN
Status: DISCONTINUED | OUTPATIENT
Start: 2020-02-13 | End: 2020-02-13

## 2020-02-13 RX ORDER — TRIAMCINOLONE ACETONIDE 40 MG/ML
INJECTION, SUSPENSION INTRA-ARTICULAR; INTRAMUSCULAR PRN
Status: DISCONTINUED | OUTPATIENT
Start: 2020-02-13 | End: 2020-02-13 | Stop reason: HOSPADM

## 2020-02-13 RX ORDER — LIDOCAINE HYDROCHLORIDE 20 MG/ML
INJECTION, SOLUTION INFILTRATION; PERINEURAL PRN
Status: DISCONTINUED | OUTPATIENT
Start: 2020-02-13 | End: 2020-02-13

## 2020-02-13 RX ORDER — LIDOCAINE 40 MG/G
CREAM TOPICAL
Status: DISCONTINUED | OUTPATIENT
Start: 2020-02-13 | End: 2020-02-13 | Stop reason: HOSPADM

## 2020-02-13 RX ORDER — IOPAMIDOL 612 MG/ML
INJECTION, SOLUTION INTRATHECAL PRN
Status: DISCONTINUED | OUTPATIENT
Start: 2020-02-13 | End: 2020-02-13 | Stop reason: HOSPADM

## 2020-02-13 RX ADMIN — PROPOFOL 50 MG: 10 INJECTION, EMULSION INTRAVENOUS at 14:39

## 2020-02-13 RX ADMIN — PROPOFOL 50 MG: 10 INJECTION, EMULSION INTRAVENOUS at 14:38

## 2020-02-13 RX ADMIN — PROPOFOL 50 MG: 10 INJECTION, EMULSION INTRAVENOUS at 14:41

## 2020-02-13 RX ADMIN — LIDOCAINE HYDROCHLORIDE 40 MG: 20 INJECTION, SOLUTION INFILTRATION; PERINEURAL at 14:38

## 2020-02-13 RX ADMIN — LIDOCAINE HYDROCHLORIDE 1 ML: 10 INJECTION, SOLUTION EPIDURAL; INFILTRATION; INTRACAUDAL; PERINEURAL at 13:37

## 2020-02-13 ASSESSMENT — LIFESTYLE VARIABLES: TOBACCO_USE: 1

## 2020-02-13 NOTE — ANESTHESIA CARE TRANSFER NOTE
Patient: Joselito Abarca    Procedure(s):  Lumber 4-5 bilateral Epidural Injection    Diagnosis: Chronic bilateral low back pain without sciatica [M54.5, G89.29]  Diagnosis Additional Information: No value filed.    Anesthesia Type:   MAC     Note:  Airway :Room Air  Patient transferred to:Phase II  Handoff Report: Identifed the Patient, Identified the Reponsible Provider, Reviewed the pertinent medical history, Discussed the surgical course, Reviewed Intra-OP anesthesia mangement and issues during anesthesia, Set expectations for post-procedure period and Allowed opportunity for questions and acknowledgement of understanding      Vitals: (Last set prior to Anesthesia Care Transfer)    CRNA VITALS  2/13/2020 1419 - 2/13/2020 1453      2/13/2020             Pulse:  88    SpO2:  99 %    Resp Rate (observed):  17                Electronically Signed By: CORA Canales CRNA  February 13, 2020  2:53 PM

## 2020-02-13 NOTE — ANESTHESIA POSTPROCEDURE EVALUATION
Patient: Joselito Abarca    Procedure(s):  Lumber 4-5 bilateral Epidural Injection    Diagnosis:Chronic bilateral low back pain without sciatica [M54.5, G89.29]  Diagnosis Additional Information: No value filed.    Anesthesia Type:  MAC    Note:  Anesthesia Post Evaluation    Patient location during evaluation: Phase 2 and Bedside  Patient participation: Able to fully participate in evaluation  Level of consciousness: awake  Pain management: adequate  Airway patency: patent  Cardiovascular status: acceptable and hemodynamically stable  Respiratory status: acceptable, room air and nonlabored ventilation  Hydration status: stable  PONV: none     Anesthetic complications: None    Comments: Patient was happy with the anesthesia care received and no anesthesia related complications were noted.  I will follow up with the patient again if it is needed.        Last vitals:  Vitals:    02/13/20 1330   BP: 136/86   Resp: 16   Temp: 97  F (36.1  C)   SpO2: 98%         Electronically Signed By: CORA Canales CRNA  February 13, 2020  2:53 PM

## 2020-02-13 NOTE — OP NOTE
PRIMARY PROBLEM: Low back pain bilateral leg pains    PROCEDURE: Bilateral L4-5  Transforaminal Epidural Steroid Injections with fluoroscopic guidance and contrast.     PROCEDURE DETAILS: After written informed consent was obtained from the patient, the patient was escorted to the procedure room.  The patient was placed in the prone position.  A  time out  was conducted to verify patient identity, procedure to be performed, side, site, allergies and any special requirements.  The skin over the thoracolumbar region was prepped and draped in normal sterile fashion. Fluoroscopy was used to identify the neural foramen in AP view and the skin was anesthetized with 2 mL of 1% lidocaine with bicarbonate buffer. A 25-gauge Quincke spinal needle was advanced through this location and advanced under fluoroscopic guidance towards the neural foramen.  The target zone was the 6 o clock position of the pedicle.   Prior to entering the foramen, the depth of the needle was gauged with a lateral view on fluoroscopy. While still in a lateral view, the needle was slowly advanced to avoid injury to the spinal nerve.  Then, in the oblique view (approximately 28 degrees), after negative aspiration, 1.5 mL of Omnipaque contrast dye was injected revealing epidural spread without evidence of intravascular or intrathecal spread.  Then a 3cc solution of 20 mg of Triamcinolone in 2.5 mL of  Preservative-Free saline was slowly injected into the epidural space at each segment.  This is done bilaterally at the L4-5 segments.  After injection of the medication, as the needle tip was withdrawn, it was flushed with local anesthetic.   The patient was monitored with blood pressure and pulse oximetry machines with the assistance of an RN throughout the procedure.  The patient was alert and responsive to questions throughout the procedure.   The patient tolerated the procedure well and was observed in the post-procedural area.  The patient was  dismissed without apparent complications.     DIAGNOSIS:  1.  Grade 1 spondylolisthesis at L4-5 with disc degeneration at L4-5 causing back pain and leg pains    PLAN:  1. Performed bilateral L4-5  transforaminal epidural steroid injections.  2. The patient was instructed to follow-up per Dr. Driscoll's instructions.      Ronald Driscoll MD  Diplomate of the American Board of Anesthesiology, Pain Medicine

## 2020-02-13 NOTE — ANESTHESIA PREPROCEDURE EVALUATION
Anesthesia Pre-Procedure Evaluation    Patient: Joselito Abarca   MRN: [id number] : 1965          Preoperative Diagnosis: Chronic bilateral low back pain without sciatica [M54.5, G89.29]    Procedure(s):  Lumber Epidural Injection    Past Medical History:   Diagnosis Date     Allergic rhinitis      Anxiety      Depressive disorder      GERD (gastroesophageal reflux disease)      Neck pain 6/15/2011     Pneumonia      Uncomplicated asthma      Past Surgical History:   Procedure Laterality Date     BRONCHOSCOPY (RIGID OR FLEXIBLE), DIAGNOSTIC N/A 2018    Procedure: COMBINED BRONCHOSCOPY (RIGID OR FLEXIBLE), LAVAGE;  Bronchoscopy with Lavage and Transbronchial Biopsy;  Surgeon: Yung Miranda MD;  Location: [facility]     COLONOSCOPY WITH CO2 INSUFFLATION N/A 2018    Procedure: COLONOSCOPY WITH CO2 INSUFFLATION;  Colon and upper endoscopy ;  Surgeon: Devin Pelayo MD;  Location: [facility]     COMBINED ESOPHAGOSCOPY, GASTROSCOPY, DUODENOSCOPY (EGD) WITH CO2 INSUFFLATION N/A 2018    Procedure: COMBINED ESOPHAGOSCOPY, GASTROSCOPY, DUODENOSCOPY (EGD) WITH CO2 INSUFFLATION;  Colon and upper endoscopy ;  Surgeon: Devin Pelayo MD;  Location: [facility]     DISCECTOMY LUMBAR POSTERIOR MICROSCOPIC ONE LEVEL  2012    Procedure:DISCECTOMY LUMBAR POSTERIOR MICROSCOPIC ONE LEVEL; LEFT T1-T2 THORASIC HEMILAMINECTOMY MICRODISCECTOMY WITH MEXTRIX II ; Surgeon:SHARON PURI; Location:[facility]     DISCECTOMY, FUSION CERVICAL ANTERIOR ONE LEVEL, COMBINED N/A 2017    Procedure: COMBINED DISCECTOMY, FUSION CERVICAL ANTERIOR ONE LEVEL;  Anterior Cervical Discectomy and Fusion Cervical Six - Cervical Seven, Exploration and Revision Cervical Four - Cervical Six with Hardware Removal;  Surgeon: Nikolas Vasques MD;  Location: [facility]     ESOPHAGEAL IMPEDENCE FUNCTION TEST WITH 24 HOUR PH GREATER THAN 1 HOUR N/A 2018    Procedure: ESOPHAGEAL IMPEDENCE FUNCTION TEST WITH 24  HOUR PH GREATER THAN 1 HOUR;  Surgeon: Atif Oconnor MD;  Location: [facility]     ESOPHAGOSCOPY, GASTROSCOPY, DUODENOSCOPY (EGD), COMBINED N/A 9/24/2018    Procedure: COMBINED ESOPHAGOSCOPY, GASTROSCOPY, DUODENOSCOPY (EGD), BIOPSY SINGLE OR MULTIPLE;;  Surgeon: Devin Pelayo MD;  Location: [facility]     EXPLORE SPINE, REMOVE HARDWARE, COMBINED N/A 11/29/2017    Procedure: COMBINED EXPLORE SPINE, REMOVE HARDWARE;;  Surgeon: Nikolas Vasques MD;  Location: [facility]     FUSION CERVICAL ANTERIOR TWO LEVELS  1/29/2010     HC DRAIN/INJ MAJOR JOINT/BURSA W/O US  5/5/2008    Left sacroiliac joint injection.     HC INJ EPIDURAL LUMBAR/SACRAL W/WO CONTRAST  2009     HEAD & NECK SURGERY      2013     HERNIA REPAIR       INJECT BLOCK MEDIAL BRANCH CERVICAL/THORACIC/LUMBAR Bilateral 8/26/2015    Procedure: INJECT BLOCK MEDIAL BRANCH CERVICAL / THORACIC / LUMBAR;  Surgeon: Ronald Driscoll MD;  [facility]     INJECT BLOCK MEDIAL BRANCH CERVICAL/THORACIC/LUMBAR N/A 8/8/2019    Procedure: BLOCK, NERVE, FACET JOINT, MEDIAL BRANCH, DIAGNOSTIC Bilateral Cervical 2,3,4;  Surgeon: Ronald Driscoll MD;  Location: [facility]     INJECT BLOCK MEDIAL BRANCH CERVICAL/THORACIC/LUMBAR N/A 9/13/2019    Procedure: Medial Branch Block Bilateral Cervical 2,3, and 4;  Surgeon: Ronald Driscoll MD;  [facility]     INJECT FACET JOINT Bilateral 5/27/2015    Procedure: INJECT FACET JOINT;  Surgeon: Ronald Driscoll MD;  [facility]     NERVE BLOCK OCCIPITAL Bilateral 7/12/2018    Procedure: NERVE BLOCK OCCIPITAL;  bilateral occipital nerve blocks;  Surgeon: Ronald Driscoll MD;  Location: [facility]     PROCEDURE PLACEHOLDER GENERAL N/A 02/21/2019    [place] [facility] [place]     RADIO FREQUENCY ABLATION PULSED CERVICAL Right 10/18/2019    Procedure: CERVICAL RADIOFREQUENCY ABLATION  Cervical 2,3,4;  Surgeon: Ronald Driscoll MD;  [facility]     RADIO FREQUENCY ABLATION PULSED CERVICAL Left 11/14/2019    Procedure: Left Cervical 2, 3, 4  Radiofreqency Ablation;  Surgeon: Ronald Driscoll MD;  Location: [facility]       Anesthesia Evaluation     . Pt has had prior anesthetic. Type: General           ROS/MED HX    ENT/Pulmonary:     (+)tobacco use, Current use Moderate Persistent asthma Treatment: Nebulizer prn and Inhaler prn,  , . .   (-) recent URI   Neurologic:     (+)neuropathy - Cervical-related, migraines,     Cardiovascular:     (+) Dyslipidemia, ----. : . . . :. . Previous cardiac testing Echodate:5/1/16results:EF 65Stress Testdate:1/15/09 results:NormalECG reviewed date:11/17/17 results: date: results:          METS/Exercise Tolerance:  >4 METS   Hematologic:  - neg hematologic  ROS       Musculoskeletal:   (+)  other musculoskeletal-       GI/Hepatic: Comment: History of ETOH abuse    (+) GERD Asymptomatic on medication, Other GI/Hepatic long segment Olson's esophagus      Renal/Genitourinary:  - ROS Renal section negative       Endo:  - neg endo ROS       Psychiatric:     (+) psychiatric history anxiety      Infectious Disease:  - neg infectious disease ROS       Malignancy:      - no malignancy   Other:    (+) H/O Chronic Pain,H/O chronic opiod use ,                         Physical Exam  Normal systems: cardiovascular and pulmonary    Airway   Mallampati: II  TM distance: >3 FB  Neck ROM: full    Dental     Cardiovascular   Rhythm and rate: regular and normal      Pulmonary    breath sounds clear to auscultation            Lab Results   Component Value Date    WBC 6.1 02/05/2020    HGB 14.7 02/05/2020    HCT 44.2 02/05/2020     02/05/2020    .0 (H) 03/15/2017     02/05/2020    POTASSIUM 4.1 02/05/2020    CHLORIDE 99 02/05/2020    CO2 32 02/05/2020    BUN 5 (L) 02/05/2020    CR 0.99 02/05/2020    GLC 94 02/05/2020    LINDSEY 9.0 02/05/2020    ALBUMIN 3.8 07/19/2019    PROTTOTAL 7.7 07/19/2019    ALT 39 07/19/2019    AST 28 07/19/2019    ALKPHOS 67 07/19/2019    BILITOTAL 0.3 07/19/2019    LIPASE 142 01/06/2017    PTT 29  "02/15/2019    INR 0.94 02/15/2019    TSH 0.51 05/23/2008       Preop Vitals  BP Readings from Last 3 Encounters:   02/13/20 136/86   02/11/20 92/70   02/05/20 96/66    Pulse Readings from Last 3 Encounters:   02/05/20 88   11/14/19 68   10/18/19 80      Resp Readings from Last 3 Encounters:   02/13/20 16   02/05/20 16   01/01/20 16    SpO2 Readings from Last 3 Encounters:   02/13/20 98%   02/05/20 98%   01/01/20 96%      Temp Readings from Last 1 Encounters:   02/13/20 97  F (36.1  C) (Oral)    Ht Readings from Last 1 Encounters:   02/11/20 1.702 m (5' 7\")      Wt Readings from Last 1 Encounters:   02/11/20 75.9 kg (167 lb 4 oz)    Estimated body mass index is 26.2 kg/m  as calculated from the following:    Height as of 2/11/20: 1.702 m (5' 7\").    Weight as of 2/11/20: 75.9 kg (167 lb 4 oz).       Anesthesia Plan      History & Physical Review  History and physical reviewed and following examination; no interval change.    ASA Status:  2 .    NPO Status:  > 8 hours    Plan for MAC Maintenance will be TIVA.  Reason for MAC:  Deep or markedly invasive procedure (G8)         Postoperative Care      Consents  Anesthetic plan, risks, benefits and alternatives discussed with:  Patient.  Use of blood products discussed: No .   .                 CORA Canales CRNA  "

## 2020-02-13 NOTE — DISCHARGE INSTRUCTIONS
Home Care Instructions                Procedure: Epidural injection or joint injection    Activity:    Rest today    Do not work today    Resume normal activity tomorrow    Pain:    You may experience soreness at the injection site for 1 to 3 days.    You may use an ice pack for 20 minutes every 2 hours for the first 24 hours    You may use a heating pad after the first 24 hours    You may use Tylenol  (acetaminophen) every 4 hours or other pain medicines as directed by your physician    Safety  Sedation medicine, if given may remain active for many hours.    It is important for the next 24 hours that you do not:    Drive a car    Operate machines or power tools    Consume alcohol, including beer    Sign any important papers or legal documents    You may experience numbness radiating into your legs or arms, (depending on the procedure location)  This numbness may last several hours.  Until the numb sensation returns to normal please use caution in walking, climbing stairs, stepping out of your vehicle, etc.    Common side effects of steroids:  Not everyone will experience corticosteroid side effects. If side effects are experienced they will gradually subside in the 7-10 day period following an injection.    Most common side effects include:    Flushed face and/or chest    Feeling of warmth, particularly in face but could be overall feeling of warmth    Increased blood sugar in diabetic patients    Menstrual irregularities may occur.  If taking hormone based birth control an alternate method of birth control is recommended    Sleep disturbances and/or mood swings are possible    Leg cramps    Please contact us if you have:  Severe pain   Fever more than 101.5 degrees Fahrenheit  Signs of infection (redness, swelling or drainage)      If you have questions during normal business hours (8am-5pm Monday-Friday) contact the [facility] at [phone]. If you need help after hours, we recommend that you go to a  hospital emergency room or dial 911.

## 2020-02-14 ENCOUNTER — OFFICE VISIT (OUTPATIENT)
Dept: FAMILY MEDICINE | Facility: CLINIC | Age: 55
End: 2020-02-14
Payer: COMMERCIAL

## 2020-02-14 VITALS
OXYGEN SATURATION: 99 % | HEART RATE: 104 BPM | WEIGHT: 167.2 LBS | HEIGHT: 67 IN | TEMPERATURE: 97.8 F | BODY MASS INDEX: 26.24 KG/M2 | SYSTOLIC BLOOD PRESSURE: 102 MMHG | RESPIRATION RATE: 16 BRPM | DIASTOLIC BLOOD PRESSURE: 74 MMHG

## 2020-02-14 DIAGNOSIS — F41.1 GENERALIZED ANXIETY DISORDER: ICD-10-CM

## 2020-02-14 DIAGNOSIS — J45.40 MODERATE PERSISTENT ASTHMA WITHOUT COMPLICATION: Primary | ICD-10-CM

## 2020-02-14 DIAGNOSIS — G89.29 GROIN PAIN, CHRONIC, RIGHT: ICD-10-CM

## 2020-02-14 DIAGNOSIS — R10.31 GROIN PAIN, CHRONIC, RIGHT: ICD-10-CM

## 2020-02-14 DIAGNOSIS — I95.1 ORTHOSTATIC HYPOTENSION: ICD-10-CM

## 2020-02-14 DIAGNOSIS — N53.12 PAINFUL EJACULATION: ICD-10-CM

## 2020-02-14 DIAGNOSIS — N52.9 ERECTILE DYSFUNCTION, UNSPECIFIED ERECTILE DYSFUNCTION TYPE: ICD-10-CM

## 2020-02-14 PROCEDURE — 99214 OFFICE O/P EST MOD 30 MIN: CPT | Performed by: FAMILY MEDICINE

## 2020-02-14 RX ORDER — CLONAZEPAM 0.5 MG/1
TABLET ORAL
Qty: 90 TABLET | Refills: 0 | Status: SHIPPED | OUTPATIENT
Start: 2020-02-14 | End: 2020-02-14

## 2020-02-14 RX ORDER — CLONAZEPAM 0.5 MG/1
TABLET ORAL
Qty: 90 TABLET | Refills: 0 | Status: SHIPPED | OUTPATIENT
Start: 2020-02-14 | End: 2020-03-13

## 2020-02-14 ASSESSMENT — PAIN SCALES - GENERAL: PAINLEVEL: MODERATE PAIN (5)

## 2020-02-14 ASSESSMENT — MIFFLIN-ST. JEOR: SCORE: 1567.04

## 2020-02-14 NOTE — PROGRESS NOTES
"Subjective     Joselito Abarca is a 54 year old male who presents to clinic today for the following health issues:    HPI     Breathing issues  Light headed once in a while  Pain in testicle area  Take over med list    States that his breathing has been getting worse over the past few weeks. He was increased on gabapentin up to 900 mg tid and felt that was making his breathing worse so he stopped taking them after reading it can affect breathing and is doing better. He gets short of breath with a single flight of stairs. URI can trigger things as well.  No issues with smoke exposure but in the past molds were a trigger. Not using his flovent daily, only once or twice a week and states he was not aware that this needed to be used daily.      Complains of having dizziness/weakness associated with getting up quickly and sometimes  out of the blue. Has always had low blood pressure.  Has always had very pale skin.     Pain is coming along and has had some injections and branch blocks/ablations of his cervical spine and also had an injection of his lower back yesterday.  He feels that the pain provider is focused only on lowering her pain medications each visit and not focusing on his need for pain.     States he had a complicated hernia surgery in his early 20s where he had a significant hematoma in the right groin that had to be drained for a long period of time.  He also has a burning pain with ejaculation/orgasm.          Review of Systems   ROS COMP: Constitutional, HEENT, cardiovascular, pulmonary, gi and gu systems are negative, except as otherwise noted.      Objective    /74 (BP Location: Left arm, Patient Position: Chair, Cuff Size: Adult Regular)   Pulse 104   Temp 97.8  F (36.6  C) (Temporal)   Resp 16   Ht 1.702 m (5' 7\")   Wt 75.8 kg (167 lb 3.2 oz)   SpO2 99%   BMI 26.19 kg/m    There is no height or weight on file to calculate BMI.  Physical Exam   Alert and oriented, in no acute " distress.  Lungs are clear to auscultation today, decreased air entry but no rales or wheezes.   Heart is regular, no murmurs.   Observed gait appears normal. Posture is rigid s/p Cspine surgery.    Genital exam shows normal testicles, normal cord structures but has severe withdrawal pain with palpation of the right inguinal ring which appears to be intact and without evidence of hernia recurrence.  Penis appears normal. There were no masses or other abnormalities.     ASSESSMENT:     Moderate persistent asthma without complication  Groin pain, chronic, right  Generalized anxiety disorder  Erectile dysfunction, unspecified erectile dysfunction type  Painful ejaculation  Orthostatic hypotension      PLAN:  We created an asthma action plan and reiterated the importance of him using his daily controller.  Will do that and see if his asthma/breathing improves with follow up in one month.     Regarding the groin pain and ED, will refer to Dr. Tinoco for assessment. I see no evidence of hernia recurrence but his past history of a complicated surgery and hematoma raises some questions about whether this is a surgical or urologic issue. Will start with urology referral.     I did refill his anxiety meds and had a long discussion regarding his pain management. I indicated my support/agreement with lowering his narcotics at least to maximum recommended levels but also finding other alternatives. He says he has complied with numerous interventions but also says that his pain provider is not doing anything else to help him. I reminded him that the procedures are in fact doing something but did say I would send a message to his pain provider about his perceptions of what is going on so she can more specifically address them.     I will have him do an ACTH stim test due to his hypotension.  With very pale skin, it is not likely he has hypoadrenalism but will verify that.  Also discussed venous incompetence and importance of  hydration as well as leg pumping prior to getting up from chair/bed to reduce orthostatic symptoms.    RTC in one month for recheck on asthma.    Electronically signed by Greg Schoen, MD

## 2020-02-14 NOTE — LETTER
My Asthma Action Plan    Name: Joselito Abarca   YOB: 1965  Date: 2/14/2020   My doctor: Gregory G. Schoen, MD   My clinic: [facility]        My Control Medicine: Fluticasone propionate (Flovent HFA) - 220 mcg 1 puff twice a day every day  My Rescue Medicine: Albuterol (Proair/Ventolin/Proventil HFA) 2-4 puffs EVERY 4 HOURS as needed. Use a spacer if recommended by your provider.  Duonebs: Use as needed every 4 hours if ventolin isn't helping.  My Asthma Severity:   Moderate Persistent  Know your asthma triggers: upper respiratory infections, mold, exercise or sports and cold air  mold            GREEN ZONE   Good Control    I feel good    No cough or wheeze    Can work, sleep and play without asthma symptoms       Take your asthma control medicine every day.     1. If exercise triggers your asthma, take your rescue medication    15 minutes before exercise or sports, and    During exercise if you have asthma symptoms  2. Spacer to use with inhaler: If you have a spacer, make sure to use it with your inhaler             YELLOW ZONE Getting Worse  I have ANY of these:    I do not feel good    Cough or wheeze    Chest feels tight    Wake up at night   1. Keep taking your Green Zone medications  2. Start taking your rescue medicine:    every 20 minutes for up to 1 hour. Then every 4 hours for 24-48 hours.  3. If you stay in the Yellow Zone for more than 12-24 hours, contact your doctor.  4. If you do not return to the Green Zone in 12-24 hours or you get worse, start taking your oral steroid medicine if prescribed by your provider.           RED ZONE Medical Alert - Get Help  I have ANY of these:    I feel awful    Medicine is not helping    Breathing getting harder    Trouble walking or talking    Nose opens wide to breathe       1. Take your rescue medicine NOW  2. If your provider has prescribed an oral steroid medicine, start taking it NOW  3. Call your doctor NOW  4. If you are  still in the Red Zone after 20 minutes and you have not reached your doctor:    Take your rescue medicine again and    Call 911 or go to the emergency room right away    See your regular doctor within 2 weeks of an Emergency Room or Urgent Care visit for follow-up treatment.          Annual Reminders:  Meet with Asthma Educator,  Flu Shot in the Fall, consider Pneumonia Vaccination for patients with asthma (aged 19 and older).    Pharmacy:    [facility] - [place] - [address]  [facility] [place] [place] [facility] [address]   [facility] - [place], [place] [address]  [facility] - [place], [place] - [address] [facility] [place] - [address]    Electronically signed by Gregory G. Schoen, MD   Date: 02/14/20                      Asthma Triggers  How To Control Things That Make Your Asthma Worse    Triggers are things that make your asthma worse.  Look at the list below to help you find your triggers and what you can do about them.  You can help prevent asthma flare-ups by staying away from your triggers.      Trigger                                                          What you can do   Cigarette Smoke  Tobacco smoke can make asthma worse. Do not allow smoking in your home, car or around you.  Be sure no one smokes at a child s day care or school.  If you smoke, ask your health care provider for ways to help you quit.  Ask family members to quit too.  Ask your health care provider for a referral to Quit Plan to help you quit smoking, or call 2-211-516-PLAN.     Colds, Flu, Bronchitis  These are common triggers of asthma. Wash your hands often.  Don t touch your eyes, nose or mouth.  Get a flu shot every year.     Dust Mites  These are tiny bugs that live in cloth or carpet. They are too small to see. Wash sheets and blankets in hot water every week.   Encase pillows  and mattress in dust mite proof covers.  Avoid having carpet if you can. If you have carpet, vacuum weekly.   Use a dust mask and HEPA vacuum.   Pollen and Outdoor Mold  Some people are allergic to trees, grass, or weed pollen, or molds. Try to keep your windows closed.  Limit time out doors when pollen count is high.   Ask you health care provider about taking medicine during allergy season.     Animal Dander  Some people are allergic to skin flakes, urine or saliva from pets with fur or feathers. Keep pets with fur or feathers out of your home.    If you can t keep the pet outdoors, then keep the pet out of your bedroom.  Keep the bedroom door closed.  Keep pets off cloth furniture and away from stuffed toys.     Mice, Rats, and Cockroaches   Some people are allergic to the waste from these pests.   Cover food and garbage.  Clean up spills and food crumbs.  Store grease in the refrigerator.   Keep food out of the bedroom.   Indoor Mold  This can be a trigger if your home has high moisture. Fix leaking faucets, pipes, or other sources of water.   Clean moldy surfaces.  Dehumidify basement if it is damp and smelly.   Smoke, Strong Odors, and Sprays  These can reduce air quality. Stay away from strong odors and sprays, such as perfume, powder, hair spray, paints, smoke incense, paint, cleaning products, candles and new carpet.   Exercise or Sports  Some people with asthma have this trigger. Be active!  Ask your doctor about taking medicine before sports or exercise to prevent symptoms.    Warm up for 5-10 minutes before and after sports or exercise.     Other Triggers of Asthma  Cold air:  Cover your nose and mouth with a scarf.  Sometimes laughing or crying can be a trigger.  Some medicines and food can trigger asthma.

## 2020-02-14 NOTE — TELEPHONE ENCOUNTER
Routing refill request to provider for review/approval because:  Drug not on the [facility] refill protocol   Patient has an appointment today  Judy Dave RN BSN

## 2020-02-14 NOTE — TELEPHONE ENCOUNTER
Clonazepam  Last Written Prescription Date:  01/15/2020  Last Fill Quantity: 90,  # refills: 0   Last office visit: 2/5/2020 with prescribing provider:   Ted  Future Office Visit:   Next 5 appointments (look out 90 days)    Feb 14, 2020  4:30 PM CST  [facility] G [facility] [address] [place] [address]  [phone]   Mar 10, 2020  3:00 PM CDT  Return Visit with Ashley Salgado PA-C  [facility] [address]  [facility] [address] [phone]         Requested Prescriptions   Pending Prescriptions Disp Refills     clonazePAM (KLONOPIN) 0.5 MG tablet [Pharmacy Med Name: CLONAZEPAM 0.5MG TABS] 90 tablet 0     Sig: TAKE 1/2 TO 1 TABLET BY MOUTH THREE TIMES A DAY AS NEEDED FOR ANXIETY       There is no refill protocol information for this order        Judy Dave RN BSN

## 2020-02-15 ASSESSMENT — ASTHMA QUESTIONNAIRES: ACT_TOTALSCORE: 18

## 2020-02-24 ENCOUNTER — HOSPITAL ENCOUNTER (EMERGENCY)
Facility: CLINIC | Age: 55
Discharge: HOME | End: 2020-02-24
Attending: FAMILY MEDICINE | Admitting: FAMILY MEDICINE
Payer: COMMERCIAL

## 2020-02-24 VITALS
SYSTOLIC BLOOD PRESSURE: 114 MMHG | HEART RATE: 83 BPM | DIASTOLIC BLOOD PRESSURE: 84 MMHG | OXYGEN SATURATION: 99 % | RESPIRATION RATE: 18 BRPM | HEIGHT: 67 IN | BODY MASS INDEX: 25.29 KG/M2 | TEMPERATURE: 97.7 F | WEIGHT: 161.13 LBS

## 2020-02-24 DIAGNOSIS — M54.2 CHRONIC NECK PAIN: ICD-10-CM

## 2020-02-24 DIAGNOSIS — G89.29 CHRONIC NECK PAIN: ICD-10-CM

## 2020-02-24 DIAGNOSIS — M62.838 NECK MUSCLE SPASM: ICD-10-CM

## 2020-02-24 PROCEDURE — 20553 NJX 1/MLT TRIGGER POINTS 3/>: CPT | Mod: Z6 | Performed by: FAMILY MEDICINE

## 2020-02-24 PROCEDURE — 99284 EMERGENCY DEPT VISIT MOD MDM: CPT | Mod: 25 | Performed by: FAMILY MEDICINE

## 2020-02-24 PROCEDURE — 99283 EMERGENCY DEPT VISIT LOW MDM: CPT | Mod: 25 | Performed by: FAMILY MEDICINE

## 2020-02-24 PROCEDURE — 20553 NJX 1/MLT TRIGGER POINTS 3/>: CPT | Performed by: FAMILY MEDICINE

## 2020-02-24 RX ORDER — BUPIVACAINE HYDROCHLORIDE 5 MG/ML
10 INJECTION, SOLUTION EPIDURAL; INTRACAUDAL ONCE
Status: DISCONTINUED | OUTPATIENT
Start: 2020-02-24 | End: 2020-02-25 | Stop reason: HOSPADM

## 2020-02-24 ASSESSMENT — ENCOUNTER SYMPTOMS
FEVER: 0
NUMBNESS: 0
WEAKNESS: 0

## 2020-02-24 ASSESSMENT — MIFFLIN-ST. JEOR: SCORE: 1539.49

## 2020-02-24 NOTE — ED AVS SNAPSHOT
[facility] Emergency Department  [address] [place] [address]  Phone:  [phone]  Fax:  [phone]                                    Joselito Abarca   MRN: [id number]    Department:  [facility] Emergency Department   Date of Visit:  2/24/2020           After Visit Summary Signature Page    I have received my discharge instructions, and my questions have been answered. I have discussed any challenges I see with this plan with the nurse or doctor.    ..........................................................................................................................................  Patient/Patient Representative Signature      ..........................................................................................................................................  Patient Representative Print Name and Relationship to Patient    ..................................................               ................................................  Date                                   Time    ..........................................................................................................................................  Reviewed by Signature/Title    ...................................................              ..............................................  Date                                               Time          22EPIC Rev 08/18

## 2020-02-25 ENCOUNTER — PATIENT OUTREACH (OUTPATIENT)
Dept: CARE COORDINATION | Facility: CLINIC | Age: 55
End: 2020-02-25

## 2020-02-25 DIAGNOSIS — Z71.89 OTHER SPECIFIED COUNSELING: ICD-10-CM

## 2020-02-25 NOTE — ED TRIAGE NOTES
He has chronic neck pain that is severe.  He comes in for trigger point injections that really help.

## 2020-02-25 NOTE — PROGRESS NOTES
Scheduled Follow Up Call: Attempt 1 Community Health Worker called and left a message for the patient. If the patient is returning my call, please transfer the patient to Pamella PASCUAL at [phone].          Referral: ED discharge report.  Patient was seen for neck spasm/pain    Notes:  Follow up with PCP &  Neurosurgery

## 2020-02-25 NOTE — ED PROVIDER NOTES
History     Chief Complaint   Patient presents with     Neck Pain     HPI  Joselito Abarca is a 54 year old male who presents to the ED this evening with a flare of his chronic neck pain.  He has done well with trigger point injections in the past.  Was started on some muscle relaxants which seem to help.  Has also had some neck injections which have been helpful.  Pain has been increasing over the past week.    Denies any fevers.  No focal weakness or numbness.  Last trigger point injections were on 1/1/2020.      Allergies:  Allergies   Allergen Reactions     Vicodin [Hydrocodone-Acetaminophen] Nausea     Other reaction(s): Diaphoresis, Vomiting  HEADACHE       Problem List:    Patient Active Problem List    Diagnosis Date Noted     Back pain, chronic 02/21/2019     Priority: Medium     S/P laparoscopic fundoplication 02/21/2019     Priority: Medium     Long-segment Olson's esophagus 02/21/2019     Priority: Medium     Gastroesophageal reflux disease, esophagitis presence not specified 09/21/2018     Priority: Medium     Chronic seasonal allergic rhinitis due to fungal spores 06/07/2018     Priority: Medium     Status post cervical spinal arthrodesis 11/29/2017     Priority: Medium     Chronic, continuous use of opioids 12/13/2016     Priority: Medium     Patient is followed by Gregory G. Schoen, MD for ongoing prescription of pain medication.  All refills should be approved by this provider, or covering partner.    Medication(s): Oxycodone 5 mg IR: Take 2 capsules (10 mg) by mouth every 4 hours as needed 2 caps q 4 hour prn pain up to 12 per day .   Methadone 10 mg three times daily, 90 per month  Clonazepam 0.5 mg tid, 90 per month   Clinic visit frequency required: Q 3 months     Controlled substance agreement:  Encounter-Level CSA - 2/27/15:               Controlled Substance Agreement - Scan on 3/14/2015  8:47 AM : Controlled Medication Agreement-02/27/15 (below)            Pain Clinic evaluation in the  past: Yes    DIRE Total Score(s):  No flowsheet data found.    Last [facility] website verification:  10/30/2016     [email or web address]         Moderate persistent asthma without complication 11/02/2016     Priority: Medium     Acute respiratory failure with hypoxia (H) 04/29/2016     Priority: Medium     Nausea with vomiting 04/27/2016     Priority: Medium     Cough 03/06/2016     Priority: Medium     Tobacco dependence syndrome 02/17/2016     Priority: Medium     Leukocytosis 02/16/2016     Priority: Medium     Disease of bronchial airway (HCC) 02/12/2016     Priority: Medium     Bronchiolectasis (H) 02/12/2016     Priority: Medium     Degeneration of cervical intervertebral disc 01/26/2015     Priority: Medium     Health Care Home 09/30/2013     Priority: Medium     Status:  Accepted  Care Coordinator:    Melissa Behl BSN, RN, N  [facility] Care Coordinator  [phone]     See Letters for [facility] Care Plan  Date:  April 26, 2016            Persons encountering health services in other specified circumstances 09/30/2013     Priority: Medium     Overview:   Overview:   Status:  Accepted  Care Coordinator:    Melissa Behl BSN, RN, [facility] Care Coordinator  [phone]     See Letters for [facility] Care Plan  Date:  April 26, 2016       Arthrodesis status 06/15/2011     Priority: Medium     Neck pain 06/15/2011     Priority: Medium     History of arthrodesis 06/15/2011     Priority: Medium     CARDIOVASCULAR SCREENING; LDL GOAL LESS THAN 160 10/31/2010     Priority: Medium     Encounter for screening for cardiovascular disorders 10/31/2010     Priority: Medium     Intervertebral cervical disc disorder with myelopathy, cervical region 11/12/2009     Priority: Medium     Patient is followed by TIARA JIMENEZ for ongoing prescription of narcotic pain medicine.  Med: methadone 10 mg tid. Taking oxycodone up to 8 per day, 120 per month  Maximum use per month: 90  Expected duration: ongoing  Narcotic  agreement on file: YES  Clinic visit recommended: Q 3 months         Intervertebral disc disorder of cervical region with myelopathy 11/12/2009     Priority: Medium     Overview:   Overview:   Patient is followed by TIARA JIMENEZ for ongoing prescription of narcotic pain medicine.  Med: methadone 10 mg tid. Taking oxycodone up to 8 per day, 120 per month  Maximum use per month: 90  Expected duration: ongoing  Narcotic agreement on file: YES  Clinic visit recommended: Q 3 months       Constipation 03/19/2008     Priority: Medium     Problem list name updated by automated process. Provider to review       Thoracic or lumbosacral neuritis or radiculitis, unspecified 03/19/2008     Priority: Medium     Esophageal reflux 07/09/2003     Priority: Medium     Generalized anxiety disorder 07/08/2003     Priority: Medium     Alcohol abuse, in remission 07/08/2003     Priority: Medium     Nondependent alcohol abuse, in remission 07/08/2003     Priority: Medium        Past Medical History:    Past Medical History:   Diagnosis Date     Allergic rhinitis      Anxiety      Depressive disorder      GERD (gastroesophageal reflux disease)      Neck pain 6/15/2011     Pneumonia      Uncomplicated asthma        Past Surgical History:    Past Surgical History:   Procedure Laterality Date     BRONCHOSCOPY (RIGID OR FLEXIBLE), DIAGNOSTIC N/A 8/7/2018    Procedure: COMBINED BRONCHOSCOPY (RIGID OR FLEXIBLE), LAVAGE;  Bronchoscopy with Lavage and Transbronchial Biopsy;  Surgeon: Yung Miranda MD;  Location: [facility]     COLONOSCOPY WITH CO2 INSUFFLATION N/A 9/24/2018    Procedure: COLONOSCOPY WITH CO2 INSUFFLATION;  Colon and upper endoscopy ;  Surgeon: Devin Pelayo MD;  Location:  OR     COMBINED ESOPHAGOSCOPY, GASTROSCOPY, DUODENOSCOPY (EGD) WITH CO2 INSUFFLATION N/A 9/24/2018    Procedure: COMBINED ESOPHAGOSCOPY, GASTROSCOPY, DUODENOSCOPY (EGD) WITH CO2 INSUFFLATION;  Colon and upper endoscopy ;  Surgeon:  Devin Pelayo MD;  Location: [facility]     DISCECTOMY LUMBAR POSTERIOR MICROSCOPIC ONE LEVEL  2/21/2012    Procedure:DISCECTOMY LUMBAR POSTERIOR MICROSCOPIC ONE LEVEL; LEFT T1-T2 THORASIC HEMILAMINECTOMY MICRODISCECTOMY WITH MEXTRIX II ; Surgeon:SHARON PURI; Location:[facility]     DISCECTOMY, FUSION CERVICAL ANTERIOR ONE LEVEL, COMBINED N/A 11/29/2017    Procedure: COMBINED DISCECTOMY, FUSION CERVICAL ANTERIOR ONE LEVEL;  Anterior Cervical Discectomy and Fusion Cervical Six - Cervical Seven, Exploration and Revision Cervical Four - Cervical Six with Hardware Removal;  Surgeon: Nikolas Vasques MD;  Location: [facility]     ESOPHAGEAL IMPEDENCE FUNCTION TEST WITH 24 HOUR PH GREATER THAN 1 HOUR N/A 12/12/2018    Procedure: ESOPHAGEAL IMPEDENCE FUNCTION TEST WITH 24 HOUR PH GREATER THAN 1 HOUR;  Surgeon: Atif Oconnor MD;  Location: [facility]     ESOPHAGOSCOPY, GASTROSCOPY, DUODENOSCOPY (EGD), COMBINED N/A 9/24/2018    Procedure: COMBINED ESOPHAGOSCOPY, GASTROSCOPY, DUODENOSCOPY (EGD), BIOPSY SINGLE OR MULTIPLE;;  Surgeon: Devin Pelayo MD;  Location: [facility]     EXPLORE SPINE, REMOVE HARDWARE, COMBINED N/A 11/29/2017    Procedure: COMBINED EXPLORE SPINE, REMOVE HARDWARE;;  Surgeon: Nikolas Vasques MD;  [facility]: [facility]     FUSION CERVICAL ANTERIOR TWO LEVELS  1/29/2010     HC DRAIN/INJ MAJOR JOINT/BURSA W/O US  5/5/2008    Left sacroiliac joint injection.     HC INJ EPIDURAL LUMBAR/SACRAL W/WO CONTRAST  2009     HEAD & NECK SURGERY      2013     HERNIA REPAIR       INJECT BLOCK MEDIAL BRANCH CERVICAL/THORACIC/LUMBAR Bilateral 8/26/2015    Procedure: INJECT BLOCK MEDIAL BRANCH CERVICAL / THORACIC / LUMBAR;  Surgeon: Ronald Driscoll MD;  [facility]     INJECT BLOCK MEDIAL BRANCH CERVICAL/THORACIC/LUMBAR N/A 8/8/2019    Procedure: BLOCK, NERVE, FACET JOINT, MEDIAL BRANCH, DIAGNOSTIC Bilateral Cervical 2,3,4;  Surgeon: Ronald Driscoll MD;  [facility]     INJECT BLOCK MEDIAL  BRANCH CERVICAL/THORACIC/LUMBAR N/A 9/13/2019    Procedure: Medial Branch Block Bilateral Cervical 2,3, and 4;  Surgeon: Ronald Driscoll MD;  [facility]     INJECT EPIDURAL LUMBAR N/A 2/13/2020    Procedure: Lumber 4-5 bilateral Epidural Injection;  Surgeon: Ronald Driscoll MD;  [facility]     INJECT FACET JOINT Bilateral 5/27/2015    Procedure: INJECT FACET JOINT;  Surgeon: Ronald Driscoll MD;  [facility]     NERVE BLOCK OCCIPITAL Bilateral 7/12/2018    Procedure: NERVE BLOCK OCCIPITAL;  bilateral occipital nerve blocks;  Surgeon: Ronald Driscoll MD;  [facility]: [facility]     PROCEDURE PLACEHOLDER GENERAL N/A 02/21/2019    Lap Ed at [facility]     RADIO FREQUENCY ABLATION PULSED CERVICAL Right 10/18/2019    Procedure: CERVICAL RADIOFREQUENCY ABLATION  Cervical 2,3,4;  Surgeon: Ronald Driscoll MD;  [facility]     RADIO FREQUENCY ABLATION PULSED CERVICAL Left 11/14/2019    Procedure: Left Cervical 2, 3, 4 Radiofreqency Ablation;  Surgeon: Ronald Driscoll MD;  Location: [facility]       Family History:    Family History   Problem Relation Age of Onset     Family History Negative No family hx of      C.A.D. No family hx of        Social History:  Marital Status:  Single [1]  Social History     Tobacco Use     Smoking status: Former Smoker     Packs/day: 1.00     Years: 30.00     Pack years: 30.00     Types: Cigars     Last attempt to quit: 12/19/2009     Years since quitting: 10.1     Smokeless tobacco: Former User     Quit date: 2012   Substance Use Topics     Alcohol use: No     Alcohol/week: 0.0 standard drinks     Comment: HX OF ABUSE-IN REMISSION     Drug use: No        Medications:    clonazePAM (KLONOPIN) 0.5 MG tablet  DULoxetine (CYMBALTA) 60 MG capsule  FLOVENT  MCG/ACT Inhaler  fluticasone (FLONASE) 50 MCG/ACT nasal spray  ipratropium - albuterol 0.5 mg/2.5 mg/3 mL (DUONEB) 0.5-2.5 (3) MG/3ML neb solution  methadone (DOLOPHINE) 5 MG tablet  naloxone (NARCAN) 4 MG/0.1ML nasal spray  nystatin  "(MYCOSTATIN) 059120 UNIT/ML suspension  order for DME  oxyCODONE IR (ROXICODONE) 10 MG tablet  sildenafil (VIAGRA) 100 MG tablet  tiZANidine (ZANAFLEX) 2 MG tablet  traZODone (DESYREL) 100 MG tablet  VENTOLIN  (90 Base) MCG/ACT inhaler  vitamin D3 (CHOLECALCIFEROL) 2000 units tablet  zolpidem (AMBIEN) 10 MG tablet          Review of Systems   Constitutional: Negative for fever.   Neurological: Negative for weakness and numbness.       Physical Exam   BP: 114/84  Pulse: 83  Temp: 97.7  F (36.5  C)  Resp: 18  Height: 170.2 cm (5' 7\")  Weight: 73.1 kg (161 lb 2 oz)  SpO2: 99 %      Physical Exam  Constitutional:       General: He is not in acute distress.     Appearance: Normal appearance.   Neck:     Neurological:      Mental Status: He is alert.         ED Course  (with Medical Decision Making)      54-year-old gentleman with chronic neck pain started flaring up again the past week.  Has had excellent result with trigger point injections in the past and would like to pursue the same.  Is followed by primary care and neurosurgery.    He has some trigger points and muscle spasm in the occiput bilaterally and down the right paraspinous into the upper trap.  6 trigger point injections were given with Marcaine 0.5% plain with good results.  He was quite pleased.  This was done after vigorous alcohol prep.  Verbal and written discharge instructions given.            Procedures               Critical Care time:  none               No results found. However, due to the size of the patient record, not all encounters were searched. Please check Results Review for a complete set of results.    Medications   bupivacaine (MARCAINE) 0.5% preservative free injection (has no administration in time range)       Assessments & Plan     I have reviewed the nursing notes.    I have reviewed the findings, diagnosis, plan and need for follow up with the patient.       New Prescriptions    No medications on file       Final " diagnoses:   Chronic neck pain   Neck muscle spasm       2/24/2020   [facility] EMERGENCY DEPARTMENT     Don Love MD  02/24/20 [id number]

## 2020-02-26 ENCOUNTER — PATIENT OUTREACH (OUTPATIENT)
Dept: CARE COORDINATION | Facility: CLINIC | Age: 55
End: 2020-02-26

## 2020-02-26 NOTE — PROGRESS NOTES
Community Health Worker called and left a message for the patient.  If the patient is returning my call, please transfer the patient to Pamella PASCUAL at [phone]. Patient has been mailed a introduction letter and was provided with CHW contact information if they are interested in accessing Clinic Care Coordination. Order for Care Management has been closed, no further outreach will be done at this time and patient can be re-referred.           Referral: ED discharge report.  Patient was seen for neck spasm/pain    Notes:  Follow up with PCP &  Neurosurgery

## 2020-02-26 NOTE — LETTER
[place] CARE COORDINATION    February 26, 2020    Joselito Abarca  [address]   [facility] [address]      Dear Joselito,    I am a clinic care coordinator who works with Gregory G. Schoen, MD at [facility]. I have been trying to reach you recently to introduce Clinic Care Coordination and to see if there was anything I could assist you with.  Below is a description of clinic care coordination and how I can further assist you.      The clinic care coordinator team is made up of a registered nurse,  and community health worker who understand the health care system. The goal of clinic care coordination is to help you manage your health and improve access to the health care system in the most efficient manner. The team can assist you in meeting your health care goals by providing education, coordinating services, strengthening the communication among your providers  and supporting you with any resource needs.    Please feel free to contact the Community Health Worker, at [phone] with any questions or concerns. We are focused on providing you with the highest-quality healthcare experience possible and that all starts with you.     Sincerely,     Pamella Blanton  Formerly [facility] Health Worker   [facility]  [address] [place] [address]  [facility]  [address] [place] [address]  [email or web address]   Office: [phone]  Fax: [phone]

## 2020-03-04 DIAGNOSIS — M79.18 MYOFASCIAL PAIN: ICD-10-CM

## 2020-03-05 RX ORDER — TIZANIDINE 2 MG/1
TABLET ORAL
Qty: 60 TABLET | Refills: 0 | Status: SHIPPED | OUTPATIENT
Start: 2020-03-05 | End: 2020-03-25

## 2020-03-05 NOTE — TELEPHONE ENCOUNTER
Zanaflex  Last Written Prescription Date:  02/03/2020  Last Fill Quantity: 60,  # refills: 0   Last office visit: 2/14/2020 with prescribing provider:  Schoen Future Office Visit:   Next 5 appointments (look out 90 days)    Mar 10, 2020  3:00 PM CDT  Return Visit with Ashley Salgado PA-C  [facility] [address] [place] [address] [address]  [facility] [address]  [phone]         Routing refill request to provider for review/approval because:  Drug not on the [facility] refill protocol     Ailyn Tobias RN

## 2020-03-12 NOTE — PROGRESS NOTES
"[facility]    CHIEF COMPLAINT:   Pain  -neck pain    INTERVAL HISTORY:  Last seen on 02/11/20.        Recommendations/plan at the last visit included:  1. Physical Therapy:  Yes, continue current plan  2. Clinical Health Psychologist:  NO    3. Diagnostic Studies: none at this time  4. Medication Management:                1.   Reduce Methadone 7.5mg every 12 hours               2.   Continue Oxycodone to 10mg every 6 hours as needed for severe pain. Max of 4/day.               3.   Continue Tizanidine 2mg 1-2 tablets TID PRN               4.   Continue Cymbalta as recommended by PCP  5. Further procedures recommended: keep appointment for LESI  6. Recommendations to PCP. See above        Follow up with this provider:   4 Weeks      Since his last visit, Joselito Abarca reports:    He states that he has a lot of neck pain, especially with sleeping. He states that he has to get up during the night and sit on his recliner to help relieve it. He states that he has pain during the day as well. He states that he has to get up and take pain medications to start his day. He states that he cannot do \"fun stuff\". He states that he is unable to have \"good sex\" with his girlfriend due to pain. He states that he is unable to perform oral do enjoy sex due to pain. He states that he cannot drive long distances.     He was seen in the ER on 02/24/2020 for his neck pain. He received trigger point injections. He had a L4-5 REVA on 02/13/2020. This helps his low back pain.     Pain Information:   Pain quality:  miserable    Pain rating: intensity ranges from 10/10 to 10/10, and averages 10/10 on a 0-10 scale.   Pain today 10/10    SELF CARE:   How often do you practice SELF-CARE (relaxing, stretching, pacing, monitoring posture, taking mini-breaks) in a typical day: He states that he cannot do activities    Annual Controlled Substance Agreement: 7/16/2019  UDS: 6/28/2019    CURRENT RELEVANT PAIN " MEDICATIONS:   Cymbalta 60mg daily  Clonazepam 0.5mg-takes 3-4/day  Methadone 7.5mg every 12 hours  Oxycodone 10mg every 6 hours max of 4/day  Tizanidine 2mg-2 tablet 2-3 times daily    Patient is using the medication as prescribed:  YES  Is your medication helpful? somewhat  Medication side effects? no side effect    Previous Medications: (H--helped; HI--Helped initially; SWH-- somewhat helpful, NH--No help; W--worse; SE--side effects)   Opiates: hydrocodone SE allergic, fever, sweaty, headache, methadone H, Oxycodone H  NSAIDS: Ibuprofen NH, Aleve NH  Anti-migraine mediations: Fiorinal H  Muscle Relaxants: Valium H  Neuropathics: Gabapentin H SE breathing issues  Anti-depressants: none  Anxiolytics: Klonopin H  Topicals: Lidocaine H  Other medications not covered above: Tylenol NH    Past Pain Treatments:  Pain Clinic:   Yes, he was previously seen at [facility]   PT: Yes, has done many times  Psychologist: No  Relaxation techniques/biofeedback: No  Chiropractor: Yes, feels that it made it worse  Acupuncture: Yes, just did one session  Pharmacotherapy:               Opioids: Yes                Non-opioids:    Yes   TENs Unit:Yes, aggravates the pain  Injections:     07/12/2018 bilateral occipital nerve blocks ,     08/26/2015 and 5/27/2015Cervical medial branch blocks      Has had low back pain injections at [facility].     10/18/2019 Right cervical RFA ,     11/14/2019 left cervical RFA .    02/13/2020 L4-5 REVA    02/24/2020 TPI neck in ER  Self-care:   Yes, hot tubs, ice packs, heating pads  Surgeries related to pain: Yes,  1. anterior cervical discectomy and fusion C6-7, exploration and revision C4-6 with hardwar removal 11/29/2017,   2. Left T1-T2 thoracic hemilaminectomy, microdiscectomy 02/21/2012,   3. Removal of C4 through C6 anterior cervical plate, Exploration of C4-C5 and C5-C6 anterior cervical fusion, C4-C5 and C5-C6 arthrodesis, C4 through C6 anterior cervical instrumentation, and [place] of right  iliac hip graft.  03/15/2011  4. C4-C5 and C5-C6 anterior cervical discectomy and fusion with instrumentation and neural electrophysiologic monitoring 01/26/2010    [place] Board of Pharmacy Data Base Reviewed:    YES; As expected, no concern for misuse/abuse of controlled medications based on this report.    THE 4 As OF OPIOID MAINTENANCE ANALGESIA    Analgesia: Is pain relief clinically significant? NO   Activity: Is patient functional and able to perform Activities of Daily Living? YES   Adverse effects: Is patient free from adverse side effects from opiates? YES   Adherence to Rx protocol: Is patient adhering to Controlled Substance Agreement and taking medications ONLY as ordered? YES       Is Narcan prescribed for opiate use >50 MME daily? YES      Daily MME: 120    Medications:  Current Outpatient Medications   Medication Sig Dispense Refill     clonazePAM (KLONOPIN) 0.5 MG tablet TAKE 1/2 TO 1 TABLET BY MOUTH THREE TIMES A DAY AS NEEDED FOR ANXIETY 90 tablet 0     DULoxetine (CYMBALTA) 60 MG capsule Take 1 capsule (60 mg) by mouth daily 30 capsule 3     FLOVENT  MCG/ACT Inhaler INHALE 2 PUFFS INTO THE LUNGS TWICE DAILY 36 g 2     fluticasone (FLONASE) 50 MCG/ACT nasal spray SPRAY 2 SPRAYS IN EACH NOSTRIL EVERY DAY 16 g 5     ipratropium - albuterol 0.5 mg/2.5 mg/3 mL (DUONEB) 0.5-2.5 (3) MG/3ML neb solution Take 1 vial (3 mLs) by nebulization every 4 hours as needed for shortness of breath / dyspnea 30 vial 0     methadone (DOLOPHINE) 5 MG tablet Take 1.5 tablets every 12 hours.  Fill 2/28/2020. Start 2/29/2020. 90 tablet 0     naloxone (NARCAN) 4 MG/0.1ML nasal spray Spray 4 mg into one nostril alternating nostrils once as needed for opioid reversal every 2-3 minutes until assistance arrives       nystatin (MYCOSTATIN) 671193 UNIT/ML suspension TAKE 5 MLS BY MOUTH FOUR TIMES A  mL 0     order for DME Equipment being ordered: Nebulizer mask and tubing 1 each 1     oxyCODONE IR (ROXICODONE) 10  MG tablet Take 1 tablet every 6 hours as needed for breakthrough pain. Max of 4/day. Fill 2/19/2020. Start 2/22/2020. 30 day script 115 tablet 0     sildenafil (VIAGRA) 100 MG tablet Take 1 tablet (100 mg) by mouth daily as needed 30 min to 4 hrs before sex. Do not use with nitroglycerin, terazosin or doxazosin. 4 tablet 0     tiZANidine (ZANAFLEX) 2 MG tablet TAKE ONE TO TWO TABLETS BY MOUTH THREE TIMES A DAY AS NEEDED FOR MUSCLE SPASMS/TENSION 60 tablet 0     traZODone (DESYREL) 100 MG tablet Take 3 tablets (300 mg) by mouth At Bedtime 90 tablet 3     VENTOLIN  (90 Base) MCG/ACT inhaler INHALE 2 PUFFS INTO THE LUNGS EVERY 6 HOURS AS NEEDED FOR SHORTNESS OF BREATH, DIFFICULTY BREATHING OR WHEEZING. 18 g 3     vitamin D3 (CHOLECALCIFEROL) 2000 units tablet TAKE ONE TABLET BY MOUTH EVERY  tablet 3     zolpidem (AMBIEN) 10 MG tablet Take 10 mg by mouth nightly as needed          Review of Systems: A 10-point review of systems was negative, with the exception of chronic pain issues    Social History: Reviewed; unchanged from previous consultation.      Family history: Reviewed; unchanged from previous consultation.     PHYSICAL EXAM:     Vitals:   /72   Temp 97.8  F (36.6  C) (Temporal)   Wt 75.5 kg (166 lb 8 oz)   BMI 26.08 kg/m    Body mass index is 26.08 kg/m .  Data Unavailable  166 lbs 8 oz      Constitutional: healthy, alert and no distress  HEENT: Head atraumatic, normocephalic. Eyes without conjunctival injection or jaundice. Neck supple. No obvious neck masses.  Skin: No rash, lesions, or petechiae of exposed skin.   Psychiatric/mental status: Alert, without lethargy or stupor. Appropriate affect. Mood normal.       DIAGNOSTIC TESTS:  Imaging Studies:   No new imaging to review today.    Assessment:  Joselito Abarca is a 54 year old male who presents today for follow up regarding his:      1. Arthropathy of cervical facet joint  2. cervicalgia  3. Midline thoracic back  pain  4. Myofascial pain  5. Chronic pain syndrome  6. Chronic use of opioids  7. Chronic low back pain    I did have a discussion today with the patient regarding his discussion that he had with his primary care provider.  Patient stated that he does not feel that he is listened to at his visits with me.  He states that his pain is not being controlled and all that I care about doing is taking him off of the narcotic pain medications.  I discussed with the patient that injections and other medications are part of helping treat chronic pain.  I discussed with him that as I have stated from the beginning I do not agree with the dosing that he is on and I would not continue prescribing at those doses.  I discussed with the patient that I would continue a taper.  Patient asked me to slow down on the taper and continuing him on the same dose for the next month.  I again discussed with him that I do not agree with these doses and I would continue making changes at every visit.  I have been going down by about 10% at each visit recently.  I discussed with the patient that I have been doing a very slow taper with him.  Discussed that we did change him from every 2-week visits to monthly visits but I would not change him to every 8-week visits, again because I do not agree with his dosing.  I then stated to the patient that I am more than happy to look at other medications that he can try.  He feels that the tizanidine is helpful.  He is currently on Cymbalta 60 mg a day.  We did talk about increasing this dose versus trying a new medication such as Lyrica.  Patient states that he prefer to increase the Cymbalta.  He will continue the 60 mg in the morning and we will start him on 30 mg at night.  I discussed with the patient that typically the max dosing for Cymbalta is 60 mg however you can go up to 120 mg.  There is the potential for increasing side effects with increasing past 60 mg.  Although studies do not show a  significant benefit from increasing over 60 mg I have seen patients benefit by going up.  Again patient would like to try this.  We can always consider Lyrica in the future as well.    With regards to his narcotic pain medications he will continue oxycodone 10 mg every 6 hours as needed.  I did agree to go up to 120 tablets of this medication so he can get for a day every day.  With the methadone we will go down to 7.5 mg in the morning and 5 mg at bedtime.  This will bring his MME from 120-110.    Plan:    Diagnosis reviewed, treatment option addressed, and risk/benifits discussed.  Self-care instructions given.  I am recommending a multidisciplinary treatment plan to help this patient better manage pain.      1. Physical Therapy:  Yes, continue current plan  2. Clinical Health Psychologist:  NO    3. Diagnostic Studies: none at this time  4. Medication Management:    1. Reduce Methadone 7.5mg in AM and 5mg at bedtime. Space dosing by 12 hours   2. Continue Oxycodone to 10mg every 6 hours as needed for  severe pain. Max of 4/day.   3. Continue Tizanidine 2mg 1-2 tablets TID PRN   4. Increase Cymbalta to 60mg in AM and 30mg at bedtime  5. Further procedures recommended: none at this time  6. Recommendations to PCP. See above      Follow up with this provider: 4 Weeks     Total time spent face to face was 20 minutes and more than 50% of face to face time was spent in counseling and/or coordination of care regarding the diagnosis and recommendations, including injection therapy and medication management.      Ashley Salgado PA-C   [facility]

## 2020-03-13 ENCOUNTER — OFFICE VISIT (OUTPATIENT)
Dept: PALLIATIVE MEDICINE | Facility: CLINIC | Age: 55
End: 2020-03-13
Payer: COMMERCIAL

## 2020-03-13 VITALS
SYSTOLIC BLOOD PRESSURE: 104 MMHG | BODY MASS INDEX: 26.08 KG/M2 | DIASTOLIC BLOOD PRESSURE: 72 MMHG | TEMPERATURE: 97.8 F | WEIGHT: 166.5 LBS

## 2020-03-13 DIAGNOSIS — M54.2 CERVICALGIA: ICD-10-CM

## 2020-03-13 DIAGNOSIS — G89.29 CHRONIC BILATERAL LOW BACK PAIN WITHOUT SCIATICA: ICD-10-CM

## 2020-03-13 DIAGNOSIS — M79.18 MYOFASCIAL PAIN: ICD-10-CM

## 2020-03-13 DIAGNOSIS — G89.29 CHRONIC MIDLINE THORACIC BACK PAIN: ICD-10-CM

## 2020-03-13 DIAGNOSIS — M54.50 CHRONIC BILATERAL LOW BACK PAIN WITHOUT SCIATICA: ICD-10-CM

## 2020-03-13 DIAGNOSIS — G89.4 CHRONIC PAIN SYNDROME: ICD-10-CM

## 2020-03-13 DIAGNOSIS — F11.90 CHRONIC, CONTINUOUS USE OF OPIOIDS: ICD-10-CM

## 2020-03-13 DIAGNOSIS — M54.6 CHRONIC MIDLINE THORACIC BACK PAIN: ICD-10-CM

## 2020-03-13 DIAGNOSIS — M47.812 ARTHROPATHY OF CERVICAL FACET JOINT: Primary | ICD-10-CM

## 2020-03-13 DIAGNOSIS — F41.1 GENERALIZED ANXIETY DISORDER: ICD-10-CM

## 2020-03-13 PROCEDURE — 99214 OFFICE O/P EST MOD 30 MIN: CPT | Performed by: PHYSICIAN ASSISTANT

## 2020-03-13 RX ORDER — OXYCODONE HYDROCHLORIDE 10 MG/1
TABLET ORAL
Qty: 120 TABLET | Refills: 0 | Status: SHIPPED | OUTPATIENT
Start: 2020-03-13 | End: 2020-04-16

## 2020-03-13 RX ORDER — DULOXETIN HYDROCHLORIDE 30 MG/1
CAPSULE, DELAYED RELEASE ORAL
Qty: 30 CAPSULE | Refills: 0 | Status: SHIPPED | OUTPATIENT
Start: 2020-03-13 | End: 2020-04-16

## 2020-03-13 RX ORDER — METHADONE HYDROCHLORIDE 5 MG/1
TABLET ORAL
Qty: 75 TABLET | Refills: 0 | Status: SHIPPED | OUTPATIENT
Start: 2020-03-13 | End: 2020-04-16

## 2020-03-13 RX ORDER — CLONAZEPAM 0.5 MG/1
TABLET ORAL
Qty: 90 TABLET | Refills: 0 | Status: SHIPPED | OUTPATIENT
Start: 2020-03-13 | End: 2020-05-12

## 2020-03-13 ASSESSMENT — PAIN SCALES - GENERAL: PAINLEVEL: EXTREME PAIN (9)

## 2020-03-13 NOTE — TELEPHONE ENCOUNTER
Clonazepam  Last Written Prescription Date:  02/14/2020  Last Fill Quantity: 90,  # refills: 0   Last office visit: 2/14/2020 with prescribing provider:  Schoen Future Office Visit:  None    Routing refill request to provider for review/approval because:  Drug not on the [facility] refill protocol     Ailyn Tobias RN

## 2020-03-13 NOTE — PATIENT INSTRUCTIONS
After Visit Instructions:     Thank you for coming to [facility] for your care. It is my goal to partner with you to help you reach your optimal state of health.     I am recommending multidisciplinary care at this time.  The focus of care will be to continue gradual rehabilitation and pain management with medication adjustments as needed.    Continue daily self-care, identifying contributing factors, and monitoring variations in pain level. Continue to integrate self-care into your life.        Schedule follow-up with Ashley Salgado PA-C in 4 weeks. You will need to make this appointment.     Referrals: call your insurance about acupuncture    Medication recommendations:     Increase your Cymbalta to 60mg in the morning and start 30mg at bedtime    Continue Tizanidine 2mg: take 1-2 tablets three times daily as needed    Consider stopping the Tizanidine and starting Robaxin (methocarbamol)    Consider starting Lyrica (pregabalin)    Continue Oxycodone 10mg every 6 hours as needed, can fill 3/20/2020, start 3/23/2020    Reduce Methadone to 7.5mg in AM and 5mg at bedtime, fill 3/27/2020 and start 3/30/2020      Ashley Salgado PA-C  [facility]  [place]    Contact information: [facility]  Clinic Number:  [phone]     Call with any questions about your care and for scheduling assistance.     Calls are returned Monday through Friday between 8 AM and 4:30 PM. We usually get back to you within 2 business days depending on the issue/request.    If we are prescribing your medications:    For opioid medication refills, call the clinic or send a [email or web address]@ message 7 days in advance.  Please include:    Name of requested medication    Name of the pharmacy.    For non-opioid medications, call your pharmacy directly to request a refill. Please allow 3-4 days to be processed.     Per [place] State Law:    All controlled substance prescriptions must be  filled within 30 days of being written.      For those controlled substances allowing refills, pickup must occur within 30 days of last fill.      We believe regular attendance is key to your success in our program!      Any time you are unable to keep your appointment we ask that you call us at least 24 hours in advance to cancel.This will allow us to offer the appointment time to another patient.   Multiple missed appointments may lead to dismissal from the clinic.

## 2020-03-24 DIAGNOSIS — G89.4 CHRONIC PAIN SYNDROME: ICD-10-CM

## 2020-03-24 DIAGNOSIS — F41.1 GENERALIZED ANXIETY DISORDER: ICD-10-CM

## 2020-03-24 DIAGNOSIS — M79.18 MYOFASCIAL PAIN: ICD-10-CM

## 2020-03-25 RX ORDER — TIZANIDINE 2 MG/1
TABLET ORAL
Qty: 60 TABLET | Refills: 0 | Status: SHIPPED | OUTPATIENT
Start: 2020-03-25 | End: 2020-04-16 | Stop reason: SINTOL

## 2020-03-25 RX ORDER — DULOXETIN HYDROCHLORIDE 60 MG/1
CAPSULE, DELAYED RELEASE ORAL
Qty: 30 CAPSULE | Refills: 3 | Status: SHIPPED | OUTPATIENT
Start: 2020-03-25 | End: 2020-07-17

## 2020-03-25 NOTE — TELEPHONE ENCOUNTER
"Cymbalta  Last Written Prescription Date:  07/20/2019  Last Fill Quantity: 30,  # refills: 3   Last office visit: 2/14/2020 with prescribing provider:  Schoen   Routing refill request to provider for review/approval because:  Drug interaction warning    Tizanidine  Last Written Prescription Date:  03/05/2020  Last Fill Quantity: 60,  # refills: 0   Last office visit: 2/14/2020 with prescribing provider:  Schoen   Routing refill request to provider for review/approval because:  Drug not on the [facility] refill protocol     Future Office Visit:  None    Requested Prescriptions   Pending Prescriptions Disp Refills     tiZANidine (ZANAFLEX) 2 MG tablet [Pharmacy Med Name: TIZANIDINE HCL 2MG TABS] 60 tablet 0     Sig: TAKE ONE TO TWO TABLETS BY MOUTH THREE TIMES A DAY AS NEEDED FOR MUSCLE SPASMS / TENSION       There is no refill protocol information for this order        DULoxetine (CYMBALTA) 60 MG capsule [Pharmacy Med Name: DULOXETINE HCL 60MG CPEP] 30 capsule 3     Sig: TAKE ONE CAPSULE BY MOUTH ONCE DAILY       Serotonin-Norepinephrine Reuptake Inhibitors  Passed - 3/24/2020  5:36 PM        Passed - Blood pressure under 140/90 in past 12 months     BP Readings from Last 3 Encounters:   03/13/20 104/72   02/24/20 114/84   02/14/20 102/74           Passed - Recent (12 mo) or future (30 days) visit within the authorizing provider's specialty     Patient has had an office visit with the authorizing provider or a provider within the authorizing providers department within the previous 12 mos or has a future within next 30 days. See \"Patient Info\" tab in inbasket, or \"Choose Columns\" in Meds & Orders section of the refill encounter.          Passed - Medication is active on med list        Passed - Patient is age 18 or older         Ailyn Tobias RN   "

## 2020-04-15 DIAGNOSIS — G89.29 CHRONIC MIDLINE THORACIC BACK PAIN: ICD-10-CM

## 2020-04-15 DIAGNOSIS — M47.812 ARTHROPATHY OF CERVICAL FACET JOINT: ICD-10-CM

## 2020-04-15 DIAGNOSIS — M79.18 MYOFASCIAL PAIN: ICD-10-CM

## 2020-04-15 DIAGNOSIS — M54.6 CHRONIC MIDLINE THORACIC BACK PAIN: ICD-10-CM

## 2020-04-15 DIAGNOSIS — G89.4 CHRONIC PAIN SYNDROME: ICD-10-CM

## 2020-04-15 DIAGNOSIS — M54.2 CERVICALGIA: ICD-10-CM

## 2020-04-15 DIAGNOSIS — F11.90 CHRONIC, CONTINUOUS USE OF OPIOIDS: ICD-10-CM

## 2020-04-16 ENCOUNTER — VIRTUAL VISIT (OUTPATIENT)
Dept: PALLIATIVE MEDICINE | Facility: CLINIC | Age: 55
End: 2020-04-16
Payer: COMMERCIAL

## 2020-04-16 VITALS — BODY MASS INDEX: 26.63 KG/M2 | WEIGHT: 170 LBS

## 2020-04-16 DIAGNOSIS — F11.90 CHRONIC, CONTINUOUS USE OF OPIOIDS: ICD-10-CM

## 2020-04-16 DIAGNOSIS — M47.812 ARTHROPATHY OF CERVICAL FACET JOINT: ICD-10-CM

## 2020-04-16 DIAGNOSIS — M54.2 CERVICALGIA: ICD-10-CM

## 2020-04-16 DIAGNOSIS — M79.18 MYOFASCIAL PAIN: ICD-10-CM

## 2020-04-16 DIAGNOSIS — M54.6 CHRONIC MIDLINE THORACIC BACK PAIN: ICD-10-CM

## 2020-04-16 DIAGNOSIS — G89.29 CHRONIC MIDLINE THORACIC BACK PAIN: ICD-10-CM

## 2020-04-16 DIAGNOSIS — G89.4 CHRONIC PAIN SYNDROME: ICD-10-CM

## 2020-04-16 PROCEDURE — 99214 OFFICE O/P EST MOD 30 MIN: CPT | Mod: 95 | Performed by: PHYSICIAN ASSISTANT

## 2020-04-16 RX ORDER — PREGABALIN 25 MG/1
CAPSULE ORAL
Qty: 60 CAPSULE | Refills: 0 | Status: SHIPPED | OUTPATIENT
Start: 2020-04-16 | End: 2020-05-05 | Stop reason: SINTOL

## 2020-04-16 RX ORDER — METHOCARBAMOL 500 MG/1
TABLET, FILM COATED ORAL
Qty: 60 TABLET | Refills: 0 | Status: SHIPPED | OUTPATIENT
Start: 2020-04-16 | End: 2020-04-28

## 2020-04-16 RX ORDER — OXYCODONE HYDROCHLORIDE 10 MG/1
TABLET ORAL
Qty: 120 TABLET | Refills: 0 | Status: SHIPPED | OUTPATIENT
Start: 2020-04-16 | End: 2020-05-18

## 2020-04-16 RX ORDER — TIZANIDINE 2 MG/1
TABLET ORAL
Qty: 60 TABLET | Refills: 0 | Status: SHIPPED | OUTPATIENT
Start: 2020-04-16 | End: 2020-05-05

## 2020-04-16 RX ORDER — DULOXETIN HYDROCHLORIDE 30 MG/1
CAPSULE, DELAYED RELEASE ORAL
Qty: 30 CAPSULE | Refills: 6 | Status: SHIPPED | OUTPATIENT
Start: 2020-04-16 | End: 2020-05-05 | Stop reason: SINTOL

## 2020-04-16 RX ORDER — METHADONE HYDROCHLORIDE 5 MG/1
TABLET ORAL
Qty: 60 TABLET | Refills: 0 | Status: SHIPPED | OUTPATIENT
Start: 2020-04-16 | End: 2020-05-18

## 2020-04-16 ASSESSMENT — PAIN SCALES - GENERAL: PAINLEVEL: WORST PAIN (10)

## 2020-04-16 NOTE — TELEPHONE ENCOUNTER
Requested Prescriptions   Pending Prescriptions Disp Refills     tiZANidine (ZANAFLEX) 2 MG tablet [Pharmacy Med Name: TIZANIDINE HCL 2MG TABS] 60 tablet 0     Sig: TAKE ONE TO TWO TABLETS BY MOUTH THREE TIMES A DAY AS NEEDED FOR MUSCLE SPASMS /TENSION         Last Written Prescription Date:  03/25/2020  Last Fill Quantity: 60,   # refills: 0  Last Office Visit: 02/14/2020  Future Office visit:       Routing refill request to provider for review/approval because:  Drug not on the [facility] or [facility] refill protocol or controlled substance  Drug not active on patient's medication list    Jill Rodriguez MA

## 2020-04-16 NOTE — PATIENT INSTRUCTIONS
After Visit Instructions:     Thank you for coming to [facility] for your care. It is my goal to partner with you to help you reach your optimal state of health.     I am recommending multidisciplinary care at this time.  The focus of care will be to continue gradual rehabilitation and pain management with medication adjustments as needed.    Continue daily self-care, identifying contributing factors, and monitoring variations in pain level. Continue to integrate self-care into your life.        Schedule follow-up with Ashley Salgado PA-C in 4 weeks. You will need to make this appointment.     Medication recommendations:     Stop Tizanidine. Start Robaxin 500m-1.5 tablets three times daily as needed    Continue Oxycodone 10mg every 6 hour as needed for severe pain.     Reduce Methadone to 5mg every 12 hours.     Start Lyrica 25mg at bedtime for 1 week, then take 25mg twice daily    Go back on your Duloxetine to 60mg in the morning. Stop the 30mg at bedtime.       Ashley Salgado PA-C  [facility]  [place]    Contact information: [facility]  Clinic Number:  [phone]     Call with any questions about your care and for scheduling assistance.     Calls are returned Monday through Friday between 8 AM and 4:30 PM. We usually get back to you within 2 business days depending on the issue/request.    If we are prescribing your medications:    For opioid medication refills, call the clinic or send a Jamglue message 7 days in advance.  Please include:    Name of requested medication    Name of the pharmacy.    For non-opioid medications, call your pharmacy directly to request a refill. Please allow 3-4 days to be processed.     Per [place] State Law:    All controlled substance prescriptions must be filled within 30 days of being written.      For those controlled substances allowing refills, pickup must occur within 30 days of last fill.       We believe regular attendance is key to your success in our program!      Any time you are unable to keep your appointment we ask that you call us at least 24 hours in advance to cancel.This will allow us to offer the appointment time to another patient.   Multiple missed appointments may lead to dismissal from the clinic.

## 2020-04-16 NOTE — TELEPHONE ENCOUNTER
"Cymbalta  Last Written Prescription Date:  03/25/2020  Last Fill Quantity: 30,  # refills: 3   Last office visit: 2/14/2020 with prescribing provider:  Schoen   Future Office Visit:  None  Routing refill request to provider for review/approval because:  Drug interaction warning    Requested Prescriptions   Pending Prescriptions Disp Refills     DULoxetine (CYMBALTA) 30 MG capsule 30 capsule 0     Sig: Take 1 capsule at bedtime. Continue your 60mg in the morning       Serotonin-Norepinephrine Reuptake Inhibitors  Passed - 4/15/2020  3:03 PM        Passed - Blood pressure under 140/90 in past 12 months     BP Readings from Last 3 Encounters:   03/13/20 104/72   02/24/20 114/84   02/14/20 102/74           Passed - Recent (12 mo) or future (30 days) visit within the authorizing provider's specialty     Patient has had an office visit with the authorizing provider or a provider within the authorizing providers department within the previous 12 mos or has a future within next 30 days. See \"Patient Info\" tab in inbasket, or \"Choose Columns\" in Meds & Orders section of the refill encounter.          Passed - Medication is active on med list        Passed - Patient is age 18 or older         Ailyn Tobias RN   "

## 2020-04-16 NOTE — PROGRESS NOTES
"Joselito Abarca is a 54 year old male who is being evaluated via a billable telephone visit.      The patient has been notified of following:     \"This telephone visit will be conducted via a call between you and your physician/provider. We have found that certain health care needs can be provided without the need for a physical exam.  This service lets us provide the care you need with a short phone conversation.  If a prescription is necessary we can send it directly to your pharmacy.  If lab work is needed we can place an order for that and you can then stop by our lab to have the test done at a later time.    Telephone visits are billed at different rates depending on your insurance coverage. During this emergency period, for some insurers they may be billed the same as an in-person visit.  Please reach out to your insurance provider with any questions.    If during the course of the call the physician/provider feels a telephone visit is not appropriate, you will not be charged for this service.\"    Patient has given verbal consent for Telephone visit?  Yes    How would you like to obtain your AVS? Mail a copy    Additional provider notes:     CHIEF COMPLAINT:   Pain  -neck pain     INTERVAL HISTORY:  Last seen on 03/13/2020.  1. Physical Therapy:  Yes, continue current plan  2. Clinical Health Psychologist:  NO    3. Diagnostic Studies: none at this time  4. Medication Management:    1.   Reduce Methadone 7.5mg in AM and 5mg at bedtime. Space dosing by 12 hours   2.   Continue Oxycodone to 10mg every 6 hours as needed for severe pain. Max of 4/day.   3.   Continue Tizanidine 2mg 1-2 tablets TID PRN   4.   Increase Cymbalta to 60mg in AM and 30mg at bedtime  5. Further procedures recommended: none at this time  6. Recommendations to PCP. See above        Follow up with this provider: 4 Weeks     Current Visit: He states that he has had a really bad month. He states that he has had a headache all month. He is " "unable to get rid of it. He states that he is fluctuating between hot and cold flashes. He states that his pain has been \"unbearable bad\" even with his pain medications. He states that he is not able to sleep as his pain is waking him up. His pain is worse at night. He states that he did not notice a change in his pain with the increased Cymbalta dose. He has not made an appointment with is primary care provider as he feels these are withdrawal symptoms. He then states for the last 4-5 days he hasn't had the hot/cold flashes. He states that the Tizanidine takes all of his ambition out of him. He states that they make him feel very tired. He states that he is only taking them here and there during the day and using 2 at night. These help him sleep.      He states that most of his pain is in his neck at the base of his skull in the middle of spine. He states that the headaches start on the right side at that spot and goes up his head. He has tried lidocaine patches at that spot. He states that they have not helped him much.       Annual Controlled Substance Agreement: 7/16/2019  UDS: 6/28/2019    [place] Board of Pharmacy Data Base Reviewed:    YES; As expected, no concern for misuse/abuse of controlled medications based on this report.     THE 4 As OF OPIOID MAINTENANCE ANALGESIA    Analgesia: Is pain relief clinically significant? NO   Activity: Is patient functional and able to perform Activities of Daily Living? YES   Adverse effects: Is patient free from adverse side effects from opiates? YES   Adherence to Rx protocol: Is patient adhering to Controlled Substance Agreement and taking medications ONLY as ordered? YES     Assessment:  Joselito Abarca is a 54 year old male who presents today for follow up regarding his:        1. Arthropathy of cervical facet joint  2. cervicalgia  3. Midline thoracic back pain  4. Myofascial pain  5. Chronic pain syndrome  6. Chronic use of opioids     Discussed with the " patient that I feel his increased symptoms in the last month may be from the increased Cymbalta dose. Patient was concerned that he was in withdrawals. I educated him how this is unlikely. He is interested in another procedure, but unfortunately that is not an option due to COVID-19. We discussed options for pain control today including trying Lyrica and a different muscle relaxant, Robaxin. Side effects of these medications were discussed with the patient today.     With regards to his narcotic pain medications he will continue oxycodone 10 mg every 6 hours as needed. We will continue on 120 tablets of this medication so he can get four a day every day.  With the methadone we will go down to  5 mg every 12 hours.  This will bring his MME from 110-100.     Plan:     Diagnosis reviewed, treatment option addressed, and risk/benifits discussed.  Self-care instructions given.  I am recommending a multidisciplinary treatment plan to help this patient better manage pain.       1. Physical Therapy:  not at this time  2. Clinical Health Psychologist:  NO    3. Diagnostic Studies: none at this time  4. Medication Management:    1.   Reduce Methadone 5mg every 12 hours.   2.   Continue Oxycodone to 10mg every 6 hours as needed for severe pain. Max of 4/day.   3.   Stop Tizanidine. Start Robaxin 500mg TID PRN   4.   REduce Cymbalta back to 60mg in AM  5. Start Lyrica 25mg at bedtime for 1 week, then 25mg BID  5. Further procedures recommended: none at this time  6. Recommendations to PCP. See above        Follow up with this provider: 4 Weeks  Phone call duration: 21minutes    Ashley Salgado PA-C   [facility]

## 2020-04-21 DIAGNOSIS — B37.0 THRUSH: ICD-10-CM

## 2020-04-22 RX ORDER — NYSTATIN 100000/ML
SUSPENSION, ORAL (FINAL DOSE FORM) ORAL
Qty: 280 ML | Refills: 0 | Status: SHIPPED | OUTPATIENT
Start: 2020-04-22 | End: 2020-08-21

## 2020-04-22 NOTE — TELEPHONE ENCOUNTER
Nystatin        Last Written Prescription Date:  2/9/2020  Last Fill Quantity: 280 ml,   # refills: 0  Last Office Visit: 2/14/2020  Future Office visit:    Next 5 appointments (look out 90 days)    Apr 24, 2020  3:10 PM CDT  Telephone Visit with Gregory G Schoen, MD  [facility] [address] [place] [id number]  [phone]           Routing refill request to provider for review/approval because:  Drug not on the [facility], [facility] or  Health refill protocol or controlled substance

## 2020-04-27 ENCOUNTER — TELEPHONE (OUTPATIENT)
Dept: FAMILY MEDICINE | Facility: OTHER | Age: 55
End: 2020-04-27

## 2020-04-27 NOTE — TELEPHONE ENCOUNTER
Methadone rejects with insurance stating product is not on formulary. Prior Authorization will be needed.        [facility] ([facility])  Insurance phone [phone]   Patient ID# [id number]    When approved or denied, please contact pharmacy [phone]    Thanks  Dorothy Tony [facility]   [phone]

## 2020-04-27 NOTE — TELEPHONE ENCOUNTER
PA Initiation    Medication: Methadone needs PA  Insurance Company: [employer] - Phone [phone] Fax [phone]  Pharmacy Filling the Rx: [facility] [address]   Filling Pharmacy Phone: [phone]  Filling Pharmacy Fax: [phone]  Start Date: 4/27/2020

## 2020-04-27 NOTE — TELEPHONE ENCOUNTER
Will route to PA Team Theodore Cabrera [facility]

## 2020-04-28 DIAGNOSIS — M54.6 CHRONIC MIDLINE THORACIC BACK PAIN: ICD-10-CM

## 2020-04-28 DIAGNOSIS — M54.2 CERVICALGIA: ICD-10-CM

## 2020-04-28 DIAGNOSIS — G89.29 CHRONIC MIDLINE THORACIC BACK PAIN: ICD-10-CM

## 2020-04-28 DIAGNOSIS — G89.4 CHRONIC PAIN SYNDROME: ICD-10-CM

## 2020-04-28 DIAGNOSIS — F11.90 CHRONIC, CONTINUOUS USE OF OPIOIDS: ICD-10-CM

## 2020-04-28 DIAGNOSIS — M79.18 MYOFASCIAL PAIN: ICD-10-CM

## 2020-04-28 DIAGNOSIS — M47.812 ARTHROPATHY OF CERVICAL FACET JOINT: ICD-10-CM

## 2020-04-28 RX ORDER — METHOCARBAMOL 500 MG/1
TABLET, FILM COATED ORAL
Qty: 60 TABLET | Refills: 0 | Status: SHIPPED | OUTPATIENT
Start: 2020-04-28 | End: 2020-05-05

## 2020-04-28 NOTE — TELEPHONE ENCOUNTER
Chief Complaint   Patient presents with     Refill Request     Methocarbamol 500 mg      Refill request received via fax by pharmacy        [facility], [place] - [address] [place] [address]       Pending Prescriptions:                       Disp   Refills    methocarbamol (ROBAXIN) 500 MG tablet     60 tab*0            Sig: Take 1-1.5 tablets three times daily as needed         Last Written Prescription Date:  04/16/2020  Last Fill Quantity: 60,   # refills: 0  Last Office Visit with prescribing provider: 04/16/2020  Future Office visit: None scheduled.       Christy Burris CMA on 4/28/2020 at 4:29 PM

## 2020-04-28 NOTE — TELEPHONE ENCOUNTER
Prior Authorization Approval    Authorization Effective Date: 3/28/2020  Authorization Expiration Date: 7/27/2020  Medication: Methadone--APPROVED  Approved Dose/Quantity:   Reference #:     Insurance Company: [employer] - Phone [phone] Fax [phone]  Expected CoPay:       CoPay Card Available:      Foundation Assistance Needed:    Which Pharmacy is filling the prescription (Not needed for infusion/clinic administered): [facility] [address]   Pharmacy Notified: Yes  Patient Notified: Yes **Instructed pharmacy to notify patient when script is ready to /ship.**

## 2020-05-03 ENCOUNTER — HOSPITAL ENCOUNTER (EMERGENCY)
Facility: CLINIC | Age: 55
Discharge: HOME | End: 2020-05-03
Attending: FAMILY MEDICINE | Admitting: FAMILY MEDICINE
Payer: COMMERCIAL

## 2020-05-03 VITALS
DIASTOLIC BLOOD PRESSURE: 88 MMHG | HEART RATE: 72 BPM | RESPIRATION RATE: 15 BRPM | SYSTOLIC BLOOD PRESSURE: 138 MMHG | HEIGHT: 67 IN | OXYGEN SATURATION: 99 % | WEIGHT: 165 LBS | TEMPERATURE: 98.5 F | BODY MASS INDEX: 25.9 KG/M2

## 2020-05-03 DIAGNOSIS — M79.18 MYOFASCIAL MUSCLE PAIN: ICD-10-CM

## 2020-05-03 PROCEDURE — 99284 EMERGENCY DEPT VISIT MOD MDM: CPT | Mod: 25 | Performed by: FAMILY MEDICINE

## 2020-05-03 PROCEDURE — 20552 NJX 1/MLT TRIGGER POINT 1/2: CPT | Performed by: FAMILY MEDICINE

## 2020-05-03 PROCEDURE — 20552 NJX 1/MLT TRIGGER POINT 1/2: CPT | Mod: Z6 | Performed by: FAMILY MEDICINE

## 2020-05-03 ASSESSMENT — MIFFLIN-ST. JEOR: SCORE: 1557.07

## 2020-05-03 NOTE — ED AVS SNAPSHOT
[facility] Emergency Department  [address] [place] [address]  Phone:  [phone]  Fax:  [phone]                                    Joselito Abarca   MRN: [id number]    Department:  [facility] Emergency Department   Date of Visit:  5/3/2020           After Visit Summary Signature Page    I have received my discharge instructions, and my questions have been answered. I have discussed any challenges I see with this plan with the nurse or doctor.    ..........................................................................................................................................  Patient/Patient Representative Signature      ..........................................................................................................................................  Patient Representative Print Name and Relationship to Patient    ..................................................               ................................................  Date                                   Time    ..........................................................................................................................................  Reviewed by Signature/Title    ...................................................              ..............................................  Date                                               Time          22EPIC Rev 08/18

## 2020-05-03 NOTE — ED TRIAGE NOTES
Chronic neck pain that has been worse in the last month and causing a headache.  States in this last week he is unable to do much due to the pain.  Has been in ED for this pain before and got injections

## 2020-05-04 NOTE — ED PROVIDER NOTES
"                                                            ED Provider Note     Joselito Abarca  [id number]  May 3, 2020      CC:     Chief Complaint   Patient presents with     Neck Pain          History is obtained from the patient.    HPI: Joselito Abarca is a 54 year old male well-known to me presenting with severe intractable occipital nuchal pain which has been chronic, but over the past month has been more acute.  Patient recalls no preceding injury or trauma.  There have been some changes in his medications including weaning off of his pain medication and changing his muscle relaxers.  He is currently on Lyrica for his \"nerve pain\", but it has not been helping.  Patient has not been able to get any relief or comfort, and had to come into the emergency department this evening.  He has not had a trigger point injection for couple months, and this usually helps.    PMH/Problem List:   Past Medical History:   Diagnosis Date     Allergic rhinitis      Anxiety      Depressive disorder      GERD (gastroesophageal reflux disease)      Neck pain 6/15/2011     Pneumonia      Uncomplicated asthma        PSH:   Past Surgical History:   Procedure Laterality Date     BRONCHOSCOPY (RIGID OR FLEXIBLE), DIAGNOSTIC N/A 8/7/2018    Procedure: COMBINED BRONCHOSCOPY (RIGID OR FLEXIBLE), LAVAGE;  Bronchoscopy with Lavage and Transbronchial Biopsy;  Surgeon: Yung Miranda MD;  Location: [facility]     COLONOSCOPY WITH CO2 INSUFFLATION N/A 9/24/2018    Procedure: COLONOSCOPY WITH CO2 INSUFFLATION;  Colon and upper endoscopy ;  Surgeon: Devin Pelayo MD;  Location:  OR     COMBINED ESOPHAGOSCOPY, GASTROSCOPY, DUODENOSCOPY (EGD) WITH CO2 INSUFFLATION N/A 9/24/2018    Procedure: COMBINED ESOPHAGOSCOPY, GASTROSCOPY, DUODENOSCOPY (EGD) WITH CO2 INSUFFLATION;  Colon and upper endoscopy ;  Surgeon: Devin Pelayo MD;  Location:  OR     DISCECTOMY LUMBAR POSTERIOR MICROSCOPIC ONE LEVEL  2/21/2012    " Procedure:DISCECTOMY LUMBAR POSTERIOR MICROSCOPIC ONE LEVEL; LEFT T1-T2 THORASIC HEMILAMINECTOMY MICRODISCECTOMY WITH MEXTRIX II ; Surgeon:SHARON PURI; Location:[facility]     DISCECTOMY, FUSION CERVICAL ANTERIOR ONE LEVEL, COMBINED N/A 11/29/2017    Procedure: COMBINED DISCECTOMY, FUSION CERVICAL ANTERIOR ONE LEVEL;  Anterior Cervical Discectomy and Fusion Cervical Six - Cervical Seven, Exploration and Revision Cervical Four - Cervical Six with Hardware Removal;  Surgeon: Nikolas Vasques MD;  Location: [facility]     ESOPHAGEAL IMPEDENCE FUNCTION TEST WITH 24 HOUR PH GREATER THAN 1 HOUR N/A 12/12/2018    Procedure: ESOPHAGEAL IMPEDENCE FUNCTION TEST WITH 24 HOUR PH GREATER THAN 1 HOUR;  Surgeon: Atif Oconnor MD;  Location: [facility]     ESOPHAGOSCOPY, GASTROSCOPY, DUODENOSCOPY (EGD), COMBINED N/A 9/24/2018    Procedure: COMBINED ESOPHAGOSCOPY, GASTROSCOPY, DUODENOSCOPY (EGD), BIOPSY SINGLE OR MULTIPLE;;  Surgeon: Devin Pelayo MD;  Location: [facility]     EXPLORE SPINE, REMOVE HARDWARE, COMBINED N/A 11/29/2017    Procedure: COMBINED EXPLORE SPINE, REMOVE HARDWARE;;  Surgeon: Nikolas Vasques MD;  Location: [facility]     FUSION CERVICAL ANTERIOR TWO LEVELS  1/29/2010     HC DRAIN/INJ MAJOR JOINT/BURSA W/O US  5/5/2008    Left sacroiliac joint injection.     HC INJ EPIDURAL LUMBAR/SACRAL W/WO CONTRAST  2009     HEAD & NECK SURGERY      2013     HERNIA REPAIR       INJECT BLOCK MEDIAL BRANCH CERVICAL/THORACIC/LUMBAR Bilateral 8/26/2015    Procedure: INJECT BLOCK MEDIAL BRANCH CERVICAL / THORACIC / LUMBAR;  Surgeon: Ronald Driscoll MD;  [facility]     INJECT BLOCK MEDIAL BRANCH CERVICAL/THORACIC/LUMBAR N/A 8/8/2019    Procedure: BLOCK, NERVE, FACET JOINT, MEDIAL BRANCH, DIAGNOSTIC Bilateral Cervical 2,3,4;  Surgeon: Ronald Driscoll MD;  [facility]     INJECT BLOCK MEDIAL BRANCH CERVICAL/THORACIC/LUMBAR N/A 9/13/2019    Procedure: Medial Branch Block Bilateral Cervical 2,3, and 4;  Surgeon:  Ronald Driscoll MD;  [facility]     INJECT EPIDURAL LUMBAR N/A 2/13/2020    Procedure: Lumber 4-5 bilateral Epidural Injection;  Surgeon: Ronadl Driscoll MD;  [facility]     INJECT FACET JOINT Bilateral 5/27/2015    Procedure: INJECT FACET JOINT;  Surgeon: Ronald Driscoll MD;  [facility]     NERVE BLOCK OCCIPITAL Bilateral 7/12/2018    Procedure: NERVE BLOCK OCCIPITAL;  bilateral occipital nerve blocks;  Surgeon: Ronald Driscoll MD;  Location: [facility]     PROCEDURE PLACEHOLDER GENERAL N/A 02/21/2019    [facility] Ed at [facility]     RADIO FREQUENCY ABLATION PULSED CERVICAL Right 10/18/2019    Procedure: CERVICAL RADIOFREQUENCY ABLATION  Cervical 2,3,4;  Surgeon: Ronald Driscoll MD;  [facility]     RADIO FREQUENCY ABLATION PULSED CERVICAL Left 11/14/2019    Procedure: Left Cervical 2, 3, 4 Radiofreqency Ablation;  Surgeon: Ronald Driscoll MD;  Location: [facility]       MEDS: No current facility-administered medications on file prior to encounter.   clonazePAM (KLONOPIN) 0.5 MG tablet, TAKE ONE-HALF TO ONE TABLET BY MOUTH THREE TIMES A DAY AS NEEDED FOR ANXIETY  DULoxetine (CYMBALTA) 30 MG capsule, Take 1 capsule at bedtime. Continue your 60mg in the morning  DULoxetine (CYMBALTA) 60 MG capsule, TAKE ONE CAPSULE BY MOUTH ONCE DAILY  FLOVENT  MCG/ACT Inhaler, INHALE 2 PUFFS INTO THE LUNGS TWICE DAILY  fluticasone (FLONASE) 50 MCG/ACT nasal spray, SPRAY 2 SPRAYS IN EACH NOSTRIL EVERY DAY  ipratropium - albuterol 0.5 mg/2.5 mg/3 mL (DUONEB) 0.5-2.5 (3) MG/3ML neb solution, Take 1 vial (3 mLs) by nebulization every 4 hours as needed for shortness of breath / dyspnea  methadone (DOLOPHINE) 5 MG tablet, Take 1 tablet every 12 hours.  Fill 4/27/2020. Start 4/29/2020.  methocarbamol (ROBAXIN) 500 MG tablet, Take 1-1.5 tablets three times daily as needed  naloxone (NARCAN) 4 MG/0.1ML nasal spray, Spray 4 mg into one nostril alternating nostrils once as needed for opioid reversal every 2-3 minutes until assistance  "arrives  nystatin (MYCOSTATIN) 167156 UNIT/ML suspension, TAKE FIVE MLS MOUTH/THROAT FOUR TIMES A DAY  order for DME, Equipment being ordered: Nebulizer mask and tubing  oxyCODONE IR (ROXICODONE) 10 MG tablet, Take 1 tablet every 6 hours as needed for breakthrough pain. Max of 4/day. Fill 4/20/2020. Start 4/22/2020. 30 day script  pregabalin (LYRICA) 25 MG capsule, Take 25mg at bedtime for 1 week, then take 25mg twice daily  sildenafil (VIAGRA) 100 MG tablet, Take 1 tablet (100 mg) by mouth daily as needed 30 min to 4 hrs before sex. Do not use with nitroglycerin, terazosin or doxazosin.  tiZANidine (ZANAFLEX) 2 MG tablet, TAKE ONE TO TWO TABLETS BY MOUTH THREE TIMES A DAY AS NEEDED FOR MUSCLE SPASMS /TENSION  traZODone (DESYREL) 100 MG tablet, Take 3 tablets (300 mg) by mouth At Bedtime  VENTOLIN  (90 Base) MCG/ACT inhaler, INHALE 2 PUFFS INTO THE LUNGS EVERY 6 HOURS AS NEEDED FOR SHORTNESS OF BREATH, DIFFICULTY BREATHING OR WHEEZING.  vitamin D3 (CHOLECALCIFEROL) 2000 units tablet, TAKE ONE TABLET BY MOUTH EVERY DAY  zolpidem (AMBIEN) 10 MG tablet, Take 10 mg by mouth nightly as needed         Allergies: Vicodin [hydrocodone-acetaminophen]    Triage and nursing notes were reviewed.    ROS: All other systems were reviewed and are negative    Physical Exam:  Vitals:    05/03/20 1900   BP: 138/88   Pulse: 72   Resp: 15   Temp: 98.5  F (36.9  C)   TempSrc: Oral   SpO2: 99%   Weight: 74.8 kg (165 lb)   Height: 1.702 m (5' 7\")     GENERAL APPEARANCE: Alert, moderate distress due to pain  HEAD: atraumatic  EYES: PERRL, EOMI  HENT: Normal external exam  NECK: Bilateral occipital nuchal tenderness, right side worse than left.  There is also tenderness along the right paracervical musculature.  RESP: Normal respiratory effort  EXT: Unremarkable  SKIN: no rash; as above      Labs/Imaging Results:  No results found. However, due to the size of the patient record, not all encounters were searched. Please check Results " Review for a complete set of results.      Procedure Note: Patient had trigger point injections using 15 cc of bupivacaine 0.25% without epinephrine.  Approximately 6 areas were injected.  There were no immediate complications and patient tolerated the injections well.      Impression:  Final diagnoses:   Myofascial muscle pain         ED Course & Medical Decision Making (Plan):  Joselito Abarca is a 54 year old male with chronic myofascial pain involving muscles around the occipital nuchal region.  He reports exacerbation about a month ago, and it started initially on the left side but now has moved into the right side.  He is unable to get any level of comfort.  He has success with trigger point injections in the past.  Patient is well-known to me and has had a history of relief for several weeks to month with trigger point injections.  Patient received a total of 15 cc of 0.25% bupivacaine to approximately 6 areas.  Patient reported onset of relief, but sometimes he does not get the full benefit until 1-2 days later.  Patient is stable for discharge home.  Follow-up with his primary care provider or pain management clinic.  He has a few tizanidine tablets left and I told him that he could take 1 of these tablets if he has severe breakthrough pain.    Written after-visit summary and instructions were given at the time of discharge.        Follow Up Plan:  Schoen, Gregory G, MD  [address] [place] [id number] [phone]      As needed          Disclaimer: This note consists of words and symbols derived from keyboarding and dictation using voice recognition software.  As a result, there may be errors that have gone undetected.  Please consider this when interpreting information found in this note.       Becca Bains MD  05/04/20 [id number]

## 2020-05-04 NOTE — DISCHARGE INSTRUCTIONS
You received trigger point injections tonight with bupivacaine.  Continue your current pain regimen.  Add tizanidine for breakthrough pain/spasms.  Follow-up with your primary care provider as needed.  Return to the emergency department if your pain becomes acutely intractable.

## 2020-05-05 ENCOUNTER — VIRTUAL VISIT (OUTPATIENT)
Dept: PALLIATIVE MEDICINE | Facility: CLINIC | Age: 55
End: 2020-05-05
Payer: COMMERCIAL

## 2020-05-05 VITALS — WEIGHT: 165 LBS | BODY MASS INDEX: 25.9 KG/M2 | HEIGHT: 67 IN

## 2020-05-05 DIAGNOSIS — M54.2 CERVICALGIA: ICD-10-CM

## 2020-05-05 DIAGNOSIS — F11.90 CHRONIC, CONTINUOUS USE OF OPIOIDS: ICD-10-CM

## 2020-05-05 DIAGNOSIS — G89.29 CHRONIC MIDLINE THORACIC BACK PAIN: ICD-10-CM

## 2020-05-05 DIAGNOSIS — M47.812 ARTHROPATHY OF CERVICAL FACET JOINT: Primary | ICD-10-CM

## 2020-05-05 DIAGNOSIS — G89.4 CHRONIC PAIN SYNDROME: ICD-10-CM

## 2020-05-05 DIAGNOSIS — M79.18 MYOFASCIAL PAIN: ICD-10-CM

## 2020-05-05 DIAGNOSIS — M54.6 CHRONIC MIDLINE THORACIC BACK PAIN: ICD-10-CM

## 2020-05-05 PROCEDURE — 99214 OFFICE O/P EST MOD 30 MIN: CPT | Mod: 95 | Performed by: PHYSICIAN ASSISTANT

## 2020-05-05 RX ORDER — TIZANIDINE 2 MG/1
TABLET ORAL
Qty: 120 TABLET | Refills: 0 | Status: SHIPPED | OUTPATIENT
Start: 2020-05-05 | End: 2020-05-27

## 2020-05-05 RX ORDER — TOPIRAMATE 25 MG/1
TABLET, FILM COATED ORAL
Qty: 120 TABLET | Refills: 0 | Status: SHIPPED | OUTPATIENT
Start: 2020-05-05 | End: 2020-05-27

## 2020-05-05 ASSESSMENT — MIFFLIN-ST. JEOR: SCORE: 1557.07

## 2020-05-05 ASSESSMENT — PAIN SCALES - GENERAL: PAINLEVEL: WORST PAIN (10)

## 2020-05-05 NOTE — PATIENT INSTRUCTIONS
After Visit Instructions:     Thank you for coming to [facility] for your care. It is my goal to partner with you to help you reach your optimal state of health.     I am recommending multidisciplinary care at this time.  The focus of care will be to continue gradual rehabilitation and pain management with medication adjustments as needed.    Continue daily self-care, identifying contributing factors, and monitoring variations in pain level. Continue to integrate self-care into your life.        Schedule follow-up with Ashley Salgado PA-C in 2 weeks. You will need to make this appointment.     Medication recommendations:     Stop Lyrica. After of being off of it for 1 week, then we'll have you start Topamax 1 tab at bedtime for 1 week then take 1 tab twice daily x1week then take 1 tab in AM and 2 tabs at bed x1 week then take 2 tabs twice daily. DRINK A LOT OF WATER.    Okay to stop Robaxin and start Tizanidine 2m-2 tablets three times daily as needed      Ashley Salgado PA-C  [facility]    Contact information: [facility]  Clinic Number:  [phone]     Call with any questions about your care and for scheduling assistance.     Calls are returned Monday through Friday between 8 AM and 4:30 PM. We usually get back to you within 2 business days depending on the issue/request.    If we are prescribing your medications:    For opioid medication refills, call the clinic or send a 3scale message 7 days in advance.  Please include:    Name of requested medication    Name of the pharmacy.    For non-opioid medications, call your pharmacy directly to request a refill. Please allow 3-4 days to be processed.     Per [place] State Law:    All controlled substance prescriptions must be filled within 30 days of being written.      For those controlled substances allowing refills, pickup must occur within 30 days of last fill.      We  believe regular attendance is key to your success in our program!      Any time you are unable to keep your appointment we ask that you call us at least 24 hours in advance to cancel.This will allow us to offer the appointment time to another patient.   Multiple missed appointments may lead to dismissal from the clinic.

## 2020-05-05 NOTE — PROGRESS NOTES
"Joselito Abarca is a 54 year old male who is being evaluated via a billable telephone visit.      The patient has been notified of following:     \"This telephone visit will be conducted via a call between you and your physician/provider. We have found that certain health care needs can be provided without the need for a physical exam.  This service lets us provide the care you need with a short phone conversation.  If a prescription is necessary we can send it directly to your pharmacy.  If lab work is needed we can place an order for that and you can then stop by our lab to have the test done at a later time.    Telephone visits are billed at different rates depending on your insurance coverage. During this emergency period, for some insurers they may be billed the same as an in-person visit.  Please reach out to your insurance provider with any questions.    If during the course of the call the physician/provider feels a telephone visit is not appropriate, you will not be charged for this service.\"    Patient has given verbal consent for Telephone visit?  Yes    What phone number would you like to be contacted at? [phone]    How would you like to obtain your AVS? E-Mail (inform patient AVS not encrypted) [email or web address]    CHIEF COMPLAINT:   Pain  -neck pain     INTERVAL HISTORY:  Last seen on 4/16/2020 for a virtual visit.  Recommendations at last visit included:  1. Physical Therapy:  not at this time  2. Clinical Health Psychologist:  NO    3. Diagnostic Studies: none at this time  4. Medication Management:    1.   Reduce Methadone 5mg every 12 hours.   2.   Continue Oxycodone to 10mg every 6 hours as needed for severe pain. Max of 4/day.   3.   Stop Tizanidine. Start Robaxin 500mg TID PRN   4.   Reduce Cymbalta back to 60mg in AM  5. Start Lyrica 25mg at bedtime for 1 week, then 25mg BID  5. Further procedures recommended: none at this time  6. Recommendations to PCP. See above        Follow up " "with this provider: 4 Weeks    Annual Controlled Substance Agreement: 7/16/2019  UDS: 6/28/2019     [place] Board of Pharmacy Data Base Reviewed:    YES; As expected, no concern for misuse/abuse of controlled medications based on this report.     THE 4 As OF OPIOID MAINTENANCE ANALGESIA    Analgesia: Is pain relief clinically significant? NO   Activity: Is patient functional and able to perform Activities of Daily Living? YES   Adverse effects: Is patient free from adverse side effects from opiates? YES   Adherence to Rx protocol: Is patient adhering to Controlled Substance Agreement and taking medications ONLY as ordered? YES     MME: 100    Current Visit:  Patient states that he does not feel the new medications are helpful. He states that he has been in excruciating pain since. He feels that the Lyrica is upsetting up his stomach. He states that he gets nauseous with it as well. He states that he continues to have the increased headache. He is taking Excedrin tension headaches. He states that today things are better due to getting trigger point injections in the ER 2 days ago. He states that he calling to see if something can be done about his pain.    Assessment:  Joselito Abarca is a 54 year old male who presents today for follow up regarding his:        1. Arthropathy of cervical facet joint  2. cervicalgia  3. Midline thoracic back pain  4. Myofascial pain  5. Chronic pain syndrome  6. Chronic use of opioids     Patient continues to report significant neck pain and headaches. He states that he started smoking again due to me reducing his pain medications. I discussed with the patient that we all make personal choices and it has been his choice to start smoking, it has nothing to do with medication changes that I am making. He then stated that I \"control\" his life. I discussed multiple options with the patient today. Due to side effects I do think that he should stop the Lyrica. After being off the Lyrica " for 1 week, he can start Topamax. Side effects discussed. We discussed the importance of drinking a lot of water with this medication. He can also stop the robaxin and switch back to Tizanidine.    We did talk about his pain medications. He does not feel the 5mg BID of Methadone is enough. I did educate him that I will not be increasing the dose. One option would be to stop the Methadone and consider starting something like MS Contin. Patient is not interested in that. We will discuss options again at his next visit in 2 weeks. He has made significant progress since we started working together. We started out at a MME of 330 and is now at 100. This is great progress, but again he is still above guidelines and I would not recommend that he stay at these doses long term.       Plan:     Diagnosis reviewed, treatment option addressed, and risk/benifits discussed.  Self-care instructions given.  I am recommending a multidisciplinary treatment plan to help this patient better manage pain.       1. Physical Therapy:  not at this time  2. Clinical Health Psychologist:  NO, but we may need to consider this as we further taper his opiates.    3. Diagnostic Studies: none at this time  4. Medication Management:    1.   Continue Methadone 5mg every 12 hours.   2.   Continue Oxycodone to 10mg every 6 hours as needed for severe pain. Max of 4/day.   3.   Stop  Robaxin. Okay to restart Tizanidine 2m-2 tabs TID PRN  4. Stop Lyrica. After being off of Lyrica for 1 week, will start Topamax 25m tab at bedtime for 1 week then take 1 tab twice daily x1week then take 1 tab in AM and 2 tabs at bed x1 week then take 2 tabs twice daily  5. Further procedures recommended: none at this time  6. Recommendations to PCP. See above        Follow up with this provider: 2 Weeks      Phone call duration: 21 minutes    Ashley Salgado [facility]

## 2020-05-08 ENCOUNTER — VIRTUAL VISIT (OUTPATIENT)
Dept: FAMILY MEDICINE | Facility: CLINIC | Age: 55
End: 2020-05-08
Payer: COMMERCIAL

## 2020-05-08 DIAGNOSIS — M50.00 INTERVERTEBRAL DISC DISORDER OF CERVICAL REGION WITH MYELOPATHY: ICD-10-CM

## 2020-05-08 DIAGNOSIS — M47.812 ARTHROPATHY OF CERVICAL FACET JOINT: Primary | ICD-10-CM

## 2020-05-08 PROCEDURE — 99213 OFFICE O/P EST LOW 20 MIN: CPT | Mod: 95 | Performed by: FAMILY MEDICINE

## 2020-05-08 NOTE — PROGRESS NOTES
"Joselito Abarca is a 54 year old male who is being evaluated via a billable telephone visit.      The patient has been notified of following:     \"This telephone visit will be conducted via a call between you and your physician/provider. We have found that certain health care needs can be provided without the need for a physical exam.  This service lets us provide the care you need with a short phone conversation.  If a prescription is necessary we can send it directly to your pharmacy.  If lab work is needed we can place an order for that and you can then stop by our lab to have the test done at a later time.    Telephone visits are billed at different rates depending on your insurance coverage. During this emergency period, for some insurers they may be billed the same as an in-person visit.  Please reach out to your insurance provider with any questions.    If during the course of the call the physician/provider feels a telephone visit is not appropriate, you will not be charged for this service.\"    Patient has given verbal consent for Telephone visit?  Yes    What phone number would you like to be contacted at? [phone]        Subjective     Joselito Abarca is a 54 year old male who presents to clinic today for the following health issues:    HPI  Spenser states he is doing \"okay.\" He says that he really needs my help.  He says that my note to the pain specialist must have upset her, stating that she told him she doesn't want to see him any further and he should go see someone else.  He was in the ER recently for injections.  He was started on duloxetine, methacarbamol and another pill and he has nausea, vomiting, pain he can't tolerate.  He spoke to his pain provider and was told that further narcotic reductions were going to be made.      He notes he is also having headaches on a daily basis and that is adding to his pain.  He is asking me to take over his pain plan again and I declined that. It is clear " that he does not get along with his pain provider and would like a different option.      We reviewed that many things being done are appropriate to try to manage his pain with non-narcotic approaches. he feels they are not helping and that he was fine on his pain meds as they were.  Just this week he had robaxin stopped and tizanidine started; lyrica stopped and Topamax started and is being titrated, yet he feels nothing is being done.  He is focused on wanting to stay on his narcotic doses until he can get back in to see Dr. Driscoll regarding other options for injections.     I would suggest that he have a follow up visit with Dr. Vasques's team regarding his prior spine surgeries  and approaches to reduce his pain rather than just treat the pain.       Reviewed and updated as needed this visit by Provider         Review of Systems   Constitutional, HEENT, cardiovascular, pulmonary, gi and gu systems are negative, except as otherwise noted.  MSK positive for cervical pain and headaches as noted above.        Objective   Reported vitals:  There were no vitals taken for this visit.   healthy, alert and appeared upset/agitated.    PSYCH: Alert and oriented times 3; coherent speech, normal   rate and volume, able to articulate logical thoughts, no tangential thoughts, no hallucinations   or delusions  His affect is anxious  RESP: No cough, no audible wheezing, able to talk in full sentences  Remainder of exam unable to be completed due to telephone visits            Assessment/Plan:  1. Arthropathy of cervical facet joint  See comments above. Spenser was pleading that I resume management of his narcotic pain medications.  He stated several times that he feels his pain provider has a goal of changing his medications and not managing his pain.  I assured him that everything I was seeing indicated that many attempts to find non-narcotic alternatives were being tried and that even a discussion of switching from methadone to  oxycontin was discussed in his recent office visit notes.  I advised that I was not going to step back in and manage his meds but I would see about getting him back in to see spine surgery team to reassess has progress since his last spine surgery and advise on options for interventions for pain management and he agreed to that approach.  Referral to spine surgery made.   - NEUROSURGERY REFERRAL    2. 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   - NEUROSURGERY REFERRAL    Return in about 3 months (around 8/8/2020) for In-Clinic Visit.      Phone call duration:  23 minutes    Gregory G. Schoen, MD

## 2020-05-11 ENCOUNTER — TELEPHONE (OUTPATIENT)
Dept: FAMILY MEDICINE | Facility: CLINIC | Age: 55
End: 2020-05-11

## 2020-05-11 DIAGNOSIS — F41.1 GENERALIZED ANXIETY DISORDER: ICD-10-CM

## 2020-05-11 NOTE — TELEPHONE ENCOUNTER
They are only doing phone visits ar this time with Neurosurgery, we need to contact [facility] for those appointments.  That number is [facility] [phone]

## 2020-05-11 NOTE — TELEPHONE ENCOUNTER
----- Message from Gregory G Schoen, MD sent at 5/8/2020  3:47 PM CDT -----  Spenser has had surgery with [facility] in the past and needs a follow up visit with spine due to persistent neck pain that is limiting his ability to function as we try to reduce his pain meds.  Please see about getting a follow up visit scheduled, virtual or otherwise, asap.  Electronically signed by Greg Schoen, MD

## 2020-05-11 NOTE — TELEPHONE ENCOUNTER
Clonazepam  Last Written Prescription Date:  03/13/2020  Last Fill Quantity: 90,  # refills: 0   Last office visit: 05/08/2020 with prescribing provider:  Schoen Future Office Visit:  None    Routing refill request to provider for review/approval because:  Drug not on the [facility] refill protocol     Ailyn Tobias RN

## 2020-05-12 RX ORDER — CLONAZEPAM 0.5 MG/1
TABLET ORAL
Qty: 90 TABLET | Refills: 0 | Status: SHIPPED | OUTPATIENT
Start: 2020-05-12 | End: 2020-06-19

## 2020-05-14 ENCOUNTER — OFFICE VISIT (OUTPATIENT)
Dept: NEUROSURGERY | Facility: CLINIC | Age: 55
End: 2020-05-14
Attending: NEUROLOGICAL SURGERY
Payer: COMMERCIAL

## 2020-05-14 VITALS
SYSTOLIC BLOOD PRESSURE: 114 MMHG | BODY MASS INDEX: 26.37 KG/M2 | OXYGEN SATURATION: 99 % | HEIGHT: 67 IN | WEIGHT: 168 LBS | DIASTOLIC BLOOD PRESSURE: 74 MMHG | HEART RATE: 95 BPM | TEMPERATURE: 97.8 F

## 2020-05-14 DIAGNOSIS — Z98.1 S/P CERVICAL SPINAL FUSION: Primary | ICD-10-CM

## 2020-05-14 PROCEDURE — 99213 OFFICE O/P EST LOW 20 MIN: CPT | Performed by: NEUROLOGICAL SURGERY

## 2020-05-14 PROCEDURE — G0463 HOSPITAL OUTPT CLINIC VISIT: HCPCS

## 2020-05-14 ASSESSMENT — PAIN SCALES - GENERAL: PAINLEVEL: WORST PAIN (10)

## 2020-05-14 ASSESSMENT — MIFFLIN-ST. JEOR: SCORE: 1570.67
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
## 2020-05-14 NOTE — PATIENT INSTRUCTIONS
1. Order for cervical XR placed, please call [phone] to schedule.  2. Order for cervical medial branch block and possible radiofrequency ablation to be done with Dr. Driscoll in [place]. Please call [phone] to schedule with him.     Please call our clinic with any questions or concerns: [phone]
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
## 2020-05-18 ENCOUNTER — HOSPITAL ENCOUNTER (OUTPATIENT)
Dept: GENERAL RADIOLOGY | Facility: CLINIC | Age: 55
End: 2020-05-18
Attending: NEUROLOGICAL SURGERY
Payer: COMMERCIAL

## 2020-05-18 ENCOUNTER — VIRTUAL VISIT (OUTPATIENT)
Dept: PALLIATIVE MEDICINE | Facility: CLINIC | Age: 55
End: 2020-05-18
Payer: COMMERCIAL

## 2020-05-18 VITALS — WEIGHT: 168 LBS | HEIGHT: 67 IN | BODY MASS INDEX: 26.37 KG/M2

## 2020-05-18 DIAGNOSIS — F11.90 CHRONIC, CONTINUOUS USE OF OPIOIDS: ICD-10-CM

## 2020-05-18 DIAGNOSIS — M54.6 CHRONIC MIDLINE THORACIC BACK PAIN: ICD-10-CM

## 2020-05-18 DIAGNOSIS — G89.29 CHRONIC MIDLINE THORACIC BACK PAIN: ICD-10-CM

## 2020-05-18 DIAGNOSIS — M47.812 ARTHROPATHY OF CERVICAL FACET JOINT: ICD-10-CM

## 2020-05-18 DIAGNOSIS — G89.4 CHRONIC PAIN SYNDROME: ICD-10-CM

## 2020-05-18 DIAGNOSIS — M79.18 MYOFASCIAL PAIN: ICD-10-CM

## 2020-05-18 DIAGNOSIS — Z98.1 S/P CERVICAL SPINAL FUSION: ICD-10-CM

## 2020-05-18 DIAGNOSIS — M54.2 CERVICALGIA: ICD-10-CM

## 2020-05-18 PROCEDURE — 72040 X-RAY EXAM NECK SPINE 2-3 VW: CPT | Mod: TC

## 2020-05-18 PROCEDURE — 99213 OFFICE O/P EST LOW 20 MIN: CPT | Mod: 95 | Performed by: PHYSICIAN ASSISTANT

## 2020-05-18 RX ORDER — METHADONE HYDROCHLORIDE 5 MG/1
TABLET ORAL
Qty: 60 TABLET | Refills: 0 | Status: SHIPPED | OUTPATIENT
Start: 2020-05-18 | End: 2020-06-19

## 2020-05-18 RX ORDER — OXYCODONE HYDROCHLORIDE 10 MG/1
TABLET ORAL
Qty: 120 TABLET | Refills: 0 | Status: SHIPPED | OUTPATIENT
Start: 2020-05-18 | End: 2020-06-19

## 2020-05-18 ASSESSMENT — MIFFLIN-ST. JEOR: SCORE: 1570.67

## 2020-05-18 ASSESSMENT — PAIN SCALES - GENERAL: PAINLEVEL: SEVERE PAIN (7)

## 2020-05-18 NOTE — PATIENT INSTRUCTIONS
After Visit Instructions:     Thank you for coming to [facility] for your care. It is my goal to partner with you to help you reach your optimal state of health.     I am recommending multidisciplinary care at this time.  The focus of care will be to continue gradual rehabilitation and pain management with medication adjustments as needed.    Continue daily self-care, identifying contributing factors, and monitoring variations in pain level. Continue to integrate self-care into your life.          Schedule follow-up with Ashley Salgado PA-C in 4 weeks. You will need to make this appointment.     Imaging: Xray as recommended by Dr. Vasques    Medication recommendations:     Continue Methadone 5mg twice daily. We could consider stopping this and starting a different long acting such as MS Contin    Continue Oxycodone 10mg every 6 hours as needed for severe pain    Increase Topamax to 25mg in AM and 50mg at bedtime for 1 week, then take 50mg twice daily    Continue Tizanidine 2m-2 tablets three times daily as needed      Ashley Salgado PA-C  [facility]  [facility]    Contact information: [facility]  Clinic Number:  [phone]     Call with any questions about your care and for scheduling assistance.     Calls are returned Monday through Friday between 8 AM and 4:30 PM. We usually get back to you within 2 business days depending on the issue/request.    If we are prescribing your medications:    For opioid medication refills, call the clinic or send a MemoryMerge message 7 days in advance.  Please include:    Name of requested medication    Name of the pharmacy.    For non-opioid medications, call your pharmacy directly to request a refill. Please allow 3-4 days to be processed.     Per [place] State Law:    All controlled substance prescriptions must be filled within 30 days of being written.      For those controlled substances allowing  refills, pickup must occur within 30 days of last fill.      We believe regular attendance is key to your success in our program!      Any time you are unable to keep your appointment we ask that you call us at least 24 hours in advance to cancel.This will allow us to offer the appointment time to another patient.   Multiple missed appointments may lead to dismissal from the clinic.

## 2020-05-18 NOTE — PROGRESS NOTES
"Joselito Abarca is a 54 year old male who is being evaluated via a billable telephone visit.      The patient has been notified of following:     \"This telephone visit will be conducted via a call between you and your physician/provider. We have found that certain health care needs can be provided without the need for a physical exam.  This service lets us provide the care you need with a short phone conversation.  If a prescription is necessary we can send it directly to your pharmacy.  If lab work is needed we can place an order for that and you can then stop by our lab to have the test done at a later time.    Telephone visits are billed at different rates depending on your insurance coverage. During this emergency period, for some insurers they may be billed the same as an in-person visit.  Please reach out to your insurance provider with any questions.    If during the course of the call the physician/provider feels a telephone visit is not appropriate, you will not be charged for this service.\"    Patient has given verbal consent for Telephone visit?  Yes    What phone number would you like to be contacted at? [phone]    How would you like to obtain your AVS? Mail a copy    CHIEF COMPLAINT:   Pain  -neck pain     INTERVAL HISTORY:  Last seen on 2020 for a virtual visit.    Recommendations at last visit included:  1. Physical Therapy:  not at this time  2. Clinical Health Psychologist:  NO, but we may need to consider this as we further taper his opiates.    3. Diagnostic Studies: none at this time  4. Medication Management:    1.   Continue Methadone 5mg every 12 hours.   2.   Continue Oxycodone to 10mg every 6 hours as needed for severe pain. Max of 4/day.   3.   Stop  Robaxin. Okay to restart Tizanidine 2m-2 tabs TID PRN  4. Stop Lyrica. After being off of Lyrica for 1 week, will start Topamax 25m tab at bedtime for 1 week then take 1 tab twice daily x1week then take 1 tab in AM and 2 " tabs at bed x1 week then take 2 tabs twice daily  5. Further procedures recommended: none at this time  6. Recommendations to PCP. See above        Follow up with this provider: 2 Weeks    Annual Controlled Substance Agreement: 7/16/2019  UDS: 6/28/2019     [place] Board of Pharmacy Data Base Reviewed:    YES; As expected, no concern for misuse/abuse of controlled medications based on this report.     THE 4 As OF OPIOID MAINTENANCE ANALGESIA    Analgesia: Is pain relief clinically significant? NO   Activity: Is patient functional and able to perform Activities of Daily Living? YES   Adverse effects: Is patient free from adverse side effects from opiates? YES   Adherence to Rx protocol: Is patient adhering to Controlled Substance Agreement and taking medications ONLY as ordered? YES     MME: 100    Current Visit:    He did start the Topamax. He is up to 2/day. He also started back on the muscle relaxants (the tizanidine). He states that his pain is better than what it was at the last visit. He states that since his last visit he spoke with Dr. Schoen, and was referred to the neck specialist. He will be getting more imaging on his neck and will referred back to Dr. Driscoll, depending on the results of the imaging.       Assessment:  Joselito Abarca is a 54 year old male who presents today for follow up regarding his:        1. Arthropathy of cervical facet joint  2. cervicalgia  3. Midline thoracic back pain  4. Myofascial pain  5. Chronic pain syndrome  6. Chronic use of opioids    Patient is reporting an improvement in his pain since his last visit. He did see Dr. Vasques's group and may be getting more injections.     He is tolerating the Topamax well. Will increase the dose. He can continue the Tizanidine as well.     With regards to his Methadone an Oxycodone, I did agree to continue on the same dosing for this month. We discussed the potential for stopping the Methadone and starting MS Contin. The patient  apologized as he did not hear me say that at the last visit. He stated all he heard was stop the Methadone due to the amount he pain he was in. He states that since this was all he heard he became upset and again apologized for how he acted at his last visit.       Plan:     Diagnosis reviewed, treatment option addressed, and risk/benifits discussed.  Self-care instructions given.  I am recommending a multidisciplinary treatment plan to help this patient better manage pain.       1. Physical Therapy:  not at this time  2. Clinical Health Psychologist:  NO, but we may need to consider this as we further taper his opiates.    3. Diagnostic Studies: none at this time  4. Medication Management:    1.   Continue Methadone 5mg every 12 hours.   2.   Continue Oxycodone to 10mg every 6 hours as needed for severe pain. Max of 4/day.   3.   Continue Tizanidine 2m-2 tabs TID PRN  4.  Topamax 25mmg in AM and 50mg at bedtime for 1 week, then take 50mg twice daily  5. Further procedures recommended: none at this time  6. Recommendations to PCP. See above        Follow up with this provider: 4 Weeks      Phone call duration: 10 minutes    Ashley Salgado [facility]

## 2020-05-27 DIAGNOSIS — M54.2 CERVICALGIA: ICD-10-CM

## 2020-05-27 DIAGNOSIS — M54.6 CHRONIC MIDLINE THORACIC BACK PAIN: ICD-10-CM

## 2020-05-27 DIAGNOSIS — M79.18 MYOFASCIAL PAIN: ICD-10-CM

## 2020-05-27 DIAGNOSIS — M47.812 ARTHROPATHY OF CERVICAL FACET JOINT: ICD-10-CM

## 2020-05-27 DIAGNOSIS — G89.29 CHRONIC MIDLINE THORACIC BACK PAIN: ICD-10-CM

## 2020-05-27 DIAGNOSIS — G89.4 CHRONIC PAIN SYNDROME: ICD-10-CM

## 2020-05-27 RX ORDER — TOPIRAMATE 25 MG/1
TABLET, FILM COATED ORAL
Qty: 120 TABLET | Refills: 1 | Status: SHIPPED | OUTPATIENT
Start: 2020-05-27 | End: 2020-08-03

## 2020-05-27 RX ORDER — TIZANIDINE 2 MG/1
TABLET ORAL
Qty: 120 TABLET | Refills: 0 | Status: SHIPPED | OUTPATIENT
Start: 2020-05-27 | End: 2020-06-22

## 2020-05-27 NOTE — TELEPHONE ENCOUNTER
Chief Complaint   Patient presents with     Refill Request     Topiramate 25MG && tizanidiine 2mg      Refill request received via fax by pharmacy      [facility], [place] - [address] [place] [address]         Pending Prescriptions:                       Disp   Refills    topiramate (TOPAMAX) 25 MG tablet         120 ta*0            Si tab at bedtime for 1 week then take 1 tab twice           daily x1week then take 1 tab in AM and 2 tabs at           bed x1 week then take 2 tabs twice daily    tiZANidine (ZANAFLEX) 2 MG tablet         120 ta*0            Sig: TAKE ONE TO TWO TABLETS BY MOUTH THREE TIMES A           DAY AS NEEDED FOR MUSCLE SPASMS /TENSION    Topiramate 25MG  Last Written Prescription Date:  2020  Last Fill Quantity: 120,   # refills: 0      Tizandine HCL 2 mg  Last Written Prescription Date:  2020  Last Fill Quantity: 120,   # refills: 0  Last Office Visit with prescribing provider: 2020  Future Office visit: None scheduled.     Medications are ready to be sent if appropriate.   Christy Burris CMA on 2020 at 8:07 AM

## 2020-06-03 ENCOUNTER — HOSPITAL ENCOUNTER (EMERGENCY)
Facility: CLINIC | Age: 55
Discharge: HOME | End: 2020-06-03
Attending: FAMILY MEDICINE | Admitting: FAMILY MEDICINE
Payer: COMMERCIAL

## 2020-06-03 VITALS
BODY MASS INDEX: 26.63 KG/M2 | HEART RATE: 91 BPM | WEIGHT: 170 LBS | SYSTOLIC BLOOD PRESSURE: 124 MMHG | TEMPERATURE: 98.4 F | DIASTOLIC BLOOD PRESSURE: 72 MMHG | RESPIRATION RATE: 16 BRPM | OXYGEN SATURATION: 98 %

## 2020-06-03 DIAGNOSIS — M54.2 TRIGGER POINT OF NECK: ICD-10-CM

## 2020-06-03 DIAGNOSIS — M54.2 CHRONIC NECK PAIN: ICD-10-CM

## 2020-06-03 DIAGNOSIS — G89.29 CHRONIC NECK PAIN: ICD-10-CM

## 2020-06-03 PROCEDURE — 20552 NJX 1/MLT TRIGGER POINT 1/2: CPT | Performed by: EMERGENCY MEDICINE

## 2020-06-03 PROCEDURE — 99283 EMERGENCY DEPT VISIT LOW MDM: CPT | Mod: 25 | Performed by: EMERGENCY MEDICINE

## 2020-06-03 PROCEDURE — 20552 NJX 1/MLT TRIGGER POINT 1/2: CPT | Mod: Z6 | Performed by: EMERGENCY MEDICINE

## 2020-06-03 NOTE — ED PROVIDER NOTES
History     Chief Complaint   Patient presents with     Neck Pain     HPI  Joselito Abarca is a 54 year old male who presents with requesting trigger points as he has chronic neck pain issues and this is benefiting him in the past.  Denies recent fall or injury.  His pain is not different than his baseline.  Denies any weakness or tingling in his extremities.  Has not had fever or chills.  Denies headache with this.  He identifies 6 points of discomfort for trigger point injections.    Allergies:  Allergies   Allergen Reactions     Vicodin [Hydrocodone-Acetaminophen] Nausea     Other reaction(s): Diaphoresis, Vomiting  HEADACHE       Problem List:    Patient Active Problem List    Diagnosis Date Noted     Back pain, chronic 02/21/2019     Priority: Medium     S/P laparoscopic fundoplication 02/21/2019     Priority: Medium     Long-segment Olson's esophagus 02/21/2019     Priority: Medium     Gastroesophageal reflux disease, esophagitis presence not specified 09/21/2018     Priority: Medium     Chronic seasonal allergic rhinitis due to fungal spores 06/07/2018     Priority: Medium     Status post cervical spinal arthrodesis 11/29/2017     Priority: Medium     Chronic, continuous use of opioids 12/13/2016     Priority: Medium     Patient is followed by Gregory G. Schoen, MD for ongoing prescription of pain medication.  All refills should be approved by this provider, or covering partner.    Medication(s): Oxycodone 5 mg IR: Take 2 capsules (10 mg) by mouth every 4 hours as needed 2 caps q 4 hour prn pain up to 12 per day .   Methadone 10 mg three times daily, 90 per month  Clonazepam 0.5 mg tid, 90 per month   Clinic visit frequency required: Q 3 months     Controlled substance agreement:  Encounter-Level CSA - 2/27/15:               Controlled Substance Agreement - Scan on 3/14/2015  8:47 AM : Controlled Medication Agreement-02/27/15 (below)            Pain Clinic evaluation in the past: Yes    DIRE Total  Score(s):  No flowsheet data found.    Last [facility] website verification:  10/30/2016     [email or web address]         Moderate persistent asthma without complication 11/02/2016     Priority: Medium     Acute respiratory failure with hypoxia (H) 04/29/2016     Priority: Medium     Nausea with vomiting 04/27/2016     Priority: Medium     Cough 03/06/2016     Priority: Medium     Tobacco dependence syndrome 02/17/2016     Priority: Medium     Leukocytosis 02/16/2016     Priority: Medium     Disease of bronchial airway (HCC) 02/12/2016     Priority: Medium     Bronchiolectasis (H) 02/12/2016     Priority: Medium     Degeneration of cervical intervertebral disc 01/26/2015     Priority: Medium     Health Care Home 09/30/2013     Priority: Medium     Status:  Accepted  Care Coordinator:    Melissa Behl BSN, RN, N  [facility] Care Coordinator  [phone]     See Letters for [facility] Care Plan  Date:  April 26, 2016            Persons encountering health services in other specified circumstances 09/30/2013     Priority: Medium     Overview:   Overview:   Status:  Accepted  Care Coordinator:    Melissa Behl BSN, RN, [facility] Care Coordinator  [phone]     See Letters for [facility] Care Plan  Date:  April 26, 2016       Arthrodesis status 06/15/2011     Priority: Medium     Neck pain 06/15/2011     Priority: Medium     History of arthrodesis 06/15/2011     Priority: Medium     CARDIOVASCULAR SCREENING; LDL GOAL LESS THAN 160 10/31/2010     Priority: Medium     Encounter for screening for cardiovascular disorders 10/31/2010     Priority: Medium     Intervertebral cervical disc disorder with myelopathy, cervical region 11/12/2009     Priority: Medium     Patient is followed by TIARA JIMENEZ for ongoing prescription of narcotic pain medicine.  Med: methadone 10 mg tid. Taking oxycodone up to 8 per day, 120 per month  Maximum use per month: 90  Expected duration: ongoing  Narcotic agreement on file: YES  Clinic  visit recommended: Q 3 months         Intervertebral disc disorder of cervical region with myelopathy 11/12/2009     Priority: Medium     Overview:   Overview:   Patient is followed by TIARA JIMENEZ for ongoing prescription of narcotic pain medicine.  Med: methadone 10 mg tid. Taking oxycodone up to 8 per day, 120 per month  Maximum use per month: 90  Expected duration: ongoing  Narcotic agreement on file: YES  Clinic visit recommended: Q 3 months       Constipation 03/19/2008     Priority: Medium     Problem list name updated by automated process. Provider to review       Thoracic or lumbosacral neuritis or radiculitis, unspecified 03/19/2008     Priority: Medium     Esophageal reflux 07/09/2003     Priority: Medium     Generalized anxiety disorder 07/08/2003     Priority: Medium     Alcohol abuse, in remission 07/08/2003     Priority: Medium     Nondependent alcohol abuse, in remission 07/08/2003     Priority: Medium        Past Medical History:    Past Medical History:   Diagnosis Date     Allergic rhinitis      Anxiety      Depressive disorder      GERD (gastroesophageal reflux disease)      Neck pain 6/15/2011     Pneumonia      Uncomplicated asthma        Past Surgical History:    Past Surgical History:   Procedure Laterality Date     BRONCHOSCOPY (RIGID OR FLEXIBLE), DIAGNOSTIC N/A 8/7/2018    Procedure: COMBINED BRONCHOSCOPY (RIGID OR FLEXIBLE), LAVAGE;  Bronchoscopy with Lavage and Transbronchial Biopsy;  Surgeon: Yung Miranda MD;  Location: [facility]     COLONOSCOPY WITH CO2 INSUFFLATION N/A 9/24/2018    Procedure: COLONOSCOPY WITH CO2 INSUFFLATION;  Colon and upper endoscopy ;  Surgeon: Devin Pelayo MD;  Location: [facility]     COMBINED ESOPHAGOSCOPY, GASTROSCOPY, DUODENOSCOPY (EGD) WITH CO2 INSUFFLATION N/A 9/24/2018    Procedure: COMBINED ESOPHAGOSCOPY, GASTROSCOPY, DUODENOSCOPY (EGD) WITH CO2 INSUFFLATION;  Colon and upper endoscopy ;  Surgeon: Devin Pelayo MD;   Location: [facility]     DISCECTOMY LUMBAR POSTERIOR MICROSCOPIC ONE LEVEL  2/21/2012    Procedure:DISCECTOMY LUMBAR POSTERIOR MICROSCOPIC ONE LEVEL; LEFT T1-T2 THORASIC HEMILAMINECTOMY MICRODISCECTOMY WITH MEXTRIX II ; Surgeon:SHARON PURI; Location:[facility]     DISCECTOMY, FUSION CERVICAL ANTERIOR ONE LEVEL, COMBINED N/A 11/29/2017    Procedure: COMBINED DISCECTOMY, FUSION CERVICAL ANTERIOR ONE LEVEL;  Anterior Cervical Discectomy and Fusion Cervical Six - Cervical Seven, Exploration and Revision Cervical Four - Cervical Six with Hardware Removal;  Surgeon: Nikolas Vasques MD;  Location: [facility]     ESOPHAGEAL IMPEDENCE FUNCTION TEST WITH 24 HOUR PH GREATER THAN 1 HOUR N/A 12/12/2018    Procedure: ESOPHAGEAL IMPEDENCE FUNCTION TEST WITH 24 HOUR PH GREATER THAN 1 HOUR;  Surgeon: Atif Oconnor MD;  Location: [facility]     ESOPHAGOSCOPY, GASTROSCOPY, DUODENOSCOPY (EGD), COMBINED N/A 9/24/2018    Procedure: COMBINED ESOPHAGOSCOPY, GASTROSCOPY, DUODENOSCOPY (EGD), BIOPSY SINGLE OR MULTIPLE;;  Surgeon: Devin Pelayo MD;  Location: [facility]     EXPLORE SPINE, REMOVE HARDWARE, COMBINED N/A 11/29/2017    Procedure: COMBINED EXPLORE SPINE, REMOVE HARDWARE;;  Surgeon: Nikolas Vasques MD;  [facility]     FUSION CERVICAL ANTERIOR TWO LEVELS  1/29/2010     HC DRAIN/INJ MAJOR JOINT/BURSA W/O US  5/5/2008    Left sacroiliac joint injection.     HC INJ EPIDURAL LUMBAR/SACRAL W/WO CONTRAST  2009     HEAD & NECK SURGERY      2013     HERNIA REPAIR       INJECT BLOCK MEDIAL BRANCH CERVICAL/THORACIC/LUMBAR Bilateral 8/26/2015    Procedure: INJECT BLOCK MEDIAL BRANCH CERVICAL / THORACIC / LUMBAR;  Surgeon: Ronald Driscoll MD;  [facility]     INJECT BLOCK MEDIAL BRANCH CERVICAL/THORACIC/LUMBAR N/A 8/8/2019    Procedure: BLOCK, NERVE, FACET JOINT, MEDIAL BRANCH, DIAGNOSTIC Bilateral Cervical 2,3,4;  Surgeon: Ronald Driscoll MD;  [facility]     INJECT BLOCK MEDIAL BRANCH CERVICAL/THORACIC/LUMBAR N/A  9/13/2019    Procedure: Medial Branch Block Bilateral Cervical 2,3, and 4;  Surgeon: Ronald Driscoll MD;  [facility]     INJECT EPIDURAL LUMBAR N/A 2/13/2020    Procedure: Lumber 4-5 bilateral Epidural Injection;  Surgeon: Ronald Driscoll MD;  [facility]     INJECT FACET JOINT Bilateral 5/27/2015    Procedure: INJECT FACET JOINT;  Surgeon: Ronald Driscoll MD;  [facility]     NERVE BLOCK OCCIPITAL Bilateral 7/12/2018    Procedure: NERVE BLOCK OCCIPITAL;  bilateral occipital nerve blocks;  Surgeon: Ronald Driscoll MD;  [facility]     PROCEDURE PLACEHOLDER GENERAL N/A 02/21/2019    Lap Ed at [facility] [place]     RADIO FREQUENCY ABLATION PULSED CERVICAL Right 10/18/2019    Procedure: CERVICAL RADIOFREQUENCY ABLATION  Cervical 2,3,4;  Surgeon: Ronald Driscoll MD;  [facility]     RADIO FREQUENCY ABLATION PULSED CERVICAL Left 11/14/2019    Procedure: Left Cervical 2, 3, 4 Radiofreqency Ablation;  Surgeon: Ronald Driscoll MD;  Location: [facility]       Family History:    Family History   Problem Relation Age of Onset     Family History Negative No family hx of      C.A.D. No family hx of        Social History:  Marital Status:  Single [1]  Social History     Tobacco Use     Smoking status: Current Every Day Smoker     Years: 30.00     Types: Cigars, Cigarettes     Last attempt to quit: 12/19/2009     Years since quitting: 10.4     Smokeless tobacco: Former User     Quit date: 2012   Substance Use Topics     Alcohol use: No     Alcohol/week: 0.0 standard drinks     Comment: HX OF ABUSE-IN REMISSION     Drug use: No        Medications:    clonazePAM (KLONOPIN) 0.5 MG tablet  DULoxetine (CYMBALTA) 60 MG capsule  FLOVENT  MCG/ACT Inhaler  fluticasone (FLONASE) 50 MCG/ACT nasal spray  ipratropium - albuterol 0.5 mg/2.5 mg/3 mL (DUONEB) 0.5-2.5 (3) MG/3ML neb solution  methadone (DOLOPHINE) 5 MG tablet  naloxone (NARCAN) 4 MG/0.1ML nasal spray  nystatin (MYCOSTATIN) 166946 UNIT/ML suspension  order for  DME  oxyCODONE IR (ROXICODONE) 10 MG tablet  sildenafil (VIAGRA) 100 MG tablet  tiZANidine (ZANAFLEX) 2 MG tablet  topiramate (TOPAMAX) 25 MG tablet  traZODone (DESYREL) 100 MG tablet  VENTOLIN  (90 Base) MCG/ACT inhaler  vitamin D3 (CHOLECALCIFEROL) 2000 units tablet  zolpidem (AMBIEN) 10 MG tablet          Review of Systems all other systems were reviewed and are negative.    Physical Exam   BP: 124/72  Pulse: 91  Temp: 98.4  F (36.9  C)  Resp: 16  Weight: 77.1 kg (170 lb)  SpO2: 98 %      Physical Exam general alert cooperative male in mild to moderate stress.  He identifies 6 trigger points two up on his neck and 1 in his mid trapezius and the upper back.  Is not have meningismus.  His neurologic exam is intact.    ED Course        Procedures        Risks and benefits of trigger points were discussed.  Patient understands accepts these risks.  The areas of discomfort were again identified by the patient.  These were marked the skin was cleaned with alcohol.  Then with 1% lidocaine with epinephrine the 6 trigger points were injected with about 1 to 1/2 cc each.  No adverse reaction no active bleeding thereafter.       Critical Care time:  none               No results found. However, due to the size of the patient record, not all encounters were searched. Please check Results Review for a complete set of results.    Medications - No data to display    Assessments & Plan (with Medical Decision Making)   Joselito Abarca is a 54 year old male who presents with requesting trigger points as he has chronic neck pain issues and this is benefiting him in the past.  Denies recent fall or injury.  His pain is not different than his baseline.  Denies any weakness or tingling in his extremities.  Has not had fever or chills.  Denies headache with this.  He identifies 6 points of discomfort for trigger point injections.  Patient was given injection of 6 trigger points with 1% lidocaine with epinephrine.  No adverse  reaction.  Improvement in his neck pain.  Duration of neck pain is provided reasons to return for reassessment discussed.  I have reviewed the nursing notes.    I have reviewed the findings, diagnosis, plan and need for follow up with the patient.       New Prescriptions    No medications on file       Final diagnoses:   Chronic neck pain   Trigger point of neck       6/3/2020   [facility] EMERGENCY DEPARTMENT     Easton Alfaro MD  06/03/20 [id number]

## 2020-06-03 NOTE — ED AVS SNAPSHOT
[facility] Emergency Department  [address] [place] [address]  Phone:  [phone]  Fax:  [phone]                                    Joselito Abarca   MRN: [id number]    Department:  [facility] Emergency Department   Date of Visit:  6/3/2020           After Visit Summary Signature Page    I have received my discharge instructions, and my questions have been answered. I have discussed any challenges I see with this plan with the nurse or doctor.    ..........................................................................................................................................  Patient/Patient Representative Signature      ..........................................................................................................................................  Patient Representative Print Name and Relationship to Patient    ..................................................               ................................................  Date                                   Time    ..........................................................................................................................................  Reviewed by Signature/Title    ...................................................              ..............................................  Date                                               Time          22EPIC Rev 08/18

## 2020-06-19 ENCOUNTER — VIRTUAL VISIT (OUTPATIENT)
Dept: PALLIATIVE MEDICINE | Facility: CLINIC | Age: 55
End: 2020-06-19
Payer: COMMERCIAL

## 2020-06-19 ENCOUNTER — TELEPHONE (OUTPATIENT)
Dept: SURGERY | Facility: CLINIC | Age: 55
End: 2020-06-19

## 2020-06-19 VITALS — WEIGHT: 165 LBS | HEIGHT: 67 IN | BODY MASS INDEX: 25.9 KG/M2

## 2020-06-19 DIAGNOSIS — G89.4 CHRONIC PAIN SYNDROME: ICD-10-CM

## 2020-06-19 DIAGNOSIS — G89.29 CHRONIC MIDLINE THORACIC BACK PAIN: ICD-10-CM

## 2020-06-19 DIAGNOSIS — F11.90 CHRONIC, CONTINUOUS USE OF OPIOIDS: ICD-10-CM

## 2020-06-19 DIAGNOSIS — M54.6 CHRONIC MIDLINE THORACIC BACK PAIN: ICD-10-CM

## 2020-06-19 DIAGNOSIS — M47.812 ARTHROPATHY OF CERVICAL FACET JOINT: ICD-10-CM

## 2020-06-19 DIAGNOSIS — Z11.59 ENCOUNTER FOR SCREENING FOR OTHER VIRAL DISEASES: Primary | ICD-10-CM

## 2020-06-19 DIAGNOSIS — M54.2 CERVICALGIA: ICD-10-CM

## 2020-06-19 DIAGNOSIS — F41.1 GENERALIZED ANXIETY DISORDER: ICD-10-CM

## 2020-06-19 DIAGNOSIS — M79.18 MYOFASCIAL PAIN: ICD-10-CM

## 2020-06-19 PROCEDURE — 99213 OFFICE O/P EST LOW 20 MIN: CPT | Mod: 95 | Performed by: PHYSICIAN ASSISTANT

## 2020-06-19 RX ORDER — OXYCODONE HYDROCHLORIDE 10 MG/1
TABLET ORAL
Qty: 120 TABLET | Refills: 0 | Status: SHIPPED | OUTPATIENT
Start: 2020-06-19 | End: 2020-07-17

## 2020-06-19 RX ORDER — CLONAZEPAM 0.5 MG/1
TABLET ORAL
Qty: 90 TABLET | Refills: 0 | Status: SHIPPED | OUTPATIENT
Start: 2020-06-19 | End: 2020-07-17

## 2020-06-19 RX ORDER — METHADONE HYDROCHLORIDE 5 MG/1
TABLET ORAL
Qty: 60 TABLET | Refills: 0 | Status: SHIPPED | OUTPATIENT
Start: 2020-06-19 | End: 2020-06-23

## 2020-06-19 ASSESSMENT — MIFFLIN-ST. JEOR: SCORE: 1557.07

## 2020-06-19 ASSESSMENT — PAIN SCALES - GENERAL: PAINLEVEL: EXTREME PAIN (8)

## 2020-06-19 NOTE — PROGRESS NOTES
"Joselito Abarca is a 54 year old male who is being evaluated via a billable telephone visit.      The patient has been notified of following:     \"This telephone visit will be conducted via a call between you and your physician/provider. We have found that certain health care needs can be provided without the need for a physical exam.  This service lets us provide the care you need with a short phone conversation.  If a prescription is necessary we can send it directly to your pharmacy.  If lab work is needed we can place an order for that and you can then stop by our lab to have the test done at a later time.    Telephone visits are billed at different rates depending on your insurance coverage. During this emergency period, for some insurers they may be billed the same as an in-person visit.  Please reach out to your insurance provider with any questions.    If during the course of the call the physician/provider feels a telephone visit is not appropriate, you will not be charged for this service.\"    Patient has given verbal consent for Telephone visit?  Yes    What phone number would you like to be contacted at? [phone]    How would you like to obtain your AVS? Mail a copy    [facility]     CHIEF COMPLAINT:   Pain  -neck pain     INTERVAL HISTORY:  Last seen on 2020 for a virtual visit.    Recommendations at last visit included:  1. Physical Therapy:  not at this time  2. Clinical Health Psychologist:  NO, but we may need to consider this as we further taper his opiates.    3. Diagnostic Studies: none at this time  4. Medication Management:    1.   Continue Methadone 5mg every 12 hours.   2.   Continue Oxycodone to 10mg every 6 hours as needed for severe pain. Max of 4/day.   3.   Continue Tizanidine 2m-2 tabs TID PRN  4.  Topamax 25mmg in AM and 50mg at bedtime for 1 week, then take 50mg twice daily  5. Further procedures recommended: none at this " "time  6. Recommendations to PCP. See above        Follow up with this provider: 4 Weeks       Since his last visit, Joselito Abarca reports:     He is not too bad today. He states that things have been \"pretty bad\" this last month. He states that he just woke up so, so far today he isn't bad. He states that he has to  his motorcycle and that usually increases his pain. He states that he is fishing this weekend and this makes his pain worse. He states that he is usually more active in the summer. He did increase his Topamax and is tolerating it well. He states that he is taking more Excedrin migraines.     Annual Controlled Substance Agreement: 7/16/2019  UDS: 6/28/2019     [place] Board of Pharmacy Data Base Reviewed:    YES; As expected, no concern for misuse/abuse of controlled medications based on this report.     THE 4 As OF OPIOID MAINTENANCE ANALGESIA    Analgesia: Is pain relief clinically significant? NO   Activity: Is patient functional and able to perform Activities of Daily Living? YES   Adverse effects: Is patient free from adverse side effects from opiates? YES   Adherence to Rx protocol: Is patient adhering to Controlled Substance Agreement and taking medications ONLY as ordered? YES     MME: 100      Assessment:  Joselito Abarca is a 54 year old male who presents today for follow up regarding his:        1. Arthropathy of cervical facet joint  2. cervicalgia  3. Midline thoracic back pain  4. Myofascial pain  5. Chronic pain syndrome  6. Chronic use of opioids    Continues to report increased pain. He is encouraged to schedule with Dr. Driscoll for injections that were recommended by Dr. Vasques. Will continue current medications at current dosing. Discussed that we will resume his taper at his next visit. Encouraged him to stop the Excedrin Migraine due to stomach upset and history of ulcer.     I did let the patient know that he can get trigger point injections with me every 6 weeks as " needed rather than going to the ER.       Plan:     Diagnosis reviewed, treatment option addressed, and risk/benifits discussed.  Self-care instructions given.  I am recommending a multidisciplinary treatment plan to help this patient better manage pain.       1. Physical Therapy:  not at this time  2. Clinical Health Psychologist:  NO, but we may need to consider this as we further taper his opiates.    3. Diagnostic Studies: none at this time  4. Medication Management:    1.   Continue Methadone 5mg every 12 hours.   2.   Continue Oxycodone to 10mg every 6 hours as needed for severe pain. Max of 4/day.   3.   Continue Tizanidine 2m-2 tabs TID PRN  4.  Topamax 25mg: continue 50mg twice daily  5. Further procedures recommended: trigger point injections as needed, call to schedule medial branch blocks per Dr. Vasques   6. Recommendations to PCP. See above        Follow up with this provider: 4 Weeks      Phone call duration: 13 minutes    Ashley Salgado [facility]

## 2020-06-19 NOTE — TELEPHONE ENCOUNTER
Pt scheduled for MBB   Date:7/3/2020  Time:1:00pm  Dr. Driscoll    Instructed pt to have a  for procedure.  Patient informed of COVID testing process.

## 2020-06-19 NOTE — PATIENT INSTRUCTIONS
After Visit Instructions:     Thank you for coming to [facility] for your care. It is my goal to partner with you to help you reach your optimal state of health.     I am recommending multidisciplinary care at this time.  The focus of care will be to continue gradual rehabilitation and pain management with medication adjustments as needed.    Continue daily self-care, identifying contributing factors, and monitoring variations in pain level. Continue to integrate self-care into your life.        Schedule follow-up with Ashley Salgado PA-C in 4 weeks. You will need to make this appointment.   Procedures recommended: Call to schedule cervical medial branch blocks per Dr. Vasques. To call and schedule your procedure, you can call: [phone], then hit option 2     Medication recommendations:   1.   Continue Methadone 5mg every 12 hours.   2.   Continue Oxycodone to 10mg every 6 hours as needed for severe pain. Max of 4/day.   3.   Continue Tizanidine 2m-2 tabs TID PRN  4.  Topamax 25mg: continue 50mg twice daily      Ashley Salgado PA-C  [facility]  [facility]    Contact information: [facility] [place]  Clinic Number:  [phone]     Call with any questions about your care and for scheduling assistance.     Calls are returned Monday through Friday between 8 AM and 4:30 PM. We usually get back to you within 2 business days depending on the issue/request.    If we are prescribing your medications:    For opioid medication refills, call the clinic or send a Beta Dash message 7 days in advance.  Please include:    Name of requested medication    Name of the pharmacy.    For non-opioid medications, call your pharmacy directly to request a refill. Please allow 3-4 days to be processed.     Per [place] State Law:    All controlled substance prescriptions must be filled within 30 days of being written.      For those controlled substances allowing refills,  pickup must occur within 30 days of last fill.      We believe regular attendance is key to your success in our program!      Any time you are unable to keep your appointment we ask that you call us at least 24 hours in advance to cancel.This will allow us to offer the appointment time to another patient.   Multiple missed appointments may lead to dismissal from the clinic.

## 2020-06-19 NOTE — TELEPHONE ENCOUNTER
Pending Prescriptions:                       Disp   Refills    clonazePAM (KLONOPIN) 0.5 MG tablet [Pharm*90 tab*0        Sig: TAKE 1/2-1 TABLET BY MOUTH THREE TIMES A DAY AS           NEEDED FOR ANXIETY    Last Written Prescription Date:  5/12/20  Last Fill Quantity: 90,  # refills: 0   Last office visit: 6/19/20 Damian   Future Office Visit:      Routing refill request to provider for review/approval because:  Drug not on the G refill protocol     Brionna Sharma RN on 6/19/2020 at 2:28 PM

## 2020-06-22 ENCOUNTER — TELEPHONE (OUTPATIENT)
Dept: FAMILY MEDICINE | Facility: OTHER | Age: 55
End: 2020-06-22

## 2020-06-22 DIAGNOSIS — M47.812 ARTHROPATHY OF CERVICAL FACET JOINT: ICD-10-CM

## 2020-06-22 DIAGNOSIS — M79.18 MYOFASCIAL PAIN: ICD-10-CM

## 2020-06-22 RX ORDER — TIZANIDINE 2 MG/1
TABLET ORAL
Qty: 120 TABLET | Refills: 2 | Status: SHIPPED | OUTPATIENT
Start: 2020-06-22 | End: 2020-07-17

## 2020-06-22 NOTE — TELEPHONE ENCOUNTER
Reason for Call:  Other call back    Detailed comments: Went fishing this weekend and he brought his week's worth of pills-muscle relaxer, one for headache and the oxycodone and methadone(he states that are 4 total) and and they got wet and she is wondering if there is anything you can do for him?  [facility]    Phone Number Patient can be reached at: Home number on file [phone] (home)    Best Time: any    Can we leave a detailed message on this number? YES    Call taken on 6/22/2020 at 3:15 PM by Maria Luz Suero

## 2020-06-22 NOTE — TELEPHONE ENCOUNTER
Chief Complaint   Patient presents with     Refill Request     tiZANidine (ZANAFLEX) 2 MG tablet       Refill request received via fax by pharmacy      [facility], [place] - [address] [place] [address]     Pending Prescriptions:                       Disp   Refills    tiZANidine (ZANAFLEX) 2 MG tablet         120 ta*0            Sig: TAKE ONE TO TWO TABLETS BY MOUTH THREE TIMES A           DAY AS NEEDED FOR MUSCLE SPASMS /TENSION         Last Written Prescription Date:  05/27/2020  Last Fill Quantity: 120,   # refills: 0  Last Office Visit with prescribing provider: 06/19/2020  Future Office visit: None scheduled.     Medication is ready to be sent if appropriate.     Christy Burris CMA on 6/22/2020 at 9:15 AM

## 2020-06-23 RX ORDER — METHADONE HYDROCHLORIDE 5 MG/1
TABLET ORAL
Qty: 60 TABLET | Refills: 0 | Status: SHIPPED | OUTPATIENT
Start: 2020-06-23 | End: 2020-07-17

## 2020-06-23 NOTE — TELEPHONE ENCOUNTER
I will agree to allow for a 1 time early refill of the Methadone. I will not do an early refill of his Oxycodone next month if he has been taking more. He will need to make sure what he has left will last until 7/21/2020.     New methadone dates: fill/start 6/23/2020    I called the pharmacy and okayed the early refill of the methadone.     Ashley Salgado PA-C on 6/23/2020 at 2:24 PM

## 2020-06-23 NOTE — TELEPHONE ENCOUNTER
Called pt. He [place] where he had put them so thought he forgot them. When he got home, he found the bag that had the medications and they were all wet. States that they were in a bag that he had on the boat where they were pulling the anchor in and out.     States that he has been out of methadone since Saturday. Oxycodone medication was not in the bag that got wet. Pt has been taking 2 additional tablets of oxycodone 5 mg tabs since he ran out of methadone.     There is a new prescription for methadone 5 mg at the pharmacy with a fill date of 6/26. Pt wondering if he could get that filled ASAP and start the next 30 day supply a little early.     States that he is feeling anxiety and wondering if this is because he has not take methadone for a few days. States that he lost other medications too. Was able to refill the tizanidine and topamax. Other medications are not as urgent as the methadone so he followed up with Ashley first.     Let him know that I would have Ashley review this information and his request and that we would get back to him. Will need to determine plan for both the methadone and oxycodone since he used more than prescribed of that to cover loss of methadone.     MAXIMUS ApodacaN, RN-BC  Patient Care Supervisor  [facility] [place]

## 2020-06-24 NOTE — TELEPHONE ENCOUNTER
Outreach X1. Left a  requesting call back. Provided call back number. Need to review plan from Ashley below.    A prescription for Methadone was sent to the patients pharmacy yesterday with a fill/start date of 6/23/20.     MAXIMUS SchreiberN, RN  Care Coordinator  [facility] [place]

## 2020-06-25 NOTE — TELEPHONE ENCOUNTER
Called pt. He picked up the methadone a couple of days ago. Relayed message from Ashley JACKSON about rationing the oxycodone until the 7/21 due date if he had needed to take more while he was out of the methadone. Pt stated understanding.     Rita Martinez, MAXIMUSN, RN-BC  Patient Care Supervisor  [facility] [place]

## 2020-07-01 DIAGNOSIS — Z11.59 ENCOUNTER FOR SCREENING FOR OTHER VIRAL DISEASES: ICD-10-CM

## 2020-07-01 PROCEDURE — U0003 INFECTIOUS AGENT DETECTION BY NUCLEIC ACID (DNA OR RNA); SEVERE ACUTE RESPIRATORY SYNDROME CORONAVIRUS 2 (SARS-COV-2) (CORONAVIRUS DISEASE [COVID-19]), AMPLIFIED PROBE TECHNIQUE, MAKING USE OF HIGH THROUGHPUT TECHNOLOGIES AS DESCRIBED BY CMS-2020-01-R: HCPCS | Performed by: ANESTHESIOLOGY

## 2020-07-02 LAB
SARS-COV-2 RNA SPEC QL NAA+PROBE: NOT DETECTED
SPECIMEN SOURCE: NORMAL

## 2020-07-03 ENCOUNTER — HOSPITAL ENCOUNTER (OUTPATIENT)
Facility: CLINIC | Age: 55
Discharge: HOME | End: 2020-07-03
Attending: ANESTHESIOLOGY | Admitting: ANESTHESIOLOGY
Payer: COMMERCIAL

## 2020-07-03 ENCOUNTER — HOSPITAL ENCOUNTER (OUTPATIENT)
Dept: GENERAL RADIOLOGY | Facility: CLINIC | Age: 55
End: 2020-07-03
Attending: ANESTHESIOLOGY | Admitting: ANESTHESIOLOGY
Payer: COMMERCIAL

## 2020-07-03 VITALS
RESPIRATION RATE: 20 BRPM | SYSTOLIC BLOOD PRESSURE: 122 MMHG | TEMPERATURE: 97.5 F | OXYGEN SATURATION: 96 % | DIASTOLIC BLOOD PRESSURE: 79 MMHG

## 2020-07-03 DIAGNOSIS — Z98.1 S/P SPINAL FUSION: ICD-10-CM

## 2020-07-03 PROCEDURE — 77003 FLUOROGUIDE FOR SPINE INJECT: CPT | Mod: TC

## 2020-07-03 PROCEDURE — 25000125 ZZHC RX 250: Performed by: ANESTHESIOLOGY

## 2020-07-03 PROCEDURE — 64490 INJ PARAVERT F JNT C/T 1 LEV: CPT | Mod: 50 | Performed by: ANESTHESIOLOGY

## 2020-07-03 RX ORDER — LIDOCAINE 40 MG/G
CREAM TOPICAL
Status: DISCONTINUED | OUTPATIENT
Start: 2020-07-03 | End: 2020-07-03 | Stop reason: HOSPADM

## 2020-07-03 RX ORDER — LIDOCAINE HYDROCHLORIDE 40 MG/ML
INJECTION, SOLUTION RETROBULBAR PRN
Status: DISCONTINUED | OUTPATIENT
Start: 2020-07-03 | End: 2020-07-03 | Stop reason: HOSPADM

## 2020-07-03 NOTE — OP NOTE
CHIEF COMPLAINT:  Neck pain secondary to cervical spondylosis  INTERVAL HISTORY:     The history was discussed with the patient. I agree with above. Risks were discussed including risks of infection, bleeding, nerve injury, no help , or worse pain and they were willing to take these risks and proceed. They also understand this is a strictly diagnostic block.    PROCEDURE:  Cervical Medial branch blocks of the   Left C3, Left C4, Right C3, and Right C4 levels and the bilateral third occipital nerves   utilizing fluoroscopic guidance with contrast dye.   PROCEDURE DETAILS: After written informed consent was obtained from the patient, the patient was escorted to the procedure room.  The patient was placed in the sitting position. A time out was conducted to verify the patient identity, procedure to be performed, side, site, allergies and any special requirements.  The skin over the neck was prepped and draped in normal sterile fashion. Fluoroscopy was used to identify the mid point of the articular pillar with a lateral view.   The skin was anesthetized with 0.5 mL of 1% lidocaine with bicarbonate buffer.  A 25-gauge 3.5 inch Quincke needle was advanced under fluoroscopic guidance.  <0.3 cc of Omnipaque contrast dye was injected without evidence of intrathecal or intravascular spread.  A- 0.5 mL solution of 4% lidocaine was injected at each articular pillar.  Then, the needle was removed.   The patient did not receive sedation during the procedure. The patient was monitored with blood pressure and pulse oximetry machines with the assistance of an RN throughout the procedure.  The patient was alert and responsive to questions throughout the procedure.   The patient tolerated the procedure well and was observed in the post-procedural area.  The patient was dismissed without apparent complications.     DIAGNOSIS:  1. Neck pain secondary to cervical spondylosis  PLAN:  1. Performed   medial branch blocks to treat the    facet joints with 4% lidocaine for the 2nd diagnostic medial branch block.  If both blocks are diagnostically positive, I will proceed with radiofrequency neurolysis.  2. The patient will fax in injection report form to same-day surgery for me to diagnostically interpret the pain form.  If it looks diagnostically negative then I would move up and screen his C1-2 facet joint.

## 2020-07-03 NOTE — DISCHARGE INSTRUCTIONS
Home Care Instructions     Please fax or mail this form to the location that is selected at the bottom of your pain relief form.  Do not try to send the form back to Dr. Driscoll's clinic in [place].  If you are unsure of which location to send this form back to, please send it back to the [facility] same-day surgery department.  Dr. Driscoll will then evaluate your form and determine the next step of your care.            Procedure:  Diagnostic Block (which includes medial branch blocks, SI joint injection blocks, and nerve root injections)    Activity:  The injection was used to identify the cause of your pain:    Resume normal activity  You are to engage in activity that would have normally caused you discomfort to determine the effectiveness of the block/injection.  It is imperative to evaluate and rate your pain the first 2 hours after the injection.     Pain:    You may experience soreness at the injection site for one or two days    You may use an ice pack for 20 minutes every 2 hours for the first 24 hours    You may use a heating pad after the first 24 hours    You may use Tylenol  (acetaminophen) every 4 hours or other pain medicines as directed by your physician after your block wears off.    Safety  You may experience numbness radiating into your legs or arms, (depending on the procedure location)  This numbness may last several hours.  Until the numb sensation returns to normal please use caution in walking, climbing stairs, stepping out of your vehicle, etc.    Please contact us if you have:  Severe pain   Fever more than 101.5 degrees Fahrenheit  Signs of infection (redness, swelling or drainage)       If you need immediate attention we recommend that you go to a hospital emergency room or dial 911.    _____ Please contact our office one week after your injection to report your percent of pain relief.   See attached One Week Procedure Injection Report  __X__ Please return your Nerve Block Pain  Relief Form the day after your injection.     See attached Nerve Block Pain Relief Form             ## PLEASE SEND NERVE BLOCK PAIN RELIEF REPORT WITH PATIENT ##

## 2020-07-09 DIAGNOSIS — M50.30 DEGENERATION OF CERVICAL INTERVERTEBRAL DISC: ICD-10-CM

## 2020-07-10 RX ORDER — CHOLECALCIFEROL (VITAMIN D3) 50 MCG
TABLET ORAL
Qty: 100 TABLET | Refills: 2 | Status: SHIPPED | OUTPATIENT
Start: 2020-07-10 | End: 2021-08-10

## 2020-07-10 NOTE — TELEPHONE ENCOUNTER
Prescription approved per [facility] Refill Protocol.    Brionna Sharma RN on 7/10/2020 at 10:01 AM

## 2020-07-17 ENCOUNTER — VIRTUAL VISIT (OUTPATIENT)
Dept: PALLIATIVE MEDICINE | Facility: CLINIC | Age: 55
End: 2020-07-17
Payer: COMMERCIAL

## 2020-07-17 VITALS — HEIGHT: 67 IN | WEIGHT: 170 LBS | BODY MASS INDEX: 26.68 KG/M2

## 2020-07-17 DIAGNOSIS — M54.2 CERVICALGIA: ICD-10-CM

## 2020-07-17 DIAGNOSIS — M54.6 CHRONIC MIDLINE THORACIC BACK PAIN: ICD-10-CM

## 2020-07-17 DIAGNOSIS — G89.29 CHRONIC MIDLINE THORACIC BACK PAIN: ICD-10-CM

## 2020-07-17 DIAGNOSIS — M47.812 ARTHROPATHY OF CERVICAL FACET JOINT: ICD-10-CM

## 2020-07-17 DIAGNOSIS — F41.1 GENERALIZED ANXIETY DISORDER: ICD-10-CM

## 2020-07-17 DIAGNOSIS — F17.200 NICOTINE DEPENDENCE, UNCOMPLICATED, UNSPECIFIED NICOTINE PRODUCT TYPE: Primary | ICD-10-CM

## 2020-07-17 DIAGNOSIS — F11.90 CHRONIC, CONTINUOUS USE OF OPIOIDS: ICD-10-CM

## 2020-07-17 DIAGNOSIS — G89.4 CHRONIC PAIN SYNDROME: ICD-10-CM

## 2020-07-17 DIAGNOSIS — M79.18 MYOFASCIAL PAIN: ICD-10-CM

## 2020-07-17 PROCEDURE — 99213 OFFICE O/P EST LOW 20 MIN: CPT | Mod: 95 | Performed by: PHYSICIAN ASSISTANT

## 2020-07-17 RX ORDER — OXYCODONE HYDROCHLORIDE 10 MG/1
TABLET ORAL
Qty: 120 TABLET | Refills: 0 | Status: SHIPPED | OUTPATIENT
Start: 2020-07-17 | End: 2020-08-18

## 2020-07-17 RX ORDER — DULOXETIN HYDROCHLORIDE 60 MG/1
CAPSULE, DELAYED RELEASE ORAL
Qty: 30 CAPSULE | Refills: 3 | Status: SHIPPED | OUTPATIENT
Start: 2020-07-17 | End: 2020-11-27

## 2020-07-17 RX ORDER — METHADONE HYDROCHLORIDE 5 MG/1
TABLET ORAL
Qty: 30 TABLET | Refills: 0 | Status: SHIPPED | OUTPATIENT
Start: 2020-07-17 | End: 2020-08-18

## 2020-07-17 RX ORDER — CLONAZEPAM 0.5 MG/1
TABLET ORAL
Qty: 90 TABLET | Refills: 0 | Status: SHIPPED | OUTPATIENT
Start: 2020-07-17 | End: 2020-08-21

## 2020-07-17 RX ORDER — NICOTINE 21 MG/24HR
1 PATCH, TRANSDERMAL 24 HOURS TRANSDERMAL EVERY 24 HOURS
Qty: 30 PATCH | Refills: 0 | Status: SHIPPED | OUTPATIENT
Start: 2020-07-17 | End: 2020-08-18 | Stop reason: DRUGHIGH

## 2020-07-17 ASSESSMENT — MIFFLIN-ST. JEOR: SCORE: 1574.74

## 2020-07-17 NOTE — TELEPHONE ENCOUNTER
Klonopin      Last Written Prescription Date:  6/19/2020  Last Fill Quantity: 90,   # refills: 0  Last Office Visit: 5/8/2020  Future Office visit:       Routing refill request to provider for review/approval because:  Drug not on the [facility], P or [facility] refill protocol or controlled substance

## 2020-07-17 NOTE — PROGRESS NOTES
"Joselito Abarca is a 54 year old male who is being evaluated via a billable telephone visit.      The patient has been notified of following:     \"This telephone visit will be conducted via a call between you and your physician/provider. We have found that certain health care needs can be provided without the need for a physical exam.  This service lets us provide the care you need with a short phone conversation.  If a prescription is necessary we can send it directly to your pharmacy.  If lab work is needed we can place an order for that and you can then stop by our lab to have the test done at a later time.    Telephone visits are billed at different rates depending on your insurance coverage. During this emergency period, for some insurers they may be billed the same as an in-person visit.  Please reach out to your insurance provider with any questions.    If during the course of the call the physician/provider feels a telephone visit is not appropriate, you will not be charged for this service.\"    Patient has given verbal consent for Telephone visit?  Yes    What phone number would you like to be contacted at? [phone]    How would you like to obtain your AVS? Mail a copy    [facility]     CHIEF COMPLAINT:   Pain  -neck pain     INTERVAL HISTORY:  Last seen on 2020 for a virtual visit.    Recommendations at last visit included:  1. Physical Therapy:  not at this time  2. Clinical Health Psychologist:  NO, but we may need to consider this as we further taper his opiates.    3. Diagnostic Studies: none at this time  4. Medication Management:    1.   Continue Methadone 5mg every 12 hours.   2.   Continue Oxycodone to 10mg every 6 hours as needed for severe pain. Max of 4/day.   3.   Continue Tizanidine 2m-2 tabs TID PRN  4.  Topamax 25mg: continue 50mg twice daily  5. Further procedures recommended: trigger point injections as needed, call to schedule medial branch " "blocks per Dr. Vasques   6. Recommendations to PCP. See above        Follow up with this provider: 4 Weeks       Since his last visit, Joselito GOSS Rima reports:    He is not too bad. He had the blocks with Dr. Driscoll on 7/3/2020 and they went well. He thinks where the injections were, is where his heads start from. He did send his pain diary back to the clinic. He states that he got \"heat stroke\" fishing and got really sick from that. He states that his shoulder muscles have been tight and he had to hold weights to get a good xray of his shoulders. He is wondering if his muscle relaxant can be increased. He states that he tried stopping his Methadone a couple of mornings and states that he got \"the shakes\" from that.     Pain Information:              Pain today 8/10     Annual Controlled Substance Agreement: 7/16/2019  UDS: 6/28/2019    CURRENT RELEVANT PAIN MEDICATIONS:   Cymbalta 60mg daily  Clonazepam 0.5mg-takes 3-4/day  Methadone 5mg every 12 hours  Oxycodone 10mg every 6 hours max of 4/day  Tizanidine 2mg-2 tablet 3 times daily     [place] Board of Pharmacy Data Base Reviewed:    YES; As expected, no concern for misuse/abuse of controlled medications based on this report.     THE 4 As OF OPIOID MAINTENANCE ANALGESIA    Analgesia: Is pain relief clinically significant? NO   Activity: Is patient functional and able to perform Activities of Daily Living? YES   Adverse effects: Is patient free from adverse side effects from opiates? YES   Adherence to Rx protocol: Is patient adhering to Controlled Substance Agreement and taking medications ONLY as ordered? YES     MME: 100    Medications:  Current Outpatient Prescriptions          Current Outpatient Medications   Medication Sig Dispense Refill     clonazePAM (KLONOPIN) 0.5 MG tablet TAKE 1/2 TO 1 TABLET BY MOUTH THREE TIMES A DAY AS NEEDED FOR ANXIETY 90 tablet 0     DULoxetine (CYMBALTA) 60 MG capsule Take 1 capsule (60 mg) by mouth daily 30 capsule 3     " FLOVENT  MCG/ACT Inhaler INHALE 2 PUFFS INTO THE LUNGS TWICE DAILY 36 g 2     fluticasone (FLONASE) 50 MCG/ACT nasal spray SPRAY 2 SPRAYS IN EACH NOSTRIL EVERY DAY 16 g 5     ipratropium - albuterol 0.5 mg/2.5 mg/3 mL (DUONEB) 0.5-2.5 (3) MG/3ML neb solution Take 1 vial (3 mLs) by nebulization every 4 hours as needed for shortness of breath / dyspnea 30 vial 0     methadone (DOLOPHINE) 5 MG tablet Take 1.5 tablets every 12 hours.  Fill 2/28/2020. Start 2/29/2020. 90 tablet 0     naloxone (NARCAN) 4 MG/0.1ML nasal spray Spray 4 mg into one nostril alternating nostrils once as needed for opioid reversal every 2-3 minutes until assistance arrives         nystatin (MYCOSTATIN) 764705 UNIT/ML suspension TAKE 5 MLS BY MOUTH FOUR TIMES A  mL 0     order for DME Equipment being ordered: Nebulizer mask and tubing 1 each 1     oxyCODONE IR (ROXICODONE) 10 MG tablet Take 1 tablet every 6 hours as needed for breakthrough pain. Max of 4/day. Fill 2/19/2020. Start 2/22/2020. 30 day script 115 tablet 0     sildenafil (VIAGRA) 100 MG tablet Take 1 tablet (100 mg) by mouth daily as needed 30 min to 4 hrs before sex. Do not use with nitroglycerin, terazosin or doxazosin. 4 tablet 0     tiZANidine (ZANAFLEX) 2 MG tablet TAKE ONE TO TWO TABLETS BY MOUTH THREE TIMES A DAY AS NEEDED FOR MUSCLE SPASMS/TENSION 60 tablet 0     traZODone (DESYREL) 100 MG tablet Take 3 tablets (300 mg) by mouth At Bedtime 90 tablet 3     VENTOLIN  (90 Base) MCG/ACT inhaler INHALE 2 PUFFS INTO THE LUNGS EVERY 6 HOURS AS NEEDED FOR SHORTNESS OF BREATH, DIFFICULTY BREATHING OR WHEEZING. 18 g 3     vitamin D3 (CHOLECALCIFEROL) 2000 units tablet TAKE ONE TABLET BY MOUTH EVERY  tablet 3     zolpidem (AMBIEN) 10 MG tablet Take 10 mg by mouth nightly as needed                   Assessment:  Joselito Abarca is a 55 year old male who presents today for follow up regarding his:        1. Arthropathy of cervical facet  "joint  2. cervicalgia  3. Midline thoracic back pain  4. Myofascial pain  5. Chronic pain syndrome  6. Chronic use of opioids  7. Nicotine dependence     Patient reports an improvement with the last medial branch blocks. Plan to proceed with 2nd block per Dr. Vasques. Discussed with the patient that we can do trigger point injections as needed.     Will resume taper of his opiates. Plan to reduce Methadone to 2.5mg twice daily. Once he is off the Methadone, can consider a different long acting pain medication such as Morphine, with reduce Oxycodone use.         Plan:     Diagnosis reviewed, treatment option addressed, and risk/benifits discussed.  Self-care instructions given.  I am recommending a multidisciplinary treatment plan to help this patient better manage pain.       1. Physical Therapy:  not at this time  2. Clinical Health Psychologist:  NO, but we may need to consider this as we further taper his opiates.    3. Diagnostic Studies: none at this time  4. Medication Management:    1.   Reduce Methadone 2.5mg every 12 hours.   2.   Continue Oxycodone to 10mg every 6 hours as needed for severe pain. Max of 4/day.   3.   Increase Tizanidine 4m-1.5 tabs TID PRN  4.  Topamax 25mg: continue 50mg twice daily  5. Nicotine 21mg/24 hour patch prescribed. Patient reports \"smoking like a chimney\" and unable to go even one hour without a cigarette.   5. Further procedures recommended: trigger point injections as needed, plan for medial branch blocks #2 per Dr. Vasques  6. Recommendations to PCP. See above        Follow up with this provider: 4 Weeks      Phone call duration: 19 minutes    Ashley Salgado [facility]    "

## 2020-07-17 NOTE — PATIENT INSTRUCTIONS
After Visit Instructions:     Thank you for coming to [facility] [place] for your care. It is my goal to partner with you to help you reach your optimal state of health.     I am recommending multidisciplinary care at this time.  The focus of care will be to continue gradual rehabilitation and pain management with medication adjustments as needed.    Continue daily self-care, identifying contributing factors, and monitoring variations in pain level. Continue to integrate self-care into your life.        Schedule follow-up with Ashley Salgado PA-C in 4 weeks. You will need to make this appointment.     Should you become interested in quitting smoking, for free help visit [email or web address] or call [phone] ([phone]).    Procedures recommended: Trigger point injections as needed    Medication recommendations:     Reduce Methadone to 1/2 tab (2.5mg) every 12 hours    Continue Oxycodone 10mg every 6 hours    Increase tizanidine to 4m-1.5 tabs three times daily as needed    Nicotine patches 21mg/24 hours.       Ashley Salgado PA-C  [facility]    Contact information: [facility] [place]  Clinic Number:  [phone]     Call with any questions about your care and for scheduling assistance.     Calls are returned Monday through Friday between 8 AM and 4:30 PM. We usually get back to you within 2 business days depending on the issue/request.    If we are prescribing your medications:    For opioid medication refills, call the clinic or send a [employer] message 7 days in advance.  Please include:    Name of requested medication    Name of the pharmacy.    For non-opioid medications, call your pharmacy directly to request a refill. Please allow 3-4 days to be processed.     Per [place] State Law:    All controlled substance prescriptions must be filled within 30 days of being written.      For those controlled substances allowing refills, pickup must  occur within 30 days of last fill.      We believe regular attendance is key to your success in our program!      Any time you are unable to keep your appointment we ask that you call us at least 24 hours in advance to cancel.This will allow us to offer the appointment time to another patient.   Multiple missed appointments may lead to dismissal from the clinic.

## 2020-07-22 ENCOUNTER — TELEPHONE (OUTPATIENT)
Dept: NEUROSURGERY | Facility: CLINIC | Age: 55
End: 2020-07-22

## 2020-07-22 ENCOUNTER — OFFICE VISIT (OUTPATIENT)
Dept: PALLIATIVE MEDICINE | Facility: CLINIC | Age: 55
End: 2020-07-22
Payer: COMMERCIAL

## 2020-07-22 VITALS
OXYGEN SATURATION: 99 % | SYSTOLIC BLOOD PRESSURE: 110 MMHG | HEART RATE: 113 BPM | DIASTOLIC BLOOD PRESSURE: 74 MMHG | TEMPERATURE: 97.4 F | RESPIRATION RATE: 18 BRPM

## 2020-07-22 DIAGNOSIS — M79.18 MYOFASCIAL PAIN: Primary | ICD-10-CM

## 2020-07-22 DIAGNOSIS — Z98.1 S/P CERVICAL SPINAL FUSION: Primary | ICD-10-CM

## 2020-07-22 PROCEDURE — 20552 NJX 1/MLT TRIGGER POINT 1/2: CPT | Performed by: PHYSICIAN ASSISTANT

## 2020-07-22 RX ORDER — BUPIVACAINE HYDROCHLORIDE 5 MG/ML
6 INJECTION, SOLUTION PERINEURAL ONCE
Status: COMPLETED | OUTPATIENT
Start: 2020-07-22 | End: 2020-07-22

## 2020-07-22 RX ADMIN — BUPIVACAINE HYDROCHLORIDE 30 MG: 5 INJECTION, SOLUTION PERINEURAL at 15:53

## 2020-07-22 ASSESSMENT — PAIN SCALES - GENERAL: PAINLEVEL: MODERATE PAIN (4)

## 2020-07-22 NOTE — TELEPHONE ENCOUNTER
LAST VISIT: 5/14/20  NEXT VISIT: N/A    ASSESSMENT: MBB workup for RFA was ordered. Patient had this completed on 7/2/30.     RECOMMENDATION GIVEN: Patient got % relief for ~1 hour and then 50-80% relief for 15 minutes and 20-50% relief for ~ 1 1/2 hours.     ACTION NEEDED FROM PROVIDER: Need review from Dr. Vasques to verify ordering 2nd MBB.

## 2020-07-22 NOTE — PROGRESS NOTES
Pre Procedure Diagnosis: myofascial pain  Post procedure Diagnosis: Same  Procedure performed: trigger point injections  Anesthesia: none  Complications: none  Operators: Ashley Salgado PA-C     Indications:   Joselito Abarca is a 55 year old male with a history of neck.  Exam shows myofascial pain of the muscle groups listed below and they have tried conservative treatment including medications.    Options/alternatives, benefits and risks were discussed with the patient including bleeding, infection, tissue trauma and pnuemothorax.  Questions were answered to his satisfaction and he agrees to proceed. Voluntary informed consent was obtained and signed.     Vitals were reviewed: Yes  Allergies were reviewed:  Yes   Medications were reviewed:  Yes   Pre-procedure pain score: 4/10    Procedure:  After getting informed consent, a Pause for the Cause was performed.    Trigger points were identified by patient, and marked when appropriate.  The area was prepped with Chloroprep.    Using clean technique, injections were completed using a 25G, 1.5 inch needle.  After negative aspiration, injection was completed.  A total of 10 locations were injected.  When possible, tissue was retracted from the chest wall to avoid lung injury.    Muscle groups injected:  Bilateral trapezius    Injection solution contained:  6ml of 1% lidocaine and 6ml of 0.5% bupivacaine.    Hemostasis was achieved, the area was cleaned, and bandaids were placed when appropriate.  The patient tolerated the procedure well.  No shortness of breath with conversaton    Post-procedure pain score: 4/10  Follow-up includes:   -repeat every 6 weeks prn      Ashley Salgado, PAC  [facility]

## 2020-07-22 NOTE — PATIENT INSTRUCTIONS
[facility]   Post Procedure Instructions    Today you had:   trigger point injections     Medications used:  lidocaine   bupivicaine           Go to the emergency room if you develop any shortness of breath    Monitor the injection sites for signs and symptoms of infection-fever, chills, redness, swelling, warmth, or drainage to areas.    You may have soreness at injection sites for up to 24 hours.    You may apply ice to the painful areas to help minimize the discomfort of the needle pokes.    Do not apply heat to sites for at least 12 hours.    You may use anti-inflammatory medications or Tylenol for pain control if necessary    Pain Clinic phone number during work hours Monday-Friday:  [phone]    After hours provider line: [phone]

## 2020-07-23 ENCOUNTER — TELEPHONE (OUTPATIENT)
Facility: CLINIC | Age: 55
End: 2020-07-23

## 2020-07-23 DIAGNOSIS — Z11.59 ENCOUNTER FOR SCREENING FOR OTHER VIRAL DISEASES: Primary | ICD-10-CM

## 2020-07-23 NOTE — TELEPHONE ENCOUNTER
Pt scheduled for MBB  Date: 7/29/20  Time: 1230    Dr. Driscoll    Instructed pt to have H&P and  for procedure.  Patient informed of COVID testing process.

## 2020-07-23 NOTE — TELEPHONE ENCOUNTER
Dr Vasques reviewed pain journal for 1st MBB. Per Dr Vasques ok to proceed with 2nd MBB. New order placed with Dr Driscoll. Called and left detailed messages for patient. Advised patient to call back if any questions. Dr Driscoll's scheduling team notified order placed and will reach out to him.

## 2020-07-27 DIAGNOSIS — Z11.59 ENCOUNTER FOR SCREENING FOR OTHER VIRAL DISEASES: ICD-10-CM

## 2020-07-28 LAB
LABORATORY COMMENT REPORT: NORMAL
SARS-COV-2 RNA SPEC QL NAA+PROBE: NEGATIVE
SARS-COV-2 RNA SPEC QL NAA+PROBE: NORMAL
SPECIMEN SOURCE: NORMAL
SPECIMEN SOURCE: NORMAL

## 2020-07-29 ENCOUNTER — HOSPITAL ENCOUNTER (OUTPATIENT)
Facility: CLINIC | Age: 55
Discharge: HOME | End: 2020-07-29
Attending: ANESTHESIOLOGY | Admitting: ANESTHESIOLOGY
Payer: COMMERCIAL

## 2020-07-29 ENCOUNTER — HOSPITAL ENCOUNTER (OUTPATIENT)
Dept: GENERAL RADIOLOGY | Facility: CLINIC | Age: 55
End: 2020-07-29
Attending: ANESTHESIOLOGY
Payer: COMMERCIAL

## 2020-07-29 VITALS
OXYGEN SATURATION: 97 % | RESPIRATION RATE: 16 BRPM | SYSTOLIC BLOOD PRESSURE: 123 MMHG | HEART RATE: 80 BPM | TEMPERATURE: 98.4 F | DIASTOLIC BLOOD PRESSURE: 85 MMHG

## 2020-07-29 DIAGNOSIS — Z98.1 S/P CERVICAL SPINAL FUSION: ICD-10-CM

## 2020-07-29 PROCEDURE — 64491 INJ PARAVERT F JNT C/T 2 LEV: CPT

## 2020-07-29 PROCEDURE — 64491 INJ PARAVERT F JNT C/T 2 LEV: CPT | Mod: 50 | Performed by: ANESTHESIOLOGY

## 2020-07-29 PROCEDURE — 64450 NJX AA&/STRD OTHER PN/BRANCH: CPT

## 2020-07-29 PROCEDURE — 25000125 ZZHC RX 250: Performed by: ANESTHESIOLOGY

## 2020-07-29 PROCEDURE — 64490 INJ PARAVERT F JNT C/T 1 LEV: CPT | Mod: 50 | Performed by: ANESTHESIOLOGY

## 2020-07-29 PROCEDURE — 25000128 H RX IP 250 OP 636: Performed by: ANESTHESIOLOGY

## 2020-07-29 PROCEDURE — 77003 FLUOROGUIDE FOR SPINE INJECT: CPT | Mod: TC

## 2020-07-29 RX ORDER — LIDOCAINE HYDROCHLORIDE 40 MG/ML
INJECTION, SOLUTION RETROBULBAR PRN
Status: DISCONTINUED | OUTPATIENT
Start: 2020-07-29 | End: 2020-07-29 | Stop reason: HOSPADM

## 2020-07-29 RX ORDER — IOPAMIDOL 612 MG/ML
INJECTION, SOLUTION INTRATHECAL PRN
Status: DISCONTINUED | OUTPATIENT
Start: 2020-07-29 | End: 2020-07-29 | Stop reason: HOSPADM

## 2020-07-29 NOTE — OP NOTE
CHIEF COMPLAINT:  Neck pain secondary to cervical spondylosis  INTERVAL HISTORY:     The history was discussed with the patient. I agree with above. Risks were discussed including risks of infection, bleeding, nerve injury, no help , or worse pain and they were willing to take these risks and proceed. They also understand this is a strictly diagnostic block.    PROCEDURE:  Cervical Medial branch blocks of the bilateral C3 and C4 and the bilateral third occipital nerves   utilizing fluoroscopic guidance with contrast dye.   PROCEDURE DETAILS: After written informed consent was obtained from the patient, the patient was escorted to the procedure room.  The patient was placed in the sitting position. A time out was conducted to verify the patient identity, procedure to be performed, side, site, allergies and any special requirements.  The skin over the neck was prepped and draped in normal sterile fashion. Fluoroscopy was used to identify the mid point of the articular pillar with a lateral view.   The skin was anesthetized with 0.5 mL of 1% lidocaine with bicarbonate buffer.  A 25-gauge 3.5 inch Quincke needle was advanced under fluoroscopic guidance.  <0.3 cc of Omnipaque contrast dye was injected without evidence of intrathecal or intravascular spread.  A- 0.5 mL solution of 4% lidocaine was injected at each articular pillar.  Then, the needle was removed.   The patient did not receive sedation during the procedure. The patient was monitored with blood pressure and pulse oximetry machines with the assistance of an RN throughout the procedure.  The patient was alert and responsive to questions throughout the procedure.   The patient tolerated the procedure well and was observed in the post-procedural area.  The patient was dismissed without apparent complications.     DIAGNOSIS:  1. Neck pain secondary to cervical spondylosis  2.  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  PLAN:  1. Performed   medial branch blocks to treat the   facet joints with 4% lidocaine for the 2nd diagnostic medial branch block.  If both blocks are diagnostically positive, I will proceed with radiofrequency neurolysis.  2. The patient will fax in injection report form to to us for diagnostic interpretation.  With a confirmatory block today he needs at least 1 hour of pain relief in the 80 to 100% range.  If this is not helpful then he could consider moving up to the C1-C2 joint or C0-C1 joint.  Most of his pain is in the suboccipital region.

## 2020-07-29 NOTE — DISCHARGE INSTRUCTIONS
Home Care Instructions     Please fax or mail this form to the location that is selected at the bottom of your pain relief form.  Do not try to send the form back to Dr. Driscoll's clinic in [place].  If you are unsure of which location to send this form back to, please send it back to the [facility] same-day surgery department.  Dr. Driscoll will then evaluate your form and determine the next step of your care.            Procedure:  Diagnostic Block (which includes medial branch blocks, SI joint injection blocks, and nerve root injections)    Activity:  The injection was used to identify the cause of your pain:    Resume normal activity  You are to engage in activity that would have normally caused you discomfort to determine the effectiveness of the block/injection.  It is imperative to evaluate and rate your pain the first 2 hours after the injection.     Pain:    You may experience soreness at the injection site for one or two days    You may use an ice pack for 20 minutes every 2 hours for the first 24 hours    You may use a heating pad after the first 24 hours    You may use Tylenol  (acetaminophen) every 4 hours or other pain medicines as directed by your physician after your block wears off.    Safety  You may experience numbness radiating into your legs or arms, (depending on the procedure location)  This numbness may last several hours.  Until the numb sensation returns to normal please use caution in walking, climbing stairs, stepping out of your vehicle, etc.    Please contact us if you have:  Severe pain   Fever more than 101.5 degrees Fahrenheit  Signs of infection (redness, swelling or drainage)       If you need immediate attention we recommend that you go to a hospital emergency room or dial 911.    _____ Please contact our office one week after your injection to report your percent of pain relief.   See attached One Week Procedure Injection Report  __X__ Please return your Nerve Block Pain  Relief Form the day after your injection.     See attached Nerve Block Pain Relief Form             ## PLEASE SEND NERVE BLOCK PAIN RELIEF REPORT WITH PATIENT ##

## 2020-08-03 DIAGNOSIS — G89.4 CHRONIC PAIN SYNDROME: ICD-10-CM

## 2020-08-03 DIAGNOSIS — M54.2 CERVICALGIA: ICD-10-CM

## 2020-08-03 DIAGNOSIS — M47.812 ARTHROPATHY OF CERVICAL FACET JOINT: ICD-10-CM

## 2020-08-03 DIAGNOSIS — M54.6 CHRONIC MIDLINE THORACIC BACK PAIN: ICD-10-CM

## 2020-08-03 DIAGNOSIS — G89.29 CHRONIC MIDLINE THORACIC BACK PAIN: ICD-10-CM

## 2020-08-03 RX ORDER — TOPIRAMATE 25 MG/1
TABLET, FILM COATED ORAL
Qty: 120 TABLET | Refills: 1 | Status: SHIPPED | OUTPATIENT
Start: 2020-08-03 | End: 2020-09-14

## 2020-08-03 NOTE — TELEPHONE ENCOUNTER
Chief Complaint   Patient presents with     Refill Request      Refill request received via fax by pharmacy      [facility]  [place], [place] - [address]   [facility] [place], [place] - [address] [facility] [address]       No prescriptions requested or ordered in this encounter         Last Written Prescription Date:  05/27/2020  Last Fill Quantity: 120,   # refills: 1  Last Office Visit with prescribing provider: 07/22/2020  Future Office visit: None scheduled.       Medication is ready to be sent if appropriate.  Christy Burris CMA on 8/3/2020 at 8:06 AM

## 2020-08-18 ENCOUNTER — VIRTUAL VISIT (OUTPATIENT)
Dept: PALLIATIVE MEDICINE | Facility: CLINIC | Age: 55
End: 2020-08-18
Payer: COMMERCIAL

## 2020-08-18 VITALS — HEIGHT: 67 IN | BODY MASS INDEX: 26.68 KG/M2 | WEIGHT: 170 LBS

## 2020-08-18 DIAGNOSIS — F17.200 NICOTINE DEPENDENCE, UNCOMPLICATED, UNSPECIFIED NICOTINE PRODUCT TYPE: ICD-10-CM

## 2020-08-18 DIAGNOSIS — M54.6 CHRONIC MIDLINE THORACIC BACK PAIN: ICD-10-CM

## 2020-08-18 DIAGNOSIS — G89.29 CHRONIC MIDLINE THORACIC BACK PAIN: ICD-10-CM

## 2020-08-18 DIAGNOSIS — G89.4 CHRONIC PAIN SYNDROME: ICD-10-CM

## 2020-08-18 DIAGNOSIS — M79.18 MYOFASCIAL PAIN: ICD-10-CM

## 2020-08-18 DIAGNOSIS — M47.812 ARTHROPATHY OF CERVICAL FACET JOINT: Primary | ICD-10-CM

## 2020-08-18 DIAGNOSIS — M54.2 CERVICALGIA: ICD-10-CM

## 2020-08-18 DIAGNOSIS — F11.90 CHRONIC, CONTINUOUS USE OF OPIOIDS: ICD-10-CM

## 2020-08-18 PROCEDURE — 99214 OFFICE O/P EST MOD 30 MIN: CPT | Mod: 95 | Performed by: PHYSICIAN ASSISTANT

## 2020-08-18 RX ORDER — NICOTINE 21 MG/24HR
1 PATCH, TRANSDERMAL 24 HOURS TRANSDERMAL EVERY 24 HOURS
Qty: 30 PATCH | Refills: 0 | Status: SHIPPED | OUTPATIENT
Start: 2020-08-18 | End: 2022-08-20

## 2020-08-18 RX ORDER — METHADONE HYDROCHLORIDE 5 MG/1
TABLET ORAL
Qty: 30 TABLET | Refills: 0 | Status: SHIPPED | OUTPATIENT
Start: 2020-08-18 | End: 2021-02-03

## 2020-08-18 RX ORDER — OXYCODONE HYDROCHLORIDE 10 MG/1
TABLET ORAL
Qty: 120 TABLET | Refills: 0 | Status: SHIPPED | OUTPATIENT
Start: 2020-08-18 | End: 2020-09-11

## 2020-08-18 ASSESSMENT — PAIN SCALES - GENERAL: PAINLEVEL: SEVERE PAIN (7)

## 2020-08-18 ASSESSMENT — MIFFLIN-ST. JEOR: SCORE: 1574.74

## 2020-08-18 NOTE — PROGRESS NOTES
"Joselito Abarca is a 54 year old male who is being evaluated via a billable telephone visit.      The patient has been notified of following:     \"This telephone visit will be conducted via a call between you and your physician/provider. We have found that certain health care needs can be provided without the need for a physical exam.  This service lets us provide the care you need with a short phone conversation.  If a prescription is necessary we can send it directly to your pharmacy.  If lab work is needed we can place an order for that and you can then stop by our lab to have the test done at a later time.    Telephone visits are billed at different rates depending on your insurance coverage. During this emergency period, for some insurers they may be billed the same as an in-person visit.  Please reach out to your insurance provider with any questions.    If during the course of the call the physician/provider feels a telephone visit is not appropriate, you will not be charged for this service.\"    Patient has given verbal consent for Telephone visit?  Yes    What phone number would you like to be contacted at? [phone]    How would you like to obtain your AVS? Mail a copy    [facility]     CHIEF COMPLAINT:   Pain  -neck pain     INTERVAL HISTORY:  Last seen on 2020 for a virtual visit.    Recommendations at last visit included:  1. Physical Therapy:  not at this time  2. Clinical Health Psychologist:  NO, but we may need to consider this as we further taper his opiates.    3. Diagnostic Studies: none at this time  4. Medication Management:     1.   Reduce Methadone 2.5mg every 12 hours.    2.   Continue Oxycodone to 10mg every 6 hours as needed for severe pain. Max  of 4/day.    3.   Increase Tizanidine 4m-1.5 tabs TID PRN   4.  Topamax 25mg: continue 50mg twice daily   5. Nicotine 21mg/24 hour patch prescribed. Patient reports \"smoking like a c chimney\" and unable " to go even one hour without a cigarette.   5. Further procedures recommended: trigger point injections as needed, plan for medial branch blocks #2 per Dr. Vasques  6. Recommendations to PCP. See above        Follow up with this provider: 4 Weeks     Since his last visit, Joselito Abarca reports:    He states that he is doing okay. He states that the second blocks were not helpful. He states that his pain is more up by the base of the skull.     He states that he did not try the nicotine patches. He was concerned about the dose and would like to try a lower dose. He states that he is still getting daily headaches.      Pain Information:              Pain today 7/10     Annual Controlled Substance Agreement: 7/16/2019  UDS: 6/28/2019    CURRENT RELEVANT PAIN MEDICATIONS:   Cymbalta 60mg daily  Clonazepam 0.5mg-takes 3-4/day  Methadone 2.5mg every 12 hours  Oxycodone 10mg every 6 hours max of 4/day  Tizanidine 2mg-2 tablet 3 times daily  Topamax 25mg: takes 2 tabs twice daily     [place] Board of Pharmacy Data Base Reviewed:    YES; As expected, no concern for misuse/abuse of controlled medications based on this report.     THE 4 As OF OPIOID MAINTENANCE ANALGESIA    Analgesia: Is pain relief clinically significant? NO   Activity: Is patient functional and able to perform Activities of Daily Living? YES   Adverse effects: Is patient free from adverse side effects from opiates? YES   Adherence to Rx protocol: Is patient adhering to Controlled Substance Agreement and taking medications ONLY as ordered? YES     MME: 100    Medications:  Current Outpatient Prescriptions          Current Outpatient Medications   Medication Sig Dispense Refill     clonazePAM (KLONOPIN) 0.5 MG tablet TAKE 1/2 TO 1 TABLET BY MOUTH THREE TIMES A DAY AS NEEDED FOR ANXIETY 90 tablet 0     DULoxetine (CYMBALTA) 60 MG capsule Take 1 capsule (60 mg) by mouth daily 30 capsule 3     FLOVENT  MCG/ACT Inhaler INHALE 2 PUFFS INTO THE LUNGS TWICE  DAILY 36 g 2     fluticasone (FLONASE) 50 MCG/ACT nasal spray SPRAY 2 SPRAYS IN EACH NOSTRIL EVERY DAY 16 g 5     ipratropium - albuterol 0.5 mg/2.5 mg/3 mL (DUONEB) 0.5-2.5 (3) MG/3ML neb solution Take 1 vial (3 mLs) by nebulization every 4 hours as needed for shortness of breath / dyspnea 30 vial 0     methadone (DOLOPHINE) 5 MG tablet Take 1.5 tablets every 12 hours.  Fill 2/28/2020. Start 2/29/2020. 90 tablet 0     naloxone (NARCAN) 4 MG/0.1ML nasal spray Spray 4 mg into one nostril alternating nostrils once as needed for opioid reversal every 2-3 minutes until assistance arrives         nystatin (MYCOSTATIN) 247997 UNIT/ML suspension TAKE 5 MLS BY MOUTH FOUR TIMES A  mL 0     order for DME Equipment being ordered: Nebulizer mask and tubing 1 each 1     oxyCODONE IR (ROXICODONE) 10 MG tablet Take 1 tablet every 6 hours as needed for breakthrough pain. Max of 4/day. Fill 2/19/2020. Start 2/22/2020. 30 day script 115 tablet 0     sildenafil (VIAGRA) 100 MG tablet Take 1 tablet (100 mg) by mouth daily as needed 30 min to 4 hrs before sex. Do not use with nitroglycerin, terazosin or doxazosin. 4 tablet 0     tiZANidine (ZANAFLEX) 2 MG tablet TAKE ONE TO TWO TABLETS BY MOUTH THREE TIMES A DAY AS NEEDED FOR MUSCLE SPASMS/TENSION 60 tablet 0     traZODone (DESYREL) 100 MG tablet Take 3 tablets (300 mg) by mouth At Bedtime 90 tablet 3     VENTOLIN  (90 Base) MCG/ACT inhaler INHALE 2 PUFFS INTO THE LUNGS EVERY 6 HOURS AS NEEDED FOR SHORTNESS OF BREATH, DIFFICULTY BREATHING OR WHEEZING. 18 g 3     vitamin D3 (CHOLECALCIFEROL) 2000 units tablet TAKE ONE TABLET BY MOUTH EVERY  tablet 3     zolpidem (AMBIEN) 10 MG tablet Take 10 mg by mouth nightly as needed                   Assessment:  Joselito Abarca is a 55 year old male who presents today for follow up regarding his:        1. Arthropathy of cervical facet joint  2. cervicalgia  3. Midline thoracic back pain  4. Myofascial pain  5. Chronic pain  syndrome  6. Chronic use of opioids  7. Nicotine dependence     No improvement in pain with recent injection. He will reach out to Dr. Vasques's group. Will continue methadone and Oxycodone at current dosing for this month. Will increase Topamax due to an increase in headaches.      Plan:     Diagnosis reviewed, treatment option addressed, and risk/benifits discussed.  Self-care instructions given.  I am recommending a multidisciplinary treatment plan to help this patient better manage pain.       1. Physical Therapy:  not at this time  2. Clinical Health Psychologist:  NO, but we may need to consider this  as we further taper his opiates.    3. Diagnostic Studies: none at this time  4. Medication Management:     1.   Continue Methadone 2.5mg every 12 hours.    2.   Continue Oxycodone to 10mg every 6 hours as needed  for severe pain. Max of 4/day.    3.   Continue Tizanidine 4m-1.5 tabs TID PRN   4.  Topamax 25mg: increase to 50mg in AM and 75mg at HS x1  week, then 75mg BID   5. Nicotine 14mg/24 hour patch prescribed. .   5. Further procedures recommended: Call Dr. Vasques's group regarding  recent CMBB  6. Recommendations to PCP. See above        Follow up with this provider: 4 Weeks      Phone call duration: 14 minutes    Ashley Salgado [facility]

## 2020-08-18 NOTE — PATIENT INSTRUCTIONS
After Visit Instructions:     Thank you for coming to [facility] [place] for your care. It is my goal to partner with you to help you reach your optimal state of health.     I am recommending multidisciplinary care at this time.  The focus of care will be to continue gradual rehabilitation and pain management with medication adjustments as needed.    Continue daily self-care, identifying contributing factors, and monitoring variations in pain level. Continue to integrate self-care into your life.        Schedule follow-up with Ashley Salgado PA-C in 4 weeks. You will need to make this appointment.     Procedures recommended: call Dr. Vasques's group regarding recent injection      Medication recommendations:     Topamax 25mg: take 2 tabs in the morning and 3 tabs at bedtime for 1 week, then take 3 tabs twice daily    Continue Methadone 2.5mg every 12 hours.     Continue Oxycodone to 10mg every 6 hours as needed for severe pain. Max of 4/day.     Continue Tizanidine 4m-1.5 tabs TID PRN      Ashley Salgado PA-C  [facility]    Contact information: [facility] [place]  Clinic Number:  [phone]     Call with any questions about your care and for scheduling assistance.     Calls are returned Monday through Friday between 8 AM and 4:30 PM. We usually get back to you within 2 business days depending on the issue/request.    If we are prescribing your medications:    For opioid medication refills, call the clinic or send a [employer] message 7 days in advance.  Please include:    Name of requested medication    Name of the pharmacy.    For non-opioid medications, call your pharmacy directly to request a refill. Please allow 3-4 days to be processed.     Per [place] State Law:    All controlled substance prescriptions must be filled within 30 days of being written.      For those controlled substances allowing refills, pickup must occur within 30 days of  last fill.      We believe regular attendance is key to your success in our program!      Any time you are unable to keep your appointment we ask that you call us at least 24 hours in advance to cancel.This will allow us to offer the appointment time to another patient.   Multiple missed appointments may lead to dismissal from the clinic.

## 2020-08-20 DIAGNOSIS — F41.1 GENERALIZED ANXIETY DISORDER: ICD-10-CM

## 2020-08-20 DIAGNOSIS — B37.0 THRUSH: ICD-10-CM

## 2020-08-20 NOTE — TELEPHONE ENCOUNTER
Nystatin  Routing refill request to provider for review/approval because:  Drug not on the [facility] refill protocol     Clonazepam      Last Written Prescription Date:  07/17/2020  Last Fill Quantity: 90,   # refills: 0  Future Office visit:   None    Routing refill request to provider for review/approval because:  Drug not on the [facility] refill protocol or controlled substance    Ailyn Tobias RN

## 2020-08-21 RX ORDER — NYSTATIN 100000/ML
SUSPENSION, ORAL (FINAL DOSE FORM) ORAL
Qty: 280 ML | Refills: 0 | Status: SHIPPED | OUTPATIENT
Start: 2020-08-21 | End: 2021-09-17

## 2020-08-21 RX ORDER — CLONAZEPAM 0.5 MG/1
TABLET ORAL
Qty: 90 TABLET | Refills: 0 | Status: SHIPPED | OUTPATIENT
Start: 2020-08-21 | End: 2020-09-14

## 2020-08-28 DIAGNOSIS — M79.18 MYOFASCIAL PAIN: ICD-10-CM

## 2020-08-28 NOTE — TELEPHONE ENCOUNTER
Pending Prescriptions:                       Disp   Refills    tiZANidine (ZANAFLEX) 4 MG tablet         90 tab*1            Sig: TAKE 1-1.5 TABLETS BY MOUTH THREE TIMES A DAY AS           NEEDED FOR MUSCLE SPASMS /TENSION  last date fill: 8/13/20

## 2020-09-03 ENCOUNTER — HOSPITAL ENCOUNTER (EMERGENCY)
Facility: CLINIC | Age: 55
Discharge: HOME | End: 2020-09-03
Attending: EMERGENCY MEDICINE | Admitting: EMERGENCY MEDICINE
Payer: COMMERCIAL

## 2020-09-03 VITALS
RESPIRATION RATE: 18 BRPM | DIASTOLIC BLOOD PRESSURE: 82 MMHG | OXYGEN SATURATION: 98 % | TEMPERATURE: 98.2 F | HEART RATE: 90 BPM | SYSTOLIC BLOOD PRESSURE: 105 MMHG

## 2020-09-03 DIAGNOSIS — M54.2 NECK PAIN: ICD-10-CM

## 2020-09-03 PROCEDURE — 99284 EMERGENCY DEPT VISIT MOD MDM: CPT | Mod: 25 | Performed by: EMERGENCY MEDICINE

## 2020-09-03 PROCEDURE — 20553 NJX 1/MLT TRIGGER POINTS 3/>: CPT | Mod: Z6 | Performed by: EMERGENCY MEDICINE

## 2020-09-03 PROCEDURE — 99283 EMERGENCY DEPT VISIT LOW MDM: CPT | Mod: 25 | Performed by: EMERGENCY MEDICINE

## 2020-09-03 PROCEDURE — 20553 NJX 1/MLT TRIGGER POINTS 3/>: CPT | Performed by: EMERGENCY MEDICINE

## 2020-09-03 NOTE — ED PROVIDER NOTES
"  History     Chief Complaint   Patient presents with     Neck Pain     The history is provided by the patient.     Joselito Abarca is a 55 year old male who  Presents to the emergency department with concerns of neck pain. The patient has a history of chronic back pain and neck pain. He has had trigger point injections before and notes that these help \"calm things down\" until he can get the pain under control. His neck pain flared up one week ago. He notes some pain spots at the base of his neck that cause migraines. He has pain radiating down his arms bilaterally, but denies any numbness or tingling. He has no other new symptoms. The patient takes Oxycodone and Methadone. He has a pain contract through a pain clinic.     Allergies:  Allergies   Allergen Reactions     Vicodin [Hydrocodone-Acetaminophen] Nausea     Other reaction(s): Diaphoresis, Vomiting  HEADACHE       Problem List:    Patient Active Problem List    Diagnosis Date Noted     Back pain, chronic 02/21/2019     Priority: Medium     S/P laparoscopic fundoplication 02/21/2019     Priority: Medium     Long-segment Olson's esophagus 02/21/2019     Priority: Medium     Gastroesophageal reflux disease, esophagitis presence not specified 09/21/2018     Priority: Medium     Chronic seasonal allergic rhinitis due to fungal spores 06/07/2018     Priority: Medium     Status post cervical spinal arthrodesis 11/29/2017     Priority: Medium     Chronic, continuous use of opioids 12/13/2016     Priority: Medium     Patient is followed by Gregory G. Schoen, MD for ongoing prescription of pain medication.  All refills should be approved by this provider, or covering partner.    Medication(s): Oxycodone 5 mg IR: Take 2 capsules (10 mg) by mouth every 4 hours as needed 2 caps q 4 hour prn pain up to 12 per day .   Methadone 10 mg three times daily, 90 per month  Clonazepam 0.5 mg tid, 90 per month   Clinic visit frequency required: Q 3 months     Controlled " substance agreement:  Encounter-Level CSA - 2/27/15:               Controlled Substance Agreement - Scan on 3/14/2015  8:47 AM : Controlled Medication Agreement-02/27/15 (below)            Pain Clinic evaluation in the past: Yes    DIRE Total Score(s):  No flowsheet data found.    Last [facility] website verification:  10/30/2016     [email or web address]         Moderate persistent asthma without complication 11/02/2016     Priority: Medium     Acute respiratory failure with hypoxia (H) 04/29/2016     Priority: Medium     Nausea with vomiting 04/27/2016     Priority: Medium     Cough 03/06/2016     Priority: Medium     Tobacco dependence syndrome 02/17/2016     Priority: Medium     Leukocytosis 02/16/2016     Priority: Medium     Disease of bronchial airway (HCC) 02/12/2016     Priority: Medium     Bronchiolectasis (H) 02/12/2016     Priority: Medium     Degeneration of cervical intervertebral disc 01/26/2015     Priority: Medium     Health Care Home 09/30/2013     Priority: Medium     Status:  Accepted  Care Coordinator:    Melissa Behl BSN, RN, N  [facility] Care Coordinator  [phone]     See Letters for [facility] Care Plan  Date:  April 26, 2016            Persons encountering health services in other specified circumstances 09/30/2013     Priority: Medium     Overview:   Overview:   Status:  Accepted  Care Coordinator:    Melissa Behl BSN, RN, N  [facility] Care Coordinator  [phone]     See Letters for [facility] Care Plan  Date:  April 26, 2016       Arthrodesis status 06/15/2011     Priority: Medium     Neck pain 06/15/2011     Priority: Medium     History of arthrodesis 06/15/2011     Priority: Medium     CARDIOVASCULAR SCREENING; LDL GOAL LESS THAN 160 10/31/2010     Priority: Medium     Encounter for screening for cardiovascular disorders 10/31/2010     Priority: Medium     Intervertebral cervical disc disorder with myelopathy, cervical region 11/12/2009     Priority: Medium     Patient is followed by  TIARA JIMENEZ for ongoing prescription of narcotic pain medicine.  Med: methadone 10 mg tid. Taking oxycodone up to 8 per day, 120 per month  Maximum use per month: 90  Expected duration: ongoing  Narcotic agreement on file: YES  Clinic visit recommended: Q 3 months         Intervertebral disc disorder of cervical region with myelopathy 11/12/2009     Priority: Medium     Overview:   Overview:   Patient is followed by TIARA JIMENEZ for ongoing prescription of narcotic pain medicine.  Med: methadone 10 mg tid. Taking oxycodone up to 8 per day, 120 per month  Maximum use per month: 90  Expected duration: ongoing  Narcotic agreement on file: YES  Clinic visit recommended: Q 3 months       Constipation 03/19/2008     Priority: Medium     Problem list name updated by automated process. Provider to review       Thoracic or lumbosacral neuritis or radiculitis, unspecified 03/19/2008     Priority: Medium     Esophageal reflux 07/09/2003     Priority: Medium     Generalized anxiety disorder 07/08/2003     Priority: Medium     Alcohol abuse, in remission 07/08/2003     Priority: Medium     Nondependent alcohol abuse, in remission 07/08/2003     Priority: Medium        Past Medical History:    Past Medical History:   Diagnosis Date     Allergic rhinitis      Anxiety      Depressive disorder      GERD (gastroesophageal reflux disease)      Neck pain 6/15/2011     Pneumonia      Uncomplicated asthma        Past Surgical History:    Past Surgical History:   Procedure Laterality Date     BRONCHOSCOPY (RIGID OR FLEXIBLE), DIAGNOSTIC N/A 8/7/2018    Procedure: COMBINED BRONCHOSCOPY (RIGID OR FLEXIBLE), LAVAGE;  Bronchoscopy with Lavage and Transbronchial Biopsy;  Surgeon: Yung Miranda MD;  Location: [facility]     COLONOSCOPY WITH CO2 INSUFFLATION N/A 9/24/2018    Procedure: COLONOSCOPY WITH CO2 INSUFFLATION;  Colon and upper endoscopy ;  Surgeon: Devin Pelayo MD;  Location:  OR     COMBINED  ESOPHAGOSCOPY, GASTROSCOPY, DUODENOSCOPY (EGD) WITH CO2 INSUFFLATION N/A 9/24/2018    Procedure: COMBINED ESOPHAGOSCOPY, GASTROSCOPY, DUODENOSCOPY (EGD) WITH CO2 INSUFFLATION;  Colon and upper endoscopy ;  Surgeon: Devin Pelayo MD;  Location: [facility]     DISCECTOMY LUMBAR POSTERIOR MICROSCOPIC ONE LEVEL  2/21/2012    Procedure:DISCECTOMY LUMBAR POSTERIOR MICROSCOPIC ONE LEVEL; LEFT T1-T2 THORASIC HEMILAMINECTOMY MICRODISCECTOMY WITH MEXTRIX II ; Surgeon:SHARON PURI; Location:[facility]     DISCECTOMY, FUSION CERVICAL ANTERIOR ONE LEVEL, COMBINED N/A 11/29/2017    Procedure: COMBINED DISCECTOMY, FUSION CERVICAL ANTERIOR ONE LEVEL;  Anterior Cervical Discectomy and Fusion Cervical Six - Cervical Seven, Exploration and Revision Cervical Four - Cervical Six with Hardware Removal;  Surgeon: Nikolas Vasques MD;  Location: [facility]     ESOPHAGEAL IMPEDENCE FUNCTION TEST WITH 24 HOUR PH GREATER THAN 1 HOUR N/A 12/12/2018    Procedure: ESOPHAGEAL IMPEDENCE FUNCTION TEST WITH 24 HOUR PH GREATER THAN 1 HOUR;  Surgeon: Atif Oconnor MD;  Location: [facility]     ESOPHAGOSCOPY, GASTROSCOPY, DUODENOSCOPY (EGD), COMBINED N/A 9/24/2018    Procedure: COMBINED ESOPHAGOSCOPY, GASTROSCOPY, DUODENOSCOPY (EGD), BIOPSY SINGLE OR MULTIPLE;;  Surgeon: Devin Pelayo MD;  Location: [facility]     EXPLORE SPINE, REMOVE HARDWARE, COMBINED N/A 11/29/2017    Procedure: COMBINED EXPLORE SPINE, REMOVE HARDWARE;;  Surgeon: Nikolas Vasques MD;  [facility]: [facility]     FUSION CERVICAL ANTERIOR TWO LEVELS  1/29/2010     HC DRAIN/INJ MAJOR JOINT/BURSA W/O US  5/5/2008    Left sacroiliac joint injection.     HC INJ EPIDURAL LUMBAR/SACRAL W/WO CONTRAST  2009     HEAD & NECK SURGERY      2013     HERNIA REPAIR       INJECT BLOCK MEDIAL BRANCH CERVICAL/THORACIC/LUMBAR Bilateral 8/26/2015    Procedure: INJECT BLOCK MEDIAL BRANCH CERVICAL / THORACIC / LUMBAR;  Surgeon: Ronald Driscoll MD;  [facility]     INJECT BLOCK  MEDIAL BRANCH CERVICAL/THORACIC/LUMBAR N/A 8/8/2019    Procedure: BLOCK, NERVE, FACET JOINT, MEDIAL BRANCH, DIAGNOSTIC Bilateral Cervical 2,3,4;  Surgeon: Ronald Driscoll MD;  [facility]     INJECT BLOCK MEDIAL BRANCH CERVICAL/THORACIC/LUMBAR N/A 9/13/2019    Procedure: Medial Branch Block Bilateral Cervical 2,3, and 4;  Surgeon: Ronald Driscoll MD;  [facility]     INJECT BLOCK MEDIAL BRANCH CERVICAL/THORACIC/LUMBAR Bilateral 7/3/2020    Procedure: Third occipital and Cervical 3-4 Medial Branch Blocks bilateral;  Surgeon: Ronald Driscoll MD;  [facility]     INJECT BLOCK MEDIAL BRANCH CERVICAL/THORACIC/LUMBAR N/A 7/29/2020    Procedure: medial branch blocks cervical 3-4 and bilateral third occiptal nerves;  Surgeon: Ronald Driscoll MD;  [facility]     INJECT EPIDURAL LUMBAR N/A 2/13/2020    Procedure: Lumber 4-5 bilateral Epidural Injection;  Surgeon: Ronald Driscoll MD;  [facility]     INJECT FACET JOINT Bilateral 5/27/2015    Procedure: INJECT FACET JOINT;  Surgeon: Ronald Driscoll MD;  [facility]     NERVE BLOCK OCCIPITAL Bilateral 7/12/2018    Procedure: NERVE BLOCK OCCIPITAL;  bilateral occipital nerve blocks;  Surgeon: Ronald Driscoll MD;  [facility]     PROCEDURE PLACEHOLDER GENERAL N/A 02/21/2019    Kellen Ward at [facility] [place]     RADIO FREQUENCY ABLATION PULSED CERVICAL Right 10/18/2019    Procedure: CERVICAL RADIOFREQUENCY ABLATION  Cervical 2,3,4;  Surgeon: Ronald Driscoll MD;  [facility]     RADIO FREQUENCY ABLATION PULSED CERVICAL Left 11/14/2019    Procedure: Left Cervical 2, 3, 4 Radiofreqency Ablation;  Surgeon: Ronald Driscoll MD;  Location: [facility]       Family History:    Family History   Problem Relation Age of Onset     Family History Negative No family hx of      C.A.D. No family hx of        Social History:  Marital Status:  Single [1]  Social History     Tobacco Use     Smoking status: Current Every Day Smoker     Years: 30.00     Types: Cigars, Cigarettes      Smokeless tobacco: Former User     Quit date: 2012   Substance Use Topics     Alcohol use: No     Alcohol/week: 0.0 standard drinks     Comment: HX OF ABUSE-IN REMISSION     Drug use: No        Medications:    clonazePAM (KLONOPIN) 0.5 MG tablet  DULoxetine (CYMBALTA) 60 MG capsule  FLOVENT  MCG/ACT Inhaler  fluticasone (FLONASE) 50 MCG/ACT nasal spray  ipratropium - albuterol 0.5 mg/2.5 mg/3 mL (DUONEB) 0.5-2.5 (3) MG/3ML neb solution  methadone (DOLOPHINE) 5 MG tablet  naloxone (NARCAN) 4 MG/0.1ML nasal spray  nicotine (NICODERM CQ) 14 MG/24HR 24 hr patch  nystatin (MYCOSTATIN) 916751 UNIT/ML suspension  order for DME  oxyCODONE IR (ROXICODONE) 10 MG tablet  sildenafil (VIAGRA) 100 MG tablet  tiZANidine (ZANAFLEX) 4 MG tablet  topiramate (TOPAMAX) 25 MG tablet  traZODone (DESYREL) 100 MG tablet  VENTOLIN  (90 Base) MCG/ACT inhaler  vitamin D3 (CHOLECALCIFEROL) 50 mcg (2000 units) tablet  zolpidem (AMBIEN) 10 MG tablet          Review of Systems   All other systems reviewed and are negative.      Physical Exam   BP: 105/82  Pulse: 90  Temp: 98.2  F (36.8  C)  Resp: 18  SpO2: 98 %      Physical Exam  Vitals signs and nursing note reviewed.   Constitutional:       General: He is not in acute distress.     Appearance: He is well-developed. He is not diaphoretic.   HENT:      Head: Normocephalic and atraumatic.      Right Ear: External ear normal.      Left Ear: External ear normal.      Nose: Rhinorrhea present. No congestion.   Eyes:      General: No scleral icterus.  Neck:      Musculoskeletal: Normal range of motion and neck supple. Muscular tenderness present. No neck rigidity.      Comments: ttp over several points along the bilateral cervical spine paraspinal musculature and near the occiput.   Cardiovascular:      Rate and Rhythm: Normal rate.      Heart sounds: Normal heart sounds.   Pulmonary:      Effort: Pulmonary effort is normal.   Musculoskeletal: Normal range of motion.         General: No  swelling.   Skin:     General: Skin is warm and dry.      Findings: No rash.   Neurological:      Mental Status: He is alert and oriented to person, place, and time.         ED Course        Procedures          After discussing the risks and benefits I proceeded with trigger point injections in 7 locations in the muscles around the cervical spine.  I started with bilateral occipital nerve area injection with 1 ml of 0.5% bupivicaine with epinephrine in each location with exception of 2 mls in each trapezius for the paracervical spine injections.  Each area was cleansed with alcohol pad prior and I was careful to draw back on the syringe prior to the injection. The patient tolerated the procedure well and there were no complications.        No results found. However, due to the size of the patient record, not all encounters were searched. Please check Results Review for a complete set of results.    Medications - No data to display    Assessments & Plan (with Medical Decision Making)  55 male who presents with concerns of neck pain. The patient has a history of neck and back pain. Upon arrival in the ED he requested trigger point injections. He is afebrile and his vitals are within normal limits. Physical exam showed several areas of tenderness and muscle spasm.  The patient received 7 injections here in the ED to help with his neck pain. I discussed continuing to use his oxycodone and methadone. He can also use ice or heat to the area. IF his symptoms persist or worsen he should see his doctor at the pain clinic, make a follow up in the clinic here or return to the ED. The patient was in agreement with this treatment plan and is suitable for discharge in stable condition.     I have reviewed the nursing notes.    I have reviewed the findings, diagnosis, plan and need for follow up with the patient.      Discharge Medication List as of 9/3/2020  6:22 PM          Final diagnoses:   Neck pain         This document  serves as a record of services personally performed by Jeremie Prater MD. It was created on their behalf by Eusebia Fair, a trained medical scribe. The creation of this record is based on the provider's personal observations and the statements of the patient. This document has been checked and approved by the attending provider.  Note: Chart documentation done in part with Dragon Voice Recognition software. Although reviewed after completion, some word and grammatical errors may remain.  9/3/2020   [facility] EMERGENCY DEPARTMENT     Jeremie Prater MD  09/03/20 2006

## 2020-09-03 NOTE — ED AVS SNAPSHOT
[facility] Emergency Department  [address] [place] [address]  Phone:  [phone]  Fax:  [phone]                                    Joselito Abarca   MRN: [id number]    Department:  [facility] Emergency Department   Date of Visit:  9/3/2020           After Visit Summary Signature Page    I have received my discharge instructions, and my questions have been answered. I have discussed any challenges I see with this plan with the nurse or doctor.    ..........................................................................................................................................  Patient/Patient Representative Signature      ..........................................................................................................................................  Patient Representative Print Name and Relationship to Patient    ..................................................               ................................................  Date                                   Time    ..........................................................................................................................................  Reviewed by Signature/Title    ...................................................              ..............................................  Date                                               Time          22EPIC Rev 08/18

## 2020-09-03 NOTE — DISCHARGE INSTRUCTIONS
I hope these injections helped you today and that your neck pain improves.  Return to the ER if new or worsening symptoms. Follow up with your pain doctor as planned.

## 2020-09-11 ENCOUNTER — VIRTUAL VISIT (OUTPATIENT)
Dept: PALLIATIVE MEDICINE | Facility: CLINIC | Age: 55
End: 2020-09-11
Payer: COMMERCIAL

## 2020-09-11 VITALS — BODY MASS INDEX: 26.68 KG/M2 | HEIGHT: 67 IN | WEIGHT: 170 LBS

## 2020-09-11 DIAGNOSIS — F41.1 GENERALIZED ANXIETY DISORDER: ICD-10-CM

## 2020-09-11 DIAGNOSIS — M79.18 MYOFASCIAL PAIN: ICD-10-CM

## 2020-09-11 DIAGNOSIS — M54.6 CHRONIC MIDLINE THORACIC BACK PAIN: ICD-10-CM

## 2020-09-11 DIAGNOSIS — M54.2 CERVICALGIA: ICD-10-CM

## 2020-09-11 DIAGNOSIS — M47.812 ARTHROPATHY OF CERVICAL FACET JOINT: ICD-10-CM

## 2020-09-11 DIAGNOSIS — G89.29 CHRONIC MIDLINE THORACIC BACK PAIN: ICD-10-CM

## 2020-09-11 DIAGNOSIS — G89.4 CHRONIC PAIN SYNDROME: ICD-10-CM

## 2020-09-11 DIAGNOSIS — F11.90 CHRONIC, CONTINUOUS USE OF OPIOIDS: ICD-10-CM

## 2020-09-11 PROCEDURE — 99214 OFFICE O/P EST MOD 30 MIN: CPT | Mod: 95 | Performed by: PHYSICIAN ASSISTANT

## 2020-09-11 RX ORDER — OXYCODONE HYDROCHLORIDE 10 MG/1
TABLET ORAL
Qty: 150 TABLET | Refills: 0 | Status: SHIPPED | OUTPATIENT
Start: 2020-09-11 | End: 2020-10-14

## 2020-09-11 RX ORDER — METAXALONE 800 MG/1
800 TABLET ORAL 3 TIMES DAILY PRN
Qty: 60 TABLET | Refills: 0 | Status: SHIPPED | OUTPATIENT
Start: 2020-09-11 | End: 2020-10-07

## 2020-09-11 ASSESSMENT — MIFFLIN-ST. JEOR: SCORE: 1574.74

## 2020-09-11 ASSESSMENT — PAIN SCALES - GENERAL: PAINLEVEL: EXTREME PAIN (8)

## 2020-09-11 NOTE — PROGRESS NOTES
"Joselito Abarca is a 54 year old male who is being evaluated via a billable telephone visit.      The patient has been notified of following:     \"This telephone visit will be conducted via a call between you and your physician/provider. We have found that certain health care needs can be provided without the need for a physical exam.  This service lets us provide the care you need with a short phone conversation.  If a prescription is necessary we can send it directly to your pharmacy.  If lab work is needed we can place an order for that and you can then stop by our lab to have the test done at a later time.    Telephone visits are billed at different rates depending on your insurance coverage. During this emergency period, for some insurers they may be billed the same as an in-person visit.  Please reach out to your insurance provider with any questions.    If during the course of the call the physician/provider feels a telephone visit is not appropriate, you will not be charged for this service.\"    Patient has given verbal consent for Telephone visit?  Yes    What phone number would you like to be contacted at? [phone]    How would you like to obtain your AVS? Mail a copy    [facility]     CHIEF COMPLAINT:   Pain  -neck pain     INTERVAL HISTORY:  Last seen on 2020 for a virtual visit.    Recommendations at last visit included:  1. Physical Therapy:  not at this time  2. Clinical Health Psychologist:  NO, but we may need to consider this  as we further taper his opiates.    3. Diagnostic Studies: none at this time  4. Medication Management:                1.   Continue Methadone 2.5mg every 12 hours.               2.   Continue Oxycodone to 10mg every 6 hours as needed  for severe pain. Max of 4/day.               3.   Continue Tizanidine 4m-1.5 tabs TID PRN              4.  Topamax 25mg: increase to 50mg in AM and 75mg at HS x1 week, then 75mg BID              5. " "Nicotine 14mg/24 hour patch prescribed. .   5. Further procedures recommended: Call Dr. Vasques's group regarding  recent CMBB  6. Recommendations to PCP. See above        Follow up with this provider: 4 Weeks     Since his last visit, Joselito Abarca reports:    He is not doing too good. He states that he has been in \"really really bad pain\". He is still getting daily headaches. He tried different OTC medications to try to help his headache and nothing seems to help. He states that the muscle relaxant isn't calming his muscles down and they are making him \"really tired\". He did increase the Topamax and did not notice a change in his headaches. He states that his headache and neck pain are worse when he is sleeping. He states that he is up every 2 hours with the pain.    Pain Information:              Pain today 8/10     Annual Controlled Substance Agreement: 7/16/2019  UDS: 6/28/2019    CURRENT RELEVANT PAIN MEDICATIONS:   Cymbalta 60mg daily  Clonazepam 0.5mg-takes 3-4/day  Methadone 2.5mg every 12 hours  Oxycodone 10mg every 6 hours max of 4/day  Tizanidine 2mg-2 tablet 3 times daily  Topamax 25mg: takes 3 tabs twice daily    Previous Medications: (H--helped; HI--Helped initially; SWH-- somewhat helpful, NH--No help; W--worse; SE--side effects)   Opiates: hydrocodone SE allergic, fever, sweaty, headache, methadone H, Oxycodone H  NSAIDS: Ibuprofen NH, Aleve NH  Anti-migraine mediations: Fiorinal H  Muscle Relaxants: Valium H, methocarbamol NH  Neuropathics: Gabapentin H SE breathing issues  Anti-depressants: Cymbalta NH  Anxiolytics: Klonopin H  Topicals: Lidocaine H  Other medications not covered above: Tylenol NH     Past Pain Treatments:  Pain Clinic:   Yes, he was previously [facility]   PT: Yes, has done many times  Psychologist: No  Relaxation techniques/biofeedback: No  Chiropractor: Yes, feels that it made it worse  Acupuncture: Yes, just did one session  Pharmacotherapy:               Opioids: " Yes                Non-opioids:    Yes   TENs Unit:Yes, aggravates the pain  Injections:     07/12/2018 bilateral occipital nerve blocks ,     08/26/2015 and 5/27/2015Cervical medial branch blocks      Has had low back pain injections at [facility].     10/18/2019 Right cervical RFA ,     11/14/2019 left cervical RFA .    02/13/2020 L4-5 REVA    02/24/2020 TPI neck in ER  Self-care:   Yes, hot tubs, ice packs, heating pads  Surgeries related to pain: Yes,  1. anterior cervical discectomy and fusion C6-7, exploration and revision C4-6 with hardware removal 11/29/2017,   2. Left T1-T2 thoracic hemilaminectomy, microdiscectomy 02/21/2012,   3. Removal of C4 through C6 anterior cervical plate, Exploration of C4-C5 and C5-C6 anterior cervical fusion, C4-C5 and C5-C6 arthrodesis, C4 through C6 anterior cervical instrumentation, and [place] of right iliac hip graft.  03/15/2011  4. C4-C5 and C5-C6 anterior cervical discectomy and fusion with instrumentation and neural electrophysiologic monitoring 01/26/2010     [place] Board of Pharmacy Data Base Reviewed:    YES; As expected, no concern for misuse/abuse of controlled medications based on this report.     THE 4 As OF OPIOID MAINTENANCE ANALGESIA    Analgesia: Is pain relief clinically significant? NO   Activity: Is patient functional and able to perform Activities of Daily Living? YES   Adverse effects: Is patient free from adverse side effects from opiates? YES   Adherence to Rx protocol: Is patient adhering to Controlled Substance Agreement and taking medications ONLY as ordered? YES     MME: 80    Medications:  Current Outpatient Prescriptions          Current Outpatient Medications   Medication Sig Dispense Refill     clonazePAM (KLONOPIN) 0.5 MG tablet TAKE 1/2 TO 1 TABLET BY MOUTH THREE TIMES A DAY AS NEEDED FOR ANXIETY 90 tablet 0     DULoxetine (CYMBALTA) 60 MG capsule Take 1 capsule (60 mg) by mouth daily 30 capsule 3     FLOVENT  MCG/ACT Inhaler INHALE 2  PUFFS INTO THE LUNGS TWICE DAILY 36 g 2     fluticasone (FLONASE) 50 MCG/ACT nasal spray SPRAY 2 SPRAYS IN EACH NOSTRIL EVERY DAY 16 g 5     ipratropium - albuterol 0.5 mg/2.5 mg/3 mL (DUONEB) 0.5-2.5 (3) MG/3ML neb solution Take 1 vial (3 mLs) by nebulization every 4 hours as needed for shortness of breath / dyspnea 30 vial 0     methadone (DOLOPHINE) 5 MG tablet Take 1.5 tablets every 12 hours.  Fill 2/28/2020. Start 2/29/2020. 90 tablet 0     naloxone (NARCAN) 4 MG/0.1ML nasal spray Spray 4 mg into one nostril alternating nostrils once as needed for opioid reversal every 2-3 minutes until assistance arrives         nystatin (MYCOSTATIN) 671661 UNIT/ML suspension TAKE 5 MLS BY MOUTH FOUR TIMES A  mL 0     order for DME Equipment being ordered: Nebulizer mask and tubing 1 each 1     oxyCODONE IR (ROXICODONE) 10 MG tablet Take 1 tablet every 6 hours as needed for breakthrough pain. Max of 4/day. Fill 2/19/2020. Start 2/22/2020. 30 day script 115 tablet 0     sildenafil (VIAGRA) 100 MG tablet Take 1 tablet (100 mg) by mouth daily as needed 30 min to 4 hrs before sex. Do not use with nitroglycerin, terazosin or doxazosin. 4 tablet 0     tiZANidine (ZANAFLEX) 2 MG tablet TAKE ONE TO TWO TABLETS BY MOUTH THREE TIMES A DAY AS NEEDED FOR MUSCLE SPASMS/TENSION 60 tablet 0     traZODone (DESYREL) 100 MG tablet Take 3 tablets (300 mg) by mouth At Bedtime 90 tablet 3     VENTOLIN  (90 Base) MCG/ACT inhaler INHALE 2 PUFFS INTO THE LUNGS EVERY 6 HOURS AS NEEDED FOR SHORTNESS OF BREATH, DIFFICULTY BREATHING OR WHEEZING. 18 g 3     vitamin D3 (CHOLECALCIFEROL) 2000 units tablet TAKE ONE TABLET BY MOUTH EVERY  tablet 3     zolpidem (AMBIEN) 10 MG tablet Take 10 mg by mouth nightly as needed                   Assessment:  Joselito Abarca is a 55 year old male who presents today for follow up regarding his:        1. Arthropathy of cervical facet joint  2. cervicalgia  3. Midline thoracic back  pain  4. Myofascial pain  5. Chronic pain syndrome  6. Chronic use of opioids  7. Nicotine dependence     Continues to report high levels of pain in his neck as well as headaches. He does not feel that the Methadone is doing anything. Will have him go to 2.5mg daily for 3 days then stop. Will increase his oxycodone from 4/day to 5/day. This will bring his MME to 75.     Continue Topamax. Consider Nortriptyline.      Plan:     Diagnosis reviewed, treatment option addressed, and risk/benifits discussed.  Self-care instructions given.  I am recommending a multidisciplinary treatment plan to help this patient better manage pain.       1. Physical Therapy:  not at this time  2. Clinical Health Psychologist:  NO, but we may need to consider this as we further taper his opiates.    3. Diagnostic Studies: none at this time  4. Medication Management:      Stop Tizanidine. Start Skelaxin 800mg three times daily as needed for muscle pain    Methadone 5mg: take 0.5 tablet daily for 3 days then stop. The rest of what you have left needs to be disposed of. Please contact me once you dispose of it so I can verify with the pharmacy.    Oxycodone 10mg: increase to 1 tab every 4-6 hours as needed. Max of 5 tabs/day.    Topamax 75mg BID  5. Further procedures recommended: Call Dr. Vasques's group regarding  recent CMBB  6. Recommendations to PCP. See above        Follow up with this provider: 4 Weeks      Phone call duration: 22 minutes    Ashley Salgado [facility]

## 2020-09-11 NOTE — PATIENT INSTRUCTIONS
After Visit Instructions:     Thank you for coming to [facility] for your care. It is my goal to partner with you to help you reach your optimal state of health.     I am recommending multidisciplinary care at this time.  The focus of care will be to continue gradual rehabilitation and pain management with medication adjustments as needed.    Continue daily self-care, identifying contributing factors, and monitoring variations in pain level. Continue to integrate self-care into your life.        Schedule follow-up with Ashley Salgado PA-C in 4 weeks. You will need to make this appointment.     Medication recommendations:     Stop Tizanidine. Start Skelaxin 800mg three times daily as needed for muscle pain    Methadone 5mg: take 0.5 tablet daily for 3 days then stop. The rest of what you have left needs to be disposed of. Please contact me once you dispose of it so I can verify with the pharmacy.    Oxycodone 10mg: increase to 1 tab every 4-6 hours as needed. Max of 5 tabs/day.       Ashley Salgado PA-C  [facility]    Contact information: [facility]  Clinic Number:  [phone]     Call with any questions about your care and for scheduling assistance.     Calls are returned Monday through Friday between 8 AM and 4:30 PM. We usually get back to you within 2 business days depending on the issue/request.    If we are prescribing your medications:    For opioid medication refills, call the clinic or send a Rezzie message 7 days in advance.  Please include:    Name of requested medication    Name of the pharmacy.    For non-opioid medications, call your pharmacy directly to request a refill. Please allow 3-4 days to be processed.     Per [place] State Law:    All controlled substance prescriptions must be filled within 30 days of being written.      For those controlled substances allowing refills, pickup must occur within 30 days of  last fill.      We believe regular attendance is key to your success in our program!      Any time you are unable to keep your appointment we ask that you call us at least 24 hours in advance to cancel.This will allow us to offer the appointment time to another patient.   Multiple missed appointments may lead to dismissal from the clinic.

## 2020-09-14 DIAGNOSIS — M47.812 ARTHROPATHY OF CERVICAL FACET JOINT: ICD-10-CM

## 2020-09-14 DIAGNOSIS — G89.4 CHRONIC PAIN SYNDROME: ICD-10-CM

## 2020-09-14 DIAGNOSIS — M54.6 CHRONIC MIDLINE THORACIC BACK PAIN: ICD-10-CM

## 2020-09-14 DIAGNOSIS — M54.2 CERVICALGIA: ICD-10-CM

## 2020-09-14 DIAGNOSIS — G89.29 CHRONIC MIDLINE THORACIC BACK PAIN: ICD-10-CM

## 2020-09-14 RX ORDER — CLONAZEPAM 0.5 MG/1
TABLET ORAL
Qty: 90 TABLET | Refills: 0 | Status: SHIPPED | OUTPATIENT
Start: 2020-09-14 | End: 2020-10-26

## 2020-09-14 RX ORDER — TOPIRAMATE 25 MG/1
75 TABLET, FILM COATED ORAL 2 TIMES DAILY
Qty: 180 TABLET | Refills: 1 | Status: SHIPPED | OUTPATIENT
Start: 2020-09-14 | End: 2020-11-18

## 2020-09-14 NOTE — TELEPHONE ENCOUNTER
Chief Complaint   Patient presents with     Refill Request     Topiramate 25MG      Refill request received via fax by pharmacy      [facility] [place], [place] - [address] [facility] [place], [place] - [address] [facility]        Pending Prescriptions:                       Disp   Refills    topiramate (TOPAMAX) 25 MG tablet         120 ta*1            Sig: take 2 tabs twice daily         Last Written Prescription Date:  708/03/2020  Last Fill Quantity: 120,   # refills: 1  Last Office Visit with prescribing provider: 09/11/2020  Future Office visit: None scheduled.     Christy Burris CMA on 9/14/2020 at 9:24 AM

## 2020-09-14 NOTE — TELEPHONE ENCOUNTER
Klonopin      Last Written Prescription Date:  8/21/2020  Last Fill Quantity: 90,   # refills: 0  Last Office Visit: 5/8/2020  Future Office visit:       Routing refill request to provider for review/approval because:  Drug not on the [facility], P or [facility] refill protocol or controlled substance

## 2020-09-14 NOTE — TELEPHONE ENCOUNTER
Per Ashley's last note:    Medication Management:    ? Stop Tizanidine. Start Skelaxin 800mg three times daily as needed for muscle pain  ? Methadone 5mg: take 0.5 tablet daily for 3 days then stop. The rest of what you have left needs to be disposed of. Please contact me once you dispose of it so I can verify with the pharmacy.  ? Oxycodone 10mg: increase to 1 tab every 4-6 hours as needed. Max of 5 tabs/day.  ? Topamax 75mg BID    Signed Prescriptions:                        Disp   Refills    topiramate (TOPAMAX) 25 MG tablet          180 ta*1        Sig: Take 3 tablets (75 mg) by mouth 2 times daily .           Titrate to this dose as instructed in clinic.  Authorizing Provider: JOE GONZALES MD on 9/14/2020 at 9:36 AM

## 2020-09-18 ENCOUNTER — TELEPHONE (OUTPATIENT)
Dept: FAMILY MEDICINE | Facility: OTHER | Age: 55
End: 2020-09-18

## 2020-09-18 NOTE — TELEPHONE ENCOUNTER
Reason for Call:  Other call back    Detailed comments: Please call Pt on Monday about disgrading his RX.     Phone Number Patient can be reached at: Home number on file [phone] (home)    Best Time: NA    Can we leave a detailed message on this number? YES    Call taken on 9/18/2020 at 4:32 PM by Anushka Fair

## 2020-09-21 NOTE — TELEPHONE ENCOUNTER
Outreach X1. Left a  requesting call back. Provided call back number.    Plan from 9/11/20 pasted below for review        Plan:     1. Physical Therapy:  not at this time  2. Clinical Health Psychologist:  NO, but we may need to consider this as we further taper his opiates.    3. Diagnostic Studies: none at this time  4. Medication Management:    ? Stop Tizanidine. Start Skelaxin 800mg three times daily as needed for muscle pain  ? Methadone 5mg: take 0.5 tablet daily for 3 days then stop. The rest of what you have left needs to be disposed of. Please contact me once you dispose of it so I can verify with the pharmacy.  ? Oxycodone 10mg: increase to 1 tab every 4-6 hours as needed. Max of 5 tabs/day.  ? Topamax 75mg BID  5. Further procedures recommended: Call Dr. Vasques's group regarding  recent CMBB  6. Recommendations to PCP. See above     Follow up with this provider: 4 Weeks    BROCK Schreiber, RN  Care Coordinator  [facility] [place]

## 2020-09-22 ENCOUNTER — TELEPHONE (OUTPATIENT)
Dept: NEUROSURGERY | Facility: OTHER | Age: 55
End: 2020-09-22

## 2020-09-22 NOTE — TELEPHONE ENCOUNTER
Reason for Call:  Other call back    Detailed comments: Spenser is calling wondering if Dr Vasques would place an order for him to see Dr Driscoll for another injection in his neck?     Phone Number Patient can be reached at: Home number on file [phone] (home)    Best Time: any    Can we leave a detailed message on this number? YES    Call taken on 9/22/2020 at 2:59 PM by Maria Luz Suero

## 2020-09-23 NOTE — TELEPHONE ENCOUNTER
Outreach X2. Left a  requesting call back. Provided call back number.    MAXIMUS SchreiberN, RN  Care Coordinator  [facility] [place]

## 2020-09-24 ENCOUNTER — TELEPHONE (OUTPATIENT)
Dept: FAMILY MEDICINE | Facility: OTHER | Age: 55
End: 2020-09-24

## 2020-09-24 NOTE — TELEPHONE ENCOUNTER
Patient called and is wondering what he should do with the rest of his methadone (DOLOPHINE) 5 MG tablet       He is wondering how he should dispose of them.            Christine Rodriguez    [facility]

## 2020-09-24 NOTE — TELEPHONE ENCOUNTER
"Informed patient that per Dr. Driscoll's note from 7/29 \"The patient will fax in injection report form to to us for diagnostic interpretation.  With a confirmatory block today he needs at least 1 hour of pain relief in the 80 to 100% range.  If this is not helpful then he could consider moving up to the C1-C2 joint or C0-C1 joint.  Most of his pain is in the suboccipital region.\"    Patient is going to send the form to Dr. Driscoll and then based on the results we will determine if the second MBB can be ordered.   "

## 2020-09-25 NOTE — TELEPHONE ENCOUNTER
Christine Rodriguez 17 hours ago (4:19 PM)         Patient called and is wondering what he should do with the rest of his methadone (DOLOPHINE) 5 MG tablet      He is wondering how he should dispose of them.     Christine Rodriguez    [facility]

## 2020-09-25 NOTE — TELEPHONE ENCOUNTER
Outreach X3. Left a  requesting call back. Provided call back number.    MAXIMUS SchreiberN, RN  Care Coordinator  [facility] [place]

## 2020-09-28 NOTE — TELEPHONE ENCOUNTER
Routing to provider to review pharmacy only has a drop box for medication and no way to verify. Do you want him to use the drop box?     ? Methadone 5mg: take 0.5 tablet daily for 3 days then stop. The rest of what you have left needs to be disposed of. Please contact me once you dispose of it so I can verify with the pharmacy.    MAXIMUS SchreiberN, RN  Care Coordinator  [facility] [place]

## 2020-09-28 NOTE — TELEPHONE ENCOUNTER
Left message for patient that he is able to use the drop box for his excess prescription.     Christy Burris, CMA on 9/28/2020 at 3:08 PM

## 2020-10-07 DIAGNOSIS — J45.21 MILD INTERMITTENT ASTHMA WITH ACUTE EXACERBATION: ICD-10-CM

## 2020-10-07 DIAGNOSIS — J84.9 ILD (INTERSTITIAL LUNG DISEASE) (H): ICD-10-CM

## 2020-10-07 DIAGNOSIS — J30.2 CHRONIC SEASONAL ALLERGIC RHINITIS DUE TO FUNGAL SPORES: ICD-10-CM

## 2020-10-07 DIAGNOSIS — M79.18 MYOFASCIAL PAIN: ICD-10-CM

## 2020-10-07 RX ORDER — METAXALONE 800 MG/1
800 TABLET ORAL 3 TIMES DAILY PRN
Qty: 60 TABLET | Refills: 0 | Status: SHIPPED | OUTPATIENT
Start: 2020-10-07 | End: 2020-10-30

## 2020-10-07 NOTE — TELEPHONE ENCOUNTER
Signed Prescriptions:                        Disp   Refills    metaxalone (SKELAXIN) 800 MG tablet        60 tab*0        Sig: Take 1 tablet (800 mg) by mouth 3 times daily as           needed for muscle soreness  Authorizing Provider: ROBERTH MARTINEZ, RN CNP, FNP  [facility]  [facility]

## 2020-10-07 NOTE — TELEPHONE ENCOUNTER
Chief Complaint   Patient presents with     Refill Request     Metaxalone      Refill request received via fax by pharmacy        [facility], [place] - [address] [place] [address]         Pending Prescriptions:                       Disp   Refills    metaxalone (SKELAXIN) 800 MG tablet       60 tab*0            Sig: Take 1 tablet (800 mg) by mouth 3 times daily as           needed for muscle soreness         Last Written Prescription Date:  9/11/20  Last Fill Quantity: 60,   # refills: 0  Last Office Visit with prescribing provider: 9/11/20  Future Office visit:       Routing refill request to provider for review/approval because:  Drug not on the [facility], P or [facility] refill protocol or controlled substance

## 2020-10-08 ENCOUNTER — TELEPHONE (OUTPATIENT)
Dept: NEUROSURGERY | Facility: CLINIC | Age: 55
End: 2020-10-08

## 2020-10-08 RX ORDER — ALBUTEROL SULFATE 90 UG/1
AEROSOL, METERED RESPIRATORY (INHALATION)
Qty: 18 G | Refills: 3 | Status: SHIPPED | OUTPATIENT
Start: 2020-10-08 | End: 2021-01-05

## 2020-10-08 RX ORDER — FLUTICASONE PROPIONATE 220 UG/1
AEROSOL, METERED RESPIRATORY (INHALATION)
Qty: 36 G | Refills: 2 | Status: SHIPPED | OUTPATIENT
Start: 2020-10-08

## 2020-10-08 RX ORDER — FLUTICASONE PROPIONATE 50 MCG
SPRAY, SUSPENSION (ML) NASAL
Qty: 16 G | Refills: 5 | Status: SHIPPED | OUTPATIENT
Start: 2020-10-08 | End: 2021-09-09

## 2020-10-08 NOTE — TELEPHONE ENCOUNTER
Routing refill request to provider for review/approval because:  A break in medication, Flovent and Albuterol last prescribed in 2018  ACT <20 (scre of 18)    MAXIMUS ZazuetaN, RN  [facility]

## 2020-10-08 NOTE — TELEPHONE ENCOUNTER
Called and LM for patient. Received pain journal from 2nd MBB. Waiting for call back from patient to discuss.

## 2020-10-09 NOTE — TELEPHONE ENCOUNTER
Spoke to patient. 2nd MBB that was done on 7/29/20 with Dr Driscoll at [facility] did not provide relief . He reports pain is at the base of his skull, gets severe JIMENEZ daily.     Per Dr Driscoll's note on 7/29/20 : If this is not helpful then he could consider moving up to the C1-C2 joint or C0-C1 joint.  Most of his pain is in the suboccipital region.    Will route message to Dr Vasques to review and advise.

## 2020-10-13 ENCOUNTER — TELEPHONE (OUTPATIENT)
Dept: SURGERY | Facility: CLINIC | Age: 55
End: 2020-10-13

## 2020-10-13 DIAGNOSIS — G89.4 CHRONIC PAIN SYNDROME: ICD-10-CM

## 2020-10-13 DIAGNOSIS — M47.812 ARTHROPATHY OF CERVICAL FACET JOINT: ICD-10-CM

## 2020-10-13 DIAGNOSIS — M54.2 CERVICALGIA: ICD-10-CM

## 2020-10-13 DIAGNOSIS — M54.6 CHRONIC MIDLINE THORACIC BACK PAIN: ICD-10-CM

## 2020-10-13 DIAGNOSIS — M79.18 MYOFASCIAL PAIN: ICD-10-CM

## 2020-10-13 DIAGNOSIS — F11.90 CHRONIC, CONTINUOUS USE OF OPIOIDS: ICD-10-CM

## 2020-10-13 DIAGNOSIS — G89.29 CHRONIC MIDLINE THORACIC BACK PAIN: ICD-10-CM

## 2020-10-13 DIAGNOSIS — Z11.59 ENCOUNTER FOR SCREENING FOR OTHER VIRAL DISEASES: Primary | ICD-10-CM

## 2020-10-13 NOTE — TELEPHONE ENCOUNTER
Reason for call:  Medication   If this is a refill request, has the caller requested the refill from the pharmacy already? No  Will the patient be using a [facility]? Yes  Name of the pharmacy and phone number for the current request: [facility] [address]     Name of the medication requested: oxyCODONE IR (ROXICODONE) 10 MG tablet    Other request: none    Phone number to reach patient:  Cell number on file:    Telephone Information:   Mobile [phone]       Best Time:  anytime    Can we leave a detailed message on this number?  YES    Travel screening: Not Applicable     Maryanne PETTIT    [facility]

## 2020-10-13 NOTE — TELEPHONE ENCOUNTER
Pt scheduled for MBB  Date:11/6/2020  Time:130pm  Dr. Driscoll    Instructed pt to have a  for procedure.  Patient informed of COVID testing process.

## 2020-10-14 RX ORDER — OXYCODONE HYDROCHLORIDE 10 MG/1
TABLET ORAL
Qty: 150 TABLET | Refills: 0 | Status: SHIPPED | OUTPATIENT
Start: 2020-10-19 | End: 2020-11-11

## 2020-10-14 NOTE — TELEPHONE ENCOUNTER
Medication refill information reviewed.     Due date for oxyCODONE IR (ROXICODONE) 10 MG tablet is 10/19/20     Prescriptions prepped for review.     Will route to provider.

## 2020-10-14 NOTE — TELEPHONE ENCOUNTER
Received call from patient requesting refill(s) of oxyCODONE IR (ROXICODONE) 10 MG tablet    Last dispensed from pharmacy on 09/18/20    Pt last seen by prescribing provider on 09/11/20-virtual visit  Next appt scheduled for : none     checked in the past 6 months? Yes If no, print current report and give to RN    Last urine drug screen date 06/28/19  Current opioid agreement on file (completed within the last year) No Date of opioid agreement: 07/16/19    E-prescribe to pharmacy-[facility] [address]      Will route to [facility] for review and preparation of prescription(s).

## 2020-10-15 NOTE — TELEPHONE ENCOUNTER
Script Eprescribed to pharmacy    Will send this to MA team to notify patient.    Signed Prescriptions:                        Disp   Refills    oxyCODONE IR (ROXICODONE) 10 MG tablet     150 ta*0        Sig: Take 1 tablet every 4-6 hours as needed for           breakthrough pain. Max of 5/day. Fill 10/18/2020.           Start 10/19/2020. 30 day script  Authorizing Provider: SEVERO ANDERSON MD  [facility]

## 2020-10-23 DIAGNOSIS — F41.1 GENERALIZED ANXIETY DISORDER: ICD-10-CM

## 2020-10-26 RX ORDER — CLONAZEPAM 0.5 MG/1
TABLET ORAL
Qty: 90 TABLET | Refills: 0 | Status: SHIPPED | OUTPATIENT
Start: 2020-10-26 | End: 2020-11-29

## 2020-10-26 NOTE — TELEPHONE ENCOUNTER
Klonopin      Last Written Prescription Date:  9/14/2020  Last Fill Quantity: 90,   # refills: 0  Last Office Visit: 5/8/2020  Future Office visit:       Routing refill request to provider for review/approval because:  Drug not on the [facility], P or [facility] refill protocol or controlled substance

## 2020-10-28 DIAGNOSIS — M79.18 MYOFASCIAL PAIN: ICD-10-CM

## 2020-10-29 NOTE — TELEPHONE ENCOUNTER
Requested Prescriptions   Pending Prescriptions Disp Refills     metaxalone (SKELAXIN) 800 MG tablet 60 tablet 0     Sig: Take 1 tablet (800 mg) by mouth 3 times daily as needed for muscle soreness     Last Written Prescription Date:  10/07/2020  Last Fill Quantity: 60,   # refills: 0  Last Office Visit: 05/08/2020  Future Office visit:       Routing refill request to provider for review/approval because:  Drug not on the [facility], P or  Health refill protocol or controlled substance    Jill Rodriguez MA

## 2020-10-30 RX ORDER — METAXALONE 800 MG/1
800 TABLET ORAL 3 TIMES DAILY PRN
Qty: 60 TABLET | Refills: 0 | Status: SHIPPED | OUTPATIENT
Start: 2020-10-30 | End: 2020-11-17

## 2020-11-04 DIAGNOSIS — Z11.59 ENCOUNTER FOR SCREENING FOR OTHER VIRAL DISEASES: ICD-10-CM

## 2020-11-04 PROCEDURE — U0003 INFECTIOUS AGENT DETECTION BY NUCLEIC ACID (DNA OR RNA); SEVERE ACUTE RESPIRATORY SYNDROME CORONAVIRUS 2 (SARS-COV-2) (CORONAVIRUS DISEASE [COVID-19]), AMPLIFIED PROBE TECHNIQUE, MAKING USE OF HIGH THROUGHPUT TECHNOLOGIES AS DESCRIBED BY CMS-2020-01-R: HCPCS | Performed by: ANESTHESIOLOGY

## 2020-11-06 ENCOUNTER — HOSPITAL ENCOUNTER (OUTPATIENT)
Dept: GENERAL RADIOLOGY | Facility: CLINIC | Age: 55
End: 2020-11-06
Attending: ANESTHESIOLOGY | Admitting: ANESTHESIOLOGY
Payer: COMMERCIAL

## 2020-11-06 ENCOUNTER — HOSPITAL ENCOUNTER (OUTPATIENT)
Facility: CLINIC | Age: 55
Discharge: HOME | End: 2020-11-06
Attending: ANESTHESIOLOGY | Admitting: ANESTHESIOLOGY
Payer: COMMERCIAL

## 2020-11-06 VITALS
SYSTOLIC BLOOD PRESSURE: 111 MMHG | TEMPERATURE: 97.7 F | RESPIRATION RATE: 16 BRPM | DIASTOLIC BLOOD PRESSURE: 70 MMHG | OXYGEN SATURATION: 97 % | HEART RATE: 88 BPM

## 2020-11-06 DIAGNOSIS — Z98.1 S/P CERVICAL SPINAL FUSION: ICD-10-CM

## 2020-11-06 PROCEDURE — 64490 INJ PARAVERT F JNT C/T 1 LEV: CPT | Mod: 50 | Performed by: ANESTHESIOLOGY

## 2020-11-06 PROCEDURE — 77003 FLUOROGUIDE FOR SPINE INJECT: CPT | Mod: TC

## 2020-11-06 PROCEDURE — 64490 INJ PARAVERT F JNT C/T 1 LEV: CPT | Performed by: ANESTHESIOLOGY

## 2020-11-06 PROCEDURE — 250N000011 HC RX IP 250 OP 636: Performed by: ANESTHESIOLOGY

## 2020-11-06 RX ORDER — BUPIVACAINE HYDROCHLORIDE 7.5 MG/ML
INJECTION, SOLUTION EPIDURAL; RETROBULBAR PRN
Status: DISCONTINUED | OUTPATIENT
Start: 2020-11-06 | End: 2020-11-06 | Stop reason: HOSPADM

## 2020-11-06 RX ORDER — IOPAMIDOL 612 MG/ML
INJECTION, SOLUTION INTRATHECAL PRN
Status: DISCONTINUED | OUTPATIENT
Start: 2020-11-06 | End: 2020-11-06 | Stop reason: HOSPADM

## 2020-11-06 NOTE — DISCHARGE INSTRUCTIONS
Home Care Instructions     Please fax or mail this form to the location that is selected at the bottom of your pain relief form.  Do not try to send the form back to Dr. Driscoll's clinic in [place].  If you are unsure of which location to send this form back to, please send it back to the [facility] same-day surgery department.  Dr. Driscoll will then evaluate your form and determine the next step of your care.            Procedure:  Diagnostic Block (which includes medial branch blocks, SI joint injection blocks, and nerve root injections)    Activity:  The injection was used to identify the cause of your pain:    Resume normal activity  You are to engage in activity that would have normally caused you discomfort to determine the effectiveness of the block/injection.  It is imperative to evaluate and rate your pain the first 2 hours after the injection.     Pain:    You may experience soreness at the injection site for one or two days    You may use an ice pack for 20 minutes every 2 hours for the first 24 hours    You may use a heating pad after the first 24 hours    You may use Tylenol  (acetaminophen) every 4 hours or other pain medicines as directed by your physician after your block wears off.    Safety  You may experience numbness radiating into your legs or arms, (depending on the procedure location)  This numbness may last several hours.  Until the numb sensation returns to normal please use caution in walking, climbing stairs, stepping out of your vehicle, etc.    Please contact us if you have:  Severe pain   Fever more than 101.5 degrees Fahrenheit  Signs of infection (redness, swelling or drainage)       If you need immediate attention we recommend that you go to a hospital emergency room or dial 911.    _____ Please contact our office one week after your injection to report your percent of pain relief.   See attached One Week Procedure Injection Report  __X__ Please return your Nerve Block Pain  Relief Form the day after your injection.     See attached Nerve Block Pain Relief Form             ## PLEASE SEND NERVE BLOCK PAIN RELIEF REPORT WITH PATIENT ##

## 2020-11-06 NOTE — OP NOTE
Preoperative diagnosis: Cervical spondylosis without myelopathy    Postoperative diagnosis: Same as preoperative diagnosis    Anesthesia: Local    Interval history: Joselito had medial branch blocks above his surgical fusion but they came out nondiagnostic therefore were moving up to the next level at C1-2.    Procedure: Bilateral C1-2 facet nerve block all under fluoroscopic guidance with contrast control    Technique: After written and verbal informed consent was obtained including risks of infection, bleeding, no help, or worse pain he was brought to the procedure area placed in the supine position and his neck was prepped with ChloraPrep.  Subcutaneous blebs of 1% lidocaine were used for skin.  I then advanced a 25-gauge 3-1/2 inch Quincke needle into the ventral lateral sulcus of the C2 vertebral body.  Omnipaque contrast was injected which showed proper spread of medication of the C1-2 facet nerve.  I then injected half cc of 0.75% bupivacaine into each location with good dispersion of the medication washing out the contrast and showing good spread over the C1-2 facet nerve.  The needles were removed and sterile bandages placed over the needle insertion site he was brought to the recovery room in stable condition.    Follow-up he is going to send his pain form back to me for diagnostic interpretation.  He needs at least 2 hours of pain relief in the 80 to 100% range in order for this to be diagnostically positive.  If this is nondiagnostic or negative then I would recommend his C0-C1 facet be diagnostically injected.

## 2020-11-10 DIAGNOSIS — M47.812 ARTHROPATHY OF CERVICAL FACET JOINT: ICD-10-CM

## 2020-11-10 DIAGNOSIS — M54.2 CERVICALGIA: ICD-10-CM

## 2020-11-10 DIAGNOSIS — G89.29 CHRONIC MIDLINE THORACIC BACK PAIN: ICD-10-CM

## 2020-11-10 DIAGNOSIS — F11.90 CHRONIC, CONTINUOUS USE OF OPIOIDS: ICD-10-CM

## 2020-11-10 DIAGNOSIS — M79.18 MYOFASCIAL PAIN: ICD-10-CM

## 2020-11-10 DIAGNOSIS — G89.4 CHRONIC PAIN SYNDROME: ICD-10-CM

## 2020-11-10 DIAGNOSIS — M54.6 CHRONIC MIDLINE THORACIC BACK PAIN: ICD-10-CM

## 2020-11-10 NOTE — TELEPHONE ENCOUNTER
Reason for call:  Medication   If this is a refill request, has the caller requested the refill from the pharmacy already? unknown  Will the patient be using a [facility]? Yes  Name of the pharmacy and phone number for the current request: [facility]       Name of the medication requested: oxyCODONE IR (ROXICODONE) 10 MG tablet    Other request: n/a    Phone number to reach patient:  Cell number on file:    Telephone Information:   Mobile [phone]       Best Time:  any    Can we leave a detailed message on this number?  YES    Travel screening: Not Applicable

## 2020-11-11 RX ORDER — OXYCODONE HYDROCHLORIDE 10 MG/1
TABLET ORAL
Qty: 150 TABLET | Refills: 0 | Status: SHIPPED | OUTPATIENT
Start: 2020-11-11 | End: 2020-12-16

## 2020-11-11 NOTE — TELEPHONE ENCOUNTER
Patient has not seen Ashley since 9/11/2020.   He is overdue for CSA.   He should make appointment to see me or Ashley prior to his next refill    Script Eprescribed to pharmacy    Will send this to MA team to notify patient.    Pending Prescriptions:                       Disp   Refills    oxyCODONE IR (ROXICODONE) 10 MG tablet    150 ta*0            Sig: Take 1 tablet every 4-6 hours as needed for           breakthrough pain. Max of 5/day. Fill 11/16/2020.           Start 11/18/2020. 30 day script      Mirna Shin MD  [facility]

## 2020-11-11 NOTE — TELEPHONE ENCOUNTER
Medication refill information reviewed.     Due date for oxyCODONE IR (ROXICODONE) 10 MG tablet is 11/18/20     Prescriptions prepped for review.     Will route to provider.     Manuel Ghotra, RN  Care Coordinator   [facility] [place]

## 2020-11-16 DIAGNOSIS — M79.18 MYOFASCIAL PAIN: ICD-10-CM

## 2020-11-17 DIAGNOSIS — M54.2 CERVICALGIA: ICD-10-CM

## 2020-11-17 DIAGNOSIS — M47.812 ARTHROPATHY OF CERVICAL FACET JOINT: ICD-10-CM

## 2020-11-17 DIAGNOSIS — G89.29 CHRONIC MIDLINE THORACIC BACK PAIN: ICD-10-CM

## 2020-11-17 DIAGNOSIS — M54.6 CHRONIC MIDLINE THORACIC BACK PAIN: ICD-10-CM

## 2020-11-17 DIAGNOSIS — G89.4 CHRONIC PAIN SYNDROME: ICD-10-CM

## 2020-11-17 RX ORDER — METAXALONE 800 MG/1
TABLET ORAL
Qty: 60 TABLET | Refills: 0 | Status: SHIPPED | OUTPATIENT
Start: 2020-11-17 | End: 2020-12-11

## 2020-11-17 NOTE — TELEPHONE ENCOUNTER
Metaxalone      Last Written Prescription Date:  10/30/2020  Last Fill Quantity: 60,   # refills: 0  Last Office Visit: 5/8/2020  Future Office visit:       Routing refill request to provider for review/approval because:  Drug not on the [facility], [facility] or [facility] refill protocol or controlled substance

## 2020-11-18 RX ORDER — TOPIRAMATE 25 MG/1
75 TABLET, FILM COATED ORAL 2 TIMES DAILY
Qty: 180 TABLET | Refills: 1 | Status: SHIPPED | OUTPATIENT
Start: 2020-11-18 | End: 2021-01-19

## 2020-11-18 NOTE — TELEPHONE ENCOUNTER
Routing refill request to provider for review/approval because:  Is patient continuing to take medication?    Ailyn Tobias RN

## 2020-11-27 DIAGNOSIS — G89.4 CHRONIC PAIN SYNDROME: ICD-10-CM

## 2020-11-27 DIAGNOSIS — F41.1 GENERALIZED ANXIETY DISORDER: ICD-10-CM

## 2020-11-27 RX ORDER — DULOXETIN HYDROCHLORIDE 60 MG/1
CAPSULE, DELAYED RELEASE ORAL
Qty: 90 CAPSULE | Refills: 1 | Status: SHIPPED | OUTPATIENT
Start: 2020-11-27 | End: 2021-06-08

## 2020-11-27 NOTE — TELEPHONE ENCOUNTER
Cymbalta:  Rx refilled per RN protocol.    Clonazepam:  Routing refill request to provider for review/approval because:  Drug not on the [facility] [place]   Last Written Prescription Date:  10/26/2020  Last Fill Quantity: 90,  # refills: 0   Last office visit: 2/14/2020 with prescribing provider:  5/8/2020 - virtual   Future Office Visit:        BROCK Mary, RN

## 2020-11-28 ENCOUNTER — HOSPITAL ENCOUNTER (EMERGENCY)
Facility: CLINIC | Age: 55
Discharge: HOME | End: 2020-11-28
Attending: PHYSICIAN ASSISTANT | Admitting: PHYSICIAN ASSISTANT
Payer: COMMERCIAL

## 2020-11-28 VITALS
RESPIRATION RATE: 18 BRPM | HEART RATE: 105 BPM | TEMPERATURE: 97.8 F | OXYGEN SATURATION: 97 % | SYSTOLIC BLOOD PRESSURE: 114 MMHG | DIASTOLIC BLOOD PRESSURE: 68 MMHG

## 2020-11-28 DIAGNOSIS — Z20.822 ENCOUNTER FOR LABORATORY TESTING FOR COVID-19 VIRUS: ICD-10-CM

## 2020-11-28 DIAGNOSIS — J40 BRONCHITIS: ICD-10-CM

## 2020-11-28 PROCEDURE — 99283 EMERGENCY DEPT VISIT LOW MDM: CPT | Performed by: PHYSICIAN ASSISTANT

## 2020-11-28 PROCEDURE — C9803 HOPD COVID-19 SPEC COLLECT: HCPCS | Performed by: PHYSICIAN ASSISTANT

## 2020-11-28 PROCEDURE — U0003 INFECTIOUS AGENT DETECTION BY NUCLEIC ACID (DNA OR RNA); SEVERE ACUTE RESPIRATORY SYNDROME CORONAVIRUS 2 (SARS-COV-2) (CORONAVIRUS DISEASE [COVID-19]), AMPLIFIED PROBE TECHNIQUE, MAKING USE OF HIGH THROUGHPUT TECHNOLOGIES AS DESCRIBED BY CMS-2020-01-R: HCPCS | Performed by: PHYSICIAN ASSISTANT

## 2020-11-28 PROCEDURE — 250N000012 HC RX MED GY IP 250 OP 636 PS 637: Performed by: PHYSICIAN ASSISTANT

## 2020-11-28 PROCEDURE — 99284 EMERGENCY DEPT VISIT MOD MDM: CPT | Performed by: PHYSICIAN ASSISTANT

## 2020-11-28 RX ORDER — PREDNISONE 20 MG/1
40 TABLET ORAL ONCE
Status: COMPLETED | OUTPATIENT
Start: 2020-11-28 | End: 2020-11-28

## 2020-11-28 RX ORDER — PREDNISONE 20 MG/1
TABLET ORAL
Qty: 10 TABLET | Refills: 0 | Status: SHIPPED | OUTPATIENT
Start: 2020-11-28 | End: 2020-12-04

## 2020-11-28 RX ADMIN — PREDNISONE 40 MG: 20 TABLET ORAL at 21:57

## 2020-11-28 NOTE — ED AVS SNAPSHOT
[facility] Emergency Dept  [address] [place] [address]  Phone: [phone]  Fax: [phone]                                    Joselito Abarca   MRN: [id number]    Department: [facility] Emergency Dept   Date of Visit: 11/28/2020           After Visit Summary Signature Page    I have received my discharge instructions, and my questions have been answered. I have discussed any challenges I see with this plan with the nurse or doctor.    ..........................................................................................................................................  Patient/Patient Representative Signature      ..........................................................................................................................................  Patient Representative Print Name and Relationship to Patient    ..................................................               ................................................  Date                                   Time    ..........................................................................................................................................  Reviewed by Signature/Title    ...................................................              ..............................................  Date                                               Time          22EPIC Rev 08/18

## 2020-11-29 LAB
SARS-COV-2 RNA SPEC QL NAA+PROBE: NOT DETECTED
SPECIMEN SOURCE: NORMAL

## 2020-11-29 RX ORDER — CLONAZEPAM 0.5 MG/1
TABLET ORAL
Qty: 90 TABLET | Refills: 0 | Status: SHIPPED | OUTPATIENT
Start: 2020-11-29 | End: 2020-12-30

## 2020-11-29 NOTE — ED TRIAGE NOTES
"Pt presents with concerns of possible bronchitis.  Pt states that he has has a \"cold\" for a month.  Denies fevers.  Pt states that he can not smell anything.  Pt requesting covid test.  "

## 2020-11-29 NOTE — ED PROVIDER NOTES
History     Chief Complaint   Patient presents with     Respiratory Problems     HPI  Joselito Abarca is a 55 year old male who presents to the emergency department complaining of a cough and wheezing.  The patient has a history of asthma.  He states he gets frequent bronchitis.  He has had nasal congestion for about a month.  In the last 4 to 5 days he has developed increased coughing and wheezing at home.  He has been doing his nebulizer treatments which helps greatly and did one prior to arrival.  He has woken up due to increased coughing at night and has had to do nebs.  He has not had any fevers, body aches, vomiting or diarrhea.  He has had loss of smell in the last week which is unusual.        Allergies:  Allergies   Allergen Reactions     Vicodin [Hydrocodone-Acetaminophen] Nausea     Other reaction(s): Diaphoresis, Vomiting  HEADACHE       Problem List:    Patient Active Problem List    Diagnosis Date Noted     Back pain, chronic 02/21/2019     Priority: Medium     S/P laparoscopic fundoplication 02/21/2019     Priority: Medium     Long-segment Olson's esophagus 02/21/2019     Priority: Medium     Gastroesophageal reflux disease, esophagitis presence not specified 09/21/2018     Priority: Medium     IMO Regulatory Load OCT 2020       Chronic seasonal allergic rhinitis due to fungal spores 06/07/2018     Priority: Medium     Status post cervical spinal arthrodesis 11/29/2017     Priority: Medium     Chronic, continuous use of opioids 12/13/2016     Priority: Medium     Patient is followed by Gregory G. Schoen, MD for ongoing prescription of pain medication.  All refills should be approved by this provider, or covering partner.    Medication(s): Oxycodone 5 mg IR: Take 2 capsules (10 mg) by mouth every 4 hours as needed 2 caps q 4 hour prn pain up to 12 per day .   Methadone 10 mg three times daily, 90 per month  Clonazepam 0.5 mg tid, 90 per month   Clinic visit frequency required: Q 3 months      Controlled substance agreement:  Encounter-Level CSA - 2/27/15:               Controlled Substance Agreement - Scan on 3/14/2015  8:47 AM : Controlled Medication Agreement-02/27/15 (below)            Pain Clinic evaluation in the past: Yes    DIRE Total Score(s):  No flowsheet data found.    Last [facility] website verification:  10/30/2016     [email or web address]         Moderate persistent asthma without complication 11/02/2016     Priority: Medium     Acute respiratory failure with hypoxia (H) 04/29/2016     Priority: Medium     Nausea with vomiting 04/27/2016     Priority: Medium     Cough 03/06/2016     Priority: Medium     Tobacco dependence syndrome 02/17/2016     Priority: Medium     Leukocytosis 02/16/2016     Priority: Medium     Disease of bronchial airway (HCC) 02/12/2016     Priority: Medium     Bronchiolectasis (H) 02/12/2016     Priority: Medium     Degeneration of cervical intervertebral disc 01/26/2015     Priority: Medium     Health Care Home 09/30/2013     Priority: Medium     Status:  Accepted  Care Coordinator:    Melissa Behl BSN, RN, N  [facility] Care Coordinator  [phone]     See Letters for [facility] [place] Care Plan  Date:  April 26, 2016            Persons encountering health services in other specified circumstances 09/30/2013     Priority: Medium     Overview:   Overview:   Status:  Accepted  Care Coordinator:    Melissa Behl BSN, RN, N  [facility] Care Coordinator  [phone]     See Letters for [facility] [place] Care Plan  Date:  April 26, 2016       Arthrodesis status 06/15/2011     Priority: Medium     Neck pain 06/15/2011     Priority: Medium     History of arthrodesis 06/15/2011     Priority: Medium     CARDIOVASCULAR SCREENING; LDL GOAL LESS THAN 160 10/31/2010     Priority: Medium     Encounter for screening for cardiovascular disorders 10/31/2010     Priority: Medium     Intervertebral cervical disc disorder with myelopathy, cervical region 11/12/2009     Priority: Medium     Patient is  followed by TIARA JIMENEZ for ongoing prescription of narcotic pain medicine.  Med: methadone 10 mg tid. Taking oxycodone up to 8 per day, 120 per month  Maximum use per month: 90  Expected duration: ongoing  Narcotic agreement on file: YES  Clinic visit recommended: Q 3 months         Intervertebral disc disorder of cervical region with myelopathy 11/12/2009     Priority: Medium     Overview:   Overview:   Patient is followed by TIARA JIMENEZ for ongoing prescription of narcotic pain medicine.  Med: methadone 10 mg tid. Taking oxycodone up to 8 per day, 120 per month  Maximum use per month: 90  Expected duration: ongoing  Narcotic agreement on file: YES  Clinic visit recommended: Q 3 months       Constipation 03/19/2008     Priority: Medium     Problem list name updated by automated process. Provider to review       Thoracic or lumbosacral neuritis or radiculitis, unspecified 03/19/2008     Priority: Medium     Esophageal reflux 07/09/2003     Priority: Medium     Generalized anxiety disorder 07/08/2003     Priority: Medium     Alcohol abuse, in remission 07/08/2003     Priority: Medium     Nondependent alcohol abuse, in remission 07/08/2003     Priority: Medium        Past Medical History:    Past Medical History:   Diagnosis Date     Allergic rhinitis      Anxiety      Depressive disorder      GERD (gastroesophageal reflux disease)      Neck pain 6/15/2011     Pneumonia      Uncomplicated asthma        Past Surgical History:    Past Surgical History:   Procedure Laterality Date     BRONCHOSCOPY (RIGID OR FLEXIBLE), DIAGNOSTIC N/A 8/7/2018    Procedure: COMBINED BRONCHOSCOPY (RIGID OR FLEXIBLE), LAVAGE;  Bronchoscopy with Lavage and Transbronchial Biopsy;  Surgeon: Yung Mirnada MD;  Location: [facility]     COLONOSCOPY WITH CO2 INSUFFLATION N/A 9/24/2018    Procedure: COLONOSCOPY WITH CO2 INSUFFLATION;  Colon and upper endoscopy ;  Surgeon: Devin Pelayo MD;  Location:  OR      COMBINED ESOPHAGOSCOPY, GASTROSCOPY, DUODENOSCOPY (EGD) WITH CO2 INSUFFLATION N/A 9/24/2018    Procedure: COMBINED ESOPHAGOSCOPY, GASTROSCOPY, DUODENOSCOPY (EGD) WITH CO2 INSUFFLATION;  Colon and upper endoscopy ;  Surgeon: Devin Pelayo MD;  Location: [facility]     DISCECTOMY LUMBAR POSTERIOR MICROSCOPIC ONE LEVEL  2/21/2012    Procedure:DISCECTOMY LUMBAR POSTERIOR MICROSCOPIC ONE LEVEL; LEFT T1-T2 THORASIC HEMILAMINECTOMY MICRODISCECTOMY WITH MEXTRIX II ; Surgeon:SHARON PURI; Location:[facility] OR     DISCECTOMY, FUSION CERVICAL ANTERIOR ONE LEVEL, COMBINED N/A 11/29/2017    Procedure: COMBINED DISCECTOMY, FUSION CERVICAL ANTERIOR ONE LEVEL;  Anterior Cervical Discectomy and Fusion Cervical Six - Cervical Seven, Exploration and Revision Cervical Four - Cervical Six with Hardware Removal;  Surgeon: Nikolas Vasques MD;  Location: [facility]     ESOPHAGEAL IMPEDENCE FUNCTION TEST WITH 24 HOUR PH GREATER THAN 1 HOUR N/A 12/12/2018    Procedure: ESOPHAGEAL IMPEDENCE FUNCTION TEST WITH 24 HOUR PH GREATER THAN 1 HOUR;  Surgeon: Atif Oconnor MD;  Location: [facility]     ESOPHAGOSCOPY, GASTROSCOPY, DUODENOSCOPY (EGD), COMBINED N/A 9/24/2018    Procedure: COMBINED ESOPHAGOSCOPY, GASTROSCOPY, DUODENOSCOPY (EGD), BIOPSY SINGLE OR MULTIPLE;;  Surgeon: Devin Pelayo MD;  Location: [facility]     EXPLORE SPINE, REMOVE HARDWARE, COMBINED N/A 11/29/2017    Procedure: COMBINED EXPLORE SPINE, REMOVE HARDWARE;;  Surgeon: Nikolas Vasques MD;  Location: [facility]     FUSION CERVICAL ANTERIOR TWO LEVELS  1/29/2010     HC DRAIN/INJ MAJOR JOINT/BURSA W/O US  5/5/2008    Left sacroiliac joint injection.     HC INJ EPIDURAL LUMBAR/SACRAL W/WO CONTRAST  2009     HEAD & NECK SURGERY      2013     HERNIA REPAIR       INJECT BLOCK MEDIAL BRANCH CERVICAL/THORACIC/LUMBAR Bilateral 8/26/2015    Procedure: INJECT BLOCK MEDIAL BRANCH CERVICAL / THORACIC / LUMBAR;  Surgeon: Ronald Driscoll MD;  [facility]     INJECT  BLOCK MEDIAL BRANCH CERVICAL/THORACIC/LUMBAR N/A 8/8/2019    Procedure: BLOCK, NERVE, FACET JOINT, MEDIAL BRANCH, DIAGNOSTIC Bilateral Cervical 2,3,4;  Surgeon: Ronald Driscoll MD;  Location[facility]     INJECT BLOCK MEDIAL BRANCH CERVICAL/THORACIC/LUMBAR N/A 9/13/2019    Procedure: Medial Branch Block Bilateral Cervical 2,3, and 4;  Surgeon: Ronald Driscoll MD;  [facility]     INJECT BLOCK MEDIAL BRANCH CERVICAL/THORACIC/LUMBAR Bilateral 7/3/2020    Procedure: Third occipital and Cervical 3-4 Medial Branch Blocks bilateral;  Surgeon: Ronald Driscoll MD;  [facility]     INJECT BLOCK MEDIAL BRANCH CERVICAL/THORACIC/LUMBAR N/A 7/29/2020    Procedure: medial branch blocks cervical 3-4 and bilateral third occiptal nerves;  Surgeon: Ronald Driscoll MD;  [facility]     INJECT BLOCK MEDIAL BRANCH CERVICAL/THORACIC/LUMBAR Bilateral 11/6/2020    Procedure: Cervical 1-2 Medial Branch Block Bilateral;  Surgeon: Ronald Driscoll MD;  [facility]     INJECT EPIDURAL LUMBAR N/A 2/13/2020    Procedure: Lumber 4-5 bilateral Epidural Injection;  Surgeon: Ronald Driscoll MD;  [facility]     INJECT FACET JOINT Bilateral 5/27/2015    Procedure: INJECT FACET JOINT;  Surgeon: Ronald Driscoll MD;  [facility]     NERVE BLOCK OCCIPITAL Bilateral 7/12/2018    Procedure: NERVE BLOCK OCCIPITAL;  bilateral occipital nerve blocks;  Surgeon: Ronald Driscoll MD;  [facility]     PROCEDURE PLACEHOLDER GENERAL N/A 02/21/2019    Kellen Ward at [facility]     RADIO FREQUENCY ABLATION PULSED CERVICAL Right 10/18/2019    Procedure: CERVICAL RADIOFREQUENCY ABLATION  Cervical 2,3,4;  Surgeon: Ronald Driscoll MD;  [facility]     RADIO FREQUENCY ABLATION PULSED CERVICAL Left 11/14/2019    Procedure: Left Cervical 2, 3, 4 Radiofreqency Ablation;  Surgeon: Ronald Driscoll MD;  Location: [facility]       Family History:    Family History   Problem Relation Age of Onset     Family History Negative No family hx of      C.A.D. No family hx  of        Social History:  Marital Status:  Single [1]  Social History     Tobacco Use     Smoking status: Current Every Day Smoker     Packs/day: 0.25     Years: 30.00     Pack years: 7.50     Types: Cigars, Cigarettes     Smokeless tobacco: Former User     Quit date: 2012   Substance Use Topics     Alcohol use: No     Alcohol/week: 0.0 standard drinks     Comment: HX OF ABUSE-IN REMISSION     Drug use: No        Medications:         albuterol (VENTOLIN HFA) 108 (90 Base) MCG/ACT inhaler       ipratropium - albuterol 0.5 mg/2.5 mg/3 mL (DUONEB) 0.5-2.5 (3) MG/3ML neb solution       predniSONE (DELTASONE) 20 MG tablet       clonazePAM (KLONOPIN) 0.5 MG tablet       DULoxetine (CYMBALTA) 60 MG capsule       fluticasone (FLONASE) 50 MCG/ACT nasal spray       fluticasone (FLOVENT HFA) 220 MCG/ACT inhaler       metaxalone (SKELAXIN) 800 MG tablet       methadone (DOLOPHINE) 5 MG tablet       naloxone (NARCAN) 4 MG/0.1ML nasal spray       nicotine (NICODERM CQ) 14 MG/24HR 24 hr patch       nystatin (MYCOSTATIN) 722482 UNIT/ML suspension       order for DME       oxyCODONE IR (ROXICODONE) 10 MG tablet       sildenafil (VIAGRA) 100 MG tablet       tiZANidine (ZANAFLEX) 4 MG tablet       topiramate (TOPAMAX) 25 MG tablet       traZODone (DESYREL) 100 MG tablet       vitamin D3 (CHOLECALCIFEROL) 50 mcg (2000 units) tablet       zolpidem (AMBIEN) 10 MG tablet          Review of Systems   All other systems reviewed and are negative.      Physical Exam   BP: 115/82  Pulse: 100  Temp: 97.8  F (36.6  C)  Resp: 18  SpO2: 98 %      Physical Exam  Vitals signs and nursing note reviewed.   Constitutional:       General: He is not in acute distress.     Appearance: Normal appearance. He is well-developed. He is not ill-appearing, toxic-appearing or diaphoretic.   HENT:      Head: Normocephalic and atraumatic.      Nose: Nose normal.      Mouth/Throat:      Mouth: Mucous membranes are moist.      Pharynx: Oropharynx is clear.   Eyes:       Conjunctiva/sclera: Conjunctivae normal.      Pupils: Pupils are equal, round, and reactive to light.   Neck:      Musculoskeletal: Neck supple.   Cardiovascular:      Rate and Rhythm: Normal rate and regular rhythm.      Heart sounds: Normal heart sounds.   Pulmonary:      Effort: Pulmonary effort is normal. No respiratory distress.      Breath sounds: Wheezing (coarse expiratory scattered) present. No rales.      Comments: Speaking in long sentences  Abdominal:      General: Bowel sounds are normal. There is no distension.      Palpations: Abdomen is soft.      Tenderness: There is no abdominal tenderness.   Musculoskeletal:         General: No deformity.   Skin:     General: Skin is warm and dry.   Neurological:      Mental Status: He is alert and oriented to person, place, and time. Mental status is at baseline.      Coordination: Coordination normal.   Psychiatric:         Mood and Affect: Mood normal.         Behavior: Behavior normal.         ED Course        Procedures      No results found. However, due to the size of the patient record, not all encounters were searched. Please check Results Review for a complete set of results.    Medications   predniSONE (DELTASONE) tablet 40 mg (40 mg Oral Given 11/28/20 2157)       Assessments & Plan (with Medical Decision Making)  Joselito Abarca is a 55 year old male who presented to the ED complaining of a cough and wheezing for the last 4 to 5 days.  No associated fever or body aches.  No vomiting or diarrhea.  History of asthma.  On arrival to the ED he was afebrile. When I evaluated him.Mildly tachycardic which improved by the time I evaluated him.  O2 saturations were 97% on room air.  He did not appear acutely ill or toxic but was noted to have diffuse coarse expiratory wheezes scattered throughout.  Speaking in long sentences without evidence of respiratory distress.  Patient was tested for Covid here.  Given his reassuring O2 saturations, history of  asthma and bronchitis, and no fever, I have lower suspicion for bacterial pneumonia at this time.  Patient was agreeable to treating this like an asthma exacerbation with bronchitis.  He was given a dose of prednisone here and a 5-day prescription for home going forward to  in the morning.  I advised he start using his nebulizers more frequently every 4-6 hours until the prednisone kicks in.  Discussed that COVID-19 test will result in the next 24 to 48 hours and to practice isolation per CDC guidelines until test results and follow further instructions if positive.  He was advised to return to the emergency department for any worsening symptoms.  All questions were answered and patient was discharged home in suitable condition.     I have reviewed the nursing notes.    I have reviewed the findings, diagnosis, plan and need for follow up with the patient.    Discharge Medication List as of 11/28/2020  9:51 PM      START taking these medications    Details   predniSONE (DELTASONE) 20 MG tablet Take two tablets (= 40mg) each day for 5 (five) days, Disp-10 tablet, R-0, E-Prescribe             Final diagnoses:   Bronchitis   Encounter for laboratory testing for COVID-19 virus     Note: Chart documentation done in part with Dragon Voice Recognition software. Although reviewed after completion, some word and grammatical errors may remain.     11/28/2020   [facility] EMERGENCY DEPT     Marcia Fair PA-C  11/28/20 9181

## 2020-11-29 NOTE — DISCHARGE INSTRUCTIONS
the prednisone in the morning and continue full course as prescribed.  This will reduce inflammation in your lungs.  Use your nebulizer treatments at home every 4-6 hours to help with shortness of breath.  You can try over-the-counter cough medication as well.  In regard to COVID-19, it is possible you have this causing the bronchitis so please isolate per CDC guidelines until test results and follow further instructions if positive.    If you develop any worsening symptoms please return to the emergency department.    Thank you for choosing [facility]'s Emergency Department. It was a pleasure taking care of you today. If you have any questions, please call [phone].    Marcia Fair PA-C

## 2020-11-29 NOTE — ED TRIAGE NOTES
Pt appears to not be in distress.  Respirations are normal, not working.  Oxygen sats 98% in room air.

## 2020-12-01 ENCOUNTER — TELEPHONE (OUTPATIENT)
Dept: EMERGENCY MEDICINE | Facility: CLINIC | Age: 55
End: 2020-12-01

## 2020-12-01 NOTE — TELEPHONE ENCOUNTER

## 2020-12-03 ENCOUNTER — TELEPHONE (OUTPATIENT)
Dept: FAMILY MEDICINE | Facility: CLINIC | Age: 55
End: 2020-12-03

## 2020-12-03 DIAGNOSIS — J84.9 ILD (INTERSTITIAL LUNG DISEASE) (H): Primary | ICD-10-CM

## 2020-12-03 NOTE — TELEPHONE ENCOUNTER
Reason for call:  Medication   If this is a refill request, has the caller requested the refill from the pharmacy already? N/A  Will the patient be using a [facility]? Yes  Name of the pharmacy and phone number for the current request:     Name of the medication requested: steriod    Other request: patient was recently in the Emergency with bronchitis  He is hoping the Dr. Schoen will be able to give him another round of steroids as this is his 5 day and it is not clearing it up    Phone number to reach patient:  Home number on file [phone] (home)    Best Time:  Any time as he stated that you can leave a message    Can we leave a detailed message on this number?  YES    Travel screening: Not Applicable

## 2020-12-04 RX ORDER — PREDNISONE 20 MG/1
TABLET ORAL
Qty: 10 TABLET | Refills: 0 | Status: SHIPPED | OUTPATIENT
Start: 2020-12-04 | End: 2020-12-14

## 2020-12-10 DIAGNOSIS — M79.18 MYOFASCIAL PAIN: ICD-10-CM

## 2020-12-11 DIAGNOSIS — J84.9 ILD (INTERSTITIAL LUNG DISEASE) (H): Primary | ICD-10-CM

## 2020-12-11 DIAGNOSIS — J45.21 MILD INTERMITTENT ASTHMA WITH ACUTE EXACERBATION: ICD-10-CM

## 2020-12-11 RX ORDER — METAXALONE 800 MG/1
TABLET ORAL
Qty: 60 TABLET | Refills: 0 | Status: SHIPPED | OUTPATIENT
Start: 2020-12-11 | End: 2020-12-30

## 2020-12-11 NOTE — TELEPHONE ENCOUNTER
Skelaxin      Last Written Prescription Date:  11/17/2020  Last Fill Quantity: 60,   # refills: 0  Last Office Visit: 05/08/2020  Future Office visit:   None  Routing refill request to provider for review/approval because:  Drug not on the [facility], [facility] or [facility] refill protocol or controlled substance    Ailyn Tobias RN

## 2020-12-14 RX ORDER — IPRATROPIUM BROMIDE AND ALBUTEROL SULFATE 2.5; .5 MG/3ML; MG/3ML
1 SOLUTION RESPIRATORY (INHALATION) EVERY 4 HOURS PRN
Qty: 30 VIAL | Refills: 0 | Status: SHIPPED | OUTPATIENT
Start: 2020-12-14 | End: 2021-01-05

## 2020-12-15 NOTE — TELEPHONE ENCOUNTER
Routing refill request to provider for review/approval because:  A break in medication  ACT Total Scores 2/4/2019 6/21/2019 2/14/2020   ACT TOTAL SCORE (Goal Greater than or Equal to 20) 16 22 18   In the past 12 months, how many times did you visit the emergency room for your asthma without being admitted to the hospital? 3 2 0   In the past 12 months, how many times were you hospitalized overnight because of your asthma? 0 0 0           Last Written Prescription Date:  3/15/2017  Last Fill Quantity: 30 vials,  # refills: 0   Last office visit: 2/14/2020 with prescribing provider:     Future Office Visit:                      Pari Obando RN  Triage Nurse  [facility], 24 hour nurse line, available by calling clinic at [phone] and following prompts.

## 2020-12-16 ENCOUNTER — VIRTUAL VISIT (OUTPATIENT)
Dept: PALLIATIVE MEDICINE | Facility: CLINIC | Age: 55
End: 2020-12-16
Payer: COMMERCIAL

## 2020-12-16 DIAGNOSIS — M47.812 ARTHROPATHY OF CERVICAL FACET JOINT: ICD-10-CM

## 2020-12-16 DIAGNOSIS — F17.200 NICOTINE DEPENDENCE, UNCOMPLICATED, UNSPECIFIED NICOTINE PRODUCT TYPE: ICD-10-CM

## 2020-12-16 DIAGNOSIS — G89.29 CHRONIC MIDLINE THORACIC BACK PAIN: ICD-10-CM

## 2020-12-16 DIAGNOSIS — M79.18 MYOFASCIAL PAIN: ICD-10-CM

## 2020-12-16 DIAGNOSIS — F11.90 CHRONIC, CONTINUOUS USE OF OPIOIDS: ICD-10-CM

## 2020-12-16 DIAGNOSIS — M54.2 CERVICALGIA: Primary | ICD-10-CM

## 2020-12-16 DIAGNOSIS — G89.4 CHRONIC PAIN SYNDROME: ICD-10-CM

## 2020-12-16 DIAGNOSIS — M54.6 CHRONIC MIDLINE THORACIC BACK PAIN: ICD-10-CM

## 2020-12-16 DIAGNOSIS — M54.6 MIDLINE THORACIC BACK PAIN, UNSPECIFIED CHRONICITY: ICD-10-CM

## 2020-12-16 PROCEDURE — 99214 OFFICE O/P EST MOD 30 MIN: CPT | Mod: 95 | Performed by: STUDENT IN AN ORGANIZED HEALTH CARE EDUCATION/TRAINING PROGRAM

## 2020-12-16 RX ORDER — OXYCODONE HYDROCHLORIDE 10 MG/1
TABLET ORAL
Qty: 150 TABLET | Refills: 0 | Status: SHIPPED | OUTPATIENT
Start: 2020-12-16 | End: 2021-01-15

## 2020-12-16 ASSESSMENT — PAIN SCALES - GENERAL: PAINLEVEL: EXTREME PAIN (8)

## 2020-12-16 NOTE — PROGRESS NOTES
"Joselito Abarca is a 55 year old male who is being evaluated via a billable telephone visit.      The patient has been notified of following:     \"This telephone visit will be conducted via a call between you and your physician/provider. We have found that certain health care needs can be provided without the need for a physical exam.  This service lets us provide the care you need with a short phone conversation.  If a prescription is necessary we can send it directly to your pharmacy.  If lab work is needed we can place an order for that and you can then stop by our lab to have the test done at a later time.    Telephone visits are billed at different rates depending on your insurance coverage. During this emergency period, for some insurers they may be billed the same as an in-person visit.  Please reach out to your insurance provider with any questions.    If during the course of the call the physician/provider feels a telephone visit is not appropriate, you will not be charged for this service.\"    Patient has given verbal consent for Telephone visit?  Yes    What phone number would you like to be contacted at? [phone]    How would you like to obtain your AVS? Patient declined at this time    Amy Cabrera [facility]  [place]    Call Duration: 22 min                                [facility]  Follow up clinic visit    Date of visit: 12/15/2020    Chief complaint:   Chief Complaint   Patient presents with     Pain     Telephone visit due to Covid-19       Interval history:  Joselito Abarca is a 55 year old male with a history of NICOLETTE, EtoH abuse in remission, s/p cervical arthrodesis who follows with Ashley Salgado PA-C for:  - chronic neck pain.   - thoracic back pain, midline  - myofascial pain  Last seen by Ashley on 9/11/2020. he is seeing me while Ashley is on leave.     Interval pain history 9/11/2020:  \"He is not doing too good. He " "states that he has been in \"really really bad pain\". He is still getting daily headaches. He tried different OTC medications to try to help his headache and nothing seems to help. He states that the muscle relaxant isn't calming his muscles down and they are making him \"really tired\". He did increase the Topamax and did not notice a change in his headaches. He states that his headache and neck pain are worse when he is sleeping. He states that he is up every 2 hours with the pain.\"    Recommendations/plan at the last visit included:  1. Physical Therapy:  not at this time  2. Clinical Health Psychologist:  NO, but we may need to consider this as we further taper his opiates.    3. Diagnostic Studies: none at this time  4. Medication Management:    ? Stop Tizanidine. Start Skelaxin 800mg three times daily as needed for muscle pain  ? Methadone 5mg: take 0.5 tablet daily for 3 days then stop. The rest of what you have left needs to be disposed of. Please contact me once you dispose of it so I can verify with the pharmacy.  ? Oxycodone 10mg: increase to 1 tab every 4-6 hours as needed. Max of 5 tabs/day.  ? Topamax 75mg BID  Continue Topamax. Consider Nortriptyline  5. Further procedures recommended: Call Dr. Vasques's group regarding  recent CMBB  6. Recommendations to PCP. See above    Since his last visit, Joselito Abarca reports:  - now s/p 11/6/2020 C1,2 b/l MBB - worked well. Sent paperwork back. Hasn't heard back. He will call Dr. Driscoll's office about this   - ED 11/28/2020 for bronchitis  - stopped methadone   - taking topiramate   - reports waking up in middle of night in withdrawals after he stopped the methadone (restlessness)  - has had bronchitis for several weeks. This has contributed to his pain    Pain scores:  Pain intensity on average is 8 on a scale of 0-10.     Current pain treatments:   Cymbalta 60mg daily  Clonazepam 0.5mg-takes 3-4/day  Oxycodone 10mg every 4-6 hours  - taking 5 per day "   Skelaxin - reports not taking this  Topamax 25mg: takes 3 tabs twice daily    Patient is using the medication as prescribed:  YES  Is your medication helpful? YES   Side Effects: no side effect    Past pain treatments:  Previous Medications: (H--helped; HI--Helped initially; [facility]-- somewhat helpful, NH--No help; W--worse; SE--side effects)   Opiates: hydrocodone SE allergic, fever, sweaty, headache, methadone H, Oxycodone H  NSAIDS: Ibuprofen NH, Aleve NH  Anti-migraine mediations: Fiorinal H  Muscle Relaxants: Valium H, methocarbamol NH  Neuropathics: Gabapentin H SE breathing issues  Anti-depressants: Cymbalta NH  Anxiolytics: Klonopin H  Topicals: Lidocaine H  Other medications not covered above: Tylenol NH     Past Pain Treatments:  Pain Clinic:   Yes, he was previously seen at [facility] and [facility]   PT: Yes, has done many times  Psychologist: No  Relaxation techniques/biofeedback: No  Chiropractor: Yes, feels that it made it worse  Acupuncture: Yes, just did one session  Pharmacotherapy:               Opioids: Yes                Non-opioids:    Yes   TENs Unit:Yes, aggravates the pain  Injections:     07/12/2018 bilateral occipital nerve blocks ,     08/26/2015 and 5/27/2015Cervical medial branch blocks      Has had low back pain injections at [facility].     10/18/2019 Right cervical RFA ,     11/14/2019 left cervical RFA .    02/13/2020 L4-5 REVA    02/24/2020 TPI neck in ER  Self-care:   Yes, hot tubs, ice packs, heating pads  Surgeries related to pain: Yes,  1. anterior cervical discectomy and fusion C6-7, exploration and revision C4-6 with hardware removal 11/29/2017,   2. Left T1-T2 thoracic hemilaminectomy, microdiscectomy 02/21/2012,   3. Removal of C4 through C6 anterior cervical plate, Exploration of C4-C5 and C5-C6 anterior cervical fusion, C4-C5 and C5-C6 arthrodesis, C4 through C6 anterior cervical instrumentation, and Sp[place] of right iliac hip graft.  03/15/2011  4. C4-C5 and C5-C6 anterior cervical  discectomy and fusion with instrumentation and neural electrophysiologic monitoring 01/26/2010    Medications:  Current Outpatient Medications   Medication Sig Dispense Refill     albuterol (VENTOLIN HFA) 108 (90 Base) MCG/ACT inhaler INHALE 2 PUFFS INTO THE LUNGS EVERY 6 HOURS AS NEEDED FOR SHORTNESS OF BREATH, DIFFICULTY BREATHING OR WHEEZING. 18 g 3     clonazePAM (KLONOPIN) 0.5 MG tablet TAKE ONE-HALF TO ONE TABLET BY MOUTH THREE TIMES A DAY AS NEEDED FOR ANXIETY 90 tablet 0     DULoxetine (CYMBALTA) 60 MG capsule TAKE ONE CAPSULE BY MOUTH ONCE DAILY 90 capsule 1     fluticasone (FLONASE) 50 MCG/ACT nasal spray SPRAY 2 SPRAYS IN EACH NOSTRIL EVERY DAY 16 g 5     fluticasone (FLOVENT HFA) 220 MCG/ACT inhaler INHALE 2 PUFFS INTO THE LUNGS TWICE DAILY 36 g 2     ipratropium - albuterol 0.5 mg/2.5 mg/3 mL (DUONEB) 0.5-2.5 (3) MG/3ML neb solution Take 1 vial (3 mLs) by nebulization every 4 hours as needed for shortness of breath / dyspnea 30 vial 0     metaxalone (SKELAXIN) 800 MG tablet TAKE ONE TABLET BY MOUTH THREE TIMES A DAY AS NEEDED FOR MUSCLE SORENESS 60 tablet 0     methadone (DOLOPHINE) 5 MG tablet Take 0.5 tablet every 12 hours.  Fill 8/19/2020  start 8/22/2020. 30 tablet 0     naloxone (NARCAN) 4 MG/0.1ML nasal spray Spray 4 mg into one nostril alternating nostrils once as needed for opioid reversal every 2-3 minutes until assistance arrives       nicotine (NICODERM CQ) 14 MG/24HR 24 hr patch Place 1 patch onto the skin every 24 hours 30 patch 0     nystatin (MYCOSTATIN) 188486 UNIT/ML suspension TAKE FIVE MLS MOUTH/THROAT FOUR TIMES A  mL 0     order for DME Equipment being ordered: Nebulizer mask and tubing 1 each 1     oxyCODONE IR (ROXICODONE) 10 MG tablet Take 1 tablet every 4-6 hours as needed for breakthrough pain. Max of 5/day. Fill 11/16/2020. Start 11/18/2020. 30 day script 150 tablet 0     predniSONE (DELTASONE) 20 MG tablet TAKE TWO TABLETS BY MOUTH ONCE DAILY FOR 5 DAYS 10 tablet 0      sildenafil (VIAGRA) 100 MG tablet Take 1 tablet (100 mg) by mouth daily as needed 30 min to 4 hrs before sex. Do not use with nitroglycerin, terazosin or doxazosin. 4 tablet 0     tiZANidine (ZANAFLEX) 4 MG tablet TAKE 1-1.5 TABLETS BY MOUTH THREE TIMES A DAY AS NEEDED FOR MUSCLE SPASMS /TENSION 90 tablet 1     topiramate (TOPAMAX) 25 MG tablet Take 3 tablets (75 mg) by mouth 2 times daily . Titrate to this dose as instructed in clinic. 180 tablet 1     traZODone (DESYREL) 100 MG tablet Take 3 tablets (300 mg) by mouth At Bedtime 90 tablet 3     vitamin D3 (CHOLECALCIFEROL) 50 mcg (2000 units) tablet TAKE ONE TABLET BY MOUTH EVERY  tablet 2     zolpidem (AMBIEN) 10 MG tablet Take 10 mg by mouth nightly as needed          Medical History: any changes in medical history since they were last seen? bronchitis as above    Review of Systems:  The 14 system ROS was reviewed from the intake questionnaire, and is positive for: coughing  Any bowel or bladder problems: no  Mood: pretty good    Physical Exam:  There were no vitals taken for this visit.  TELEPHONE VISIT  Neuro: Speech is fluent    Controlled Substance Agreement:   Encounter-Level CSA - 02/27/2015:    Controlled Substance Agreement - Scan on 3/14/2015  8:47 AM: Controlled Medication Agreement-02/27/15     Patient-Level CSA:    Controlled Substance Agreement - Opioid - Scan on 7/16/2019  3:52 PM          UDS:   Pain Drug SCR UR W RPTD Meds   Date Value Ref Range Status   06/28/2019 FINAL  Final     Comment:     (Note)  ====================================================================  [facility] COMP DRUG ANALYSIS,UR  ====================================================================  Test                             Result       Flag       Units        Drug Present and Declared for Prescription Verification   7-aminoclonazepam              284          EXPECTED   ng/mg creat    7-aminoclonazepam is an expected metabolite of clonazepam. Source    of  clonazepam is a scheduled prescription medication.   Methadone                      2984         EXPECTED   ng/mg creat   EDDP (Methadone Mtb)           4421         EXPECTED   ng/mg creat    Sources of methadone include scheduled prescription medications.    EDDP is an expected metabolite of methadone.   Gabapentin                     PRESENT      EXPECTED                 Citalopram                     PRESENT      EXPECTED                 Desmethylcitalopram            PRESENT      EXPECTED                  Desmethylcitalopram is an expected metabolite of citalopram or    the enantiomeric form, escitalopram.  Drug Present not Declared for Prescription Verification   Zolpidem                       PRESENT      UNEXPECTED               Zolpidem Acid                  PRESENT      UNEXPECTED                Zolpidem acid is an expected metabolite of zolpidem.   Trazodone                      PRESENT      UNEXPECTED               1,3 chlorophenyl piperazine    PRESENT      UNEXPECTED                1,3-chlorophenyl piperazine is an expected metabolite of    trazodone.   Acetaminophen                  PRESENT      UNEXPECTED               Doxylamine                     PRESENT      UNEXPECTED              Drug Absent but Declared for Prescription Verification   Oxycodone                      Not Detected UNEXPECTED ng/mg creat  ====================================================================  Test                      Result    Flag   Units      Ref Range        Creatinine              63               mg/dL      >=20            ====================================================================  Declared Medications:  The flagging and interpretation on this report are based on the  following declared medications.  Unexpected results may arise from  inaccuracies in the declared medications.  **Note: The testing scope of this panel includes these medications:  Citalopram (Celexa)  Clonazepam  (Klonopin)  Gabapentin  Methadone  Oxycodone  ====================================================================  For clinical consultation, please call [phone].  ====================================================================  Analysis performed by [email or web address], [address] [place] [address]          THE 4 As OF OPIOID MAINTENANCE ANALGESIA    Analgesia: Is pain relief clinically significant? YES   Activity: Is patient functional and able to perform Activities of Daily Living? YES   Adverse effects: Is patient free from adverse side effects from opiates? YES   Adherence to Rx protocol: Is patient adhering to Controlled Substance Agreement and taking medications ONLY as ordered? YES    MME: 75    Is Narcan prescribed for opiate use >50 MME daily? YES     :  [place] Prescription Drug Monitoring Program (PDMP) Link  Checked and as expected       Assessment:   Joselito Abarca is a 55 year old male who is seen at the pain clinic for:       Cervicalgia  Myofascial pain  Midline thoracic back pain, unspecified chronicity  Nicotine dependence, uncomplicated, unspecified nicotine product type  Chronic pain syndrome  Chronic, continuous use of opioids  Arthropathy of cervical facet joint  Chronic midline thoracic back pain    Stopped methadone, now on oxycodone 10mg, #5 per day. Never started skelaxin that Ashley had recommended, just picked it up 5 days ago. We will therefore not make medication changes today, as he is just starting the skelaxin.   He never heard back from Dr. Driscoll's office after he completed the cervical MBBs. He will contact their office to enquire about next steps.   We will anticipate tapering his oxycodone to #4 tabs per day after he has taken further steps with cervical MBBs/RFA.     Continue topamax. Just started skelaxin. Consider nortriptyline.     Plan:  Additional Workup:   1. - Diagnostic Studies:  no  2. - Laboratory studies:  no  3. - Urine toxicology screen today: no      Therapies:   4. - Physical Therapy: not at this time, will need to if he opts to proceed with cervical RFA  5. - Clinical Health Psychologist to address issues of relaxation, behavioral change, coping style, and other factors important to improvement: consider as we further taper his opioids    Medication Management:   6. - Start skelaxin. Never started after last visit. 800mg TID PRN  7. - Continue oxycodone 10mg, #5 per day. Refilled today.   8. - Continue topamax 75 mg BID  9. - Consider nortriptyline    Further procedures recommended:   10. - continue cervical MBB/RFA workup with Dr. Driscoll    Follow up: 2 mo    Mirna Shin MD  [facility] [place]

## 2020-12-16 NOTE — PATIENT INSTRUCTIONS
1.Continue skelaxin - this had originally be recommended by Ashley. It looks like it was just prescribed by Dr. Schoen. Continue to take this.     2. Continue oxycodone at your current dose, max #5 tabs per day.     3. Continue topamax 75mg twice daily    4. Contact Dr. Driscoll's office to ask about next steps for your neck procedures.     Follow up with Ashley in 2 months.     Mirna Shin MD  [facility]    ----------------------------------------------------------------  Clinic Number:  [phone]     Call with any questions about your care and for scheduling assistance.     Calls are returned Monday through Friday between 8 AM and 4:30 PM. We usually get back to you within 2 business days depending on the issue/request.    If we are prescribing your medications:    For opioid medication refills, call the clinic or send a [employer] message 7 days in advance.  Please include:    Name of requested medication    Name of the pharmacy.    For non-opioid medications, call your pharmacy directly to request a refill. Please allow 3-4 days to be processed.     Per [place] State Law:    All controlled substance prescriptions must be filled within 30 days of being written.      For those controlled substances allowing refills, pickup must occur within 30 days of last fill.      We believe regular attendance is key to your success in our program!      Any time you are unable to keep your appointment we ask that you call us at least 24 hours in advance to cancel.This will allow us to offer the appointment time to another patient.     Multiple missed appointments may lead to dismissal from the clinic.

## 2020-12-17 ENCOUNTER — TELEPHONE (OUTPATIENT)
Dept: FAMILY MEDICINE | Facility: CLINIC | Age: 55
End: 2020-12-17

## 2020-12-17 DIAGNOSIS — J45.21 MILD INTERMITTENT ASTHMA WITH ACUTE EXACERBATION: Primary | ICD-10-CM

## 2020-12-17 DIAGNOSIS — F17.200 TOBACCO USE DISORDER: ICD-10-CM

## 2020-12-17 NOTE — TELEPHONE ENCOUNTER
Pt would like to try chantix for quitting smoking. He tried nicotine patches but states he had allergic rash to them.

## 2020-12-18 RX ORDER — VARENICLINE TARTRATE 0.5 MG/1
0.5 TABLET, FILM COATED ORAL 2 TIMES DAILY
Qty: 60 TABLET | Refills: 3 | Status: SHIPPED | OUTPATIENT
Start: 2020-12-18 | End: 2021-04-13

## 2020-12-18 NOTE — TELEPHONE ENCOUNTER
Please inform Chantix was ordered and has  first 4 week starter pack and then a continued use pack.    Electronically signed by Greg Schoen, MD

## 2020-12-18 NOTE — TELEPHONE ENCOUNTER
I have attempted to contact this patient by phone with the following results: no answer.  Please notify patient of provider's note below. Ann Colorado MA     12/18/2020

## 2020-12-28 ENCOUNTER — TELEPHONE (OUTPATIENT)
Dept: FAMILY MEDICINE | Facility: CLINIC | Age: 55
End: 2020-12-28

## 2020-12-28 DIAGNOSIS — J84.9 ILD (INTERSTITIAL LUNG DISEASE) (H): ICD-10-CM

## 2020-12-29 DIAGNOSIS — M79.18 MYOFASCIAL PAIN: ICD-10-CM

## 2020-12-29 DIAGNOSIS — F41.1 GENERALIZED ANXIETY DISORDER: ICD-10-CM

## 2020-12-29 NOTE — TELEPHONE ENCOUNTER
Requested Prescriptions   Pending Prescriptions Disp Refills     predniSONE (DELTASONE) 20 MG tablet [Pharmacy Med Name: PREDNISONE 20MG TABS] 10 tablet 0     Sig: TAKE TWO TABLETS BY MOUTH ONCE DAILY FOR 5 DAYS     Last Written Prescription Date:  12/14/2020  Last Fill Quantity: 10,   # refills: 0  Last Office Visit: 05/08/2020  Future Office visit:       Routing refill request to provider for review/approval because:  Drug not on the [facility], [facility] or [facility] refill protocol or controlled substance    Jill Rodriguez MA

## 2020-12-29 NOTE — TELEPHONE ENCOUNTER
Please contact Spenser and inquire how he is doing with his asthma. He was just given a prednisone burst on 12/14 and is requesting again.  Inquire about use of flovent as prescribed and what he is using in regard to duonebs and albuterol inhaler.  Electronically signed by Greg Schoen, MD

## 2020-12-30 RX ORDER — CLONAZEPAM 0.5 MG/1
TABLET ORAL
Qty: 90 TABLET | Refills: 0 | Status: SHIPPED | OUTPATIENT
Start: 2020-12-30 | End: 2021-01-22

## 2020-12-30 RX ORDER — METAXALONE 800 MG/1
TABLET ORAL
Qty: 60 TABLET | Refills: 0 | Status: SHIPPED | OUTPATIENT
Start: 2020-12-30 | End: 2021-01-22

## 2020-12-30 NOTE — TELEPHONE ENCOUNTER
Patient returned call - states has been using the duo neb nebulizer, using just the one inhaler Ventolin once in awhile.   When went to  the medication did not take it because thought bronchitis was gone but it came back, started taking the prednisone from ED did not work took second dose, then requested third dose but did not take it until about 5 days ago because thought was cleared up. Thinks still has bronchitis - trouble breathing, wheezing, hears fluid in lungs, easily winded. doing duo neb every 4 hours to catch breath. No fever. coughing a lot with phlegm that is greenish in color and thick.   After 5 days of the prednisone it is not going away thinks maybe has pneumonia.   Used fluticasone (Flovent) today for the first time in awhile.  The nebulizer seems to help more than the inhalers.     Using the Ventolin inhaler a couple times a day.    Admits has dyspnea when sleeping about half way through the night will wake up and have trouble breathing and gets up and does Duo neb and that helps. Wheezing badly.     Nurse can hear patient's breathing - sounds like rubs/crackles. Does have nasal congestion.  Is using an allergy nasal spray for that    Has to do a Duo neb right away in am to be able to move around without getting extremely SOB.      Not as bad during the day.     Pharmacy cued.      Routing to PCP to review and advise.          Pari Obando RN  [facility]

## 2020-12-31 DIAGNOSIS — J84.9 ILD (INTERSTITIAL LUNG DISEASE) (H): ICD-10-CM

## 2020-12-31 DIAGNOSIS — J45.21 MILD INTERMITTENT ASTHMA WITH ACUTE EXACERBATION: ICD-10-CM

## 2021-01-04 NOTE — TELEPHONE ENCOUNTER
Routing refill request to provider for review/approval because:  ACT <20 (score of 18)    MAXIMUS ZazuetaN, RN  [facility]

## 2021-01-05 RX ORDER — IPRATROPIUM BROMIDE AND ALBUTEROL SULFATE 2.5; .5 MG/3ML; MG/3ML
SOLUTION RESPIRATORY (INHALATION)
Qty: 90 VIAL | Refills: 3 | Status: SHIPPED | OUTPATIENT
Start: 2021-01-05

## 2021-01-05 RX ORDER — PREDNISONE 20 MG/1
TABLET ORAL
Qty: 10 TABLET | Refills: 0 | OUTPATIENT
Start: 2021-01-05

## 2021-01-05 RX ORDER — ALBUTEROL SULFATE 90 UG/1
AEROSOL, METERED RESPIRATORY (INHALATION)
Qty: 1 INHALER | Refills: 11 | Status: SHIPPED | OUTPATIENT
Start: 2021-01-05

## 2021-01-05 NOTE — TELEPHONE ENCOUNTER
No return call after 3 attempts.     Will refuse medication and let pharmacy know patient needs to call clinic.    MAXIMUS ZazuetaN, RN  [facility]

## 2021-01-13 DIAGNOSIS — M54.2 CERVICALGIA: ICD-10-CM

## 2021-01-13 DIAGNOSIS — G89.29 CHRONIC MIDLINE THORACIC BACK PAIN: ICD-10-CM

## 2021-01-13 DIAGNOSIS — M54.6 CHRONIC MIDLINE THORACIC BACK PAIN: ICD-10-CM

## 2021-01-13 DIAGNOSIS — F11.90 CHRONIC, CONTINUOUS USE OF OPIOIDS: ICD-10-CM

## 2021-01-13 DIAGNOSIS — M47.812 ARTHROPATHY OF CERVICAL FACET JOINT: ICD-10-CM

## 2021-01-13 DIAGNOSIS — M79.18 MYOFASCIAL PAIN: ICD-10-CM

## 2021-01-13 DIAGNOSIS — G89.4 CHRONIC PAIN SYNDROME: ICD-10-CM

## 2021-01-13 RX ORDER — OXYCODONE HYDROCHLORIDE 10 MG/1
TABLET ORAL
Qty: 150 TABLET | Refills: 0 | Status: CANCELLED | OUTPATIENT
Start: 2021-01-13

## 2021-01-13 NOTE — LETTER
[facility] [address]  Ph: [phone]/Fax: [phone]          January 18, 2021        Joselito Abarca  [facility] [address]   [place] [address]          Dear Joselito,    We have been trying to reach you by phone without success.    Your recent prescription has been filled, your provider would like you to schedule a virtual appointment to follow up prior to your February refill.      Sincerely,        LORENZO Mathis/Lupis LEWIS

## 2021-01-14 ENCOUNTER — HOSPITAL ENCOUNTER (EMERGENCY)
Facility: CLINIC | Age: 56
Discharge: HOME | End: 2021-01-14
Attending: FAMILY MEDICINE | Admitting: FAMILY MEDICINE
Payer: COMMERCIAL

## 2021-01-14 VITALS
HEART RATE: 86 BPM | TEMPERATURE: 97.8 F | RESPIRATION RATE: 16 BRPM | SYSTOLIC BLOOD PRESSURE: 117 MMHG | OXYGEN SATURATION: 97 % | DIASTOLIC BLOOD PRESSURE: 75 MMHG

## 2021-01-14 DIAGNOSIS — M54.2 TRIGGER POINT WITH NECK PAIN: ICD-10-CM

## 2021-01-14 PROCEDURE — 250N000011 HC RX IP 250 OP 636: Performed by: FAMILY MEDICINE

## 2021-01-14 PROCEDURE — 20553 NJX 1/MLT TRIGGER POINTS 3/>: CPT | Performed by: FAMILY MEDICINE

## 2021-01-14 PROCEDURE — 96372 THER/PROPH/DIAG INJ SC/IM: CPT | Performed by: FAMILY MEDICINE

## 2021-01-14 PROCEDURE — 99283 EMERGENCY DEPT VISIT LOW MDM: CPT | Mod: 25 | Performed by: FAMILY MEDICINE

## 2021-01-14 RX ORDER — DEXAMETHASONE SODIUM PHOSPHATE 10 MG/ML
4 INJECTION, SOLUTION INTRAMUSCULAR; INTRAVENOUS ONCE
Status: COMPLETED | OUTPATIENT
Start: 2021-01-14 | End: 2021-01-14

## 2021-01-14 RX ADMIN — DEXAMETHASONE SODIUM PHOSPHATE 4 MG: 10 INJECTION, SOLUTION INTRAMUSCULAR; INTRAVENOUS at 01:18

## 2021-01-14 NOTE — ED TRIAGE NOTES
Pt reports chronic neck pain that has gotten worse over the past couple months. States when the pain gets like this he normally comes into the emergency room and gets injections in his neck that help with the pain.

## 2021-01-14 NOTE — ED AVS SNAPSHOT
[facility] Emergency Dept  [address] [place] [address]  Phone: [phone]  Fax: [phone]                                    Joselito Abarca   MRN: [id number]    Department: [facility] Emergency Dept   Date of Visit: 1/14/2021           After Visit Summary Signature Page    I have received my discharge instructions, and my questions have been answered. I have discussed any challenges I see with this plan with the nurse or doctor.    ..........................................................................................................................................  Patient/Patient Representative Signature      ..........................................................................................................................................  Patient Representative Print Name and Relationship to Patient    ..................................................               ................................................  Date                                   Time    ..........................................................................................................................................  Reviewed by Signature/Title    ...................................................              ..............................................  Date                                               Time          22EPIC Rev 08/18

## 2021-01-14 NOTE — TELEPHONE ENCOUNTER
Pending Prescriptions:                       Disp   Refills    oxyCODONE IR (ROXICODONE) 10 MG tablet     150 ta*0        Sig: Take 1 tablet every 4-6 hours as needed for           breakthrough pain. Max of 5/day. Fill 12/16/2020.           Start 12/18/2020. 30 day script    Routing refill request to provider for review/approval because:  Drug not on the [facility] refill protocol

## 2021-01-14 NOTE — ED PROVIDER NOTES
History     Chief Complaint   Patient presents with     Neck Pain     HPI  Joselito Abarca is a 55 year old male who presented to the emergency room today secondary to concerns of acute exacerbation of his chronic neck pain symptoms.  Patient is well-known to me over many years of being seen in the emergency room for intermittent episodes of his neck pain symptoms.  Patient has had multiple prior surgeries to his spine.  Patient is in a chronic pain program.  The patient has been seen in our ER for several years receiving occasional trigger point injections to the musculature of the trapezius muscle to help improve his symptoms.  Is requesting similar trigger point injections today.  He states that his pain is in the base of his skull bilaterally extending down into the upper shoulder region bilaterally.  His right side is little worse than his left today.  He denies any fever or chills symptoms.  He denies any recent fall or injury that might have exacerbated his neck pain.  He denies any loss of touch sensation or weakness more than usual to the extremities.    Allergies:  Allergies   Allergen Reactions     Vicodin [Hydrocodone-Acetaminophen] Nausea     Other reaction(s): Diaphoresis, Vomiting  HEADACHE       Problem List:    Patient Active Problem List    Diagnosis Date Noted     Back pain, chronic 02/21/2019     Priority: Medium     S/P laparoscopic fundoplication 02/21/2019     Priority: Medium     Long-segment Olson's esophagus 02/21/2019     Priority: Medium     Gastroesophageal reflux disease, esophagitis presence not specified 09/21/2018     Priority: Medium     IMO Regulatory Load OCT 2020       Chronic seasonal allergic rhinitis due to fungal spores 06/07/2018     Priority: Medium     Status post cervical spinal arthrodesis 11/29/2017     Priority: Medium     Chronic, continuous use of opioids 12/13/2016     Priority: Medium     Patient is followed by Gregory G. Schoen, MD for ongoing prescription  of pain medication.  All refills should be approved by this provider, or covering partner.    Medication(s): Oxycodone 5 mg IR: Take 2 capsules (10 mg) by mouth every 4 hours as needed 2 caps q 4 hour prn pain up to 12 per day .   Methadone 10 mg three times daily, 90 per month  Clonazepam 0.5 mg tid, 90 per month   Clinic visit frequency required: Q 3 months     Controlled substance agreement:  Encounter-Level CSA - 2/27/15:               Controlled Substance Agreement - Scan on 3/14/2015  8:47 AM : Controlled Medication Agreement-02/27/15 (below)            Pain Clinic evaluation in the past: Yes    DIRE Total Score(s):  No flowsheet data found.    [facility] website verification:  10/30/2016     [email or web address]         Moderate persistent asthma without complication 11/02/2016     Priority: Medium     Acute respiratory failure with hypoxia (H) 04/29/2016     Priority: Medium     Nausea with vomiting 04/27/2016     Priority: Medium     Cough 03/06/2016     Priority: Medium     Tobacco dependence syndrome 02/17/2016     Priority: Medium     Leukocytosis 02/16/2016     Priority: Medium     Disease of bronchial airway (HCC) 02/12/2016     Priority: Medium     Bronchiolectasis (H) 02/12/2016     Priority: Medium     Degeneration of cervical intervertebral disc 01/26/2015     Priority: Medium     Health Care Home 09/30/2013     Priority: Medium     Status:  Accepted  Care Coordinator:    Melissa Behl BSN, RN, PHN  [facility] Care Coordinator  [phone]     See Letters for [facility] Care Plan  Date:  April 26, 2016            Persons encountering health services in other specified circumstances 09/30/2013     Priority: Medium     Overview:   Overview:   Status:  Accepted  Care Coordinator:    Melissa Behl BSN, RN, PHN  [facility] Care Coordinator  [phone]     See Letters for [facility] Care Plan  Date:  April 26, 2016       Arthrodesis status 06/15/2011     Priority: Medium     Neck pain 06/15/2011     Priority:  Medium     History of arthrodesis 06/15/2011     Priority: Medium     CARDIOVASCULAR SCREENING; LDL GOAL LESS THAN 160 10/31/2010     Priority: Medium     Encounter for screening for cardiovascular disorders 10/31/2010     Priority: Medium     Intervertebral cervical disc disorder with myelopathy, cervical region 11/12/2009     Priority: Medium     Patient is followed by TIARA JIMENEZ for ongoing prescription of narcotic pain medicine.  Med: methadone 10 mg tid. Taking oxycodone up to 8 per day, 120 per month  Maximum use per month: 90  Expected duration: ongoing  Narcotic agreement on file: YES  Clinic visit recommended: Q 3 months         Intervertebral disc disorder of cervical region with myelopathy 11/12/2009     Priority: Medium     Overview:   Overview:   Patient is followed by TIARA JIMENEZ for ongoing prescription of narcotic pain medicine.  Med: methadone 10 mg tid. Taking oxycodone up to 8 per day, 120 per month  Maximum use per month: 90  Expected duration: ongoing  Narcotic agreement on file: YES  Clinic visit recommended: Q 3 months       Constipation 03/19/2008     Priority: Medium     Problem list name updated by automated process. Provider to review       Thoracic or lumbosacral neuritis or radiculitis, unspecified 03/19/2008     Priority: Medium     Esophageal reflux 07/09/2003     Priority: Medium     Generalized anxiety disorder 07/08/2003     Priority: Medium     Alcohol abuse, in remission 07/08/2003     Priority: Medium     Nondependent alcohol abuse, in remission 07/08/2003     Priority: Medium        Past Medical History:    Past Medical History:   Diagnosis Date     Allergic rhinitis      Anxiety      Depressive disorder      GERD (gastroesophageal reflux disease)      Neck pain 6/15/2011     Pneumonia      Uncomplicated asthma        Past Surgical History:    Past Surgical History:   Procedure Laterality Date     BRONCHOSCOPY (RIGID OR FLEXIBLE), DIAGNOSTIC N/A 8/7/2018     Procedure: COMBINED BRONCHOSCOPY (RIGID OR FLEXIBLE), LAVAGE;  Bronchoscopy with Lavage and Transbronchial Biopsy;  Surgeon: Yung Miranda MD;  Location: [facility]     COLONOSCOPY WITH CO2 INSUFFLATION N/A 9/24/2018    Procedure: COLONOSCOPY WITH CO2 INSUFFLATION;  Colon and upper endoscopy ;  Surgeon: Devin Pelayo MD;  Location: [facility]     COMBINED ESOPHAGOSCOPY, GASTROSCOPY, DUODENOSCOPY (EGD) WITH CO2 INSUFFLATION N/A 9/24/2018    Procedure: COMBINED ESOPHAGOSCOPY, GASTROSCOPY, DUODENOSCOPY (EGD) WITH CO2 INSUFFLATION;  Colon and upper endoscopy ;  Surgeon: Devin Pelayo MD;  Location: [facility]     DISCECTOMY LUMBAR POSTERIOR MICROSCOPIC ONE LEVEL  2/21/2012    Procedure:DISCECTOMY LUMBAR POSTERIOR MICROSCOPIC ONE LEVEL; LEFT T1-T2 THORASIC HEMILAMINECTOMY MICRODISCECTOMY WITH MEXTRIX II ; Surgeon:SHARON PURI; Location:[facility]     DISCECTOMY, FUSION CERVICAL ANTERIOR ONE LEVEL, COMBINED N/A 11/29/2017    Procedure: COMBINED DISCECTOMY, FUSION CERVICAL ANTERIOR ONE LEVEL;  Anterior Cervical Discectomy and Fusion Cervical Six - Cervical Seven, Exploration and Revision Cervical Four - Cervical Six with Hardware Removal;  Surgeon: Nikolas Vasques MD;  Location: [facility]     ESOPHAGEAL IMPEDENCE FUNCTION TEST WITH 24 HOUR PH GREATER THAN 1 HOUR N/A 12/12/2018    Procedure: ESOPHAGEAL IMPEDENCE FUNCTION TEST WITH 24 HOUR PH GREATER THAN 1 HOUR;  Surgeon: Atif Oconnor MD;  Location: [facility]     ESOPHAGOSCOPY, GASTROSCOPY, DUODENOSCOPY (EGD), COMBINED N/A 9/24/2018    Procedure: COMBINED ESOPHAGOSCOPY, GASTROSCOPY, DUODENOSCOPY (EGD), BIOPSY SINGLE OR MULTIPLE;;  Surgeon: Devin Pelyao MD;  Location: [facility]     EXPLORE SPINE, REMOVE HARDWARE, COMBINED N/A 11/29/2017    Procedure: COMBINED EXPLORE SPINE, REMOVE HARDWARE;;  Surgeon: Nikolas Vasques MD;  [facility]     FUSION CERVICAL ANTERIOR TWO LEVELS  1/29/2010     HC DRAIN/INJ MAJOR JOINT/BURSA W/O US   5/5/2008    Left sacroiliac joint injection.     HC INJ EPIDURAL LUMBAR/SACRAL W/WO CONTRAST  2009     HEAD & NECK SURGERY      2013     HERNIA REPAIR       INJECT BLOCK MEDIAL BRANCH CERVICAL/THORACIC/LUMBAR Bilateral 8/26/2015    Procedure: INJECT BLOCK MEDIAL BRANCH CERVICAL / THORACIC / LUMBAR;  Surgeon: Ronald Driscoll MD;  [facility]     INJECT BLOCK MEDIAL BRANCH CERVICAL/THORACIC/LUMBAR N/A 8/8/2019    Procedure: BLOCK, NERVE, FACET JOINT, MEDIAL BRANCH, DIAGNOSTIC Bilateral Cervical 2,3,4;  Surgeon: Ronald Driscoll MD;  [facility]     INJECT BLOCK MEDIAL BRANCH CERVICAL/THORACIC/LUMBAR N/A 9/13/2019    Procedure: Medial Branch Block Bilateral Cervical 2,3, and 4;  Surgeon: Ronald Driscoll MD;  [facility]     INJECT BLOCK MEDIAL BRANCH CERVICAL/THORACIC/LUMBAR Bilateral 7/3/2020    Procedure: Third occipital and Cervical 3-4 Medial Branch Blocks bilateral;  Surgeon: Ronald Driscoll MD;  [facility]     INJECT BLOCK MEDIAL BRANCH CERVICAL/THORACIC/LUMBAR N/A 7/29/2020    Procedure: medial branch blocks cervical 3-4 and bilateral third occiptal nerves;  Surgeon: Ronald Driscoll MD;  [facility]     INJECT BLOCK MEDIAL BRANCH CERVICAL/THORACIC/LUMBAR Bilateral 11/6/2020    Procedure: Cervical 1-2 Medial Branch Block Bilateral;  Surgeon: Ronald Driscoll MD;  [facility]     INJECT EPIDURAL LUMBAR N/A 2/13/2020    Procedure: Lumber 4-5 bilateral Epidural Injection;  Surgeon: Ronald Driscoll MD;  [facility]     INJECT FACET JOINT Bilateral 5/27/2015    Procedure: INJECT FACET JOINT;  Surgeon: Ronald Driscoll MD;  [facility]     NERVE BLOCK OCCIPITAL Bilateral 7/12/2018    Procedure: NERVE BLOCK OCCIPITAL;  bilateral occipital nerve blocks;  Surgeon: Ronald Driscoll MD;  [facility]     PROCEDURE PLACEHOLDER GENERAL N/A 02/21/2019    Kellen Ward at [facility]     RADIO FREQUENCY ABLATION PULSED CERVICAL Right 10/18/2019    Procedure: CERVICAL RADIOFREQUENCY ABLATION  Cervical 2,3,4;   Surgeon: Ronald Driscoll MD;  [facility]     RADIO FREQUENCY ABLATION PULSED CERVICAL Left 11/14/2019    Procedure: Left Cervical 2, 3, 4 Radiofreqency Ablation;  Surgeon: Ronald Driscoll MD;  [facility]: [facility]       Family History:    Family History   Problem Relation Age of Onset     Family History Negative No family hx of      C.A.D. No family hx of        Social History:  Marital Status:  Single [1]  Social History     Tobacco Use     Smoking status: Current Every Day Smoker     Packs/day: 0.25     Years: 30.00     Pack years: 7.50     Types: Cigars, Cigarettes     Smokeless tobacco: Former User     Quit date: 2012   Substance Use Topics     Alcohol use: No     Alcohol/week: 0.0 standard drinks     Comment: HX OF ABUSE-IN REMISSION     Drug use: No        Medications:         albuterol (PROAIR HFA/PROVENTIL HFA/VENTOLIN HFA) 108 (90 Base) MCG/ACT inhaler       clonazePAM (KLONOPIN) 0.5 MG tablet       DULoxetine (CYMBALTA) 60 MG capsule       fluticasone (FLONASE) 50 MCG/ACT nasal spray       fluticasone (FLOVENT HFA) 220 MCG/ACT inhaler       ipratropium - albuterol 0.5 mg/2.5 mg/3 mL (DUONEB) 0.5-2.5 (3) MG/3ML neb solution       metaxalone (SKELAXIN) 800 MG tablet       nystatin (MYCOSTATIN) 924520 UNIT/ML suspension       oxyCODONE IR (ROXICODONE) 10 MG tablet       tiZANidine (ZANAFLEX) 4 MG tablet       traZODone (DESYREL) 100 MG tablet       vitamin D3 (CHOLECALCIFEROL) 50 mcg (2000 units) tablet       zolpidem (AMBIEN) 10 MG tablet       methadone (DOLOPHINE) 5 MG tablet       naloxone (NARCAN) 4 MG/0.1ML nasal spray       nicotine (NICODERM CQ) 14 MG/24HR 24 hr patch       order for DME       predniSONE (DELTASONE) 20 MG tablet       sildenafil (VIAGRA) 100 MG tablet       topiramate (TOPAMAX) 25 MG tablet       varenicline (CHANTIX SAMRA) 0.5 MG X 11 & 1 MG X 42 tablet       varenicline (CHANTIX) 0.5 MG tablet          Review of Systems   All other systems reviewed and are negative.      Physical  Exam   BP: 128/81  Pulse: 81  Temp: 97.8  F (36.6  C)  Resp: 20  SpO2: 99 %      Physical Exam  Vitals signs and nursing note reviewed.   Constitutional:       General: He is in acute distress.      Appearance: He is not toxic-appearing.   Neck:      Musculoskeletal: Pain with movement and muscular tenderness present. No injury or spinous process tenderness.      Trachea: Phonation normal.     Neurological:      Mental Status: He is alert.         ED Course        Procedures         Procedure: 80mg ( 1ml) dexamethasone mixed with 3ml of 1% lidocaine with epinephrine was divided equally among the 4 trigger points. Cleansing of the skin was performed with alcohol. Joselito was given informed consent as to the possible side effects of the injection to include: allergic reaction, infection, and possible puncture of lung. Joselito agrees to proceed with the trigger point injection(s). A timeout was observed prior to injecting the trigger points. The trigger points areas were injected without complication. Joselito was watched for signs of allergic reaction and none were noted. Joselito was instructed to ice the trigger point areas and continue the gentle stretching exercises. He is encouraged to watch for evidence of infection at the trigger point injection site(s) and return to the ER or his clinic should infection be suspected.         Critical Care time:  none                   Medications   dexamethasone PF (DECADRON) injection 4 mg (4 mg Intramuscular Given by Other Clinician 1/14/21 0118)       Assessments & Plan (with Medical Decision Making)  55-year-old to the ER requesting trigger point injections for his chronic neck pain.  Exam findings consistent with prior exam of this patient with multiple trigger point/myofascial tender sites palpable to the trapezius musculature bilaterally.  Palpations of the site does reproduce referral pain consistent with trigger point.  Trigger point injections performed as noted above.   Patient monitored for any immediate allergic reaction without signs of that noted in the ER  after period of observation.  Patient discharged to home.     I have reviewed the nursing notes.    I have reviewed the findings, diagnosis, plan and need for follow up with the patient.       Discharge Medication List as of 1/14/2021  1:28 AM          Final diagnoses:   Trigger point with neck pain - Multiple       1/14/2021   [facility] EMERGENCY DEPT     Jung Mccarty,   01/14/21 1705

## 2021-01-15 RX ORDER — OXYCODONE HYDROCHLORIDE 10 MG/1
TABLET ORAL
Qty: 150 TABLET | Refills: 0 | Status: SHIPPED | OUTPATIENT
Start: 2021-01-15 | End: 2021-02-03

## 2021-01-15 NOTE — TELEPHONE ENCOUNTER
Received call from patient requesting refill(s) of Oxycodone 10 MG     Last dispensed from pharmacy on 12/16/2020    Patient's last office/virtual visit by prescribing provider on 12/16/2020 albert Shin  Next office/virtual appointment scheduled for non listed    Last urine drug screen date 6/28/2019  Current opioid agreement on file (completed within the last yearOxycodone 10 MG)  No Date of opioid agreement: 7/16/2019    E-prescribe to [facility] pharmacy pharmacy    Will route to [facility] for review and preparation of prescription(s).

## 2021-01-15 NOTE — TELEPHONE ENCOUNTER
Refill appropriate. Sent to requested pharmacy. Patient to schedule virtual follow up visit prior to his February refill.    [place] Prescription Monitoring Program checked on 1/15/2021.    Ashley Salgado, PAC

## 2021-01-15 NOTE — TELEPHONE ENCOUNTER
Patient's pain meds are managed by the pain clinic and will defer to them for refill.  Electronically signed by Greg Schoen, MD

## 2021-01-15 NOTE — TELEPHONE ENCOUNTER
Please process a refill of Oxycodone 10 MG to [facility] pharmacy.    MAXIMUS SchreiberN, RN  Care Coordinator  [facility] [place]

## 2021-01-15 NOTE — TELEPHONE ENCOUNTER
Medication refill information reviewed. Please ask patient to schedule a video visit with Ashley Salgado prior to his next refill.     Due date for oxyCODONE IR (ROXICODONE) 10 MG tablet is 01/17/21     Prescriptions prepped for review.     Will route to provider.

## 2021-01-18 DIAGNOSIS — M47.812 ARTHROPATHY OF CERVICAL FACET JOINT: ICD-10-CM

## 2021-01-18 DIAGNOSIS — M54.6 CHRONIC MIDLINE THORACIC BACK PAIN: ICD-10-CM

## 2021-01-18 DIAGNOSIS — G89.29 CHRONIC MIDLINE THORACIC BACK PAIN: ICD-10-CM

## 2021-01-18 DIAGNOSIS — M54.2 CERVICALGIA: ICD-10-CM

## 2021-01-18 DIAGNOSIS — G89.4 CHRONIC PAIN SYNDROME: ICD-10-CM

## 2021-01-19 RX ORDER — TOPIRAMATE 25 MG/1
TABLET, FILM COATED ORAL
Qty: 180 TABLET | Refills: 1 | Status: SHIPPED | OUTPATIENT
Start: 2021-01-19 | End: 2021-03-18

## 2021-01-19 NOTE — TELEPHONE ENCOUNTER
Prescription approved per [facility] Refill Protocol.    Ailyn Tobias RN

## 2021-01-29 NOTE — PROGRESS NOTES
"Spenser is a 55 year old who is being evaluated via a billable telephone visit.      What phone number would you like to be contacted at? [phone]  How would you like to obtain your AVS? Mail a copy      [facility]     CHIEF COMPLAINT:   Pain  -neck pain     INTERVAL HISTORY:  Last seen on 12/16/2020  Dr Shin    Recommendations at last visit included:  1. Diagnostic Studies:  no  2. - Laboratory studies:  no  3. - Urine toxicology screen today: no      Therapies:   4. - Physical Therapy: not at this time, will need to if he opts to proceed with cervical RFA  5. - Clinical Health Psychologist to address issues of relaxation, behavioral change, coping style, and other factors important to improvement: consider as we further taper his opioids     Medication Management:   6. - Start skelaxin. Never started after last visit. 800mg TID PRN  7. - Continue oxycodone 10mg, #5 per day. Refilled today.   8. - Continue topamax 75 mg BID  9. - Consider nortriptyline     Further procedures recommended:   10. - continue cervical MBB/RFA workup with Dr. Driscoll     Follow up: 2 mo     Since his last visit, Joselito Abarca reports:    He states that he is not \"too good\". He got the injections in his neck, the trial ones. He sent the paperwork back as the injections worked. He states that he has not heard anything back. He states that he has a lot of soreness at the base of his skull. He states that he'll turn his head and will \"black out\"/\"almost pass out\" as his pain will get so severe. He states that he has also been having pain in his low back.     When he is sleeping he will wake up due to pain and have to change locations. He did talk with his psychiatrist and got something to help with sleep.    He does think his muscles are contributing to his pain as well. He feels that his muscles are pulling down on his neck/skull causing pain. He states that he has pain going down his arms as well. He " states that when he straightens his arms out he can feel the pain in his arms. He states that it is getting difficult to function. He has been using metaxalone but he has not seen relief from it.     Pain Information:              Pain today 7/10     Annual Controlled Substance Agreement: 2019  UDS: 2019    CURRENT RELEVANT PAIN MEDICATIONS:   Cymbalta 60mg daily  Clonazepam 0.5mg-takes 3-4/day  Oxycodone 10mg every 4-6 hours max of 5/day  Topamax 25mg: takes 3 tabs twice daily  Metaxalone 800m tab three times daily    Previous Medications: (H--helped; HI--Helped initially; SWH-- somewhat helpful, NH--No help; W--worse; SE--side effects)   Opiates: hydrocodone SE allergic, fever, sweaty, headache, methadone H, Oxycodone H  NSAIDS: Ibuprofen NH, Aleve NH  Anti-migraine mediations: Fiorinal H  Muscle Relaxants: Valium H, methocarbamol NH, Tizanidine NH, Skelaxin NH  Neuropathics: Gabapentin H SE breathing issues  Anti-depressants: Cymbalta NH  Anxiolytics: Klonopin H  Topicals: Lidocaine H  Other medications not covered above: Tylenol NH     Past Pain Treatments:  Pain Clinic:   Yes, he was previously seen at [facility] and [facility]   PT: Yes, has done many times  Psychologist: No  Relaxation techniques/biofeedback: No  Chiropractor: Yes, feels that it made it worse  Acupuncture: Yes, just did one session  Pharmacotherapy:               Opioids: Yes                Non-opioids:    Yes   TENs Unit:Yes, aggravates the pain  Injections:     2018 bilateral occipital nerve blocks ,     2015 and 2015Cervical medial branch blocks      Has had low back pain injections at [facility].     10/18/2019 Right cervical RFA ,     2019 left cervical RFA .    2020 L4-5 REVA    2020 TPI neck in ER  Self-care:   Yes, hot tubs, ice packs, heating pads  Surgeries related to pain: Yes,  1. anterior cervical discectomy and fusion C6-7, exploration and revision C4-6 with hardware removal 2017,   2. Left  T1-T2 thoracic hemilaminectomy, microdiscectomy 02/21/2012,   3. Removal of C4 through C6 anterior cervical plate, Exploration of C4-C5 and C5-C6 anterior cervical fusion, C4-C5 and C5-C6 arthrodesis, C4 through C6 anterior cervical instrumentation, and [place] of right iliac hip graft.  03/15/2011  4. C4-C5 and C5-C6 anterior cervical discectomy and fusion with instrumentation and neural electrophysiologic monitoring 01/26/2010     [place] Board of Pharmacy Data Base Reviewed:    YES; As expected, no concern for misuse/abuse of controlled medications based on this report. Checked 1/15/21     THE 4 As OF OPIOID MAINTENANCE ANALGESIA    Analgesia: Is pain relief clinically significant? NO   Activity: Is patient functional and able to perform Activities of Daily Living? YES   Adverse effects: Is patient free from adverse side effects from opiates? YES   Adherence to Rx protocol: Is patient adhering to Controlled Substance Agreement and taking medications ONLY as ordered? YES     MME: 75    Medications:  Current Outpatient Prescriptions          Current Outpatient Medications   Medication Sig Dispense Refill     clonazePAM (KLONOPIN) 0.5 MG tablet TAKE 1/2 TO 1 TABLET BY MOUTH THREE TIMES A DAY AS NEEDED FOR ANXIETY 90 tablet 0     DULoxetine (CYMBALTA) 60 MG capsule Take 1 capsule (60 mg) by mouth daily 30 capsule 3     FLOVENT  MCG/ACT Inhaler INHALE 2 PUFFS INTO THE LUNGS TWICE DAILY 36 g 2     fluticasone (FLONASE) 50 MCG/ACT nasal spray SPRAY 2 SPRAYS IN EACH NOSTRIL EVERY DAY 16 g 5     ipratropium - albuterol 0.5 mg/2.5 mg/3 mL (DUONEB) 0.5-2.5 (3) MG/3ML neb solution Take 1 vial (3 mLs) by nebulization every 4 hours as needed for shortness of breath / dyspnea 30 vial 0     methadone (DOLOPHINE) 5 MG tablet Take 1.5 tablets every 12 hours.  Fill 2/28/2020. Start 2/29/2020. 90 tablet 0     naloxone (NARCAN) 4 MG/0.1ML nasal spray Spray 4 mg into one nostril alternating nostrils once as needed for opioid  reversal every 2-3 minutes until assistance arrives         nystatin (MYCOSTATIN) 559057 UNIT/ML suspension TAKE 5 MLS BY MOUTH FOUR TIMES A  mL 0     order for DME Equipment being ordered: Nebulizer mask and tubing 1 each 1     oxyCODONE IR (ROXICODONE) 10 MG tablet Take 1 tablet every 6 hours as needed for breakthrough pain. Max of 4/day. Fill 2/19/2020. Start 2/22/2020. 30 day script 115 tablet 0     sildenafil (VIAGRA) 100 MG tablet Take 1 tablet (100 mg) by mouth daily as needed 30 min to 4 hrs before sex. Do not use with nitroglycerin, terazosin or doxazosin. 4 tablet 0     tiZANidine (ZANAFLEX) 2 MG tablet TAKE ONE TO TWO TABLETS BY MOUTH THREE TIMES A DAY AS NEEDED FOR MUSCLE SPASMS/TENSION 60 tablet 0     traZODone (DESYREL) 100 MG tablet Take 3 tablets (300 mg) by mouth At Bedtime 90 tablet 3     VENTOLIN  (90 Base) MCG/ACT inhaler INHALE 2 PUFFS INTO THE LUNGS EVERY 6 HOURS AS NEEDED FOR SHORTNESS OF BREATH, DIFFICULTY BREATHING OR WHEEZING. 18 g 3     vitamin D3 (CHOLECALCIFEROL) 2000 units tablet TAKE ONE TABLET BY MOUTH EVERY  tablet 3     zolpidem (AMBIEN) 10 MG tablet Take 10 mg by mouth nightly as needed                   Assessment:  Joselito Abarca is a 55 year old male who presents today for follow up regarding his:        1. Arthropathy of cervical facet joint  2. cervicalgia  3. Midline thoracic back pain  4. Myofascial pain  5. Chronic pain syndrome  6. Chronic use of opioids      He reports % pain relief for 2 hours from the last MBB. He would like to proceed with the RFA. Will continue current medications at current dosing.      Plan:     Diagnosis reviewed, treatment option addressed, and risk/benifits discussed.  Self-care instructions given.  I am recommending a multidisciplinary treatment plan to help this patient better manage pain.       1. Physical Therapy:  not at this time  2. Clinical Health Psychologist:  NO, but we may need to consider this as we  further taper his opiates.    3. Diagnostic Studies: none at this time  4. Medication Management:      Continue Skelaxin 800mg three times daily as needed for muscle pain    Oxycodone 10mg: increase to 1 tab every 4-6 hours as needed. Max of 5 tabs/day.    Topamax 75mg BID   5. Further procedures recommended: Cervical radiofrequency [id number]  6. Recommendations to PCP. See above  7. UDS to be completed in 48-72 hours        Follow up with this provider: 8 Weeks    The total TIME spent on this patient on the day of the appointment was 31 minutes.      Ashley Salgado [facility]

## 2021-02-02 NOTE — MR AVS SNAPSHOT
"              After Visit Summary   3/17/2017    Joselito Abarca    MRN: [id number]           Patient Information     Date Of Birth          1965        Visit Information        Provider Department      3/17/2017 12:30 PM Schoen, Gregory G, MD [facility]        Today's Diagnoses     Hypokalemia    -  1    Pneumonia of both lower lobes due to infectious organism           Follow-ups after your visit        Who to contact     If you have questions or need follow up information about today's clinic visit or your schedule please contact [facility] directly at [phone].  Normal or non-critical lab and imaging results will be communicated to you by bublhart, letter or phone within 4 business days after the clinic has received the results. If you do not hear from us within 7 days, please contact the clinic through bublhart or phone. If you have a critical or abnormal lab result, we will notify you by phone as soon as possible.  Submit refill requests through [employer] or call your pharmacy and they will forward the refill request to us. Please allow 3 business days for your refill to be completed.          Additional Information About Your Visit        MyChart Information     [employer] lets you send messages to your doctor, view your test results, renew your prescriptions, schedule appointments and more. To sign up, go to [email or web address] . Click on \"Log in\" on the left side of the screen, which will take you to the Welcome page. Then click on \"Sign up Now\" on the right side of the page.     You will be asked to enter the access code listed below, as well as some personal information. Please follow the directions to create your username and password.     Your access code is: [email or web address]  Expires: 2017  8:23 PM     Your access code will  in 90 days. If you need help or a new code, please call your [facility] or [phone].        Care EveryWhere ID     This is " Pertinent assessments: A & O x4. VSS. Thoracentesis during day shift. Dressing over sites CDI. Denies SOB & GARAY. On 1.5 LPM NC during day, and on 4 LPM CPAP overnight. Appetite is fair. BG's: 156, 224. Telemetry: SR w/frequent PAC's & BBB. Up SBA with walker.     Major Shift Events: No significant changes.    Treatment Plan: Wean O2 as able and strict I & O's.     Bedside Nurse: Romy Lee RN   your Care EveryWhere ID. This could be used by other organizations to access your [place] medical records  [id number]        Your Vitals Were     Pulse Temperature Respirations Pulse Oximetry BMI (Body Mass Index)       100 96.6  F (35.9  C) (Temporal) 20 96% 25.06 kg/m2        Blood Pressure from Last 3 Encounters:   03/17/17 98/72   03/16/17 109/78   03/06/17 132/86    Weight from Last 3 Encounters:   03/17/17 160 lb (72.6 kg)   03/15/17 161 lb (73 kg)   03/06/17 165 lb (74.8 kg)              We Performed the Following     Potassium        Primary Care Provider Office Phone # Fax #    Gregory G Schoen, [phone]       [facility] [address] [place] [phone]        Goals        General    I will call to schedule an appointment  if I develop new or worsening symptoms. Started 4/26/16. (pt-stated)     Notes - Note created  4/26/2016  3:32 PM by Behl, Melissa K, RN    As of today's date 4/26/2016 goal is met at 0 - 25%.   Goal Status:  Active  Melissa Behl BSN, RN, [facility] Care Coordinator  [phone]        I will use my nebulizers and inhalers as prescribed. Started 4/26/16 (pt-stated)     Notes - Note edited  5/10/2016  8:58 AM by Behl, Melissa K, RN    As of today's date 5/10/2016 goal is met at 51 - 75%.   Goal Status:  Active  Melissa Behl BSN, RN, [facility] Care Coordinator  [phone]          Thank you!     Thank you for choosing [facility]  for your care. Our goal is always to provide you with excellent care. Hearing back from our patients is one way we can continue to improve our services. Please take a few minutes to complete the written survey that you may receive in the mail after your visit with us. Thank you!             Your Updated Medication List - Protect others around you: Learn how to safely use, store and throw away your medicines at www.disposemymeds.org.          This list is accurate as of: 3/17/17  1:22 PM.   Always use your most recent med list.                   Brand Name Dispense Instructions for use    clonazePAM 0.5 MG tablet    klonoPIN    90 tablet    Take 0.5-1 tablets (0.25-0.5 mg) by mouth 3 times daily as needed for anxiety       doxycycline 100 MG tablet    VIBRA-TABS    20 tablet    Take 1 tablet (100 mg) by mouth 2 times daily for 20 doses       escitalopram 10 MG tablet    LEXAPRO     Take 10 mg by mouth daily       fluticasone 220 MCG/ACT Inhaler    FLOVENT HFA    3 Inhaler    Inhale 2 puffs into the lungs 2 times daily       fluticasone 50 MCG/ACT spray    FLONASE    16 g    USE TWO SPRAYS IN EACH NOSTRIL EVERY DAY       gabapentin 300 MG capsule    NEURONTIN    270 capsule    TAKE TWO CAPSULES BY MOUTH THREE TIMES A DAY       ibuprofen 200 MG tablet    ADVIL/MOTRIN     Take 3 tablets (600 mg) by mouth every 6 hours as needed for other (mild pain)       ipratropium - albuterol 0.5 mg/2.5 mg/3 mL 0.5-2.5 (3) MG/3ML neb solution    DUONEB    30 vial    Take 1 vial (3 mLs) by nebulization every 4 hours as needed for shortness of breath / dyspnea       methadone 10 MG tablet    DOLOPHINE    90 tablet    Take 1 tablet (10 mg) by mouth every 8 hours as needed       omeprazole 20 MG CR capsule    priLOSEC    30 capsule    TAKE 1 CAPSULE BY MOUTH DAILY, 30 TO 60 MINUTES BEFORE A MEAL.       oxyCODONE 5 MG capsule    OXY-IR    360 capsule    Take 2 capsules (10 mg) by mouth every 4 hours as needed 2 caps q 4 hour prn pain up to 12 per day May fill 5/24/2012       potassium chloride SA 20 MEQ CR tablet    K-DUR/KLOR-CON M    20 tablet    Take 1 tablet (20 mEq) by mouth 3 times daily       ranitidine 150 MG tablet    ZANTAC    30 tablet    TAKE ONE TABLET BY MOUTH EVERY MORNING       traZODone 100 MG tablet    DESYREL    90 tablet    Take 3 tablets (300 mg) by mouth At Bedtime       * VENTOLIN  (90 BASE) MCG/ACT Inhaler   Generic drug:  albuterol     18 g    INHALE 2 PUFFS INTO THE LUNGS EVERY 6 HOURS AS  NEEDED FOR SHORTNESS OF BREATH       * albuterol (2.5 MG/3ML) 0.083% neb solution     90 mL    NEBULIZE CONTENTS OF ONE VIAL EVERY 4 HOURS AS NEEDED FOR SHORTNESS OF BREATH /DYSPNEA OR WHEEZING       vitamin D 2000 UNITS tablet     100 tablet    Take 1 tablet by mouth daily       zolpidem 10 MG tablet    AMBIEN     Take 10 mg by mouth nightly as needed       * Notice:  This list has 2 medication(s) that are the same as other medications prescribed for you. Read the directions carefully, and ask your doctor or other care provider to review them with you.

## 2021-02-03 ENCOUNTER — VIRTUAL VISIT (OUTPATIENT)
Dept: PALLIATIVE MEDICINE | Facility: CLINIC | Age: 56
End: 2021-02-03
Payer: COMMERCIAL

## 2021-02-03 DIAGNOSIS — M54.6 CHRONIC MIDLINE THORACIC BACK PAIN: ICD-10-CM

## 2021-02-03 DIAGNOSIS — G89.29 CHRONIC MIDLINE THORACIC BACK PAIN: ICD-10-CM

## 2021-02-03 DIAGNOSIS — M47.812 ARTHROPATHY OF CERVICAL FACET JOINT: Primary | ICD-10-CM

## 2021-02-03 DIAGNOSIS — F11.90 CHRONIC, CONTINUOUS USE OF OPIOIDS: ICD-10-CM

## 2021-02-03 DIAGNOSIS — M54.2 CERVICALGIA: ICD-10-CM

## 2021-02-03 DIAGNOSIS — M79.18 MYOFASCIAL PAIN: ICD-10-CM

## 2021-02-03 DIAGNOSIS — G89.4 CHRONIC PAIN SYNDROME: ICD-10-CM

## 2021-02-03 PROCEDURE — 99214 OFFICE O/P EST MOD 30 MIN: CPT | Mod: 95 | Performed by: PHYSICIAN ASSISTANT

## 2021-02-03 RX ORDER — OXYCODONE HYDROCHLORIDE 10 MG/1
TABLET ORAL
Qty: 150 TABLET | Refills: 0 | Status: ON HOLD | OUTPATIENT
Start: 2021-02-03 | End: 2021-03-11

## 2021-02-03 NOTE — PATIENT INSTRUCTIONS
After Visit Instructions:     Thank you for coming to [facility] for your care. It is my goal to partner with you to help you reach your optimal state of health.     I am recommending multidisciplinary care at this time.  The focus of care will be to continue gradual rehabilitation and pain management with medication adjustments as needed.    Continue daily self-care, identifying contributing factors, and monitoring variations in pain level. Continue to integrate self-care into your life.        Schedule physical therapy assessment/visit: You may need this for the radiofrequency ablation    Schedule follow-up with Ashley Salgado PA-C in 8 weeks. You will need to make this appointment.     Call and schedule a urine drug screen to be completed in the next 48-72 hours    Procedures recommended: cervical radiofrequency at C1-2     Medication recommendations:     Continue Skelaxin 800mg three times daily as needed for muscle pain    Oxycodone 10mg: increase to 1 tab every 4-6 hours as needed. Max of 5 tabs/day.    Topamax 75mg BID       Ashley Salgado PA-C  [facility]  [place]    Contact information: [facility]  Clinic Number:  [phone]     Call with any questions about your care and for scheduling assistance.     Calls are returned Monday through Friday between 8 AM and 4:30 PM. We usually get back to you within 2 business days depending on the issue/request.    If we are prescribing your medications:    For opioid medication refills, call the clinic or send a [employer] message 7 days in advance.  Please include:    Name of requested medication    Name of the pharmacy.    For non-opioid medications, call your pharmacy directly to request a refill. Please allow 3-4 days to be processed.     Per [place] State Law:    All controlled substance prescriptions must be filled within 30 days of being written.      For those controlled  substances allowing refills, pickup must occur within 30 days of last fill.      We believe regular attendance is key to your success in our program!      Any time you are unable to keep your appointment we ask that you call us at least 24 hours in advance to cancel.This will allow us to offer the appointment time to another patient.   Multiple missed appointments may lead to dismissal from the clinic.

## 2021-02-07 DIAGNOSIS — M54.2 CERVICALGIA: ICD-10-CM

## 2021-02-07 DIAGNOSIS — M54.6 CHRONIC MIDLINE THORACIC BACK PAIN: ICD-10-CM

## 2021-02-07 DIAGNOSIS — G89.4 CHRONIC PAIN SYNDROME: ICD-10-CM

## 2021-02-07 DIAGNOSIS — F11.90 CHRONIC, CONTINUOUS USE OF OPIOIDS: ICD-10-CM

## 2021-02-07 DIAGNOSIS — M79.18 MYOFASCIAL PAIN: ICD-10-CM

## 2021-02-07 DIAGNOSIS — M47.812 ARTHROPATHY OF CERVICAL FACET JOINT: ICD-10-CM

## 2021-02-07 DIAGNOSIS — G89.29 CHRONIC MIDLINE THORACIC BACK PAIN: ICD-10-CM

## 2021-02-07 PROCEDURE — 80307 DRUG TEST PRSMV CHEM ANLYZR: CPT | Mod: 90 | Performed by: PHYSICIAN ASSISTANT

## 2021-02-07 PROCEDURE — 99000 SPECIMEN HANDLING OFFICE-LAB: CPT | Performed by: PHYSICIAN ASSISTANT

## 2021-02-09 ENCOUNTER — TELEPHONE (OUTPATIENT)
Dept: SURGERY | Facility: CLINIC | Age: 56
End: 2021-02-09

## 2021-02-11 LAB — PAIN DRUG SCR UR W RPTD MEDS: NORMAL

## 2021-02-11 NOTE — TELEPHONE ENCOUNTER
Pt scheduled for right RFA   Date: 2/25/21  Time: 300p  Dr. Driscoll    Pt scheduled for left RFA   Date: 3/11/21  Time: 300p   Dr. Driscoll      Instructed pt to have H&P and  for procedure.  Patient informed of COVID testing process.

## 2021-02-12 DIAGNOSIS — Z11.59 ENCOUNTER FOR SCREENING FOR OTHER VIRAL DISEASES: Primary | ICD-10-CM

## 2021-02-14 ENCOUNTER — HOSPITAL ENCOUNTER (EMERGENCY)
Facility: CLINIC | Age: 56
Discharge: HOME | End: 2021-02-15
Attending: FAMILY MEDICINE | Admitting: FAMILY MEDICINE
Payer: COMMERCIAL

## 2021-02-14 VITALS
OXYGEN SATURATION: 99 % | RESPIRATION RATE: 18 BRPM | SYSTOLIC BLOOD PRESSURE: 136 MMHG | DIASTOLIC BLOOD PRESSURE: 100 MMHG | HEART RATE: 99 BPM | TEMPERATURE: 98.4 F

## 2021-02-14 DIAGNOSIS — N48.9 PENILE LESION: ICD-10-CM

## 2021-02-14 PROCEDURE — 99282 EMERGENCY DEPT VISIT SF MDM: CPT | Performed by: FAMILY MEDICINE

## 2021-02-15 ENCOUNTER — TELEPHONE (OUTPATIENT)
Dept: FAMILY MEDICINE | Facility: CLINIC | Age: 56
End: 2021-02-15

## 2021-02-15 NOTE — ED PROVIDER NOTES
History     Chief Complaint   Patient presents with     Groin Swelling     HPI  Joselito Abarca is a 55 year old male who presents to the ED with a lesion on the shaft of the penis.  This dates back many years.  Noticed when he was 27 years old that he had a bump on the shaft of his penis.  Over the years the skin over it got thinner and at about age 47 he could see some white stuff underneath so he and his girlfriend made a tiny cut and able managed to express out a hard white chunk that was the consistency of bar soap.  It healed up and recurred within a couple of months.  It is gradually gotten bigger.  It now hurts when he has intercourse.  The skin is thin over that area again.  2 days ago he had intercourse with his girlfriend and the following morning it popped open.  He was squeezing it and got some white caseous material and a little bit of purulent material.  When he squeezes on it now he feels a very sharp sensation like there is something still in there.  So has some burning with ejaculation.  I suggested we get him into see urology.    Allergies:  Allergies   Allergen Reactions     Vicodin [Hydrocodone-Acetaminophen] Nausea     Other reaction(s): Diaphoresis, Vomiting  HEADACHE       Problem List:    Patient Active Problem List    Diagnosis Date Noted     Back pain, chronic 02/21/2019     Priority: Medium     S/P laparoscopic fundoplication 02/21/2019     Priority: Medium     Long-segment Olson's esophagus 02/21/2019     Priority: Medium     Gastroesophageal reflux disease, esophagitis presence not specified 09/21/2018     Priority: Medium     IMO Regulatory Load OCT 2020       Chronic seasonal allergic rhinitis due to fungal spores 06/07/2018     Priority: Medium     Status post cervical spinal arthrodesis 11/29/2017     Priority: Medium     Chronic, continuous use of opioids 12/13/2016     Priority: Medium     Patient is followed by Gregory G. Schoen, MD for ongoing prescription of pain  medication.  All refills should be approved by this provider, or covering partner.    Medication(s): Oxycodone 5 mg IR: Take 2 capsules (10 mg) by mouth every 4 hours as needed 2 caps q 4 hour prn pain up to 12 per day .   Methadone 10 mg three times daily, 90 per month  Clonazepam 0.5 mg tid, 90 per month   Clinic visit frequency required: Q 3 months     Controlled substance agreement:  Encounter-Level CSA - 2/27/15:               Controlled Substance Agreement - Scan on 3/14/2015  8:47 AM : Controlled Medication Agreement-02/27/15 (below)            Pain Clinic evaluation in the past: Yes    DIRE Total Score(s):  No flowsheet data found.    Last [facility] website verification:  10/30/2016     [email or web address]         Moderate persistent asthma without complication 11/02/2016     Priority: Medium     Acute respiratory failure with hypoxia (H) 04/29/2016     Priority: Medium     Nausea with vomiting 04/27/2016     Priority: Medium     Cough 03/06/2016     Priority: Medium     Tobacco dependence syndrome 02/17/2016     Priority: Medium     Leukocytosis 02/16/2016     Priority: Medium     Disease of bronchial airway (HCC) 02/12/2016     Priority: Medium     Bronchiolectasis (H) 02/12/2016     Priority: Medium     Degeneration of cervical intervertebral disc 01/26/2015     Priority: Medium     Health Care Home 09/30/2013     Priority: Medium     Status:  Accepted  Care Coordinator:    Melissa Behl BSN, RN, PHN  [facility] Care Coordinator  [phone]     See Letters for [facility] Care Plan  Date:  April 26, 2016            Persons encountering health services in other specified circumstances 09/30/2013     Priority: Medium     Overview:   Overview:   Status:  Accepted  Care Coordinator:    Melissa Behl BSN, RN, PHN  [facility] Care Coordinator  [phone]     See Letters for [facility] Care Plan  Date:  April 26, 2016       Arthrodesis status 06/15/2011     Priority: Medium     Neck pain 06/15/2011     Priority: Medium      History of arthrodesis 06/15/2011     Priority: Medium     CARDIOVASCULAR SCREENING; LDL GOAL LESS THAN 160 10/31/2010     Priority: Medium     Encounter for screening for cardiovascular disorders 10/31/2010     Priority: Medium     Intervertebral cervical disc disorder with myelopathy, cervical region 11/12/2009     Priority: Medium     Patient is followed by TIARA JIMENEZ for ongoing prescription of narcotic pain medicine.  Med: methadone 10 mg tid. Taking oxycodone up to 8 per day, 120 per month  Maximum use per month: 90  Expected duration: ongoing  Narcotic agreement on file: YES  Clinic visit recommended: Q 3 months         Intervertebral disc disorder of cervical region with myelopathy 11/12/2009     Priority: Medium     Overview:   Overview:   Patient is followed by TIARA JIMENEZ for ongoing prescription of narcotic pain medicine.  Med: methadone 10 mg tid. Taking oxycodone up to 8 per day, 120 per month  Maximum use per month: 90  Expected duration: ongoing  Narcotic agreement on file: YES  Clinic visit recommended: Q 3 months       Constipation 03/19/2008     Priority: Medium     Problem list name updated by automated process. Provider to review       Thoracic or lumbosacral neuritis or radiculitis, unspecified 03/19/2008     Priority: Medium     Esophageal reflux 07/09/2003     Priority: Medium     Generalized anxiety disorder 07/08/2003     Priority: Medium     Alcohol abuse, in remission 07/08/2003     Priority: Medium     Nondependent alcohol abuse, in remission 07/08/2003     Priority: Medium        Past Medical History:    Past Medical History:   Diagnosis Date     Allergic rhinitis      Anxiety      Depressive disorder      GERD (gastroesophageal reflux disease)      Neck pain 6/15/2011     Pneumonia      Uncomplicated asthma        Past Surgical History:    Past Surgical History:   Procedure Laterality Date     BRONCHOSCOPY (RIGID OR FLEXIBLE), DIAGNOSTIC N/A 8/7/2018     Procedure: COMBINED BRONCHOSCOPY (RIGID OR FLEXIBLE), LAVAGE;  Bronchoscopy with Lavage and Transbronchial Biopsy;  Surgeon: Yung Miranda MD;  Location: [facility]     COLONOSCOPY WITH CO2 INSUFFLATION N/A 9/24/2018    Procedure: COLONOSCOPY WITH CO2 INSUFFLATION;  Colon and upper endoscopy ;  Surgeon: Devin Pelayo MD;  Location: [facility]     COMBINED ESOPHAGOSCOPY, GASTROSCOPY, DUODENOSCOPY (EGD) WITH CO2 INSUFFLATION N/A 9/24/2018    Procedure: COMBINED ESOPHAGOSCOPY, GASTROSCOPY, DUODENOSCOPY (EGD) WITH CO2 INSUFFLATION;  Colon and upper endoscopy ;  Surgeon: Devin Pelayo MD;  Location: [facility]     DISCECTOMY LUMBAR POSTERIOR MICROSCOPIC ONE LEVEL  2/21/2012    Procedure:DISCECTOMY LUMBAR POSTERIOR MICROSCOPIC ONE LEVEL; LEFT T1-T2 THORASIC HEMILAMINECTOMY MICRODISCECTOMY WITH MEXTRIX II ; Surgeon:SHARON PURI; Location:[facility]     DISCECTOMY, FUSION CERVICAL ANTERIOR ONE LEVEL, COMBINED N/A 11/29/2017    Procedure: COMBINED DISCECTOMY, FUSION CERVICAL ANTERIOR ONE LEVEL;  Anterior Cervical Discectomy and Fusion Cervical Six - Cervical Seven, Exploration and Revision Cervical Four - Cervical Six with Hardware Removal;  Surgeon: Nikolas Vasques MD;  Location: [facility]     ESOPHAGEAL IMPEDENCE FUNCTION TEST WITH 24 HOUR PH GREATER THAN 1 HOUR N/A 12/12/2018    Procedure: ESOPHAGEAL IMPEDENCE FUNCTION TEST WITH 24 HOUR PH GREATER THAN 1 HOUR;  Surgeon: Atif Oconnor MD;  Location: [facility]     ESOPHAGOSCOPY, GASTROSCOPY, DUODENOSCOPY (EGD), COMBINED N/A 9/24/2018    Procedure: COMBINED ESOPHAGOSCOPY, GASTROSCOPY, DUODENOSCOPY (EGD), BIOPSY SINGLE OR MULTIPLE;;  Surgeon: Devin Pelayo MD;  Location: [facility]     EXPLORE SPINE, REMOVE HARDWARE, COMBINED N/A 11/29/2017    Procedure: COMBINED EXPLORE SPINE, REMOVE HARDWARE;;  Surgeon: Nikolas Vasques MD;  [facility]     FUSION CERVICAL ANTERIOR TWO LEVELS  1/29/2010     HC DRAIN/INJ MAJOR JOINT/BURSA W/O US   5/5/2008    Left sacroiliac joint injection.     HC INJ EPIDURAL LUMBAR/SACRAL W/WO CONTRAST  2009     HEAD & NECK SURGERY      2013     HERNIA REPAIR       INJECT BLOCK MEDIAL BRANCH CERVICAL/THORACIC/LUMBAR Bilateral 8/26/2015    Procedure: INJECT BLOCK MEDIAL BRANCH CERVICAL / THORACIC / LUMBAR;  Surgeon: Ronald Driscoll MD;  [facility]     INJECT BLOCK MEDIAL BRANCH CERVICAL/THORACIC/LUMBAR N/A 8/8/2019    Procedure: BLOCK, NERVE, FACET JOINT, MEDIAL BRANCH, DIAGNOSTIC Bilateral Cervical 2,3,4;  Surgeon: Ronald Driscoll MD;  [facility]     INJECT BLOCK MEDIAL BRANCH CERVICAL/THORACIC/LUMBAR N/A 9/13/2019    Procedure: Medial Branch Block Bilateral Cervical 2,3, and 4;  Surgeon: Ronald Driscoll MD;  [facility]     INJECT BLOCK MEDIAL BRANCH CERVICAL/THORACIC/LUMBAR Bilateral 7/3/2020    Procedure: Third occipital and Cervical 3-4 Medial Branch Blocks bilateral;  Surgeon: Ronald Driscoll MD;  [facility]     INJECT BLOCK MEDIAL BRANCH CERVICAL/THORACIC/LUMBAR N/A 7/29/2020    Procedure: medial branch blocks cervical 3-4 and bilateral third occiptal nerves;  Surgeon: Ronald Driscoll MD;  [facility]     INJECT BLOCK MEDIAL BRANCH CERVICAL/THORACIC/LUMBAR Bilateral 11/6/2020    Procedure: Cervical 1-2 Medial Branch Block Bilateral;  Surgeon: Ronald Driscoll MD;  [facility]     INJECT EPIDURAL LUMBAR N/A 2/13/2020    Procedure: Lumber 4-5 bilateral Epidural Injection;  Surgeon: Ronald Driscoll MD;  [facility]     INJECT FACET JOINT Bilateral 5/27/2015    Procedure: INJECT FACET JOINT;  Surgeon: Ronald Driscoll MD;  [facility]     NERVE BLOCK OCCIPITAL Bilateral 7/12/2018    Procedure: NERVE BLOCK OCCIPITAL;  bilateral occipital nerve blocks;  Surgeon: Ronald Driscoll MD;  [facility]     PROCEDURE PLACEHOLDER GENERAL N/A 02/21/2019    Kellen Ward at [facility]     RADIO FREQUENCY ABLATION PULSED CERVICAL Right 10/18/2019    Procedure: CERVICAL RADIOFREQUENCY ABLATION  Cervical 2,3,4;   Surgeon: Ronald Driscoll MD;  [facility]     RADIO FREQUENCY ABLATION PULSED CERVICAL Left 11/14/2019    Procedure: Left Cervical 2, 3, 4 Radiofreqency Ablation;  Surgeon: Ronald Driscoll MD;  [facility]: [facility]       Family History:    Family History   Problem Relation Age of Onset     Family History Negative No family hx of      C.A.D. No family hx of        Social History:  Marital Status:  Single [1]  Social History     Tobacco Use     Smoking status: Current Every Day Smoker     Packs/day: 0.50     Years: 30.00     Pack years: 15.00     Types: Cigars, Cigarettes     Smokeless tobacco: Former User     Quit date: 2012   Substance Use Topics     Alcohol use: No     Alcohol/week: 0.0 standard drinks     Comment: HX OF ABUSE-IN REMISSION     Drug use: No        Medications:         albuterol (PROAIR HFA/PROVENTIL HFA/VENTOLIN HFA) 108 (90 Base) MCG/ACT inhaler       clonazePAM (KLONOPIN) 0.5 MG tablet       DULoxetine (CYMBALTA) 60 MG capsule       fluticasone (FLONASE) 50 MCG/ACT nasal spray       fluticasone (FLOVENT HFA) 220 MCG/ACT inhaler       ipratropium - albuterol 0.5 mg/2.5 mg/3 mL (DUONEB) 0.5-2.5 (3) MG/3ML neb solution       metaxalone (SKELAXIN) 800 MG tablet       naloxone (NARCAN) 4 MG/0.1ML nasal spray       nicotine (NICODERM CQ) 14 MG/24HR 24 hr patch       nystatin (MYCOSTATIN) 282950 UNIT/ML suspension       order for DME       oxyCODONE IR (ROXICODONE) 10 MG tablet       sildenafil (VIAGRA) 100 MG tablet       tiZANidine (ZANAFLEX) 4 MG tablet       topiramate (TOPAMAX) 25 MG tablet       traZODone (DESYREL) 100 MG tablet       varenicline (CHANTIX SAMRA) 0.5 MG X 11 & 1 MG X 42 tablet       varenicline (CHANTIX) 0.5 MG tablet       vitamin D3 (CHOLECALCIFEROL) 50 mcg (2000 units) tablet       zolpidem (AMBIEN) 10 MG tablet          Review of Systems   Genitourinary: Positive for penile pain.       Physical Exam   BP: (!) 136/100  Pulse: 99  Temp: 98.4  F (36.9  C)  Resp: 18  SpO2: 99  %      Physical Exam  Constitutional:       General: He is not in acute distress.     Appearance: Normal appearance.   Pulmonary:      Effort: Pulmonary effort is normal. No respiratory distress.   Genitourinary:     Penis: Circumcised. Tenderness ( ) and lesions ( small papule left shaft, small purulent fluid expressed but there appears to be harder material still inside and it is very tender if squeezed.) present.        Neurological:      Mental Status: He is alert.         ED Course  (with Medical Decision Making)    5-year-old with a lesion on the left side of his penis that has had for over 25 years.  About 8 years ago skin over it got very thin and he expressed a hard white chunk out consisting mostly of bar soap.  Healed up and now has recurred again.  Some pain with intercourse and it popped open yesterday morning.  He squeezed a little bit of purulent along with some white caseous material still feels like there is something sharp inside.  I recommended that we get him into see Dr. Tinoco from urology.  Consult was placed in epic.  Scheduling should call him to set that up otherwise he can call the main office and make his own appointment.  He is comfortable with this plan.            Procedures               Critical Care time:  none               No results found. However, due to the size of the patient record, not all encounters were searched. Please check Results Review for a complete set of results.    Medications - No data to display    Assessments & Plan     I have reviewed the nursing notes.    I have reviewed the findings, diagnosis, plan and need for follow up with the patient.       New Prescriptions    No medications on file       Final diagnoses:   Penile lesion       2/14/2021   [facility] EMERGENCY DEPT     Don Love MD  02/15/21 0008

## 2021-02-15 NOTE — DISCHARGE INSTRUCTIONS
Expect a call from specialty scheduling to set up an appointment to see Dr. Tinoco from urology.  If you miss their call, you can call his main office and make an appointment to be seen either in [place] or [place].  It was nice visiting with you again this evening.  I hope Dr Tinoco can help you find some answers.    Thank you for choosing [facility]. We appreciate the opportunity to meet your urgent medical needs. Please let us know if we could have done anything to make your stay more satisfying.    After discharge, please closely monitor for any new or worsening symptoms. Return to the Emergency Department if you develop any acute worsening signs or symptoms.    If you had lab work, cultures or imaging studies done during your stay, the final results may still be pending. We will call you if your plan of care needs to change. However, if you are not improving as expected, please follow up with your primary care provider or clinic.     Start any prescription medications that were prescribed to you and take them as directed.     Please see additional handouts that may be pertinent to your condition.

## 2021-02-15 NOTE — TELEPHONE ENCOUNTER
Patient stating he is returning call to clinic unsure of what it was about? Stating he is scheduled for surgery and that he was in the ED yesterday and was being referred to Urology. Note Urology had tried to call patient, number given to Speciality for [place] that he can call tomorrow to schedule with Dr. Tinoco due to Speciality being gone for the day. Gloria Devine LPN

## 2021-02-18 DIAGNOSIS — M79.18 MYOFASCIAL PAIN: ICD-10-CM

## 2021-02-18 NOTE — TELEPHONE ENCOUNTER
Skelaxin      Last Written Prescription Date:  01/22/2021  Last Fill Quantity: 60,   # refills: 0  Last Office Visit: 05/08/2020  Future Office visit:    Next 5 appointments (look out 90 days)    Feb 19, 2021  4:00 PM  Pre-Op physical with Rima Sterling PA-C  [facility] [address] [id number]  [phone]   Feb 22, 2021  3:30 PM  Pre-procedure Covid with ER PHARMACY LAB  [facility] ([facility] ) [address] [id number] [phone]       Routing refill request to provider for review/approval because:  Drug not on the [facility], [facility] or [facility] refill protocol or controlled substance    Ailyn Tobias RN

## 2021-02-19 ENCOUNTER — OFFICE VISIT (OUTPATIENT)
Dept: FAMILY MEDICINE | Facility: CLINIC | Age: 56
End: 2021-02-19
Payer: COMMERCIAL

## 2021-02-19 VITALS
BODY MASS INDEX: 24.73 KG/M2 | TEMPERATURE: 97.6 F | SYSTOLIC BLOOD PRESSURE: 102 MMHG | WEIGHT: 167 LBS | HEART RATE: 90 BPM | RESPIRATION RATE: 18 BRPM | OXYGEN SATURATION: 99 % | DIASTOLIC BLOOD PRESSURE: 72 MMHG | HEIGHT: 69 IN

## 2021-02-19 DIAGNOSIS — M54.2 CERVICALGIA: ICD-10-CM

## 2021-02-19 DIAGNOSIS — Z13.1 SCREENING FOR DIABETES MELLITUS: ICD-10-CM

## 2021-02-19 DIAGNOSIS — Z01.818 PREOP GENERAL PHYSICAL EXAM: Primary | ICD-10-CM

## 2021-02-19 LAB
ALBUMIN SERPL-MCNC: 3.8 G/DL (ref 3.4–5)
ALP SERPL-CCNC: 44 U/L (ref 40–150)
ALT SERPL W P-5'-P-CCNC: 16 U/L (ref 0–70)
ANION GAP SERPL CALCULATED.3IONS-SCNC: 6 MMOL/L (ref 3–14)
AST SERPL W P-5'-P-CCNC: 12 U/L (ref 0–45)
BILIRUB SERPL-MCNC: 0.3 MG/DL (ref 0.2–1.3)
BUN SERPL-MCNC: 8 MG/DL (ref 7–30)
CALCIUM SERPL-MCNC: 9.3 MG/DL (ref 8.5–10.1)
CHLORIDE SERPL-SCNC: 108 MMOL/L (ref 94–109)
CO2 SERPL-SCNC: 25 MMOL/L (ref 20–32)
CREAT SERPL-MCNC: 1.09 MG/DL (ref 0.66–1.25)
ERYTHROCYTE [DISTWIDTH] IN BLOOD BY AUTOMATED COUNT: 12.6 % (ref 10–15)
GFR SERPL CREATININE-BSD FRML MDRD: 77 ML/MIN/{1.73_M2}
GLUCOSE SERPL-MCNC: 89 MG/DL (ref 70–99)
HCT VFR BLD AUTO: 43.8 % (ref 40–53)
HGB BLD-MCNC: 14.8 G/DL (ref 13.3–17.7)
MCH RBC QN AUTO: 33.2 PG (ref 26.5–33)
MCHC RBC AUTO-ENTMCNC: 33.8 G/DL (ref 31.5–36.5)
MCV RBC AUTO: 98 FL (ref 78–100)
PLATELET # BLD AUTO: 203 10E9/L (ref 150–450)
POTASSIUM SERPL-SCNC: 4 MMOL/L (ref 3.4–5.3)
PROT SERPL-MCNC: 7.6 G/DL (ref 6.8–8.8)
RBC # BLD AUTO: 4.46 10E12/L (ref 4.4–5.9)
SODIUM SERPL-SCNC: 139 MMOL/L (ref 133–144)
WBC # BLD AUTO: 7.8 10E9/L (ref 4–11)

## 2021-02-19 PROCEDURE — 80053 COMPREHEN METABOLIC PANEL: CPT | Performed by: PHYSICIAN ASSISTANT

## 2021-02-19 PROCEDURE — 99214 OFFICE O/P EST MOD 30 MIN: CPT | Performed by: PHYSICIAN ASSISTANT

## 2021-02-19 PROCEDURE — 85027 COMPLETE CBC AUTOMATED: CPT | Performed by: PHYSICIAN ASSISTANT

## 2021-02-19 PROCEDURE — 36415 COLL VENOUS BLD VENIPUNCTURE: CPT | Performed by: PHYSICIAN ASSISTANT

## 2021-02-19 RX ORDER — METAXALONE 800 MG/1
TABLET ORAL
Qty: 60 TABLET | Refills: 0 | Status: SHIPPED | OUTPATIENT
Start: 2021-02-19 | End: 2021-03-04

## 2021-02-19 ASSESSMENT — PATIENT HEALTH QUESTIONNAIRE - PHQ9: SUM OF ALL RESPONSES TO PHQ QUESTIONS 1-9: 1

## 2021-02-19 ASSESSMENT — MIFFLIN-ST. JEOR: SCORE: 1592.89

## 2021-02-19 NOTE — PROGRESS NOTES
[address] [address]  Phone: [phone]  Fax: [phone]  Primary Provider: Schoen, Gregory G  Pre-op Performing Provider: MOHIT MORALES      PREOPERATIVE EVALUATION:  Today's date: 2/19/2021    Joselito Abarca is a 55 year old male who presents for a preoperative evaluation.    Surgical Information:  Surgery/Procedure: radiofrequency ablation cervical left side  Surgery Location: [place]  Surgeon: dr haque  Surgery Date: 2/25/21 and 3/11/21  Time of Surgery: 2:00  Where patient plans to recover: At home with family  Fax number for surgical facility: Note does not need to be faxed, will be available electronically in Epic.    Type of Anesthesia Anticipated: to be determined    Subjective     HPI related to upcoming procedure: radiofrequency ablation cervical left side    Preop Questions 2/19/2021   1. Have you ever had a heart attack or stroke? No   2. Have you ever had surgery on your heart or blood vessels, such as a stent placement, a coronary artery bypass, or surgery on an artery in your head, neck, heart, or legs? No   3. Do you have chest pain with activity? No   4. Do you have a history of  heart failure? No   5. Do you currently have a cold, bronchitis or symptoms of other infection? No   6. Do you have a cough, shortness of breath, or wheezing? No   7. Do you or anyone in your family have previous history of blood clots? No   8. Do you or does anyone in your family have a serious bleeding problem such as prolonged bleeding following surgeries or cuts? No   9. Have you ever had problems with anemia or been told to take iron pills? No   10. Have you had any abnormal blood loss such as black, tarry or bloody stools? No   11. Have you ever had a blood transfusion? No   12. Are you willing to have a blood transfusion if it is medically needed before, during, or after your surgery? Yes   13. Have you or any of your relatives ever had  problems with anesthesia? No   14. Do you have sleep apnea, excessive snoring or daytime drowsiness? No   15. Do you have any artifical heart valves or other implanted medical devices like a pacemaker, defibrillator, or continuous glucose monitor? No   16. Do you have artificial joints? No   17. Are you allergic to latex? No       Health Care Directive:  Patient does not have a Health Care Directive or Living Will: Discussed advance care planning with patient; however, patient declined at this time.    Preoperative Review of :   reviewed - controlled substances reflected in medication list.    Status of Chronic Conditions:  See problem list for active medical problems.  Problems all longstanding and stable, except as noted/documented.  See ROS for pertinent symptoms related to these conditions.    COPD - Patient has a longstanding history of moderate-severe COPD . Patient has been doing well overall noting COUGH and continues on medication regimen consisting of Flovent and Albuterol or Duonebs as needed without adverse reactions or side effects.    CHRONIC PAIN - Follows with pain management. Frustrated as he has been asked to wean off medication but really struggling with the pain. Hopeful the upcoming procedures will provide him relief.     Review of Systems  CONSTITUTIONAL: NEGATIVE for fever, chills, change in weight  INTEGUMENTARY/SKIN: NEGATIVE for worrisome rashes, moles or lesions  EYES: NEGATIVE for vision changes or irritation  ENT/MOUTH: NEGATIVE for ear, mouth and throat problems  RESP: NEGATIVE for significant cough or SOB  BREAST: NEGATIVE for masses, tenderness or discharge  CV: NEGATIVE for chest pain, palpitations or peripheral edema  GI: NEGATIVE for nausea, abdominal pain, heartburn, or change in bowel habits  : NEGATIVE for frequency, dysuria, or hematuria  MUSCULOSKELETAL: NEGATIVE for significant arthralgias or myalgia  NEURO: NEGATIVE for weakness, dizziness or paresthesias  ENDOCRINE:  NEGATIVE for temperature intolerance, skin/hair changes  HEME: NEGATIVE for bleeding problems  PSYCHIATRIC: NEGATIVE for changes in mood or affect    Patient Active Problem List    Diagnosis Date Noted     Back pain, chronic 02/21/2019     Priority: Medium     S/P laparoscopic fundoplication 02/21/2019     Priority: Medium     Long-segment Olson's esophagus 02/21/2019     Priority: Medium     Gastroesophageal reflux disease, esophagitis presence not specified 09/21/2018     Priority: Medium     IMO Regulatory Load OCT 2020       Chronic seasonal allergic rhinitis due to fungal spores 06/07/2018     Priority: Medium     Status post cervical spinal arthrodesis 11/29/2017     Priority: Medium     Chronic, continuous use of opioids 12/13/2016     Priority: Medium     Patient is followed by Gregory G. Schoen, MD for ongoing prescription of pain medication.  All refills should be approved by this provider, or covering partner.    Medication(s): Oxycodone 5 mg IR: Take 2 capsules (10 mg) by mouth every 4 hours as needed 2 caps q 4 hour prn pain up to 12 per day .   Methadone 10 mg three times daily, 90 per month  Clonazepam 0.5 mg tid, 90 per month   Clinic visit frequency required: Q 3 months     Controlled substance agreement:  Encounter-Level CSA - 2/27/15:               Controlled Substance Agreement - Scan on 3/14/2015  8:47 AM : Controlled Medication Agreement-02/27/15 (below)            Pain Clinic evaluation in the past: Yes    DIRE Total Score(s):  No flowsheet data found.    Last [facility] website verification:  10/30/2016     [email or web address]         Moderate persistent asthma without complication 11/02/2016     Priority: Medium     Acute respiratory failure with hypoxia (H) 04/29/2016     Priority: Medium     Nausea with vomiting 04/27/2016     Priority: Medium     Cough 03/06/2016     Priority: Medium     Tobacco dependence syndrome 02/17/2016     Priority: Medium     Leukocytosis 02/16/2016      Priority: Medium     Disease of bronchial airway (HCC) 02/12/2016     Priority: Medium     Bronchiolectasis (H) 02/12/2016     Priority: Medium     Degeneration of cervical intervertebral disc 01/26/2015     Priority: Medium     Health Care Home 09/30/2013     Priority: Medium     Status:  Accepted  Care Coordinator:    Melissa Behl BSN, RN, PHN  [facility] Care Coordinator  [phone]     See Letters for [facility] Care Plan  Date:  April 26, 2016            Persons encountering health services in other specified circumstances 09/30/2013     Priority: Medium     Overview:   Overview:   Status:  Accepted  Care Coordinator:    Melissa Behl BSN, RN, PHN  [facility] Care Coordinator  [phone]     See Letters for [facility] Care Plan  Date:  April 26, 2016       Arthrodesis status 06/15/2011     Priority: Medium     Neck pain 06/15/2011     Priority: Medium     History of arthrodesis 06/15/2011     Priority: Medium     CARDIOVASCULAR SCREENING; LDL GOAL LESS THAN 160 10/31/2010     Priority: Medium     Encounter for screening for cardiovascular disorders 10/31/2010     Priority: Medium     Intervertebral cervical disc disorder with myelopathy, cervical region 11/12/2009     Priority: Medium     Patient is followed by TIARA JIMENEZ for ongoing prescription of narcotic pain medicine.  Med: methadone 10 mg tid. Taking oxycodone up to 8 per day, 120 per month  Maximum use per month: 90  Expected duration: ongoing  Narcotic agreement on file: YES  Clinic visit recommended: Q 3 months         Intervertebral disc disorder of cervical region with myelopathy 11/12/2009     Priority: Medium     Overview:   Overview:   Patient is followed by TIARA JIMENEZ for ongoing prescription of narcotic pain medicine.  Med: methadone 10 mg tid. Taking oxycodone up to 8 per day, 120 per month  Maximum use per month: 90  Expected duration: ongoing  Narcotic agreement on file: YES  Clinic visit recommended: Q 3 months       Constipation  03/19/2008     Priority: Medium     Problem list name updated by automated process. Provider to review       Thoracic or lumbosacral neuritis or radiculitis, unspecified 03/19/2008     Priority: Medium     Esophageal reflux 07/09/2003     Priority: Medium     Generalized anxiety disorder 07/08/2003     Priority: Medium     Alcohol abuse, in remission 07/08/2003     Priority: Medium     Nondependent alcohol abuse, in remission 07/08/2003     Priority: Medium      Past Medical History:   Diagnosis Date     Allergic rhinitis      Anxiety      Depressive disorder      GERD (gastroesophageal reflux disease)      Neck pain 6/15/2011     Pneumonia      Uncomplicated asthma      Past Surgical History:   Procedure Laterality Date     BRONCHOSCOPY (RIGID OR FLEXIBLE), DIAGNOSTIC N/A 8/7/2018    Procedure: COMBINED BRONCHOSCOPY (RIGID OR FLEXIBLE), LAVAGE;  Bronchoscopy with Lavage and Transbronchial Biopsy;  Surgeon: Yung Miranda MD;  [facility]     COLONOSCOPY WITH CO2 INSUFFLATION N/A 9/24/2018    Procedure: COLONOSCOPY WITH CO2 INSUFFLATION;  Colon and upper endoscopy ;  Surgeon: Devin Pelayo MD;  Location: [facility]     COMBINED ESOPHAGOSCOPY, GASTROSCOPY, DUODENOSCOPY (EGD) WITH CO2 INSUFFLATION N/A 9/24/2018    Procedure: COMBINED ESOPHAGOSCOPY, GASTROSCOPY, DUODENOSCOPY (EGD) WITH CO2 INSUFFLATION;  Colon and upper endoscopy ;  Surgeon: Devin Pelayo MD;  Location: [facility]     DISCECTOMY LUMBAR POSTERIOR MICROSCOPIC ONE LEVEL  2/21/2012    Procedure:DISCECTOMY LUMBAR POSTERIOR MICROSCOPIC ONE LEVEL; LEFT T1-T2 THORASIC HEMILAMINECTOMY MICRODISCECTOMY WITH MEXTRIX II ; Surgeon:SHARON PURI; Location:[facility]     DISCECTOMY, FUSION CERVICAL ANTERIOR ONE LEVEL, COMBINED N/A 11/29/2017    Procedure: COMBINED DISCECTOMY, FUSION CERVICAL ANTERIOR ONE LEVEL;  Anterior Cervical Discectomy and Fusion Cervical Six - Cervical Seven, Exploration and Revision Cervical Four - Cervical Six  with Hardware Removal;  Surgeon: Nikolas Vasques MD;  [facility]     ESOPHAGEAL IMPEDENCE FUNCTION TEST WITH 24 HOUR PH GREATER THAN 1 HOUR N/A 12/12/2018    Procedure: ESOPHAGEAL IMPEDENCE FUNCTION TEST WITH 24 HOUR PH GREATER THAN 1 HOUR;  Surgeon: Atif Oconnor MD;  [facility]     ESOPHAGOSCOPY, GASTROSCOPY, DUODENOSCOPY (EGD), COMBINED N/A 9/24/2018    Procedure: COMBINED ESOPHAGOSCOPY, GASTROSCOPY, DUODENOSCOPY (EGD), BIOPSY SINGLE OR MULTIPLE;;  Surgeon: Devin ePlayo MD;  [facility]: [facility]     EXPLORE SPINE, REMOVE HARDWARE, COMBINED N/A 11/29/2017    Procedure: COMBINED EXPLORE SPINE, REMOVE HARDWARE;;  Surgeon: Nikolas Vasques MD;  [facility]     FUSION CERVICAL ANTERIOR TWO LEVELS  1/29/2010     HC DRAIN/INJ MAJOR JOINT/BURSA W/O US  5/5/2008    Left sacroiliac joint injection.     HC INJ EPIDURAL LUMBAR/SACRAL W/WO CONTRAST  2009     HEAD & NECK SURGERY      2013     HERNIA REPAIR       INJECT BLOCK MEDIAL BRANCH CERVICAL/THORACIC/LUMBAR Bilateral 8/26/2015    Procedure: INJECT BLOCK MEDIAL BRANCH CERVICAL / THORACIC / LUMBAR;  Surgeon: Ronald Driscoll MD;  [facility]     INJECT BLOCK MEDIAL BRANCH CERVICAL/THORACIC/LUMBAR N/A 8/8/2019    Procedure: BLOCK, NERVE, FACET JOINT, MEDIAL BRANCH, DIAGNOSTIC Bilateral Cervical 2,3,4;  Surgeon: Ronald Driscoll MD;  [facility]     INJECT BLOCK MEDIAL BRANCH CERVICAL/THORACIC/LUMBAR N/A 9/13/2019    Procedure: Medial Branch Block Bilateral Cervical 2,3, and 4;  Surgeon: Ronald Driscoll MD;  [facility]     INJECT BLOCK MEDIAL BRANCH CERVICAL/THORACIC/LUMBAR Bilateral 7/3/2020    Procedure: Third occipital and Cervical 3-4 Medial Branch Blocks bilateral;  Surgeon: Ronald Driscoll MD;  [facility]     INJECT BLOCK MEDIAL BRANCH CERVICAL/THORACIC/LUMBAR N/A 7/29/2020    Procedure: medial branch blocks cervical 3-4 and bilateral third occiptal nerves;  Surgeon: Ronald Driscoll MD;  [facility]     INJECT  BLOCK MEDIAL BRANCH CERVICAL/THORACIC/LUMBAR Bilateral 11/6/2020    Procedure: Cervical 1-2 Medial Branch Block Bilateral;  Surgeon: Ronald Driscoll MD;  [facility]     INJECT EPIDURAL LUMBAR N/A 2/13/2020    Procedure: Lumber 4-5 bilateral Epidural Injection;  Surgeon: Ronald Driscoll MD;  [facility]     INJECT FACET JOINT Bilateral 5/27/2015    Procedure: INJECT FACET JOINT;  Surgeon: Ronald Driscoll MD;  [facility]     NERVE BLOCK OCCIPITAL Bilateral 7/12/2018    Procedure: NERVE BLOCK OCCIPITAL;  bilateral occipital nerve blocks;  Surgeon: Ronald Driscoll MD;  [facility]     PROCEDURE PLACEHOLDER GENERAL N/A 02/21/2019    Lap Ed at [facility]     RADIO FREQUENCY ABLATION PULSED CERVICAL Right 10/18/2019    Procedure: CERVICAL RADIOFREQUENCY ABLATION  Cervical 2,3,4;  Surgeon: Ronald Driscoll MD;  [facility]     RADIO FREQUENCY ABLATION PULSED CERVICAL Left 11/14/2019    Procedure: Left Cervical 2, 3, 4 Radiofreqency Ablation;  Surgeon: Ronald Driscoll MD;  [facility]     Current Outpatient Medications   Medication Sig Dispense Refill     albuterol (PROAIR HFA/PROVENTIL HFA/VENTOLIN HFA) 108 (90 Base) MCG/ACT inhaler INHALE 2 PUFFS INTO THE LUNGS EVERY 6 HOURS AS NEEDED FOR SHORTNESS OF BREATH, DIFFICULTY BREATHING OR WHEEZING. 1 Inhaler 11     clonazePAM (KLONOPIN) 0.5 MG tablet TAKE ONE-HALF TO ONE TABLET BY MOUTH THREE TIMES A DAY AS NEEDED FOR ANXIETY 90 tablet 0     DULoxetine (CYMBALTA) 60 MG capsule TAKE ONE CAPSULE BY MOUTH ONCE DAILY 90 capsule 1     fluticasone (FLONASE) 50 MCG/ACT nasal spray SPRAY 2 SPRAYS IN EACH NOSTRIL EVERY DAY 16 g 5     fluticasone (FLOVENT HFA) 220 MCG/ACT inhaler INHALE 2 PUFFS INTO THE LUNGS TWICE DAILY 36 g 2     ipratropium - albuterol 0.5 mg/2.5 mg/3 mL (DUONEB) 0.5-2.5 (3) MG/3ML neb solution NEBULIZE CONTENTS OF ONE VIAL EVERY 4 HOURS AS NEEDED FOR SHORTNESS OF BREATH / DYSPNEA 90 vial 3     metaxalone (SKELAXIN) 800 MG tablet TAKE ONE TABLET  BY MOUTH THREE TIMES A DAY AS NEEDED FOR MUSCLE SORENESS 60 tablet 0     naloxone (NARCAN) 4 MG/0.1ML nasal spray Spray 4 mg into one nostril alternating nostrils once as needed for opioid reversal every 2-3 minutes until assistance arrives       nicotine (NICODERM CQ) 14 MG/24HR 24 hr patch Place 1 patch onto the skin every 24 hours (Patient not taking: Reported on 2/3/2021) 30 patch 0     nystatin (MYCOSTATIN) 157070 UNIT/ML suspension TAKE FIVE MLS MOUTH/THROAT FOUR TIMES A  mL 0     order for DME Equipment being ordered: Nebulizer mask and tubing 1 each 1     oxyCODONE IR (ROXICODONE) 10 MG tablet Take 1 tablet every 4-6 hours as needed for breakthrough pain. Max of 5/day. Fill 02/14/21. Start 02/16/21. 30 day script 150 tablet 0     sildenafil (VIAGRA) 100 MG tablet Take 1 tablet (100 mg) by mouth daily as needed 30 min to 4 hrs before sex. Do not use with nitroglycerin, terazosin or doxazosin. 4 tablet 0     tiZANidine (ZANAFLEX) 4 MG tablet TAKE 1-1.5 TABLETS BY MOUTH THREE TIMES A DAY AS NEEDED FOR MUSCLE SPASMS /TENSION 90 tablet 1     topiramate (TOPAMAX) 25 MG tablet TAKE THREE TABLETS BY MOUTH TWICE A DAY - TITRATE TO THIS DOSE AS INSTRUCTED IN CLINIC 180 tablet 1     traZODone (DESYREL) 100 MG tablet Take 3 tablets (300 mg) by mouth At Bedtime 90 tablet 3     varenicline (CHANTIX SAMRA) 0.5 MG X 11 & 1 MG X 42 tablet Take 0.5 mg tab daily for 3 days, THEN 0.5 mg tab twice daily for 4 days, THEN 1 mg twice daily. (Patient not taking: Reported on 2/3/2021) 53 tablet 0     varenicline (CHANTIX) 0.5 MG tablet Take 1 tablet (0.5 mg) by mouth 2 times daily (Patient not taking: Reported on 2/3/2021) 60 tablet 3     vitamin D3 (CHOLECALCIFEROL) 50 mcg (2000 units) tablet TAKE ONE TABLET BY MOUTH EVERY  tablet 2     zolpidem (AMBIEN) 10 MG tablet Take 10 mg by mouth nightly as needed          Allergies   Allergen Reactions     Vicodin [Hydrocodone-Acetaminophen] Nausea     Other reaction(s):  Diaphoresis, Vomiting  HEADACHE        Social History     Tobacco Use     Smoking status: Current Every Day Smoker     Packs/day: 0.50     Years: 30.00     Pack years: 15.00     Types: Cigars, Cigarettes     Smokeless tobacco: Former User     Quit date: 2012   Substance Use Topics     Alcohol use: No     Alcohol/week: 0.0 standard drinks     Comment: HX OF ABUSE-IN REMISSION     Family History   Problem Relation Age of Onset     Family History Negative No family hx of      C.A.D. No family hx of      History   Drug Use No         Objective     There were no vitals taken for this visit.    Physical Exam    GENERAL APPEARANCE: healthy, alert and no distress     EYES: EOMI,  PERRL     HENT: ear canals and TM's normal and nose and mouth without ulcers or lesions     NECK: no adenopathy, no asymmetry, masses, or scars and thyroid normal to palpation     RESP: lungs clear to auscultation - no rales, rhonchi or wheezes     CV: regular rates and rhythm, normal S1 S2, no S3 or S4 and no murmur, click or rub     ABDOMEN:  soft, nontender, no HSM or masses and bowel sounds normal     MS: extremities normal- no gross deformities noted, no evidence of inflammation in joints, FROM in all extremities.     SKIN: no suspicious lesions or rashes     NEURO: Normal strength and tone, sensory exam grossly normal, mentation intact and speech normal     PSYCH: mentation appears normal. and affect normal/bright     LYMPHATICS: No cervical adenopathy    Recent Labs   Lab Test 02/05/20  1710 07/19/19  1629   HGB 14.7  --      --     139   POTASSIUM 4.1 3.6   CR 0.99 1.03        Diagnostics:  Recent Results (from the past 168 hour(s))   CBC with platelets    Collection Time: 02/19/21  4:48 PM   Result Value Ref Range    WBC 7.8 4.0 - 11.0 10e9/L    RBC Count 4.46 4.4 - 5.9 10e12/L    Hemoglobin 14.8 13.3 - 17.7 g/dL    Hematocrit 43.8 40.0 - 53.0 %    MCV 98 78 - 100 fl    MCH 33.2 (H) 26.5 - 33.0 pg    MCHC 33.8 31.5 - 36.5 g/dL     RDW 12.6 10.0 - 15.0 %    Platelet Count 203 150 - 450 10e9/L   Comprehensive metabolic panel (BMP + Alb, Alk Phos, ALT, AST, Total. Bili, TP)    Collection Time: 02/19/21  4:48 PM   Result Value Ref Range    Sodium 139 133 - 144 mmol/L    Potassium 4.0 3.4 - 5.3 mmol/L    Chloride 108 94 - 109 mmol/L    Carbon Dioxide 25 20 - 32 mmol/L    Anion Gap 6 3 - 14 mmol/L    Glucose 89 70 - 99 mg/dL    Urea Nitrogen 8 7 - 30 mg/dL    Creatinine 1.09 0.66 - 1.25 mg/dL    GFR Estimate 77 >60 mL/min/[1.73_m2]    GFR Estimate If Black 89 >60 mL/min/[1.73_m2]    Calcium 9.3 8.5 - 10.1 mg/dL    Bilirubin Total 0.3 0.2 - 1.3 mg/dL    Albumin 3.8 3.4 - 5.0 g/dL    Protein Total 7.6 6.8 - 8.8 g/dL    Alkaline Phosphatase 44 40 - 150 U/L    ALT 16 0 - 70 U/L    AST 12 0 - 45 U/L      No EKG required, no history of coronary heart disease, significant arrhythmia, peripheral arterial disease or other structural heart disease.    Revised Cardiac Risk Index (RCRI):  The patient has the following serious cardiovascular risks for perioperative complications:   - No serious cardiac risks = 0 points     RCRI Interpretation: 0 points: Class I (very low risk - 0.4% complication rate)       Assessment & Plan     The proposed surgical procedure is considered LOW risk.    Preop general physical exam  - CBC with platelets  - Comprehensive metabolic panel (BMP + Alb, Alk Phos, ALT, AST, Total. Bili, TP)    Cervicalgia    Screening for diabetes mellitus  - Comprehensive metabolic panel (BMP + Alb, Alk Phos, ALT, AST, Total. Bili, TP)    Risks and Recommendations:  The patient has the following additional risks and recommendations for perioperative complications:  Pulmonary:    - Incentive spirometry post-op   - Active nicotine user, advised smoking cessation  Social and Substance:    - Patient is taking medications for chronic pain   - Active nicotine user, advised smoking cessation    Medication Instructions:  Patient is to take all scheduled  medications on the day of surgery    RECOMMENDATION:  APPROVAL GIVEN to proceed with proposed procedure, without further diagnostic evaluation.    Signed Electronically by: Rima Sterling PA-C  Copy of this evaluation report is provided to requesting physician.    [facility]    Revised Cardiac Risk Index

## 2021-02-19 NOTE — H&P (VIEW-ONLY)
[address] [address]  Phone: [phone]  Fax: [phone]  Primary Provider: Schoen, Gregory G  Pre-op Performing Provider: MOHIT MORALES      PREOPERATIVE EVALUATION:  Today's date: 2/19/2021    Joselito Abarca is a 55 year old male who presents for a preoperative evaluation.    Surgical Information:  Surgery/Procedure: radiofrequency ablation cervical left side  Surgery Location: [facility]  Surgeon: dr haque  Surgery Date: 2/25/21 and 3/11/21  Time of Surgery: 2:00  Where patient plans to recover: At home with family  Fax number for surgical facility: Note does not need to be faxed, will be available electronically in Epic.    Type of Anesthesia Anticipated: to be determined    Subjective     HPI related to upcoming procedure: radiofrequency ablation cervical left side    Preop Questions 2/19/2021   1. Have you ever had a heart attack or stroke? No   2. Have you ever had surgery on your heart or blood vessels, such as a stent placement, a coronary artery bypass, or surgery on an artery in your head, neck, heart, or legs? No   3. Do you have chest pain with activity? No   4. Do you have a history of  heart failure? No   5. Do you currently have a cold, bronchitis or symptoms of other infection? No   6. Do you have a cough, shortness of breath, or wheezing? No   7. Do you or anyone in your family have previous history of blood clots? No   8. Do you or does anyone in your family have a serious bleeding problem such as prolonged bleeding following surgeries or cuts? No   9. Have you ever had problems with anemia or been told to take iron pills? No   10. Have you had any abnormal blood loss such as black, tarry or bloody stools? No   11. Have you ever had a blood transfusion? No   12. Are you willing to have a blood transfusion if it is medically needed before, during, or after your surgery? Yes   13. Have you or any of your relatives ever had  problems with anesthesia? No   14. Do you have sleep apnea, excessive snoring or daytime drowsiness? No   15. Do you have any artifical heart valves or other implanted medical devices like a pacemaker, defibrillator, or continuous glucose monitor? No   16. Do you have artificial joints? No   17. Are you allergic to latex? No       Health Care Directive:  Patient does not have a Health Care Directive or Living Will: Discussed advance care planning with patient; however, patient declined at this time.    Preoperative Review of :   reviewed - controlled substances reflected in medication list.    Status of Chronic Conditions:  See problem list for active medical problems.  Problems all longstanding and stable, except as noted/documented.  See ROS for pertinent symptoms related to these conditions.    COPD - Patient has a longstanding history of moderate-severe COPD . Patient has been doing well overall noting COUGH and continues on medication regimen consisting of Flovent and Albuterol or Duonebs as needed without adverse reactions or side effects.    CHRONIC PAIN - Follows with pain management. Frustrated as he has been asked to wean off medication but really struggling with the pain. Hopeful the upcoming procedures will provide him relief.     Review of Systems  CONSTITUTIONAL: NEGATIVE for fever, chills, change in weight  INTEGUMENTARY/SKIN: NEGATIVE for worrisome rashes, moles or lesions  EYES: NEGATIVE for vision changes or irritation  ENT/MOUTH: NEGATIVE for ear, mouth and throat problems  RESP: NEGATIVE for significant cough or SOB  BREAST: NEGATIVE for masses, tenderness or discharge  CV: NEGATIVE for chest pain, palpitations or peripheral edema  GI: NEGATIVE for nausea, abdominal pain, heartburn, or change in bowel habits  : NEGATIVE for frequency, dysuria, or hematuria  MUSCULOSKELETAL: NEGATIVE for significant arthralgias or myalgia  NEURO: NEGATIVE for weakness, dizziness or paresthesias  ENDOCRINE:  NEGATIVE for temperature intolerance, skin/hair changes  HEME: NEGATIVE for bleeding problems  PSYCHIATRIC: NEGATIVE for changes in mood or affect    Patient Active Problem List    Diagnosis Date Noted     Back pain, chronic 02/21/2019     Priority: Medium     S/P laparoscopic fundoplication 02/21/2019     Priority: Medium     Long-segment Olson's esophagus 02/21/2019     Priority: Medium     Gastroesophageal reflux disease, esophagitis presence not specified 09/21/2018     Priority: Medium     IMO Regulatory Load OCT 2020       Chronic seasonal allergic rhinitis due to fungal spores 06/07/2018     Priority: Medium     Status post cervical spinal arthrodesis 11/29/2017     Priority: Medium     Chronic, continuous use of opioids 12/13/2016     Priority: Medium     Patient is followed by Gregory G. Schoen, MD for ongoing prescription of pain medication.  All refills should be approved by this provider, or covering partner.    Medication(s): Oxycodone 5 mg IR: Take 2 capsules (10 mg) by mouth every 4 hours as needed 2 caps q 4 hour prn pain up to 12 per day .   Methadone 10 mg three times daily, 90 per month  Clonazepam 0.5 mg tid, 90 per month   Clinic visit frequency required: Q 3 months     Controlled substance agreement:  Encounter-Level CSA - 2/27/15:               Controlled Substance Agreement - Scan on 3/14/2015  8:47 AM : Controlled Medication Agreement-02/27/15 (below)            Pain Clinic evaluation in the past: Yes    DIRE Total Score(s):  No flowsheet data found.    Last [facility] website verification:  10/30/2016     [email or web address]         Moderate persistent asthma without complication 11/02/2016     Priority: Medium     Acute respiratory failure with hypoxia (H) 04/29/2016     Priority: Medium     Nausea with vomiting 04/27/2016     Priority: Medium     Cough 03/06/2016     Priority: Medium     Tobacco dependence syndrome 02/17/2016     Priority: Medium     Leukocytosis 02/16/2016      Priority: Medium     Disease of bronchial airway (HCC) 02/12/2016     Priority: Medium     Bronchiolectasis (H) 02/12/2016     Priority: Medium     Degeneration of cervical intervertebral disc 01/26/2015     Priority: Medium     Health Care Home 09/30/2013     Priority: Medium     Status:  Accepted  Care Coordinator:    Melissa Behl BSN, RN, PHN  [facility] Care Coordinator  [phone]     See Letters for [facility] Care Plan  Date:  April 26, 2016            Persons encountering health services in other specified circumstances 09/30/2013     Priority: Medium     Overview:   Overview:   Status:  Accepted  Care Coordinator:    Melissa Behl BSN, RN, PHN  [facility] Care Coordinator  [phone]     See Letters for [facility] Care Plan  Date:  April 26, 2016       Arthrodesis status 06/15/2011     Priority: Medium     Neck pain 06/15/2011     Priority: Medium     History of arthrodesis 06/15/2011     Priority: Medium     CARDIOVASCULAR SCREENING; LDL GOAL LESS THAN 160 10/31/2010     Priority: Medium     Encounter for screening for cardiovascular disorders 10/31/2010     Priority: Medium     Intervertebral cervical disc disorder with myelopathy, cervical region 11/12/2009     Priority: Medium     Patient is followed by TIARA JIMENEZ for ongoing prescription of narcotic pain medicine.  Med: methadone 10 mg tid. Taking oxycodone up to 8 per day, 120 per month  Maximum use per month: 90  Expected duration: ongoing  Narcotic agreement on file: YES  Clinic visit recommended: Q 3 months         Intervertebral disc disorder of cervical region with myelopathy 11/12/2009     Priority: Medium     Overview:   Overview:   Patient is followed by TIARA JIMENEZ for ongoing prescription of narcotic pain medicine.  Med: methadone 10 mg tid. Taking oxycodone up to 8 per day, 120 per month  Maximum use per month: 90  Expected duration: ongoing  Narcotic agreement on file: YES  Clinic visit recommended: Q 3 months       Constipation  03/19/2008     Priority: Medium     Problem list name updated by automated process. Provider to review       Thoracic or lumbosacral neuritis or radiculitis, unspecified 03/19/2008     Priority: Medium     Esophageal reflux 07/09/2003     Priority: Medium     Generalized anxiety disorder 07/08/2003     Priority: Medium     Alcohol abuse, in remission 07/08/2003     Priority: Medium     Nondependent alcohol abuse, in remission 07/08/2003     Priority: Medium      Past Medical History:   Diagnosis Date     Allergic rhinitis      Anxiety      Depressive disorder      GERD (gastroesophageal reflux disease)      Neck pain 6/15/2011     Pneumonia      Uncomplicated asthma      Past Surgical History:   Procedure Laterality Date     BRONCHOSCOPY (RIGID OR FLEXIBLE), DIAGNOSTIC N/A 8/7/2018    Procedure: COMBINED BRONCHOSCOPY (RIGID OR FLEXIBLE), LAVAGE;  Bronchoscopy with Lavage and Transbronchial Biopsy;  Surgeon: Yung Miranda MD;  [facility]     COLONOSCOPY WITH CO2 INSUFFLATION N/A 9/24/2018    Procedure: COLONOSCOPY WITH CO2 INSUFFLATION;  Colon and upper endoscopy ;  Surgeon: Devin Pelayo MD;  Location: [facility]     COMBINED ESOPHAGOSCOPY, GASTROSCOPY, DUODENOSCOPY (EGD) WITH CO2 INSUFFLATION N/A 9/24/2018    Procedure: COMBINED ESOPHAGOSCOPY, GASTROSCOPY, DUODENOSCOPY (EGD) WITH CO2 INSUFFLATION;  Colon and upper endoscopy ;  Surgeon: Devin Pelayo MD;  Location: [facility]     DISCECTOMY LUMBAR POSTERIOR MICROSCOPIC ONE LEVEL  2/21/2012    Procedure:DISCECTOMY LUMBAR POSTERIOR MICROSCOPIC ONE LEVEL; LEFT T1-T2 THORASIC HEMILAMINECTOMY MICRODISCECTOMY WITH MEXTRIX II ; Surgeon:SHARON PURI; Location:[facility]     DISCECTOMY, FUSION CERVICAL ANTERIOR ONE LEVEL, COMBINED N/A 11/29/2017    Procedure: COMBINED DISCECTOMY, FUSION CERVICAL ANTERIOR ONE LEVEL;  Anterior Cervical Discectomy and Fusion Cervical Six - Cervical Seven, Exploration and Revision Cervical Four - Cervical Six  with Hardware Removal;  Surgeon: Nikolas Vasques MD;  [facility]     ESOPHAGEAL IMPEDENCE FUNCTION TEST WITH 24 HOUR PH GREATER THAN 1 HOUR N/A 12/12/2018    Procedure: ESOPHAGEAL IMPEDENCE FUNCTION TEST WITH 24 HOUR PH GREATER THAN 1 HOUR;  Surgeon: Atif Oconnor MD;  [facility]     ESOPHAGOSCOPY, GASTROSCOPY, DUODENOSCOPY (EGD), COMBINED N/A 9/24/2018    Procedure: COMBINED ESOPHAGOSCOPY, GASTROSCOPY, DUODENOSCOPY (EGD), BIOPSY SINGLE OR MULTIPLE;;  Surgeon: Devin Pelayo MD;  [facility]     EXPLORE SPINE, REMOVE HARDWARE, COMBINED N/A 11/29/2017    Procedure: COMBINED EXPLORE SPINE, REMOVE HARDWARE;;  Surgeon: Nikolas Vasques MD;  [facility]     FUSION CERVICAL ANTERIOR TWO LEVELS  1/29/2010     HC DRAIN/INJ MAJOR JOINT/BURSA W/O US  5/5/2008    Left sacroiliac joint injection.     HC INJ EPIDURAL LUMBAR/SACRAL W/WO CONTRAST  2009     HEAD & NECK SURGERY      2013     HERNIA REPAIR       INJECT BLOCK MEDIAL BRANCH CERVICAL/THORACIC/LUMBAR Bilateral 8/26/2015    Procedure: INJECT BLOCK MEDIAL BRANCH CERVICAL / THORACIC / LUMBAR;  Surgeon: Ronald Driscoll MD;  [facility]     INJECT BLOCK MEDIAL BRANCH CERVICAL/THORACIC/LUMBAR N/A 8/8/2019    Procedure: BLOCK, NERVE, FACET JOINT, MEDIAL BRANCH, DIAGNOSTIC Bilateral Cervical 2,3,4;  Surgeon: Ronald Driscoll MD;  [facility]     INJECT BLOCK MEDIAL BRANCH CERVICAL/THORACIC/LUMBAR N/A 9/13/2019    Procedure: Medial Branch Block Bilateral Cervical 2,3, and 4;  Surgeon: Ronald Driscoll MD;  [facility]     INJECT BLOCK MEDIAL BRANCH CERVICAL/THORACIC/LUMBAR Bilateral 7/3/2020    Procedure: Third occipital and Cervical 3-4 Medial Branch Blocks bilateral;  Surgeon: Ronald Driscoll MD;  [facility]     INJECT BLOCK MEDIAL BRANCH CERVICAL/THORACIC/LUMBAR N/A 7/29/2020    Procedure: medial branch blocks cervical 3-4 and bilateral third occiptal nerves;  Surgeon: Ronald Driscoll MD;  [facility]     INJECT  BLOCK MEDIAL BRANCH CERVICAL/THORACIC/LUMBAR Bilateral 11/6/2020    Procedure: Cervical 1-2 Medial Branch Block Bilateral;  Surgeon: Ronald Driscoll MD;  [facility]     INJECT EPIDURAL LUMBAR N/A 2/13/2020    Procedure: Lumber 4-5 bilateral Epidural Injection;  Surgeon: Ronald Driscoll MD;  [facility]     INJECT FACET JOINT Bilateral 5/27/2015    Procedure: INJECT FACET JOINT;  Surgeon: Ronald Driscoll MD;  [facility]     NERVE BLOCK OCCIPITAL Bilateral 7/12/2018    Procedure: NERVE BLOCK OCCIPITAL;  bilateral occipital nerve blocks;  Surgeon: Ronald Driscoll MD;  [facility]     PROCEDURE PLACEHOLDER GENERAL N/A 02/21/2019    [place] [facility]     RADIO FREQUENCY ABLATION PULSED CERVICAL Right 10/18/2019    Procedure: CERVICAL RADIOFREQUENCY ABLATION  Cervical 2,3,4;  Surgeon: Ronald Driscoll MD;  [facility]     RADIO FREQUENCY ABLATION PULSED CERVICAL Left 11/14/2019    Procedure: Left Cervical 2, 3, 4 Radiofreqency Ablation;  Surgeon: Ronald Driscoll MD;  [facility]     Current Outpatient Medications   Medication Sig Dispense Refill     albuterol (PROAIR HFA/PROVENTIL HFA/VENTOLIN HFA) 108 (90 Base) MCG/ACT inhaler INHALE 2 PUFFS INTO THE LUNGS EVERY 6 HOURS AS NEEDED FOR SHORTNESS OF BREATH, DIFFICULTY BREATHING OR WHEEZING. 1 Inhaler 11     clonazePAM (KLONOPIN) 0.5 MG tablet TAKE ONE-HALF TO ONE TABLET BY MOUTH THREE TIMES A DAY AS NEEDED FOR ANXIETY 90 tablet 0     DULoxetine (CYMBALTA) 60 MG capsule TAKE ONE CAPSULE BY MOUTH ONCE DAILY 90 capsule 1     fluticasone (FLONASE) 50 MCG/ACT nasal spray SPRAY 2 SPRAYS IN EACH NOSTRIL EVERY DAY 16 g 5     fluticasone (FLOVENT HFA) 220 MCG/ACT inhaler INHALE 2 PUFFS INTO THE LUNGS TWICE DAILY 36 g 2     ipratropium - albuterol 0.5 mg/2.5 mg/3 mL (DUONEB) 0.5-2.5 (3) MG/3ML neb solution NEBULIZE CONTENTS OF ONE VIAL EVERY 4 HOURS AS NEEDED FOR SHORTNESS OF BREATH / DYSPNEA 90 vial 3     metaxalone (SKELAXIN) 800 MG tablet TAKE ONE TABLET  BY MOUTH THREE TIMES A DAY AS NEEDED FOR MUSCLE SORENESS 60 tablet 0     naloxone (NARCAN) 4 MG/0.1ML nasal spray Spray 4 mg into one nostril alternating nostrils once as needed for opioid reversal every 2-3 minutes until assistance arrives       nicotine (NICODERM CQ) 14 MG/24HR 24 hr patch Place 1 patch onto the skin every 24 hours (Patient not taking: Reported on 2/3/2021) 30 patch 0     nystatin (MYCOSTATIN) 173153 UNIT/ML suspension TAKE FIVE MLS MOUTH/THROAT FOUR TIMES A  mL 0     order for DME Equipment being ordered: Nebulizer mask and tubing 1 each 1     oxyCODONE IR (ROXICODONE) 10 MG tablet Take 1 tablet every 4-6 hours as needed for breakthrough pain. Max of 5/day. Fill 02/14/21. Start 02/16/21. 30 day script 150 tablet 0     sildenafil (VIAGRA) 100 MG tablet Take 1 tablet (100 mg) by mouth daily as needed 30 min to 4 hrs before sex. Do not use with nitroglycerin, terazosin or doxazosin. 4 tablet 0     tiZANidine (ZANAFLEX) 4 MG tablet TAKE 1-1.5 TABLETS BY MOUTH THREE TIMES A DAY AS NEEDED FOR MUSCLE SPASMS /TENSION 90 tablet 1     topiramate (TOPAMAX) 25 MG tablet TAKE THREE TABLETS BY MOUTH TWICE A DAY - TITRATE TO THIS DOSE AS INSTRUCTED IN CLINIC 180 tablet 1     traZODone (DESYREL) 100 MG tablet Take 3 tablets (300 mg) by mouth At Bedtime 90 tablet 3     varenicline (CHANTIX SAMRA) 0.5 MG X 11 & 1 MG X 42 tablet Take 0.5 mg tab daily for 3 days, THEN 0.5 mg tab twice daily for 4 days, THEN 1 mg twice daily. (Patient not taking: Reported on 2/3/2021) 53 tablet 0     varenicline (CHANTIX) 0.5 MG tablet Take 1 tablet (0.5 mg) by mouth 2 times daily (Patient not taking: Reported on 2/3/2021) 60 tablet 3     vitamin D3 (CHOLECALCIFEROL) 50 mcg (2000 units) tablet TAKE ONE TABLET BY MOUTH EVERY  tablet 2     zolpidem (AMBIEN) 10 MG tablet Take 10 mg by mouth nightly as needed          Allergies   Allergen Reactions     Vicodin [Hydrocodone-Acetaminophen] Nausea     Other reaction(s):  Diaphoresis, Vomiting  HEADACHE        Social History     Tobacco Use     Smoking status: Current Every Day Smoker     Packs/day: 0.50     Years: 30.00     Pack years: 15.00     Types: Cigars, Cigarettes     Smokeless tobacco: Former User     Quit date: 2012   Substance Use Topics     Alcohol use: No     Alcohol/week: 0.0 standard drinks     Comment: HX OF ABUSE-IN REMISSION     Family History   Problem Relation Age of Onset     Family History Negative No family hx of      C.A.D. No family hx of      History   Drug Use No         Objective     There were no vitals taken for this visit.    Physical Exam    GENERAL APPEARANCE: healthy, alert and no distress     EYES: EOMI,  PERRL     HENT: ear canals and TM's normal and nose and mouth without ulcers or lesions     NECK: no adenopathy, no asymmetry, masses, or scars and thyroid normal to palpation     RESP: lungs clear to auscultation - no rales, rhonchi or wheezes     CV: regular rates and rhythm, normal S1 S2, no S3 or S4 and no murmur, click or rub     ABDOMEN:  soft, nontender, no HSM or masses and bowel sounds normal     MS: extremities normal- no gross deformities noted, no evidence of inflammation in joints, FROM in all extremities.     SKIN: no suspicious lesions or rashes     NEURO: Normal strength and tone, sensory exam grossly normal, mentation intact and speech normal     PSYCH: mentation appears normal. and affect normal/bright     LYMPHATICS: No cervical adenopathy    Recent Labs   Lab Test 02/05/20  1710 07/19/19  1629   HGB 14.7  --      --     139   POTASSIUM 4.1 3.6   CR 0.99 1.03        Diagnostics:  Recent Results (from the past 168 hour(s))   CBC with platelets    Collection Time: 02/19/21  4:48 PM   Result Value Ref Range    WBC 7.8 4.0 - 11.0 10e9/L    RBC Count 4.46 4.4 - 5.9 10e12/L    Hemoglobin 14.8 13.3 - 17.7 g/dL    Hematocrit 43.8 40.0 - 53.0 %    MCV 98 78 - 100 fl    MCH 33.2 (H) 26.5 - 33.0 pg    MCHC 33.8 31.5 - 36.5 g/dL     RDW 12.6 10.0 - 15.0 %    Platelet Count 203 150 - 450 10e9/L   Comprehensive metabolic panel (BMP + Alb, Alk Phos, ALT, AST, Total. Bili, TP)    Collection Time: 02/19/21  4:48 PM   Result Value Ref Range    Sodium 139 133 - 144 mmol/L    Potassium 4.0 3.4 - 5.3 mmol/L    Chloride 108 94 - 109 mmol/L    Carbon Dioxide 25 20 - 32 mmol/L    Anion Gap 6 3 - 14 mmol/L    Glucose 89 70 - 99 mg/dL    Urea Nitrogen 8 7 - 30 mg/dL    Creatinine 1.09 0.66 - 1.25 mg/dL    GFR Estimate 77 >60 mL/min/[1.73_m2]    GFR Estimate If Black 89 >60 mL/min/[1.73_m2]    Calcium 9.3 8.5 - 10.1 mg/dL    Bilirubin Total 0.3 0.2 - 1.3 mg/dL    Albumin 3.8 3.4 - 5.0 g/dL    Protein Total 7.6 6.8 - 8.8 g/dL    Alkaline Phosphatase 44 40 - 150 U/L    ALT 16 0 - 70 U/L    AST 12 0 - 45 U/L      No EKG required, no history of coronary heart disease, significant arrhythmia, peripheral arterial disease or other structural heart disease.    Revised Cardiac Risk Index (RCRI):  The patient has the following serious cardiovascular risks for perioperative complications:   - No serious cardiac risks = 0 points     RCRI Interpretation: 0 points: Class I (very low risk - 0.4% complication rate)       Assessment & Plan     The proposed surgical procedure is considered LOW risk.    Preop general physical exam  - CBC with platelets  - Comprehensive metabolic panel (BMP + Alb, Alk Phos, ALT, AST, Total. Bili, TP)    Cervicalgia    Screening for diabetes mellitus  - Comprehensive metabolic panel (BMP + Alb, Alk Phos, ALT, AST, Total. Bili, TP)    Risks and Recommendations:  The patient has the following additional risks and recommendations for perioperative complications:  Pulmonary:    - Incentive spirometry post-op   - Active nicotine user, advised smoking cessation  Social and Substance:    - Patient is taking medications for chronic pain   - Active nicotine user, advised smoking cessation    Medication Instructions:  Patient is to take all scheduled  medications on the day of surgery    RECOMMENDATION:  APPROVAL GIVEN to proceed with proposed procedure, without further diagnostic evaluation.    Signed Electronically by: Rima Sterling PA-C  Copy of this evaluation report is provided to requesting physician.    [facility]    Revised Cardiac Risk Index

## 2021-02-19 NOTE — H&P (VIEW-ONLY)
[address] [address]  Phone: [phone]  Fax: [phone]  Primary Provider: Schoen, Gregory G  Pre-op Performing Provider: MOHIT MORALES      PREOPERATIVE EVALUATION:  Today's date: 2/19/2021    Joselito Abarca is a 55 year old male who presents for a preoperative evaluation.    Surgical Information:  Surgery/Procedure: radiofrequency ablation cervical left side  Surgery Location: [facility]  Surgeon: dr haque  Surgery Date: 2/25/21 and 3/11/21  Time of Surgery: 2:00  Where patient plans to recover: At home with family  Fax number for surgical facility: Note does not need to be faxed, will be available electronically in Epic.    Type of Anesthesia Anticipated: to be determined    Subjective     HPI related to upcoming procedure: radiofrequency ablation cervical left side    Preop Questions 2/19/2021   1. Have you ever had a heart attack or stroke? No   2. Have you ever had surgery on your heart or blood vessels, such as a stent placement, a coronary artery bypass, or surgery on an artery in your head, neck, heart, or legs? No   3. Do you have chest pain with activity? No   4. Do you have a history of  heart failure? No   5. Do you currently have a cold, bronchitis or symptoms of other infection? No   6. Do you have a cough, shortness of breath, or wheezing? No   7. Do you or anyone in your family have previous history of blood clots? No   8. Do you or does anyone in your family have a serious bleeding problem such as prolonged bleeding following surgeries or cuts? No   9. Have you ever had problems with anemia or been told to take iron pills? No   10. Have you had any abnormal blood loss such as black, tarry or bloody stools? No   11. Have you ever had a blood transfusion? No   12. Are you willing to have a blood transfusion if it is medically needed before, during, or after your surgery? Yes   13. Have you or any of your relatives ever had  problems with anesthesia? No   14. Do you have sleep apnea, excessive snoring or daytime drowsiness? No   15. Do you have any artifical heart valves or other implanted medical devices like a pacemaker, defibrillator, or continuous glucose monitor? No   16. Do you have artificial joints? No   17. Are you allergic to latex? No       Health Care Directive:  Patient does not have a Health Care Directive or Living Will: Discussed advance care planning with patient; however, patient declined at this time.    Preoperative Review of :   reviewed - controlled substances reflected in medication list.    Status of Chronic Conditions:  See problem list for active medical problems.  Problems all longstanding and stable, except as noted/documented.  See ROS for pertinent symptoms related to these conditions.    COPD - Patient has a longstanding history of moderate-severe COPD . Patient has been doing well overall noting COUGH and continues on medication regimen consisting of Flovent and Albuterol or Duonebs as needed without adverse reactions or side effects.    CHRONIC PAIN - Follows with pain management. Frustrated as he has been asked to wean off medication but really struggling with the pain. Hopeful the upcoming procedures will provide him relief.     Review of Systems  CONSTITUTIONAL: NEGATIVE for fever, chills, change in weight  INTEGUMENTARY/SKIN: NEGATIVE for worrisome rashes, moles or lesions  EYES: NEGATIVE for vision changes or irritation  ENT/MOUTH: NEGATIVE for ear, mouth and throat problems  RESP: NEGATIVE for significant cough or SOB  BREAST: NEGATIVE for masses, tenderness or discharge  CV: NEGATIVE for chest pain, palpitations or peripheral edema  GI: NEGATIVE for nausea, abdominal pain, heartburn, or change in bowel habits  : NEGATIVE for frequency, dysuria, or hematuria  MUSCULOSKELETAL: NEGATIVE for significant arthralgias or myalgia  NEURO: NEGATIVE for weakness, dizziness or paresthesias  ENDOCRINE:  NEGATIVE for temperature intolerance, skin/hair changes  HEME: NEGATIVE for bleeding problems  PSYCHIATRIC: NEGATIVE for changes in mood or affect    Patient Active Problem List    Diagnosis Date Noted     Back pain, chronic 02/21/2019     Priority: Medium     S/P laparoscopic fundoplication 02/21/2019     Priority: Medium     Long-segment Olson's esophagus 02/21/2019     Priority: Medium     Gastroesophageal reflux disease, esophagitis presence not specified 09/21/2018     Priority: Medium     IMO Regulatory Load OCT 2020       Chronic seasonal allergic rhinitis due to fungal spores 06/07/2018     Priority: Medium     Status post cervical spinal arthrodesis 11/29/2017     Priority: Medium     Chronic, continuous use of opioids 12/13/2016     Priority: Medium     Patient is followed by Gregory G. Schoen, MD for ongoing prescription of pain medication.  All refills should be approved by this provider, or covering partner.    Medication(s): Oxycodone 5 mg IR: Take 2 capsules (10 mg) by mouth every 4 hours as needed 2 caps q 4 hour prn pain up to 12 per day .   Methadone 10 mg three times daily, 90 per month  Clonazepam 0.5 mg tid, 90 per month   Clinic visit frequency required: Q 3 months     Controlled substance agreement:  Encounter-Level CSA - 2/27/15:               Controlled Substance Agreement - Scan on 3/14/2015  8:47 AM : Controlled Medication Agreement-02/27/15 (below)            Pain Clinic evaluation in the past: Yes    DIRE Total Score(s):  No flowsheet data found.    Last [facility] website verification:  10/30/2016     [email or web address]         Moderate persistent asthma without complication 11/02/2016     Priority: Medium     Acute respiratory failure with hypoxia (H) 04/29/2016     Priority: Medium     Nausea with vomiting 04/27/2016     Priority: Medium     Cough 03/06/2016     Priority: Medium     Tobacco dependence syndrome 02/17/2016     Priority: Medium     Leukocytosis 02/16/2016      Priority: Medium     Disease of bronchial airway (HCC) 02/12/2016     Priority: Medium     Bronchiolectasis (H) 02/12/2016     Priority: Medium     Degeneration of cervical intervertebral disc 01/26/2015     Priority: Medium     Health Care Home 09/30/2013     Priority: Medium     Status:  Accepted  Care Coordinator:    Melissa Behl BSN, RN, PHN  [facility] Care Coordinator  [phone]     See Letters for [facility] Care Plan  Date:  April 26, 2016            Persons encountering health services in other specified circumstances 09/30/2013     Priority: Medium     Overview:   Overview:   Status:  Accepted  Care Coordinator:    Melissa Behl BSN, RN, PHN  [facility] Care Coordinator  [phone]     See Letters for [facility] Care Plan  Date:  April 26, 2016       Arthrodesis status 06/15/2011     Priority: Medium     Neck pain 06/15/2011     Priority: Medium     History of arthrodesis 06/15/2011     Priority: Medium     CARDIOVASCULAR SCREENING; LDL GOAL LESS THAN 160 10/31/2010     Priority: Medium     Encounter for screening for cardiovascular disorders 10/31/2010     Priority: Medium     Intervertebral cervical disc disorder with myelopathy, cervical region 11/12/2009     Priority: Medium     Patient is followed by TIARA JIMENEZ for ongoing prescription of narcotic pain medicine.  Med: methadone 10 mg tid. Taking oxycodone up to 8 per day, 120 per month  Maximum use per month: 90  Expected duration: ongoing  Narcotic agreement on file: YES  Clinic visit recommended: Q 3 months         Intervertebral disc disorder of cervical region with myelopathy 11/12/2009     Priority: Medium     Overview:   Overview:   Patient is followed by TIARA JIMENEZ for ongoing prescription of narcotic pain medicine.  Med: methadone 10 mg tid. Taking oxycodone up to 8 per day, 120 per month  Maximum use per month: 90  Expected duration: ongoing  Narcotic agreement on file: YES  Clinic visit recommended: Q 3 months       Constipation  03/19/2008     Priority: Medium     Problem list name updated by automated process. Provider to review       Thoracic or lumbosacral neuritis or radiculitis, unspecified 03/19/2008     Priority: Medium     Esophageal reflux 07/09/2003     Priority: Medium     Generalized anxiety disorder 07/08/2003     Priority: Medium     Alcohol abuse, in remission 07/08/2003     Priority: Medium     Nondependent alcohol abuse, in remission 07/08/2003     Priority: Medium      Past Medical History:   Diagnosis Date     Allergic rhinitis      Anxiety      Depressive disorder      GERD (gastroesophageal reflux disease)      Neck pain 6/15/2011     Pneumonia      Uncomplicated asthma      Past Surgical History:   Procedure Laterality Date     BRONCHOSCOPY (RIGID OR FLEXIBLE), DIAGNOSTIC N/A 8/7/2018    Procedure: COMBINED BRONCHOSCOPY (RIGID OR FLEXIBLE), LAVAGE;  Bronchoscopy with Lavage and Transbronchial Biopsy;  Surgeon: Yung Miranda MD;  [facility]     COLONOSCOPY WITH CO2 INSUFFLATION N/A 9/24/2018    Procedure: COLONOSCOPY WITH CO2 INSUFFLATION;  Colon and upper endoscopy ;  Surgeon: Devin Pelayo MD;  Location: [facility]     COMBINED ESOPHAGOSCOPY, GASTROSCOPY, DUODENOSCOPY (EGD) WITH CO2 INSUFFLATION N/A 9/24/2018    Procedure: COMBINED ESOPHAGOSCOPY, GASTROSCOPY, DUODENOSCOPY (EGD) WITH CO2 INSUFFLATION;  Colon and upper endoscopy ;  Surgeon: Devin Pelayo MD;  Location: [facility]     DISCECTOMY LUMBAR POSTERIOR MICROSCOPIC ONE LEVEL  2/21/2012    Procedure:DISCECTOMY LUMBAR POSTERIOR MICROSCOPIC ONE LEVEL; LEFT T1-T2 THORASIC HEMILAMINECTOMY MICRODISCECTOMY WITH MEXTRIX II ; Surgeon:SHARON PURI; Location:[facility]     DISCECTOMY, FUSION CERVICAL ANTERIOR ONE LEVEL, COMBINED N/A 11/29/2017    Procedure: COMBINED DISCECTOMY, FUSION CERVICAL ANTERIOR ONE LEVEL;  Anterior Cervical Discectomy and Fusion Cervical Six - Cervical Seven, Exploration and Revision Cervical Four - Cervical Six  with Hardware Removal;  Surgeon: Nikolas Vasques MD;  [facility]     ESOPHAGEAL IMPEDENCE FUNCTION TEST WITH 24 HOUR PH GREATER THAN 1 HOUR N/A 12/12/2018    Procedure: ESOPHAGEAL IMPEDENCE FUNCTION TEST WITH 24 HOUR PH GREATER THAN 1 HOUR;  Surgeon: Atif Oconnor MD;  [facility]     ESOPHAGOSCOPY, GASTROSCOPY, DUODENOSCOPY (EGD), COMBINED N/A 9/24/2018    Procedure: COMBINED ESOPHAGOSCOPY, GASTROSCOPY, DUODENOSCOPY (EGD), BIOPSY SINGLE OR MULTIPLE;;  Surgeon: Devin Pelayo MD;  [facility]     EXPLORE SPINE, REMOVE HARDWARE, COMBINED N/A 11/29/2017    Procedure: COMBINED EXPLORE SPINE, REMOVE HARDWARE;;  Surgeon: Nikolas Vasques MD;  [facility]     FUSION CERVICAL ANTERIOR TWO LEVELS  1/29/2010     HC DRAIN/INJ MAJOR JOINT/BURSA W/O US  5/5/2008    Left sacroiliac joint injection.     HC INJ EPIDURAL LUMBAR/SACRAL W/WO CONTRAST  2009     HEAD & NECK SURGERY      2013     HERNIA REPAIR       INJECT BLOCK MEDIAL BRANCH CERVICAL/THORACIC/LUMBAR Bilateral 8/26/2015    Procedure: INJECT BLOCK MEDIAL BRANCH CERVICAL / THORACIC / LUMBAR;  Surgeon: Ronald Driscoll MD;  [facility]     INJECT BLOCK MEDIAL BRANCH CERVICAL/THORACIC/LUMBAR N/A 8/8/2019    Procedure: BLOCK, NERVE, FACET JOINT, MEDIAL BRANCH, DIAGNOSTIC Bilateral Cervical 2,3,4;  Surgeon: Ronald Driscoll MD;  [facility]     INJECT BLOCK MEDIAL BRANCH CERVICAL/THORACIC/LUMBAR N/A 9/13/2019    Procedure: Medial Branch Block Bilateral Cervical 2,3, and 4;  Surgeon: Ronald Driscoll MD;  [facility]     INJECT BLOCK MEDIAL BRANCH CERVICAL/THORACIC/LUMBAR Bilateral 7/3/2020    Procedure: Third occipital and Cervical 3-4 Medial Branch Blocks bilateral;  Surgeon: Ronald Driscoll MD;  [facility]     INJECT BLOCK MEDIAL BRANCH CERVICAL/THORACIC/LUMBAR N/A 7/29/2020    Procedure: medial branch blocks cervical 3-4 and bilateral third occiptal nerves;  Surgeon: Ronald Driscoll MD;  [facility]     INJECT  BLOCK MEDIAL BRANCH CERVICAL/THORACIC/LUMBAR Bilateral 11/6/2020    Procedure: Cervical 1-2 Medial Branch Block Bilateral;  Surgeon: Ronald Driscoll MD;  [facility]     INJECT EPIDURAL LUMBAR N/A 2/13/2020    Procedure: Lumber 4-5 bilateral Epidural Injection;  Surgeon: Ronald Driscoll MD;  [facility]     INJECT FACET JOINT Bilateral 5/27/2015    Procedure: INJECT FACET JOINT;  Surgeon: Ronald Driscoll MD;  [facility]     NERVE BLOCK OCCIPITAL Bilateral 7/12/2018    Procedure: NERVE BLOCK OCCIPITAL;  bilateral occipital nerve blocks;  Surgeon: Ronald Driscoll MD;  [facility]     PROCEDURE PLACEHOLDER GENERAL N/A 02/21/2019    [place] [facility]     RADIO FREQUENCY ABLATION PULSED CERVICAL Right 10/18/2019    Procedure: CERVICAL RADIOFREQUENCY ABLATION  Cervical 2,3,4;  Surgeon: Ronald Driscoll MD;  [facility]     RADIO FREQUENCY ABLATION PULSED CERVICAL Left 11/14/2019    Procedure: Left Cervical 2, 3, 4 Radiofreqency Ablation;  Surgeon: Ronald Driscoll MD;  [facility]     Current Outpatient Medications   Medication Sig Dispense Refill     albuterol (PROAIR HFA/PROVENTIL HFA/VENTOLIN HFA) 108 (90 Base) MCG/ACT inhaler INHALE 2 PUFFS INTO THE LUNGS EVERY 6 HOURS AS NEEDED FOR SHORTNESS OF BREATH, DIFFICULTY BREATHING OR WHEEZING. 1 Inhaler 11     clonazePAM (KLONOPIN) 0.5 MG tablet TAKE ONE-HALF TO ONE TABLET BY MOUTH THREE TIMES A DAY AS NEEDED FOR ANXIETY 90 tablet 0     DULoxetine (CYMBALTA) 60 MG capsule TAKE ONE CAPSULE BY MOUTH ONCE DAILY 90 capsule 1     fluticasone (FLONASE) 50 MCG/ACT nasal spray SPRAY 2 SPRAYS IN EACH NOSTRIL EVERY DAY 16 g 5     fluticasone (FLOVENT HFA) 220 MCG/ACT inhaler INHALE 2 PUFFS INTO THE LUNGS TWICE DAILY 36 g 2     ipratropium - albuterol 0.5 mg/2.5 mg/3 mL (DUONEB) 0.5-2.5 (3) MG/3ML neb solution NEBULIZE CONTENTS OF ONE VIAL EVERY 4 HOURS AS NEEDED FOR SHORTNESS OF BREATH / DYSPNEA 90 vial 3     metaxalone (SKELAXIN) 800 MG tablet TAKE ONE TABLET  BY MOUTH THREE TIMES A DAY AS NEEDED FOR MUSCLE SORENESS 60 tablet 0     naloxone (NARCAN) 4 MG/0.1ML nasal spray Spray 4 mg into one nostril alternating nostrils once as needed for opioid reversal every 2-3 minutes until assistance arrives       nicotine (NICODERM CQ) 14 MG/24HR 24 hr patch Place 1 patch onto the skin every 24 hours (Patient not taking: Reported on 2/3/2021) 30 patch 0     nystatin (MYCOSTATIN) 719905 UNIT/ML suspension TAKE FIVE MLS MOUTH/THROAT FOUR TIMES A  mL 0     order for DME Equipment being ordered: Nebulizer mask and tubing 1 each 1     oxyCODONE IR (ROXICODONE) 10 MG tablet Take 1 tablet every 4-6 hours as needed for breakthrough pain. Max of 5/day. Fill 02/14/21. Start 02/16/21. 30 day script 150 tablet 0     sildenafil (VIAGRA) 100 MG tablet Take 1 tablet (100 mg) by mouth daily as needed 30 min to 4 hrs before sex. Do not use with nitroglycerin, terazosin or doxazosin. 4 tablet 0     tiZANidine (ZANAFLEX) 4 MG tablet TAKE 1-1.5 TABLETS BY MOUTH THREE TIMES A DAY AS NEEDED FOR MUSCLE SPASMS /TENSION 90 tablet 1     topiramate (TOPAMAX) 25 MG tablet TAKE THREE TABLETS BY MOUTH TWICE A DAY - TITRATE TO THIS DOSE AS INSTRUCTED IN CLINIC 180 tablet 1     traZODone (DESYREL) 100 MG tablet Take 3 tablets (300 mg) by mouth At Bedtime 90 tablet 3     varenicline (CHANTIX SAMRA) 0.5 MG X 11 & 1 MG X 42 tablet Take 0.5 mg tab daily for 3 days, THEN 0.5 mg tab twice daily for 4 days, THEN 1 mg twice daily. (Patient not taking: Reported on 2/3/2021) 53 tablet 0     varenicline (CHANTIX) 0.5 MG tablet Take 1 tablet (0.5 mg) by mouth 2 times daily (Patient not taking: Reported on 2/3/2021) 60 tablet 3     vitamin D3 (CHOLECALCIFEROL) 50 mcg (2000 units) tablet TAKE ONE TABLET BY MOUTH EVERY  tablet 2     zolpidem (AMBIEN) 10 MG tablet Take 10 mg by mouth nightly as needed          Allergies   Allergen Reactions     Vicodin [Hydrocodone-Acetaminophen] Nausea     Other reaction(s):  Diaphoresis, Vomiting  HEADACHE        Social History     Tobacco Use     Smoking status: Current Every Day Smoker     Packs/day: 0.50     Years: 30.00     Pack years: 15.00     Types: Cigars, Cigarettes     Smokeless tobacco: Former User     Quit date: 2012   Substance Use Topics     Alcohol use: No     Alcohol/week: 0.0 standard drinks     Comment: HX OF ABUSE-IN REMISSION     Family History   Problem Relation Age of Onset     Family History Negative No family hx of      C.A.D. No family hx of      History   Drug Use No         Objective     There were no vitals taken for this visit.    Physical Exam    GENERAL APPEARANCE: healthy, alert and no distress     EYES: EOMI,  PERRL     HENT: ear canals and TM's normal and nose and mouth without ulcers or lesions     NECK: no adenopathy, no asymmetry, masses, or scars and thyroid normal to palpation     RESP: lungs clear to auscultation - no rales, rhonchi or wheezes     CV: regular rates and rhythm, normal S1 S2, no S3 or S4 and no murmur, click or rub     ABDOMEN:  soft, nontender, no HSM or masses and bowel sounds normal     MS: extremities normal- no gross deformities noted, no evidence of inflammation in joints, FROM in all extremities.     SKIN: no suspicious lesions or rashes     NEURO: Normal strength and tone, sensory exam grossly normal, mentation intact and speech normal     PSYCH: mentation appears normal. and affect normal/bright     LYMPHATICS: No cervical adenopathy    Recent Labs   Lab Test 02/05/20  1710 07/19/19  1629   HGB 14.7  --      --     139   POTASSIUM 4.1 3.6   CR 0.99 1.03        Diagnostics:  Recent Results (from the past 168 hour(s))   CBC with platelets    Collection Time: 02/19/21  4:48 PM   Result Value Ref Range    WBC 7.8 4.0 - 11.0 10e9/L    RBC Count 4.46 4.4 - 5.9 10e12/L    Hemoglobin 14.8 13.3 - 17.7 g/dL    Hematocrit 43.8 40.0 - 53.0 %    MCV 98 78 - 100 fl    MCH 33.2 (H) 26.5 - 33.0 pg    MCHC 33.8 31.5 - 36.5 g/dL     RDW 12.6 10.0 - 15.0 %    Platelet Count 203 150 - 450 10e9/L   Comprehensive metabolic panel (BMP + Alb, Alk Phos, ALT, AST, Total. Bili, TP)    Collection Time: 02/19/21  4:48 PM   Result Value Ref Range    Sodium 139 133 - 144 mmol/L    Potassium 4.0 3.4 - 5.3 mmol/L    Chloride 108 94 - 109 mmol/L    Carbon Dioxide 25 20 - 32 mmol/L    Anion Gap 6 3 - 14 mmol/L    Glucose 89 70 - 99 mg/dL    Urea Nitrogen 8 7 - 30 mg/dL    Creatinine 1.09 0.66 - 1.25 mg/dL    GFR Estimate 77 >60 mL/min/[1.73_m2]    GFR Estimate If Black 89 >60 mL/min/[1.73_m2]    Calcium 9.3 8.5 - 10.1 mg/dL    Bilirubin Total 0.3 0.2 - 1.3 mg/dL    Albumin 3.8 3.4 - 5.0 g/dL    Protein Total 7.6 6.8 - 8.8 g/dL    Alkaline Phosphatase 44 40 - 150 U/L    ALT 16 0 - 70 U/L    AST 12 0 - 45 U/L      No EKG required, no history of coronary heart disease, significant arrhythmia, peripheral arterial disease or other structural heart disease.    Revised Cardiac Risk Index (RCRI):  The patient has the following serious cardiovascular risks for perioperative complications:   - No serious cardiac risks = 0 points     RCRI Interpretation: 0 points: Class I (very low risk - 0.4% complication rate)       Assessment & Plan     The proposed surgical procedure is considered LOW risk.    Preop general physical exam  - CBC with platelets  - Comprehensive metabolic panel (BMP + Alb, Alk Phos, ALT, AST, Total. Bili, TP)    Cervicalgia    Screening for diabetes mellitus  - Comprehensive metabolic panel (BMP + Alb, Alk Phos, ALT, AST, Total. Bili, TP)    Risks and Recommendations:  The patient has the following additional risks and recommendations for perioperative complications:  Pulmonary:    - Incentive spirometry post-op   - Active nicotine user, advised smoking cessation  Social and Substance:    - Patient is taking medications for chronic pain   - Active nicotine user, advised smoking cessation    Medication Instructions:  Patient is to take all scheduled  medications on the day of surgery    RECOMMENDATION:  APPROVAL GIVEN to proceed with proposed procedure, without further diagnostic evaluation.    Signed Electronically by: Rima Sterling PA-C  Copy of this evaluation report is provided to requesting physician.    [facility] Pre[facility] Guidelines    Revised Cardiac Risk Index

## 2021-02-19 NOTE — PATIENT INSTRUCTIONS

## 2021-02-20 ASSESSMENT — ASTHMA QUESTIONNAIRES: ACT_TOTALSCORE: 18

## 2021-02-22 ENCOUNTER — TELEPHONE (OUTPATIENT)
Dept: FAMILY MEDICINE | Facility: CLINIC | Age: 56
End: 2021-02-22

## 2021-02-22 DIAGNOSIS — Z11.59 ENCOUNTER FOR SCREENING FOR OTHER VIRAL DISEASES: ICD-10-CM

## 2021-02-22 LAB
SARS-COV-2 RNA RESP QL NAA+PROBE: NORMAL
SPECIMEN SOURCE: NORMAL

## 2021-02-22 PROCEDURE — U0003 INFECTIOUS AGENT DETECTION BY NUCLEIC ACID (DNA OR RNA); SEVERE ACUTE RESPIRATORY SYNDROME CORONAVIRUS 2 (SARS-COV-2) (CORONAVIRUS DISEASE [COVID-19]), AMPLIFIED PROBE TECHNIQUE, MAKING USE OF HIGH THROUGHPUT TECHNOLOGIES AS DESCRIBED BY CMS-2020-01-R: HCPCS | Performed by: ANESTHESIOLOGY

## 2021-02-22 PROCEDURE — U0005 INFEC AGEN DETEC AMPLI PROBE: HCPCS | Performed by: ANESTHESIOLOGY

## 2021-02-22 NOTE — TELEPHONE ENCOUNTER
----- Message from Rima Sterling PA-C sent at 2/20/2021  1:37 PM CST -----  Please notify patient that his labs were normal.     Rima Sterling PA-C

## 2021-02-23 LAB
LABORATORY COMMENT REPORT: NORMAL
SARS-COV-2 RNA RESP QL NAA+PROBE: NEGATIVE
SPECIMEN SOURCE: NORMAL

## 2021-02-25 ENCOUNTER — HOSPITAL ENCOUNTER (OUTPATIENT)
Facility: CLINIC | Age: 56
Discharge: HOME | End: 2021-02-25
Attending: ANESTHESIOLOGY | Admitting: ANESTHESIOLOGY
Payer: COMMERCIAL

## 2021-02-25 ENCOUNTER — ANESTHESIA EVENT (OUTPATIENT)
Dept: SURGERY | Facility: CLINIC | Age: 56
End: 2021-02-25
Payer: COMMERCIAL

## 2021-02-25 ENCOUNTER — ANESTHESIA (OUTPATIENT)
Dept: SURGERY | Facility: CLINIC | Age: 56
End: 2021-02-25
Payer: COMMERCIAL

## 2021-02-25 PROCEDURE — 999N000141 HC STATISTIC PRE-PROCEDURE NURSING ASSESSMENT: Performed by: ANESTHESIOLOGY

## 2021-02-25 PROCEDURE — 258N000003 HC RX IP 258 OP 636: Performed by: NURSE ANESTHETIST, CERTIFIED REGISTERED

## 2021-02-25 RX ORDER — SODIUM CHLORIDE, SODIUM LACTATE, POTASSIUM CHLORIDE, CALCIUM CHLORIDE 600; 310; 30; 20 MG/100ML; MG/100ML; MG/100ML; MG/100ML
INJECTION, SOLUTION INTRAVENOUS CONTINUOUS
Status: DISCONTINUED | OUTPATIENT
Start: 2021-02-25 | End: 2021-02-25 | Stop reason: HOSPADM

## 2021-02-25 RX ORDER — LIDOCAINE 40 MG/G
CREAM TOPICAL
Status: DISCONTINUED | OUTPATIENT
Start: 2021-02-25 | End: 2021-02-25 | Stop reason: HOSPADM

## 2021-02-25 RX ADMIN — SODIUM CHLORIDE, POTASSIUM CHLORIDE, SODIUM LACTATE AND CALCIUM CHLORIDE: 600; 310; 30; 20 INJECTION, SOLUTION INTRAVENOUS at 14:15

## 2021-02-25 ASSESSMENT — COPD QUESTIONNAIRES: COPD: 1

## 2021-02-25 ASSESSMENT — LIFESTYLE VARIABLES: TOBACCO_USE: 1
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
## 2021-02-25 NOTE — OR NURSING
Procedure cancelled after pt. Was fully admitted all pre-op assess and IV completed when OR reported correct  instrument needed not available. Pt. To return on 3/8 for next procedure.   Dr. Driscoll talked with pt. And pt. Ok as planned. IV DC'c pt. Discharged back home with friend with who was originally planned to give pt. A ride. Matthew WOOTEN

## 2021-03-03 ENCOUNTER — TELEPHONE (OUTPATIENT)
Dept: PALLIATIVE MEDICINE | Facility: CLINIC | Age: 56
End: 2021-03-03

## 2021-03-03 DIAGNOSIS — F41.1 GENERALIZED ANXIETY DISORDER: ICD-10-CM

## 2021-03-03 DIAGNOSIS — M79.18 MYOFASCIAL PAIN: ICD-10-CM

## 2021-03-03 NOTE — TELEPHONE ENCOUNTER
Patient calling stating he is in pain from falling on the ice 2 days ago. He was asking if Ashley can ok pain meds for this. Per Ashley, she cannot address acute injuries, and her recommendations are to go to Urgent Care, or the Emergency Room. This information was relayed to the patient, he states understanding and agrees to be checked at the ER.  Lupis/[facility]

## 2021-03-04 RX ORDER — METAXALONE 800 MG/1
TABLET ORAL
Qty: 60 TABLET | Refills: 0 | Status: SHIPPED | OUTPATIENT
Start: 2021-03-04 | End: 2021-03-18

## 2021-03-04 RX ORDER — CLONAZEPAM 0.5 MG/1
TABLET ORAL
Qty: 90 TABLET | Refills: 0 | Status: SHIPPED | OUTPATIENT
Start: 2021-03-04 | End: 2021-03-31

## 2021-03-04 NOTE — TELEPHONE ENCOUNTER
Clonazepam      Last Written Prescription Date:  01/22/2021  Last Fill Quantity: 90,   # refills: 0  Last Office Visit: 02/19/2021 - Niesen  Future Office visit:    Next 5 appointments (look out 90 days)    Mar 09, 2021  2:45 PM  Pre-procedure Covid with ER COVID LAB  [facility] ([facility] [address] [address]  [phone]       Routing refill request to provider for review/approval because:  Drug not on the [facility], [facility] or [facility] refill protocol or controlled substance    Skelaxin      Last Written Prescription Date:  02/19/2020  Last Fill Quantity: 60,   # refills: 0  Last Office Visit: 02/19/2021 - Asher  Future Office visit:    Next 5 appointments (look out 90 days)    Mar 09, 2021  2:45 PM  Pre-procedure Covid with ER COVID LAB  [facility] ([facility] ) [address] [id number] [phone]       Routing refill request to provider for review/approval because:  Drug not on the [facility], [facility] or [facility] refill protocol or controlled substance    Ailyn Tobias Rn

## 2021-03-09 DIAGNOSIS — Z11.59 ENCOUNTER FOR SCREENING FOR OTHER VIRAL DISEASES: ICD-10-CM

## 2021-03-09 LAB
SARS-COV-2 RNA RESP QL NAA+PROBE: NORMAL
SPECIMEN SOURCE: NORMAL

## 2021-03-09 PROCEDURE — U0003 INFECTIOUS AGENT DETECTION BY NUCLEIC ACID (DNA OR RNA); SEVERE ACUTE RESPIRATORY SYNDROME CORONAVIRUS 2 (SARS-COV-2) (CORONAVIRUS DISEASE [COVID-19]), AMPLIFIED PROBE TECHNIQUE, MAKING USE OF HIGH THROUGHPUT TECHNOLOGIES AS DESCRIBED BY CMS-2020-01-R: HCPCS | Performed by: ANESTHESIOLOGY

## 2021-03-09 PROCEDURE — U0005 INFEC AGEN DETEC AMPLI PROBE: HCPCS | Performed by: ANESTHESIOLOGY

## 2021-03-10 ENCOUNTER — ANESTHESIA EVENT (OUTPATIENT)
Dept: SURGERY | Facility: CLINIC | Age: 56
End: 2021-03-10
Payer: COMMERCIAL

## 2021-03-10 ASSESSMENT — LIFESTYLE VARIABLES: TOBACCO_USE: 1

## 2021-03-10 ASSESSMENT — COPD QUESTIONNAIRES: COPD: 1

## 2021-03-11 ENCOUNTER — TELEPHONE (OUTPATIENT)
Dept: PALLIATIVE MEDICINE | Facility: CLINIC | Age: 56
End: 2021-03-11

## 2021-03-11 ENCOUNTER — HOSPITAL ENCOUNTER (OUTPATIENT)
Facility: CLINIC | Age: 56
Discharge: HOME | End: 2021-03-11
Attending: ANESTHESIOLOGY | Admitting: ANESTHESIOLOGY
Payer: COMMERCIAL

## 2021-03-11 ENCOUNTER — ANESTHESIA (OUTPATIENT)
Dept: SURGERY | Facility: CLINIC | Age: 56
End: 2021-03-11
Payer: COMMERCIAL

## 2021-03-11 VITALS
SYSTOLIC BLOOD PRESSURE: 110 MMHG | WEIGHT: 167 LBS | OXYGEN SATURATION: 97 % | HEIGHT: 69 IN | BODY MASS INDEX: 24.73 KG/M2 | HEART RATE: 70 BPM | DIASTOLIC BLOOD PRESSURE: 77 MMHG | TEMPERATURE: 98.1 F | RESPIRATION RATE: 18 BRPM

## 2021-03-11 DIAGNOSIS — M47.812 ARTHROPATHY OF CERVICAL FACET JOINT: ICD-10-CM

## 2021-03-11 DIAGNOSIS — M54.6 CHRONIC MIDLINE THORACIC BACK PAIN: ICD-10-CM

## 2021-03-11 DIAGNOSIS — F11.90 CHRONIC, CONTINUOUS USE OF OPIOIDS: ICD-10-CM

## 2021-03-11 DIAGNOSIS — G89.4 CHRONIC PAIN SYNDROME: ICD-10-CM

## 2021-03-11 DIAGNOSIS — M79.18 MYOFASCIAL PAIN: ICD-10-CM

## 2021-03-11 DIAGNOSIS — G89.29 CHRONIC MIDLINE THORACIC BACK PAIN: ICD-10-CM

## 2021-03-11 DIAGNOSIS — M54.2 CERVICALGIA: ICD-10-CM

## 2021-03-11 PROCEDURE — 710N000012 HC RECOVERY PHASE 2, PER MINUTE: Performed by: ANESTHESIOLOGY

## 2021-03-11 PROCEDURE — 258N000003 HC RX IP 258 OP 636: Performed by: NURSE ANESTHETIST, CERTIFIED REGISTERED

## 2021-03-11 PROCEDURE — 250N000009 HC RX 250: Performed by: NURSE ANESTHETIST, CERTIFIED REGISTERED

## 2021-03-11 PROCEDURE — 360N000081 HC SURGERY LEVEL 1 W/ FLUORO, PER MIN: Performed by: ANESTHESIOLOGY

## 2021-03-11 PROCEDURE — 370N000017 HC ANESTHESIA TECHNICAL FEE, PER MIN: Performed by: ANESTHESIOLOGY

## 2021-03-11 PROCEDURE — 999N000141 HC STATISTIC PRE-PROCEDURE NURSING ASSESSMENT: Performed by: ANESTHESIOLOGY

## 2021-03-11 PROCEDURE — 272N000001 HC OR GENERAL SUPPLY STERILE: Performed by: ANESTHESIOLOGY

## 2021-03-11 PROCEDURE — 250N000011 HC RX IP 250 OP 636: Performed by: ANESTHESIOLOGY

## 2021-03-11 PROCEDURE — 250N000009 HC RX 250: Performed by: ANESTHESIOLOGY

## 2021-03-11 PROCEDURE — 255N000002 HC RX 255 OP 636: Performed by: ANESTHESIOLOGY

## 2021-03-11 PROCEDURE — 250N000011 HC RX IP 250 OP 636: Performed by: NURSE ANESTHETIST, CERTIFIED REGISTERED

## 2021-03-11 RX ORDER — LIDOCAINE HYDROCHLORIDE 40 MG/ML
INJECTION, SOLUTION RETROBULBAR PRN
Status: DISCONTINUED | OUTPATIENT
Start: 2021-03-11 | End: 2021-03-11 | Stop reason: HOSPADM

## 2021-03-11 RX ORDER — SODIUM CHLORIDE, SODIUM LACTATE, POTASSIUM CHLORIDE, CALCIUM CHLORIDE 600; 310; 30; 20 MG/100ML; MG/100ML; MG/100ML; MG/100ML
INJECTION, SOLUTION INTRAVENOUS CONTINUOUS
Status: DISCONTINUED | OUTPATIENT
Start: 2021-03-11 | End: 2021-03-11 | Stop reason: HOSPADM

## 2021-03-11 RX ORDER — TRIAMCINOLONE ACETONIDE 40 MG/ML
INJECTION, SUSPENSION INTRA-ARTICULAR; INTRAMUSCULAR PRN
Status: DISCONTINUED | OUTPATIENT
Start: 2021-03-11 | End: 2021-03-11 | Stop reason: HOSPADM

## 2021-03-11 RX ORDER — LIDOCAINE HYDROCHLORIDE 20 MG/ML
INJECTION, SOLUTION INFILTRATION; PERINEURAL PRN
Status: DISCONTINUED | OUTPATIENT
Start: 2021-03-11 | End: 2021-03-11

## 2021-03-11 RX ORDER — LIDOCAINE 40 MG/G
CREAM TOPICAL
Status: DISCONTINUED | OUTPATIENT
Start: 2021-03-11 | End: 2021-03-11 | Stop reason: HOSPADM

## 2021-03-11 RX ORDER — IOPAMIDOL 612 MG/ML
INJECTION, SOLUTION INTRAVASCULAR PRN
Status: DISCONTINUED | OUTPATIENT
Start: 2021-03-11 | End: 2021-03-11 | Stop reason: HOSPADM

## 2021-03-11 RX ORDER — OXYCODONE HYDROCHLORIDE 10 MG/1
TABLET ORAL
Qty: 150 TABLET | Refills: 0 | Status: SHIPPED | OUTPATIENT
Start: 2021-03-11 | End: 2021-04-05

## 2021-03-11 RX ORDER — PROPOFOL 10 MG/ML
INJECTION, EMULSION INTRAVENOUS PRN
Status: DISCONTINUED | OUTPATIENT
Start: 2021-03-11 | End: 2021-03-11

## 2021-03-11 RX ADMIN — PROPOFOL 40 MG: 10 INJECTION, EMULSION INTRAVENOUS at 10:28

## 2021-03-11 RX ADMIN — LIDOCAINE HYDROCHLORIDE 40 MG: 20 INJECTION, SOLUTION INFILTRATION; PERINEURAL at 10:28

## 2021-03-11 RX ADMIN — SODIUM CHLORIDE, POTASSIUM CHLORIDE, SODIUM LACTATE AND CALCIUM CHLORIDE: 600; 310; 30; 20 INJECTION, SOLUTION INTRAVENOUS at 09:59

## 2021-03-11 ASSESSMENT — MIFFLIN-ST. JEOR: SCORE: 1592.89

## 2021-03-11 NOTE — DISCHARGE INSTRUCTIONS
[facility]   Adult Discharge Orders & Instructions     For 24 hours after surgery    1. Get plenty of rest.  A responsible adult must stay with you for at least 24 hours after you leave the hospital.   2. Do not drive or use heavy equipment.  If you have weakness or tingling, don't drive or use heavy equipment until this feeling goes away.  3. Do not drink alcohol.  4. Avoid strenuous or risky activities.  Ask for help when climbing stairs.   5. You may feel lightheaded.  IF so, sit for a few minutes before standing.  Have someone help you get up.   6. If you have nausea (feel sick to your stomach): Drink only clear liquids such as apple juice, ginger ale, broth or 7-Up.  Rest may also help.  Be sure to drink enough fluids.  Move to a regular diet as you feel able.  7. You may have a slight fever. Call the doctor if your fever is over 100 F (37.7 C) (taken under the tongue) or lasts longer than 24 hours.  8. You may have a dry mouth, a sore throat, muscle aches or trouble sleeping.  These should go away after 24 hours.  9. Do not make important or legal decisions.   Call your doctor for any of the followin.  Signs of infection (fever, growing tenderness at the surgery site, a large amount of drainage or bleeding, severe pain, foul-smelling drainage, redness, swelling).    2. It has been over 8 to 10 hours since surgery and you are still not able to urinate (pass water).    3.  Headache for over 24 hours.

## 2021-03-11 NOTE — TELEPHONE ENCOUNTER
Received call from patient requesting refill(s) of Oxycodone 10mg     Last dispensed from pharmacy on 2/14/21    Patient's last office/virtual visit by prescribing provider on 2/3/21  Next office/virtual appointment scheduled for none listed    Last urine drug screen date 2/7/21  Current opioid agreement on file (completed within the last year) No Date of opioid agreement: 7/16/2019    E-prescribe to [facility] pharmacy    Will route to nursing [place] for review and preparation of prescription(s).
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
## 2021-03-11 NOTE — TELEPHONE ENCOUNTER
Called patient, informed him of RX, he states the fill and  dates should be about 3/14/21 and 3/16/21. Sending to provider for review. Also, patient scheduled for in clinic visit 3/17/21 2:00pm in [place].   Lupis/[facility]

## 2021-03-11 NOTE — OP NOTE
"Preoperative diagnosis: Cervical spondylosis without myelopathy causing a symptomatic C1-2 facet mediated neck pain    Postoperative diagnosis: Same as preoperative diagnosis    Anesthesia: Monitored anesthetic care    Procedure: Attempted C1-2 facet ablation with me aborting the procedure due to incompatibility of equipment    Technique: After written and verbal informed consent was obtained including risks of infection, bleeding, no help, or worse pain he was brought to the procedure area placed in the supine position.  His neck was prepped with ChloraPrep.  I then put a bleb of 1% lidocaine for skin.  I then advanced a 22-gauge Ayala radiofrequency ablation needle with a 5 mm active tip in from the right side into the ventral lateral sulcus of the C2 vertebral body.  Once the needle was in the correct position to start sensory stimulation I tried to place the electrode into the needle but there was an incompatibility of the electrode with the Ayala needle.  There was a venom electrode which was not compatible with this particular needle.  It was unknown by me that there was this incompatibility.  Apparently you have to have a straight electrode with the needles that we are utilizing i.e. a 22-gauge radiofrequency ablation needle with a 5 mm active tip.  There essentially was no way to stimulate or lesion the nerve therefore the introducer needle was removed and the procedure was aborted.    Follow-up: I apologized several times to time.  He was unknown by me or any of the staff at the hospital that we needed a \"straight\" electrode for this particular needle.  I do have this equipment in my [place] clinic.  I will have our  call Spenser, if he is still interested, and have him scheduled for this procedure in [place].  Again, I apologized to him for the second time that we had to abort the procedure due to equipment issues.  "

## 2021-03-11 NOTE — TELEPHONE ENCOUNTER
Refill appropriate. Sent to requested pharmacy. Please have patient schedule an in clinic visit with me prior to his next refill.     [place] Prescription Monitoring Program checked on 1/15/21.    Ashley Salgado, PAC

## 2021-03-11 NOTE — ANESTHESIA POSTPROCEDURE EVALUATION
Patient: Joselito Abarca    Procedure(s):  Bilateral Cervical 1 and 2 radiofrequency ablation, aborted    Diagnosis:Arthropathy of cervical facet joint [M47.812]  Diagnosis Additional Information: No value filed.    Anesthesia Type:  No value filed.    Note:  Disposition: Outpatient   Postop Pain Control: Uneventful            Sign Out: Well controlled pain   PONV: No   Neuro/Psych: Uneventful            Sign Out: Acceptable/Baseline neuro status   Airway/Respiratory: Uneventful            Sign Out: Acceptable/Baseline resp. status   CV/Hemodynamics: Uneventful            Sign Out: Acceptable CV status   Other NRE: NONE   DID A NON-ROUTINE EVENT OCCUR? No    Event details/Postop Comments:  Pt was happy with anesthesia care.  No complications. Procedure was not able to be performed because of equipment issues.   I will follow up with the pt if needed.         Last vitals:  Vitals:    03/11/21 0942   BP: 104/75   Resp: 18   Temp: 98.1  F (36.7  C)       Last vitals prior to Anesthesia Care Transfer:  CRNA VITALS  3/11/2021 1025 - 3/11/2021 1101      3/11/2021             Pulse:  67    SpO2:  98 %          Electronically Signed By: CORA Canales CRNA  March 11, 2021  11:01 AM

## 2021-03-11 NOTE — ANESTHESIA CARE TRANSFER NOTE
Patient: Joselito Abarca    Procedure(s):  Bilateral Cervical 1 and 2 radiofrequency ablation, aborted    Diagnosis: Arthropathy of cervical facet joint [M47.812]  Diagnosis Additional Information: No value filed.    Anesthesia Type:   No value filed.     Note:    Oropharynx: oropharynx clear of all foreign objects and spontaneously breathing  Level of Consciousness: drowsy  Oxygen Supplementation: face mask    Independent Airway: airway patency satisfactory and stable  Dentition: dentition unchanged  Vital Signs Stable: post-procedure vital signs reviewed and stable  Report to RN Given: handoff report given  Patient transferred to: Phase II    Handoff Report: Identifed the Patient, Identified the Reponsible Provider, Reviewed the pertinent medical history, Discussed the surgical course, Reviewed Intra-OP anesthesia mangement and issues during anesthesia, Set expectations for post-procedure period and Allowed opportunity for questions and acknowledgement of understanding      Vitals: (Last set prior to Anesthesia Care Transfer)  CRNA VITALS  3/11/2021 1025 - 3/11/2021 1059      3/11/2021             Pulse:  67    SpO2:  98 %        Electronically Signed By: CORA Canales CRNA  March 11, 2021  10:59 AM

## 2021-03-12 NOTE — TELEPHONE ENCOUNTER
Called and informed patient of Ashley's response. Patient states understanding. Lupis/MA  [facility]

## 2021-03-12 NOTE — TELEPHONE ENCOUNTER
The dates of fill 3/16 and start 3/18 are correct. His prescriptions are 30 days prescriptions and there were only 28 days in February, therefore the days go forward 2 days.     Ashley Salgado PA-C on 3/12/2021 at 7:54 AM

## 2021-03-16 NOTE — PROGRESS NOTES
[facility]     CHIEF COMPLAINT:   Pain  -neck pain     INTERVAL HISTORY:  Last seen on 2/3/21    Recommendations at last visit included:  1. Physical Therapy:  not at this time  2. Clinical Health Psychologist:  NO, but we may need to consider this as we further taper his opiates.    3. Diagnostic Studies: none at this time  4. Medication Management:    ? Continue Skelaxin 800mg three times daily as needed for muscle pain  ? Oxycodone 10mg: increase to 1 tab every 4-6 hours as needed. Max of 5 tabs/day.  ? Topamax 75mg BID   5. Further procedures recommended: Cervical radiofrequency [id number]  6. Recommendations to PCP. See above  7. UDS to be completed in 48-72 hours        Follow up with this provider: 8 Weeks     Since his last visit, Joselito Abarca reports:    Using oxycodone 10mg 5 times a day was going well until he had a fall. He states that he had a bad 2 weeks. He did not get evaluated for the fall. He states that he didn't feel he broke anything.     They had to abort the ablation due to not having the right needle. He plans to go to [place] to have this completed. He has not scheduled this yet.     Pain Information:              Pain today 4/10     Annual Controlled Substance Agreement: 2019  UDS: 2021    CURRENT RELEVANT PAIN MEDICATIONS:   Cymbalta 60mg daily  Clonazepam 0.5mg-takes 3-4/day  Oxycodone 10mg every 4-6 hours max of 5/day  Topamax 25mg: takes 3 tabs twice daily  Metaxalone 800m tab three times daily    Previous Medications: (H--helped; HI--Helped initially; SWH-- somewhat helpful, NH--No help; W--worse; SE--side effects)   Opiates: hydrocodone SE allergic, fever, sweaty, headache, methadone H, Oxycodone H  NSAIDS: Ibuprofen NH, Aleve NH  Anti-migraine mediations: Fiorinal H  Muscle Relaxants: Valium H, methocarbamol NH, Tizanidine NH, Skelaxin NH  Neuropathics: Gabapentin H SE breathing issues  Anti-depressants: Cymbalta  NH  Anxiolytics: Klonopin H  Topicals: Lidocaine H  Other medications not covered above: Tylenol NH     Past Pain Treatments:  Pain Clinic:   Yes, he was previously seen at [facility] and [facility]   PT: Yes, has done many times  Psychologist: No  Relaxation techniques/biofeedback: No  Chiropractor: Yes, feels that it made it worse  Acupuncture: Yes, just did one session  Pharmacotherapy:               Opioids: Yes                Non-opioids:    Yes   TENs Unit:Yes, aggravates the pain  Injections:     07/12/2018 bilateral occipital nerve blocks ,     08/26/2015 and 5/27/2015Cervical medial branch blocks      Has had low back pain injections at [facility].     10/18/2019 Right cervical RFA ,     11/14/2019 left cervical RFA .    02/13/2020 L4-5 REVA    02/24/2020 TPI neck in ER  Self-care:   Yes, hot tubs, ice packs, heating pads  Surgeries related to pain: Yes,  1. anterior cervical discectomy and fusion C6-7, exploration and revision C4-6 with hardware removal 11/29/2017,   2. Left T1-T2 thoracic hemilaminectomy, microdiscectomy 02/21/2012,   3. Removal of C4 through C6 anterior cervical plate, Exploration of C4-C5 and C5-C6 anterior cervical fusion, C4-C5 and C5-C6 arthrodesis, C4 through C6 anterior cervical instrumentation, and [place] of right iliac hip graft.  03/15/2011  4. C4-C5 and C5-C6 anterior cervical discectomy and fusion with instrumentation and neural electrophysiologic monitoring 01/26/2010     [place] Board of Pharmacy Data Base Reviewed:    YES; As expected, no concern for misuse/abuse of controlled medications based on this report. Checked 1/15/21     THE 4 As OF OPIOID MAINTENANCE ANALGESIA    Analgesia: Is pain relief clinically significant? NO   Activity: Is patient functional and able to perform Activities of Daily Living? YES   Adverse effects: Is patient free from adverse side effects from opiates? YES   Adherence to Rx protocol: Is patient adhering to Controlled Substance Agreement and taking  medications ONLY as ordered? YES     MME: 75    Medications:  Current Outpatient Prescriptions          Current Outpatient Medications   Medication Sig Dispense Refill     clonazePAM (KLONOPIN) 0.5 MG tablet TAKE 1/2 TO 1 TABLET BY MOUTH THREE TIMES A DAY AS NEEDED FOR ANXIETY 90 tablet 0     DULoxetine (CYMBALTA) 60 MG capsule Take 1 capsule (60 mg) by mouth daily 30 capsule 3     FLOVENT  MCG/ACT Inhaler INHALE 2 PUFFS INTO THE LUNGS TWICE DAILY 36 g 2     fluticasone (FLONASE) 50 MCG/ACT nasal spray SPRAY 2 SPRAYS IN EACH NOSTRIL EVERY DAY 16 g 5     ipratropium - albuterol 0.5 mg/2.5 mg/3 mL (DUONEB) 0.5-2.5 (3) MG/3ML neb solution Take 1 vial (3 mLs) by nebulization every 4 hours as needed for shortness of breath / dyspnea 30 vial 0     methadone (DOLOPHINE) 5 MG tablet Take 1.5 tablets every 12 hours.  Fill 2/28/2020. Start 2/29/2020. 90 tablet 0     naloxone (NARCAN) 4 MG/0.1ML nasal spray Spray 4 mg into one nostril alternating nostrils once as needed for opioid reversal every 2-3 minutes until assistance arrives         nystatin (MYCOSTATIN) 239767 UNIT/ML suspension TAKE 5 MLS BY MOUTH FOUR TIMES A  mL 0     order for DME Equipment being ordered: Nebulizer mask and tubing 1 each 1     oxyCODONE IR (ROXICODONE) 10 MG tablet Take 1 tablet every 6 hours as needed for breakthrough pain. Max of 4/day. Fill 2/19/2020. Start 2/22/2020. 30 day script 115 tablet 0     sildenafil (VIAGRA) 100 MG tablet Take 1 tablet (100 mg) by mouth daily as needed 30 min to 4 hrs before sex. Do not use with nitroglycerin, terazosin or doxazosin. 4 tablet 0     tiZANidine (ZANAFLEX) 2 MG tablet TAKE ONE TO TWO TABLETS BY MOUTH THREE TIMES A DAY AS NEEDED FOR MUSCLE SPASMS/TENSION 60 tablet 0     traZODone (DESYREL) 100 MG tablet Take 3 tablets (300 mg) by mouth At Bedtime 90 tablet 3     VENTOLIN  (90 Base) MCG/ACT inhaler INHALE 2 PUFFS INTO THE LUNGS EVERY 6 HOURS AS NEEDED FOR SHORTNESS OF BREATH, DIFFICULTY  BREATHING OR WHEEZING. 18 g 3     vitamin D3 (CHOLECALCIFEROL) 2000 units tablet TAKE ONE TABLET BY MOUTH EVERY  tablet 3     zolpidem (AMBIEN) 10 MG tablet Take 10 mg by mouth nightly as needed                   Assessment:  Joselito Abarca is a 55 year old male who presents today for follow up regarding his:        1. Arthropathy of cervical facet joint  2. cervicalgia  3. Midline thoracic back pain  4. Myofascial pain  5. Chronic pain syndrome  6. Chronic use of opioids    Controlled substance agreement reviewed and signed. No other changes today. Continue current medications. He will work with Dr. Driscoll at his [place] office regarding the RFA.      Plan:     Diagnosis reviewed, treatment option addressed, and risk/benifits discussed.  Self-care instructions given.  I am recommending a multidisciplinary treatment plan to help this patient better manage pain.       1. Physical Therapy:  not at this time  2. Clinical Health Psychologist:  NO  3. Diagnostic Studies: none at this time  4. Medication Management:      Continue Skelaxin 800mg three times daily as needed for muscle pain    Oxycodone 10m tab every 4-6 hours as needed. Max of 5 tabs/day.    Topamax 75mg BID   5. Further procedures recommended: Cervical radiofrequency [id number]  6. Recommendations to PCP. See above  7. CSA signed today        Follow up with this provider: 8 Weeks for a virtual visit  The total TIME spent on this patient on the day of the appointment was 21 minutes.    Time spent preparing to see the patient (reviewing records and tests) 2 minutes  Time spend face to face with the patient 10 minutes  Time spent ordering tests, medications, procedures and referrals 0 minutes  Time spent Referring and communicating with other healthcare professionals 0 minutes  Time spent documenting clinical information in Epic 2 minutes      Ashley Salgado [facility]

## 2021-03-17 ENCOUNTER — OFFICE VISIT (OUTPATIENT)
Dept: UROLOGY | Facility: CLINIC | Age: 56
End: 2021-03-17
Payer: COMMERCIAL

## 2021-03-17 ENCOUNTER — OFFICE VISIT (OUTPATIENT)
Dept: PALLIATIVE MEDICINE | Facility: CLINIC | Age: 56
End: 2021-03-17
Payer: COMMERCIAL

## 2021-03-17 VITALS
BODY MASS INDEX: 25.7 KG/M2 | DIASTOLIC BLOOD PRESSURE: 72 MMHG | SYSTOLIC BLOOD PRESSURE: 115 MMHG | TEMPERATURE: 97.3 F | WEIGHT: 174 LBS

## 2021-03-17 VITALS
HEART RATE: 62 BPM | SYSTOLIC BLOOD PRESSURE: 115 MMHG | OXYGEN SATURATION: 100 % | WEIGHT: 174.5 LBS | BODY MASS INDEX: 25.77 KG/M2 | DIASTOLIC BLOOD PRESSURE: 72 MMHG

## 2021-03-17 DIAGNOSIS — M54.6 CHRONIC MIDLINE THORACIC BACK PAIN: ICD-10-CM

## 2021-03-17 DIAGNOSIS — N53.12 PAINFUL EJACULATION: Primary | ICD-10-CM

## 2021-03-17 DIAGNOSIS — M79.18 MYOFASCIAL PAIN: ICD-10-CM

## 2021-03-17 DIAGNOSIS — F11.90 CHRONIC, CONTINUOUS USE OF OPIOIDS: ICD-10-CM

## 2021-03-17 DIAGNOSIS — G89.4 CHRONIC PAIN SYNDROME: ICD-10-CM

## 2021-03-17 DIAGNOSIS — N48.9 PENILE LESION: ICD-10-CM

## 2021-03-17 DIAGNOSIS — M54.2 CERVICALGIA: ICD-10-CM

## 2021-03-17 DIAGNOSIS — R10.31 GROIN PAIN, RIGHT: ICD-10-CM

## 2021-03-17 DIAGNOSIS — N52.9 ERECTILE DYSFUNCTION, UNSPECIFIED ERECTILE DYSFUNCTION TYPE: ICD-10-CM

## 2021-03-17 DIAGNOSIS — G89.29 CHRONIC MIDLINE THORACIC BACK PAIN: ICD-10-CM

## 2021-03-17 DIAGNOSIS — M47.812 ARTHROPATHY OF CERVICAL FACET JOINT: Primary | ICD-10-CM

## 2021-03-17 LAB
ALBUMIN UR-MCNC: NEGATIVE MG/DL
APPEARANCE UR: CLEAR
BILIRUB UR QL STRIP: NEGATIVE
COLOR UR AUTO: YELLOW
GLUCOSE UR STRIP-MCNC: NEGATIVE MG/DL
HGB UR QL STRIP: NEGATIVE
KETONES UR STRIP-MCNC: NEGATIVE MG/DL
LEUKOCYTE ESTERASE UR QL STRIP: NEGATIVE
NITRATE UR QL: NEGATIVE
PH UR STRIP: 6 PH (ref 5–7)
PSA SERPL-MCNC: 1.31 UG/L (ref 0–4)
SOURCE: NORMAL
SP GR UR STRIP: 1.01 (ref 1–1.03)
UROBILINOGEN UR STRIP-MCNC: 0 MG/DL (ref 0–2)

## 2021-03-17 PROCEDURE — 84153 ASSAY OF PSA TOTAL: CPT | Performed by: UROLOGY

## 2021-03-17 PROCEDURE — 99203 OFFICE O/P NEW LOW 30 MIN: CPT | Performed by: UROLOGY

## 2021-03-17 PROCEDURE — 99214 OFFICE O/P EST MOD 30 MIN: CPT | Performed by: PHYSICIAN ASSISTANT

## 2021-03-17 PROCEDURE — 36415 COLL VENOUS BLD VENIPUNCTURE: CPT | Performed by: UROLOGY

## 2021-03-17 PROCEDURE — 81003 URINALYSIS AUTO W/O SCOPE: CPT | Performed by: UROLOGY

## 2021-03-17 ASSESSMENT — PAIN SCALES - GENERAL
PAINLEVEL: MODERATE PAIN (4)
PAINLEVEL: NO PAIN (0)
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
## 2021-03-17 NOTE — LETTER
Opioid / Opioid Plus Controlled Substance Agreement    This is an agreement between you and your provider about the safe and appropriate use of controlled substance/opioids prescribed by your care team. Controlled substances are medicines that can cause physical and mental dependence (abuse).    There are strict laws about having and using these medicines. We here at [facility] are committing to working with you in your efforts to get better. To support you in this work, we ll help you schedule regular office appointments for medicine refills. If we must cancel or change your appointment for any reason, we ll make sure you have enough medicine to last until your next appointment.     As a Provider, I will:    Listen carefully to your concerns and treat you with respect.     Recommend a treatment plan that I believe is in your best interest. This plan may involve therapies other than opioid pain medication.     Talk with you often about the possible benefits, and the risk of harm of any medicine that we prescribe for you.     Provide a plan on how to taper (discontinue or go off) using this medicine if the decision is made to stop its use.    As a Patient, I understand that opioid(s):     Are a controlled substance prescribed by my care team to help me function or work and manage my condition(s).     Are strong medicines and can cause serious side effects such as:    Drowsiness, which can seriously affect my driving ability    A lower breathing rate, enough to cause death    Harm to my thinking ability     Depression     Abuse of and addiction to this medicine    Need to be taken exactly as prescribed. Combining opioids with certain medicines or chemicals (such as illegal drugs, sedatives, sleeping pills, and benzodiazepines) can be dangerous or even fatal. If I stop opioids suddenly, I may have severe withdrawal symptoms.    Do not work for all types of pain nor for all patients. If they re not helpful, I may  be asked to stop them.        The risks, benefits and side effects of these medicine(s) were explained to me. I agree that:  1. I will take part in other treatments as advised by my care team. This may be psychiatry or counseling, physical therapy, behavioral therapy, group treatment or a referral to a specialist.     2. I will keep all my appointments. I understand that this is part of the monitoring of opioids. My care team may require an office visit for EVERY opioid/controlled substance refill. If I miss appointments or don t follow instructions, my care team may stop my medicine.    3. I will take my medicines as prescribed. I will not change the dose or schedule unless my care team tells me to. There will be no refills if I run out early.     4. I may be asked to come to the clinic and complete a urine drug test or complete a pill count at any time. If I don t give a urine sample or participate in a pill count, the care team may stop my medicine.    5. I will only receive prescriptions from this clinic for chronic pain. If I am treated by another provider for acute pain issues, I will tell them that I am taking opioid pain medication for chronic pain and that I have a treatment agreement with this provider. I will inform my [facility] care team within one business day if I am given a prescription for any pain medication by another healthcare provider. My [facility] care team can contact other providers and pharmacists about my use of any medicines.    6. It is up to me to make sure that I don t run out of my medicines on weekends or holidays. If my care team is willing to refill my opioid prescription without a visit, I must request refills only during office hours. Refills may take up to 3 business days to process. I will use one pharmacy to fill all my opioid and other controlled substance prescriptions. I will notify the clinic about any changes to my insurance or medication  availability.    7. I am responsible for my prescriptions. If the medicine/prescription is lost, stolen or destroyed, it will not be replaced. I also agree not to share controlled substance medicines with anyone.    8. I am aware I should not use any illegal or recreational drugs. I also agree not to drink alcohol unless my care team says I can.       9. If I enroll in the [place] Medical Cannabis program, I will tell my care team prior to my next refill.     10. I will tell my care team right away if I become pregnant, have a new medical problem treated outside of my regular clinic, or have a change in my medications.    11. I understand that this medicine can affect my thinking, judgment and reaction time. Alcohol and drugs affect the brain and body, which can affect the safety of my driving. Being under the influence of alcohol or drugs can affect my decision-making, behaviors, personal safety, and the safety of others. Driving while impaired (DWI) can occur if a person is driving, operating, or in physical control of a car, motorcycle, boat, snowmobile, ATV, motorbike, off-road vehicle, or any other motor vehicle ([place] Statute 169A.20). I understand the risk if I choose to drive or operate any vehicle or machinery.    I understand that if I do not follow any of the conditions above, my prescriptions or treatment may be stopped or changed.          Opioids  What You Need to Know    What are opioids?   Opioids are pain medicines that must be prescribed by a doctor. They are also known as narcotics.     Examples are:   1. morphine (MS Contin, Zulema)  2. oxycodone (Oxycontin)  3. oxycodone and acetaminophen (Percocet)  4. hydrocodone and acetaminophen (Vicodin, Norco)   5. fentanyl patch (Duragesic)   6. hydromorphone (Dilaudid)   7. methadone  8. codeine (Tylenol #3)     What do opioids do well?   Opioids are best for severe short-term pain such as after a surgery or injury. They may work well for cancer pain.  They may help some people with long-lasting (chronic) pain.     What do opioids NOT do well?   Opioids never get rid of pain entirely, and they don t work well for most patients with chronic pain. Opioids don t reduce swelling, one of the causes of pain.                                    Other ways to manage chronic pain and improve function include:       Treat the health problem that may be causing pain    Anti-inflammation medicines, which reduce swelling and tenderness, such as ibuprofen (Advil, Motrin) or naproxen (Aleve)    Acetaminophen (Tylenol)    Antidepressants and anti-seizure medicines, especially for nerve pain    Topical treatments such as patches or creams    Injections or nerve blocks    Chiropractic or osteopathic treatment    Acupuncture, massage, deep breathing, meditation, visual imagery, aromatherapy    Use heat or ice at the pain site    Physical therapy     Exercise    Stop smoking    Take part in therapy       Risks and side effects     Talk to your doctor before you start or decide to keep taking opioids. Possible side effects include:      Lowering your breathing rate enough to cause death    Overdose, including death, especially if taking higher than prescribed doses    Worse depression symptoms; less pleasure in things you usually enjoy    Feeling tired or sluggish    Slower thoughts or cloudy thinking    Being more sensitive to pain over time; pain is harder to control    Trouble sleeping or restless sleep    Changes in hormone levels (for example, less testosterone)    Changes in sex drive or ability to have sex    Constipation    Unsafe driving    Itching and sweating    Dizziness    Nausea, throwing up and dry mouth    What else should I know about opioids?    Opioids may lead to dependence, tolerance, or addiction.      Dependence means that if you stop or reduce the medicine too quickly, you will have withdrawal symptoms. These include loose poop (diarrhea), jitters, flu-like  symptoms, nervousness and tremors. Dependence is not the same as addiction.                       Tolerance means needing higher doses over time to get the same effect. This may increase the chance of serious side effects.      Addiction is when people improperly use a substance that harms their body, their mind or their relations with others. Use of opiates can cause a relapse of addiction if you have a history of drug or alcohol abuse.      People who have used opioids for a long time may have a lower quality of life, worse depression, higher levels of pain and more visits to doctors.    You can overdose on opioids. Take these steps to lower your risk of overdose:    1. Recognize the signs:  Signs of overdose include decrease or loss of consciousness (blackout), slowed breathing, trouble waking up and blue lips. If someone is worried about overdose, they should call 911.    2. Talk to your doctor about Narcan (naloxone).   If you are at risk for overdose, you may be given a prescription for Narcan. This medicine very quickly reverses the effects of opioids.   If you overdose, a friend or family member can give you Narcan while waiting for the ambulance. They need to know the signs of overdose and how to give Narcan.     3. Don't use alcohol or street drugs.   Taking them with opioids can cause death.    4. Do not take any of these medicines unless your doctor says it s OK. Taking these with opioids can cause death:    Benzodiazepines, such as lorazepam (Ativan), alprazolam (Xanax) or diazepam (Valium)    Muscle relaxers, such as cyclobenzaprine (Flexeril)    Sleeping pills like zolpidem (Ambien)     Other opioids      How to keep you and other people safe while taking opioids:    1. Never share your opioids with others.  Opioid medicines are regulated by the Drug Enforcement Agency (SOURAV). Selling or sharing medications is a criminal act.    2. Be sure to store opioids in a secure place, locked up if possible.  Young children can easily swallow them and overdose.    3. When you are traveling with your medicines, keep them in the original bottles. If you use a pill box, be sure you also carry a copy of your medicine list from your clinic or pharmacy.    4. Safe disposal of opioids    Most pharmacies have places to get rid of medicine, called disposal kiosks. Medicine disposal options are also available in every [facility]. Search your county and  medication disposal  to find more options. You can find more details at:  [email or web address]     I agree that my provider, clinic care team, and pharmacy may work with any city, state or federal law enforcement agency that investigates the misuse, sale, or other diversion of my controlled medicine. I will allow my provider to discuss my care with, or share a copy of, this agreement with any other treating provider, pharmacy or emergency room where I receive care.    I have read this agreement and have asked questions about anything I did not understand.    _______________________________________________________  Patient Signature - Joselito Abarca _____________________                   Date     _______________________________________________________  Provider Signature - Ashley Salgado PA-C   _____________________                   Date     _______________________________________________________  Witness Signature (required if provider not present while patient signing)   _____________________                   Date

## 2021-03-22 NOTE — ED AVS SNAPSHOT
[facility] Emergency Department    [address] [place] [address]    Phone:  [phone]    Fax:  [phone]                                       Joselito Abarca   MRN: [id number]    Department:  [facility] Emergency Department   Date of Visit:  2/3/2018           After Visit Summary Signature Page     I have received my discharge instructions, and my questions have been answered. I have discussed any challenges I see with this plan with the nurse or doctor.    ..........................................................................................................................................  Patient/Patient Representative Signature      ..........................................................................................................................................  Patient Representative Print Name and Relationship to Patient    ..................................................               ................................................  Date                                            Time    ..........................................................................................................................................  Reviewed by Signature/Title    ...................................................              ..............................................  Date                                                            Time           V-Y Flap Text: The defect edges were debeveled with a #15 scalpel blade.  Given the location of the defect, shape of the defect and the proximity to free margins a V-Y flap was deemed most appropriate.  Using a sterile surgical marker, an appropriate advancement flap was drawn incorporating the defect and placing the expected incisions within the relaxed skin tension lines where possible.    The area thus outlined was incised deep to adipose tissue with a #15 scalpel blade.  The skin margins were undermined to an appropriate distance in all directions utilizing iris scissors.

## 2021-03-31 ENCOUNTER — TELEPHONE (OUTPATIENT)
Dept: FAMILY MEDICINE | Facility: OTHER | Age: 56
End: 2021-03-31

## 2021-03-31 DIAGNOSIS — F41.1 GENERALIZED ANXIETY DISORDER: ICD-10-CM

## 2021-03-31 RX ORDER — CLONAZEPAM 0.5 MG/1
TABLET ORAL
Qty: 90 TABLET | Refills: 0 | Status: SHIPPED | OUTPATIENT
Start: 2021-03-31 | End: 2021-05-01

## 2021-03-31 NOTE — TELEPHONE ENCOUNTER
Requested Prescriptions   Pending Prescriptions Disp Refills     clonazePAM (KLONOPIN) 0.5 MG tablet [Pharmacy Med Name: CLONAZEPAM 0.5MG TABS] 90 tablet 0     Sig: TAKE ONE-HALF TO ONE TABLET BY MOUTH THREE TIMES A DAY AS NEEDED FOR ANXIETY     Last Written Prescription Date:  03/04/2021  Last Fill Quantity: 90,   # refills: 0  Last Office Visit: 02/19/2021  Future Office visit:       Routing refill request to provider for review/approval because:  Drug not on the [facility], [facility] or [facility] refill protocol or controlled substance    Jill Rodriguez MA

## 2021-03-31 NOTE — TELEPHONE ENCOUNTER
Reason for Call:  Other call back    Detailed comments: had neck surgery today and was given 15 pain pills     Phone Number Patient can be reached at: Home number on file [phone] (home)    Best Time: anytime    Can we leave a detailed message on this number? YES    Call taken on 3/31/2021 at 4:16 PM by Terri Hubbard

## 2021-04-05 ENCOUNTER — TELEPHONE (OUTPATIENT)
Dept: FAMILY MEDICINE | Facility: OTHER | Age: 56
End: 2021-04-05

## 2021-04-05 DIAGNOSIS — F11.90 CHRONIC, CONTINUOUS USE OF OPIOIDS: ICD-10-CM

## 2021-04-05 DIAGNOSIS — M79.18 MYOFASCIAL PAIN: ICD-10-CM

## 2021-04-05 DIAGNOSIS — G89.4 CHRONIC PAIN SYNDROME: ICD-10-CM

## 2021-04-05 DIAGNOSIS — M54.6 CHRONIC MIDLINE THORACIC BACK PAIN: ICD-10-CM

## 2021-04-05 DIAGNOSIS — M54.2 CERVICALGIA: ICD-10-CM

## 2021-04-05 DIAGNOSIS — G89.29 CHRONIC MIDLINE THORACIC BACK PAIN: ICD-10-CM

## 2021-04-05 DIAGNOSIS — M47.812 ARTHROPATHY OF CERVICAL FACET JOINT: ICD-10-CM

## 2021-04-05 NOTE — TELEPHONE ENCOUNTER
Reason for Call:  Other call back    Detailed comments:   pt requesting refill of oxycodone early beause he wasnt able to get pain pills after neck surgery- doesnt need them till the 13th. Goes to [facility]  Phone Number Patient can be reached at: Home number on file [phone] (home)    Best Time: anytime    Can we leave a detailed message on this number? YES    Call taken on 4/5/2021 at 3:45 PM by Terri Hubbard

## 2021-04-05 NOTE — TELEPHONE ENCOUNTER
Received call from patient requesting refill(s) of Oxycodone     Last dispensed from pharmacy on 3/16/21    Patient's last office/virtual visit by prescribing provider on 3/17/21  Next office/virtual appointment scheduled for none    Last urine drug screen date 2/7/21  Current opioid agreement on file (completed within the last year) Yes Date of opioid agreement: 3/17/21    E-prescribe to [facility] pharmacy    Will route to nursing [place] for review and preparation of prescription(s).

## 2021-04-05 NOTE — TELEPHONE ENCOUNTER
Medication refill information reviewed.     Due date for  oxycodone 10 MG  is 4/17/21     Prescriptions prepped for review.     Will route to provider.            Resolving. plan as above, c/w HF  - C/w HF, deescalate to NC as needed   - Lasix PRN

## 2021-04-06 RX ORDER — OXYCODONE HYDROCHLORIDE 10 MG/1
TABLET ORAL
Qty: 150 TABLET | Refills: 0 | Status: SHIPPED | OUTPATIENT
Start: 2021-04-06 | End: 2021-05-12

## 2021-04-06 NOTE — TELEPHONE ENCOUNTER
Left message on numerical id vm  to inform of approved RX .  Lupis/MA  [facility]

## 2021-04-06 NOTE — TELEPHONE ENCOUNTER
Plan from 21 pasted below    1. Medication Management:    ? Continue Skelaxin 800mg three times daily as needed for muscle pain  ? Oxycodone 10m tab every 4-6 hours as needed. Max of 5 tabs/day.  ? Topamax 75mg BID   5. Further procedures recommended: Cervical radiofrequency [id number] Not scheduled  ---------------------------------------------    Outreach X1. Left a  requesting call back. Provided call back number.    Shwetha Valles, MAXIMUSN, RN  Care Coordinator  [facility] [place]

## 2021-04-06 NOTE — TELEPHONE ENCOUNTER
Refill appropriate. Sent to requested pharmacy.    [place] Prescription Monitoring Program checked on 4/6/21.    Ashley Salgado, PAC

## 2021-04-11 DIAGNOSIS — J45.21 MILD INTERMITTENT ASTHMA WITH ACUTE EXACERBATION: ICD-10-CM

## 2021-04-11 DIAGNOSIS — F17.200 TOBACCO USE DISORDER: ICD-10-CM

## 2021-04-12 RX ORDER — OXYCODONE HYDROCHLORIDE 10 MG/1
TABLET ORAL
Qty: 150 TABLET | Refills: 0 | Status: CANCELLED | OUTPATIENT
Start: 2021-04-12

## 2021-04-12 NOTE — TELEPHONE ENCOUNTER
Please process a refill of oxyCODONE IR (ROXICODONE) 10 MG tablet to the [facility] pharmacy.     MAXIMUS SchreiberN, RN  Care Coordinator  [facility] [place]

## 2021-04-12 NOTE — TELEPHONE ENCOUNTER
Called [facility] pharmacy, they have a script saying to fill tomorrow and start 4/17/21. Will cancel this RX request.  Lupis/MA  [facility] [place]

## 2021-04-13 RX ORDER — VARENICLINE TARTRATE 1 MG/1
TABLET, FILM COATED ORAL
Qty: 60 TABLET | Refills: 3 | Status: SHIPPED | OUTPATIENT
Start: 2021-04-13 | End: 2021-08-10

## 2021-04-22 DIAGNOSIS — F41.1 GENERALIZED ANXIETY DISORDER: ICD-10-CM

## 2021-04-23 NOTE — TELEPHONE ENCOUNTER
Klonopin      Last Written Prescription Date:  3/31/2021  Last Fill Quantity: 90,   # refills: 0  Last Office Visit: 2/19/2021  Future Office visit:       Routing refill request to provider for review/approval because:  Drug not on the [facility], [facility] or [facility] refill protocol or controlled substance

## 2021-05-01 RX ORDER — CLONAZEPAM 0.5 MG/1
TABLET ORAL
Qty: 90 TABLET | Refills: 0 | Status: SHIPPED | OUTPATIENT
Start: 2021-05-01 | End: 2021-05-25

## 2021-05-03 DIAGNOSIS — F41.1 GENERALIZED ANXIETY DISORDER: ICD-10-CM

## 2021-05-03 NOTE — TELEPHONE ENCOUNTER
Requested Prescriptions   Pending Prescriptions Disp Refills     clonazePAM (KLONOPIN) 0.5 MG tablet 90 tablet 0       There is no refill protocol information for this order        Last Written Prescription Date:  05/01/21   Error already filled.

## 2021-05-11 DIAGNOSIS — M79.18 MYOFASCIAL PAIN: ICD-10-CM

## 2021-05-11 DIAGNOSIS — G89.4 CHRONIC PAIN SYNDROME: ICD-10-CM

## 2021-05-11 DIAGNOSIS — G89.29 CHRONIC MIDLINE THORACIC BACK PAIN: ICD-10-CM

## 2021-05-11 DIAGNOSIS — F11.90 CHRONIC, CONTINUOUS USE OF OPIOIDS: ICD-10-CM

## 2021-05-11 DIAGNOSIS — M54.2 CERVICALGIA: ICD-10-CM

## 2021-05-11 DIAGNOSIS — M47.812 ARTHROPATHY OF CERVICAL FACET JOINT: ICD-10-CM

## 2021-05-11 DIAGNOSIS — M54.6 CHRONIC MIDLINE THORACIC BACK PAIN: ICD-10-CM

## 2021-05-11 NOTE — TELEPHONE ENCOUNTER
Oxycodone 10mg      Last Written Prescription Date:  04/15/21  Last Fill Quantity: 150,   # refills: 0  Last Office Visit: 3/17/21  Future Office visit:       Routing refill request to provider for review/approval because:  Drug not on the [facility], [facility] or [facility] refill protocol or controlled substance

## 2021-05-12 RX ORDER — OXYCODONE HYDROCHLORIDE 10 MG/1
TABLET ORAL
Qty: 150 TABLET | Refills: 0 | Status: SHIPPED | OUTPATIENT
Start: 2021-05-12 | End: 2021-06-14

## 2021-05-12 NOTE — TELEPHONE ENCOUNTER
Medication refill information reviewed.     Due date for oxyCODONE IR (ROXICODONE) 10 MG tablet is 05/17/21     UDS 02/07/21  CSA 03/17/21    Prescriptions prepped for review.     Will route to provider.

## 2021-05-19 DIAGNOSIS — G89.4 CHRONIC PAIN SYNDROME: ICD-10-CM

## 2021-05-19 DIAGNOSIS — G89.29 CHRONIC MIDLINE THORACIC BACK PAIN: ICD-10-CM

## 2021-05-19 DIAGNOSIS — M54.2 CERVICALGIA: ICD-10-CM

## 2021-05-19 DIAGNOSIS — M54.6 CHRONIC MIDLINE THORACIC BACK PAIN: ICD-10-CM

## 2021-05-19 DIAGNOSIS — M47.812 ARTHROPATHY OF CERVICAL FACET JOINT: ICD-10-CM

## 2021-05-20 RX ORDER — TOPIRAMATE 25 MG/1
TABLET, FILM COATED ORAL
Qty: 180 TABLET | Refills: 1 | Status: SHIPPED | OUTPATIENT
Start: 2021-05-20 | End: 2021-07-06

## 2021-05-20 NOTE — TELEPHONE ENCOUNTER
Prescription approved per [facility] Refill Protocol.    Ailyn Tobias RN

## 2021-06-07 DIAGNOSIS — F41.1 GENERALIZED ANXIETY DISORDER: ICD-10-CM

## 2021-06-07 DIAGNOSIS — G89.4 CHRONIC PAIN SYNDROME: ICD-10-CM

## 2021-06-08 RX ORDER — DULOXETIN HYDROCHLORIDE 60 MG/1
CAPSULE, DELAYED RELEASE ORAL
Qty: 90 CAPSULE | Refills: 0 | Status: SHIPPED | OUTPATIENT
Start: 2021-06-08 | End: 2021-07-07

## 2021-06-08 NOTE — TELEPHONE ENCOUNTER
Routing refill request to provider for review/approval because:  Drug interaction warning    MAXIMUS ZazuetaN, RN  [facility]

## 2021-06-14 DIAGNOSIS — G89.29 CHRONIC MIDLINE THORACIC BACK PAIN: ICD-10-CM

## 2021-06-14 DIAGNOSIS — M79.18 MYOFASCIAL PAIN: ICD-10-CM

## 2021-06-14 DIAGNOSIS — M54.2 CERVICALGIA: ICD-10-CM

## 2021-06-14 DIAGNOSIS — F11.90 CHRONIC, CONTINUOUS USE OF OPIOIDS: ICD-10-CM

## 2021-06-14 DIAGNOSIS — G89.4 CHRONIC PAIN SYNDROME: ICD-10-CM

## 2021-06-14 DIAGNOSIS — M54.6 CHRONIC MIDLINE THORACIC BACK PAIN: ICD-10-CM

## 2021-06-14 DIAGNOSIS — M47.812 ARTHROPATHY OF CERVICAL FACET JOINT: ICD-10-CM

## 2021-06-14 RX ORDER — OXYCODONE HYDROCHLORIDE 10 MG/1
TABLET ORAL
Qty: 150 TABLET | Refills: 0 | Status: SHIPPED | OUTPATIENT
Start: 2021-06-14 | End: 2021-07-07

## 2021-06-14 NOTE — TELEPHONE ENCOUNTER
Received call from patient requesting refill(s) of Oxycodone     Last dispensed from pharmacy on 5/15/21    Patient's last office/virtual visit by prescribing provider on 3/17/21  Next office/virtual appointment scheduled for none    Last urine drug screen date 2/7/21  Current opioid agreement on file (completed within the last year) Yes Date of opioid agreement: 3/17/21    E-prescribe to [facility]   pharmacy    Will route to nursing [place] for review and preparation of prescription(s).

## 2021-06-14 NOTE — TELEPHONE ENCOUNTER
Routing to provider to review medication prepped per below. Called pt and LM to remind to make follow up appt prior to next refill.      Oxycodone 10mg, #150, Refill:no  Sig:Take 1 tablet every 4-6 hours as needed for breakthrough pain. Max of 5/day. Fill 06/15/21. Start 21. 30 day script  Last picked up 05/15/21 with start on 21  Due 21    Per last OV note 21:  ? Oxycodone 10m tab every 4-6 hours as needed. Max of 5 tabs/day.       Follow up with this provider: 8 Weeks for a virtual visit    Talia GUTIÉRREZ, RN Care Coordinator  [facility]  Pain Management

## 2021-06-14 NOTE — TELEPHONE ENCOUNTER
Left message on numerical id vm to inform of approved Rx and to schedule appt .  Lupis/[facility]

## 2021-06-15 DIAGNOSIS — M79.18 MYOFASCIAL PAIN: ICD-10-CM

## 2021-06-15 RX ORDER — METAXALONE 800 MG/1
TABLET ORAL
Qty: 60 TABLET | Refills: 3 | Status: SHIPPED | OUTPATIENT
Start: 2021-06-15 | End: 2021-07-07

## 2021-06-15 NOTE — TELEPHONE ENCOUNTER
Skelaxin      Last Written Prescription Date:  03/18/2021  Last Fill Quantity: 60,   # refills: 3  Last Office Visit: 02/19/2021 - [place]  Future Office visit:   None  Routing refill request to provider for review/approval because:  Drug not on the [facility], [facility] or [facility] refill protocol or controlled substance    Ailyn Tobias RN

## 2021-06-28 ENCOUNTER — HOSPITAL ENCOUNTER (EMERGENCY)
Facility: CLINIC | Age: 56
Discharge: HOME | End: 2021-06-28
Attending: FAMILY MEDICINE | Admitting: FAMILY MEDICINE
Payer: COMMERCIAL

## 2021-06-28 VITALS
BODY MASS INDEX: 23.92 KG/M2 | RESPIRATION RATE: 19 BRPM | OXYGEN SATURATION: 93 % | WEIGHT: 162 LBS | SYSTOLIC BLOOD PRESSURE: 120 MMHG | TEMPERATURE: 98.3 F | HEART RATE: 107 BPM | DIASTOLIC BLOOD PRESSURE: 90 MMHG

## 2021-06-28 DIAGNOSIS — J20.9 ACUTE BRONCHITIS, UNSPECIFIED ORGANISM: ICD-10-CM

## 2021-06-28 PROCEDURE — 99284 EMERGENCY DEPT VISIT MOD MDM: CPT | Performed by: FAMILY MEDICINE

## 2021-06-28 PROCEDURE — 99282 EMERGENCY DEPT VISIT SF MDM: CPT | Performed by: FAMILY MEDICINE

## 2021-06-28 RX ORDER — PREDNISONE 20 MG/1
20 TABLET ORAL 2 TIMES DAILY
Qty: 10 TABLET | Refills: 0 | Status: SHIPPED | OUTPATIENT
Start: 2021-06-28 | End: 2021-07-03

## 2021-06-28 RX ORDER — PREDNISONE 20 MG/1
20 TABLET ORAL 2 TIMES DAILY
Qty: 10 TABLET | Refills: 0 | Status: SHIPPED | OUTPATIENT
Start: 2021-06-28 | End: 2021-06-28

## 2021-06-28 NOTE — ED TRIAGE NOTES
Pt presents with concerns of a cough.  Pt is concerned he has bronchitis again.  Pt reports he has had a cough for a few weeks.  Pt states that he is both congested and has a runny nose along with a productive cough.  Phlegm is green in color per pt.  Pt states that he feels like he is wheezing.  Pt states that he took his pain medication a few hours ago with Excedrin and Ibuprofen.

## 2021-06-28 NOTE — DISCHARGE INSTRUCTIONS
You have acute bronchitis with wheezing.  Please see the attached handout.  It is important that you stop smoking during this time.  Take Augmentin 875 mg twice a day for 10 days.  Begin prednisone 20 mg twice a day-take in the morning and afternoon for 5 days.  Use your albuterol inhaler or nebulizer every 4 hours while awake for the next 3-5 days.  Please quit smoking.

## 2021-06-29 ENCOUNTER — HOSPITAL ENCOUNTER (EMERGENCY)
Facility: CLINIC | Age: 56
Discharge: HOME | End: 2021-06-29
Attending: FAMILY MEDICINE | Admitting: FAMILY MEDICINE
Payer: COMMERCIAL

## 2021-06-29 ENCOUNTER — APPOINTMENT (OUTPATIENT)
Dept: GENERAL RADIOLOGY | Facility: CLINIC | Age: 56
End: 2021-06-29
Attending: FAMILY MEDICINE
Payer: COMMERCIAL

## 2021-06-29 VITALS
RESPIRATION RATE: 16 BRPM | TEMPERATURE: 98 F | HEART RATE: 104 BPM | DIASTOLIC BLOOD PRESSURE: 72 MMHG | SYSTOLIC BLOOD PRESSURE: 115 MMHG | OXYGEN SATURATION: 94 % | BODY MASS INDEX: 23.92 KG/M2 | WEIGHT: 162 LBS

## 2021-06-29 DIAGNOSIS — J20.9 ACUTE BRONCHITIS, UNSPECIFIED ORGANISM: ICD-10-CM

## 2021-06-29 DIAGNOSIS — T40.605A: ICD-10-CM

## 2021-06-29 LAB
ANION GAP SERPL CALCULATED.3IONS-SCNC: 4 MMOL/L (ref 3–14)
BASOPHILS # BLD AUTO: 0 10E9/L (ref 0–0.2)
BASOPHILS NFR BLD AUTO: 0.4 %
BUN SERPL-MCNC: 7 MG/DL (ref 7–30)
CALCIUM SERPL-MCNC: 9 MG/DL (ref 8.5–10.1)
CHLORIDE SERPL-SCNC: 104 MMOL/L (ref 94–109)
CO2 SERPL-SCNC: 27 MMOL/L (ref 20–32)
CREAT SERPL-MCNC: 1.05 MG/DL (ref 0.66–1.25)
CRP SERPL-MCNC: 208 MG/L (ref 0–8)
DIFFERENTIAL METHOD BLD: ABNORMAL
EOSINOPHIL # BLD AUTO: 0.4 10E9/L (ref 0–0.7)
EOSINOPHIL NFR BLD AUTO: 3.5 %
ERYTHROCYTE [DISTWIDTH] IN BLOOD BY AUTOMATED COUNT: 12.3 % (ref 10–15)
GFR SERPL CREATININE-BSD FRML MDRD: 80 ML/MIN/{1.73_M2}
GLUCOSE SERPL-MCNC: 104 MG/DL (ref 70–99)
HCT VFR BLD AUTO: 41.1 % (ref 40–53)
HGB BLD-MCNC: 14 G/DL (ref 13.3–17.7)
IMM GRANULOCYTES # BLD: 0 10E9/L (ref 0–0.4)
IMM GRANULOCYTES NFR BLD: 0.3 %
LABORATORY COMMENT REPORT: NORMAL
LYMPHOCYTES # BLD AUTO: 1.2 10E9/L (ref 0.8–5.3)
LYMPHOCYTES NFR BLD AUTO: 11.1 %
MCH RBC QN AUTO: 32.9 PG (ref 26.5–33)
MCHC RBC AUTO-ENTMCNC: 34.1 G/DL (ref 31.5–36.5)
MCV RBC AUTO: 97 FL (ref 78–100)
MONOCYTES # BLD AUTO: 0.4 10E9/L (ref 0–1.3)
MONOCYTES NFR BLD AUTO: 3.9 %
NEUTROPHILS # BLD AUTO: 8.5 10E9/L (ref 1.6–8.3)
NEUTROPHILS NFR BLD AUTO: 80.8 %
NRBC # BLD AUTO: 0 10*3/UL
NRBC BLD AUTO-RTO: 0 /100
PLATELET # BLD AUTO: 173 10E9/L (ref 150–450)
POTASSIUM SERPL-SCNC: 3.4 MMOL/L (ref 3.4–5.3)
RBC # BLD AUTO: 4.25 10E12/L (ref 4.4–5.9)
SARS-COV-2 RNA RESP QL NAA+PROBE: NEGATIVE
SODIUM SERPL-SCNC: 135 MMOL/L (ref 133–144)
SPECIMEN SOURCE: NORMAL
TROPONIN I SERPL-MCNC: <0.015 UG/L (ref 0–0.04)
WBC # BLD AUTO: 10.5 10E9/L (ref 4–11)

## 2021-06-29 PROCEDURE — 96360 HYDRATION IV INFUSION INIT: CPT | Performed by: FAMILY MEDICINE

## 2021-06-29 PROCEDURE — C9803 HOPD COVID-19 SPEC COLLECT: HCPCS | Performed by: FAMILY MEDICINE

## 2021-06-29 PROCEDURE — 87635 SARS-COV-2 COVID-19 AMP PRB: CPT | Performed by: FAMILY MEDICINE

## 2021-06-29 PROCEDURE — 258N000003 HC RX IP 258 OP 636: Performed by: FAMILY MEDICINE

## 2021-06-29 PROCEDURE — 80048 BASIC METABOLIC PNL TOTAL CA: CPT | Performed by: FAMILY MEDICINE

## 2021-06-29 PROCEDURE — 99285 EMERGENCY DEPT VISIT HI MDM: CPT | Mod: 25 | Performed by: FAMILY MEDICINE

## 2021-06-29 PROCEDURE — 71045 X-RAY EXAM CHEST 1 VIEW: CPT

## 2021-06-29 PROCEDURE — 85025 COMPLETE CBC W/AUTO DIFF WBC: CPT | Performed by: FAMILY MEDICINE

## 2021-06-29 PROCEDURE — 250N000013 HC RX MED GY IP 250 OP 250 PS 637: Performed by: FAMILY MEDICINE

## 2021-06-29 PROCEDURE — 93005 ELECTROCARDIOGRAM TRACING: CPT | Performed by: FAMILY MEDICINE

## 2021-06-29 PROCEDURE — 84484 ASSAY OF TROPONIN QUANT: CPT | Performed by: FAMILY MEDICINE

## 2021-06-29 PROCEDURE — 86140 C-REACTIVE PROTEIN: CPT | Performed by: FAMILY MEDICINE

## 2021-06-29 PROCEDURE — 93010 ELECTROCARDIOGRAM REPORT: CPT | Performed by: FAMILY MEDICINE

## 2021-06-29 RX ORDER — ALBUTEROL SULFATE 90 UG/1
2 AEROSOL, METERED RESPIRATORY (INHALATION)
Status: DISCONTINUED | OUTPATIENT
Start: 2021-06-29 | End: 2021-06-30 | Stop reason: HOSPADM

## 2021-06-29 RX ORDER — SODIUM CHLORIDE 9 MG/ML
INJECTION, SOLUTION INTRAVENOUS CONTINUOUS
Status: DISCONTINUED | OUTPATIENT
Start: 2021-06-29 | End: 2021-06-30 | Stop reason: HOSPADM

## 2021-06-29 RX ADMIN — ALBUTEROL SULFATE 2 PUFF: 90 AEROSOL, METERED RESPIRATORY (INHALATION) at 22:38

## 2021-06-29 RX ADMIN — SODIUM CHLORIDE 1000 ML: 9 INJECTION, SOLUTION INTRAVENOUS at 22:35

## 2021-06-30 ASSESSMENT — ENCOUNTER SYMPTOMS
ENDOCRINE NEGATIVE: 1
HEMATOLOGIC/LYMPHATIC NEGATIVE: 1
FEVER: 0
CHILLS: 1
GASTROINTESTINAL NEGATIVE: 1
EYES NEGATIVE: 1
RESPIRATORY NEGATIVE: 1
PSYCHIATRIC NEGATIVE: 1
MUSCULOSKELETAL NEGATIVE: 1
NEUROLOGICAL NEGATIVE: 1
CARDIOVASCULAR NEGATIVE: 1
FATIGUE: 1
DIAPHORESIS: 1

## 2021-06-30 NOTE — DISCHARGE INSTRUCTIONS
It is possible that the CBD gummy medication that you ordered online could have interacted with your narcotic medication causing your episode of decreased consciousness. Stop using these gummy medications. Continue on your bronchitis medications as started by Dr. Bains and take as directed.    I'm glad you are feeling improved.

## 2021-06-30 NOTE — ED NOTES
" Blood culture x1 drawn with IV start. Blood sent to lab. Pt states \"i'd like to just drink water, I feel just fine\". Call light in reach, side rails x2.   "

## 2021-06-30 NOTE — ED NOTES
Reviewed discharge instruction, verbalized understanding. No questions or concerns. IV removed. Meds reviewed. VS reassessed.   Pt states he has a ride coming to get him, he will let RN know when arrived.

## 2021-06-30 NOTE — ED TRIAGE NOTES
Pt presents with EMS with altered mental status; pt had rode to the pharmacy with his roommate to get his prescriptions; on the way home he became lethargic and could not in the car when he got home. EMS placed pt on oxygen and gave 0.4mg intranasal narcan and his mentation cleared up. He reports he took 3 5 mg oxycodone today.

## 2021-06-30 NOTE — PROGRESS NOTES
[facility]     CHIEF COMPLAINT:   Pain  -neck pain     INTERVAL HISTORY:  Last seen on 3/17/21    Recommendations at last visit included:  1. Physical Therapy:  not at this time  2. Clinical Health Psychologist:  NO  3. Diagnostic Studies: none at this time  4. Medication Management:    ? Continue Skelaxin 800mg three times daily as needed for muscle pain  ? Oxycodone 10m tab every 4-6 hours as needed. Max of 5 tabs/day.  ? Topamax 75mg BID   5. Further procedures recommended: Cervical radiofrequency [id number]  6. Recommendations to PCP. See above  7. CSA signed today        Follow up with this provider: 8 Weeks for a virtual visit     Since his last visit, Joselito Abarca reports:    He had bronchitis and was prescribed antibiotics. He states that he couldn't get out of the truck and was repeating himself. His friend called 911. He was asked if he did any narcotics that day and he said yeah as he takes his pain medication. He states that he was brought to the hospital and longterm there he started feeling better. He states that the only other thing he started taking was a quit smoking aid (CBD gummies) and he thought it was that. He states that he was discharged and when he went home he took his sleep medication and states that he had body jerking for 1.5 hours. He states that it was like he was in withdrawals. He states that he had this for 3 days. He states that he thinks that it was an interaction with his quetiapine and antibiotic. He did stop the CBD gummies    He states that Dr. Driscoll is recommending he have a disc surgery. He states that he needs a referral to see a surgeon. He states that he is having pain down his arm and his right is going numb and tingly. He had the nerve burning and states that it was helpful but he states that when his arm is at certain positions, it is very painful. He states that after 30 minutes of going to bed he had pain between his  shoulder blades as well. He states that he has to consistently change positions. He states that he feels lightheaded/dizzy after he first gets up and needs to sit for 30 minutes and takes a pain medication. Once things calm down he is able to function.       Pain Information:              Pain today 6/10     Annual Controlled Substance Agreement: 3/17/21  UDS: 2021    CURRENT RELEVANT PAIN MEDICATIONS:   Cymbalta 60mg daily  Clonazepam 0.5mg-takes 3-4/day  Oxycodone 10mg every 4-6 hours max of 5/day  Topamax 25mg: takes 3 tabs twice daily  Metaxalone 800m tab three times daily    Previous Medications: (H--helped; HI--Helped initially; [facility]-- somewhat helpful, NH--No help; W--worse; SE--side effects)   Opiates: hydrocodone SE allergic, fever, sweaty, headache, methadone H, Oxycodone H  NSAIDS: Ibuprofen NH, Aleve NH  Anti-migraine mediations: Fiorinal H  Muscle Relaxants: Valium H, methocarbamol NH, Tizanidine NH, Skelaxin NH  Neuropathics: Gabapentin H SE breathing issues  Anti-depressants: Cymbalta NH  Anxiolytics: Klonopin H  Topicals: Lidocaine H  Other medications not covered above: Tylenol NH     Past Pain Treatments:  Pain Clinic:   Yes, he was previously seen at [facility] and [facility]   PT: Yes, has done many times  Psychologist: No  Relaxation techniques/biofeedback: No  Chiropractor: Yes, feels that it made it worse  Acupuncture: Yes, just did one session  Pharmacotherapy:               Opioids: Yes                Non-opioids:    Yes   TENs Unit:Yes, aggravates the pain  Injections:     2018 bilateral occipital nerve blocks ,     2015 and 2015Cervical medial branch blocks      Has had low back pain injections at [facility].     10/18/2019 Right cervical RFA ,     2019 left cervical RFA .    2020 L4-5 REVA    2020 TPI neck in ER  Self-care:   Yes, hot tubs, ice packs, heating pads  Surgeries related to pain: Yes,  1. anterior cervical discectomy and fusion C6-7, exploration and  revision C4-6 with hardware removal 11/29/2017,   2. Left T1-T2 thoracic hemilaminectomy, microdiscectomy 02/21/2012,   3. Removal of C4 through C6 anterior cervical plate, Exploration of C4-C5 and C5-C6 anterior cervical fusion, C4-C5 and C5-C6 arthrodesis, C4 through C6 anterior cervical instrumentation, and [place] of right iliac hip graft.  03/15/2011  4. C4-C5 and C5-C6 anterior cervical discectomy and fusion with instrumentation and neural electrophysiologic monitoring 01/26/2010     [place] Board of Pharmacy Data Base Reviewed:    YES; As expected, no concern for misuse/abuse of controlled medications based on this report. Checked 7/7/21     THE 4 As OF OPIOID MAINTENANCE ANALGESIA    Analgesia: Is pain relief clinically significant? NO   Activity: Is patient functional and able to perform Activities of Daily Living? YES   Adverse effects: Is patient free from adverse side effects from opiates? YES   Adherence to Rx protocol: Is patient adhering to Controlled Substance Agreement and taking medications ONLY as ordered? YES     MME: 75 (started at 330)    Medications:  Current Outpatient Prescriptions          Current Outpatient Medications   Medication Sig Dispense Refill     clonazePAM (KLONOPIN) 0.5 MG tablet TAKE 1/2 TO 1 TABLET BY MOUTH THREE TIMES A DAY AS NEEDED FOR ANXIETY 90 tablet 0     DULoxetine (CYMBALTA) 60 MG capsule Take 1 capsule (60 mg) by mouth daily 30 capsule 3     FLOVENT  MCG/ACT Inhaler INHALE 2 PUFFS INTO THE LUNGS TWICE DAILY 36 g 2     fluticasone (FLONASE) 50 MCG/ACT nasal spray SPRAY 2 SPRAYS IN EACH NOSTRIL EVERY DAY 16 g 5     ipratropium - albuterol 0.5 mg/2.5 mg/3 mL (DUONEB) 0.5-2.5 (3) MG/3ML neb solution Take 1 vial (3 mLs) by nebulization every 4 hours as needed for shortness of breath / dyspnea 30 vial 0     methadone (DOLOPHINE) 5 MG tablet Take 1.5 tablets every 12 hours.  Fill 2/28/2020. Start 2/29/2020. 90 tablet 0     naloxone (NARCAN) 4 MG/0.1ML nasal spray  Spray 4 mg into one nostril alternating nostrils once as needed for opioid reversal every 2-3 minutes until assistance arrives         nystatin (MYCOSTATIN) 287507 UNIT/ML suspension TAKE 5 MLS BY MOUTH FOUR TIMES A  mL 0     order for DME Equipment being ordered: Nebulizer mask and tubing 1 each 1     oxyCODONE IR (ROXICODONE) 10 MG tablet Take 1 tablet every 6 hours as needed for breakthrough pain. Max of 4/day. Fill 2/19/2020. Start 2/22/2020. 30 day script 115 tablet 0     sildenafil (VIAGRA) 100 MG tablet Take 1 tablet (100 mg) by mouth daily as needed 30 min to 4 hrs before sex. Do not use with nitroglycerin, terazosin or doxazosin. 4 tablet 0     tiZANidine (ZANAFLEX) 2 MG tablet TAKE ONE TO TWO TABLETS BY MOUTH THREE TIMES A DAY AS NEEDED FOR MUSCLE SPASMS/TENSION 60 tablet 0     traZODone (DESYREL) 100 MG tablet Take 3 tablets (300 mg) by mouth At Bedtime 90 tablet 3     VENTOLIN  (90 Base) MCG/ACT inhaler INHALE 2 PUFFS INTO THE LUNGS EVERY 6 HOURS AS NEEDED FOR SHORTNESS OF BREATH, DIFFICULTY BREATHING OR WHEEZING. 18 g 3     vitamin D3 (CHOLECALCIFEROL) 2000 units tablet TAKE ONE TABLET BY MOUTH EVERY  tablet 3     zolpidem (AMBIEN) 10 MG tablet Take 10 mg by mouth nightly as needed                   Assessment:  Joselito Abarca is a 56 year old male who presents today for follow up regarding his:        1. Cervical radiculopathy  2. Arthropathy of cervical facet joint  3. cervicalgia  4. Midline thoracic back pain  5. Myofascial pain  6. Chronic pain syndrome  7. Chronic use of opioids    Chronic pain is overall stable.  Patient will dispose of the CBD Gummies that he had ordered online.  Given the recent episode that the patient had he is very concerned about all the medications that he is on and would like to come off of medications that are not helping/needed.  We will start with Cymbalta and then discuss Topamax after he is off of Cymbalta.        Plan:     Diagnosis reviewed,  treatment option addressed, and risk/benifits discussed.  Self-care instructions given.  I am recommending a multidisciplinary treatment plan to help this patient better manage pain.       1. Physical Therapy:  not at this time  2. Clinical Health Psychologist:  NO  3. Diagnostic Studies: none at this time  4. Medication Management:      Okay to stop Metaxalone    Decrease Cymbalta to 30mg daily    Continue topamax at current dose    Continue Oxycodone at current dose  5. Further procedures recommended: none at this time  6. Recommendations to PCP. See above  7. Referred to Dr. Vasques to discuss further surgery that was recommended by Dr. Driscoll        Follow up with this provider: 4 Weeks for a virtual visit    The total TIME spent on this patient on the day of the appointment was 30 minutes.    Time spent preparing to see the patient (reviewing records and tests) 4 minutes  Time spend face to face with the patient 20 minutes  Time spent ordering tests, medications, procedures and referrals 0 minutes  Time spent Referring and communicating with other healthcare professionals 0 minutes  Time spent documenting clinical information in Epic 6 minutes      Ashley Salgado [facility]
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
## 2021-07-05 DIAGNOSIS — G89.4 CHRONIC PAIN SYNDROME: ICD-10-CM

## 2021-07-05 DIAGNOSIS — M54.2 CERVICALGIA: ICD-10-CM

## 2021-07-05 DIAGNOSIS — G89.29 CHRONIC MIDLINE THORACIC BACK PAIN: ICD-10-CM

## 2021-07-05 DIAGNOSIS — F41.1 GENERALIZED ANXIETY DISORDER: ICD-10-CM

## 2021-07-05 DIAGNOSIS — M47.812 ARTHROPATHY OF CERVICAL FACET JOINT: ICD-10-CM

## 2021-07-05 DIAGNOSIS — M54.6 CHRONIC MIDLINE THORACIC BACK PAIN: ICD-10-CM

## 2021-07-06 RX ORDER — CLONAZEPAM 0.5 MG/1
TABLET ORAL
Qty: 90 TABLET | Refills: 0 | Status: SHIPPED | OUTPATIENT
Start: 2021-07-06 | End: 2021-08-09

## 2021-07-06 RX ORDER — TOPIRAMATE 25 MG/1
TABLET, FILM COATED ORAL
Qty: 180 TABLET | Refills: 0 | Status: SHIPPED | OUTPATIENT
Start: 2021-07-06 | End: 2021-08-09

## 2021-07-06 NOTE — TELEPHONE ENCOUNTER
"Requested Prescriptions   Pending Prescriptions Disp Refills     topiramate (TOPAMAX) 25 MG tablet [Pharmacy Med Name: TOPIRAMATE 25MG TABS] 180 tablet 1     Sig: TAKE THREE TABLETS BY MOUTH TWICE A DAY - TITRATE TO THIS DOSE AS INSTRUCTED IN CLINIC       Anti-Seizure Meds Protocol  Failed - 7/5/2021 10:52 AM        Failed - Review Authorizing provider's last note.      Refer to last progress notes: confirm request is for original authorizing provider (cannot be through other providers).        Failed - Normal CBC on file in past 26 months     Recent Labs   Lab Test 06/29/21  2230   WBC 10.5   RBC 4.25*   HGB 14.0   HCT 41.1              Passed - Recent (12 mo) or future (30 days) visit within the authorizing provider's specialty     Patient has had an office visit with the authorizing provider or a provider within the authorizing providers department within the previous 12 mos or has a future within next 30 days. See \"Patient Info\" tab in inbasket, or \"Choose Columns\" in Meds & Orders section of the refill encounter.              Passed - Normal ALT or AST on file in past 26 months     Recent Labs   Lab Test 02/19/21  1648   ALT 16     Recent Labs   Lab Test 02/19/21  1648   AST 12             Passed - Normal platelet count on file in past 26 months     Recent Labs   Lab Test 06/29/21  2230                  Passed - Medication is active on med list      .Routing refill request to provider for review/approval        clonazePAM (KLONOPIN) 0.5 MG tablet [Pharmacy Med Name: CLONAZEPAM 0.5MG TABS] 90 tablet 0     Sig: TAKE ONE-HALF TO ONE TABLET BY MOUTH THREE TIMES A DAY AS NEEDED FOR ANXIETY       There is no refill protocol information for this order      Routing refill request to provider for review/approval  Ana Lazo RN        "

## 2021-07-06 NOTE — TELEPHONE ENCOUNTER
topiramate (TOPAMAX) 25 MG tablet [Pharmacy Med Name: TOPIRAMATE 25MG TABS] 180 tablet 1    Sig: TAKE THREE TABLETS BY MOUTH TWICE A DAY - TITRATE TO THIS DOSE AS INSTRUCTED IN CLINIC   Routing refill request to provider for review/approval because:  Labs out of range:  CBC  T'd up 1 month for provider review.        clonazePAM (KLONOPIN) 0.5 MG tablet [Pharmacy Med Name: CLONAZEPAM 0.5MG TABS] 90 tablet 0    Sig: TAKE ONE-HALF TO ONE TABLET BY MOUTH THREE TIMES A DAY AS NEEDED FOR ANXIETY   Last Written Prescription Date:  5/25/2021  Last Fill Quantity: 90,  # refills: 0   Last office visit: 2/19/2021   Generalized anxiety disorder [F41.1]     Future Office Visit:   Next 5 appointments (look out 90 days)    Jul 07, 2021  3:00 PM  Return Visit with Ashley Salgado PA-C  [facility] ([facility] [address] [place] [id number] [phone]      Routing refill request to provider for review/approval because:  Drug not on the [facility] refill protocol     Ana Lazo, RN

## 2021-07-07 ENCOUNTER — OFFICE VISIT (OUTPATIENT)
Dept: PALLIATIVE MEDICINE | Facility: CLINIC | Age: 56
End: 2021-07-07
Payer: COMMERCIAL

## 2021-07-07 VITALS
WEIGHT: 162 LBS | SYSTOLIC BLOOD PRESSURE: 122 MMHG | DIASTOLIC BLOOD PRESSURE: 66 MMHG | BODY MASS INDEX: 23.99 KG/M2 | HEIGHT: 69 IN | TEMPERATURE: 97.7 F

## 2021-07-07 DIAGNOSIS — F11.90 CHRONIC, CONTINUOUS USE OF OPIOIDS: ICD-10-CM

## 2021-07-07 DIAGNOSIS — M54.12 CERVICAL RADICULOPATHY: Primary | ICD-10-CM

## 2021-07-07 DIAGNOSIS — G89.29 CHRONIC MIDLINE THORACIC BACK PAIN: ICD-10-CM

## 2021-07-07 DIAGNOSIS — M54.2 CERVICALGIA: ICD-10-CM

## 2021-07-07 DIAGNOSIS — M79.18 MYOFASCIAL PAIN: ICD-10-CM

## 2021-07-07 DIAGNOSIS — M47.812 ARTHROPATHY OF CERVICAL FACET JOINT: ICD-10-CM

## 2021-07-07 DIAGNOSIS — M54.6 CHRONIC MIDLINE THORACIC BACK PAIN: ICD-10-CM

## 2021-07-07 DIAGNOSIS — G89.4 CHRONIC PAIN SYNDROME: ICD-10-CM

## 2021-07-07 PROCEDURE — 99214 OFFICE O/P EST MOD 30 MIN: CPT | Performed by: PHYSICIAN ASSISTANT

## 2021-07-07 RX ORDER — DULOXETIN HYDROCHLORIDE 30 MG/1
CAPSULE, DELAYED RELEASE ORAL
Qty: 30 CAPSULE | Refills: 0 | Status: SHIPPED | OUTPATIENT
Start: 2021-07-07 | End: 2021-08-10

## 2021-07-07 RX ORDER — OXYCODONE HYDROCHLORIDE 10 MG/1
TABLET ORAL
Qty: 150 TABLET | Refills: 0 | Status: SHIPPED | OUTPATIENT
Start: 2021-07-07 | End: 2021-08-10

## 2021-07-07 ASSESSMENT — PAIN SCALES - GENERAL: PAINLEVEL: SEVERE PAIN (6)

## 2021-07-07 ASSESSMENT — MIFFLIN-ST. JEOR: SCORE: 1565.21

## 2021-07-07 NOTE — PATIENT INSTRUCTIONS
After Visit Instructions:     Thank you for coming to [facility] for your care. It is my goal to partner with you to help you reach your optimal state of health.     I am recommending multidisciplinary care at this time.  The focus of care will be to continue gradual rehabilitation and pain management with medication adjustments as needed.    Continue daily self-care, identifying contributing factors, and monitoring variations in pain level. Continue to integrate self-care into your life.          Schedule follow-up with Ashley Salgado PA-C in 4 weeks for a virtual visit. You will need to make this appointment.     Referrals: referred to see Dr. Vasques    Medication recommendations:     Okay to stop Metaxalone    Decrease Cymbalta to 30mg daily    Continue topamax at current dose    Continue Oxycodone at current dose      Ashley Salgado PA-C  [facility]  [facility]    Contact information: [facility]  Clinic Number:  [phone]     Call with any questions about your care and for scheduling assistance.     Calls are returned Monday through Friday between 8 AM and 4:30 PM. We usually get back to you within 2 business days depending on the issue/request.    If we are prescribing your medications:    For opioid medication refills, call the clinic or send a INAPPIN message 7 days in advance.  Please include:    Name of requested medication    Name of the pharmacy.    For non-opioid medications, call your pharmacy directly to request a refill. Please allow 3-4 days to be processed.     Per [place] State Law:    All controlled substance prescriptions must be filled within 30 days of being written.      For those controlled substances allowing refills, pickup must occur within 30 days of last fill.      We believe regular attendance is key to your success in our program!      Any time you are unable to keep your appointment  we ask that you call us at least 24 hours in advance to cancel.This will allow us to offer the appointment time to another patient.   Multiple missed appointments may lead to dismissal from the clinic.

## 2021-07-22 ENCOUNTER — OFFICE VISIT (OUTPATIENT)
Dept: NEUROSURGERY | Facility: CLINIC | Age: 56
End: 2021-07-22
Attending: PHYSICIAN ASSISTANT
Payer: COMMERCIAL

## 2021-07-22 VITALS
WEIGHT: 161.3 LBS | DIASTOLIC BLOOD PRESSURE: 66 MMHG | SYSTOLIC BLOOD PRESSURE: 122 MMHG | BODY MASS INDEX: 23.89 KG/M2 | TEMPERATURE: 97.1 F | HEIGHT: 69 IN

## 2021-07-22 DIAGNOSIS — M54.12 CERVICAL RADICULOPATHY: ICD-10-CM

## 2021-07-22 PROCEDURE — 99213 OFFICE O/P EST LOW 20 MIN: CPT | Performed by: NURSE PRACTITIONER

## 2021-07-22 ASSESSMENT — MIFFLIN-ST. JEOR: SCORE: 1562.03

## 2021-07-22 ASSESSMENT — PAIN SCALES - GENERAL: PAINLEVEL: SEVERE PAIN (7)

## 2021-07-22 NOTE — PROGRESS NOTES
"[facility]  Neurosurgery Followup:    HPI: 4 years s/p C6-7 ACDF with prior C4-6 fusion. Now with 8 month history of worsened chronic neck pain and right>left non-specific arm pain. Has been managed by pain management for chronic opioid dependence. Recently underwent cervical RFA with improvement in neck symptoms. Has undergone PT in the past without much benefit. Has not had any recent imaging.     Medical, surgical, family, and social history unchanged since prior exam.    Exam:  Constitutional:  Alert, well nourished, NAD.  HEENT: Normocephalic, atraumatic.   Pulm:  Without shortness of breath   CV:  No pitting edema of BLE.     Vital Signs:  /66   Temp 97.1  F (36.2  C) (Temporal)   Ht 1.753 m (5' 9\")   Wt 73.2 kg (161 lb 4.8 oz)   BMI 23.82 kg/m      Neurological:  Awake  Alert  Oriented x 3  Motor exam:     Shoulder Abduction:  Right:  5/5    Left:  5/5  Biceps:                      Right:  5/5    Left:  5/5  Triceps:                     Right:  5/5    Left:  5/5  Wrist Extensors:       Right:  5/5    Left:  5/5  Wrist Flexors:           Right:  5/5    Left:  5/5  Intrinsics:                  Right:  5/5    Left:  5/5     Able to spontaneously move U/E bilaterally  Sensation intact throughout all U/E dermatomes    Imaging:  None recent    A/P: s/p cervical spine fusion    Patient Instructions   -Cervical CT and MRI ordered. Please contact [phone] to schedule. I will contact you with the results and further recommendations.  -Please contact our clinic with questions or concerns at [phone].      Maria De Jesus Houston, MERI  [facility]  [address] [place] [address]  [address] [place] [address]  Tel [phone]  Fax [phone]    "

## 2021-07-22 NOTE — LETTER
"    7/22/2021         RE: Joselito Abarca  [address]  [facility] [address]        Dear Colleague,    Thank you for referring your patient, Joselito Abarca, to the [facility] [place]. Please see a copy of my visit note below.    [facility]  Neurosurgery Followup:    HPI: 4 years s/p C6-7 ACDF with prior C4-6 fusion. Now with 8 month history of worsened chronic neck pain and right>left non-specific arm pain. Has been managed by pain management for chronic opioid dependence. Recently underwent cervical RFA with improvement in neck symptoms. Has undergone PT in the past without much benefit. Has not had any recent imaging.     Medical, surgical, family, and social history unchanged since prior exam.    Exam:  Constitutional:  Alert, well nourished, NAD.  HEENT: Normocephalic, atraumatic.   Pulm:  Without shortness of breath   CV:  No pitting edema of BLE.     Vital Signs:  /66   Temp 97.1  F (36.2  C) (Temporal)   Ht 1.753 m (5' 9\")   Wt 73.2 kg (161 lb 4.8 oz)   BMI 23.82 kg/m      Neurological:  Awake  Alert  Oriented x 3  Motor exam:     Shoulder Abduction:  Right:  5/5    Left:  5/5  Biceps:                      Right:  5/5    Left:  5/5  Triceps:                     Right:  5/5    Left:  5/5  Wrist Extensors:       Right:  5/5    Left:  5/5  Wrist Flexors:           Right:  5/5    Left:  5/5  Intrinsics:                  Right:  5/5    Left:  5/5     Able to spontaneously move U/E bilaterally  Sensation intact throughout all U/E dermatomes    Imaging:  None recent    A/P: s/p cervical spine fusion    Patient Instructions   -Cervical CT and MRI ordered. Please contact [phone] to schedule. I will contact you with the results and further recommendations.  -Please contact our clinic with questions or concerns at [phone].      Maria De Jesus Houston, MERI  [facility]  [address] [place] [address] [address]  [place] [address]  Tel [phone]  Fax [phone]        Again, thank you for allowing me to participate in the care of your patient.        Sincerely,        Maria De Jesus Houston, NP

## 2021-07-22 NOTE — PATIENT INSTRUCTIONS
-Cervical CT and MRI ordered. Please contact [phone] to schedule. I will contact you with the results and further recommendations.  -Please contact our clinic with questions or concerns at [phone].

## 2021-07-29 NOTE — TELEPHONE ENCOUNTER
Patient called back.  Please try again   [facility]  [address] [place] [address],  [address]  [address] [place] [address]  Phone: [phone]  Fax: [phone]    DESTINEE Swanson - ELE        July 30, 2021      To Whom it May Concern,    Luna Duke is a patient in our office. He suffered a stroke, and has resultant post-stroke seizures. He requires Keppra to control his seizures.        Sincerely,        DESTINEE Swanson - CNP

## 2021-07-30 ENCOUNTER — HOSPITAL ENCOUNTER (OUTPATIENT)
Dept: CT IMAGING | Facility: CLINIC | Age: 56
End: 2021-07-30
Attending: NURSE PRACTITIONER
Payer: COMMERCIAL

## 2021-07-30 ENCOUNTER — HOSPITAL ENCOUNTER (OUTPATIENT)
Dept: MRI IMAGING | Facility: CLINIC | Age: 56
End: 2021-07-30
Attending: NURSE PRACTITIONER
Payer: COMMERCIAL

## 2021-07-30 DIAGNOSIS — M54.12 CERVICAL RADICULOPATHY: ICD-10-CM

## 2021-07-30 PROCEDURE — 72141 MRI NECK SPINE W/O DYE: CPT

## 2021-07-30 PROCEDURE — 72125 CT NECK SPINE W/O DYE: CPT

## 2021-08-02 ENCOUNTER — TELEPHONE (OUTPATIENT)
Dept: NEUROSURGERY | Facility: CLINIC | Age: 56
End: 2021-08-02

## 2021-08-02 NOTE — TELEPHONE ENCOUNTER
Attempted to contact patient to discuss cervical imaging. Left message to return call. Cervical imaging with multilevel degenerative changes without high grade spinal canal stenosis. Bilateral foraminal stenosis at C6-7, C7-T1 and C3-4. Would recommend JASMINE at this time. Should follow up with Dr. Vasques if symptoms persist or worsen. Also right upper lobe pulmoary nodule noted on scan. Would recommend follow up with PCP regarding this.

## 2021-08-02 NOTE — PROGRESS NOTES
Spenser is a 56 year old who is being evaluated via a billable telephone visit.    Is Pt currently in [place]? Yes      What phone number would you like to be contacted at? [phone]  How would you like to obtain your AVS? Mail a copy       [facility]     CHIEF COMPLAINT:   Pain  -neck pain     INTERVAL HISTORY:  Last seen on 7/7/21    Recommendations at last visit included:  1. Physical Therapy:  not at this time  2. Clinical Health Psychologist:  NO  3. Diagnostic Studies: none at this time  4. Medication Management:    ? Okay to stop Metaxalone  ? Decrease Cymbalta to 30mg daily  ? Continue topamax at current dose  ? Continue Oxycodone at current dose  5. Further procedures recommended: none at this time  6. Recommendations to PCP. See above  7. Referred to Dr. Vasques to discuss further surgery that was recommended by Dr. Driscoll        Follow up with this provider: 4 Weeks for a virtual visit     Since his last visit, Joselito Abarca reports:    He is not doing too bad. He has not yet connected with neurosurgery regarding his scans.     He states that he tolerated the medication changes well from his last visit. He states that he would like to stay on the 60mg of Cymbalta as it seems to be helping.     He states that he is on the verge of bronchitis and is using his nebulizer and inhalers. He states that he is worried about getting antibiotics. He states that he is wheezy but he needs a neb treatment.     Pain Information:              Pain today 8/10     Annual Controlled Substance Agreement: 3/17/21  UDS: 2/7/2021    CURRENT RELEVANT PAIN MEDICATIONS:   Cymbalta 60mg daily  Clonazepam 0.5mg-takes 3-4/day  Oxycodone 10mg every 4-6 hours max of 5/day  Topamax 25mg: takes 3 tabs twice daily    Previous Medications: (H--helped; HI--Helped initially; SWH-- somewhat helpful, NH--No help; W--worse; SE--side effects)   Opiates: hydrocodone SE allergic, fever, sweaty, headache, methadone H,  Oxycodone H  NSAIDS: Ibuprofen NH, Aleve NH  Anti-migraine mediations: Fiorinal H  Muscle Relaxants: Valium H, methocarbamol NH, Tizanidine NH, Skelaxin NH  Neuropathics: Gabapentin H SE breathing issues  Anti-depressants: Cymbalta NH  Anxiolytics: Klonopin H  Topicals: Lidocaine H  Other medications not covered above: Tylenol NH     Past Pain Treatments:  Pain Clinic:   Yes, he was previously seen at [facility] and [facility]   PT: Yes, has done many times  Psychologist: No  Relaxation techniques/biofeedback: No  Chiropractor: Yes, feels that it made it worse  Acupuncture: Yes, just did one session  Pharmacotherapy:               Opioids: Yes                Non-opioids:    Yes   TENs Unit:Yes, aggravates the pain  Injections:     07/12/2018 bilateral occipital nerve blocks ,     08/26/2015 and 5/27/2015Cervical medial branch blocks      Has had low back pain injections at [facility].     10/18/2019 Right cervical RFA ,     11/14/2019 left cervical RFA .    02/13/2020 L4-5 REVA    02/24/2020 TPI neck in ER  Self-care:   Yes, hot tubs, ice packs, heating pads  Surgeries related to pain: Yes,  1. anterior cervical discectomy and fusion C6-7, exploration and revision C4-6 with hardware removal 11/29/2017,   2. Left T1-T2 thoracic hemilaminectomy, microdiscectomy 02/21/2012,   3. Removal of C4 through C6 anterior cervical plate, Exploration of C4-C5 and C5-C6 anterior cervical fusion, C4-C5 and C5-C6 arthrodesis, C4 through C6 anterior cervical instrumentation, and [place] of right iliac hip graft.  03/15/2011  4. C4-C5 and C5-C6 anterior cervical discectomy and fusion with instrumentation and neural electrophysiologic monitoring 01/26/2010     [place] Board of Pharmacy Data Base Reviewed:    YES; As expected, no concern for misuse/abuse of controlled medications based on this report. Checked 7/7/21     THE 4 As OF OPIOID MAINTENANCE ANALGESIA    Analgesia: Is pain relief clinically significant? NO   Activity: Is patient functional  and able to perform Activities of Daily Living? YES   Adverse effects: Is patient free from adverse side effects from opiates? YES   Adherence to Rx protocol: Is patient adhering to Controlled Substance Agreement and taking medications ONLY as ordered? YES     MME: 75 (started at 330)    Medications:  Current Outpatient Prescriptions          Current Outpatient Medications   Medication Sig Dispense Refill     clonazePAM (KLONOPIN) 0.5 MG tablet TAKE 1/2 TO 1 TABLET BY MOUTH THREE TIMES A DAY AS NEEDED FOR ANXIETY 90 tablet 0     DULoxetine (CYMBALTA) 60 MG capsule Take 1 capsule (60 mg) by mouth daily 30 capsule 3     FLOVENT  MCG/ACT Inhaler INHALE 2 PUFFS INTO THE LUNGS TWICE DAILY 36 g 2     fluticasone (FLONASE) 50 MCG/ACT nasal spray SPRAY 2 SPRAYS IN EACH NOSTRIL EVERY DAY 16 g 5     ipratropium - albuterol 0.5 mg/2.5 mg/3 mL (DUONEB) 0.5-2.5 (3) MG/3ML neb solution Take 1 vial (3 mLs) by nebulization every 4 hours as needed for shortness of breath / dyspnea 30 vial 0     methadone (DOLOPHINE) 5 MG tablet Take 1.5 tablets every 12 hours.  Fill 2/28/2020. Start 2/29/2020. 90 tablet 0     naloxone (NARCAN) 4 MG/0.1ML nasal spray Spray 4 mg into one nostril alternating nostrils once as needed for opioid reversal every 2-3 minutes until assistance arrives         nystatin (MYCOSTATIN) 540837 UNIT/ML suspension TAKE 5 MLS BY MOUTH FOUR TIMES A  mL 0     order for DME Equipment being ordered: Nebulizer mask and tubing 1 each 1     oxyCODONE IR (ROXICODONE) 10 MG tablet Take 1 tablet every 6 hours as needed for breakthrough pain. Max of 4/day. Fill 2/19/2020. Start 2/22/2020. 30 day script 115 tablet 0     sildenafil (VIAGRA) 100 MG tablet Take 1 tablet (100 mg) by mouth daily as needed 30 min to 4 hrs before sex. Do not use with nitroglycerin, terazosin or doxazosin. 4 tablet 0     tiZANidine (ZANAFLEX) 2 MG tablet TAKE ONE TO TWO TABLETS BY MOUTH THREE TIMES A DAY AS NEEDED FOR MUSCLE SPASMS/TENSION  60 tablet 0     traZODone (DESYREL) 100 MG tablet Take 3 tablets (300 mg) by mouth At Bedtime 90 tablet 3     VENTOLIN  (90 Base) MCG/ACT inhaler INHALE 2 PUFFS INTO THE LUNGS EVERY 6 HOURS AS NEEDED FOR SHORTNESS OF BREATH, DIFFICULTY BREATHING OR WHEEZING. 18 g 3     vitamin D3 (CHOLECALCIFEROL) 2000 units tablet TAKE ONE TABLET BY MOUTH EVERY  tablet 3     zolpidem (AMBIEN) 10 MG tablet Take 10 mg by mouth nightly as needed                Assessment:  Joselito Abarca is a 56 year old male who presents today for follow up regarding his:        1. Arthropathy of cervical facet joint  2. cervicalgia  3. Midline thoracic back pain  4. Myofascial pain  5. Chronic pain syndrome  6. Chronic use of opioids    Chronic pain is stable. Encouraged him to call neurosurgery to discuss their recommendation of a neck injection. He should also call his PCP regarding the lung nodule found on CT scan.    Plan:     Diagnosis reviewed, treatment option addressed, and risk/benifits discussed.  Self-care instructions given.  I am recommending a multidisciplinary treatment plan to help this patient better manage pain.       1. Physical Therapy:  not at this time  2. Clinical Health Psychologist:  NO  3. Diagnostic Studies: none at this time  4. Medication Management:      Continue Cymbalta 30mg twice daily    Continue Oxycodone 10mg 1 tab every 4-6 hours as needed. Max of 5/day.     Continue Topamax 75mg twice daily  5. Further procedures recommended: discuss with neurosurgery  6. Recommendations to PCP. See above  7. Discuss lung nodule with PCP        Follow up with this provider: 8 Weeks for a virtual visit    The total TIME spent on this patient on the day of the appointment was 16 minutes.    Time spent preparing to see the patient (reviewing records and tests) 1 minutes  Time spend face to face (or on the phone) with the patient 9 minutes  Time spent ordering tests, medications, procedures and referrals 0  minutes  Time spent Referring and communicating with other healthcare professionals 0 minutes  Time spent documenting clinical information in Epic 6 minutes      Ashley Salgado [facility]

## 2021-08-05 DIAGNOSIS — M54.2 CERVICALGIA: ICD-10-CM

## 2021-08-05 DIAGNOSIS — M47.812 ARTHROPATHY OF CERVICAL FACET JOINT: ICD-10-CM

## 2021-08-05 DIAGNOSIS — G89.29 CHRONIC MIDLINE THORACIC BACK PAIN: ICD-10-CM

## 2021-08-05 DIAGNOSIS — G89.4 CHRONIC PAIN SYNDROME: ICD-10-CM

## 2021-08-05 DIAGNOSIS — F41.1 GENERALIZED ANXIETY DISORDER: ICD-10-CM

## 2021-08-05 DIAGNOSIS — M54.6 CHRONIC MIDLINE THORACIC BACK PAIN: ICD-10-CM

## 2021-08-06 NOTE — TELEPHONE ENCOUNTER
Pt called back to discuss MRI. He would like a call back to go over it. Please call him at [phone]. Thank you

## 2021-08-06 NOTE — TELEPHONE ENCOUNTER
Topamax  Routing refill request to provider for review/approval because:  Labs out of range:  CBC    Clonazepam      Last Written Prescription Date:  07/06/2021  Last Fill Quantity: 90,   # refills: 0  Last Office Visit: 02/19/2021 - [facility]   Future Office visit:   None  Routing refill request to provider for review/approval because:  Drug not on the [facility], [facility] or  Health refill protocol or controlled substance    Ailyn Tobias Rn

## 2021-08-09 RX ORDER — CLONAZEPAM 0.5 MG/1
TABLET ORAL
Qty: 90 TABLET | Refills: 0 | Status: SHIPPED | OUTPATIENT
Start: 2021-08-09 | End: 2021-09-10

## 2021-08-09 RX ORDER — TOPIRAMATE 25 MG/1
TABLET, FILM COATED ORAL
Qty: 180 TABLET | Refills: 0 | Status: SHIPPED | OUTPATIENT
Start: 2021-08-09 | End: 2021-09-10

## 2021-08-10 ENCOUNTER — VIRTUAL VISIT (OUTPATIENT)
Dept: PALLIATIVE MEDICINE | Facility: CLINIC | Age: 56
End: 2021-08-10
Payer: COMMERCIAL

## 2021-08-10 DIAGNOSIS — M47.812 ARTHROPATHY OF CERVICAL FACET JOINT: ICD-10-CM

## 2021-08-10 DIAGNOSIS — G89.29 CHRONIC MIDLINE THORACIC BACK PAIN: ICD-10-CM

## 2021-08-10 DIAGNOSIS — M54.6 CHRONIC MIDLINE THORACIC BACK PAIN: ICD-10-CM

## 2021-08-10 DIAGNOSIS — M54.2 CERVICALGIA: ICD-10-CM

## 2021-08-10 DIAGNOSIS — M79.18 MYOFASCIAL PAIN: ICD-10-CM

## 2021-08-10 DIAGNOSIS — F11.90 CHRONIC, CONTINUOUS USE OF OPIOIDS: ICD-10-CM

## 2021-08-10 DIAGNOSIS — G89.4 CHRONIC PAIN SYNDROME: ICD-10-CM

## 2021-08-10 PROCEDURE — 99214 OFFICE O/P EST MOD 30 MIN: CPT | Performed by: PHYSICIAN ASSISTANT

## 2021-08-10 RX ORDER — DULOXETIN HYDROCHLORIDE 30 MG/1
30 CAPSULE, DELAYED RELEASE ORAL 2 TIMES DAILY
Qty: 60 CAPSULE | Refills: 0 | COMMUNITY
Start: 2021-08-10 | End: 2021-09-13

## 2021-08-10 RX ORDER — OXYCODONE HYDROCHLORIDE 10 MG/1
TABLET ORAL
Qty: 150 TABLET | Refills: 0 | Status: SHIPPED | OUTPATIENT
Start: 2021-08-10 | End: 2021-09-13

## 2021-08-10 NOTE — Clinical Note
Neurosurgery had patient get a CT cervical spine. Incidental lung nodule was found. Encouraged him to follow up with you to discuss.     Ashley Salgado PA-C on 8/10/2021 at 2:59 PM

## 2021-08-10 NOTE — PATIENT INSTRUCTIONS
After Visit Instructions:     Thank you for coming to [facility] for your care. It is my goal to partner with you to help you reach your optimal state of health.     I am recommending multidisciplinary care at this time.  The focus of care will be to continue gradual rehabilitation and pain management with medication adjustments as needed.    Continue daily self-care, identifying contributing factors, and monitoring variations in pain level. Continue to integrate self-care into your life.          Schedule follow-up with Ashley Salgado PA-C in 8 weeks for a virtual visit. You will need to make this appointment.     Procedures recommended: call neurosurgery to discuss cervical epidural     Call primary care provider to discuss lung nodule    Medication recommendations:     Continue Cymbalta 30mg twice daily    Continue Oxycodone 10mg 1 tab every 4-6 hours as needed. Max of 5/day.     Continue Topamax 75mg twice daily      Ashley Salgado PA-C  [facility]  [place]    Contact information: [facility]  Clinic Number:  [phone]     Call with any questions about your care and for scheduling assistance.     Calls are returned Monday through Friday between 8 AM and 4:30 PM. We usually get back to you within 2 business days depending on the issue/request.    If we are prescribing your medications:    For opioid medication refills, call the clinic or send a SalesGossip message 7 days in advance.  Please include:    Name of requested medication    Name of the pharmacy.    For non-opioid medications, call your pharmacy directly to request a refill. Please allow 3-4 days to be processed.     Per [place] State Law:    All controlled substance prescriptions must be filled within 30 days of being written.      For those controlled substances allowing refills, pickup must occur within 30 days of last fill.      We believe regular  attendance is key to your success in our program!      Any time you are unable to keep your appointment we ask that you call us at least 24 hours in advance to cancel.This will allow us to offer the appointment time to another patient.   Multiple missed appointments may lead to dismissal from the clinic.

## 2021-08-16 ENCOUNTER — HOSPITAL ENCOUNTER (EMERGENCY)
Facility: CLINIC | Age: 56
Discharge: HOME | End: 2021-08-16
Attending: FAMILY MEDICINE | Admitting: FAMILY MEDICINE
Payer: COMMERCIAL

## 2021-08-16 VITALS
WEIGHT: 160 LBS | TEMPERATURE: 98 F | RESPIRATION RATE: 18 BRPM | SYSTOLIC BLOOD PRESSURE: 102 MMHG | HEART RATE: 76 BPM | OXYGEN SATURATION: 99 % | DIASTOLIC BLOOD PRESSURE: 79 MMHG | BODY MASS INDEX: 23.63 KG/M2

## 2021-08-16 DIAGNOSIS — J45.21 EXACERBATION OF INTERMITTENT ASTHMA, UNSPECIFIED ASTHMA SEVERITY: ICD-10-CM

## 2021-08-16 PROCEDURE — 250N000009 HC RX 250: Performed by: FAMILY MEDICINE

## 2021-08-16 PROCEDURE — 94640 AIRWAY INHALATION TREATMENT: CPT | Performed by: FAMILY MEDICINE

## 2021-08-16 PROCEDURE — 99283 EMERGENCY DEPT VISIT LOW MDM: CPT | Mod: 25 | Performed by: FAMILY MEDICINE

## 2021-08-16 PROCEDURE — 99284 EMERGENCY DEPT VISIT MOD MDM: CPT | Performed by: FAMILY MEDICINE

## 2021-08-16 PROCEDURE — 250N000012 HC RX MED GY IP 250 OP 636 PS 637: Performed by: FAMILY MEDICINE

## 2021-08-16 RX ORDER — METHADONE HYDROCHLORIDE 5 MG/1
TABLET ORAL
COMMUNITY
End: 2021-09-13

## 2021-08-16 RX ORDER — QUETIAPINE FUMARATE 50 MG/1
TABLET, FILM COATED ORAL
COMMUNITY
End: 2022-04-19

## 2021-08-16 RX ORDER — DULOXETIN HYDROCHLORIDE 60 MG/1
CAPSULE, DELAYED RELEASE ORAL
COMMUNITY
Start: 2021-08-03 | End: 2021-09-10

## 2021-08-16 RX ORDER — METHYLPREDNISOLONE 4 MG
TABLET, DOSE PACK ORAL
Qty: 21 TABLET | Refills: 0 | Status: SHIPPED | OUTPATIENT
Start: 2021-08-16 | End: 2021-09-08

## 2021-08-16 RX ORDER — PREGABALIN 25 MG/1
CAPSULE ORAL
COMMUNITY
End: 2021-09-13

## 2021-08-16 RX ORDER — PREDNISONE 20 MG/1
40 TABLET ORAL ONCE
Status: COMPLETED | OUTPATIENT
Start: 2021-08-16 | End: 2021-08-16

## 2021-08-16 RX ORDER — METHOCARBAMOL 500 MG/1
TABLET, FILM COATED ORAL
COMMUNITY
End: 2021-09-13

## 2021-08-16 RX ORDER — IPRATROPIUM BROMIDE AND ALBUTEROL SULFATE 2.5; .5 MG/3ML; MG/3ML
3 SOLUTION RESPIRATORY (INHALATION) ONCE
Status: COMPLETED | OUTPATIENT
Start: 2021-08-16 | End: 2021-08-16

## 2021-08-16 RX ORDER — TIZANIDINE 2 MG/1
TABLET ORAL
COMMUNITY
End: 2021-09-13

## 2021-08-16 RX ORDER — PREDNISONE 20 MG/1
TABLET ORAL
COMMUNITY
End: 2021-12-08

## 2021-08-16 RX ADMIN — PREDNISONE 40 MG: 20 TABLET ORAL at 00:53

## 2021-08-16 RX ADMIN — IPRATROPIUM BROMIDE AND ALBUTEROL SULFATE 3 ML: .5; 3 SOLUTION RESPIRATORY (INHALATION) at 00:53

## 2021-08-16 ASSESSMENT — ENCOUNTER SYMPTOMS
CHILLS: 0
COUGH: 1
PALPITATIONS: 0
EYES NEGATIVE: 1
MUSCULOSKELETAL NEGATIVE: 1
WHEEZING: 1
FEVER: 0
PSYCHIATRIC NEGATIVE: 1
SHORTNESS OF BREATH: 1
NEUROLOGICAL NEGATIVE: 1
GASTROINTESTINAL NEGATIVE: 1

## 2021-08-16 NOTE — ED PROVIDER NOTES
History     Chief Complaint   Patient presents with     Shortness of Breath     HPI  Joselito Abarca is a 56 year old male who is well-known to me from multiple prior ER visits over many years to the ER this morning secondary to concerns of exacerbation of his asthma/chronic bronchitis condition.  He has noted increased wheezing and shortness of breath over the last several weeks.  He states that 2 nights ago he had significant coughing episodes.  The cough has been primarily nonproductive.  He has had no bloody sputum.  He denies fever or chills.  He has had no nausea or vomiting symptoms.  He states that despite his neb treatments and inhaler use he still continues to wheeze so he thought he should come in to get checked out.  He does continue to smoke.    Allergies:  Allergies   Allergen Reactions     Vicodin [Hydrocodone-Acetaminophen] Nausea     Other reaction(s): Diaphoresis, Vomiting  HEADACHE       Problem List:    Patient Active Problem List    Diagnosis Date Noted     Back pain, chronic 02/21/2019     Priority: Medium     S/P laparoscopic fundoplication 02/21/2019     Priority: Medium     Long-segment Olson's esophagus 02/21/2019     Priority: Medium     Gastroesophageal reflux disease, esophagitis presence not specified 09/21/2018     Priority: Medium     IMO Regulatory Load OCT 2020       Chronic seasonal allergic rhinitis due to fungal spores 06/07/2018     Priority: Medium     Status post cervical spinal arthrodesis 11/29/2017     Priority: Medium     Chronic, continuous use of opioids 12/13/2016     Priority: Medium     Patient is followed by Gregory G. Schoen, MD for ongoing prescription of pain medication.  All refills should be approved by this provider, or covering partner.    Medication(s): Oxycodone 5 mg IR: Take 2 capsules (10 mg) by mouth every 4 hours as needed 2 caps q 4 hour prn pain up to 12 per day .   Methadone 10 mg three times daily, 90 per month  Clonazepam 0.5 mg tid, 90 per  month   Clinic visit frequency required: Q 3 months     Controlled substance agreement:  Encounter-Level CSA - 2/27/15:               Controlled Substance Agreement - Scan on 3/14/2015  8:47 AM : Controlled Medication Agreement-02/27/15 (below)            Pain Clinic evaluation in the past: Yes    DIRE Total Score(s):  No flowsheet data found.    Last [facility] website verification:  10/30/2016     [email or web address]         Moderate persistent asthma without complication 11/02/2016     Priority: Medium     Acute respiratory failure with hypoxia (H) 04/29/2016     Priority: Medium     Nausea with vomiting 04/27/2016     Priority: Medium     Cough 03/06/2016     Priority: Medium     Tobacco dependence syndrome 02/17/2016     Priority: Medium     Leukocytosis 02/16/2016     Priority: Medium     Disease of bronchial airway (HCC) 02/12/2016     Priority: Medium     Bronchiolectasis (H) 02/12/2016     Priority: Medium     Degeneration of cervical intervertebral disc 01/26/2015     Priority: Medium     Health Care Home 09/30/2013     Priority: Medium     Status:  Accepted  Care Coordinator:    Melissa Behl BSN, RN, N  [facility] Care Coordinator  [phone]     See Letters for [facility] Care Plan  Date:  April 26, 2016            Persons encountering health services in other specified circumstances 09/30/2013     Priority: Medium     Overview:   Overview:   Status:  Accepted  Care Coordinator:    Melissa Behl BSN, RN, N  [facility] Care Coordinator  [phone]     See Letters for [facility] Care Plan  Date:  April 26, 2016       Arthrodesis status 06/15/2011     Priority: Medium     Neck pain 06/15/2011     Priority: Medium     History of arthrodesis 06/15/2011     Priority: Medium     CARDIOVASCULAR SCREENING; LDL GOAL LESS THAN 160 10/31/2010     Priority: Medium     Encounter for screening for cardiovascular disorders 10/31/2010     Priority: Medium     Intervertebral cervical disc disorder with myelopathy, cervical  region 11/12/2009     Priority: Medium     Patient is followed by TIARA JIMENEZ for ongoing prescription of narcotic pain medicine.  Med: methadone 10 mg tid. Taking oxycodone up to 8 per day, 120 per month  Maximum use per month: 90  Expected duration: ongoing  Narcotic agreement on file: YES  Clinic visit recommended: Q 3 months         Intervertebral disc disorder of cervical region with myelopathy 11/12/2009     Priority: Medium     Overview:   Overview:   Patient is followed by TIARA JIMENEZ for ongoing prescription of narcotic pain medicine.  Med: methadone 10 mg tid. Taking oxycodone up to 8 per day, 120 per month  Maximum use per month: 90  Expected duration: ongoing  Narcotic agreement on file: YES  Clinic visit recommended: Q 3 months       Constipation 03/19/2008     Priority: Medium     Problem list name updated by automated process. Provider to review       Thoracic or lumbosacral neuritis or radiculitis, unspecified 03/19/2008     Priority: Medium     Esophageal reflux 07/09/2003     Priority: Medium     Generalized anxiety disorder 07/08/2003     Priority: Medium     Alcohol abuse, in remission 07/08/2003     Priority: Medium     Nondependent alcohol abuse, in remission 07/08/2003     Priority: Medium        Past Medical History:    Past Medical History:   Diagnosis Date     Allergic rhinitis      Anxiety      Depressive disorder      GERD (gastroesophageal reflux disease)      Neck pain 6/15/2011     Pneumonia      Uncomplicated asthma        Past Surgical History:    Past Surgical History:   Procedure Laterality Date     BRONCHOSCOPY (RIGID OR FLEXIBLE), DIAGNOSTIC N/A 8/7/2018    Procedure: COMBINED BRONCHOSCOPY (RIGID OR FLEXIBLE), LAVAGE;  Bronchoscopy with Lavage and Transbronchial Biopsy;  Surgeon: Yung Miranda MD;  Location:  GI     COLONOSCOPY WITH CO2 INSUFFLATION N/A 9/24/2018    Procedure: COLONOSCOPY WITH CO2 INSUFFLATION;  Colon and upper endoscopy ;  Surgeon:  Devin Pelayo MD;  Location: [facility] OR     COMBINED ESOPHAGOSCOPY, GASTROSCOPY, DUODENOSCOPY (EGD) WITH CO2 INSUFFLATION N/A 9/24/2018    Procedure: COMBINED ESOPHAGOSCOPY, GASTROSCOPY, DUODENOSCOPY (EGD) WITH CO2 INSUFFLATION;  Colon and upper endoscopy ;  Surgeon: Devin Pelayo MD;  Location: [facility] OR     DISCECTOMY LUMBAR POSTERIOR MICROSCOPIC ONE LEVEL  2/21/2012    Procedure:DISCECTOMY LUMBAR POSTERIOR MICROSCOPIC ONE LEVEL; LEFT T1-T2 THORASIC HEMILAMINECTOMY MICRODISCECTOMY WITH MEXTRIX II ; Surgeon:SHARON PURI; Location:[facility] OR     DISCECTOMY, FUSION CERVICAL ANTERIOR ONE LEVEL, COMBINED N/A 11/29/2017    Procedure: COMBINED DISCECTOMY, FUSION CERVICAL ANTERIOR ONE LEVEL;  Anterior Cervical Discectomy and Fusion Cervical Six - Cervical Seven, Exploration and Revision Cervical Four - Cervical Six with Hardware Removal;  Surgeon: Nikolas Vasques MD;  Location: [facility] OR     ESOPHAGEAL IMPEDENCE FUNCTION TEST WITH 24 HOUR PH GREATER THAN 1 HOUR N/A 12/12/2018    Procedure: ESOPHAGEAL IMPEDENCE FUNCTION TEST WITH 24 HOUR PH GREATER THAN 1 HOUR;  Surgeon: Atif Oconnor MD;  Location: [facility]     ESOPHAGOSCOPY, GASTROSCOPY, DUODENOSCOPY (EGD), COMBINED N/A 9/24/2018    Procedure: COMBINED ESOPHAGOSCOPY, GASTROSCOPY, DUODENOSCOPY (EGD), BIOPSY SINGLE OR MULTIPLE;;  Surgeon: Devin Pelayo MD;  Location: [facility]     EXPLORE SPINE, REMOVE HARDWARE, COMBINED N/A 11/29/2017    Procedure: COMBINED EXPLORE SPINE, REMOVE HARDWARE;;  Surgeon: Nikolas Vasques MD;  Location: [facility]     FUSION CERVICAL ANTERIOR TWO LEVELS  1/29/2010     HC DRAIN/INJ MAJOR JOINT/BURSA W/O US  5/5/2008    Left sacroiliac joint injection.     HC INJ EPIDURAL LUMBAR/SACRAL W/WO CONTRAST  2009     HEAD & NECK SURGERY      2013     HERNIA REPAIR       INJECT BLOCK MEDIAL BRANCH CERVICAL/THORACIC/LUMBAR Bilateral 8/26/2015    Procedure: INJECT BLOCK MEDIAL BRANCH CERVICAL / THORACIC / LUMBAR;   Surgeon: Ronald Driscoll MD;  [facility]     INJECT BLOCK MEDIAL BRANCH CERVICAL/THORACIC/LUMBAR N/A 8/8/2019    Procedure: BLOCK, NERVE, FACET JOINT, MEDIAL BRANCH, DIAGNOSTIC Bilateral Cervical 2,3,4;  Surgeon: Ronald Driscoll MD;  [facility]     INJECT BLOCK MEDIAL BRANCH CERVICAL/THORACIC/LUMBAR N/A 9/13/2019    Procedure: Medial Branch Block Bilateral Cervical 2,3, and 4;  Surgeon: Ronald Driscoll MD;  [facility]     INJECT BLOCK MEDIAL BRANCH CERVICAL/THORACIC/LUMBAR Bilateral 7/3/2020    Procedure: Third occipital and Cervical 3-4 Medial Branch Blocks bilateral;  Surgeon: Ronald Driscoll MD;  [facility]     INJECT BLOCK MEDIAL BRANCH CERVICAL/THORACIC/LUMBAR N/A 7/29/2020    Procedure: medial branch blocks cervical 3-4 and bilateral third occiptal nerves;  Surgeon: Ronald Driscoll MD;  [facility]     INJECT BLOCK MEDIAL BRANCH CERVICAL/THORACIC/LUMBAR Bilateral 11/6/2020    Procedure: Cervical 1-2 Medial Branch Block Bilateral;  Surgeon: Ronald Driscoll MD;  [facility]     INJECT EPIDURAL LUMBAR N/A 2/13/2020    Procedure: Lumber 4-5 bilateral Epidural Injection;  Surgeon: Ronald Driscoll MD;  [facility]     INJECT FACET JOINT Bilateral 5/27/2015    Procedure: INJECT FACET JOINT;  Surgeon: Ronald Driscoll MD;  [facility]     NERVE BLOCK OCCIPITAL Bilateral 7/12/2018    Procedure: NERVE BLOCK OCCIPITAL;  bilateral occipital nerve blocks;  Surgeon: Ronald Driscoll MD;  [facility]     PROCEDURE PLACEHOLDER GENERAL N/A 02/21/2019    [facility] [place]     RADIO FREQUENCY ABLATION PULSED CERVICAL Right 10/18/2019    Procedure: CERVICAL RADIOFREQUENCY ABLATION  Cervical 2,3,4;  Surgeon: Ronald Driscoll MD;  [facility]     RADIO FREQUENCY ABLATION PULSED CERVICAL Left 11/14/2019    Procedure: Left Cervical 2, 3, 4 Radiofreqency Ablation;  Surgeon: Ronald Driscoll MD;  [facility]     RADIO FREQUENCY ABLATION PULSED CERVICAL Left 3/11/2021    Procedure: Bilateral  Cervical 1 and 2 radiofrequency ablation, aborted;  Surgeon: Ronald Driscoll MD;  Location: [facility]       Family History:    Family History   Problem Relation Age of Onset     Family History Negative No family hx of      C.A.D. No family hx of        Social History:  Marital Status:  Single [1]  Social History     Tobacco Use     Smoking status: Current Every Day Smoker     Packs/day: 0.50     Years: 30.00     Pack years: 15.00     Types: Cigars, Cigarettes     Smokeless tobacco: Former User     Quit date: 2012   Substance Use Topics     Alcohol use: No     Alcohol/week: 0.0 standard drinks     Comment: HX OF ABUSE-IN REMISSION     Drug use: No        Medications:    methylPREDNISolone (MEDROL DOSEPAK) 4 MG tablet therapy pack  albuterol (PROAIR HFA/PROVENTIL HFA/VENTOLIN HFA) 108 (90 Base) MCG/ACT inhaler  clonazePAM (KLONOPIN) 0.5 MG tablet  DULoxetine (CYMBALTA) 30 MG capsule  DULoxetine (CYMBALTA) 60 MG capsule  fluticasone (FLONASE) 50 MCG/ACT nasal spray  fluticasone (FLOVENT HFA) 220 MCG/ACT inhaler  ipratropium - albuterol 0.5 mg/2.5 mg/3 mL (DUONEB) 0.5-2.5 (3) MG/3ML neb solution  methadone (DOLOPHINE) 5 MG tablet  methocarbamol (ROBAXIN) 500 MG tablet  naloxone (NARCAN) 4 MG/0.1ML nasal spray  nicotine (NICODERM CQ) 14 MG/24HR 24 hr patch  nystatin (MYCOSTATIN) 631841 UNIT/ML suspension  order for DME  oxyCODONE IR (ROXICODONE) 10 MG tablet  predniSONE (DELTASONE) 20 MG tablet  pregabalin (LYRICA) 25 MG capsule  QUEtiapine (SEROQUEL) 50 MG tablet  sildenafil (VIAGRA) 100 MG tablet  tiZANidine (ZANAFLEX) 2 MG tablet  topiramate (TOPAMAX) 25 MG tablet  traZODone (DESYREL) 100 MG tablet  varenicline (CHANTIX SAMRA) 0.5 MG X 11 & 1 MG X 42 tablet  zolpidem (AMBIEN) 10 MG tablet          Review of Systems   Constitutional: Negative for chills and fever.   HENT: Negative.    Eyes: Negative.    Respiratory: Positive for cough, shortness of breath and wheezing.    Cardiovascular: Negative for chest pain, palpitations  and leg swelling.   Gastrointestinal: Negative.    Genitourinary: Negative.    Musculoskeletal: Negative.    Skin: Negative.    Neurological: Negative.    Psychiatric/Behavioral: Negative.    All other systems reviewed and are negative.      Physical Exam   BP: 102/79  Pulse: 76  Temp: 98  F (36.7  C)  Resp: 18  Weight: 72.6 kg (160 lb)  SpO2: 98 %      Physical Exam  Vitals and nursing note reviewed.   Constitutional:       General: He is not in acute distress.  HENT:      Head: Normocephalic and atraumatic.   Eyes:      Extraocular Movements: Extraocular movements intact.      Pupils: Pupils are equal, round, and reactive to light.   Neck:      Vascular: No JVD.   Cardiovascular:      Rate and Rhythm: Normal rate.      Pulses: Normal pulses.   Pulmonary:      Effort: Pulmonary effort is normal. No accessory muscle usage or respiratory distress.      Breath sounds: Examination of the right-upper field reveals wheezing. Examination of the left-upper field reveals wheezing. Examination of the right-middle field reveals wheezing. Examination of the left-middle field reveals wheezing. Examination of the right-lower field reveals wheezing. Examination of the left-lower field reveals wheezing. Wheezing present.   Chest:      Chest wall: No tenderness.   Abdominal:      Tenderness: There is no abdominal tenderness.   Musculoskeletal:      Cervical back: Normal range of motion.      Right lower leg: No tenderness.      Left lower leg: No tenderness.   Skin:     Capillary Refill: Capillary refill takes less than 2 seconds.      Coloration: Skin is not pale.      Findings: No rash.   Neurological:      Mental Status: He is alert and oriented to person, place, and time.   Psychiatric:         Mood and Affect: Mood normal.         Behavior: Behavior normal.         ED Course        Procedures              Critical Care time:  none                 Medications   predniSONE (DELTASONE) tablet 40 mg (40 mg Oral Given 8/16/21 0053)    ipratropium - albuterol 0.5 mg/2.5 mg/3 mL (DUONEB) neb solution 3 mL (3 mLs Nebulization Given 8/16/21 0053)       Assessments & Plan (with Medical Decision Making)  Patient with history and exam findings consistent with acute exacerbation of his asthma.  Patient was given DuoNeb treatment with marked improvement in his symptoms.  And oral prednisone was also initiated.  He felt much improved and desired return home.  He was prescribed additional prednisone course.  He states he has a good supply of his inhaler and nebulizer solution at home.  To return to the ER for increase or worsening symptoms if needed.     I have reviewed the nursing notes.    I have reviewed the findings, diagnosis, plan and need for follow up with the patient.       Discharge Medication List as of 8/16/2021  1:28 AM      START taking these medications    Details   methylPREDNISolone (MEDROL DOSEPAK) 4 MG tablet therapy pack Follow Package Directions, Disp-21 tablet, R-0, [employer]             Final diagnoses:   Exacerbation of intermittent asthma, unspecified asthma severity       8/16/2021   [facility] EMERGENCY DEPT     Jung Mccarty,   08/16/21 [id number]

## 2021-08-16 NOTE — DISCHARGE INSTRUCTIONS
Please read and follow the handout(s) instructions. Return, if needed, for increased or worsening symptoms and as directed by the handout(s).    Start the steroid joceline this afternoon/evening.

## 2021-08-16 NOTE — ED TRIAGE NOTES
Patient was treated for bronchitis about a month ago and never finished the medications. Has had shortness of breath since.

## 2021-08-23 ENCOUNTER — VIRTUAL VISIT (OUTPATIENT)
Dept: FAMILY MEDICINE | Facility: CLINIC | Age: 56
End: 2021-08-23
Payer: COMMERCIAL

## 2021-08-23 ENCOUNTER — TELEPHONE (OUTPATIENT)
Dept: NEUROSURGERY | Facility: CLINIC | Age: 56
End: 2021-08-23

## 2021-08-23 DIAGNOSIS — M50.00 INTERVERTEBRAL DISC DISORDER OF CERVICAL REGION WITH MYELOPATHY: ICD-10-CM

## 2021-08-23 DIAGNOSIS — R91.8 PULMONARY NODULES: Primary | ICD-10-CM

## 2021-08-23 DIAGNOSIS — N53.12 PAIN WITH EJACULATION: ICD-10-CM

## 2021-08-23 PROCEDURE — 99213 OFFICE O/P EST LOW 20 MIN: CPT | Mod: 95 | Performed by: FAMILY MEDICINE

## 2021-08-23 NOTE — TELEPHONE ENCOUNTER
Contacted patient to discuss cervical CT and MRI results. He states he has undergone extensive injection therapy as well as physical therapy in the past without much benefit. He would like to discuss other options and plans to schedule an appointment.

## 2021-08-23 NOTE — PROGRESS NOTES
Spenser is a 56 year old who is being evaluated via a billable telephone visit.      What phone number would you like to be contacted at? [phone]  How would you like to obtain your AVS? Mail a copy    Assessment & Plan     Pulmonary nodules  This is a non-calcified nodule, 5-6 mm in RUL found incidentally on cervical spine exam.  He has smoking as well as mold exposure history.  Discussed protocol for follow up on these and need to repeat a CT in 6 months but would do a full pulmonary CT to ensure stability of this lesion and screen for others.  Will put in a reminder to get him scheduled in February.      Pain with ejaculation  Referral to [facility] urology has been place and staff will assist in follow up to schedule.    - Adult Urology Referral; Future    Intervertebral disc disorder of cervical region with myelopathy  Message sent to [facility] to contact patient and number for the [place] office was also given to him.     [phone]     Tobacco Cessation:   reports that he has been smoking cigars and cigarettes. He has a 15.00 pack-year smoking history. He quit smokeless tobacco use about 9 years ago.  Tobacco Cessation Action Plan: Information offered: Patient not interested at this time        Return in about 6 months (around 2/23/2022) for in person.    Gregory G. Schoen, MD  [facility]   Spenser is a 56 year old who presents for the following health issues     HPI     Chief Complaint   Patient presents with     Results     MRI      Spenser as several concerns to address today:    Saw neurosurgery for his Cspine issues and had CT and MRI on 7/30 but not yet contacted with results and next steps in his care plan.  However, when he went to his pain clinic appointment on 8/10/21 he was informed there was a nodule in his lung for which he should follow up with me.  He lived for many years in an apartment with mold that created issues with his asthma and also has a smoking  history so is quite worried about what this could be.  Lastly, he say Dr. Tinoco in March of this year for painful testicle and pain on ejaculation but not with urination.  He said he had some labs done but has never heard back on the results and would like a second opinion. Says this has been going on for years and was too shy to bring it up but now it is to the point where the pain is so uncomfortable he now avoids intercourse.      Review of Systems \\      MSK: has pain in his cervical spine as well as pains down his right greater than left arm and numbness/tingling of all fingers/hand on both sides at times.    Ejaculation pain as noted above.  Notes breathing is currently fine.  He is smoking again.   Objective           Vitals:  No vitals were obtained today due to virtual visit.    Physical Exam   healthy, alert and no distress  PSYCH: Alert and oriented times 3; coherent speech, normal   rate and volume, able to articulate logical thoughts, able   to abstract reason,  His affect is normal but he is a bit anxious about unanswered health issues.   RESP: No cough, no audible wheezing, able to talk in full sentences  Remainder of exam unable to be completed due to telephone visits                Phone call duration:17 minutes

## 2021-09-07 DIAGNOSIS — M54.2 CERVICALGIA: ICD-10-CM

## 2021-09-07 DIAGNOSIS — G89.4 CHRONIC PAIN SYNDROME: ICD-10-CM

## 2021-09-07 DIAGNOSIS — M47.812 ARTHROPATHY OF CERVICAL FACET JOINT: ICD-10-CM

## 2021-09-07 DIAGNOSIS — G89.29 CHRONIC MIDLINE THORACIC BACK PAIN: ICD-10-CM

## 2021-09-07 DIAGNOSIS — J30.2 CHRONIC SEASONAL ALLERGIC RHINITIS DUE TO FUNGAL SPORES: ICD-10-CM

## 2021-09-07 DIAGNOSIS — M54.6 CHRONIC MIDLINE THORACIC BACK PAIN: ICD-10-CM

## 2021-09-07 DIAGNOSIS — F41.1 GENERALIZED ANXIETY DISORDER: ICD-10-CM

## 2021-09-08 ENCOUNTER — HOSPITAL ENCOUNTER (EMERGENCY)
Facility: CLINIC | Age: 56
Discharge: HOME | End: 2021-09-08
Attending: FAMILY MEDICINE | Admitting: FAMILY MEDICINE
Payer: COMMERCIAL

## 2021-09-08 VITALS
SYSTOLIC BLOOD PRESSURE: 121 MMHG | DIASTOLIC BLOOD PRESSURE: 84 MMHG | TEMPERATURE: 96.7 F | OXYGEN SATURATION: 99 % | HEART RATE: 85 BPM | RESPIRATION RATE: 16 BRPM | WEIGHT: 163.5 LBS | BODY MASS INDEX: 24.14 KG/M2

## 2021-09-08 DIAGNOSIS — J45.901 EXACERBATION OF ASTHMA, UNSPECIFIED ASTHMA SEVERITY, UNSPECIFIED WHETHER PERSISTENT: ICD-10-CM

## 2021-09-08 DIAGNOSIS — M54.2 NECK PAIN: ICD-10-CM

## 2021-09-08 DIAGNOSIS — R91.8 PULMONARY NODULES: ICD-10-CM

## 2021-09-08 LAB — SARS-COV-2 RNA RESP QL NAA+PROBE: NEGATIVE

## 2021-09-08 PROCEDURE — C9803 HOPD COVID-19 SPEC COLLECT: HCPCS

## 2021-09-08 PROCEDURE — 99282 EMERGENCY DEPT VISIT SF MDM: CPT | Performed by: FAMILY MEDICINE

## 2021-09-08 PROCEDURE — 99283 EMERGENCY DEPT VISIT LOW MDM: CPT

## 2021-09-08 PROCEDURE — U0005 INFEC AGEN DETEC AMPLI PROBE: HCPCS | Performed by: FAMILY MEDICINE

## 2021-09-08 RX ORDER — BUPIVACAINE HYDROCHLORIDE 5 MG/ML
10 INJECTION, SOLUTION EPIDURAL; INTRACAUDAL ONCE
Status: DISCONTINUED | OUTPATIENT
Start: 2021-09-08 | End: 2021-09-08 | Stop reason: HOSPADM

## 2021-09-08 RX ORDER — METHYLPREDNISOLONE 4 MG
TABLET, DOSE PACK ORAL
Qty: 21 TABLET | Refills: 0 | Status: SHIPPED | OUTPATIENT
Start: 2021-09-08 | End: 2021-09-17

## 2021-09-08 ASSESSMENT — ENCOUNTER SYMPTOMS
SHORTNESS OF BREATH: 1
WHEEZING: 1
NECK PAIN: 1
FEVER: 0

## 2021-09-08 NOTE — ED PROVIDER NOTES
History     Chief Complaint   Patient presents with     Shortness of Breath     HPI  Joselito Abarca is a 56 year old male with intermittent asthma was seen a month ago and did very well with a Medrol Dosepak.  He had cleared up entirely and did not have to use his inhalers or nebs until last week when he got caught out in the rain while fishing.  They got soap before they got back in and it was a cold ride in the boat.  Then started wheezing again.  Is been using the nebs/inhalers every couple of hours.  When he is doing well, he can go weeks or months without having to use them.  Does have a Flovent inhaler that he uses for prevention.    Has some chronic neck pain and is done well with trigger point injections in the past and is wondering if I could help him out in that regard.  Is his typical pain at the base of the occiput, paraspinous musculature into the traps.    No fevers chills or sweats.  Cough has been nonproductive.    Had a CT scan of his neck back in July of this year and they noticed a small 5-6 mm pleural-based nodule and recommend chest CT in 6 months to further evaluate that and follow-up.        Allergies:  Allergies   Allergen Reactions     Vicodin [Hydrocodone-Acetaminophen] Nausea     Other reaction(s): Diaphoresis, Vomiting  HEADACHE       Problem List:    Patient Active Problem List    Diagnosis Date Noted     Back pain, chronic 02/21/2019     Priority: Medium     S/P laparoscopic fundoplication 02/21/2019     Priority: Medium     Long-segment Olson's esophagus 02/21/2019     Priority: Medium     Gastroesophageal reflux disease, esophagitis presence not specified 09/21/2018     Priority: Medium     IMO Regulatory Load OCT 2020       Chronic seasonal allergic rhinitis due to fungal spores 06/07/2018     Priority: Medium     Status post cervical spinal arthrodesis 11/29/2017     Priority: Medium     Chronic, continuous use of opioids 12/13/2016     Priority: Medium     Patient is  followed by Gregory G. Schoen, MD for ongoing prescription of pain medication.  All refills should be approved by this provider, or covering partner.    Medication(s): Oxycodone 5 mg IR: Take 2 capsules (10 mg) by mouth every 4 hours as needed 2 caps q 4 hour prn pain up to 12 per day .   Methadone 10 mg three times daily, 90 per month  Clonazepam 0.5 mg tid, 90 per month   Clinic visit frequency required: Q 3 months     Controlled substance agreement:  Encounter-Level CSA - 2/27/15:               Controlled Substance Agreement - Scan on 3/14/2015  8:47 AM : Controlled Medication Agreement-02/27/15 (below)            Pain Clinic evaluation in the past: Yes    DIRE Total Score(s):  No flowsheet data found.    Last [facility] website verification:  10/30/2016     [email or web address]         Moderate persistent asthma without complication 11/02/2016     Priority: Medium     Acute respiratory failure with hypoxia (H) 04/29/2016     Priority: Medium     Nausea with vomiting 04/27/2016     Priority: Medium     Cough 03/06/2016     Priority: Medium     Tobacco dependence syndrome 02/17/2016     Priority: Medium     Leukocytosis 02/16/2016     Priority: Medium     Disease of bronchial airway (HCC) 02/12/2016     Priority: Medium     Bronchiolectasis (H) 02/12/2016     Priority: Medium     Degeneration of cervical intervertebral disc 01/26/2015     Priority: Medium     Health Care Home 09/30/2013     Priority: Medium     Status:  Accepted  Care Coordinator:    Melissa Behl BSN, RN, N  [facility] Care Coordinator  [phone]     See Letters for [facility] Care Plan  Date:  April 26, 2016            Persons encountering health services in other specified circumstances 09/30/2013     Priority: Medium     Overview:   Overview:   Status:  Accepted  Care Coordinator:    Melissa Behl BSN, RN, N  [facility] Care Coordinator  [phone]     See Letters for HC Care Plan  Date:  April 26, 2016       Arthrodesis status 06/15/2011      Priority: Medium     Neck pain 06/15/2011     Priority: Medium     History of arthrodesis 06/15/2011     Priority: Medium     CARDIOVASCULAR SCREENING; LDL GOAL LESS THAN 160 10/31/2010     Priority: Medium     Encounter for screening for cardiovascular disorders 10/31/2010     Priority: Medium     Intervertebral cervical disc disorder with myelopathy, cervical region 11/12/2009     Priority: Medium     Patient is followed by TIARA JIMENEZ for ongoing prescription of narcotic pain medicine.  Med: methadone 10 mg tid. Taking oxycodone up to 8 per day, 120 per month  Maximum use per month: 90  Expected duration: ongoing  Narcotic agreement on file: YES  Clinic visit recommended: Q 3 months         Intervertebral disc disorder of cervical region with myelopathy 11/12/2009     Priority: Medium     Overview:   Overview:   Patient is followed by TIARA JIMENEZ for ongoing prescription of narcotic pain medicine.  Med: methadone 10 mg tid. Taking oxycodone up to 8 per day, 120 per month  Maximum use per month: 90  Expected duration: ongoing  Narcotic agreement on file: YES  Clinic visit recommended: Q 3 months       Constipation 03/19/2008     Priority: Medium     Problem list name updated by automated process. Provider to review       Thoracic or lumbosacral neuritis or radiculitis, unspecified 03/19/2008     Priority: Medium     Esophageal reflux 07/09/2003     Priority: Medium     Generalized anxiety disorder 07/08/2003     Priority: Medium     Alcohol abuse, in remission 07/08/2003     Priority: Medium     Nondependent alcohol abuse, in remission 07/08/2003     Priority: Medium        Past Medical History:    Past Medical History:   Diagnosis Date     Allergic rhinitis      Anxiety      Depressive disorder      GERD (gastroesophageal reflux disease)      Neck pain 6/15/2011     Pneumonia      Uncomplicated asthma        Past Surgical History:    Past Surgical History:   Procedure Laterality Date      BRONCHOSCOPY (RIGID OR FLEXIBLE), DIAGNOSTIC N/A 8/7/2018    Procedure: COMBINED BRONCHOSCOPY (RIGID OR FLEXIBLE), LAVAGE;  Bronchoscopy with Lavage and Transbronchial Biopsy;  Surgeon: Yung Mrianda MD;  Location: [facility]     COLONOSCOPY WITH CO2 INSUFFLATION N/A 9/24/2018    Procedure: COLONOSCOPY WITH CO2 INSUFFLATION;  Colon and upper endoscopy ;  Surgeon: Devin Pelayo MD;  Location: [facility]     COMBINED ESOPHAGOSCOPY, GASTROSCOPY, DUODENOSCOPY (EGD) WITH CO2 INSUFFLATION N/A 9/24/2018    Procedure: COMBINED ESOPHAGOSCOPY, GASTROSCOPY, DUODENOSCOPY (EGD) WITH CO2 INSUFFLATION;  Colon and upper endoscopy ;  Surgeon: Devin Pelayo MD;  Location: [facility]     DISCECTOMY LUMBAR POSTERIOR MICROSCOPIC ONE LEVEL  2/21/2012    Procedure:DISCECTOMY LUMBAR POSTERIOR MICROSCOPIC ONE LEVEL; LEFT T1-T2 THORASIC HEMILAMINECTOMY MICRODISCECTOMY WITH MEXTRIX II ; Surgeon:SHARON PURI; Location:[facility]     DISCECTOMY, FUSION CERVICAL ANTERIOR ONE LEVEL, COMBINED N/A 11/29/2017    Procedure: COMBINED DISCECTOMY, FUSION CERVICAL ANTERIOR ONE LEVEL;  Anterior Cervical Discectomy and Fusion Cervical Six - Cervical Seven, Exploration and Revision Cervical Four - Cervical Six with Hardware Removal;  Surgeon: Nikolas Vasques MD;  Location: [facility]     ESOPHAGEAL IMPEDENCE FUNCTION TEST WITH 24 HOUR PH GREATER THAN 1 HOUR N/A 12/12/2018    Procedure: ESOPHAGEAL IMPEDENCE FUNCTION TEST WITH 24 HOUR PH GREATER THAN 1 HOUR;  Surgeon: Atif Oconnor MD;  Location: [facility]     ESOPHAGOSCOPY, GASTROSCOPY, DUODENOSCOPY (EGD), COMBINED N/A 9/24/2018    Procedure: COMBINED ESOPHAGOSCOPY, GASTROSCOPY, DUODENOSCOPY (EGD), BIOPSY SINGLE OR MULTIPLE;;  Surgeon: Devin Pelayo MD;  Location: [facility]     EXPLORE SPINE, REMOVE HARDWARE, COMBINED N/A 11/29/2017    Procedure: COMBINED EXPLORE SPINE, REMOVE HARDWARE;;  Surgeon: Nikolas Vasques MD;  [facility]     FUSION CERVICAL ANTERIOR TWO  LEVELS  1/29/2010     HC DRAIN/INJ MAJOR JOINT/BURSA W/O US  5/5/2008    Left sacroiliac joint injection.     HC INJ EPIDURAL LUMBAR/SACRAL W/WO CONTRAST  2009     HEAD & NECK SURGERY      2013     HERNIA REPAIR       INJECT BLOCK MEDIAL BRANCH CERVICAL/THORACIC/LUMBAR Bilateral 8/26/2015    Procedure: INJECT BLOCK MEDIAL BRANCH CERVICAL / THORACIC / LUMBAR;  Surgeon: Ronald Driscoll MD;  [facility]     INJECT BLOCK MEDIAL BRANCH CERVICAL/THORACIC/LUMBAR N/A 8/8/2019    Procedure: BLOCK, NERVE, FACET JOINT, MEDIAL BRANCH, DIAGNOSTIC Bilateral Cervical 2,3,4;  Surgeon: Ronald Driscoll MD;  [facility]     INJECT BLOCK MEDIAL BRANCH CERVICAL/THORACIC/LUMBAR N/A 9/13/2019    Procedure: Medial Branch Block Bilateral Cervical 2,3, and 4;  Surgeon: Ronald Driscoll MD;  [facility]     INJECT BLOCK MEDIAL BRANCH CERVICAL/THORACIC/LUMBAR Bilateral 7/3/2020    Procedure: Third occipital and Cervical 3-4 Medial Branch Blocks bilateral;  Surgeon: Ronald Driscoll MD;  [facility]     INJECT BLOCK MEDIAL BRANCH CERVICAL/THORACIC/LUMBAR N/A 7/29/2020    Procedure: medial branch blocks cervical 3-4 and bilateral third occiptal nerves;  Surgeon: Ronald Driscoll MD;  [facility]     INJECT BLOCK MEDIAL BRANCH CERVICAL/THORACIC/LUMBAR Bilateral 11/6/2020    Procedure: Cervical 1-2 Medial Branch Block Bilateral;  Surgeon: Ronald Driscoll MD;  [facility]     INJECT EPIDURAL LUMBAR N/A 2/13/2020    Procedure: Lumber 4-5 bilateral Epidural Injection;  Surgeon: Ronald Driscoll MD;  [facility]     INJECT FACET JOINT Bilateral 5/27/2015    Procedure: INJECT FACET JOINT;  Surgeon: Ronald Driscoll MD;  [facility]     NERVE BLOCK OCCIPITAL Bilateral 7/12/2018    Procedure: NERVE BLOCK OCCIPITAL;  bilateral occipital nerve blocks;  Surgeon: Ronald Driscoll MD;  [facility]     PROCEDURE PLACEHOLDER GENERAL N/A 02/21/2019    Kellen Ward at [facility]     RADIO FREQUENCY ABLATION PULSED CERVICAL Right 10/18/2019     Procedure: CERVICAL RADIOFREQUENCY ABLATION  Cervical 2,3,4;  Surgeon: Ronald Driscoll MD;  [facility]     RADIO FREQUENCY ABLATION PULSED CERVICAL Left 11/14/2019    Procedure: Left Cervical 2, 3, 4 Radiofreqency Ablation;  Surgeon: Ronald Driscoll MD;  [facility]     RADIO FREQUENCY ABLATION PULSED CERVICAL Left 3/11/2021    Procedure: Bilateral Cervical 1 and 2 radiofrequency ablation, aborted;  Surgeon: Ronald Driscoll MD;  [facility]: [facility]       Family History:    Family History   Problem Relation Age of Onset     Family History Negative No family hx of      C.A.D. No family hx of        Social History:  Marital Status:  Single [1]  Social History     Tobacco Use     Smoking status: Current Every Day Smoker     Packs/day: 0.50     Years: 30.00     Pack years: 15.00     Types: Cigars, Cigarettes     Smokeless tobacco: Former User     Quit date: 2012   Substance Use Topics     Alcohol use: No     Alcohol/week: 0.0 standard drinks     Comment: HX OF ABUSE-IN REMISSION     Drug use: No        Medications:    methylPREDNISolone (MEDROL DOSEPAK) 4 MG tablet therapy pack  albuterol (PROAIR HFA/PROVENTIL HFA/VENTOLIN HFA) 108 (90 Base) MCG/ACT inhaler  clonazePAM (KLONOPIN) 0.5 MG tablet  DULoxetine (CYMBALTA) 30 MG capsule  DULoxetine (CYMBALTA) 60 MG capsule  fluticasone (FLONASE) 50 MCG/ACT nasal spray  fluticasone (FLOVENT HFA) 220 MCG/ACT inhaler  ipratropium - albuterol 0.5 mg/2.5 mg/3 mL (DUONEB) 0.5-2.5 (3) MG/3ML neb solution  methadone (DOLOPHINE) 5 MG tablet  methocarbamol (ROBAXIN) 500 MG tablet  naloxone (NARCAN) 4 MG/0.1ML nasal spray  nicotine (NICODERM CQ) 14 MG/24HR 24 hr patch  nystatin (MYCOSTATIN) 487033 UNIT/ML suspension  order for DME  oxyCODONE IR (ROXICODONE) 10 MG tablet  predniSONE (DELTASONE) 20 MG tablet  pregabalin (LYRICA) 25 MG capsule  QUEtiapine (SEROQUEL) 50 MG tablet  sildenafil (VIAGRA) 100 MG tablet  tiZANidine (ZANAFLEX) 2 MG tablet  topiramate (TOPAMAX) 25 MG  tablet  traZODone (DESYREL) 100 MG tablet  varenicline (CHANTIX SAMRA) 0.5 MG X 11 & 1 MG X 42 tablet  varenicline (CHANTIX STARTING MONTH SAMRA) 0.5 MG X 11 & 1 MG X 42 tablet  zolpidem (AMBIEN) 10 MG tablet          Review of Systems   Constitutional: Negative for fever.   Respiratory: Positive for shortness of breath and wheezing.    Musculoskeletal: Positive for neck pain ( Chronic).       Physical Exam   BP: 121/84  Pulse: 85  Temp: (!) 96.7  F (35.9  C)  Resp: 16  Weight: 74.2 kg (163 lb 8 oz)  SpO2: 99 %      Physical Exam  Constitutional:       General: He is not in acute distress.     Appearance: He is well-developed.   HENT:      Mouth/Throat:      Mouth: Mucous membranes are moist.   Eyes:      Extraocular Movements: Extraocular movements intact.   Cardiovascular:      Rate and Rhythm: Normal rate and regular rhythm.   Pulmonary:      Breath sounds: Wheezing (mild diffuse, but moving air well) present.   Musculoskeletal:      Cervical back: Tenderness ( Trigger points at the base of the occiput, paraspinous musculature and traps bilaterally) present.   Neurological:      General: No focal deficit present.      Mental Status: He is alert and oriented to person, place, and time.   Psychiatric:         Mood and Affect: Mood normal.         ED Course  (with Medical Decision Making)    He is moving air well.  A few scattered wheezes.  He has neb solution at home.  Prescription for Medrol Dosepak was given.    Trigger point injections at bilateral occiput, paraspinous region and traps gave him good relief as it has in the past.  Total of 10 mL is given in 6 different spots.    He will follow-up with his primary physician in regards to the chest CT in January of this coming year to follow-up on the incidental finding of a pleural-based pulmonary nodule noted on a C-spine CT scan.      Verbal and written discharge instructions given.  He is comfortable with this plan.  COVID-19 was sent.          Procedures               Critical Care time:  none               No results found. However, due to the size of the patient record, not all encounters were searched. Please check Results Review for a complete set of results.    Medications   bupivacaine (MARCAINE) 0.5% preservative free injection (has no administration in time range)       Assessments & Plan     I have reviewed the nursing notes.    I have reviewed the findings, diagnosis, plan and need for follow up with the patient.       New Prescriptions    METHYLPREDNISOLONE (MEDROL DOSEPAK) 4 MG TABLET THERAPY PACK    Follow Package Directions       Final diagnoses:   Exacerbation of asthma, unspecified asthma severity, unspecified whether persistent   Neck pain   Pulmonary nodules - seen on CT scan of C spine 7/2021, chest CT recommended Jan 2022 9/8/2021   [facility] EMERGENCY DEPT     Don Love MD  09/08/21 0134

## 2021-09-08 NOTE — DISCHARGE INSTRUCTIONS
Take the Medrol Dosepak as directed.  You can continue with your albuterol nebs/inhalers as needed.  Recheck in clinic with Dr. Schoen if persistent problems.  Return to the ED if worse/concerns.  Your Covid test should be back in the next 1-2 days.  Ice your neck 20 minutes per hour, (20 minutes on, 40 minutes off) at least 4 times a day.  Dr. Schoen can help you schedule a CT scan of your chest towards the end of January 2022 to look further at that small spot on your lung.  It was nice visiting with you again this morning.  I hope this settles down quickly for you.    Thank you for choosing [facility]. We appreciate the opportunity to meet your urgent medical needs. Please let us know if we could have done anything to make your stay more satisfying.    After discharge, please closely monitor for any new or worsening symptoms. Return to the Emergency Department if you develop any acute worsening signs or symptoms.    If you had lab work, cultures or imaging studies done during your stay, the final results may still be pending. We will call you if your plan of care needs to change. However, if you are not improving as expected, please follow up with your primary care provider or clinic.     Start any prescription medications that were prescribed to you and take them as directed.     Please see additional handouts that may be pertinent to your condition.

## 2021-09-08 NOTE — ED TRIAGE NOTES
Patient states he has had off and on shortness of breath for months. Never runs a fever. This last time started about a week ago when he got caught out in the rain while fishing. He does not appear to be in any distress, speaking in full sentences.

## 2021-09-09 RX ORDER — FLUTICASONE PROPIONATE 50 MCG
SPRAY, SUSPENSION (ML) NASAL
Qty: 16 ML | Refills: 5 | Status: SHIPPED | OUTPATIENT
Start: 2021-09-09

## 2021-09-09 NOTE — TELEPHONE ENCOUNTER
Clonazepam      Last Written Prescription Date:  08/09/2021  Last Fill Quantity: 90,   # refills: 0  Last Office Visit: 08/23/2021  Future Office visit:   None  Routing refill request to provider for review/approval because:  Drug not on the [facility] or [facility] refill protocol or controlled substance    Cymbalta  Routing refill request to provider for review/approval because:  Medication is reported/historical    Topamax  Routing refill request to provider for review/approval because:  Labs out of range:  CBC    Ailyn Tobias Rn

## 2021-09-09 NOTE — TELEPHONE ENCOUNTER
Flonase  Prescription approved per [facility] Refill Protocol.    Ailyn Tobias Rn

## 2021-09-10 RX ORDER — TOPIRAMATE 25 MG/1
TABLET, FILM COATED ORAL
Qty: 180 TABLET | Refills: 0 | Status: SHIPPED | OUTPATIENT
Start: 2021-09-10 | End: 2021-10-11

## 2021-09-10 RX ORDER — CLONAZEPAM 0.5 MG/1
TABLET ORAL
Qty: 90 TABLET | Refills: 0 | Status: SHIPPED | OUTPATIENT
Start: 2021-09-10 | End: 2021-10-11

## 2021-09-10 RX ORDER — DULOXETIN HYDROCHLORIDE 60 MG/1
CAPSULE, DELAYED RELEASE ORAL
Qty: 90 CAPSULE | Refills: 0 | Status: SHIPPED | OUTPATIENT
Start: 2021-09-10 | End: 2022-01-03

## 2021-09-13 ENCOUNTER — VIRTUAL VISIT (OUTPATIENT)
Dept: PALLIATIVE MEDICINE | Facility: CLINIC | Age: 56
End: 2021-09-13
Payer: COMMERCIAL

## 2021-09-13 DIAGNOSIS — M47.812 ARTHROPATHY OF CERVICAL FACET JOINT: ICD-10-CM

## 2021-09-13 DIAGNOSIS — F11.90 CHRONIC, CONTINUOUS USE OF OPIOIDS: ICD-10-CM

## 2021-09-13 DIAGNOSIS — M79.18 MYOFASCIAL PAIN: ICD-10-CM

## 2021-09-13 DIAGNOSIS — M54.6 CHRONIC MIDLINE THORACIC BACK PAIN: ICD-10-CM

## 2021-09-13 DIAGNOSIS — G89.4 CHRONIC PAIN SYNDROME: ICD-10-CM

## 2021-09-13 DIAGNOSIS — M54.2 CERVICALGIA: ICD-10-CM

## 2021-09-13 DIAGNOSIS — G89.29 CHRONIC MIDLINE THORACIC BACK PAIN: ICD-10-CM

## 2021-09-13 PROCEDURE — 99213 OFFICE O/P EST LOW 20 MIN: CPT | Mod: 95 | Performed by: PHYSICIAN ASSISTANT

## 2021-09-13 RX ORDER — OXYCODONE HYDROCHLORIDE 10 MG/1
TABLET ORAL
Qty: 150 TABLET | Refills: 0 | Status: SHIPPED | OUTPATIENT
Start: 2021-09-13 | End: 2021-10-13

## 2021-09-13 NOTE — PROGRESS NOTES
Spenser is a 56 year old who is being evaluated via a billable telephone visit.    Is Pt currently in [place]? Yes      What phone number would you like to be contacted at? [phone]  How would you like to obtain your AVS? Mail a copy       [facility]     DATE OF VISIT: 9/13/21    CHIEF COMPLAINT:   Pain  -neck pain    Assessment:  Joselito Abarca is a 56 year old male who presents today for follow up regarding his:        1. Arthropathy of cervical facet joint  2. cervicalgia  3. Midline thoracic back pain  4. Myofascial pain  5. Chronic pain syndrome  6. Chronic use of opioids    Having worsening pain in his neck and plans to review his options with his surgeon next week. Will continue current treatment plan.     Plan:     Diagnosis reviewed, treatment option addressed, and risk/benifits discussed.  Self-care instructions given.  I am recommending a multidisciplinary treatment plan to help this patient better manage pain.       1. Physical Therapy:  not at this time  2. Clinical Health Psychologist:  NO  3. Diagnostic Studies: none at this time  4. Medication Management:      Continue Cymbalta 60mg daily    Continue Oxycodone 10mg 1 tab every 4-6 hours as needed. Max of 5/day.     Continue Topamax 75mg twice daily  5. Further procedures recommended: none at this time  6. Recommendations to PCP. See above          Follow up with this provider: 8 Weeks for a video visit     INTERVAL HISTORY:  Last seen on 8/10/21    Recommendations at last visit included:  1. Physical Therapy:  not at this time  2. Clinical Health Psychologist:  NO  3. Diagnostic Studies: none at this time  4. Medication Management:    ? Continue Cymbalta 60mg twice daily  ? Continue Oxycodone 10mg 1 tab every 4-6 hours as needed. Max of 5/day.   ? Continue Topamax 75mg twice daily  5. Further procedures recommended: discuss with neurosurgery  6. Recommendations to PCP. See above  7. Discuss lung nodule with  "PCP        Follow up with this provider: 8 Weeks for a virtual visit     Since his last visit, Joselito Abarca reports:    He is scheduled to see Dr. Vasques on 9/23/21 to review options for his neck pain. He states that he saw an advertisement for a neck pillow with a TENS unit in it. He states that he ordered it and is hoping that this will help. He has not gotten it yet. He states that he was told it'll take about  2 weeks to get.    He states that he is \"okay\". He states that he is a little sore, but is okay. He is tolerating his medications well and is not having any side effects to them.       Pain Information:              Pain today 7/10     Annual Controlled Substance Agreement: 3/17/21  UDS: 2/7/2021    CURRENT RELEVANT PAIN MEDICATIONS:   Cymbalta 60mg daily  Clonazepam 0.5mg-takes 3-4/day  Oxycodone 10mg every 4-6 hours max of 5/day  Topamax 25mg: takes 3 tabs twice daily    Previous Medications: (H--helped; HI--Helped initially; SWH-- somewhat helpful, NH--No help; W--worse; SE--side effects)   Opiates: hydrocodone SE allergic, fever, sweaty, headache, methadone H, Oxycodone H  NSAIDS: Ibuprofen NH, Aleve NH  Anti-migraine mediations: Fiorinal H  Muscle Relaxants: Valium H, methocarbamol NH, Tizanidine NH, Skelaxin NH  Neuropathics: Gabapentin H SE breathing issues  Anti-depressants: Cymbalta NH  Anxiolytics: Klonopin H  Topicals: Lidocaine H  Other medications not covered above: Tylenol NH     Past Pain Treatments:  Pain Clinic:   Yes, he was previously seen at [facility] and [facility]   PT: Yes, has done many times  Psychologist: No  Relaxation techniques/biofeedback: No  Chiropractor: Yes, feels that it made it worse  Acupuncture: Yes, just did one session  Pharmacotherapy:               Opioids: Yes                Non-opioids:    Yes   TENs Unit:Yes, aggravates the pain  Injections:     07/12/2018 bilateral occipital nerve blocks ,     08/26/2015 and 5/27/2015Cervical medial branch blocks      Has had low back " pain injections at [facility].     10/18/2019 Right cervical RFA ,     11/14/2019 left cervical RFA .    02/13/2020 L4-5 REVA    02/24/2020 TPI neck in ER  Self-care:   Yes, hot tubs, ice packs, heating pads  Surgeries related to pain: Yes,  1. anterior cervical discectomy and fusion C6-7, exploration and revision C4-6 with hardware removal 11/29/2017,   2. Left T1-T2 thoracic hemilaminectomy, microdiscectomy 02/21/2012,   3. Removal of C4 through C6 anterior cervical plate, Exploration of C4-C5 and C5-C6 anterior cervical fusion, C4-C5 and C5-C6 arthrodesis, C4 through C6 anterior cervical instrumentation, and [place] of right iliac hip graft.  03/15/2011  4. C4-C5 and C5-C6 anterior cervical discectomy and fusion with instrumentation and neural electrophysiologic monitoring 01/26/2010     [place] Board of Pharmacy Data Base Reviewed:    YES; As expected, no concern for misuse/abuse of controlled medications based on this report. Checked 7/7/21     THE 4 As OF OPIOID MAINTENANCE ANALGESIA    Analgesia: Is pain relief clinically significant? NO   Activity: Is patient functional and able to perform Activities of Daily Living? YES   Adverse effects: Is patient free from adverse side effects from opiates? YES   Adherence to Rx protocol: Is patient adhering to Controlled Substance Agreement and taking medications ONLY as ordered? YES     MME: 75 (started at 330)    Medications:  Current Outpatient Prescriptions          Current Outpatient Medications   Medication Sig Dispense Refill     clonazePAM (KLONOPIN) 0.5 MG tablet TAKE 1/2 TO 1 TABLET BY MOUTH THREE TIMES A DAY AS NEEDED FOR ANXIETY 90 tablet 0     DULoxetine (CYMBALTA) 60 MG capsule Take 1 capsule (60 mg) by mouth daily 30 capsule 3     FLOVENT  MCG/ACT Inhaler INHALE 2 PUFFS INTO THE LUNGS TWICE DAILY 36 g 2     fluticasone (FLONASE) 50 MCG/ACT nasal spray SPRAY 2 SPRAYS IN EACH NOSTRIL EVERY DAY 16 g 5     ipratropium - albuterol 0.5 mg/2.5 mg/3 mL  (DUONEB) 0.5-2.5 (3) MG/3ML neb solution Take 1 vial (3 mLs) by nebulization every 4 hours as needed for shortness of breath / dyspnea 30 vial 0     methadone (DOLOPHINE) 5 MG tablet Take 1.5 tablets every 12 hours.  Fill 2/28/2020. Start 2/29/2020. 90 tablet 0     naloxone (NARCAN) 4 MG/0.1ML nasal spray Spray 4 mg into one nostril alternating nostrils once as needed for opioid reversal every 2-3 minutes until assistance arrives         nystatin (MYCOSTATIN) 241584 UNIT/ML suspension TAKE 5 MLS BY MOUTH FOUR TIMES A  mL 0     order for DME Equipment being ordered: Nebulizer mask and tubing 1 each 1     oxyCODONE IR (ROXICODONE) 10 MG tablet Take 1 tablet every 6 hours as needed for breakthrough pain. Max of 4/day. Fill 2/19/2020. Start 2/22/2020. 30 day script 115 tablet 0     sildenafil (VIAGRA) 100 MG tablet Take 1 tablet (100 mg) by mouth daily as needed 30 min to 4 hrs before sex. Do not use with nitroglycerin, terazosin or doxazosin. 4 tablet 0     tiZANidine (ZANAFLEX) 2 MG tablet TAKE ONE TO TWO TABLETS BY MOUTH THREE TIMES A DAY AS NEEDED FOR MUSCLE SPASMS/TENSION 60 tablet 0     traZODone (DESYREL) 100 MG tablet Take 3 tablets (300 mg) by mouth At Bedtime 90 tablet 3     VENTOLIN  (90 Base) MCG/ACT inhaler INHALE 2 PUFFS INTO THE LUNGS EVERY 6 HOURS AS NEEDED FOR SHORTNESS OF BREATH, DIFFICULTY BREATHING OR WHEEZING. 18 g 3     vitamin D3 (CHOLECALCIFEROL) 2000 units tablet TAKE ONE TABLET BY MOUTH EVERY  tablet 3     zolpidem (AMBIEN) 10 MG tablet Take 10 mg by mouth nightly as needed                    The total TIME spent on this patient on the day of the appointment was 13 minutes.    Time spent preparing to see the patient (reviewing records and tests) 1 minutes  Time spend face to face (or on the phone) with the patient 8 minutes  Time spent ordering tests, medications, procedures and referrals 0 minutes  Time spent Referring and communicating with other healthcare professionals  0 minutes  Time spent documenting clinical information in Epic 4 minutes      Ashley Salgado, LORENZO  [facility]

## 2021-09-13 NOTE — PATIENT INSTRUCTIONS
After Visit Instructions:     Thank you for coming to [facility] for your care. It is my goal to partner with you to help you reach your optimal state of health.     I am recommending multidisciplinary care at this time.  The focus of care will be to continue gradual rehabilitation and pain management with medication adjustments as needed.    Continue daily self-care, identifying contributing factors, and monitoring variations in pain level. Continue to integrate self-care into your life.          Schedule follow-up with Ashley Salgado PA-C in 8 weeks for video visit. You will need to make this appointment.     Medication recommendations:     Continue Cymbalta 60mg daily    Continue Oxycodone 10mg 1 tab every 4-6 hours as needed. Max of 5/day.     Continue Topamax 75mg twice daily      Ashley Salgado PA-C  [facility]  [facility]    Contact information: [facility]  Clinic Number:  [phone]     Call with any questions about your care and for scheduling assistance.     Calls are returned Monday through Friday between 8 AM and 4:30 PM. We usually get back to you within 2 business days depending on the issue/request.    If we are prescribing your medications:    For opioid medication refills, call the clinic or send a [employer] message 7 days in advance.  Please include:    Name of requested medication    Name of the pharmacy.    For non-opioid medications, call your pharmacy directly to request a refill. Please allow 3-4 days to be processed.     Per [place] State Law:    All controlled substance prescriptions must be filled within 30 days of being written.      For those controlled substances allowing refills, pickup must occur within 30 days of last fill.      We believe regular attendance is key to your success in our program!      Any time you are unable to keep your appointment we ask that you call us at least  24 hours in advance to cancel.This will allow us to offer the appointment time to another patient.   Multiple missed appointments may lead to dismissal from the clinic.

## 2021-09-15 ENCOUNTER — TELEPHONE (OUTPATIENT)
Dept: FAMILY MEDICINE | Facility: CLINIC | Age: 56
End: 2021-09-15

## 2021-09-15 NOTE — TELEPHONE ENCOUNTER
Patient had 7 day zpack for bronchitis and is still having breathing problems. havent gotten worse but not any better.   Wondering about a prednisone prescription like he has had in past or something longer lasting

## 2021-09-17 ENCOUNTER — VIRTUAL VISIT (OUTPATIENT)
Dept: FAMILY MEDICINE | Facility: CLINIC | Age: 56
End: 2021-09-17
Payer: COMMERCIAL

## 2021-09-17 DIAGNOSIS — B37.0 THRUSH: ICD-10-CM

## 2021-09-17 DIAGNOSIS — J45.41 MODERATE PERSISTENT ASTHMA WITH EXACERBATION: Primary | ICD-10-CM

## 2021-09-17 PROCEDURE — 99213 OFFICE O/P EST LOW 20 MIN: CPT | Mod: 95 | Performed by: STUDENT IN AN ORGANIZED HEALTH CARE EDUCATION/TRAINING PROGRAM

## 2021-09-17 RX ORDER — PREDNISONE 20 MG/1
TABLET ORAL
Qty: 20 TABLET | Refills: 0 | Status: SHIPPED | OUTPATIENT
Start: 2021-09-17 | End: 2021-12-08

## 2021-09-17 NOTE — PROGRESS NOTES
Spenser is a 56 year old who is being evaluated via a billable telephone visit.      What phone number would you like to be contacted at? [phone]  How would you like to obtain your AVS? Mail a copy    Assessment & Plan     Moderate persistent asthma with exacerbation  He has a history of similar asthma exacerbations.  He was put on a Medrol Dosepak from the ER but did not have great improvement in symptoms.  In the past he has required longer courses of tapering prednisone to alleviate his exacerbation.  He is having no symptoms to suggest pneumonia.  I recommend he follow-up if his symptoms are not improving or are worsening.  - predniSONE (DELTASONE) 20 MG tablet; Take 3 tabs by mouth daily x 3 days, then 2 tabs daily x 3 days, then 1 tab daily x 3 days, then 1/2 tab daily x 3 days.                 No follow-ups on file.    [facility]   Spenser is a 56 year old who presents for the following health issues     HPI     Still having a lot of wheezing and bronchitis coughing. Would like prednisone.      Review of Systems   Constitutional, HEENT, cardiovascular, pulmonary, gi and gu systems are negative, except as otherwise noted.      Objective           Vitals:  No vitals were obtained today due to virtual visit.    Physical Exam   healthy, alert and no distress  PSYCH: Alert and oriented times 3; coherent speech, normal   rate and volume, able to articulate logical thoughts, able   to abstract reason, no tangential thoughts, no hallucinations   or delusions  His affect is normal  RESP: No cough, no audible wheezing, able to talk in full sentences  Remainder of exam unable to be completed due to telephone visits              Phone call duration: 10 minutes

## 2021-09-21 NOTE — CONFIDENTIAL NOTE
RN is forwarding this Nystatin RX request to Louisa Buck, who spoke with the patient last Friday., and was started on oral Prednisone for breathing issues- has moderate asthma exacerbation...................SUGAR Knapp

## 2021-09-22 RX ORDER — NYSTATIN 100000/ML
SUSPENSION, ORAL (FINAL DOSE FORM) ORAL
Qty: 280 ML | Refills: 0 | Status: SHIPPED | OUTPATIENT
Start: 2021-09-22 | End: 2022-08-20

## 2021-09-22 NOTE — TELEPHONE ENCOUNTER
Routing refill request to provider for review/approval because:  Drug not on the [facility] refill protocol         Requested Prescriptions   Pending Prescriptions Disp Refills     nystatin (MYCOSTATIN) 227433 UNIT/ML suspension 280 mL 0       There is no refill protocol information for this order      Thrush [B37.0]   Medication Notes         JIMENEZ CARRERA Mar 9, 2021  5:01 PM (CST)   As needed               Ana Lazo RN

## 2021-09-23 ENCOUNTER — OFFICE VISIT (OUTPATIENT)
Dept: NEUROSURGERY | Facility: CLINIC | Age: 56
End: 2021-09-23
Payer: COMMERCIAL

## 2021-09-23 VITALS
TEMPERATURE: 97.3 F | DIASTOLIC BLOOD PRESSURE: 66 MMHG | BODY MASS INDEX: 23.51 KG/M2 | WEIGHT: 158.7 LBS | SYSTOLIC BLOOD PRESSURE: 118 MMHG | HEIGHT: 69 IN

## 2021-09-23 DIAGNOSIS — M54.12 CERVICAL RADICULOPATHY: Primary | ICD-10-CM

## 2021-09-23 PROCEDURE — 99213 OFFICE O/P EST LOW 20 MIN: CPT | Performed by: NEUROLOGICAL SURGERY

## 2021-09-23 ASSESSMENT — PAIN SCALES - GENERAL: PAINLEVEL: SEVERE PAIN (7)

## 2021-09-23 ASSESSMENT — MIFFLIN-ST. JEOR: SCORE: 1550.24

## 2021-09-23 NOTE — PROGRESS NOTES
"Joselito Abarca is a 56 year old male who presents for:  Chief Complaint   Patient presents with     Neurologic Problem     Cervical Radiculopathy         Initial Vitals:  /66   Temp 97.3  F (36.3  C) (Temporal)   Ht 5' 9\" (1.753 m)   Wt 158 lb 11.2 oz (72 kg)   BMI 23.44 kg/m   Estimated body mass index is 23.44 kg/m  as calculated from the following:    Height as of this encounter: 5' 9\" (1.753 m).    Weight as of this encounter: 158 lb 11.2 oz (72 kg).. Body surface area is 1.87 meters squared. BP completed using cuff size: regular  Severe Pain (7)    Nursing Comments:     Mónica Winston    "

## 2021-09-23 NOTE — PATIENT INSTRUCTIONS
Ordered a right arm EMG. Call Main Office Phone: [phone] to schedule. We do them here in the [facility] through [facility].  Ordered a bilateral occipital nerve block  Injection. [facility] will call you within 48 hours to schedule this. If you don't here from them, please schedule by calling Operating Room scheduling team s phone number: [phone], option 2. If no decline in symptoms 2 weeks after injections, call our clinic and talk to a nurse.      SUGAR Shay  --  [facility]  Phone: [phone]  Fax: [phone]

## 2021-09-23 NOTE — LETTER
"    9/23/2021         RE: Joselito Abarca  [address]  [place] [address]        Dear Colleague,    Thank you for referring your patient, Joselito Abarca, to the [facility] [place]. Please see a copy of my visit note below.    Joselito Abarca is a 56 year old male who presents for:  Chief Complaint   Patient presents with     Neurologic Problem     Cervical Radiculopathy         Initial Vitals:  /66   Temp 97.3  F (36.3  C) (Temporal)   Ht 5' 9\" (1.753 m)   Wt 158 lb 11.2 oz (72 kg)   BMI 23.44 kg/m   Estimated body mass index is 23.44 kg/m  as calculated from the following:    Height as of this encounter: 5' 9\" (1.753 m).    Weight as of this encounter: 158 lb 11.2 oz (72 kg).. Body surface area is 1.87 meters squared. BP completed using cuff size: regular  Severe Pain (7)    Nursing Comments:     Mónica Winston      [facility]  Neurosurgery Followup:    HPI: 4 years s/p C6-7 ACDF with prior C4-6 fusion. Now with 8 month history of worsened chronic neck pain and right>left non-specific arm pain. Has been managed by pain management for chronic opioid dependence. Recently underwent cervical RFA with improvement in neck symptoms. Has undergone PT in the past without much benefit. Has not had any recent imaging.     Returns for follow up.  Worst pain is bilateral occipital headache with radiation to the vertex.  Also with continued right>left arm pain.  Scans show osteophytic changes and foraminal stenosis at C6-7.    Medical, surgical, family, and social history unchanged since prior exam.    Exam:  Constitutional:  Alert, well nourished, NAD.  HEENT: Normocephalic, atraumatic.   Pulm:  Without shortness of breath   CV:  No pitting edema of BLE.     Vital Signs:  /66   Temp 97.3  F (36.3  C) (Temporal)   Ht 1.753 m (5' 9\")   Wt 72 kg (158 lb 11.2 oz)   BMI 23.44 kg/m      Neurological:  Awake  Alert  Oriented x 3  Motor " exam:     Shoulder Abduction:  Right:  5/5    Left:  5/5  Biceps:                      Right:  5/5    Left:  5/5  Triceps:                     Right:  5/5    Left:  5/5  Wrist Extensors:       Right:  5/5    Left:  5/5  Wrist Flexors:           Right:  5/5    Left:  5/5  Intrinsics:                  Right:  5/5    Left:  5/5     Able to spontaneously move U/E bilaterally  Sensation intact throughout all U/E dermatomes    A/P:    Will order bilateral occipital nerve block  Will obtain RUE EMG      Again, thank you for allowing me to participate in the care of your patient.        Sincerely,        Nikolas Vasques MD

## 2021-09-24 NOTE — PROGRESS NOTES
"[facility]  Neurosurgery Followup:    HPI: 4 years s/p C6-7 ACDF with prior C4-6 fusion. Now with 8 month history of worsened chronic neck pain and right>left non-specific arm pain. Has been managed by pain management for chronic opioid dependence. Recently underwent cervical RFA with improvement in neck symptoms. Has undergone PT in the past without much benefit. Has not had any recent imaging.     Returns for follow up.  Worst pain is bilateral occipital headache with radiation to the vertex.  Also with continued right>left arm pain.  Scans show osteophytic changes and foraminal stenosis at C6-7.    Medical, surgical, family, and social history unchanged since prior exam.    Exam:  Constitutional:  Alert, well nourished, NAD.  HEENT: Normocephalic, atraumatic.   Pulm:  Without shortness of breath   CV:  No pitting edema of BLE.     Vital Signs:  /66   Temp 97.3  F (36.3  C) (Temporal)   Ht 1.753 m (5' 9\")   Wt 72 kg (158 lb 11.2 oz)   BMI 23.44 kg/m      Neurological:  Awake  Alert  Oriented x 3  Motor exam:     Shoulder Abduction:  Right:  5/5    Left:  5/5  Biceps:                      Right:  5/5    Left:  5/5  Triceps:                     Right:  5/5    Left:  5/5  Wrist Extensors:       Right:  5/5    Left:  5/5  Wrist Flexors:           Right:  5/5    Left:  5/5  Intrinsics:                  Right:  5/5    Left:  5/5     Able to spontaneously move U/E bilaterally  Sensation intact throughout all U/E dermatomes    A/P:    Will order bilateral occipital nerve block  Will obtain RUE EMG  "

## 2021-09-27 ENCOUNTER — DOCUMENTATION ONLY (OUTPATIENT)
Dept: NEUROSURGERY | Facility: OTHER | Age: 56
End: 2021-09-27

## 2021-10-07 DIAGNOSIS — M54.6 CHRONIC MIDLINE THORACIC BACK PAIN: ICD-10-CM

## 2021-10-07 DIAGNOSIS — G89.4 CHRONIC PAIN SYNDROME: ICD-10-CM

## 2021-10-07 DIAGNOSIS — M47.812 ARTHROPATHY OF CERVICAL FACET JOINT: ICD-10-CM

## 2021-10-07 DIAGNOSIS — F41.1 GENERALIZED ANXIETY DISORDER: ICD-10-CM

## 2021-10-07 DIAGNOSIS — G89.29 CHRONIC MIDLINE THORACIC BACK PAIN: ICD-10-CM

## 2021-10-07 DIAGNOSIS — M54.2 CERVICALGIA: ICD-10-CM

## 2021-10-08 NOTE — TELEPHONE ENCOUNTER
Topamax  Routing refill request to provider for review/approval because:  Labs out of range:  CBC    Clonazepam      Last Written Prescription Date:  09/10/164581/10/2021  Last Fill Quantity: 90,   # refills: 0  Last Office Visit: 08/23/2021  Future Office visit:   None  Routing refill request to provider for review/approval because:  Drug not on the [facility], [facility] or [facility] refill protocol or controlled substance    Ailyn Tobias Rn

## 2021-10-11 RX ORDER — TOPIRAMATE 25 MG/1
TABLET, FILM COATED ORAL
Qty: 180 TABLET | Refills: 0 | Status: SHIPPED | OUTPATIENT
Start: 2021-10-11 | End: 2021-11-02

## 2021-10-11 RX ORDER — CLONAZEPAM 0.5 MG/1
TABLET ORAL
Qty: 90 TABLET | Refills: 0 | Status: SHIPPED | OUTPATIENT
Start: 2021-10-11 | End: 2021-11-02

## 2021-10-13 DIAGNOSIS — F11.90 CHRONIC, CONTINUOUS USE OF OPIOIDS: ICD-10-CM

## 2021-10-13 DIAGNOSIS — G89.29 CHRONIC MIDLINE THORACIC BACK PAIN: ICD-10-CM

## 2021-10-13 DIAGNOSIS — G89.4 CHRONIC PAIN SYNDROME: ICD-10-CM

## 2021-10-13 DIAGNOSIS — M54.6 CHRONIC MIDLINE THORACIC BACK PAIN: ICD-10-CM

## 2021-10-13 DIAGNOSIS — M54.2 CERVICALGIA: ICD-10-CM

## 2021-10-13 DIAGNOSIS — M79.18 MYOFASCIAL PAIN: ICD-10-CM

## 2021-10-13 DIAGNOSIS — M47.812 ARTHROPATHY OF CERVICAL FACET JOINT: ICD-10-CM

## 2021-10-13 RX ORDER — OXYCODONE HYDROCHLORIDE 10 MG/1
TABLET ORAL
Qty: 150 TABLET | Refills: 0 | Status: SHIPPED | OUTPATIENT
Start: 2021-10-13 | End: 2021-11-11

## 2021-10-13 NOTE — TELEPHONE ENCOUNTER
Received call from patient requesting refill(s) of oxyCODONE IR (ROXICODONE) 10 MG tablet     Last dispensed from pharmacy on 9/13/21    Patient's last office/virtual visit by prescribing provider on 9/13/21  Next office/virtual appointment scheduled for none    Last urine drug screen date 2/7/21  Current opioid agreement on file (completed within the last year) Yes Date of opioid agreement: 3/17/21    E-prescribe to [facility]  pharmacy    Will route to nursing [place] for review and preparation of prescription(s).

## 2021-10-13 NOTE — TELEPHONE ENCOUNTER
Refill appropriate. Sent to requested pharmacy.    [place] Prescription Monitoring Program checked on 10/13/21.    Ashley Salgado, PAC

## 2021-10-13 NOTE — TELEPHONE ENCOUNTER
Left message on numerical id vm to inform of approved Rx .  Lupis/MA  [facility]

## 2021-11-01 DIAGNOSIS — G89.4 CHRONIC PAIN SYNDROME: ICD-10-CM

## 2021-11-01 DIAGNOSIS — M54.2 CERVICALGIA: ICD-10-CM

## 2021-11-01 DIAGNOSIS — M47.812 ARTHROPATHY OF CERVICAL FACET JOINT: ICD-10-CM

## 2021-11-01 DIAGNOSIS — F41.1 GENERALIZED ANXIETY DISORDER: ICD-10-CM

## 2021-11-01 DIAGNOSIS — M54.6 CHRONIC MIDLINE THORACIC BACK PAIN: ICD-10-CM

## 2021-11-01 DIAGNOSIS — G89.29 CHRONIC MIDLINE THORACIC BACK PAIN: ICD-10-CM

## 2021-11-02 RX ORDER — CLONAZEPAM 0.5 MG/1
TABLET ORAL
Qty: 90 TABLET | Refills: 0 | Status: SHIPPED | OUTPATIENT
Start: 2021-11-02 | End: 2021-12-11

## 2021-11-02 RX ORDER — TOPIRAMATE 25 MG/1
TABLET, FILM COATED ORAL
Qty: 180 TABLET | Refills: 0 | Status: SHIPPED | OUTPATIENT
Start: 2021-11-02 | End: 2021-12-11

## 2021-11-02 NOTE — TELEPHONE ENCOUNTER
Pending Prescriptions:                       Disp   Refills    clonazePAM (KLONOPIN) 0.5 MG tablet [Pharm*90 tab*0        Sig: TAKE ONE-HALF TO ONE TABLET BY MOUTH THREE TIMES A           DAY AS NEEDED FOR ANXIETY    topiramate (TOPAMAX) 25 MG tablet [Pharmac*180 ta*0        Sig: TAKE THREE TABLETS BY MOUTH TWICE A DAY - TITRATE TO           THIS DOSE AS INSTRUCTED IN CLINIC      Routing refill request to provider for review/approval because:  Drug not on the [facility] refill protocol   Labs out of range:  tia Sharma RN on 11/2/2021 at 4:01 PM

## 2021-11-11 DIAGNOSIS — F11.90 CHRONIC, CONTINUOUS USE OF OPIOIDS: ICD-10-CM

## 2021-11-11 DIAGNOSIS — M79.18 MYOFASCIAL PAIN: ICD-10-CM

## 2021-11-11 DIAGNOSIS — G89.29 CHRONIC MIDLINE THORACIC BACK PAIN: ICD-10-CM

## 2021-11-11 DIAGNOSIS — M54.6 CHRONIC MIDLINE THORACIC BACK PAIN: ICD-10-CM

## 2021-11-11 DIAGNOSIS — M54.2 CERVICALGIA: ICD-10-CM

## 2021-11-11 DIAGNOSIS — M47.812 ARTHROPATHY OF CERVICAL FACET JOINT: ICD-10-CM

## 2021-11-11 DIAGNOSIS — G89.4 CHRONIC PAIN SYNDROME: ICD-10-CM

## 2021-11-11 NOTE — TELEPHONE ENCOUNTER
Reason for call:  Medication   If this is a refill request, has the caller requested the refill from the pharmacy already? No  Will the patient be using a [facility]? Yes  Name of the pharmacy and phone number for the current request:   [facility] [address]              Name of the medication requested: oxyCODONE IR (ROXICODONE) 10 MG tablet    Other request:     Phone number to reach patient:  Cell number on file:    Telephone Information:   Mobile [phone]       Best Time:      Can we leave a detailed message on this number?  YES    Travel screening: Not Applicable

## 2021-11-11 NOTE — TELEPHONE ENCOUNTER
Patient requesting refill(s) of  oxyCODONE IR (ROXICODONE) 10 MG tablet    Last dispensed from pharmacy on 10/13/21    Patient's last office/virtual visit by prescribing provider on 9/13/21  Next office/virtual appointment scheduled for None     Last urine drug screen date 2/7/21  Current opioid agreement on file (completed within the last year) Yes Date of opioid agreement: 3/17/21    E-prescribe to [facility] [place], [place]     Will route to nursing [place] for review and preparation of prescription(s).

## 2021-11-11 NOTE — TELEPHONE ENCOUNTER
Medication refill information reviewed. Please ask patient to follow up for a video visit prior to his next refill.     Due date for  oxyCODONE IR (ROXICODONE) 10 MG tablet is 11/13/21     Prescriptions prepped for review.     Will route to provider.

## 2021-11-12 RX ORDER — OXYCODONE HYDROCHLORIDE 10 MG/1
TABLET ORAL
Qty: 150 TABLET | Refills: 0 | Status: SHIPPED | OUTPATIENT
Start: 2021-11-12 | End: 2021-12-08

## 2021-11-12 NOTE — TELEPHONE ENCOUNTER
Left message on numerical id vm to inform of approved Rx/message .  Lupis/MA  [facility]

## 2021-11-19 ENCOUNTER — TELEPHONE (OUTPATIENT)
Dept: PALLIATIVE MEDICINE | Facility: CLINIC | Age: 56
End: 2021-11-19
Payer: COMMERCIAL

## 2021-11-19 DIAGNOSIS — Z11.59 ENCOUNTER FOR SCREENING FOR OTHER VIRAL DISEASES: ICD-10-CM

## 2021-11-19 NOTE — TELEPHONE ENCOUNTER
Pt scheduled for nerve block  Date:12/9/21  Time:1pm  Dr. Driscoll    Instructed pt to have a  for procedure.  Patient informed of COVID testing process.

## 2021-12-06 ENCOUNTER — LAB (OUTPATIENT)
Dept: URGENT CARE | Facility: URGENT CARE | Age: 56
End: 2021-12-06
Attending: ANESTHESIOLOGY
Payer: COMMERCIAL

## 2021-12-06 DIAGNOSIS — Z11.59 ENCOUNTER FOR SCREENING FOR OTHER VIRAL DISEASES: ICD-10-CM

## 2021-12-06 PROCEDURE — U0005 INFEC AGEN DETEC AMPLI PROBE: HCPCS

## 2021-12-06 PROCEDURE — U0003 INFECTIOUS AGENT DETECTION BY NUCLEIC ACID (DNA OR RNA); SEVERE ACUTE RESPIRATORY SYNDROME CORONAVIRUS 2 (SARS-COV-2) (CORONAVIRUS DISEASE [COVID-19]), AMPLIFIED PROBE TECHNIQUE, MAKING USE OF HIGH THROUGHPUT TECHNOLOGIES AS DESCRIBED BY CMS-2020-01-R: HCPCS

## 2021-12-07 LAB — SARS-COV-2 RNA RESP QL NAA+PROBE: NEGATIVE

## 2021-12-07 NOTE — PROGRESS NOTES
Spenser is a 56 year old who is being evaluated via a billable video visit.    Is Pt currently in [place]? Yes    NOTE:  If Pt is not in [place], Appointment needs to be canceled and rescheduled.      How would you like to obtain your AVS? MyChart  If the video visit is dropped, the invitation should be resent by: Text to cell phone: [phone]   Will anyone else be joining your video visit? No      Video Start Time: 3:40pm  Video-Visit Details    Type of service:  Video Visit    Video End Time:3:51pm    Originating Location (pt. Location): Home    Distant Location (provider location):  [facility]     Platform used for Video Visit: [facility]     DATE OF VISIT: 12/8/21    CHIEF COMPLAINT:   Pain  -neck pain    Assessment:  Joselito Abarca is a 56 year old male who presents today for follow up regarding his:        1. Arthropathy of cervical facet joint  2. cervicalgia  3. Midline thoracic back pain  4. Myofascial pain  5. Chronic pain syndrome  6. Chronic use of opioids    He is scheduled for an injection with Dr. Driscoll tomorrow for his neck pain. He had previously had an injection in his low back in [place] He would liek to repeat this. Discussed that he should have his records sent so we know what injection was performed. He is also going to discuss it with Dr. Driscoll.     With regard to his medications, he has been stable. Will continue current regimen.       Plan:     Diagnosis reviewed, treatment option addressed, and risk/benifits discussed.  Self-care instructions given.  I am recommending a multidisciplinary treatment plan to help this patient better manage pain.       1. Physical Therapy:  not at this time  2. Clinical Health Psychologist:  NO  3. Diagnostic Studies: none at this time  4. Medication Management:      Continue Cymbalta 60mg daily    Continue Oxycodone 10mg 1 tab every 4-6 hours as needed. Max of 5/day.     Continue Topamax  "75mg twice daily  5. Further procedures recommended: keep injection with Dr. Driscoll as scheduled. I would recommend   6. Recommendations to PCP. See above      Follow up with this provider: PRN. As I am leaving the pain clinic in 3 weeks, will discharge the patient back to primary care given his pain stability.     INTERVAL HISTORY:  Last seen on 9/13/21    Recommendations at last visit included:    1. Physical Therapy:  not at this time  2. Clinical Health Psychologist:  NO  3. Diagnostic Studies: none at this time  4. Medication Management:    ? Continue Cymbalta 60mg daily  ? Continue Oxycodone 10mg 1 tab every 4-6 hours as needed. Max of 5/day.   ? Continue Topamax 75mg twice daily  5. Further procedures recommended: none at this time  6. Recommendations to PCP. See above           Follow up with this provider: 8 Weeks for a video visit     Since his last visit, Joselito GOSS Rima reports:    Things have been \"okay\". He states that about 5 years ago, he had some serious low back pain. He states that he went to [place] and got injections in his lower back. He states that they worked until about 6 months. He states that he would like to get those injections again but with Dr. Driscoll.     He is scheduled to see Dr. Vasques on 9/23/21 to review options for his neck pain. He states that he saw an advertisement for a neck pillow with a TENS unit in it. He states that he ordered it and is hoping that this will help. He has not gotten it yet. He states that he was told it'll take about  2 weeks to get.    He states that he is \"okay\". He states that he is a little sore, but is okay. He is tolerating his medications well and is not having any side effects to them.       Pain Information:              Pain today 5/10     Annual Controlled Substance Agreement: 3/17/21  UDS: 2/7/2021    CURRENT RELEVANT PAIN MEDICATIONS:   Cymbalta 60mg daily  Clonazepam 0.5mg-takes 3-4/day  Oxycodone 10mg every 4-6 hours max of 5/day  Topamax " 25mg: takes 3 tabs twice daily    Previous Medications: (H--helped; HI--Helped initially; SWH-- somewhat helpful, NH--No help; W--worse; SE--side effects)   Opiates: hydrocodone SE allergic, fever, sweaty, headache, methadone H, Oxycodone H  NSAIDS: Ibuprofen NH, Aleve NH  Anti-migraine mediations: Fiorinal H  Muscle Relaxants: Valium H, methocarbamol NH, Tizanidine NH, Skelaxin NH  Neuropathics: Gabapentin H SE breathing issues  Anti-depressants: Cymbalta NH  Anxiolytics: Klonopin H  Topicals: Lidocaine H  Other medications not covered above: Tylenol NH     Past Pain Treatments:  Pain Clinic:   Yes, he was previously seen at [facility] and [facility]   PT: Yes, has done many times  Psychologist: No  Relaxation techniques/biofeedback: No  Chiropractor: Yes, feels that it made it worse  Acupuncture: Yes, just did one session  Pharmacotherapy:               Opioids: Yes                Non-opioids:    Yes   TENs Unit:Yes, aggravates the pain  Injections:     07/12/2018 bilateral occipital nerve blocks ,     08/26/2015 and 5/27/2015Cervical medial branch blocks      Has had low back pain injections at [facility].     10/18/2019 Right cervical RFA ,     11/14/2019 left cervical RFA .    02/13/2020 L4-5 REVA    02/24/2020 TPI neck in ER  Self-care:   Yes, hot tubs, ice packs, heating pads  Surgeries related to pain: Yes,  1. anterior cervical discectomy and fusion C6-7, exploration and revision C4-6 with hardware removal 11/29/2017,   2. Left T1-T2 thoracic hemilaminectomy, microdiscectomy 02/21/2012,   3. Removal of C4 through C6 anterior cervical plate, Exploration of C4-C5 and C5-C6 anterior cervical fusion, C4-C5 and C5-C6 arthrodesis, C4 through C6 anterior cervical instrumentation, and [place] of right iliac hip graft.  03/15/2011  4. C4-C5 and C5-C6 anterior cervical discectomy and fusion with instrumentation and neural electrophysiologic monitoring 01/26/2010     [place] Board of Pharmacy Data Base Reviewed:    YES; As expected,  no concern for misuse/abuse of controlled medications based on this report. Checked 10/13/21     THE 4 As OF OPIOID MAINTENANCE ANALGESIA    Analgesia: Is pain relief clinically significant? NO   Activity: Is patient functional and able to perform Activities of Daily Living? YES   Adverse effects: Is patient free from adverse side effects from opiates? YES   Adherence to Rx protocol: Is patient adhering to Controlled Substance Agreement and taking medications ONLY as ordered? YES     MME: 75 (started at 330)    Medications:  Current Outpatient Prescriptions          Current Outpatient Medications   Medication Sig Dispense Refill     clonazePAM (KLONOPIN) 0.5 MG tablet TAKE 1/2 TO 1 TABLET BY MOUTH THREE TIMES A DAY AS NEEDED FOR ANXIETY 90 tablet 0     DULoxetine (CYMBALTA) 60 MG capsule Take 1 capsule (60 mg) by mouth daily 30 capsule 3     FLOVENT  MCG/ACT Inhaler INHALE 2 PUFFS INTO THE LUNGS TWICE DAILY 36 g 2     fluticasone (FLONASE) 50 MCG/ACT nasal spray SPRAY 2 SPRAYS IN EACH NOSTRIL EVERY DAY 16 g 5     ipratropium - albuterol 0.5 mg/2.5 mg/3 mL (DUONEB) 0.5-2.5 (3) MG/3ML neb solution Take 1 vial (3 mLs) by nebulization every 4 hours as needed for shortness of breath / dyspnea 30 vial 0     methadone (DOLOPHINE) 5 MG tablet Take 1.5 tablets every 12 hours.  Fill 2/28/2020. Start 2/29/2020. 90 tablet 0     naloxone (NARCAN) 4 MG/0.1ML nasal spray Spray 4 mg into one nostril alternating nostrils once as needed for opioid reversal every 2-3 minutes until assistance arrives         nystatin (MYCOSTATIN) 990959 UNIT/ML suspension TAKE 5 MLS BY MOUTH FOUR TIMES A  mL 0     order for DME Equipment being ordered: Nebulizer mask and tubing 1 each 1     oxyCODONE IR (ROXICODONE) 10 MG tablet Take 1 tablet every 6 hours as needed for breakthrough pain. Max of 4/day. Fill 2/19/2020. Start 2/22/2020. 30 day script 115 tablet 0     sildenafil (VIAGRA) 100 MG tablet Take 1 tablet (100 mg) by mouth daily as  needed 30 min to 4 hrs before sex. Do not use with nitroglycerin, terazosin or doxazosin. 4 tablet 0     tiZANidine (ZANAFLEX) 2 MG tablet TAKE ONE TO TWO TABLETS BY MOUTH THREE TIMES A DAY AS NEEDED FOR MUSCLE SPASMS/TENSION 60 tablet 0     traZODone (DESYREL) 100 MG tablet Take 3 tablets (300 mg) by mouth At Bedtime 90 tablet 3     VENTOLIN  (90 Base) MCG/ACT inhaler INHALE 2 PUFFS INTO THE LUNGS EVERY 6 HOURS AS NEEDED FOR SHORTNESS OF BREATH, DIFFICULTY BREATHING OR WHEEZING. 18 g 3     vitamin D3 (CHOLECALCIFEROL) 2000 units tablet TAKE ONE TABLET BY MOUTH EVERY  tablet 3     zolpidem (AMBIEN) 10 MG tablet Take 10 mg by mouth nightly as needed                    The total TIME spent on this patient on the day of the appointment was 17 minutes.    Time spent preparing to see the patient (reviewing records and tests) 1 minutes  Time spend face to face (or on the phone) with the patient 11 minutes  Time spent ordering tests, medications, procedures and referrals 0 minutes  Time spent Referring and communicating with other healthcare professionals 0 minutes  Time spent documenting clinical information in Epic 5 minutes      Ashley Salgado [facility]

## 2021-12-08 ENCOUNTER — VIRTUAL VISIT (OUTPATIENT)
Dept: PALLIATIVE MEDICINE | Facility: CLINIC | Age: 56
End: 2021-12-08
Payer: COMMERCIAL

## 2021-12-08 DIAGNOSIS — M54.2 CERVICALGIA: ICD-10-CM

## 2021-12-08 DIAGNOSIS — G89.4 CHRONIC PAIN SYNDROME: ICD-10-CM

## 2021-12-08 DIAGNOSIS — F11.90 CHRONIC, CONTINUOUS USE OF OPIOIDS: ICD-10-CM

## 2021-12-08 DIAGNOSIS — G89.29 CHRONIC MIDLINE THORACIC BACK PAIN: ICD-10-CM

## 2021-12-08 DIAGNOSIS — M47.812 ARTHROPATHY OF CERVICAL FACET JOINT: ICD-10-CM

## 2021-12-08 DIAGNOSIS — M54.6 CHRONIC MIDLINE THORACIC BACK PAIN: ICD-10-CM

## 2021-12-08 DIAGNOSIS — M79.18 MYOFASCIAL PAIN: ICD-10-CM

## 2021-12-08 PROCEDURE — 99213 OFFICE O/P EST LOW 20 MIN: CPT | Mod: 95 | Performed by: PHYSICIAN ASSISTANT

## 2021-12-08 RX ORDER — OXYCODONE HYDROCHLORIDE 10 MG/1
10 TABLET ORAL EVERY 4 HOURS PRN
Qty: 150 TABLET | Refills: 0 | Status: SHIPPED | OUTPATIENT
Start: 2021-12-08 | End: 2022-01-14

## 2021-12-08 ASSESSMENT — PAIN SCALES - GENERAL: PAINLEVEL: MODERATE PAIN (5)

## 2021-12-08 NOTE — PATIENT INSTRUCTIONS
After Visit Instructions:     Thank you for coming to [facility] for your care. It is my goal to partner with you to help you reach your optimal state of health.     I am recommending multidisciplinary care at this time.  The focus of care will be to continue gradual rehabilitation and pain management with medication adjustments as needed.    Continue daily self-care, identifying contributing factors, and monitoring variations in pain level. Continue to integrate self-care into your life.          Schedule follow-up with pain management as needed. You will need to make this appointment.     Procedures recommended: keep appointment with Dr. Driscoll as scheduled     Medication recommendations:     Continue Oxycodone 10m tab every 4-6 hours as needed. Max of 5/day.       Ashley Salgado PA-C  [facility]    Contact information: [facility]  Clinic Number:  [phone]     Call with any questions about your care and for scheduling assistance.     Calls are returned Monday through Friday between 8 AM and 4:30 PM. We usually get back to you within 2 business days depending on the issue/request.    If we are prescribing your medications:    For opioid medication refills, call the clinic or send a [employer] message 7 days in advance.  Please include:    Name of requested medication    Name of the pharmacy.    For non-opioid medications, call your pharmacy directly to request a refill. Please allow 3-4 days to be processed.     Per [place] State Law:    All controlled substance prescriptions must be filled within 30 days of being written.      For those controlled substances allowing refills, pickup must occur within 30 days of last fill.      We believe regular attendance is key to your success in our program!      Any time you are unable to keep your appointment we ask that you call us at least 24 hours in  advance to cancel.This will allow us to offer the appointment time to another patient.   Multiple missed appointments may lead to dismissal from the clinic.

## 2021-12-08 NOTE — Clinical Note
Dr. Schoen,    I am discharging Spenser back to primary care. His MME is down to 75 (started at 330). He takes Oxycodone 10mg every 4-6 hours max of 5/day. He is working with Dr. Driscoll on injections. Dr. Vasques has been involved with this as well. I will take care of his December refill, but he knows to request his January refill from you. He stated that he was going to try to get an appointment with you for that transition. If you have any questions, don't hesitate to reach out.  Thank you,  Ashley Salgado, PAC

## 2021-12-09 ENCOUNTER — HOSPITAL ENCOUNTER (OUTPATIENT)
Facility: CLINIC | Age: 56
Discharge: HOME | End: 2021-12-09
Attending: ANESTHESIOLOGY | Admitting: ANESTHESIOLOGY
Payer: COMMERCIAL

## 2021-12-09 VITALS
HEART RATE: 71 BPM | DIASTOLIC BLOOD PRESSURE: 45 MMHG | SYSTOLIC BLOOD PRESSURE: 133 MMHG | TEMPERATURE: 97.5 F | RESPIRATION RATE: 18 BRPM | OXYGEN SATURATION: 95 %

## 2021-12-09 DIAGNOSIS — F41.1 GENERALIZED ANXIETY DISORDER: ICD-10-CM

## 2021-12-09 DIAGNOSIS — M47.812 ARTHROPATHY OF CERVICAL FACET JOINT: ICD-10-CM

## 2021-12-09 DIAGNOSIS — M54.2 CERVICALGIA: ICD-10-CM

## 2021-12-09 DIAGNOSIS — M54.6 CHRONIC MIDLINE THORACIC BACK PAIN: ICD-10-CM

## 2021-12-09 DIAGNOSIS — G89.4 CHRONIC PAIN SYNDROME: ICD-10-CM

## 2021-12-09 DIAGNOSIS — G89.29 CHRONIC MIDLINE THORACIC BACK PAIN: ICD-10-CM

## 2021-12-09 PROCEDURE — 64405 NJX AA&/STRD GR OCPL NRV: CPT | Performed by: ANESTHESIOLOGY

## 2021-12-09 PROCEDURE — 250N000011 HC RX IP 250 OP 636: Performed by: ANESTHESIOLOGY

## 2021-12-09 PROCEDURE — 64405 NJX AA&/STRD GR OCPL NRV: CPT | Mod: 50 | Performed by: ANESTHESIOLOGY

## 2021-12-09 PROCEDURE — 250N000009 HC RX 250: Performed by: ANESTHESIOLOGY

## 2021-12-09 RX ORDER — LIDOCAINE HYDROCHLORIDE 20 MG/ML
INJECTION, SOLUTION EPIDURAL; INFILTRATION; INTRACAUDAL; PERINEURAL PRN
Status: DISCONTINUED | OUTPATIENT
Start: 2021-12-09 | End: 2021-12-09 | Stop reason: HOSPADM

## 2021-12-09 RX ORDER — TRIAMCINOLONE ACETONIDE 40 MG/ML
INJECTION, SUSPENSION INTRA-ARTICULAR; INTRAMUSCULAR PRN
Status: DISCONTINUED | OUTPATIENT
Start: 2021-12-09 | End: 2021-12-09 | Stop reason: HOSPADM

## 2021-12-09 RX ORDER — LIDOCAINE 40 MG/G
CREAM TOPICAL
Status: DISCONTINUED | OUTPATIENT
Start: 2021-12-09 | End: 2021-12-09 | Stop reason: HOSPADM

## 2021-12-09 RX ORDER — BUPIVACAINE HYDROCHLORIDE 5 MG/ML
INJECTION, SOLUTION PERINEURAL PRN
Status: DISCONTINUED | OUTPATIENT
Start: 2021-12-09 | End: 2021-12-09 | Stop reason: HOSPADM

## 2021-12-09 NOTE — OP NOTE
Preoperative diagnosis: Bilateral occipital neuralgia    Postoperative diagnosis: Same as preoperative diagnosis    Anesthesia: Local    Blood loss: 0 cc    Interval history: Spenser had a C1-C2 radiofrequency.  That was partially beneficial to him.  He rated his pain is about 60% better in his neck region.  He still has some pain and tightness along his occipital ridge and he was sent to me to have exceptional nerve blocks.    Procedure bilateral greater occipital nerve blocks    Technique: After written and verbal informed consent was obtained including risks of infection, bleeding, no help, or worse pain he was brought to the procedure area placed in the seated position his neck was prepped with ChloraPrep.  A bleb of 1% lidocaine was used at the midpoint between his occipital protuberance and his mastoid process.  In a fanlike motion injected with a 25-gauge 1-1/2 inch needle 5 cc of a 50-50 mixture of 1% lidocaine with 0.25% bupivacaine and 4 mg/cc of triamcinolone.  He did have some discomfort during the procedure but not out of proportion to what most patients experience.    Complications none    Follow-up: Spenser was sent to me for occipital nerve blocks.  If this is not helpful he did have a partial response to a C1-C2 radiofrequency ablation.  My recommendation is that since he had a partial response to the ablation that he has an intra-articular diagnostic C1-C2 facet injection.  If that is diagnostically negative I would move up to C0-C1.  It is unusual and uncommon to have degeneration at C1-C2.  He does have that and he had a partial response to the radiofrequency ablation.  There is a possibility that he simply has too much degeneration for the radiofrequency ablation to help since the nerve that goes to this particular facet joint partially innervates the joint.    Regarding his low back, we did send records to get procedure notes from Dr. Triana (ARABELLA MENEZES from [facility].  He would like to  have those procedures done for his low back pain which lasted him for quite a long time.  Once we receive those records then I can put the order in for that procedure.

## 2021-12-09 NOTE — DISCHARGE INSTRUCTIONS
Home Care Instructions                Procedure: Epidural injection or joint injection    Activity:    Rest today    Do not work today    Resume normal activity tomorrow  Pain:    You may experience soreness at the injection site for 1 to 3 days.    You may use an ice pack for 20 minutes every 2 hours for the first 24 hours    You may use a heating pad after the first 24 hours    You may use Tylenol  (acetaminophen) every 4 hours or other pain medicines as directed by your physician  Safety  Sedation medicine, if given may remain active for many hours.    It is important for the next 24 hours that you do not:    Drive a car    Operate machines or power tools    Consume alcohol, including beer    Sign any important papers or legal documents    You may experience numbness radiating into your legs or arms, (depending on the procedure location)  This numbness may last several hours.  Until the numb sensation returns to normal please use caution in walking, climbing stairs, stepping out of your vehicle, etc.    Common side effects of steroids:  Not everyone will experience corticosteroid side effects. If side effects are experienced they will gradually subside in the 7-10 day period following an injection.    Most common side effects include:    Feeling of warmth, particularly in face but could be overall feeling of warmth    Increased blood sugar in diabetic patients    Menstrual irregularities may occur.  If taking hormone based birth control an alternate method of birth control is recommended    Sleep disturbances and/or mood swings are possible    Leg cramps    Please contact us if you have:    Fever more than 101.5 degrees Fahrenheit  Signs of infection (redness, swelling or drainage)      PLEASE GO TO THE NEAREST EMERGENCY ROOM IF YOU HAVE:   --Increasing numbness or weakness in your arms or legs or increasingly severe pain (greater than 4 hours after your injection)      If you have questions during normal  business hours (8am-5pm Monday-Friday) contact central scheduling at [phone] to access the [facility] . If you need help after hours, we recommend that you go to a hospital emergency room or dial 911.

## 2021-12-10 NOTE — TELEPHONE ENCOUNTER
Clonazepam      Last Written Prescription Date:  11/2/21  Last Fill Quantity: 90,   # refills: 0  Last Office Visit: 12/8/21  Future Office visit:       Routing refill request to provider for review/approval because:  Drug not on the [facility], [facility] or [facility] refill protocol or controlled substance    Topomax  Routing refill request to provider for review/approval because:  Labs not current:  CBC      Kerri Alex RN

## 2021-12-11 RX ORDER — TOPIRAMATE 25 MG/1
TABLET, FILM COATED ORAL
Qty: 180 TABLET | Refills: 0 | Status: SHIPPED | OUTPATIENT
Start: 2021-12-11 | End: 2022-01-03

## 2021-12-11 RX ORDER — CLONAZEPAM 0.5 MG/1
TABLET ORAL
Qty: 90 TABLET | Refills: 0 | Status: SHIPPED | OUTPATIENT
Start: 2021-12-11 | End: 2022-01-10

## 2021-12-18 ENCOUNTER — HEALTH MAINTENANCE LETTER (OUTPATIENT)
Age: 56
End: 2021-12-18

## 2022-01-03 ENCOUNTER — VIRTUAL VISIT (OUTPATIENT)
Dept: FAMILY MEDICINE | Facility: CLINIC | Age: 57
End: 2022-01-03
Payer: COMMERCIAL

## 2022-01-03 DIAGNOSIS — M50.00 INTERVERTEBRAL CERVICAL DISC DISORDER WITH MYELOPATHY, CERVICAL REGION: ICD-10-CM

## 2022-01-03 DIAGNOSIS — J45.40 MODERATE PERSISTENT ASTHMA WITHOUT COMPLICATION: ICD-10-CM

## 2022-01-03 DIAGNOSIS — J45.21 MILD INTERMITTENT ASTHMA WITH ACUTE EXACERBATION: Primary | ICD-10-CM

## 2022-01-03 DIAGNOSIS — M47.812 CERVICAL SPONDYLOSIS WITHOUT MYELOPATHY: Primary | ICD-10-CM

## 2022-01-03 DIAGNOSIS — R91.8 PULMONARY NODULES: ICD-10-CM

## 2022-01-03 DIAGNOSIS — G47.00 INSOMNIA, UNSPECIFIED TYPE: ICD-10-CM

## 2022-01-03 DIAGNOSIS — F41.1 GENERALIZED ANXIETY DISORDER: ICD-10-CM

## 2022-01-03 PROCEDURE — 99214 OFFICE O/P EST MOD 30 MIN: CPT | Mod: 95 | Performed by: FAMILY MEDICINE

## 2022-01-03 ASSESSMENT — ANXIETY QUESTIONNAIRES
5. BEING SO RESTLESS THAT IT IS HARD TO SIT STILL: NOT AT ALL
3. WORRYING TOO MUCH ABOUT DIFFERENT THINGS: NOT AT ALL
6. BECOMING EASILY ANNOYED OR IRRITABLE: NEARLY EVERY DAY
IF YOU CHECKED OFF ANY PROBLEMS ON THIS QUESTIONNAIRE, HOW DIFFICULT HAVE THESE PROBLEMS MADE IT FOR YOU TO DO YOUR WORK, TAKE CARE OF THINGS AT HOME, OR GET ALONG WITH OTHER PEOPLE: NOT DIFFICULT AT ALL
2. NOT BEING ABLE TO STOP OR CONTROL WORRYING: NOT AT ALL
1. FEELING NERVOUS, ANXIOUS, OR ON EDGE: NOT AT ALL
4. TROUBLE RELAXING: NEARLY EVERY DAY
7. FEELING AFRAID AS IF SOMETHING AWFUL MIGHT HAPPEN: NOT AT ALL
GAD7 TOTAL SCORE: 6

## 2022-01-03 NOTE — Clinical Note
1. Needs CT chest scheduled as a follow up to an incidental lung nodule finding on a CT scan of his neck in July, 2021. Order is placed, please call to schedule.

## 2022-01-03 NOTE — PROGRESS NOTES
Spenser is a 56 year old who is being evaluated via a billable telephone visit.      What phone number would you like to be contacted at? [phone]  How would you like to obtain your AVS? Magalie    Assessment & Plan     Moderate persistent asthma without complication  Patient is currently using Flonase nasal spray, duo nebs, albuterol inhaler and steroid inhaler.  He is still experiencing shortness of breath despite this current regimen.  We discussed the need for assessment with pulmonary function testing to identify if there are restrictive components in addition to his obstructive lung disease or if there is any issue with diffusion capacity.  He is agreeable to that and referral has been made.  He will continue with his current medications at present time.  - General PFT Lab (Please always keep checked); Future    Pulmonary nodules  On 7/30/2021 patient had a CT scan of his cervical spine and there was an incidental finding of a 5 to 6 mm subpleural nodule partially visualized in the right upper lobe and a follow-up CT chest was recommended in 6 months if high risk, which the patient is related to his smoking history.  We will therefore put in a request for a CT chest as follow-up of this nodule.  - CT Chest w/o Contrast; Future    Intervertebral cervical disc disorder with myelopathy, cervical region  Discussed that I would connect with Dr. Driscoll to discuss what next steps might be in terms of procedural approach to pain in his cervical spine.  Would also follow-up in regard to prior discussion of getting information from his prior pain clinic in [place] to see what he had done that he found to be of benefit.  I will asked  To negative follow-up with the patient in regard to next steps.      Generalized anxiety disorder  Patient reports that his current therapist and prescriber at [facility] is leaving that practice and has not refilled his Ambien and asked that I would consider refilling it.   "He did put in for request for a new provider consult and has that arranged in February but will be without medications during the gap.  He said he put in a request with [facility] and they indicated he would not get a refill until he saw his new provider.  I will attempt to speak with staff there to see if a provider there is willing to refill this medication.  He has a very high risk medication regimen as he is on Klonopin for his anxiety as well as Ambien and 50 mg of oxycodone daily.  I therefore am not comfortable refilling his Ambien at this time personally.    Insomnia, unspecified type  See above discussion.    Return in about 4 weeks (around 1/31/2022) for using a video visit.    Gregory G. Schoen, MD  [facility]   Spenser is a 56 year old who presents for the following health issues   HPI     Concern - referral for his neck  Onset: years  Description: referral  Intensity: severe  Progression of Symptoms:  worsening  Accompanying Signs & Symptoms: none  Previous history of similar problem: YES  Precipitating factors:        Worsened by: \"everything\"  Alleviating factors:        Improved by: sitting on chair with headrest, pain medication, nerve injections  Therapies tried and outcome: nerve injection, pain medication    Concern - pain management  Onset:   Description: pain management  Intensity: severe  Progression of Symptoms:  improving  Accompanying Signs & Symptoms: low back pain  Previous history of similar problem: YES  Precipitating factors:        Worsened by: activity  Alleviating factors:        Improved by: pain medication, nerve injection, \"sitting around\"  Therapies tried and outcome:  Pain medication, nerve injections, \" sitting around\"    Spenser was being followed by our local pain clinic but that provider is no longer available. On December 9, 2021 he had a branch block at C1-2 and prior to that he had a  radiofrequency ablation at lower cervical levels and  " and is wondering what the next steps might be. He also had a series of injections with Dr. Triana in [place] that did help him but does not want to see Dr. Triana again.  He says he was pain free for some time after these injections. Dr. Driscoll has apparently attempted to get information from Dr. Triana.     With the departure of his pain provider, he was told he should follow up with me for pain management. Plan at his last visit was the following:    Diagnosis reviewed, treatment option addressed, and risk/benifits discussed.  Self-care instructions given.  I am recommending a multidisciplinary treatment plan to help this patient better manage pain.       1. Physical Therapy:  not at this time  2. Clinical Health Psychologist:  NO  3. Diagnostic Studies: none at this time  4. Medication Management:    ? Continue Cymbalta 60mg daily  ? Continue Oxycodone 10mg 1 tab every 4-6 hours as needed. Max of 5/day.   ? Continue Topamax 75mg twice daily  5. Further procedures recommended: keep injection with Dr. Driscoll as scheduled. I would recommend   6. Recommendations to PCP. See above        Follow up with this provider: PRN. As I am leaving the pain clinic in 3 weeks, will discharge the patient back to primary care given his pain stability.    Spenser states that he continues to have shortness of breath with minimal activity, noting that just getting up in the AM and walking from his bedroom to his living room gives him shortness of breath. He says it has been this way for years since he moved out of his previous apartment that had mold.   He notes he could be out of shape but his pain syndrome is very limiting to his ability to exercise.      Also needs a refill of his ambien but is between providers at [facility] but can't get into a new provider until February.       Review of Systems   Const: no fever or chills  HEENT: occipital pain ongoing  CVR: neg (has VEGA but likely lung related  RESP: as  above  MSK: cervicalgia and lumbar back pain  PSYCH: chronic insomnia, following with [facility].       Objective           Vitals:  No vitals were obtained today due to virtual visit.    Physical Exam   healthy, alert and no distress  PSYCH: Alert and oriented times 3; coherent speech, normal   rate and volume, able to articulate logical thoughts, able   to abstract reason, no tangential thoughts, no hallucinations   or delusions  His affect is normal  RESP: No cough, no audible wheezing, able to talk in full sentences  Remainder of exam unable to be completed due to telephone visits        Phone call duration: 27 minutes

## 2022-01-03 NOTE — NURSING NOTE
Ct scan scheduled for 1-6-2022, arrival 1:55 with a 2:10 scan.  I gave him the phone number to call if the date and time doesn't work, [phone].  I asked him to call them with his insurance information.

## 2022-01-03 NOTE — NURSING NOTE
Health Maintenance Due   Topic Date Due     ANNUAL REVIEW OF HM ORDERS  Never done     COVID-19 Vaccine (1) Never done     HEPATITIS C SCREENING  Never done     LIPID  Never done     PREVENTIVE CARE VISIT  02/21/2015     ZOSTER IMMUNIZATION (1 of 2) Never done     LUNG CANCER SCREENING  08/03/2019     NICOLETTE ASSESSMENT  06/21/2020     ASTHMA ACTION PLAN  02/14/2021     ASTHMA CONTROL TEST  08/19/2021     INFLUENZA VACCINE (1) 09/01/2021     Bella LINN LPN

## 2022-01-04 ENCOUNTER — TELEPHONE (OUTPATIENT)
Dept: FAMILY MEDICINE | Facility: CLINIC | Age: 57
End: 2022-01-04
Payer: COMMERCIAL

## 2022-01-04 ASSESSMENT — ANXIETY QUESTIONNAIRES: GAD7 TOTAL SCORE: 6

## 2022-01-04 NOTE — TELEPHONE ENCOUNTER
----- Message from Gregory G Schoen, MD sent at 1/3/2022  5:33 PM CST -----  Please contact patient and inquire regarding contact at [facility] we can call to discuss his medication management.    Electronically signed by Greg Schoen, MD

## 2022-01-05 NOTE — TELEPHONE ENCOUNTER
Not sure what the order for [facility] is for.  Please sign order and route back to me so I can fax the information to them.  Do you want me to make an appt for [facility] to talk to you about med management?

## 2022-01-05 NOTE — TELEPHONE ENCOUNTER
Already had the appointment to discuss medication management. From his report, his [facility] provider left the practice and can't see a new provider until February but apparently new provider won't refill meds until they see him, which I believe is more appropriate for them to refill to bridge than for me to do so.  The number is for me to call to discuss. He already has the appointment.   Electronically signed by Greg Schoen, MD

## 2022-01-06 ENCOUNTER — HOSPITAL ENCOUNTER (OUTPATIENT)
Dept: CT IMAGING | Facility: CLINIC | Age: 57
Discharge: HOME | End: 2022-01-06
Attending: FAMILY MEDICINE | Admitting: FAMILY MEDICINE
Payer: COMMERCIAL

## 2022-01-06 DIAGNOSIS — R91.8 PULMONARY NODULES: ICD-10-CM

## 2022-01-06 PROCEDURE — 71250 CT THORAX DX C-: CPT

## 2022-01-07 ENCOUNTER — TELEPHONE (OUTPATIENT)
Dept: FAMILY MEDICINE | Facility: CLINIC | Age: 57
End: 2022-01-07
Payer: COMMERCIAL

## 2022-01-07 DIAGNOSIS — F41.1 GENERALIZED ANXIETY DISORDER: ICD-10-CM

## 2022-01-07 NOTE — LETTER
January 10, 2022      Joselito Abarca  [address]   [place] [address]        Dear ,    We are writing to inform you of your test results.    CT scan showed the nodule that does not appear to be of concern but will need a follow up in one year.  Overall his lungs look much better than in 2018.    Resulted Orders   CT Chest w/o Contrast    Narrative    CT CHEST W/O CONTRAST 1/6/2022 3:45 PM    CLINICAL HISTORY: Lung nodule, < 6mm, low cancer risk; Pulmonary  nodules    TECHNIQUE: CT chest without IV contrast. Multiplanar reformats were  obtained. Dose reduction techniques were used.  CONTRAST: None.    COMPARISON: Multiple prior radiographs with the most recent dated  6/29/2021. Most recent CT chest dated 8/3/2018    FINDINGS:   LUNGS AND PLEURA: Patent central airways. There are a few residual  ill-defined reticulonodular opacities in the right upper lobe;  however, these are substantially improved since 2018. The  reticulonodular opacities also in the right middle and lower lobes are  completely resolved. There is a new 0.4 x 0.5 cm pleural-based nodule  in the posterior right upper lobe (series 4, image 64). No new pleural  effusion. No pneumothorax.    MEDIASTINUM/AXILLAE: No intrathoracic lymphadenopathy. Normal caliber  thoracic aorta. Mild-to-moderate coronary artery calcifications.  Normal heart size. No pericardial effusion. Small hiatal hernia.    UPPER ABDOMEN: Cholelithiasis.    MUSCULOSKELETAL: Degenerative changes of the spine. Partly visualized  cervical spinal fixation.      Impression    IMPRESSION:   1.  0.5 cm posterior right upper lobe nodule, new since 2018. Consider  follow-up CT in one year based on Fleischner criteria.  2.  Substantial interval improvement in the right lung reticulonodular  opacities since 2018.  3.  Cholelithiasis.    NANCIE GARCIA MD         SYSTEM ID:  [id number]       If you have any questions or concerns, please call the clinic at the  number listed above.       Sincerely,

## 2022-01-07 NOTE — TELEPHONE ENCOUNTER
----- Message from Gregory G Schoen, MD sent at 1/7/2022 11:03 AM CST -----  Please inform patient that his CT scan showed the nodule that does not appear to be of concern but will need a follow up in one year.  Overall his lungs look much better than in 2018.  Electronically signed by Greg Schoen, MD

## 2022-01-10 RX ORDER — CLONAZEPAM 0.5 MG/1
TABLET ORAL
Qty: 90 TABLET | Refills: 0 | Status: SHIPPED | OUTPATIENT
Start: 2022-01-10 | End: 2022-02-15

## 2022-01-10 NOTE — TELEPHONE ENCOUNTER
Klonopin      Last Written Prescription Date:  12/11/2021  Last Fill Quantity: 90,   # refills: 0  Last Office Visit: 1/3/2022  Future Office visit:       Routing refill request to provider for review/approval because:  Drug not on the [facility], [facility] or [facility] refill protocol or controlled substance

## 2022-01-14 ENCOUNTER — HOSPITAL ENCOUNTER (EMERGENCY)
Facility: CLINIC | Age: 57
Discharge: HOME | End: 2022-01-14
Attending: PHYSICIAN ASSISTANT | Admitting: PHYSICIAN ASSISTANT
Payer: COMMERCIAL

## 2022-01-14 VITALS
RESPIRATION RATE: 20 BRPM | TEMPERATURE: 97.7 F | BODY MASS INDEX: 23.33 KG/M2 | OXYGEN SATURATION: 99 % | HEART RATE: 79 BPM | SYSTOLIC BLOOD PRESSURE: 128 MMHG | DIASTOLIC BLOOD PRESSURE: 88 MMHG | WEIGHT: 158 LBS

## 2022-01-14 DIAGNOSIS — G89.4 CHRONIC PAIN SYNDROME: ICD-10-CM

## 2022-01-14 DIAGNOSIS — M54.2 CHRONIC NECK PAIN: ICD-10-CM

## 2022-01-14 DIAGNOSIS — M79.18 MYOFASCIAL PAIN: ICD-10-CM

## 2022-01-14 DIAGNOSIS — G89.29 CHRONIC MIDLINE THORACIC BACK PAIN: ICD-10-CM

## 2022-01-14 DIAGNOSIS — M47.812 ARTHROPATHY OF CERVICAL FACET JOINT: ICD-10-CM

## 2022-01-14 DIAGNOSIS — M54.2 CERVICALGIA: ICD-10-CM

## 2022-01-14 DIAGNOSIS — G89.29 CHRONIC NECK PAIN: ICD-10-CM

## 2022-01-14 DIAGNOSIS — M54.6 CHRONIC MIDLINE THORACIC BACK PAIN: ICD-10-CM

## 2022-01-14 DIAGNOSIS — F11.90 CHRONIC, CONTINUOUS USE OF OPIOIDS: ICD-10-CM

## 2022-01-14 PROCEDURE — 99284 EMERGENCY DEPT VISIT MOD MDM: CPT | Performed by: PHYSICIAN ASSISTANT

## 2022-01-14 PROCEDURE — 99283 EMERGENCY DEPT VISIT LOW MDM: CPT | Performed by: PHYSICIAN ASSISTANT

## 2022-01-14 RX ORDER — OXYCODONE HYDROCHLORIDE 10 MG/1
10 TABLET ORAL EVERY 4 HOURS PRN
Qty: 150 TABLET | Refills: 0 | Status: SHIPPED | OUTPATIENT
Start: 2022-01-14 | End: 2022-02-10

## 2022-01-14 RX ORDER — OXYCODONE HYDROCHLORIDE 5 MG/1
5 TABLET ORAL EVERY 6 HOURS PRN
Qty: 6 TABLET | Refills: 0 | Status: SHIPPED | OUTPATIENT
Start: 2022-01-14 | End: 2022-10-09

## 2022-01-14 NOTE — TELEPHONE ENCOUNTER
Oxycodone 10MG      Last Written Prescription Date:  12/08/2021  Last Fill Quantity: 150,   # refills: 0  Last Office Visit: 01/03/2022  Future Office visit:       Routing refill request to provider for review/approval because:  Drug not on the [facility], [facility] or [facility] refill protocol or controlled substance

## 2022-01-15 NOTE — TELEPHONE ENCOUNTER
Pain provider has left practice here and turned prescribing back to me for the time being. Will refill and monitor monthly use.   Electronically signed by Greg Schoen, MD

## 2022-01-15 NOTE — ED PROVIDER NOTES
History     Chief Complaint   Patient presents with     Medication Refill     Restless. Pt feels as if he is withdrawing from oxycodone.,      HPI  Joselito Abarca is a 56 year old male who presents for evaluation of withdrawal symptoms.  States that he has been out of his oxycodone for the past 24 hours.  Starts to get shaky.  His pain is currently rated 8 on a scale of 10.  He is concerned that he will not be able to sleep tonight due to escalating pain levels.  He thought he had discussed getting refills with his PCP given that the pain clinic provider left the clinic a month ago.  He called the clinic and pharmacy multiple times today requesting refills, but has not heard anything back.  Therefore, he comes to the ED for evaluation.  He denies any new injury.  This is his chronic pain that is dull and achy in nature and occasionally sharp with any significant movements.  He states that his limbs get really shaky and he experiences diaphoresis with withdrawal symptoms.  He denies any chest pain or dyspnea.  No lower extremity edema.  Denies any fever or chills.  No URI symptoms.            Telephone encounter from today:    Conversation: Refill Request  (Newest Message First)    Schoen, Gregory G, MD     GS    1/14/22 8:04 PM  Note  Pain provider has left practice here and turned prescribing back to me for the time being. Will refill and monitor monthly use.   Electronically signed by Greg Schoen, MD                Excerpt from most recent primary care visit on 1/3/22:    Assessment & Plan         Moderate persistent asthma without complication  Patient is currently using Flonase nasal spray, duo nebs, albuterol inhaler and steroid inhaler.  He is still experiencing shortness of breath despite this current regimen.  We discussed the need for assessment with pulmonary function testing to identify if there are restrictive components in addition to his obstructive lung disease or if there is any issue with  diffusion capacity.  He is agreeable to that and referral has been made.  He will continue with his current medications at present time.  - General PFT Lab (Please always keep checked); Future     Pulmonary nodules  On 7/30/2021 patient had a CT scan of his cervical spine and there was an incidental finding of a 5 to 6 mm subpleural nodule partially visualized in the right upper lobe and a follow-up CT chest was recommended in 6 months if high risk, which the patient is related to his smoking history.  We will therefore put in a request for a CT chest as follow-up of this nodule.  - CT Chest w/o Contrast; Future     Intervertebral cervical disc disorder with myelopathy, cervical region  Discussed that I would connect with Dr. Driscoll to discuss what next steps might be in terms of procedural approach to pain in his cervical spine.  Would also follow-up in regard to prior discussion of getting information from his prior pain clinic in [place] to see what he had done that he found to be of benefit.  I will asked  To negative follow-up with the patient in regard to next steps.        Generalized anxiety disorder  Patient reports that his current therapist and prescriber at [facility] and [facility] is leaving that practice and has not refilled his Ambien and asked that I would consider refilling it.  He did put in for request for a new provider consult and has that arranged in February but will be without medications during the gap.  He said he put in a request with [facility] and they indicated he would not get a refill until he saw his new provider.  I will attempt to speak with staff there to see if a provider there is willing to refill this medication.  He has a very high risk medication regimen as he is on Klonopin for his anxiety as well as Ambien and 50 mg of oxycodone daily.  I therefore am not comfortable refilling his Ambien at this time personally.     Insomnia, unspecified type  See above discussion.     Return  in about 4 weeks (around 1/31/2022) for using a video visit.     Gregory G. Schoen, MD  [facility]          Last Pain Clinic Virtual Visit on 12/8/21:    Assessment:  Joselito Abarca is a 56 year old male who presents today for follow up regarding his:        1. Arthropathy of cervical facet joint  2. cervicalgia  3. Midline thoracic back pain  4. Myofascial pain  5. Chronic pain syndrome  6. Chronic use of opioids     He is scheduled for an injection with Dr. Driscoll tomorrow for his neck pain. He had previously had an injection in his low back in [place] He would liek to repeat this. Discussed that he should have his records sent so we know what injection was performed. He is also going to discuss it with Dr. Driscoll.     With regard to his medications, he has been stable. Will continue current regimen.         Plan:     Diagnosis reviewed, treatment option addressed, and risk/benifits discussed.  Self-care instructions given.  I am recommending a multidisciplinary treatment plan to help this patient better manage pain.       1. Physical Therapy:  not at this time  2. Clinical Health Psychologist:  NO  3. Diagnostic Studies: none at this time  4. Medication Management:    ? Continue Cymbalta 60mg daily  ? Continue Oxycodone 10mg 1 tab every 4-6 hours as needed. Max of 5/day.   ? Continue Topamax 75mg twice daily  5. Further procedures recommended: keep injection with Dr. Driscoll as scheduled. I would recommend   6. Recommendations to PCP. See above        Follow up with this provider: PRN. As I am leaving the pain clinic in 3 weeks, will discharge the patient back to primary care given his pain stability.           record from most recent prescriptions:    Prescriptions  Total Prescriptions: 34    Total Private Pay: 0    Fill Date ID   Written Drug Qty Days Prescriber Rx # Pharmacy Refill   Daily Dose* Pymt Type      01/10/2022  1   01/10/2022  Clonazepam 0.5 MG Tablet    90.00  30 Gr  [facility]   [id number]   Paul ([id number])   0/0  3.00 LME  [place]   12/12/2021  1   12/11/2021  Clonazepam 0.5 MG Tablet    90.00  30 Gr [facility]   [id number]   Paul ([id number])   0/0  3.00 [place]   12/12/2021  1   12/08/2021  Oxycodone Hcl (Ir) 10 MG Tab    150.00  30 Me [facility]   [id number]   Paul ([id number])   0/0  75.00 MME  [place]   11/12/2021  1   11/12/2021  Oxycodone Hcl (Ir) 10 MG Tab    150.00  30 Me [facility]   [id number]   Paul ([id number])   0/0  75.00 MME                 Allergies:  Allergies   Allergen Reactions     Vicodin [Hydrocodone-Acetaminophen] Nausea     Other reaction(s): Diaphoresis, Vomiting  HEADACHE       Problem List:    Patient Active Problem List    Diagnosis Date Noted     Back pain, chronic 02/21/2019     Priority: Medium     S/P laparoscopic fundoplication 02/21/2019     Priority: Medium     Long-segment Olson's esophagus 02/21/2019     Priority: Medium     Gastroesophageal reflux disease, esophagitis presence not specified 09/21/2018     Priority: Medium     IMO Regulatory Load OCT 2020       Chronic seasonal allergic rhinitis due to fungal spores 06/07/2018     Priority: Medium     Status post cervical spinal arthrodesis 11/29/2017     Priority: Medium     Chronic, continuous use of opioids 12/13/2016     Priority: Medium     Patient is followed by Gregory G. Schoen, MD for ongoing prescription of pain medication.  All refills should be approved by this provider, or covering partner.    Medication(s): Oxycodone 5 mg IR: Take 2 capsules (10 mg) by mouth every 4 hours as needed 2 caps q 4 hour prn pain up to 12 per day .   Methadone 10 mg three times daily, 90 per month  Clonazepam 0.5 mg tid, 90 per month   Clinic visit frequency required: Q 3 months     Controlled substance agreement:  Encounter-Level CSA - 2/27/15:               Controlled Substance Agreement - Scan on 3/14/2015  8:47 AM : Controlled Medication Agreement-02/27/15 (below)            Pain Clinic evaluation in the past:  Yes    DIRE Total Score(s):  No flowsheet data found.    Last [facility] website verification:  10/30/2016     [email or web address]         Moderate persistent asthma without complication 11/02/2016     Priority: Medium     Nausea with vomiting 04/27/2016     Priority: Medium     Tobacco dependence syndrome 02/17/2016     Priority: Medium     Leukocytosis 02/16/2016     Priority: Medium     Disease of bronchial airway (HCC) 02/12/2016     Priority: Medium     Bronchiolectasis (H) 02/12/2016     Priority: Medium     Degeneration of cervical intervertebral disc 01/26/2015     Priority: Medium     Health Care Home 09/30/2013     Priority: Medium     Status:  Accepted  Care Coordinator:    Melissa Behl BSN, RN, PHN  [facility] Care Coordinator  [phone]     See Letters for [facility] Care Plan  Date:  April 26, 2016            Persons encountering health services in other specified circumstances 09/30/2013     Priority: Medium     Overview:   Overview:   Status:  Accepted  Care Coordinator:    Melissa Behl BSN, RN, N  [facility] Care Coordinator  [phone]     See Letters for [facility] Care Plan  Date:  April 26, 2016       Arthrodesis status 06/15/2011     Priority: Medium     Neck pain 06/15/2011     Priority: Medium     History of arthrodesis 06/15/2011     Priority: Medium     CARDIOVASCULAR SCREENING; LDL GOAL LESS THAN 160 10/31/2010     Priority: Medium     Encounter for screening for cardiovascular disorders 10/31/2010     Priority: Medium     Intervertebral cervical disc disorder with myelopathy, cervical region 11/12/2009     Priority: Medium     Patient is followed by TIARA JIMENEZ for ongoing prescription of narcotic pain medicine.  Med: methadone 10 mg tid. Taking oxycodone up to 8 per day, 120 per month  Maximum use per month: 90  Expected duration: ongoing  Narcotic agreement on file: YES  Clinic visit recommended: Q 3 months         Intervertebral disc disorder of cervical region with myelopathy  11/12/2009     Priority: Medium     Overview:   Overview:   Patient is followed by TIARA JIMENEZ for ongoing prescription of narcotic pain medicine.  Med: methadone 10 mg tid. Taking oxycodone up to 8 per day, 120 per month  Maximum use per month: 90  Expected duration: ongoing  Narcotic agreement on file: YES  Clinic visit recommended: Q 3 months       Constipation 03/19/2008     Priority: Medium     Problem list name updated by automated process. Provider to review       Thoracic or lumbosacral neuritis or radiculitis, unspecified 03/19/2008     Priority: Medium     Esophageal reflux 07/09/2003     Priority: Medium     Generalized anxiety disorder 07/08/2003     Priority: Medium     Alcohol abuse, in remission 07/08/2003     Priority: Medium     Nondependent alcohol abuse, in remission 07/08/2003     Priority: Medium        Past Medical History:    Past Medical History:   Diagnosis Date     Allergic rhinitis      Anxiety      Depressive disorder      GERD (gastroesophageal reflux disease)      Neck pain 6/15/2011     Pneumonia      Uncomplicated asthma        Past Surgical History:    Past Surgical History:   Procedure Laterality Date     BRONCHOSCOPY (RIGID OR FLEXIBLE), DIAGNOSTIC N/A 8/7/2018    Procedure: COMBINED BRONCHOSCOPY (RIGID OR FLEXIBLE), LAVAGE;  Bronchoscopy with Lavage and Transbronchial Biopsy;  Surgeon: Yung Miranda MD;  Location: [facility]     COLONOSCOPY WITH CO2 INSUFFLATION N/A 9/24/2018    Procedure: COLONOSCOPY WITH CO2 INSUFFLATION;  Colon and upper endoscopy ;  Surgeon: Devin Pelayo MD;  Location:  OR     COMBINED ESOPHAGOSCOPY, GASTROSCOPY, DUODENOSCOPY (EGD) WITH CO2 INSUFFLATION N/A 9/24/2018    Procedure: COMBINED ESOPHAGOSCOPY, GASTROSCOPY, DUODENOSCOPY (EGD) WITH CO2 INSUFFLATION;  Colon and upper endoscopy ;  Surgeon: Devin Pelayo MD;  Location:  OR     DISCECTOMY LUMBAR POSTERIOR MICROSCOPIC ONE LEVEL  2/21/2012    Procedure:DISCECTOMY  LUMBAR POSTERIOR MICROSCOPIC ONE LEVEL; LEFT T1-T2 THORASIC HEMILAMINECTOMY MICRODISCECTOMY WITH MEXTRIX II ; Surgeon:SHARON PURI; Location:[facility]     DISCECTOMY, FUSION CERVICAL ANTERIOR ONE LEVEL, COMBINED N/A 11/29/2017    Procedure: COMBINED DISCECTOMY, FUSION CERVICAL ANTERIOR ONE LEVEL;  Anterior Cervical Discectomy and Fusion Cervical Six - Cervical Seven, Exploration and Revision Cervical Four - Cervical Six with Hardware Removal;  Surgeon: Nikolas Vasques MD;  Location: [facility]     ESOPHAGEAL IMPEDENCE FUNCTION TEST WITH 24 HOUR PH GREATER THAN 1 HOUR N/A 12/12/2018    Procedure: ESOPHAGEAL IMPEDENCE FUNCTION TEST WITH 24 HOUR PH GREATER THAN 1 HOUR;  Surgeon: Atif Oconnor MD;  Location: [facility]     ESOPHAGOSCOPY, GASTROSCOPY, DUODENOSCOPY (EGD), COMBINED N/A 9/24/2018    Procedure: COMBINED ESOPHAGOSCOPY, GASTROSCOPY, DUODENOSCOPY (EGD), BIOPSY SINGLE OR MULTIPLE;;  Surgeon: Devin Pelayo MD;  Location: [facility]     EXPLORE SPINE, REMOVE HARDWARE, COMBINED N/A 11/29/2017    Procedure: COMBINED EXPLORE SPINE, REMOVE HARDWARE;;  Surgeon: Nikolas Vasques MD;  Location: [facility]     FUSION CERVICAL ANTERIOR TWO LEVELS  1/29/2010     HC DRAIN/INJ MAJOR JOINT/BURSA W/O US  5/5/2008    Left sacroiliac joint injection.     HC INJ EPIDURAL LUMBAR/SACRAL W/WO CONTRAST  2009     HEAD & NECK SURGERY      2013     HERNIA REPAIR       INJECT BLOCK MEDIAL BRANCH CERVICAL/THORACIC/LUMBAR Bilateral 8/26/2015    Procedure: INJECT BLOCK MEDIAL BRANCH CERVICAL / THORACIC / LUMBAR;  Surgeon: Ronald Driscoll MD;  [facility]     INJECT BLOCK MEDIAL BRANCH CERVICAL/THORACIC/LUMBAR N/A 8/8/2019    Procedure: BLOCK, NERVE, FACET JOINT, MEDIAL BRANCH, DIAGNOSTIC Bilateral Cervical 2,3,4;  Surgeon: Ronald Driscoll MD;  [facility]     INJECT BLOCK MEDIAL BRANCH CERVICAL/THORACIC/LUMBAR N/A 9/13/2019    Procedure: Medial Branch Block Bilateral Cervical 2,3, and 4;  Surgeon: Ronald Driscoll MD;   [facility]     INJECT BLOCK MEDIAL BRANCH CERVICAL/THORACIC/LUMBAR Bilateral 7/3/2020    Procedure: Third occipital and Cervical 3-4 Medial Branch Blocks bilateral;  Surgeon: Ronald Driscoll MD;  [facility]     INJECT BLOCK MEDIAL BRANCH CERVICAL/THORACIC/LUMBAR N/A 7/29/2020    Procedure: medial branch blocks cervical 3-4 and bilateral third occiptal nerves;  Surgeon: Ronald Driscoll MD;  [facility]     INJECT BLOCK MEDIAL BRANCH CERVICAL/THORACIC/LUMBAR Bilateral 11/6/2020    Procedure: Cervical 1-2 Medial Branch Block Bilateral;  Surgeon: Ronald Driscoll MD;  [facility]     INJECT EPIDURAL LUMBAR N/A 2/13/2020    Procedure: Lumber 4-5 bilateral Epidural Injection;  Surgeon: Ronald Driscoll MD;  [facility]     INJECT FACET JOINT Bilateral 5/27/2015    Procedure: INJECT FACET JOINT;  Surgeon: Ronald Driscoll MD;  [facility]     NERVE BLOCK OCCIPITAL Bilateral 7/12/2018    Procedure: NERVE BLOCK OCCIPITAL;  bilateral occipital nerve blocks;  Surgeon: Ronald Driscoll MD;  [facility]     NERVE BLOCK OCCIPITAL Bilateral 12/9/2021    Procedure: BILATERAL OCCIPITAL NERVE BLOCK;  Surgeon: Ronald Driscoll MD;  [facility]: [facility]     PROCEDURE PLACEHOLDER GENERAL N/A 02/21/2019    [place] [facility] [place]     RADIO FREQUENCY ABLATION PULSED CERVICAL Right 10/18/2019    Procedure: CERVICAL RADIOFREQUENCY ABLATION  Cervical 2,3,4;  Surgeon: Ronald Driscoll MD;  [facility]     RADIO FREQUENCY ABLATION PULSED CERVICAL Left 11/14/2019    Procedure: Left Cervical 2, 3, 4 Radiofreqency Ablation;  Surgeon: Ronald Driscoll MD;  [facility]     RADIO FREQUENCY ABLATION PULSED CERVICAL Left 3/11/2021    Procedure: Bilateral Cervical 1 and 2 radiofrequency ablation, aborted;  Surgeon: Ronald Driscoll MD;  Location: [facility]       Family History:    Family History   Problem Relation Age of Onset     Family History Negative No family hx of      C.A.D. No family hx of        Social History:  Marital Status:   Single [1]  Social History     Tobacco Use     Smoking status: Current Every Day Smoker     Packs/day: 0.50     Years: 30.00     Pack years: 15.00     Types: Cigars, Cigarettes     Smokeless tobacco: Former User     Quit date: 2012   Substance Use Topics     Alcohol use: No     Alcohol/week: 0.0 standard drinks     Comment: HX OF ABUSE-IN REMISSION     Drug use: No        Medications:    oxyCODONE (ROXICODONE) 5 MG tablet  albuterol (PROAIR HFA/PROVENTIL HFA/VENTOLIN HFA) 108 (90 Base) MCG/ACT inhaler  clonazePAM (KLONOPIN) 0.5 MG tablet  DULoxetine (CYMBALTA) 60 MG capsule  fluticasone (FLONASE) 50 MCG/ACT nasal spray  fluticasone (FLOVENT HFA) 220 MCG/ACT inhaler  ipratropium - albuterol 0.5 mg/2.5 mg/3 mL (DUONEB) 0.5-2.5 (3) MG/3ML neb solution  naloxone (NARCAN) 4 MG/0.1ML nasal spray  nicotine (NICODERM CQ) 14 MG/24HR 24 hr patch  nystatin (MYCOSTATIN) 797403 UNIT/ML suspension  order for DME  oxyCODONE IR (ROXICODONE) 10 MG tablet  QUEtiapine (SEROQUEL) 50 MG tablet  sildenafil (VIAGRA) 100 MG tablet  topiramate (TOPAMAX) 25 MG tablet  traZODone (DESYREL) 100 MG tablet  VITAMIN D3 50 MCG (2000 UT) tablet  zolpidem (AMBIEN) 10 MG tablet          Review of Systems   All other systems reviewed and are negative.      Physical Exam   BP: 128/88  Pulse: 79  Temp: 97.7  F (36.5  C)  Resp: 16  Weight: 71.7 kg (158 lb)  SpO2: 99 %      Physical Exam  Vitals and nursing note reviewed.   Constitutional:       General: He is not in acute distress.     Appearance: He is not diaphoretic.   HENT:      Head: Normocephalic and atraumatic.      Right Ear: External ear normal.      Left Ear: External ear normal.      Nose: Nose normal.      Mouth/Throat:      Pharynx: No oropharyngeal exudate.   Eyes:      General: No scleral icterus.        Right eye: No discharge.         Left eye: No discharge.      Conjunctiva/sclera: Conjunctivae normal.      Pupils: Pupils are equal, round, and reactive to light.   Neck:      Thyroid: No  thyromegaly.      Comments: Biceps, triceps, and brachioradialis DTRs 2-4 through clonus  Cardiovascular:      Rate and Rhythm: Normal rate and regular rhythm.      Heart sounds: Normal heart sounds. No murmur heard.      Pulmonary:      Effort: Pulmonary effort is normal. No respiratory distress.      Breath sounds: Normal breath sounds. No wheezing or rales.   Chest:      Chest wall: No tenderness.   Abdominal:      General: Bowel sounds are normal. There is no distension.      Palpations: Abdomen is soft. There is no mass.      Tenderness: There is no abdominal tenderness. There is no guarding or rebound.   Musculoskeletal:         General: No deformity. Normal range of motion.      Cervical back: Normal range of motion and neck supple. Tenderness (Patient has paravertebral muscular tenderness of the cervical spine.  No midline tenderness.  There is increased pain with range of motion.  Upper extremities with equal and appropriate range of motion and strength.  Sensation intact throughout.  ) present. No rigidity.   Lymphadenopathy:      Cervical: No cervical adenopathy.   Skin:     General: Skin is warm and dry.      Capillary Refill: Capillary refill takes less than 2 seconds.      Findings: No erythema or rash.   Neurological:      Mental Status: He is alert and oriented to person, place, and time.      Cranial Nerves: No cranial nerve deficit.   Psychiatric:         Behavior: Behavior normal.         Thought Content: Thought content normal.         ED Course                 Procedures              Critical Care time:  none               No results found. However, due to the size of the patient record, not all encounters were searched. Please check Results Review for a complete set of results.    Medications - No data to display    Assessments & Plan (with Medical Decision Making)  Chronic neck pain     56 year old male with a history of chronic neck pain who presents to the ED for evaluation of medication  refill.  He was due for his refill 2-3 days ago.  States that he spaced out his medication as he was concerned that he was not going to get his refill.  Called his PCP office multiple times today.  His pain clinic provider left the clinic a month ago.  See HPI for details.  On exam blood pressure 128/88, temperature 97.7, pulse 79, respirations 16, oxygen saturation 99% on room air.  Patient appears in no acute distress.  He does have paravertebral muscular tenderness of the cervical spine.  No abnormal neurological findings.  Strength is equal and appropriate.  Chart was reviewed in detail.  See HPI with those details.  Ultimately, I did see a telephone encounter from his PCP with a time of 1.5 hours prior to this visit stating that a refill was sent to his pharmacy of 150 pills of his oxycodone Rx.  Patient requested something for tonight as he typically does not go to bed until 3 AM and is concerned about his pain levels.  He had to drive home, therefore we did not give him any pain medication here in the ED, but I did give him #6 tablets of oxycodone from the [facility] system.  He will follow-up with the pharmacy tomorrow morning for his refill that was already provided by his PCP this evening.  Patient was in agreement and was suitable for discharge.     I have reviewed the nursing notes.    I have reviewed the findings, diagnosis, plan and need for follow up with the patient.       Discharge Medication List as of 1/14/2022  9:40 PM      START taking these medications    Details   !! oxyCODONE (ROXICODONE) 5 MG tablet Take 1 tablet (5 mg) by mouth every 6 hours as needed for severe pain, Disp-6 tablet, R-0, [facility]       !! - Potential duplicate medications found. Please discuss with provider.          Final diagnoses:   Chronic neck pain     Disclaimer: This note consists of symbols derived from keyboarding, dictation and/or voice recognition software. As a result, there may be errors in the script that  have gone undetected. Please consider this when interpreting information found in this chart.      1/14/2022   [facility] EMERGENCY DEPT     Rajendra Ojeda PA-C  01/14/22 214

## 2022-01-15 NOTE — ED TRIAGE NOTES
Pt presents with withdrawal symptoms . Pt states has not had oxycodone for 24 hours. Pt shaking and restless.  Pt sent refill request in for today. Pt had recent change of provider.

## 2022-01-15 NOTE — DISCHARGE INSTRUCTIONS
Thankfully, Dr. Schoen kindly agreed to approve your refill and it should be available at your pharmacy.  You can use the meds from today in the interim until tomorrow morning.

## 2022-01-21 ENCOUNTER — TELEPHONE (OUTPATIENT)
Dept: FAMILY MEDICINE | Facility: CLINIC | Age: 57
End: 2022-01-21
Payer: COMMERCIAL

## 2022-01-24 NOTE — TELEPHONE ENCOUNTER
Dr. Driscoll had connected with patient and on January 3,2022 placed orders for Cervical Thoracic Facet injection bilateral.  He has been seeing Dr. Driscoll and placement of this order should be adequate to proceed with scheduling the procedure with Dr. Driscoll without another referral. Please assist in scheduling this.   Electronically signed by Greg Schoen, MD

## 2022-01-26 ENCOUNTER — TELEPHONE (OUTPATIENT)
Dept: NEUROSURGERY | Facility: CLINIC | Age: 57
End: 2022-01-26
Payer: COMMERCIAL

## 2022-01-26 ENCOUNTER — MYC MEDICAL ADVICE (OUTPATIENT)
Dept: NEUROSURGERY | Facility: CLINIC | Age: 57
End: 2022-01-26
Payer: COMMERCIAL

## 2022-01-26 DIAGNOSIS — M54.12 CERVICAL RADICULOPATHY: Primary | ICD-10-CM

## 2022-01-26 NOTE — TELEPHONE ENCOUNTER
No answer. Unable to LM. Sent [employer] requesting patient call back.    Laurita Bhandari RN on 1/26/2022 at 1:57 PM

## 2022-01-27 NOTE — TELEPHONE ENCOUNTER
Patient called clinic requesting another bilateral occipital nerve block    Patient says he's had 2 RFA's in the past.    10/18/19 right C2,3,4 RFA    11/14/19 LEFT C2,3,4 RFA    3/11/21 bilateral C1, 2 RFA -PROCEDURE ABORTED. Around that time, Yamila gustafson at the [facility] (per patient).    12/9/21 patient had bilateral occipital nerve block and didn't help much. Asking for a repeat.    Current symptoms: unchanged since last seen. Pain in neck to skull. Radiates into eye. Constant headache. Bilateral arms n/t.  Occipital nerve block injection only helped for a few hours.    Above details my review of recent patient injections. Conversation with patient was challenging as patient wasn't clear on what he was asking. By the end, I believe he was looking for another occipital nerve block ask he kept asking for another of what he had in December 2021.    Will route to provider for review/advise.    Laurita Bhandari RN on 1/27/2022 at 6:00 PM

## 2022-01-31 NOTE — TELEPHONE ENCOUNTER
Dr. Vasques approved Bilateral Occipital Nerve Block. Order placed. Patient updated via [employer].    Laurita Bhandari RN on 1/31/2022 at 3:27 PM

## 2022-02-14 DIAGNOSIS — F41.1 GENERALIZED ANXIETY DISORDER: ICD-10-CM

## 2022-02-15 RX ORDER — CLONAZEPAM 0.5 MG/1
TABLET ORAL
Qty: 90 TABLET | Refills: 0 | Status: SHIPPED | OUTPATIENT
Start: 2022-02-15 | End: 2022-03-18

## 2022-02-15 NOTE — TELEPHONE ENCOUNTER
Pending Prescriptions:                       Disp   Refills    clonazePAM (KLONOPIN) 0.5 MG tablet [Pharm*90 tab*0        Sig: TAKE ONE-HALF TO ONE TABLET BY MOUTH THREE TIMES A           DAY AS NEEDED FOR ANXIETY    Routing refill request to provider for review/approval because:  Drug not on the [facility] refill protocol     Per med list last refilled 1/10/22 x30 days.  Mica Koch RN

## 2022-02-17 PROBLEM — K21.9 GASTROESOPHAGEAL REFLUX DISEASE: Status: ACTIVE | Noted: 2018-09-21

## 2022-02-18 ENCOUNTER — HOSPITAL ENCOUNTER (OUTPATIENT)
Dept: RESPIRATORY THERAPY | Facility: CLINIC | Age: 57
Discharge: HOME | End: 2022-02-18
Attending: FAMILY MEDICINE | Admitting: FAMILY MEDICINE
Payer: COMMERCIAL

## 2022-02-18 DIAGNOSIS — J45.21 MILD INTERMITTENT ASTHMA WITH ACUTE EXACERBATION: ICD-10-CM

## 2022-02-18 PROCEDURE — 94726 PLETHYSMOGRAPHY LUNG VOLUMES: CPT

## 2022-02-18 PROCEDURE — 250N000009 HC RX 250: Performed by: FAMILY MEDICINE

## 2022-02-18 PROCEDURE — 94729 DIFFUSING CAPACITY: CPT

## 2022-02-18 PROCEDURE — 94060 EVALUATION OF WHEEZING: CPT

## 2022-02-18 PROCEDURE — 999N000156 HC STATISTIC RCP CONSULT EA 30 MIN

## 2022-02-18 PROCEDURE — 999N000157 HC STATISTIC RCP TIME EA 10 MIN

## 2022-02-18 RX ORDER — ALBUTEROL SULFATE 0.83 MG/ML
2.5 SOLUTION RESPIRATORY (INHALATION)
Status: COMPLETED | OUTPATIENT
Start: 2022-02-18 | End: 2022-02-18

## 2022-02-18 RX ADMIN — ALBUTEROL SULFATE 2.5 MG: 2.5 SOLUTION RESPIRATORY (INHALATION) at 14:51

## 2022-02-18 NOTE — PROGRESS NOTES
Pre-Bronch                                Post Bronch        Actual Pred LLN %Pred   Actual %Pred %Chng     ---- SPIROMETRY ----                          FVC (L) 4.05 4.69 3.63 86     4.20 89 +3        FEV1 (L) 2.97 3.68 2.84 80     3.00 81 +1        FEV1/FVC (%) 73 79 67       71   -2        FEV1/SVC (%) 75 74                      FEF 25-75% (L/sec) 2.27 3.27 1.69 69     2.37 72 +4        FEF Max (L/sec) 6.12 9.39 7.14 65     6.38 67 +4        FIF Max (L/sec) 4.33 7.61 4.94 56     4.41 58 +2        MVV (L/min)   144 97                    MIP (cmH2O)                          MEP (cmH2O)                          ---- LUNG VOLUMES ----                          SVC (L) 3.96 4.95 3.97 80                  IC (L) 2.92 3.48   83                  ERV (L) 1.05 1.48   70                  FRC (N2) (L)   3.50 2.30                    RV (N2) (L)   2.25 1.43                    TLC (N2) (L)   6.92 5.52                    Clearance Index (L)                          FRC(Pleth) (L) 4.42 3.50 2.30 126                  RV (Pleth) (L) 3.37 2.25 1.43 149                  TLC (Pleth) (L) 7.33 6.92 5.52 105                  RV/TLC (Pleth) (%) 46 35 24                    ---- DIFFUSION ----                          DLCOunc (ml/min/mmHg) 20.91 27.80 19.79 75                  DLCOcor (ml/min/mmHg)   27.80 19.79                    DL/VA (ml/min/mmHg/L) 3.52 4.37                      VA (L) 5.95 6.36 5.14 93                  ---- BLOOD GASES ----                          FIO2 (%)                          pH   7.40                      PaCO2 (mmHg)   38-42                      PaO2 (mmHg)   85.8                      P(a-v)O2 (mmHg)                          Hgb (gm/dL)   12-18                      SaO2 (%)

## 2022-02-18 NOTE — DISCHARGE INSTRUCTIONS
Thank you for completing pulmonary function testing today.  All results will be scanned into your epic results for your doctor to review.  Please resume taking all your current prescribed medications and diet as directed by your provider.   If you have not heard from your provider about your testing within two weeks and do not have a follow-up appointment scheduled with them please contact your provider about any questions you have concerning your testing.   Thank you  [facility]

## 2022-02-22 LAB
DLCOUNC-%PRED-PRE: 75 %
DLCOUNC-PRE: 20.91 ML/MIN/MMHG
DLCOUNC-PRED: 27.8 ML/MIN/MMHG
ERV-%PRED-PRE: 70 %
ERV-PRE: 1.05 L
ERV-PRED: 1.48 L
EXPTIME-PRE: 9.04 SEC
FEF2575-%PRED-POST: 72 %
FEF2575-%PRED-PRE: 69 %
FEF2575-POST: 2.37 L/SEC
FEF2575-PRE: 2.27 L/SEC
FEF2575-PRED: 3.27 L/SEC
FEFMAX-%PRED-PRE: 65 %
FEFMAX-PRE: 6.12 L/SEC
FEFMAX-PRED: 9.39 L/SEC
FEV1-%PRED-PRE: 80 %
FEV1-PRE: 2.97 L
FEV1FEV6-PRE: 74 %
FEV1FEV6-PRED: 80 %
FEV1FVC-PRE: 73 %
FEV1FVC-PRED: 79 %
FEV1SVC-PRE: 75 %
FEV1SVC-PRED: 74 %
FIFMAX-PRE: 4.33 L/SEC
FRCPLETH-%PRED-PRE: 126 %
FRCPLETH-PRE: 4.42 L
FRCPLETH-PRED: 3.5 L
FVC-%PRED-PRE: 86 %
FVC-PRE: 4.05 L
FVC-PRED: 4.69 L
GAW-%PRED-PRE: 103 %
GAW-PRE: 1.07 L/S/CMH2O
GAW-PRED: 1.03 L/S/CMH2O
IC-%PRED-PRE: 83 %
IC-PRE: 2.92 L
IC-PRED: 3.48 L
RVPLETH-%PRED-PRE: 149 %
RVPLETH-PRE: 3.37 L
RVPLETH-PRED: 2.25 L
SGAW-%PRED-PRE: 289 %
SGAW-PRE: 0.24 1/CMH2O*S
SGAW-PRED: 0.08 1/CMH2O*S
SRAW-%PRED-PRE: 90 %
SRAW-PRE: 4.33 CMH2O*S
SRAW-PRED: 4.76 CMH2O*S
TLCPLETH-%PRED-PRE: 105 %
TLCPLETH-PRE: 7.33 L
TLCPLETH-PRED: 6.92 L
VA-%PRED-PRE: 93 %
VA-PRE: 5.95 L
VC-%PRED-PRE: 80 %
VC-PRE: 3.96 L
VC-PRED: 4.95 L

## 2022-03-08 DIAGNOSIS — M79.18 MYOFASCIAL PAIN: ICD-10-CM

## 2022-03-08 DIAGNOSIS — M54.2 CERVICALGIA: ICD-10-CM

## 2022-03-08 DIAGNOSIS — M47.812 ARTHROPATHY OF CERVICAL FACET JOINT: ICD-10-CM

## 2022-03-08 DIAGNOSIS — M54.6 CHRONIC MIDLINE THORACIC BACK PAIN: ICD-10-CM

## 2022-03-08 DIAGNOSIS — G89.4 CHRONIC PAIN SYNDROME: ICD-10-CM

## 2022-03-08 DIAGNOSIS — G89.29 CHRONIC MIDLINE THORACIC BACK PAIN: ICD-10-CM

## 2022-03-08 DIAGNOSIS — F11.90 CHRONIC, CONTINUOUS USE OF OPIOIDS: ICD-10-CM

## 2022-03-09 RX ORDER — OXYCODONE HYDROCHLORIDE 10 MG/1
TABLET ORAL
Qty: 150 TABLET | Refills: 0 | Status: SHIPPED | OUTPATIENT
Start: 2022-03-09 | End: 2022-04-13

## 2022-03-09 NOTE — TELEPHONE ENCOUNTER
Pending Prescriptions:                       Disp   Refills    oxyCODONE IR (ROXICODONE) 10 MG tablet [Ph*150 ta*0        Sig: TAKE ONE TABLET BY MOUTH EVERY 4 HOURS AS NEEDED FOR           SEVERE PAIN MAX OF 5 TABLETS A DAY    Routing refill request to provider for review/approval because:  Drug not on the [facility] refill protocol, Schoen out of clinic so routing to covering provider    oxyCODONE IR (ROXICODONE) 10 MG tablet 150 tablet 0 2/10/2022  No   Sig: TAKE ONE TABLET BY MOUTH EVERY 4 HOURS AS NEEDED FOR SEVERE PAIN MAX OF 5 TABLETS A DAY   Sent to pharmacy as: oxyCODONE HCl 10 MG Oral Tablet (ROXICODONE)   Class: E-Prescribe   Earliest Fill Date: 2/10/2022   Order: [id number]   E-Prescribing Status: Receipt confirmed by pharmacy (2/10/2022  5:24 PM CST)   Prior authorization: Closed - Prior Authorization not required for patient/medication       Requested Prescriptions   Pending Prescriptions Disp Refills    oxyCODONE IR (ROXICODONE) 10 MG tablet [Pharmacy Med Name: OXYCODONE HCL 10MG TABS] 150 tablet 0     Sig: TAKE ONE TABLET BY MOUTH EVERY 4 HOURS AS NEEDED FOR SEVERE PAIN MAX OF 5 TABLETS A DAY        There is no refill protocol information for this order

## 2022-03-09 NOTE — TELEPHONE ENCOUNTER
Medication refilled on behalf of Dr. Schoen at he is out of office today.  Reviewed the medical record, he has been on this medication for a while.  He also on Klonopin.  He has Narcan at home to use as needed.  His last appointment with Dr. Schoen for this was in 1/2022 - seems that he was being referred to pain specialist in [place].

## 2022-03-17 DIAGNOSIS — F41.1 GENERALIZED ANXIETY DISORDER: ICD-10-CM

## 2022-03-18 RX ORDER — CLONAZEPAM 0.5 MG/1
TABLET ORAL
Qty: 90 TABLET | Refills: 0 | Status: SHIPPED | OUTPATIENT
Start: 2022-03-18 | End: 2022-04-13

## 2022-03-18 NOTE — TELEPHONE ENCOUNTER
Klonopin      Last Written Prescription Date:  02/15/2022  Last Fill Quantity: 90,   # refills: 0  Last Office Visit: 02/15/2022  Future Office visit:   None  Routing refill request to provider for review/approval because:  Drug not on the [facility], [facility] or  Health refill protocol or controlled substance    Ailyn Tobias RN   
none

## 2022-04-05 DIAGNOSIS — F41.1 GENERALIZED ANXIETY DISORDER: ICD-10-CM

## 2022-04-06 NOTE — TELEPHONE ENCOUNTER
Pending Prescriptions:                       Disp   Refills    clonazePAM (KLONOPIN) 0.5 MG tablet [Pharm*90 tab*0        Sig: TAKE 1/2-1 TABLET BY MOUTH THREE TIMES A DAY AS           NEEDED FOR ANXIETY        Routing refill request to provider for review/approval because:  Drug not on the [facility] refill protocol   Carlos Calvin, MAXIMUSN, RN, PHN  [facility] RN for [place] [facility]  April 6, 2022

## 2022-04-12 DIAGNOSIS — M54.6 CHRONIC MIDLINE THORACIC BACK PAIN: ICD-10-CM

## 2022-04-12 DIAGNOSIS — F11.90 CHRONIC, CONTINUOUS USE OF OPIOIDS: ICD-10-CM

## 2022-04-12 DIAGNOSIS — F41.1 GENERALIZED ANXIETY DISORDER: ICD-10-CM

## 2022-04-12 DIAGNOSIS — M47.812 ARTHROPATHY OF CERVICAL FACET JOINT: ICD-10-CM

## 2022-04-12 DIAGNOSIS — M79.18 MYOFASCIAL PAIN: ICD-10-CM

## 2022-04-12 DIAGNOSIS — G89.4 CHRONIC PAIN SYNDROME: ICD-10-CM

## 2022-04-12 DIAGNOSIS — M54.2 CERVICALGIA: ICD-10-CM

## 2022-04-12 DIAGNOSIS — G89.29 CHRONIC MIDLINE THORACIC BACK PAIN: ICD-10-CM

## 2022-04-12 RX ORDER — CLONAZEPAM 0.5 MG/1
TABLET ORAL
Qty: 90 TABLET | Refills: 0 | Status: CANCELLED
Start: 2022-04-12

## 2022-04-12 RX ORDER — OXYCODONE HYDROCHLORIDE 10 MG/1
TABLET ORAL
Qty: 150 TABLET | Refills: 0 | Status: CANCELLED | OUTPATIENT
Start: 2022-04-12

## 2022-04-13 DIAGNOSIS — M79.18 MYOFASCIAL PAIN: ICD-10-CM

## 2022-04-13 DIAGNOSIS — M47.812 ARTHROPATHY OF CERVICAL FACET JOINT: ICD-10-CM

## 2022-04-13 DIAGNOSIS — G89.4 CHRONIC PAIN SYNDROME: ICD-10-CM

## 2022-04-13 DIAGNOSIS — G89.29 CHRONIC MIDLINE THORACIC BACK PAIN: ICD-10-CM

## 2022-04-13 DIAGNOSIS — F11.90 CHRONIC, CONTINUOUS USE OF OPIOIDS: ICD-10-CM

## 2022-04-13 DIAGNOSIS — M54.6 CHRONIC MIDLINE THORACIC BACK PAIN: ICD-10-CM

## 2022-04-13 DIAGNOSIS — M54.2 CERVICALGIA: ICD-10-CM

## 2022-04-13 RX ORDER — CLONAZEPAM 0.5 MG/1
TABLET ORAL
Qty: 90 TABLET | Refills: 0 | Status: SHIPPED | OUTPATIENT
Start: 2022-04-13 | End: 2022-05-17

## 2022-04-14 RX ORDER — OXYCODONE HYDROCHLORIDE 10 MG/1
10 TABLET ORAL EVERY 4 HOURS PRN
Qty: 150 TABLET | Refills: 0 | Status: SHIPPED | OUTPATIENT
Start: 2022-04-14 | End: 2022-05-13

## 2022-04-14 NOTE — TELEPHONE ENCOUNTER
Prescription comes up as needing a prior auth. Pt does not need prior auth for the oxycodone. I think a new prescription needs to be written and sent to [facility] [place].

## 2022-04-14 NOTE — TELEPHONE ENCOUNTER
Patient is completely out of oxycodone 10mg tablets. Requested to be sent high priority.    Thank you,  January Clement, [facility] [place]

## 2022-04-19 ENCOUNTER — VIRTUAL VISIT (OUTPATIENT)
Dept: FAMILY MEDICINE | Facility: CLINIC | Age: 57
End: 2022-04-19
Payer: COMMERCIAL

## 2022-04-19 DIAGNOSIS — G89.4 CHRONIC PAIN SYNDROME: ICD-10-CM

## 2022-04-19 DIAGNOSIS — G47.00 INSOMNIA, UNSPECIFIED TYPE: Primary | ICD-10-CM

## 2022-04-19 DIAGNOSIS — F41.1 GENERALIZED ANXIETY DISORDER: ICD-10-CM

## 2022-04-19 PROCEDURE — 99214 OFFICE O/P EST MOD 30 MIN: CPT | Mod: 95 | Performed by: FAMILY MEDICINE

## 2022-04-19 RX ORDER — TRAZODONE HYDROCHLORIDE 50 MG/1
50-100 TABLET, FILM COATED ORAL AT BEDTIME
Qty: 60 TABLET | Refills: 1 | Status: SHIPPED | OUTPATIENT
Start: 2022-04-19 | End: 2022-06-29

## 2022-04-19 NOTE — PROGRESS NOTES
Spenser is a 56 year old who is being evaluated via a billable telephone visit.      What phone number would you like to be contacted at? [phone]  How would you like to obtain your AVS? Magalie    Assessment & Plan     Insomnia, unspecified type  I recommend we make some drastic changes in his sleep meds and is taking so many that they are becoming stimulatory rather than sedating due to overdosing.  We agreed to drop down to 50 to 100 mg of trazodone at at bedtime with a little bit of food to protect the stomach as well as no more than 50 mg of diphenhydramine.  He will discontinue melatonin at this time.  I also recommended that we have a sleep consultation specifically around insomnia which might involve a sleep study.  He agreed to the plan and will make the adjustments in his medication as noted above.  - Adult Sleep Eval & Management  Referral; Future  - traZODone (DESYREL) 50 MG tablet; Take 1-2 tablets ( mg) by mouth At Bedtime    Chronic pain syndrome  Continues to have significant pain in his neck and expressed interest in going back to see the neurosurgery team as well as Dr. Driscoll for further injections.  Ultimately he decided he really wanted to see Dr. Driscoll stating he had trust in his recommendations.  In review of the most recent note there was significant discussion about multiple options for next consideration based on whether he did or did not have a good response to a bilateral occipital nerve injection.  I therefore have messaged Dr. Driscoll to review his records and in her contact patient for follow-up neck steps or advise me on what he would recommend and I will get back to the patient.    Generalized anxiety disorder  Patient continues to be on Klonopin but takes this mostly during the day and not for sleep.  His underlying anxiety and sure exacerbates his sleep issues.  I did not recommend a change or adjustment in this medication but of the sleep meds as noted above.  We  will continue to monitor this as well as his other medications and pain medicine carefully.  His overall polypharmacy will need to be addressed but we will have to move forward on that carefully.  Patient had in the past been seeing psychiatry but felt the only thing he was actually being treated for was insomnia.  However it might be worthwhile to have him fully evaluated anew or via pain psychology assessment as I do think all of these things are interrelated and compound 1 another.      Return in about 4 weeks (around 5/17/2022) for in person.    Gregory G. Schoen, MD  [facility]    Alisson Dueñas is a 56 year old who presents for the following health issues     HPI   Neck pain is getting worse again and would like a referral back to Dr. Driscoll for his neck pain.       Was seeing a psychiatrist (Rita Beck) and was really down to just getting sleeping medications from her and now is no longer getting anything. At one point she was giving him trazodone 50 mg, up to 3 tabs at bedtime and zolpidem 10 mg initially and then he states increased to 20 mg.  He felt the 20 mg zolpidem and 300 mg trazodone was working great but trazodone would bother his stomach.  States he has not been sleeping well for about a month.  Also reports he was taking 50 mg of Equate Sleep Aid, but increased up to 4 tablets (200 mg) at bedtime (diphenhydramine) and that is not helping.        He is currently not sleeping well and is worried he might overdose on something.  He can't fall asleep for several hours after laying down at 2am and when he eventually falls asleep he will sleep until 6pm at night.  Current meds are trazodone 50 mg, melatonin 20 mg, 200 mg diphenhydramine, occasionally 250 mg.  He also is taking 0.5mg klonopin but not usually at bedtime.  In addition he is on pain meds, so the polypharmacy mix is very high risk.        Objective           Vitals:  No vitals were obtained today due to virtual  visit.    Physical Exam   healthy, alert and no distress  PSYCH: Alert and oriented times 3; coherent speech, normal   rate and volume, able to articulate logical thoughts, able   to abstract reason, no tangential thoughts, no hallucinations   or delusions  His affect is normal  RESP: No cough, no audible wheezing, able to talk in full sentences  Remainder of exam unable to be completed due to telephone visits          Phone call duration: 31 minutes

## 2022-05-09 DIAGNOSIS — F11.90 CHRONIC, CONTINUOUS USE OF OPIOIDS: ICD-10-CM

## 2022-05-09 DIAGNOSIS — M54.6 CHRONIC MIDLINE THORACIC BACK PAIN: ICD-10-CM

## 2022-05-09 DIAGNOSIS — G89.4 CHRONIC PAIN SYNDROME: ICD-10-CM

## 2022-05-09 DIAGNOSIS — M79.18 MYOFASCIAL PAIN: ICD-10-CM

## 2022-05-09 DIAGNOSIS — M54.2 CERVICALGIA: ICD-10-CM

## 2022-05-09 DIAGNOSIS — G89.29 CHRONIC MIDLINE THORACIC BACK PAIN: ICD-10-CM

## 2022-05-09 DIAGNOSIS — M47.812 ARTHROPATHY OF CERVICAL FACET JOINT: ICD-10-CM

## 2022-05-11 NOTE — TELEPHONE ENCOUNTER
Oxycodone 10mg      Last Written Prescription Date:  4/14/22  Last Fill Quantity: 150,   # refills: 0  Last Office Visit: 4/19/22  Future Office visit: Not scheduled, provider recommended a follow-up 4 weeks from LOV (around 5/17/22)  Seeing pain management in 3 months    Routing refill request to provider for review/approval because:  Drug not on the [facility], [facility] or [facility] refill protocol or controlled substance

## 2022-05-13 DIAGNOSIS — F41.1 GENERALIZED ANXIETY DISORDER: ICD-10-CM

## 2022-05-13 RX ORDER — OXYCODONE HYDROCHLORIDE 10 MG/1
10 TABLET ORAL EVERY 4 HOURS PRN
Qty: 150 TABLET | Refills: 0 | Status: SHIPPED | OUTPATIENT
Start: 2022-05-13 | End: 2022-06-09

## 2022-05-13 NOTE — TELEPHONE ENCOUNTER
Patient calling states he was told to go to pain management but the are not able to get him in until 8/18/22, patient would like to get this Rx refilled until he can be seen by pain management please call to advise

## 2022-05-16 NOTE — TELEPHONE ENCOUNTER
clonazepam      Last Written Prescription Date:  4/13/2022  Last Fill Quantity: 90,   # refills: 0  Last Office Visit: 4/19/2022  Future Office visit:       Routing refill request to provider for review/approval because:  Drug not on the [facility], [facility] or [facility] refill protocol or controlled substance      Kerri Alex RN

## 2022-05-17 ENCOUNTER — TELEPHONE (OUTPATIENT)
Dept: FAMILY MEDICINE | Facility: CLINIC | Age: 57
End: 2022-05-17
Payer: COMMERCIAL

## 2022-05-17 ENCOUNTER — HOSPITAL ENCOUNTER (EMERGENCY)
Facility: CLINIC | Age: 57
Discharge: HOME | End: 2022-05-18
Attending: FAMILY MEDICINE | Admitting: FAMILY MEDICINE
Payer: COMMERCIAL

## 2022-05-17 DIAGNOSIS — K52.9 CHRONIC DIARRHEA: ICD-10-CM

## 2022-05-17 DIAGNOSIS — E87.6 HYPOKALEMIA: ICD-10-CM

## 2022-05-17 PROCEDURE — 99284 EMERGENCY DEPT VISIT MOD MDM: CPT | Performed by: FAMILY MEDICINE

## 2022-05-17 PROCEDURE — 99283 EMERGENCY DEPT VISIT LOW MDM: CPT | Performed by: FAMILY MEDICINE

## 2022-05-17 PROCEDURE — 96361 HYDRATE IV INFUSION ADD-ON: CPT | Performed by: FAMILY MEDICINE

## 2022-05-17 PROCEDURE — 96360 HYDRATION IV INFUSION INIT: CPT | Performed by: FAMILY MEDICINE

## 2022-05-17 RX ORDER — CLONAZEPAM 0.5 MG/1
TABLET ORAL
Qty: 90 TABLET | Refills: 0 | Status: SHIPPED | OUTPATIENT
Start: 2022-05-17 | End: 2022-06-09

## 2022-05-18 VITALS
SYSTOLIC BLOOD PRESSURE: 120 MMHG | RESPIRATION RATE: 18 BRPM | DIASTOLIC BLOOD PRESSURE: 89 MMHG | WEIGHT: 165 LBS | OXYGEN SATURATION: 99 % | HEART RATE: 77 BPM | TEMPERATURE: 97.9 F | BODY MASS INDEX: 24.37 KG/M2

## 2022-05-18 LAB
ALBUMIN SERPL-MCNC: 4.1 G/DL (ref 3.4–5)
ALP SERPL-CCNC: 62 U/L (ref 40–150)
ALT SERPL W P-5'-P-CCNC: 35 U/L (ref 0–70)
ANION GAP SERPL CALCULATED.3IONS-SCNC: 6 MMOL/L (ref 3–14)
AST SERPL W P-5'-P-CCNC: 29 U/L (ref 0–45)
BASOPHILS # BLD AUTO: 0.1 10E3/UL (ref 0–0.2)
BASOPHILS NFR BLD AUTO: 1 %
BILIRUB SERPL-MCNC: 0.6 MG/DL (ref 0.2–1.3)
BUN SERPL-MCNC: 6 MG/DL (ref 7–30)
C COLI+JEJUNI+LARI FUSA STL QL NAA+PROBE: NOT DETECTED
C DIFF TOX B STL QL: NEGATIVE
CALCIUM SERPL-MCNC: 9.6 MG/DL (ref 8.5–10.1)
CHLORIDE BLD-SCNC: 99 MMOL/L (ref 94–109)
CO2 SERPL-SCNC: 29 MMOL/L (ref 20–32)
CREAT SERPL-MCNC: 0.95 MG/DL (ref 0.66–1.25)
EC STX1 GENE STL QL NAA+PROBE: NOT DETECTED
EC STX2 GENE STL QL NAA+PROBE: NOT DETECTED
EOSINOPHIL # BLD AUTO: 0.2 10E3/UL (ref 0–0.7)
EOSINOPHIL NFR BLD AUTO: 3 %
ERYTHROCYTE [DISTWIDTH] IN BLOOD BY AUTOMATED COUNT: 12.2 % (ref 10–15)
GFR SERPL CREATININE-BSD FRML MDRD: >90 ML/MIN/1.73M2
GLUCOSE BLD-MCNC: 92 MG/DL (ref 70–99)
HCT VFR BLD AUTO: 45 % (ref 40–53)
HGB BLD-MCNC: 16.3 G/DL (ref 13.3–17.7)
IMM GRANULOCYTES # BLD: 0.1 10E3/UL
IMM GRANULOCYTES NFR BLD: 1 %
LYMPHOCYTES # BLD AUTO: 2.1 10E3/UL (ref 0.8–5.3)
LYMPHOCYTES NFR BLD AUTO: 29 %
MAGNESIUM SERPL-MCNC: 1.8 MG/DL (ref 1.6–2.3)
MCH RBC QN AUTO: 33.1 PG (ref 26.5–33)
MCHC RBC AUTO-ENTMCNC: 36.2 G/DL (ref 31.5–36.5)
MCV RBC AUTO: 92 FL (ref 78–100)
MONOCYTES # BLD AUTO: 0.5 10E3/UL (ref 0–1.3)
MONOCYTES NFR BLD AUTO: 8 %
NEUTROPHILS # BLD AUTO: 4.1 10E3/UL (ref 1.6–8.3)
NEUTROPHILS NFR BLD AUTO: 58 %
NOROV GI+II ORF1-ORF2 JNC STL QL NAA+PR: NOT DETECTED
NRBC # BLD AUTO: 0 10E3/UL
NRBC BLD AUTO-RTO: 0 /100
PLATELET # BLD AUTO: 215 10E3/UL (ref 150–450)
POTASSIUM BLD-SCNC: 2.7 MMOL/L (ref 3.4–5.3)
PROT SERPL-MCNC: 7.3 G/DL (ref 6.8–8.8)
RBC # BLD AUTO: 4.92 10E6/UL (ref 4.4–5.9)
RVA NSP5 STL QL NAA+PROBE: NOT DETECTED
SALMONELLA SP RPOD STL QL NAA+PROBE: NOT DETECTED
SHIGELLA SP+EIEC IPAH STL QL NAA+PROBE: NOT DETECTED
SODIUM SERPL-SCNC: 134 MMOL/L (ref 133–144)
V CHOL+PARA RFBL+TRKH+TNAA STL QL NAA+PR: NOT DETECTED
WBC # BLD AUTO: 7 10E3/UL (ref 4–11)
Y ENTERO RECN STL QL NAA+PROBE: NOT DETECTED

## 2022-05-18 PROCEDURE — 80053 COMPREHEN METABOLIC PANEL: CPT | Performed by: FAMILY MEDICINE

## 2022-05-18 PROCEDURE — 36415 COLL VENOUS BLD VENIPUNCTURE: CPT | Performed by: FAMILY MEDICINE

## 2022-05-18 PROCEDURE — 85004 AUTOMATED DIFF WBC COUNT: CPT | Performed by: FAMILY MEDICINE

## 2022-05-18 PROCEDURE — 258N000003 HC RX IP 258 OP 636: Performed by: FAMILY MEDICINE

## 2022-05-18 PROCEDURE — 83735 ASSAY OF MAGNESIUM: CPT | Performed by: FAMILY MEDICINE

## 2022-05-18 PROCEDURE — 87493 C DIFF AMPLIFIED PROBE: CPT | Mod: 59 | Performed by: FAMILY MEDICINE

## 2022-05-18 PROCEDURE — 250N000013 HC RX MED GY IP 250 OP 250 PS 637: Performed by: FAMILY MEDICINE

## 2022-05-18 PROCEDURE — 250N000011 HC RX IP 250 OP 636: Performed by: FAMILY MEDICINE

## 2022-05-18 PROCEDURE — 87506 IADNA-DNA/RNA PROBE TQ 6-11: CPT | Performed by: FAMILY MEDICINE

## 2022-05-18 RX ORDER — POTASSIUM CHLORIDE 1500 MG/1
40 TABLET, EXTENDED RELEASE ORAL ONCE
Status: COMPLETED | OUTPATIENT
Start: 2022-05-18 | End: 2022-05-18

## 2022-05-18 RX ORDER — POTASSIUM CHLORIDE 7.45 MG/ML
10 INJECTION INTRAVENOUS CONTINUOUS
Status: DISCONTINUED | OUTPATIENT
Start: 2022-05-18 | End: 2022-05-18 | Stop reason: HOSPADM

## 2022-05-18 RX ORDER — POTASSIUM CHLORIDE 750 MG/1
15 TABLET, EXTENDED RELEASE ORAL 2 TIMES DAILY
Qty: 9 TABLET | Refills: 0 | Status: SHIPPED | OUTPATIENT
Start: 2022-05-18 | End: 2022-06-09

## 2022-05-18 RX ADMIN — SODIUM CHLORIDE 1000 ML: 9 INJECTION, SOLUTION INTRAVENOUS at 00:21

## 2022-05-18 RX ADMIN — POTASSIUM CHLORIDE 40 MEQ: 1500 TABLET, EXTENDED RELEASE ORAL at 01:04

## 2022-05-18 RX ADMIN — POTASSIUM CHLORIDE 10 MEQ: 7.46 INJECTION, SOLUTION INTRAVENOUS at 01:04

## 2022-05-18 NOTE — ED PROVIDER NOTES
History     Chief Complaint   Patient presents with     Diarrhea     HPI  Joselito Abarca is a 56 year old male who presents with concerns of diarrhea this been going on for the past 3 weeks.  Patient states that he has not tried to get in with his doctor because he cannot get in with him for months.  Patient states initially he was having some crampy abdominal pain but that is since stopped.  He states that he is having stools every 4 hours.  If he eats he feels like is just running right through him.  He denies any blood in the stools.  Initially when the symptoms started, his roommate was sick with similar symptoms but his only lasted for 2 days.  Patient denies being on any antibiotics for any reasons over the last 3 to 4 months.  Patient's had no nausea or any vomiting.  Denies any recent fevers or chills.  Denies any dysuria or hematuria.  Patient was concerned because today he felt very winded just bagging his groceries and trying to walk upstairs.  He has felt very tired today.    Allergies:  Allergies   Allergen Reactions     Vicodin [Hydrocodone-Acetaminophen] Nausea     Other reaction(s): Diaphoresis, Vomiting  HEADACHE       Problem List:    Patient Active Problem List    Diagnosis Date Noted     Back pain, chronic 02/21/2019     Priority: Medium     S/P laparoscopic fundoplication 02/21/2019     Priority: Medium     Long-segment Olson's esophagus 02/21/2019     Priority: Medium     Gastroesophageal reflux disease, esophagitis presence not specified 09/21/2018     Priority: Medium     IMO Regulatory Load OCT 2020       Chronic seasonal allergic rhinitis due to fungal spores 06/07/2018     Priority: Medium     Status post cervical spinal arthrodesis 11/29/2017     Priority: Medium     Chronic, continuous use of opioids 12/13/2016     Priority: Medium     Patient is followed by Gregory G. Schoen, MD for ongoing prescription of pain medication.  All refills should be approved by this provider, or  covering partner.    Medication(s): Oxycodone 5 mg IR: Take 2 capsules (10 mg) by mouth every 4 hours as needed 2 caps q 4 hour prn pain up to 12 per day .   Methadone 10 mg three times daily, 90 per month  Clonazepam 0.5 mg tid, 90 per month   Clinic visit frequency required: Q 3 months     Controlled substance agreement:  Encounter-Level CSA - 2/27/15:               Controlled Substance Agreement - Scan on 3/14/2015  8:47 AM : Controlled Medication Agreement-02/27/15 (below)            Pain Clinic evaluation in the past: Yes    DIRE Total Score(s):  No flowsheet data found.    Last [facility] website verification:  10/30/2016     [email or web address]         Moderate persistent asthma without complication 11/02/2016     Priority: Medium     Nausea with vomiting 04/27/2016     Priority: Medium     Tobacco dependence syndrome 02/17/2016     Priority: Medium     Leukocytosis 02/16/2016     Priority: Medium     Disease of bronchial airway (HCC) 02/12/2016     Priority: Medium     Bronchiolectasis (H) 02/12/2016     Priority: Medium     Degeneration of cervical intervertebral disc 01/26/2015     Priority: Medium     Health Care Home 09/30/2013     Priority: Medium     Status:  Accepted  Care Coordinator:    Melissa Behl BSN, RN, PHN  [facility] Care Coordinator  [phone]     See Letters for [facility] Care Plan  Date:  April 26, 2016            Persons encountering health services in other specified circumstances 09/30/2013     Priority: Medium     Overview:   Overview:   Status:  Accepted  Care Coordinator:    Melissa Behl BSN, RN, PHN  [facility] Care Coordinator  [phone]     See Letters for [facility] Care Plan  Date:  April 26, 2016       Arthrodesis status 06/15/2011     Priority: Medium     Neck pain 06/15/2011     Priority: Medium     History of arthrodesis 06/15/2011     Priority: Medium     CARDIOVASCULAR SCREENING; LDL GOAL LESS THAN 160 10/31/2010     Priority: Medium     Encounter for screening for  cardiovascular disorders 10/31/2010     Priority: Medium     Intervertebral cervical disc disorder with myelopathy, cervical region 11/12/2009     Priority: Medium     Patient is followed by TIARA JIMENEZ for ongoing prescription of narcotic pain medicine.  Med: methadone 10 mg tid. Taking oxycodone up to 8 per day, 120 per month  Maximum use per month: 90  Expected duration: ongoing  Narcotic agreement on file: YES  Clinic visit recommended: Q 3 months         Intervertebral disc disorder of cervical region with myelopathy 11/12/2009     Priority: Medium     Overview:   Overview:   Patient is followed by TIARA JIMENEZ for ongoing prescription of narcotic pain medicine.  Med: methadone 10 mg tid. Taking oxycodone up to 8 per day, 120 per month  Maximum use per month: 90  Expected duration: ongoing  Narcotic agreement on file: YES  Clinic visit recommended: Q 3 months       Constipation 03/19/2008     Priority: Medium     Problem list name updated by automated process. Provider to review       Thoracic or lumbosacral neuritis or radiculitis, unspecified 03/19/2008     Priority: Medium     Esophageal reflux 07/09/2003     Priority: Medium     Generalized anxiety disorder 07/08/2003     Priority: Medium     Alcohol abuse, in remission 07/08/2003     Priority: Medium     Nondependent alcohol abuse, in remission 07/08/2003     Priority: Medium        Past Medical History:    Past Medical History:   Diagnosis Date     Allergic rhinitis      Anxiety      Depressive disorder      GERD (gastroesophageal reflux disease)      Neck pain 6/15/2011     Pneumonia      Uncomplicated asthma        Past Surgical History:    Past Surgical History:   Procedure Laterality Date     BRONCHOSCOPY (RIGID OR FLEXIBLE), DIAGNOSTIC N/A 8/7/2018    Procedure: COMBINED BRONCHOSCOPY (RIGID OR FLEXIBLE), LAVAGE;  Bronchoscopy with Lavage and Transbronchial Biopsy;  Surgeon: Yung Miranda MD;  Location:  GI     COLONOSCOPY  WITH CO2 INSUFFLATION N/A 9/24/2018    Procedure: COLONOSCOPY WITH CO2 INSUFFLATION;  Colon and upper endoscopy ;  Surgeon: Devin Pelayo MD;  Location: [facility] OR     COMBINED ESOPHAGOSCOPY, GASTROSCOPY, DUODENOSCOPY (EGD) WITH CO2 INSUFFLATION N/A 9/24/2018    Procedure: COMBINED ESOPHAGOSCOPY, GASTROSCOPY, DUODENOSCOPY (EGD) WITH CO2 INSUFFLATION;  Colon and upper endoscopy ;  Surgeon: Devin Pelayo MD;  Location: [facility] OR     DISCECTOMY LUMBAR POSTERIOR MICROSCOPIC ONE LEVEL  2/21/2012    Procedure:DISCECTOMY LUMBAR POSTERIOR MICROSCOPIC ONE LEVEL; LEFT T1-T2 THORASIC HEMILAMINECTOMY MICRODISCECTOMY WITH MEXTRIX II ; Surgeon:SHARON PURI; Location:[facility] OR     DISCECTOMY, FUSION CERVICAL ANTERIOR ONE LEVEL, COMBINED N/A 11/29/2017    Procedure: COMBINED DISCECTOMY, FUSION CERVICAL ANTERIOR ONE LEVEL;  Anterior Cervical Discectomy and Fusion Cervical Six - Cervical Seven, Exploration and Revision Cervical Four - Cervical Six with Hardware Removal;  Surgeon: Nikolas Vasques MD;  Location: [facility]     ESOPHAGEAL IMPEDENCE FUNCTION TEST WITH 24 HOUR PH GREATER THAN 1 HOUR N/A 12/12/2018    Procedure: ESOPHAGEAL IMPEDENCE FUNCTION TEST WITH 24 HOUR PH GREATER THAN 1 HOUR;  Surgeon: Atif Oconnor MD;  Location: [facility]     ESOPHAGOSCOPY, GASTROSCOPY, DUODENOSCOPY (EGD), COMBINED N/A 9/24/2018    Procedure: COMBINED ESOPHAGOSCOPY, GASTROSCOPY, DUODENOSCOPY (EGD), BIOPSY SINGLE OR MULTIPLE;;  Surgeon: Devin Pelayo MD;  Location: [facility]     EXPLORE SPINE, REMOVE HARDWARE, COMBINED N/A 11/29/2017    Procedure: COMBINED EXPLORE SPINE, REMOVE HARDWARE;;  Surgeon: Nikolas Vasques MD;  Location: [facility]     FUSION CERVICAL ANTERIOR TWO LEVELS  1/29/2010     HC DRAIN/INJ MAJOR JOINT/BURSA W/O US  5/5/2008    Left sacroiliac joint injection.     HC INJ EPIDURAL LUMBAR/SACRAL W/WO CONTRAST  2009     HEAD & NECK SURGERY      2013     HERNIA REPAIR       INJECT BLOCK MEDIAL BRANCH  CERVICAL/THORACIC/LUMBAR Bilateral 8/26/2015    Procedure: INJECT BLOCK MEDIAL BRANCH CERVICAL / THORACIC / LUMBAR;  Surgeon: Ronald Driscoll MD;  [facility]     INJECT BLOCK MEDIAL BRANCH CERVICAL/THORACIC/LUMBAR N/A 8/8/2019    Procedure: BLOCK, NERVE, FACET JOINT, MEDIAL BRANCH, DIAGNOSTIC Bilateral Cervical 2,3,4;  Surgeon: Ronald Driscoll MD;  Location[facility]     INJECT BLOCK MEDIAL BRANCH CERVICAL/THORACIC/LUMBAR N/A 9/13/2019    Procedure: Medial Branch Block Bilateral Cervical 2,3, and 4;  Surgeon: Ronald Driscoll MD;  [facility]     INJECT BLOCK MEDIAL BRANCH CERVICAL/THORACIC/LUMBAR Bilateral 7/3/2020    Procedure: Third occipital and Cervical 3-4 Medial Branch Blocks bilateral;  Surgeon: Ronald Driscoll MD;  [facility]     INJECT BLOCK MEDIAL BRANCH CERVICAL/THORACIC/LUMBAR N/A 7/29/2020    Procedure: medial branch blocks cervical 3-4 and bilateral third occiptal nerves;  Surgeon: Ronald Driscoll MD;  [facility]     INJECT BLOCK MEDIAL BRANCH CERVICAL/THORACIC/LUMBAR Bilateral 11/6/2020    Procedure: Cervical 1-2 Medial Branch Block Bilateral;  Surgeon: Ronald Driscoll MD;  [facility]     INJECT EPIDURAL LUMBAR N/A 2/13/2020    Procedure: Lumber 4-5 bilateral Epidural Injection;  Surgeon: Ronald Driscoll MD;  [facility]     INJECT FACET JOINT Bilateral 5/27/2015    Procedure: INJECT FACET JOINT;  Surgeon: Ronald Driscoll MD;  [facility]     NERVE BLOCK OCCIPITAL Bilateral 7/12/2018    Procedure: NERVE BLOCK OCCIPITAL;  bilateral occipital nerve blocks;  Surgeon: Ronald Driscoll MD;  [facility]     NERVE BLOCK OCCIPITAL Bilateral 12/9/2021    Procedure: BILATERAL OCCIPITAL NERVE BLOCK;  Surgeon: Ronald Driscoll MD;  [facility]     PROCEDURE PLACEHOLDER GENERAL N/A 02/21/2019    [place] [facility]     RADIO FREQUENCY ABLATION PULSED CERVICAL Right 10/18/2019    Procedure: CERVICAL RADIOFREQUENCY ABLATION  Cervical 2,3,4;  Surgeon: Ronald Driscoll MD;  [facility]     RADIO FREQUENCY ABLATION PULSED CERVICAL Left 11/14/2019    Procedure: Left Cervical 2, 3, 4 Radiofreqency Ablation;  Surgeon: Ronald Driscoll MD;  [facility]     RADIO FREQUENCY ABLATION PULSED CERVICAL Left 3/11/2021    Procedure: Bilateral Cervical 1 and 2 radiofrequency ablation, aborted;  Surgeon: Ronald Driscoll MD;  Location: [facility]       Family History:    Family History   Problem Relation Age of Onset     Family History Negative No family hx of      C.A.D. No family hx of        Social History:  Marital Status:  Single [1]  Social History     Tobacco Use     Smoking status: Current Every Day Smoker     Packs/day: 0.50     Years: 30.00     Pack years: 15.00     Types: Cigars, Cigarettes     Smokeless tobacco: Former User     Quit date: 2012   Substance Use Topics     Alcohol use: No     Alcohol/week: 0.0 standard drinks     Comment: HX OF ABUSE-IN REMISSION     Drug use: No        Medications:    albuterol (PROAIR HFA/PROVENTIL HFA/VENTOLIN HFA) 108 (90 Base) MCG/ACT inhaler  clonazePAM (KLONOPIN) 0.5 MG tablet  DULoxetine (CYMBALTA) 60 MG capsule  fluticasone (FLONASE) 50 MCG/ACT nasal spray  fluticasone (FLOVENT HFA) 220 MCG/ACT inhaler  ipratropium - albuterol 0.5 mg/2.5 mg/3 mL (DUONEB) 0.5-2.5 (3) MG/3ML neb solution  naloxone (NARCAN) 4 MG/0.1ML nasal spray  nicotine (NICODERM CQ) 14 MG/24HR 24 hr patch  nystatin (MYCOSTATIN) 489749 UNIT/ML suspension  order for DME  oxyCODONE (ROXICODONE) 5 MG tablet  oxyCODONE IR (ROXICODONE) 10 MG tablet  sildenafil (VIAGRA) 100 MG tablet  topiramate (TOPAMAX) 25 MG tablet  traZODone (DESYREL) 100 MG tablet  traZODone (DESYREL) 50 MG tablet  VITAMIN D3 50 MCG (2000 UT) tablet  zolpidem (AMBIEN) 10 MG tablet          Review of Systems   All other systems reviewed and are negative.      Physical Exam   BP: (!) 154/102  Pulse: 105  Temp: 97.9  F (36.6  C)  Resp: 18  Weight: 74.8 kg (165 lb)  SpO2: 99 %      Physical Exam  Vitals and nursing note reviewed.    Constitutional:       General: He is not in acute distress.     Appearance: Normal appearance. He is well-developed. He is not diaphoretic.   HENT:      Head: Normocephalic and atraumatic.   Eyes:      Conjunctiva/sclera: Conjunctivae normal.   Cardiovascular:      Rate and Rhythm: Normal rate and regular rhythm.      Heart sounds: Normal heart sounds. No murmur heard.  Pulmonary:      Effort: Pulmonary effort is normal. No respiratory distress.      Breath sounds: Normal breath sounds. No stridor. No wheezing.   Abdominal:      General: Bowel sounds are normal. There is no distension.      Palpations: Abdomen is soft.      Tenderness: There is no abdominal tenderness. There is no guarding.   Musculoskeletal:         General: Normal range of motion.      Cervical back: Normal range of motion.   Skin:     General: Skin is warm and dry.      Findings: No rash.   Neurological:      Mental Status: He is alert and oriented to person, place, and time.   Psychiatric:         Judgment: Judgment normal.         ED Course                 Procedures      Results for orders placed or performed during the hospital encounter of 05/17/22 (from the past 24 hour(s))   CBC with platelets differential    Narrative    The following orders were created for panel order CBC with platelets differential.  Procedure                               Abnormality         Status                     ---------                               -----------         ------                     CBC with platelets and d...[[id number]]  Abnormal            Final result                 Please view results for these tests on the individual orders.   Comprehensive metabolic panel   Result Value Ref Range    Sodium 134 133 - 144 mmol/L    Potassium 2.7 (L) 3.4 - 5.3 mmol/L    Chloride 99 94 - 109 mmol/L    Carbon Dioxide (CO2) 29 20 - 32 mmol/L    Anion Gap 6 3 - 14 mmol/L    Urea Nitrogen 6 (L) 7 - 30 mg/dL    Creatinine 0.95 0.66 - 1.25 mg/dL    Calcium 9.6 8.5  - 10.1 mg/dL    Glucose 92 70 - 99 mg/dL    Alkaline Phosphatase 62 40 - 150 U/L    AST 29 0 - 45 U/L    ALT 35 0 - 70 U/L    Protein Total 7.3 6.8 - 8.8 g/dL    Albumin 4.1 3.4 - 5.0 g/dL    Bilirubin Total 0.6 0.2 - 1.3 mg/dL    GFR Estimate >90 >60 mL/min/1.73m2   Magnesium   Result Value Ref Range    Magnesium 1.8 1.6 - 2.3 mg/dL   CBC with platelets and differential   Result Value Ref Range    WBC Count 7.0 4.0 - 11.0 10e3/uL    RBC Count 4.92 4.40 - 5.90 10e6/uL    Hemoglobin 16.3 13.3 - 17.7 g/dL    Hematocrit 45.0 40.0 - 53.0 %    MCV 92 78 - 100 fL    MCH 33.1 (H) 26.5 - 33.0 pg    MCHC 36.2 31.5 - 36.5 g/dL    RDW 12.2 10.0 - 15.0 %    Platelet Count 215 150 - 450 10e3/uL    % Neutrophils 58 %    % Lymphocytes 29 %    % Monocytes 8 %    % Eosinophils 3 %    % Basophils 1 %    % Immature Granulocytes 1 %    NRBCs per 100 WBC 0 <1 /100    Absolute Neutrophils 4.1 1.6 - 8.3 10e3/uL    Absolute Lymphocytes 2.1 0.8 - 5.3 10e3/uL    Absolute Monocytes 0.5 0.0 - 1.3 10e3/uL    Absolute Eosinophils 0.2 0.0 - 0.7 10e3/uL    Absolute Basophils 0.1 0.0 - 0.2 10e3/uL    Absolute Immature Granulocytes 0.1 <=0.4 10e3/uL    Absolute NRBCs 0.0 10e3/uL     *Note: Due to a large number of results and/or encounters for the requested time period, some results have not been displayed. A complete set of results can be found in Results Review.       Medications   0.9% sodium chloride BOLUS (has no administration in time range)     Labs are reviewed and patient's potassium did come back a little bit low.  This could explain some of his weakness that he was having today.  This is likely from his chronic diarrhea.  Otherwise the rest of his labs looked okay.  Patient was given some oral potassium here and some IV potassium.  We will send him home on continued oral potassium for the next few days.  Patient was unable to give a stool sample here so we will plan on discharging the patient home with staff to collect stools at home  including a general bacterial panel and checking for C. difficile and will bring this back to the lab.  We will make a follow-up appointment with the clinic doc for Friday to have his potassium rechecked and to go over his stool sample results and to discuss further evaluation for this chronic diarrhea.      Assessments & Plan (with Medical Decision Making)  Chronic diarrhea, hypokalemia     I have reviewed the nursing notes.    I have reviewed the findings, diagnosis, plan and need for follow up with the patient.              5/17/2022   [facility] EMERGENCY DEPT     Kirk Jaramillo MD  05/18/22 0052

## 2022-05-18 NOTE — ED TRIAGE NOTES
C/o diarrhea x3 weeks - last weekend c/o abd pain which has subsided. Pt states every time he eats 'it goes right through me'.      Triage Assessment     Row Name 05/17/22 [id number]       Triage Assessment (Adult)    Airway WDL WDL       Respiratory WDL    Respiratory WDL WDL       Cardiac WDL    Cardiac WDL WDL

## 2022-05-18 NOTE — RESULT ENCOUNTER NOTE
Final Clostridium Difficile toxin B PCR is NEGATIVE.    No treatment or change in treatment per [facility] ED Lab Result Clostridium difficile protocol.

## 2022-05-18 NOTE — RESULT ENCOUNTER NOTE
Final Enteric Bacteria and Virus Panel by [facility] Stool is NEGATIVE for all tested organisms (bacteria/virus).  No treatment or change in treatment per [facility] Lab Result Enteric Bacteria and Virus Panel protocol.

## 2022-05-20 ENCOUNTER — VIRTUAL VISIT (OUTPATIENT)
Dept: FAMILY MEDICINE | Facility: CLINIC | Age: 57
End: 2022-05-20
Attending: FAMILY MEDICINE
Payer: COMMERCIAL

## 2022-05-20 DIAGNOSIS — K52.9 CHRONIC DIARRHEA: ICD-10-CM

## 2022-05-20 DIAGNOSIS — E87.6 HYPOKALEMIA: ICD-10-CM

## 2022-05-20 PROCEDURE — 99214 OFFICE O/P EST MOD 30 MIN: CPT | Mod: 95 | Performed by: FAMILY MEDICINE

## 2022-05-20 RX ORDER — DIPHENOXYLATE HCL/ATROPINE 2.5-.025MG
1 TABLET ORAL 4 TIMES DAILY PRN
Qty: 20 TABLET | Refills: 0 | Status: SHIPPED | OUTPATIENT
Start: 2022-05-20 | End: 2022-08-20

## 2022-05-20 ASSESSMENT — PAIN SCALES - GENERAL: PAINLEVEL: NO PAIN (0)

## 2022-05-20 NOTE — PROGRESS NOTES
Spenser is a 56 year old who is being evaluated via a billable telephone visit.      What phone number would you like to be contacted at? [phone]  How would you like to obtain your AVS? MyChart    Assessment & Plan     Chronic diarrhea  Been going on for about 3 weeks.  He was seen in the emergency room.  Reviewed the reports and the labs.  He states anything he eats goes right through him.  They found him to be hypokalemic and gave him some IV potassium and some take-home pills.  They did check for C. difficile and bacteria and viruses and nothing was positive.  No one else around him has diarrhea.  We will try some Lomotil have him go on a strict 24-hour clear liquid diet and have him bring in a check for ova and parasites on the stool samples.  - Primary Care Referral  - diphenoxylate-atropine (LOMOTIL) 2.5-0.025 MG tablet; Take 1 tablet by mouth 4 times daily as needed for diarrhea  - Ova and Parasite Exam Routine; Future  - Ova and Parasite Exam Routine    Hypokalemia  Need to recheck this.  He is taking potassium.  - Primary Care Referral                 No follow-ups on file.    Silvio Miller MD  [facility]    Subjective   Spenser is a 56 year old who presents for the following health issues     HPI     ED/UC Followup:    Facility:  [facility]  Date of visit: 05/17/2022-05/18/2022  Reason for visit: Diarrhea  Current Status:  States he feels the same, 2 hours after eating anything he has loose stools.           Review of Systems   Constitutional, HEENT, cardiovascular, pulmonary, gi and gu systems are negative, except as otherwise noted.      Objective           Vitals:  No vitals were obtained today due to virtual visit.    Physical Exam   healthy, alert and no distress  PSYCH: Alert and oriented times 3; coherent speech, normal   rate and volume, able to articulate logical thoughts, able   to abstract reason, no tangential thoughts, no hallucinations   or  delusions  His affect is normal and pleasant  RESP: No cough, no audible wheezing, able to talk in full sentences  Remainder of exam unable to be completed due to telephone visits                Phone call duration: 12 minutes

## 2022-05-21 PROCEDURE — 87209 SMEAR COMPLEX STAIN: CPT | Performed by: FAMILY MEDICINE

## 2022-05-21 PROCEDURE — 87177 OVA AND PARASITES SMEARS: CPT | Performed by: FAMILY MEDICINE

## 2022-05-23 LAB
O+P STL MICRO: NEGATIVE
TRI STN SPEC: NORMAL

## 2022-05-27 ENCOUNTER — TELEPHONE (OUTPATIENT)
Dept: FAMILY MEDICINE | Facility: CLINIC | Age: 57
End: 2022-05-27
Payer: COMMERCIAL

## 2022-05-27 ENCOUNTER — VIRTUAL VISIT (OUTPATIENT)
Dept: FAMILY MEDICINE | Facility: CLINIC | Age: 57
End: 2022-05-27
Payer: COMMERCIAL

## 2022-05-27 DIAGNOSIS — R19.7 DIARRHEA, UNSPECIFIED TYPE: Primary | ICD-10-CM

## 2022-05-27 DIAGNOSIS — Z79.899 ENCOUNTER FOR LONG-TERM (CURRENT) USE OF MEDICATIONS: ICD-10-CM

## 2022-05-27 DIAGNOSIS — Z13.220 SCREENING FOR HYPERLIPIDEMIA: ICD-10-CM

## 2022-05-27 DIAGNOSIS — K22.719 BARRETT'S ESOPHAGUS WITH DYSPLASIA: ICD-10-CM

## 2022-05-27 PROCEDURE — 99213 OFFICE O/P EST LOW 20 MIN: CPT | Mod: 95 | Performed by: FAMILY MEDICINE

## 2022-05-27 NOTE — TELEPHONE ENCOUNTER
Patient would like a phone visit with you to go over lab results today are you able to fit him in or do you have a day that we could offer a phone visit?

## 2022-05-27 NOTE — TELEPHONE ENCOUNTER
Can he do a phone visit today at 12:30?  That would be my preference.  Otherwise, it would not be until after 5pm (and closer to 6pm)  that I could consider doing a phone visit.  Electronically signed by Greg Schoen, MD

## 2022-05-27 NOTE — PROGRESS NOTES
"Spenser is a 56 year old who is being evaluated via a billable telephone visit.      What phone number would you like to be contacted at? [phone]  How would you like to obtain your AVS? Mail a copy    Assessment & Plan     Diarrhea, unspecified type  Will schedule for colonoscopy as stool tests negative and need to investigate for inflammatory/ischemic forms of colitis.  Will have patient start taking in daily probiotic or yogurt and avoid taking other dairy. To come in for follow up basic profile to check potassium.   - Adult Gastro Ref - Procedure Only; Future    Olson's esophagus with dysplasia  Due for follow up EGD.   - Adult Gastro Ref - Procedure Only; Future    Encounter for long-term (current) use of medications  Needs urine drug screen in follow up of longterm use of narcotics.   - [id number] - Urine Drug Confirmation Panel (Comprehensive); Future    Screening for hyperlipidemia  Due for lipid profile, will draw with labs.   - Lipid panel reflex to direct LDL Fasting; Future    Return in about 2 weeks (around 6/10/2022) for using a phone visit.    Gregory G. Schoen, MD  [facility]    Subjective   Spenser is a 56 year old who presents for the following health issues     HPI     Follow up lab results  Has been having issues with his bowels for about a month now and seems to be getting worse.  Started having loose stools/diarrhea, which is rare because he is usually constipated.  He only eats one meal a day in general and now if he eats, he gets a strong gastrocolic reflex and states \"it is shooting out of me.\"  Has tried peptobismol and did go to the ER.  Extensive stool studies done and all were negative, including bacteria/viral cultures, C.diff, etc.  His potassium was also noted to be low and was give a few days of pills for this.  He did get some Lomotil for diarrhea but didn't help. Denies any blood or mucous.  He is no longer on potassium.  Review of old notes indicates he was to " have had a follow up EGD for Olson's and repeat colonoscopy in 2020 or 2021 but has not done so.      Review of Systems   States has been losing weight through this.  Some nausea, no vomiting, decreased appetite.   CVR/Resp neg   neg      Objective           Vitals:  No vitals were obtained today due to virtual visit.    Physical Exam   healthy, alert and no distress  PSYCH: Alert and oriented times 3; coherent speech, normal   rate and volume, able to articulate logical thoughts, able   to abstract reason, His affect is normal  RESP: No cough, no audible wheezing, able to talk in full sentences  Remainder of exam unable to be completed due to telephone visits  Phone call duration: 17 minutes

## 2022-06-03 ENCOUNTER — TELEPHONE (OUTPATIENT)
Dept: GASTROENTEROLOGY | Facility: CLINIC | Age: 57
End: 2022-06-03
Payer: COMMERCIAL

## 2022-06-03 ENCOUNTER — VIRTUAL VISIT (OUTPATIENT)
Dept: FAMILY MEDICINE | Facility: CLINIC | Age: 57
End: 2022-06-03
Payer: COMMERCIAL

## 2022-06-03 DIAGNOSIS — R19.7 DIARRHEA, UNSPECIFIED TYPE: Primary | ICD-10-CM

## 2022-06-03 PROCEDURE — 99213 OFFICE O/P EST LOW 20 MIN: CPT | Mod: 95 | Performed by: FAMILY MEDICINE

## 2022-06-03 NOTE — TELEPHONE ENCOUNTER
Screening Questions  BlueKIND OF PREP RedLOCATION [review exclusion criteria] GreenSEDATION TYPE      1. Are you able to give consent for your medical care? Do you have a legal guardian or medical Power of ?  y (Sedation review/consideration needed)    2. Have you had a positive covid tammi in the last 90 days?n    3. Are you active on mychart? y    4. What insurance is in the chart?Norma    3.   Ordering/Referring Provider: SCHoen    4. BMI 23.5 [BMI OVER 40-EXTENDED PREP]  If greater than 40 review exclusion criteria [PAC APPT IF @ [facility]]        5.  Respiratory Screening :  [If yes to any of the following HOSPITAL setting only]     Do you use daily home oxygen? n    Do you have mod to severe Obstructive Sleep Apnea? n  [OKAY @ [facility] SH PH RI]   Do you have Pulmonary Hypertension? n     Do you have UNCONTROLLED asthma? n        6.   Have you had a heart or lung transplant? n      7.   Are you currently on dialysis? n [ If yes, G-PREP & HOSPITAL setting only]     8.   Do you have chronic kidney disease? n [ If yes, G-PREP ]    9.   Have you had a stroke or Transient ischemic attack (TIA - aka  mini stroke ) within 6 months?  n (If yes, please review exclusion criteria)    10.   In the past 6 months, have you had any heart related issues including cardiomyopathy or heart attack? n           If yes, did it require cardiac stenting or other implantable device? n      11.   Do you have any implantable devices in your body (pacemaker, defib, LVAD)? n (If yes, please review exclusion criteria)    12.   Do you take nitroglycerin? n           If yes, how often? n  (if yes, HOSPITAL setting ONLY)    13.   Are you currently taking any blood thinners? n           [IF YES, INFORM PATIENT TO FOLLOW UP W/ ORDERING PROVIDER FOR BRIDGING INSTRUCTIONS]     14.   Do you have a diagnosis of diabetes? n   [ If yes, G-PREP ]    15.   [FEMALES] Are you currently pregnant?     If yes, how many weeks?     16.   Are you taking  any prescription pain medications on a routine schedule?  y oxycodone  [ If yes, EXTENDED PREP.] [If yes, MAC]    17.   Do you have any chemical dependencies such as alcohol, street drugs, or methadone?  n [If yes, MAC]    18.   Do you have any history of post-traumatic stress syndrome, severe anxiety or history of psychosis?  n  [If yes, MAC]    19.   Do you transfer independently?  y    20.  On a regular basis do you go 3-5 days between bowel movements? n   [ If yes, EXTENDED PREP.]    21.   Preferred LOCAL Pharmacy for Pre Prescription      [facility] [place] - [address] [facility] [place] - [address] [facility]  [place] [facility] [place], [place] - [address] [facility] [place], [place] - [address] [facility] [place] - [address] [facility]      Scheduling Details      Caller : Joselito Abarca  (Please ask for phone number if not scheduled by patient)    Type of Procedure Scheduled: Doubles  Which Colonoscopy Prep was Sent?: Mprep  KHORUTS CF PATIENTS & GROEN'S PATIENTS REQUIRE EXTENDED PREP  Surgeon: Carmela  Date of Procedure: 8/9  Location:       Sedation Type: MAC  Conscious Sedation- Needs  for 6 hours after the procedure  MAC/General-Needs  for 24 hours after procedure    Pre-op Required at [facility], [place], [place] and OR for MAC sedation: n  (advise patient they will need a pre-op prior to procedure -)      Informed patient they will need an adult  y  Cannot take any type of public or medical transportation alone    Pre-Procedure Covid test to be completed at [facility] or Externally: y    Confirmed Nurse will call to complete assessment y    Additional comments:

## 2022-06-03 NOTE — PROGRESS NOTES
Spenser is a 56 year old who is being evaluated via a billable telephone visit.      What phone number would you like to be contacted at? [phone]  How would you like to obtain your AVS? MyChart    Assessment & Plan     Diarrhea, unspecified type  Put an order for a comprehensive chemistry panel.  He came to get the blood work done but they told him that there was no potassium more than just a lipid and a urine test.  And he is right.  So I put this in and told him to come in tomorrow.  Just needs to be fasting for 8 hours.  He will do this.  He did finally get some yogurt and it thinks it might be helping.  But he hates yogurt.  I told him to stop at the pharmacy and ask them about getting probiotics which was recommended by Dr. Schoen.  We will follow-up after his labs are back.  He states he is set up for colonoscopy but is not till the end of August.  This is not really acceptable and we will have to look into this.  - Comprehensive metabolic panel (BMP + Alb, Alk Phos, ALT, AST, Total. Bili, TP); Future                 No follow-ups on file.    Silvio Miller MD  [facility]   Spenser is a 56 year old who presents for the following health issues     HPI     Patient is requesting potassium labs to be drawn due to recent diarrhea, onset over 1 month. States he still has diarrhea after every meal, about 20 minutes.     Review of Systems   Constitutional, HEENT, cardiovascular, pulmonary, gi and gu systems are negative, except as otherwise noted.      Objective           Vitals:  No vitals were obtained today due to virtual visit.    Physical Exam   healthy, alert and no distress  PSYCH: Alert and oriented times 3; coherent speech, normal   rate and volume, able to articulate logical thoughts, able   to abstract reason, no tangential thoughts, no hallucinations   or delusions  His affect is flat  RESP: No cough, no audible wheezing, able to talk in full sentences  Remainder of  exam unable to be completed due to telephone visits                Phone call duration: 12 minutes

## 2022-06-06 ENCOUNTER — LAB (OUTPATIENT)
Dept: LAB | Facility: CLINIC | Age: 57
End: 2022-06-06
Payer: COMMERCIAL

## 2022-06-06 DIAGNOSIS — R19.7 DIARRHEA, UNSPECIFIED TYPE: ICD-10-CM

## 2022-06-06 DIAGNOSIS — Z79.899 ENCOUNTER FOR LONG-TERM (CURRENT) USE OF MEDICATIONS: ICD-10-CM

## 2022-06-06 DIAGNOSIS — Z13.220 SCREENING FOR HYPERLIPIDEMIA: ICD-10-CM

## 2022-06-06 LAB
ALBUMIN SERPL-MCNC: 3.8 G/DL (ref 3.4–5)
ALP SERPL-CCNC: 64 U/L (ref 40–150)
ALT SERPL W P-5'-P-CCNC: 27 U/L (ref 0–70)
ANION GAP SERPL CALCULATED.3IONS-SCNC: 5 MMOL/L (ref 3–14)
AST SERPL W P-5'-P-CCNC: 17 U/L (ref 0–45)
BILIRUB SERPL-MCNC: 0.4 MG/DL (ref 0.2–1.3)
BUN SERPL-MCNC: 5 MG/DL (ref 7–30)
CALCIUM SERPL-MCNC: 9.5 MG/DL (ref 8.5–10.1)
CANNABINOIDS UR QL SCN: NORMAL
CHLORIDE BLD-SCNC: 101 MMOL/L (ref 94–109)
CHOLEST SERPL-MCNC: 203 MG/DL
CO2 SERPL-SCNC: 30 MMOL/L (ref 20–32)
CREAT SERPL-MCNC: 1.09 MG/DL (ref 0.66–1.25)
CREAT UR-MCNC: 88 MG/DL
FASTING STATUS PATIENT QL REPORTED: YES
GFR SERPL CREATININE-BSD FRML MDRD: 81 ML/MIN/1.73M2
GLUCOSE BLD-MCNC: 155 MG/DL (ref 70–99)
HDLC SERPL-MCNC: 27 MG/DL
LDLC SERPL CALC-MCNC: 145 MG/DL
NONHDLC SERPL-MCNC: 176 MG/DL
POTASSIUM BLD-SCNC: 2.6 MMOL/L (ref 3.4–5.3)
PROT SERPL-MCNC: 7 G/DL (ref 6.8–8.8)
SODIUM SERPL-SCNC: 136 MMOL/L (ref 133–144)
TRIGL SERPL-MCNC: 157 MG/DL

## 2022-06-06 PROCEDURE — 80061 LIPID PANEL: CPT

## 2022-06-06 PROCEDURE — 80053 COMPREHEN METABOLIC PANEL: CPT

## 2022-06-06 PROCEDURE — 36415 COLL VENOUS BLD VENIPUNCTURE: CPT

## 2022-06-06 PROCEDURE — 80307 DRUG TEST PRSMV CHEM ANLYZR: CPT

## 2022-06-07 DIAGNOSIS — G89.29 CHRONIC MIDLINE THORACIC BACK PAIN: ICD-10-CM

## 2022-06-07 DIAGNOSIS — M54.2 CERVICALGIA: ICD-10-CM

## 2022-06-07 DIAGNOSIS — M47.812 ARTHROPATHY OF CERVICAL FACET JOINT: ICD-10-CM

## 2022-06-07 DIAGNOSIS — F41.1 GENERALIZED ANXIETY DISORDER: ICD-10-CM

## 2022-06-07 DIAGNOSIS — F11.90 CHRONIC, CONTINUOUS USE OF OPIOIDS: ICD-10-CM

## 2022-06-07 DIAGNOSIS — M54.6 CHRONIC MIDLINE THORACIC BACK PAIN: ICD-10-CM

## 2022-06-07 DIAGNOSIS — G89.4 CHRONIC PAIN SYNDROME: ICD-10-CM

## 2022-06-07 DIAGNOSIS — M79.18 MYOFASCIAL PAIN: ICD-10-CM

## 2022-06-08 NOTE — TELEPHONE ENCOUNTER
Klonopin      Last Written Prescription Date:  5/17/2022  Last Fill Quantity: 90,   # refills: 0  Last Office Visit: 6/3/2022  Future Office visit:       Routing refill request to provider for review/approval because:  Drug not on the [facility], [facility] or [facility] refill protocol or controlled substance    Oxycodone      Last Written Prescription Date:  5/13/2022  Last Fill Quantity: 150,   # refills: 0  Last Office Visit: 6/3/2022  Future Office visit:       Routing refill request to provider for review/approval because:  Drug not on the [facility], [facility] or [facility] refill protocol or controlled substance

## 2022-06-09 ENCOUNTER — TELEPHONE (OUTPATIENT)
Dept: FAMILY MEDICINE | Facility: CLINIC | Age: 57
End: 2022-06-09
Payer: COMMERCIAL

## 2022-06-09 RX ORDER — CLONAZEPAM 0.5 MG/1
TABLET ORAL
Qty: 90 TABLET | Refills: 0 | Status: SHIPPED | OUTPATIENT
Start: 2022-06-09 | End: 2022-07-19

## 2022-06-09 RX ORDER — POTASSIUM CHLORIDE 750 MG/1
20 TABLET, EXTENDED RELEASE ORAL DAILY
Qty: 30 TABLET | Refills: 0 | Status: SHIPPED | OUTPATIENT
Start: 2022-06-09 | End: 2022-08-20

## 2022-06-09 RX ORDER — OXYCODONE HYDROCHLORIDE 10 MG/1
TABLET ORAL
Qty: 150 TABLET | Refills: 0 | Status: SHIPPED | OUTPATIENT
Start: 2022-06-09 | End: 2022-07-12

## 2022-06-09 NOTE — TELEPHONE ENCOUNTER
Patient is requesting a refill of potassium chloride ER 10. Medication is not in patient's med list and was prescribed in the ER. Please send new script if appropriate. Please contact the patient with any questions.    Thank you,  January Clement, [facility] [place]

## 2022-06-13 ENCOUNTER — TELEPHONE (OUTPATIENT)
Dept: FAMILY MEDICINE | Facility: CLINIC | Age: 57
End: 2022-06-13
Payer: COMMERCIAL

## 2022-06-13 LAB
7AMINOCLONAZEPAM UR QL CFM: PRESENT
OXYCODONE UR CFM-MCNC: 840 NG/ML
OXYCODONE/CREAT UR: 955 NG/MG {CREAT}
OXYMORPHONE UR CFM-MCNC: 163 NG/ML
OXYMORPHONE/CREAT UR: 185 NG/MG {CREAT}

## 2022-06-14 NOTE — TELEPHONE ENCOUNTER
Reason for Call:  Other appointment    Detailed comments: Patient calling in to schedule a phone visit with Dr. Schoen. Nothing available being offered. Patient is an established patient of Dr. Schoen.     Patient states he's having worsening stomach issues and needs meds reviewed/renewed.     Phone Number Patient can be reached at: Home number on file [phone] (home)    Best Time: Any time but patient states he does work overnights so please leave a message or schedule him and tell him when it is on the voicemail    Can we leave a detailed message on this number? YES    Call taken on 6/13/2022 at 7:56 PM by Conor Dean

## 2022-06-15 NOTE — TELEPHONE ENCOUNTER
We can do a 20 minute phone visit at 4:10 on 6/27.  Please call and offer.  Electronically signed by Greg Schoen, MD

## 2022-06-15 NOTE — TELEPHONE ENCOUNTER
I have attempted to call the pt with the following message. I left a message for pt to call back. I will call back another time. Lynn Barros, CMA

## 2022-06-27 ENCOUNTER — VIRTUAL VISIT (OUTPATIENT)
Dept: FAMILY MEDICINE | Facility: CLINIC | Age: 57
End: 2022-06-27
Payer: COMMERCIAL

## 2022-06-27 DIAGNOSIS — R19.7 DIARRHEA, UNSPECIFIED TYPE: Primary | ICD-10-CM

## 2022-06-27 DIAGNOSIS — K22.70 LONG-SEGMENT BARRETT'S ESOPHAGUS: ICD-10-CM

## 2022-06-27 DIAGNOSIS — F11.90 CHRONIC, CONTINUOUS USE OF OPIOIDS: ICD-10-CM

## 2022-06-27 DIAGNOSIS — G47.00 INSOMNIA, UNSPECIFIED TYPE: ICD-10-CM

## 2022-06-27 PROCEDURE — 99214 OFFICE O/P EST MOD 30 MIN: CPT | Mod: 95 | Performed by: FAMILY MEDICINE

## 2022-06-27 ASSESSMENT — ANXIETY QUESTIONNAIRES
6. BECOMING EASILY ANNOYED OR IRRITABLE: NOT AT ALL
7. FEELING AFRAID AS IF SOMETHING AWFUL MIGHT HAPPEN: NOT AT ALL
3. WORRYING TOO MUCH ABOUT DIFFERENT THINGS: NOT AT ALL
5. BEING SO RESTLESS THAT IT IS HARD TO SIT STILL: NOT AT ALL
2. NOT BEING ABLE TO STOP OR CONTROL WORRYING: NOT AT ALL
1. FEELING NERVOUS, ANXIOUS, OR ON EDGE: NOT AT ALL
4. TROUBLE RELAXING: NOT AT ALL
7. FEELING AFRAID AS IF SOMETHING AWFUL MIGHT HAPPEN: NOT AT ALL
8. IF YOU CHECKED OFF ANY PROBLEMS, HOW DIFFICULT HAVE THESE MADE IT FOR YOU TO DO YOUR WORK, TAKE CARE OF THINGS AT HOME, OR GET ALONG WITH OTHER PEOPLE?: NOT DIFFICULT AT ALL
GAD7 TOTAL SCORE: 0

## 2022-06-27 ASSESSMENT — PATIENT HEALTH QUESTIONNAIRE - PHQ9
SUM OF ALL RESPONSES TO PHQ QUESTIONS 1-9: 2
SUM OF ALL RESPONSES TO PHQ QUESTIONS 1-9: 2
10. IF YOU CHECKED OFF ANY PROBLEMS, HOW DIFFICULT HAVE THESE PROBLEMS MADE IT FOR YOU TO DO YOUR WORK, TAKE CARE OF THINGS AT HOME, OR GET ALONG WITH OTHER PEOPLE: NOT DIFFICULT AT ALL

## 2022-06-27 ASSESSMENT — ASTHMA QUESTIONNAIRES: ACT_TOTALSCORE: 25

## 2022-06-27 NOTE — PROGRESS NOTES
"Spenser is a 56 year old who is being evaluated via a billable telephone visit.      What phone number would you like to be contacted at? [phone]  How would you like to obtain your AVS? MyChart    Assessment & Plan     Diarrhea, unspecified type  Long-segment Olson's esophagus  Will check c. diff again and fecal lactoferrin to further investigate as well as see if we can move up his scopes as they are diagnostic and not screening.  No other changes in diet/plan of care.  He will come in for recheck of electrolytes as well within the next day or two to keep an eye on the potassium in particular as he states he is out of his potassium supplement and high potassium foods are hard for him to eat right now as they just aren't appealing.      Insomnia, unspecified type  Will have staff follow up on the prior referral and make sure he gets scheduled.     Chronic, continuous use of opioids  Will continue to fill these as we await his consultation with pain clinic in the next couple of weeks.           Return in about 4 weeks (around 7/25/2022) for using a phone visit.    Gregory G. Schoen, MD  [facility]            Subjective   Spenser is a 56 year old, presenting for the following health issues:  Telephone    History of Present Illness       Reason for visit:  Diarrhea     Today's PHQ-9         PHQ-9 Total Score: 2    PHQ-9 Q9 Thoughts of better off dead/self-harm past 2 weeks :   Not at all    How difficult have these problems made it for you to do your work, take care of things at home, or get along with other people: Not difficult at all  Today's NICOLETTE-7 Score: 0     Spenser states he is \"not doing too good.\"  States his diarrhea has gotten worse.  He always has only eaten one meal a day but now, no matter what he eats, within ten minutes he has explosive watery diarrhea.  He is mostly drinking plain water only as any other fluids, especially sweet fluids, don't taste good.  He is also belching a lot.  " Has not had any accidents but has come close to incontinence.  He notes he is will lay on his side for a half hour and if he can't fall asleep and rolls over, he immediately will trigger a strong colic reflex and has to run to the bathroom.  He currently is set up for colonoscopy as a screening test on 8/9/22 along with EGD to follow Olson's however he should be scheduled for a diagnostic in a sooner fashion.  States he is now down to 135 pounds with baseline around 170.  Feeling weak as well.      He also reports that he was never contacted for scheduling a sleep consult which was placed as an order on April 4/19/22.      Review of Systems   Const: weight loss as noted  HEENT: neg  RESP: stable   CVR: neg  GI: as above  MSK: still with diffuse back and neck issues triggering headaches. Does have a consult coming up with pain clinic in early August.       Objective           Vitals:  No vitals were obtained today due to virtual visit.    Physical Exam   healthy, alert and no distress  PSYCH: Alert and oriented times 3; coherent speech, normal   rate and volume, able to articulate logical thoughts, able   to abstract reason. His affect is normal  RESP: No cough, no audible wheezing, able to talk in full sentences  Remainder of exam unable to be completed due to telephone visits    Phone call duration: 14 minutes    .  ..

## 2022-06-28 PROBLEM — G47.00 INSOMNIA, UNSPECIFIED TYPE: Status: ACTIVE | Noted: 2022-06-28

## 2022-06-29 ENCOUNTER — LAB (OUTPATIENT)
Dept: LAB | Facility: OTHER | Age: 57
End: 2022-06-29
Payer: COMMERCIAL

## 2022-06-29 DIAGNOSIS — R19.7 DIARRHEA, UNSPECIFIED TYPE: ICD-10-CM

## 2022-06-29 LAB
ALBUMIN SERPL-MCNC: 3.9 G/DL (ref 3.4–5)
ALP SERPL-CCNC: 66 U/L (ref 40–150)
ALT SERPL W P-5'-P-CCNC: 32 U/L (ref 0–70)
ANION GAP SERPL CALCULATED.3IONS-SCNC: 5 MMOL/L (ref 3–14)
AST SERPL W P-5'-P-CCNC: 22 U/L (ref 0–45)
BILIRUB SERPL-MCNC: 0.4 MG/DL (ref 0.2–1.3)
BUN SERPL-MCNC: 6 MG/DL (ref 7–30)
CALCIUM SERPL-MCNC: 9.4 MG/DL (ref 8.5–10.1)
CHLORIDE BLD-SCNC: 106 MMOL/L (ref 94–109)
CO2 SERPL-SCNC: 28 MMOL/L (ref 20–32)
CREAT SERPL-MCNC: 1.19 MG/DL (ref 0.66–1.25)
GFR SERPL CREATININE-BSD FRML MDRD: 73 ML/MIN/1.73M2
GLUCOSE BLD-MCNC: 111 MG/DL (ref 70–99)
POTASSIUM BLD-SCNC: 3.6 MMOL/L (ref 3.4–5.3)
PROT SERPL-MCNC: 7.3 G/DL (ref 6.8–8.8)
SODIUM SERPL-SCNC: 139 MMOL/L (ref 133–144)

## 2022-06-29 PROCEDURE — 36415 COLL VENOUS BLD VENIPUNCTURE: CPT

## 2022-06-29 PROCEDURE — 80053 COMPREHEN METABOLIC PANEL: CPT

## 2022-06-30 ENCOUNTER — TELEPHONE (OUTPATIENT)
Dept: FAMILY MEDICINE | Facility: CLINIC | Age: 57
End: 2022-06-30

## 2022-06-30 ENCOUNTER — APPOINTMENT (OUTPATIENT)
Dept: LAB | Facility: OTHER | Age: 57
End: 2022-06-30
Payer: COMMERCIAL

## 2022-06-30 LAB
C DIFF TOX B STL QL: NEGATIVE
LACTOFERRIN STL QL IA: POSITIVE

## 2022-06-30 PROCEDURE — 83630 LACTOFERRIN FECAL (QUAL): CPT

## 2022-06-30 PROCEDURE — 87493 C DIFF AMPLIFIED PROBE: CPT

## 2022-06-30 NOTE — TELEPHONE ENCOUNTER
----- Message from Gregory G Schoen, MD sent at 6/27/2022  4:27 PM CDT -----  Spenser has a colonoscopy and EGD scheduled for 8/9 as routine but has lost about 20 pounds with persistent diarrhea and needs this done as diagnostic as soon as possible.   Electronically signed by Greg Schoen, MD

## 2022-06-30 NOTE — TELEPHONE ENCOUNTER
I called same day surgery to see if they could move up his colonoscopy and EGD. They moved him to 7/15 check in at 8:30 and procedure at 9:30. I tried calling pt and left a msg to call back to get the date and times.  Laurita Link MA

## 2022-07-11 DIAGNOSIS — M54.6 CHRONIC MIDLINE THORACIC BACK PAIN: ICD-10-CM

## 2022-07-11 DIAGNOSIS — M47.812 ARTHROPATHY OF CERVICAL FACET JOINT: ICD-10-CM

## 2022-07-11 DIAGNOSIS — G89.4 CHRONIC PAIN SYNDROME: ICD-10-CM

## 2022-07-11 DIAGNOSIS — F11.90 CHRONIC, CONTINUOUS USE OF OPIOIDS: ICD-10-CM

## 2022-07-11 DIAGNOSIS — M79.18 MYOFASCIAL PAIN: ICD-10-CM

## 2022-07-11 DIAGNOSIS — M54.2 CERVICALGIA: ICD-10-CM

## 2022-07-11 DIAGNOSIS — G89.29 CHRONIC MIDLINE THORACIC BACK PAIN: ICD-10-CM

## 2022-07-12 RX ORDER — OXYCODONE HYDROCHLORIDE 10 MG/1
TABLET ORAL
Qty: 150 TABLET | Refills: 0 | Status: SHIPPED | OUTPATIENT
Start: 2022-07-12 | End: 2022-08-11

## 2022-07-12 NOTE — TELEPHONE ENCOUNTER
Roxicodone      Last Written Prescription Date:  6/9/2022  Last Fill Quantity: 150,   # refills: 0  Last Office Visit: 6/27/2022  Future Office visit:       Routing refill request to provider for review/approval because:  Drug not on the [facility], [facility] or [facility] refill protocol or controlled substance    Tuan Lozano RN
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
## 2022-07-15 ENCOUNTER — ANESTHESIA (OUTPATIENT)
Dept: GASTROENTEROLOGY | Facility: CLINIC | Age: 57
End: 2022-07-15
Payer: COMMERCIAL

## 2022-07-15 ENCOUNTER — HOSPITAL ENCOUNTER (OUTPATIENT)
Facility: CLINIC | Age: 57
Discharge: HOME | End: 2022-07-15
Attending: SPECIALIST | Admitting: SPECIALIST
Payer: COMMERCIAL

## 2022-07-15 ENCOUNTER — ANESTHESIA EVENT (OUTPATIENT)
Dept: GASTROENTEROLOGY | Facility: CLINIC | Age: 57
End: 2022-07-15
Payer: COMMERCIAL

## 2022-07-15 VITALS
TEMPERATURE: 98.4 F | SYSTOLIC BLOOD PRESSURE: 139 MMHG | OXYGEN SATURATION: 99 % | DIASTOLIC BLOOD PRESSURE: 76 MMHG | HEART RATE: 85 BPM | RESPIRATION RATE: 16 BRPM

## 2022-07-15 LAB
COLONOSCOPY: NORMAL
UPPER GI ENDOSCOPY: NORMAL

## 2022-07-15 PROCEDURE — 250N000009 HC RX 250: Performed by: NURSE ANESTHETIST, CERTIFIED REGISTERED

## 2022-07-15 PROCEDURE — 45380 COLONOSCOPY AND BIOPSY: CPT | Performed by: SPECIALIST

## 2022-07-15 PROCEDURE — 88305 TISSUE EXAM BY PATHOLOGIST: CPT | Mod: TC | Performed by: SPECIALIST

## 2022-07-15 PROCEDURE — 370N000017 HC ANESTHESIA TECHNICAL FEE, PER MIN: Performed by: SPECIALIST

## 2022-07-15 PROCEDURE — 43239 EGD BIOPSY SINGLE/MULTIPLE: CPT | Performed by: SPECIALIST

## 2022-07-15 PROCEDURE — 250N000011 HC RX IP 250 OP 636: Performed by: NURSE ANESTHETIST, CERTIFIED REGISTERED

## 2022-07-15 PROCEDURE — 43239 EGD BIOPSY SINGLE/MULTIPLE: CPT | Mod: 51 | Performed by: SPECIALIST

## 2022-07-15 PROCEDURE — 258N000003 HC RX IP 258 OP 636: Performed by: SPECIALIST

## 2022-07-15 PROCEDURE — 250N000009 HC RX 250: Performed by: SPECIALIST

## 2022-07-15 PROCEDURE — 88305 TISSUE EXAM BY PATHOLOGIST: CPT | Mod: 26 | Performed by: PATHOLOGY

## 2022-07-15 RX ORDER — PROPOFOL 10 MG/ML
INJECTION, EMULSION INTRAVENOUS PRN
Status: DISCONTINUED | OUTPATIENT
Start: 2022-07-15 | End: 2022-07-15

## 2022-07-15 RX ORDER — SODIUM CHLORIDE, SODIUM LACTATE, POTASSIUM CHLORIDE, CALCIUM CHLORIDE 600; 310; 30; 20 MG/100ML; MG/100ML; MG/100ML; MG/100ML
INJECTION, SOLUTION INTRAVENOUS CONTINUOUS
Status: DISCONTINUED | OUTPATIENT
Start: 2022-07-15 | End: 2022-07-15 | Stop reason: HOSPADM

## 2022-07-15 RX ORDER — LIDOCAINE 40 MG/G
CREAM TOPICAL
Status: DISCONTINUED | OUTPATIENT
Start: 2022-07-15 | End: 2022-07-15 | Stop reason: HOSPADM

## 2022-07-15 RX ORDER — LIDOCAINE HYDROCHLORIDE 20 MG/ML
INJECTION, SOLUTION INFILTRATION; PERINEURAL PRN
Status: DISCONTINUED | OUTPATIENT
Start: 2022-07-15 | End: 2022-07-15

## 2022-07-15 RX ORDER — PROPOFOL 10 MG/ML
INJECTION, EMULSION INTRAVENOUS CONTINUOUS PRN
Status: DISCONTINUED | OUTPATIENT
Start: 2022-07-15 | End: 2022-07-15

## 2022-07-15 RX ADMIN — LIDOCAINE HYDROCHLORIDE 50 MG: 20 INJECTION, SOLUTION INFILTRATION; PERINEURAL at 09:53

## 2022-07-15 RX ADMIN — PROPOFOL 50 MG: 10 INJECTION, EMULSION INTRAVENOUS at 09:52

## 2022-07-15 RX ADMIN — LIDOCAINE HYDROCHLORIDE 1 ML: 10 INJECTION, SOLUTION EPIDURAL; INFILTRATION; INTRACAUDAL; PERINEURAL at 08:56

## 2022-07-15 RX ADMIN — LIDOCAINE HYDROCHLORIDE 100 MG: 20 INJECTION, SOLUTION INFILTRATION; PERINEURAL at 09:52

## 2022-07-15 RX ADMIN — SODIUM CHLORIDE, POTASSIUM CHLORIDE, SODIUM LACTATE AND CALCIUM CHLORIDE: 600; 310; 30; 20 INJECTION, SOLUTION INTRAVENOUS at 09:06

## 2022-07-15 RX ADMIN — PROPOFOL 150 MCG/KG/MIN: 10 INJECTION, EMULSION INTRAVENOUS at 09:54

## 2022-07-15 RX ADMIN — PROPOFOL 50 MG: 10 INJECTION, EMULSION INTRAVENOUS at 09:53

## 2022-07-15 ASSESSMENT — LIFESTYLE VARIABLES: TOBACCO_USE: 1

## 2022-07-15 ASSESSMENT — COPD QUESTIONNAIRES: COPD: 1

## 2022-07-15 NOTE — DISCHARGE INSTRUCTIONS
[facility]    Home Care Following Endoscopy          Activity:  You have just undergone an endoscopic procedure usually performed with conscious sedation.  Do not work or operate machinery (including a car) for at least 12 hours.    I encourage you to walk and attempt to pass this air as soon as possible.    Diet:  Return to the diet you were on before your procedure but eat lightly for the first 12-24 hours.  Drink plenty of water.  Resume any regular medications unless otherwise advised by your physician.  Please begin any new medication prescribed as a result of your procedure as directed by your physician.   If you had any biopsy or polyp removed please refrain from aspirin or aspirin products for 2 days.  If on Coumadin please restart as instructed by your physician.   Pain:  You may take Tylenol as needed for pain.  Expected Recovery:  You can expect some mild abdominal fullness and/or discomfort due to the air used to inflate your intestinal tract. It is also normal to have a mild sore throat after upper endoscopy.    Call Your Physician if You Have:  After Upper Endoscopy:  Shoulder, back or chest pain.  Difficulty breathing or swallowing.  Vomiting blood.  After Colonoscopy:  Worsening persisting abdominal pain which is worse with activity.  Fevers (>101 degrees F), chills or shakes.  Passage of continued blood with bowel movements.   Any questions or concerns about your recovery, please call [phone] or after hours [phone] Nurse Advice Line.    Follow-up Care:  You did have polyps/biopsy tissue sample(s) removed.  The polyps/biopsy tissue sample(s) will be sent to pathology.  You should receive letter in your My Chart from Dr. Casey with your results within 1-2 weeks. If you do not participate in My Chart a physical letter will come in the mail in 2-3 weeks.  Please call if you have not received a notification of your results.  If asked to return to clinic please make an  appointment 1 week after your procedure.  Call [phone].
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
## 2022-07-15 NOTE — ANESTHESIA CARE TRANSFER NOTE
Patient: Joselito Abarca    Procedure: Procedure(s):  COLONOSCOPY, WITH POLYPECTOMY AND BIOPSY  ESOPHAGOGASTRODUODENOSCOPY, WITH BIOPSY       Diagnosis: Olson's esophagus with dysplasia [K22.719]  Weight loss [R63.4]  Diarrhea, unspecified type [R19.7]  Diagnosis Additional Information: No value filed.    Anesthesia Type:   MAC     Note:    Oropharynx: oropharynx clear of all foreign objects and spontaneously breathing  Level of Consciousness: drowsy  Oxygen Supplementation: face mask    Independent Airway: airway patency satisfactory and stable  Dentition: dentition unchanged  Vital Signs Stable: post-procedure vital signs reviewed and stable  Report to RN Given: handoff report given  Patient transferred to: Phase II    Handoff Report: Identifed the Patient, Identified the Reponsible Provider, Reviewed the pertinent medical history, Discussed the surgical course, Reviewed Intra-OP anesthesia mangement and issues during anesthesia, Set expectations for post-procedure period and Allowed opportunity for questions and acknowledgement of understanding      Vitals:  Vitals Value Taken Time   BP     Temp     Pulse     Resp     SpO2         Electronically Signed By: CORA Chung CRNA  July 15, 2022  10:30 AM

## 2022-07-15 NOTE — ANESTHESIA POSTPROCEDURE EVALUATION
Patient: Joselito Abarca    Procedure: Procedure(s):  COLONOSCOPY, WITH POLYPECTOMY AND BIOPSY  ESOPHAGOGASTRODUODENOSCOPY, WITH BIOPSY       Anesthesia Type:  MAC    Note:  Disposition: Outpatient   Postop Pain Control: Uneventful            Sign Out: Well controlled pain   PONV: No   Neuro/Psych: Uneventful            Sign Out: Acceptable/Baseline neuro status   Airway/Respiratory: Uneventful            Sign Out: Acceptable/Baseline resp. status   CV/Hemodynamics: Uneventful            Sign Out: Acceptable CV status   Other NRE: NONE   DID A NON-ROUTINE EVENT OCCUR? No    Event details/Postop Comments:  Pt was happy with anesthesia care.  No complications.  I will follow up with the pt if needed.           Last vitals:  Vitals Value Taken Time   /74 07/15/22 1040   Temp     Pulse 70 07/15/22 1040   Resp 14 07/15/22 1030   SpO2 100 % 07/15/22 1048   Vitals shown include unvalidated device data.    Electronically Signed By: CORA Chung CRNA  July 15, 2022  10:49 AM

## 2022-07-18 DIAGNOSIS — F41.1 GENERALIZED ANXIETY DISORDER: ICD-10-CM

## 2022-07-18 LAB
PATH REPORT.COMMENTS IMP SPEC: NORMAL
PATH REPORT.COMMENTS IMP SPEC: NORMAL
PATH REPORT.FINAL DX SPEC: NORMAL
PATH REPORT.GROSS SPEC: NORMAL
PATH REPORT.MICROSCOPIC SPEC OTHER STN: NORMAL
PATH REPORT.RELEVANT HX SPEC: NORMAL
PHOTO IMAGE: NORMAL

## 2022-07-19 RX ORDER — CLONAZEPAM 0.5 MG/1
TABLET ORAL
Qty: 90 TABLET | Refills: 0 | Status: SHIPPED | OUTPATIENT
Start: 2022-07-19 | End: 2022-08-17

## 2022-07-19 NOTE — TELEPHONE ENCOUNTER
Clonazepam      Last Written Prescription Date:  6/9/2022  Last Fill Quantity: 90,   # refills: 0  Last Office Visit: 6/27/2022  Future Office visit:       Routing refill request to provider for review/approval because:  Drug not on the [facility], [facility] or [facility] refill protocol or controlled substance    Tuan Lozano RN

## 2022-08-04 NOTE — TELEPHONE ENCOUNTER
RECORDS RECEIVED FROM: Internal   REASON FOR VISIT: Establish care, pain ref for Chronic pain syndrome [G89.4], Generalized anxiety disorder [F41.1], Insomnia, unspecified type [G47.00]   Date of Appt: 08/18/2022   NOTES (FOR ALL VISITS) STATUS DETAILS   OFFICE NOTE from referring provider Internal 04/19/2022 Dr Shoen [facility]    OFFICE NOTE from other specialist Internal 01/26/2022 Dr Bhandari my chart message    DISCHARGE SUMMARY from hospital N/A    DISCHARGE REPORT from the ER N/A    OPERATIVE REPORT Internal 10/18/19 right C2,3,4 RFA  11/14/19 LEFT C2,3,4 RFA  12/09/2021 BILATERAL OCCIPITAL NERVE BLOCK     MEDICATION LIST N/A    IMAGING  (FOR ALL VISITS)     EMG N/A    EEG N/A    LUMBAR PUNCTURE N/A    ALISIA SCAN N/A    ULTRASOUND (CAROTID BILAT) *VASCULAR* N/A    MRI (HEAD, NECK, SPINE) Internal 07/30/2021 cervical spine   CT (HEAD, NECK, SPINE) Internal 07/30/2021 cervical spine

## 2022-08-08 DIAGNOSIS — M79.18 MYOFASCIAL PAIN: ICD-10-CM

## 2022-08-08 DIAGNOSIS — M47.812 ARTHROPATHY OF CERVICAL FACET JOINT: ICD-10-CM

## 2022-08-08 DIAGNOSIS — M54.2 CERVICALGIA: ICD-10-CM

## 2022-08-08 DIAGNOSIS — G89.4 CHRONIC PAIN SYNDROME: ICD-10-CM

## 2022-08-08 DIAGNOSIS — F11.90 CHRONIC, CONTINUOUS USE OF OPIOIDS: ICD-10-CM

## 2022-08-08 DIAGNOSIS — M54.6 CHRONIC MIDLINE THORACIC BACK PAIN: ICD-10-CM

## 2022-08-08 DIAGNOSIS — G89.29 CHRONIC MIDLINE THORACIC BACK PAIN: ICD-10-CM

## 2022-08-10 NOTE — TELEPHONE ENCOUNTER
Requested Prescriptions   Pending Prescriptions Disp Refills     oxyCODONE IR (ROXICODONE) 10 MG tablet [Pharmacy Med Name: OXYCODONE HCL 10MG TABS] 150 tablet 0     Sig: TAKE ONE TABLET BY MOUTH EVERY 4 HOURS AS NEEDED FOR SEVERE PAIN     Last Written Prescription Date:  07/12/2022  Last Fill Quantity: 150,   # refills: 0  Last Office Visit: 06/27/2022  Future Office visit:    Next 5 appointments (look out 90 days)    Aug 19, 2022  4:30 PM  (Arrive by 4:10 PM)  Provider Visit with Gregory G Schoen, MD  [facility] ([facility] ) [address] [place] [id number] [phone]           Routing refill request to provider for review/approval because:  Drug not on the [facility], [facility] or [facility] refill protocol or controlled substance

## 2022-08-11 RX ORDER — OXYCODONE HYDROCHLORIDE 10 MG/1
TABLET ORAL
Qty: 150 TABLET | Refills: 0 | Status: SHIPPED | OUTPATIENT
Start: 2022-08-11 | End: 2022-09-08

## 2022-08-16 DIAGNOSIS — F41.1 GENERALIZED ANXIETY DISORDER: ICD-10-CM

## 2022-08-16 NOTE — TELEPHONE ENCOUNTER
Pending Prescriptions:                       Disp   Refills    clonazePAM (KLONOPIN) 0.5 MG tablet [Pharm*90 tab*0        Sig: TAKE ONE-HALF TO ONE TABLET BY MOUTH THREE TIMES A           DAY AS NEEDED FOR ANXIETY    Routing refill request to provider for review/approval because:  Drug not on the [facility] refill protocol

## 2022-08-17 RX ORDER — CLONAZEPAM 0.5 MG/1
TABLET ORAL
Qty: 90 TABLET | Refills: 0 | Status: SHIPPED | OUTPATIENT
Start: 2022-08-17 | End: 2022-09-19

## 2022-08-18 ENCOUNTER — OFFICE VISIT (OUTPATIENT)
Dept: PALLIATIVE MEDICINE | Facility: CLINIC | Age: 57
End: 2022-08-18
Attending: FAMILY MEDICINE
Payer: COMMERCIAL

## 2022-08-18 ENCOUNTER — PRE VISIT (OUTPATIENT)
Dept: PALLIATIVE MEDICINE | Facility: CLINIC | Age: 57
End: 2022-08-18

## 2022-08-18 VITALS
HEART RATE: 75 BPM | WEIGHT: 136.1 LBS | SYSTOLIC BLOOD PRESSURE: 98 MMHG | BODY MASS INDEX: 20.1 KG/M2 | DIASTOLIC BLOOD PRESSURE: 62 MMHG

## 2022-08-18 DIAGNOSIS — F41.1 GENERALIZED ANXIETY DISORDER: ICD-10-CM

## 2022-08-18 DIAGNOSIS — G89.4 CHRONIC PAIN SYNDROME: ICD-10-CM

## 2022-08-18 DIAGNOSIS — G47.00 INSOMNIA, UNSPECIFIED TYPE: ICD-10-CM

## 2022-08-18 PROCEDURE — 99203 OFFICE O/P NEW LOW 30 MIN: CPT

## 2022-08-18 ASSESSMENT — PAIN SCALES - GENERAL: PAINLEVEL: MODERATE PAIN (4)

## 2022-08-18 NOTE — PATIENT INSTRUCTIONS
A/P:  Patient is a 58 y/o man with neck pain (longstanding).  Review of his MRI (2021) reveals significant facet arthropathy and neuroforaminal stenosis (multilevel).   He has had several cervical spine surgeries, in addition to spinal injections by one provider and medication management by his PCM.  He presents today, it seems, for me to assume prescribing his opioids.  There are three ways to help to manage this gentleman's pain most appropriately.  1.  The interventional physician who is currently performing spinal injections can provide the multidisciplinary service and prescribe medicatoions, if he/she feels that it is appropriate.  2.  The patient can continue with the current way of doing things with the PCM prescribing and the interventionalist performing injections  3.  Patient may consider moving his care to this location which would entail receiving spinal injections and medication management through our facility as deemed appropriate.  Adjustments in treatments would occur based on the patient's physical status (improvement or decline in pain).  We do not take over opioid prescribing.  If the patient decides to move his care here, we would like records from Dr Driscoll.  Patient states he would like to consider his options.    Follow up:       If you decide to follow with Dr. Sumner, you can call us to set up an appointment.    To speak with a nurse, schedule/reschedule/cancel a clinic appointment, or request a medication refill call: [phone].    You can also reach us by MyChart: [email or web address]
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
## 2022-08-19 ENCOUNTER — VIRTUAL VISIT (OUTPATIENT)
Dept: FAMILY MEDICINE | Facility: CLINIC | Age: 57
End: 2022-08-19
Payer: COMMERCIAL

## 2022-08-19 DIAGNOSIS — F17.200 TOBACCO DEPENDENCE SYNDROME: ICD-10-CM

## 2022-08-19 DIAGNOSIS — K22.70 LONG-SEGMENT BARRETT'S ESOPHAGUS: ICD-10-CM

## 2022-08-19 DIAGNOSIS — K52.832 LYMPHOCYTIC COLITIS: Primary | ICD-10-CM

## 2022-08-19 DIAGNOSIS — F11.90 CHRONIC, CONTINUOUS USE OF OPIOIDS: ICD-10-CM

## 2022-08-19 PROCEDURE — 99214 OFFICE O/P EST MOD 30 MIN: CPT | Performed by: FAMILY MEDICINE

## 2022-08-19 RX ORDER — OMEPRAZOLE 40 MG/1
40 CAPSULE, DELAYED RELEASE ORAL DAILY
Qty: 90 CAPSULE | Refills: 3 | Status: SHIPPED | OUTPATIENT
Start: 2022-08-19 | End: 2023-11-13

## 2022-08-19 RX ORDER — VARENICLINE TARTRATE 1 MG/1
1 TABLET, FILM COATED ORAL 2 TIMES DAILY
Qty: 60 TABLET | Refills: 3 | Status: SHIPPED | OUTPATIENT
Start: 2022-08-19 | End: 2024-02-20

## 2022-08-19 NOTE — PROGRESS NOTES
Spenser is a 57 year old who is being evaluated via a billable telephone visit.      What phone number would you like to be contacted at? [phone]  How would you like to obtain your AVS? Maglaie    Assessment & Plan     Lymphocytic colitis  Discussed treatment for this typically taking about 8+ weeks for full treatment but should see some response to treatment in regard to decreased stooling, etc in the first couple of weeks.  His insurance does not cover the 3mg tabs but does 4 mg tabs, so will treat with 8mg daily rather then the recommended 9mg for a full 8 weeks, then begin a taper if doing well.  He will notify me in two weeks if he sees no benefit and then consider addition of or switch to mesalamine and referral to GI.    - Budesonide 4 MG CPDR; Take 8 mg by mouth daily    Long-segment Olson's esophagus  Will increase his prilosec to 40 mg daily indefinitely and will need a repeat EGD in one year.   - omeprazole (PRILOSEC) 40 MG DR capsule; Take 1 capsule (40 mg) by mouth daily    Chronic, continuous use of opioids  Discussed my discomfort of keeping him on current dose on combination with his clonazepam.  He has a good relationship with Dr. Driscoll and doesn't want to make a full change over to Dr. Sumner in [place] but I did tell Spenser I think it is best that he have one provider in charge of his pain procedures and medications.  I will connect with Dr. Driscoll to see if he would be willing to take on his pain med management in addition to the injections he is doing and then based on that, get back to Spenser regarding his options.      Tobacco dependence syndrome  Willing/interested in smoking cessation at this time and feels motivated.  Rx for chantix sent but suggested that he wait on this until he has started budesonide and higher dose of omeprazole to be sure we aren't having side effects with unclear source due to starting multiple meds simultaneously.    - varenicline (CHANTIX SAMRA) 0.5 MG X 11 & 1 MG  X 42 tablet; Take 0.5 mg tab daily for 3 days, THEN 0.5 mg tab twice daily for 4 days, THEN 1 mg twice daily.  - varenicline (CHANTIX) 1 MG tablet; Take 1 tablet (1 mg) by mouth 2 times daily      Nicotine/Tobacco Cessation:  He reports that he has been smoking cigars and cigarettes. He has a 15.00 pack-year smoking history. He quit smokeless tobacco use about 10 years ago.  Nicotine/Tobacco Cessation Plan:   Pharmacotherapies : varenicline (Chantix)          Return in about 4 weeks (around 9/16/2022) for using a phone visit.    Gregory G. Schoen, MD  [facility]    Alisson Dueñas is a 57 year old, presenting for the following health issues:  No chief complaint on file.      History of Present Illness       Reason for visit:  My test results    He eats 0-1 servings of fruits and vegetables daily.He consumes 3 sweetened beverage(s) daily.He exercises with enough effort to increase his heart rate 60 or more minutes per day.  He exercises with enough effort to increase his heart rate 7 days per week.   He is taking medications regularly.     Spenser states that he is doing okay.  He wanted to talk today regarding his visit with Dr. Sumner at the pain clinic. Dr. Sumner is willing to take over full pain management, inclusive of pain meds as well as injections and other therapies, or would do injections alone but is not willing to do pharmacologic pain management alone.      He had his EGD and colonoscopy about a month ago and wishes to follow up on those results.  Reports with pathology specimens were consistent with Olson's esophagitis on the upper end and microscopic/collagenous/lymphocytic colitis on the lower end.  These do need to be addressed and treated at this time.  He has been taking 20mg omeprazole and denies heart burn.  Stools continue to be frequent, multiple per day and watery.  Denies blood in stools.  Does have some chronic, achy abdominal pain that makes it difficult to eat and  has been losing weight.  He continues to smoke but would like to quit as this would be beneficial for him regarding his esophageal issues as well as lung problems.  He has taken Chantix in the past without side effects and did quit in the past so feels confident he can do that again.      Review of Systems   CONSTITUTIONAL: NEGATIVE for fever, chills, change in weight  ENT/MOUTH: NEGATIVE for ear, mouth and throat problems  RESP: NEGATIVE for significant cough or SOB  CV: NEGATIVE for chest pain, palpitations or peripheral edema      Objective           Vitals:  No vitals were obtained today due to virtual visit.    Physical Exam   healthy, alert and no distress  PSYCH: Alert and oriented times 3; coherent speech, normal   rate and volume, able to articulate logical thoughts, able   to abstract reason, no tangential thoughts, no hallucinations   or delusions  His affect is normal  RESP: No cough, no audible wheezing, able to talk in full sentences  Remainder of exam unable to be completed due to telephone visits      Phone call duration: 26 minutes    .  ..

## 2022-08-22 ENCOUNTER — TELEPHONE (OUTPATIENT)
Dept: FAMILY MEDICINE | Facility: CLINIC | Age: 57
End: 2022-08-22

## 2022-08-22 DIAGNOSIS — K52.832 LYMPHOCYTIC COLITIS: Primary | ICD-10-CM

## 2022-08-22 NOTE — TELEPHONE ENCOUNTER
PA needed on: tarpeyo ( budesonide 4 mg delayed release )   Insurance: [employer] scripts   Ins. Phone: [phone]  Patient ID:[id number]  PCN:  N/a   BIN: [id number]   Group: [id number]     Please let us know when PA is granted/denied. Thank You      [facility], [place]

## 2022-08-22 NOTE — LETTER
[address]   [address]  Tel. [phone] / Fax [phone]    August 24, 2022      Regarding:    Joselito Abarca  [address]   [place] [address]        To Whom it May Concern:    Joselito has been diagnosed with lymphocytic colitis (also called microscopic or collagenous colitis) based on colonoscopy and biopsy proven results.  Per Up To Date, treatment of symptomatic colitis, defined as greater/equal to 3 stools per day or greater/equal to 1 water loss stool per day, budesonide oral tablets, 9 mg daily for 8 weeks is the current recommended treatment.      Patient has been suffering from water loss stools for over 4 months now and also has a documented weight decrease from 165 on 5/17/22 to 118 pounds on 8/18/2022.  It seems prudent that we treat him aggressively with the current best recommendations for treatment of this disorder.      Please feel free to contact me with any questions.    Sincerely,         Greg Schoen, MD

## 2022-08-22 NOTE — TELEPHONE ENCOUNTER
PRIOR AUTHORIZATION DENIED    Medication: Budesonide 4 MG CP - EPA DENIED    Denial Date: 8/22/2022    Denial Rational:     Appeal Information:

## 2022-08-23 DIAGNOSIS — G47.00 INSOMNIA, UNSPECIFIED TYPE: ICD-10-CM

## 2022-08-24 NOTE — TELEPHONE ENCOUNTER
I letter of appeal is in the patient's medical record in the letter tab.  Patient has a biopsy proven diagnosis if lymphocytic colitis and current research supports oral budesonide as the best treatment for symptomatic disease. Please print/send that letter for his appeal.   Electronically signed by Greg Schoen, MD

## 2022-08-25 NOTE — TELEPHONE ENCOUNTER
Medication Appeal Initiation    We have initiated an appeal for the requested medication:  Medication: Budesonide 4 MG CP - APPEAL INITIATED  Appeal Start Date:  8/25/2022  Insurance Company: [employer] - Phone [phone] Fax [phone]  Comments:  CASE# [id number]

## 2022-08-25 NOTE — TELEPHONE ENCOUNTER
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   She cancelled appeal request since it will not be covered

## 2022-08-26 ENCOUNTER — TELEPHONE (OUTPATIENT)
Dept: FAMILY MEDICINE | Facility: CLINIC | Age: 57
End: 2022-08-26

## 2022-08-26 RX ORDER — BUDESONIDE 3 MG/1
9 CAPSULE, COATED PELLETS ORAL EVERY MORNING
Qty: 90 CAPSULE | Refills: 3 | Status: SHIPPED | OUTPATIENT
Start: 2022-08-26 | End: 2023-05-25

## 2022-08-26 RX ORDER — TRAZODONE HYDROCHLORIDE 50 MG/1
50-100 TABLET, FILM COATED ORAL AT BEDTIME
Qty: 60 TABLET | Refills: 1 | Status: SHIPPED | OUTPATIENT
Start: 2022-08-26 | End: 2022-10-26

## 2022-08-26 NOTE — TELEPHONE ENCOUNTER
Prior Authorization Retail Medication Request    Medication/Dose: Budesonide 2 mg   ICD code (if different than what is on RX):    Previously Tried and Failed:    Rationale:  Product non-formulary - prior authorization required     Insurance Name:  [employer]   Insurance ID:  [id number]      Pharmacy Information (if different than what is on RX)  Name:    Phone:

## 2022-08-26 NOTE — TELEPHONE ENCOUNTER
"For awareness, Epic programming indicated that only the 4 mg dose was covered and the 3 mg dose was \"not on formulary\" yet it was the covered dose. New Rx sent with the actual desired 3mg dose at 9 mg daily.   Electronically signed by Greg Schoen, MD    "

## 2022-08-26 NOTE — TELEPHONE ENCOUNTER
Routing refill request to provider for review/approval because:  Drug interaction warning  High    Drug-Drug: traZODone and DULoxetine  Serotonergic effects of trazodone and serotonin/norepinephrine reuptake inhibitors (SNRIs) may be additive. The risk of serotonin syndrome/toxicity may be increased.        MAXIMUS MaryN, RN

## 2022-08-29 NOTE — TELEPHONE ENCOUNTER
Central Prior Authorization Team  Phone: [phone]    PA Initiation    Medication: Budesonide 3 MG EC capsule  Insurance Company: [employer] - Phone [phone] Fax [phone]  Pharmacy Filling the Rx: [address]   Filling Pharmacy Phone: [phone]  Filling Pharmacy Fax:    Start Date: 8/29/2022

## 2022-08-29 NOTE — TELEPHONE ENCOUNTER
Prior Authorization Approval    Authorization Effective Date: 8/15/2022  Authorization Expiration Date: 10/29/2022  Medication: Budesonide 3 MG EC capsule- APPROVED   Approved Dose/Quantity:   Reference #:     Insurance Company: [employer] - Phone [phone] Fax [phone]  Expected CoPay:       CoPay Card Available:      Foundation Assistance Needed:    Which Pharmacy is filling the prescription (Not needed for infusion/clinic administered): [facility] [address]   Pharmacy Notified: Yes  Patient Notified:  **Instructed pharmacy to notify patient when script is ready to /ship.**

## 2022-09-07 DIAGNOSIS — G89.4 CHRONIC PAIN SYNDROME: ICD-10-CM

## 2022-09-07 DIAGNOSIS — G89.29 CHRONIC MIDLINE THORACIC BACK PAIN: ICD-10-CM

## 2022-09-07 DIAGNOSIS — M54.6 CHRONIC MIDLINE THORACIC BACK PAIN: ICD-10-CM

## 2022-09-07 DIAGNOSIS — M47.812 ARTHROPATHY OF CERVICAL FACET JOINT: ICD-10-CM

## 2022-09-07 DIAGNOSIS — M79.18 MYOFASCIAL PAIN: ICD-10-CM

## 2022-09-07 DIAGNOSIS — F11.90 CHRONIC, CONTINUOUS USE OF OPIOIDS: ICD-10-CM

## 2022-09-07 DIAGNOSIS — M54.2 CERVICALGIA: ICD-10-CM

## 2022-09-08 RX ORDER — OXYCODONE HYDROCHLORIDE 10 MG/1
TABLET ORAL
Qty: 150 TABLET | Refills: 0 | Status: SHIPPED | OUTPATIENT
Start: 2022-09-08 | End: 2022-10-07

## 2022-09-08 NOTE — TELEPHONE ENCOUNTER
Requested Prescriptions   Pending Prescriptions Disp Refills     oxyCODONE IR (ROXICODONE) 10 MG tablet [Pharmacy Med Name: OXYCODONE HCL 10MG TABS] 150 tablet 0     Sig: TAKE ONE TABLET BY MOUTH EVERY 4 HOURS AS NEEDED FOR SEVERE PAIN     Last Written Prescription Date:  01/14/2022  Last Fill Quantity: 6,   # refills: 0  Last Office Visit: 08/19/2022  Future Office visit:       Routing refill request to provider for review/approval because:  Drug not on the [facility], P or [facility] refill protocol or controlled substance

## 2022-09-14 DIAGNOSIS — F41.1 GENERALIZED ANXIETY DISORDER: ICD-10-CM

## 2022-09-16 NOTE — TELEPHONE ENCOUNTER
Requested Prescriptions   Pending Prescriptions Disp Refills    clonazePAM (KLONOPIN) 0.5 MG tablet [Pharmacy Med Name: CLONAZEPAM 0.5MG TABS] 90 tablet 0     Sig: TAKE ONE-HALF TO ONE TABLET BY MOUTH THREE TIMES A DAY AS NEEDED FOR ANXIETY        There is no refill protocol information for this order             MAXIMUS MaryN, RN

## 2022-09-19 ENCOUNTER — TELEPHONE (OUTPATIENT)
Dept: FAMILY MEDICINE | Facility: CLINIC | Age: 57
End: 2022-09-19

## 2022-09-19 RX ORDER — CLONAZEPAM 0.5 MG/1
TABLET ORAL
Qty: 90 TABLET | Refills: 0 | Status: SHIPPED | OUTPATIENT
Start: 2022-09-19 | End: 2022-10-18

## 2022-09-19 NOTE — DISCHARGE INSTRUCTIONS
1.  Please apply heat to your sore muscles for 20 minutes every 1-2 hours as needed to help loosen up the muscle spasms.    It was a pleasure working with you today!  I am glad that your neck muscles are feeling better with the trigger point injections!   
Home

## 2022-09-20 NOTE — TELEPHONE ENCOUNTER
Spoke to Spenser and read him the message. He would like to stick with Dr. Driscoll. I gave him the number to call to schedule an appointment. He is okay with tapering off of the clonazepam, but is nervous that a new medication will not work as well for his anxiety.

## 2022-09-20 NOTE — TELEPHONE ENCOUNTER
Please schedule Spenser for an appointment (virtual okay) to discuss tapering off clonazepam and starting a different medication for his anxiety.  We will need to cut back on the clonazepam slowly.    Electronically signed by Greg Schoen, MD

## 2022-09-20 NOTE — TELEPHONE ENCOUNTER
Please contact Spenser and inform him that I did get a message back from Dr. Driscoll regarding managing his pain medications and his injections.  He said Spenser would need to be seen in their [place] office to manage his pain medications as they do required urine testing.  He also stated he would not give pain medication along with clonazepam but he would be happy to have his team take on pain medication and injection management, but we would need to find a different medication for anxiety.  If interested in that, the [facility] office number is [phone].    Dr. Sumner in [place] indicated he would only be willing to take on pain medication management if he also was doing all of his pain procedures.      I agree and advised Spenser that I felt it was necessary and appropriate for one comprehensive pain management team provide all of his care and prescribing in order to have the safest and most effective program.  He is at high risk taking both narcotic pain meds and clonazepam so needs to see a pain clinic. At this point he needs to determine if he is going to stay with Dr. Driscoll (and need to stop taking clonazepam, which we would taper off) or see Dr. Sumner going forward.  His third option is to find another pain clinic of his choice but regardless of his decision, I will only continue with his refills for another month or two so he can get established with a pain clinic.      Electronically signed by Greg Schoen, MD

## 2022-10-07 ENCOUNTER — VIRTUAL VISIT (OUTPATIENT)
Dept: FAMILY MEDICINE | Facility: CLINIC | Age: 57
End: 2022-10-07
Payer: COMMERCIAL

## 2022-10-07 ENCOUNTER — TELEPHONE (OUTPATIENT)
Dept: FAMILY MEDICINE | Facility: CLINIC | Age: 57
End: 2022-10-07

## 2022-10-07 DIAGNOSIS — G89.4 CHRONIC PAIN SYNDROME: ICD-10-CM

## 2022-10-07 DIAGNOSIS — F41.1 GENERALIZED ANXIETY DISORDER: Primary | ICD-10-CM

## 2022-10-07 DIAGNOSIS — F17.200 NICOTINE DEPENDENCE, UNCOMPLICATED, UNSPECIFIED NICOTINE PRODUCT TYPE: ICD-10-CM

## 2022-10-07 DIAGNOSIS — F11.90 CHRONIC, CONTINUOUS USE OF OPIOIDS: ICD-10-CM

## 2022-10-07 PROCEDURE — 99214 OFFICE O/P EST MOD 30 MIN: CPT | Mod: 95 | Performed by: FAMILY MEDICINE

## 2022-10-07 PROCEDURE — 96127 BRIEF EMOTIONAL/BEHAV ASSMT: CPT | Mod: 95 | Performed by: FAMILY MEDICINE

## 2022-10-07 RX ORDER — OXYCODONE HYDROCHLORIDE 10 MG/1
10 TABLET ORAL EVERY 4 HOURS PRN
Qty: 150 TABLET | Refills: 0 | Status: SHIPPED | OUTPATIENT
Start: 2022-10-07

## 2022-10-07 ASSESSMENT — ANXIETY QUESTIONNAIRES
IF YOU CHECKED OFF ANY PROBLEMS ON THIS QUESTIONNAIRE, HOW DIFFICULT HAVE THESE PROBLEMS MADE IT FOR YOU TO DO YOUR WORK, TAKE CARE OF THINGS AT HOME, OR GET ALONG WITH OTHER PEOPLE: SOMEWHAT DIFFICULT
1. FEELING NERVOUS, ANXIOUS, OR ON EDGE: MORE THAN HALF THE DAYS
6. BECOMING EASILY ANNOYED OR IRRITABLE: SEVERAL DAYS
5. BEING SO RESTLESS THAT IT IS HARD TO SIT STILL: MORE THAN HALF THE DAYS
2. NOT BEING ABLE TO STOP OR CONTROL WORRYING: MORE THAN HALF THE DAYS
GAD7 TOTAL SCORE: 13
3. WORRYING TOO MUCH ABOUT DIFFERENT THINGS: MORE THAN HALF THE DAYS
GAD7 TOTAL SCORE: 13
7. FEELING AFRAID AS IF SOMETHING AWFUL MIGHT HAPPEN: MORE THAN HALF THE DAYS

## 2022-10-07 ASSESSMENT — PATIENT HEALTH QUESTIONNAIRE - PHQ9: 5. POOR APPETITE OR OVEREATING: MORE THAN HALF THE DAYS

## 2022-10-07 NOTE — TELEPHONE ENCOUNTER
Patient was calling back for virtual visit check in.    contacted team they will call him shortly.    Javier Magaña, MAXIMUSN, RN, PHN  [facility] ~ Registered Nurse  Clinic Triage ~ [place] [place]  October 7, 2022
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
## 2022-10-09 ENCOUNTER — HEALTH MAINTENANCE LETTER (OUTPATIENT)
Age: 57
End: 2022-10-09

## 2022-10-09 NOTE — PATIENT INSTRUCTIONS
How to Quit Smoking  Smoking is a hard habit to break. About 50% of all people who have ever smoked have been able to quit. Most people who still smoke want to quit. Here are some of the best ways to stop smoking.     Keep in mind the health benefits of quitting  The health benefits of quitting start right away. They keep improving the longer you go without smoking. Knowing this can help inspire you to stay on track. These benefits occur at any age. If you are 17 or 70, quitting is a good choice. Some of the health benefits after your last cigarette include:     After 20 minutes: Your blood pressure and pulse return to normal.    After 8 hours: Your oxygen levels return to normal.    After 2 days: Your ability to smell and taste start to improve as damaged nerves regrow.    After 2 to 3 weeks: Your circulation and lung function improve.    After 1 to 9 months: Your coughing, congestion, and shortness of breath decrease. Your tiredness decreases.    After 1 year: Your risk of heart attack decreases by 50%.    After 5 years: Your risk of lung cancer decreases by 50%. Your risk of stroke becomes the same as a nonsmoker s.  Go cold turkey  Most former smokers quit cold turkey. This means stopping all at once. Trying to cut back slowly often doesn't work as well. This may be because it continues the habit of smoking. Also you may inhale more smoke while smoking fewer cigarettes. This leads to the same amount of nicotine in your body.   Get support  Support programs can be a big help, especially for heavy smokers. These groups offer lectures, ways to change behavior, and peer support. Here are some ways to find a support program:     Free national quitline 800-QUIT-NOW (531-573-2846)    [facility] quit-smoking programs    American Lung Association 471-311-5705    American Cancer Society 446-463-3543  Support at home is important too. Family and friends can offer praise and reassurance. If the smoker in your life finds  it hard to quit, encourage them to keep trying.   Try over-the-counter medicine  Nicotine replacement therapy may make it easier to quit. Some aids are available without a prescription. These include a nicotine patch, gum, and lozenges. But it's best to use these under the care of your healthcare provider. The skin patch gives a steady supply of nicotine. Nicotine gum and lozenges give short-time doses of low levels of nicotine. Both methods reduce the craving for cigarettes. If you have nausea, vomiting, dizziness, weakness, or a fast heartbeat, stop using these products. See your provider.   Ask about prescription medicine  After reviewing your smoking patterns and past attempts to quit, your healthcare provider may offer a prescription medicine such as bupropion, varenicline, a nicotine inhaler, or nasal spray. Each has advantages and side effects. Your provider can review these with you.   Keep trying  Most smokers make many attempts at quitting before they succeed. It s important not to give up.   To learn more  For more on how to quit smoking, try these resources:     www.cdc.gov/tobacco/quit_smoking/ 800-QUIT-NOW (589-357-0815)    www.smokefree.gov 877-44U-QUIT (666-527-4606)    www.lung.org/stop-smoking/ 800-LUNGUSA (461-782-5096)  Sean last reviewed this educational content on 12/1/2019 2000-2021 [employer]. All rights reserved. This information is not intended as a substitute for professional medical care. Always follow your healthcare professional's instructions.

## 2022-10-20 ENCOUNTER — TRANSCRIBE ORDERS (OUTPATIENT)
Dept: OTHER | Age: 57
End: 2022-10-20

## 2022-10-20 DIAGNOSIS — M47.812 SPONDYLOSIS WITHOUT MYELOPATHY OR RADICULOPATHY, CERVICAL REGION: Primary | ICD-10-CM

## 2022-10-25 DIAGNOSIS — G47.00 INSOMNIA, UNSPECIFIED TYPE: ICD-10-CM

## 2022-10-26 RX ORDER — TRAZODONE HYDROCHLORIDE 50 MG/1
50-100 TABLET, FILM COATED ORAL AT BEDTIME
Qty: 60 TABLET | Refills: 3 | Status: SHIPPED | OUTPATIENT
Start: 2022-10-26 | End: 2023-03-15

## 2022-10-26 NOTE — TELEPHONE ENCOUNTER
Pending Prescriptions:                       Disp   Refills    traZODone (DESYREL) 50 MG tablet [Pharmacy*60 tab*3        Sig: TAKE 1-2 TABLETS ( MG) BY MOUTH AT BEDTIME    Routing refill request to provider for review/approval because:  Drug interaction warning

## 2022-10-28 ENCOUNTER — HOSPITAL ENCOUNTER (EMERGENCY)
Facility: CLINIC | Age: 57
Discharge: HOME | End: 2022-10-28
Attending: PHYSICIAN ASSISTANT | Admitting: PHYSICIAN ASSISTANT
Payer: COMMERCIAL

## 2022-10-28 VITALS
BODY MASS INDEX: 21 KG/M2 | HEART RATE: 95 BPM | TEMPERATURE: 98.6 F | SYSTOLIC BLOOD PRESSURE: 125 MMHG | DIASTOLIC BLOOD PRESSURE: 73 MMHG | WEIGHT: 142.2 LBS | OXYGEN SATURATION: 99 % | RESPIRATION RATE: 20 BRPM

## 2022-10-28 DIAGNOSIS — M54.2 CHRONIC NECK PAIN: ICD-10-CM

## 2022-10-28 DIAGNOSIS — M62.838 NECK MUSCLE SPASM: ICD-10-CM

## 2022-10-28 DIAGNOSIS — G89.29 CHRONIC NECK PAIN: ICD-10-CM

## 2022-10-28 PROCEDURE — 99283 EMERGENCY DEPT VISIT LOW MDM: CPT | Mod: 25 | Performed by: PHYSICIAN ASSISTANT

## 2022-10-28 PROCEDURE — 20552 NJX 1/MLT TRIGGER POINT 1/2: CPT | Performed by: PHYSICIAN ASSISTANT

## 2022-10-28 PROCEDURE — 250N000009 HC RX 250: Performed by: PHYSICIAN ASSISTANT

## 2022-10-28 PROCEDURE — 99282 EMERGENCY DEPT VISIT SF MDM: CPT | Mod: 25 | Performed by: PHYSICIAN ASSISTANT

## 2022-10-28 RX ORDER — BUPIVACAINE HYDROCHLORIDE AND EPINEPHRINE 2.5; 5 MG/ML; UG/ML
10 INJECTION, SOLUTION INFILTRATION; PERINEURAL ONCE
Status: COMPLETED | OUTPATIENT
Start: 2022-10-28 | End: 2022-10-28

## 2022-10-28 RX ADMIN — BUPIVACAINE HYDROCHLORIDE AND EPINEPHRINE BITARTRATE 10 ML: 2.5; .005 INJECTION, SOLUTION INFILTRATION; PERINEURAL at 16:29

## 2022-10-28 NOTE — ED TRIAGE NOTES
Comes in with chronic neck pain that has been getting worse over the past 2 days, states comes in when the pain gets this bad to get cortisone shots.      Triage Assessment     Row Name 10/28/22 1536       Triage Assessment (Adult)    Airway WDL WDL       Respiratory WDL    Respiratory WDL WDL       Cardiac WDL    Cardiac WDL WDL       Cognitive/Neuro/Behavioral WDL    Cognitive/Neuro/Behavioral WDL WDL

## 2022-10-28 NOTE — DISCHARGE INSTRUCTIONS
It was a pleasure working with you today!  I hope your condition improves rapidly!     Please try and rest your neck the rest of this evening.  Avoid any significant movements.  Use heat over the neck area for 20 minutes every hour.  You can try some gentle massage over the area.

## 2022-10-28 NOTE — ED PROVIDER NOTES
History     Chief Complaint   Patient presents with     Neck Pain     HPI  Joselito Abarca is a 57 year old male with history of chronic neck pain managed by interventional pain specialty as well as his primary care physician who presents for evaluation of an exacerbation of his chronic symptoms.  He states that his muscles have become extremely tight.  He feels spasms in his neck.  He is taking his regular pain regimen, and states that he is not getting results.  Reports his pain is rated 100 on a scale of 10.  He does get occasional radiation of the pain down into the right arm with sharp shooting pain, but it does not persist.  Worse when he lays supine.  He has not been able to sleep for the last 2 nights.  States that he typically gets results with trigger point injections when this occurs.  He has been in the ED multiple times for this and is requesting a repeat series of trigger point injections given his previous positive results.  He denies any recent fevers or chills.  No recent fall or injury.  Nothing that would have brought on this exacerbation.  He denies any numbness or tingling in the upper extremities.  No loss of bowel/bladder function.          Last primary care OV on 10/7/22:    Assessment & Plan         Generalized anxiety disorder  Currently managed with TID clonazepam.  He has been taking that along with narcotics and wishes to continue with Dr. Driscoll's pain clinic/medication management and they have made it clear they won't prescribe narcotics to anybody on benzodiazepines.  Spenser is willing to look at alternative treatment for his anxiety and has made an appointment with a psychiatrist to make that transition.  In the meantime he will remain on clonazepam tid.       Chronic pain syndrome  Has mostly cervical spine and headache issues at cause for his chronic pain and had been following with the [facility] provider in [place] until she left the practice in this area and there was no replacement.   I took on the continued prescribing and referred him to the [facility] comprehensive pain clinic in [place], where he was advised that they would be happy to take on his full prescribing but also felt it was important that they also managed his interventional therapy.  Spenser has decided to stay with Dr. Driscoll for his interventions and now also will work with his clinic for medication management.  I will continue to fill his current prescriptions until such time as they are taken over by the pain clinic, which may require him being off BZDPs.  - oxyCODONE IR (ROXICODONE) 10 MG tablet; Take 1 tablet (10 mg) by mouth every 4 hours as needed for severe pain     Chronic, continuous use of opioids  See above.   - oxyCODONE IR (ROXICODONE) 10 MG tablet; Take 1 tablet (10 mg) by mouth every 4 hours as needed for severe pain     Nicotine dependence, uncomplicated, unspecified nicotine product type  He has some interest at this time in smoking cessation.  Referral is made.    - [place] Quit Partner Referral; Future     Return in about 4 weeks (around 11/4/2022) for using a phone visit.     Gregory G. Schoen, MD  [facility]         MRI CERVICAL SPINE WITHOUT CONTRAST 7/30/2021 2:37 PM      HISTORY: Cervical radiculopathy, prior C-spine surgery.     TECHNIQUE: Multiplanar, multisequence MRI of the cervical spine  without contrast.      COMPARISON: CT cervical spine of same date. MRI cervical spine  7/5/2019. Cervical spine radiographs 5/18/2020.      FINDINGS: Normal vertebral body heights. Sagittal alignment appears  within normal limits. Postsurgical changes of interbody fusion  extending from C4 through C7, as before. Susceptibility artifact  related to the hardware mildly limits evaluation of the regional  anatomy. Bone marrow signal is unremarkable. No abnormal spinal cord  signal. The visualized paraspinous soft tissues are unremarkable.     Segmental analysis:  Craniovertebral Junction/C1-C2:  Degenerative changes at the median  atlantoaxial joint. Otherwise unremarkable. No significant change.     C2-C3: Normal disc height. Small symmetric disc bulge. Bilateral  uncovertebral spurring. Left greater than right facet arthropathy. No  spinal canal stenosis. Mild left more than right neural foraminal  stenosis. Overall, no significant change.     C3-C4: Normal disc height. Unchanged small central disc protrusion.  Left greater than right uncovertebral arthropathy and left greater  than right facet arthropathy. No significant spinal canal stenosis.  There is at least moderate left and mild right neural foraminal  stenosis. Overall, no significant change.     C4-C5: Postoperative level. Mild bilateral facet arthropathy. No  spinal canal stenosis. Minimal left neural foraminal narrowing. The  right neural foramen is patent. Overall, no change.     C5-C6: Postoperative level. Mild bilateral facet arthropathy. No  spinal canal stenosis. Mild right and minimal left neural foraminal  narrowing, not significantly changed. Overall, no change.     C6-C7: Postoperative level. Posterior endplate osteophytic ridging and  significant bilateral uncovertebral osteophytes. Mild bilateral facet  arthropathy. No spinal canal stenosis. Moderate to severe bilateral  neural foraminal stenosis. Overall, no significant change.     C7-T1: Normal disc height. Prominent left-sided uncovertebral  spurring. Normal facets. No spinal canal stenosis. There appears to be  at least moderate left neural foraminal stenosis. The right neural  foramen is patent. Overall, no significant change.                                                                      IMPRESSION:  1. Overall, no significant change in the appearance of the lumbar  spine compared to MRI of 7/5/2019.  2. Postoperative changes from C4 through C7, as described.  3. No significant spinal canal stenosis.  4. Moderate to severe bilateral C6-C7 neural foraminal stenosis  with  lesser degrees of neural foraminal narrowing elsewhere, as detailed.      TRINITY LEE MD           Allergies:  Allergies   Allergen Reactions     Vicodin [Hydrocodone-Acetaminophen] Nausea     Other reaction(s): Diaphoresis, Vomiting  HEADACHE       Problem List:    Patient Active Problem List    Diagnosis Date Noted     Insomnia, unspecified type 06/28/2022     Priority: Medium     Back pain, chronic 02/21/2019     Priority: Medium     S/P laparoscopic fundoplication 02/21/2019     Priority: Medium     Long-segment Olson's esophagus 02/21/2019     Priority: Medium     Gastroesophageal reflux disease, esophagitis presence not specified 09/21/2018     Priority: Medium     IMO Regulatory Load OCT 2020       Chronic seasonal allergic rhinitis due to fungal spores 06/07/2018     Priority: Medium     Status post cervical spinal arthrodesis 11/29/2017     Priority: Medium     Chronic, continuous use of opioids 12/13/2016     Priority: Medium     Patient is followed by Gregory G. Schoen, MD for ongoing prescription of pain medication.  All refills should be approved by this provider, or covering partner.    Medication(s): Oxycodone 5 mg IR: Take 2 capsules (10 mg) by mouth every 4 hours as needed 2 caps q 4 hour prn pain up to 12 per day .   Methadone 10 mg three times daily, 90 per month  Clonazepam 0.5 mg tid, 90 per month   Clinic visit frequency required: Q 3 months     Controlled substance agreement:  Encounter-Level CSA - 2/27/15:               Controlled Substance Agreement - Scan on 3/14/2015  8:47 AM : Controlled Medication Agreement-02/27/15 (below)            Pain Clinic evaluation in the past: Yes    DIRE Total Score(s):  No flowsheet data found.    Last [facility] website verification:  10/30/2016     [email or web address]         Moderate persistent asthma without complication 11/02/2016     Priority: Medium     Nausea with vomiting 04/27/2016     Priority: Medium     Tobacco dependence syndrome  02/17/2016     Priority: Medium     Leukocytosis 02/16/2016     Priority: Medium     Degeneration of cervical intervertebral disc 01/26/2015     Priority: Medium     Health Care Home 09/30/2013     Priority: Medium     Status:  Accepted  Care Coordinator:    Melissa Behl BSN, RN, N  [facility] Care Coordinator  [phone]     See Letters for [facility] Care Plan  Date:  April 26, 2016            Persons encountering health services in other specified circumstances 09/30/2013     Priority: Medium     Overview:   Overview:   Status:  Accepted  Care Coordinator:    Melissa Behl BSN, RN, N  [facility] Care Coordinator  [phone]     See Letters for [facility] Care Plan  Date:  April 26, 2016       Arthrodesis status 06/15/2011     Priority: Medium     Neck pain 06/15/2011     Priority: Medium     History of arthrodesis 06/15/2011     Priority: Medium     CARDIOVASCULAR SCREENING; LDL GOAL LESS THAN 160 10/31/2010     Priority: Medium     Encounter for screening for cardiovascular disorders 10/31/2010     Priority: Medium     Intervertebral cervical disc disorder with myelopathy, cervical region 11/12/2009     Priority: Medium     Patient is followed by TIARA JIMENEZ for ongoing prescription of narcotic pain medicine.  Med: methadone 10 mg tid. Taking oxycodone up to 8 per day, 120 per month  Maximum use per month: 90  Expected duration: ongoing  Narcotic agreement on file: YES  Clinic visit recommended: Q 3 months         Intervertebral disc disorder of cervical region with myelopathy 11/12/2009     Priority: Medium     Overview:   Overview:   Patient is followed by TIARA JIMENEZ for ongoing prescription of narcotic pain medicine.  Med: methadone 10 mg tid. Taking oxycodone up to 8 per day, 120 per month  Maximum use per month: 90  Expected duration: ongoing  Narcotic agreement on file: YES  Clinic visit recommended: Q 3 months       Constipation 03/19/2008     Priority: Medium     Problem list name updated by  automated process. Provider to review       Thoracic or lumbosacral neuritis or radiculitis, unspecified 03/19/2008     Priority: Medium     Esophageal reflux 07/09/2003     Priority: Medium     Generalized anxiety disorder 07/08/2003     Priority: Medium     Alcohol abuse, in remission 07/08/2003     Priority: Medium     Nondependent alcohol abuse, in remission 07/08/2003     Priority: Medium        Past Medical History:    Past Medical History:   Diagnosis Date     Allergic rhinitis      Anxiety      Arthritis      Depressive disorder      GERD (gastroesophageal reflux disease)      Neck pain 06/15/2011     Pneumonia      Uncomplicated asthma        Past Surgical History:    Past Surgical History:   Procedure Laterality Date     BRONCHOSCOPY (RIGID OR FLEXIBLE), DIAGNOSTIC N/A 8/7/2018    Procedure: COMBINED BRONCHOSCOPY (RIGID OR FLEXIBLE), LAVAGE;  Bronchoscopy with Lavage and Transbronchial Biopsy;  Surgeon: Yung Miranda MD;  Location: [facility]     COLONOSCOPY N/A 7/15/2022    Procedure: COLONOSCOPY, WITH POLYPECTOMY AND BIOPSY;  Surgeon: Jair Casey MD;  Location:  GI     COLONOSCOPY WITH CO2 INSUFFLATION N/A 9/24/2018    Procedure: COLONOSCOPY WITH CO2 INSUFFLATION;  Colon and upper endoscopy ;  Surgeon: Devin Pelayo MD;  Location:  OR     COMBINED ESOPHAGOSCOPY, GASTROSCOPY, DUODENOSCOPY (EGD) WITH CO2 INSUFFLATION N/A 9/24/2018    Procedure: COMBINED ESOPHAGOSCOPY, GASTROSCOPY, DUODENOSCOPY (EGD) WITH CO2 INSUFFLATION;  Colon and upper endoscopy ;  Surgeon: Devin Pelayo MD;  Location: [facility] OR     DISCECTOMY LUMBAR POSTERIOR MICROSCOPIC ONE LEVEL  2/21/2012    Procedure:DISCECTOMY LUMBAR POSTERIOR MICROSCOPIC ONE LEVEL; LEFT T1-T2 THORASIC HEMILAMINECTOMY MICRODISCECTOMY WITH MEXTRIX II ; Surgeon:SHARON PUIR; Location:[facility] OR     DISCECTOMY, FUSION CERVICAL ANTERIOR ONE LEVEL, COMBINED N/A 11/29/2017    Procedure: COMBINED DISCECTOMY, FUSION CERVICAL  ANTERIOR ONE LEVEL;  Anterior Cervical Discectomy and Fusion Cervical Six - Cervical Seven, Exploration and Revision Cervical Four - Cervical Six with Hardware Removal;  Surgeon: Nikolas Vasques MD;  Location: [facility]     ESOPHAGEAL IMPEDENCE FUNCTION TEST WITH 24 HOUR PH GREATER THAN 1 HOUR N/A 12/12/2018    Procedure: ESOPHAGEAL IMPEDENCE FUNCTION TEST WITH 24 HOUR PH GREATER THAN 1 HOUR;  Surgeon: Atif Oconnor MD;  Location: [facility]     ESOPHAGOSCOPY, GASTROSCOPY, DUODENOSCOPY (EGD), COMBINED N/A 9/24/2018    Procedure: COMBINED ESOPHAGOSCOPY, GASTROSCOPY, DUODENOSCOPY (EGD), BIOPSY SINGLE OR MULTIPLE;;  Surgeon: Devin Pelayo MD;  Location: [facility]     ESOPHAGOSCOPY, GASTROSCOPY, DUODENOSCOPY (EGD), COMBINED N/A 7/15/2022    Procedure: ESOPHAGOGASTRODUODENOSCOPY, WITH BIOPSY;  Surgeon: Jair Casey MD;  [facility]: [facility]     EXPLORE SPINE, REMOVE HARDWARE, COMBINED N/A 11/29/2017    Procedure: COMBINED EXPLORE SPINE, REMOVE HARDWARE;;  Surgeon: Nikolas Vasques MD;  Location: [facility]     FUSION CERVICAL ANTERIOR TWO LEVELS  1/29/2010     HC DRAIN/INJ MAJOR JOINT/BURSA W/O US  5/5/2008    Left sacroiliac joint injection.     HC INJ EPIDURAL LUMBAR/SACRAL W/WO CONTRAST  2009     HEAD & NECK SURGERY      2013     HERNIA REPAIR       INJECT BLOCK MEDIAL BRANCH CERVICAL/THORACIC/LUMBAR Bilateral 8/26/2015    Procedure: INJECT BLOCK MEDIAL BRANCH CERVICAL / THORACIC / LUMBAR;  Surgeon: Ronald Driscoll MD;  [facility]     INJECT BLOCK MEDIAL BRANCH CERVICAL/THORACIC/LUMBAR N/A 8/8/2019    Procedure: BLOCK, NERVE, FACET JOINT, MEDIAL BRANCH, DIAGNOSTIC Bilateral Cervical 2,3,4;  Surgeon: Ronald Driscoll MD;  Location[facility]     INJECT BLOCK MEDIAL BRANCH CERVICAL/THORACIC/LUMBAR N/A 9/13/2019    Procedure: Medial Branch Block Bilateral Cervical 2,3, and 4;  Surgeon: Ronald Driscoll MD;  [facility]     INJECT BLOCK MEDIAL BRANCH CERVICAL/THORACIC/LUMBAR Bilateral 7/3/2020    Procedure:  Third occipital and Cervical 3-4 Medial Branch Blocks bilateral;  Surgeon: Ronald Driscoll MD;  [facility]     INJECT BLOCK MEDIAL BRANCH CERVICAL/THORACIC/LUMBAR N/A 7/29/2020    Procedure: medial branch blocks cervical 3-4 and bilateral third occiptal nerves;  Surgeon: Ronald Driscoll MD;  [facility]     INJECT BLOCK MEDIAL BRANCH CERVICAL/THORACIC/LUMBAR Bilateral 11/6/2020    Procedure: Cervical 1-2 Medial Branch Block Bilateral;  Surgeon: Ronald Driscoll MD;  [facility]     INJECT EPIDURAL LUMBAR N/A 2/13/2020    Procedure: Lumber 4-5 bilateral Epidural Injection;  Surgeon: Ronald Driscoll MD;  [facility]     INJECT FACET JOINT Bilateral 5/27/2015    Procedure: INJECT FACET JOINT;  Surgeon: Ronald Driscoll MD;  [facility]     NERVE BLOCK OCCIPITAL Bilateral 7/12/2018    Procedure: NERVE BLOCK OCCIPITAL;  bilateral occipital nerve blocks;  Surgeon: Ronald Driscoll MD;  [facility]     NERVE BLOCK OCCIPITAL Bilateral 12/9/2021    Procedure: BILATERAL OCCIPITAL NERVE BLOCK;  Surgeon: Ronald Driscoll MD;  [facility]     PROCEDURE PLACEHOLDER GENERAL N/A 02/21/2019    [place] [facility] [place]     RADIO FREQUENCY ABLATION PULSED CERVICAL Right 10/18/2019    Procedure: CERVICAL RADIOFREQUENCY ABLATION  Cervical 2,3,4;  Surgeon: Ronald Driscoll MD;  [facility]     RADIO FREQUENCY ABLATION PULSED CERVICAL Left 11/14/2019    Procedure: Left Cervical 2, 3, 4 Radiofreqency Ablation;  Surgeon: Ronald Driscoll MD;  [facility]: [facility]     RADIO FREQUENCY ABLATION PULSED CERVICAL Left 3/11/2021    Procedure: Bilateral Cervical 1 and 2 radiofrequency ablation, aborted;  Surgeon: Ronald Driscoll MD;  Location: [facility]       Family History:    Family History   Problem Relation Age of Onset     Family History Negative No family hx of      C.A.D. No family hx of        Social History:  Marital Status:  Single [1]  Social History     Tobacco Use     Smoking status: Every Day     Packs/day: 0.50     Years:  30.00     Pack years: 15.00     Types: Cigars, Cigarettes     Smokeless tobacco: Former     Quit date: 2012   Substance Use Topics     Alcohol use: No     Alcohol/week: 0.0 standard drinks     Comment: HX OF ABUSE-IN REMISSION     Drug use: No        Medications:    albuterol (PROAIR HFA/PROVENTIL HFA/VENTOLIN HFA) 108 (90 Base) MCG/ACT inhaler  budesonide (ENTOCORT EC) 3 MG EC capsule  clonazePAM (KLONOPIN) 0.5 MG tablet  DULoxetine (CYMBALTA) 60 MG capsule  fluticasone (FLONASE) 50 MCG/ACT nasal spray  fluticasone (FLOVENT HFA) 220 MCG/ACT inhaler  ipratropium - albuterol 0.5 mg/2.5 mg/3 mL (DUONEB) 0.5-2.5 (3) MG/3ML neb solution  naloxone (NARCAN) 4 MG/0.1ML nasal spray  omeprazole (PRILOSEC) 40 MG DR capsule  order for DME  oxyCODONE IR (ROXICODONE) 10 MG tablet  sildenafil (VIAGRA) 100 MG tablet  topiramate (TOPAMAX) 25 MG tablet  traZODone (DESYREL) 50 MG tablet  varenicline (CHANTIX) 1 MG tablet  VITAMIN D3 50 MCG (2000 UT) tablet          Review of Systems   All other systems reviewed and are negative.      Physical Exam   BP: 125/73  Pulse: 95  Temp: 98.6  F (37  C)  Resp: 20  Weight: 64.5 kg (142 lb 3.2 oz)  SpO2: 99 %      Physical Exam  Vitals and nursing note reviewed.   Constitutional:       General: He is not in acute distress.     Appearance: He is not diaphoretic.   HENT:      Head: Normocephalic and atraumatic.      Right Ear: External ear normal.      Left Ear: External ear normal.      Nose: Nose normal.      Mouth/Throat:      Mouth: Oropharynx is clear and moist.      Pharynx: No oropharyngeal exudate.   Eyes:      General: No scleral icterus.        Right eye: No discharge.         Left eye: No discharge.      Extraocular Movements: EOM normal.      Conjunctiva/sclera: Conjunctivae normal.      Pupils: Pupils are equal, round, and reactive to light.   Neck:      Thyroid: No thyromegaly.   Cardiovascular:      Rate and Rhythm: Normal rate and regular rhythm.      Heart sounds: Normal heart  sounds. No murmur heard.  Pulmonary:      Effort: Pulmonary effort is normal. No respiratory distress.      Breath sounds: Normal breath sounds. No wheezing or rales.   Chest:      Chest wall: No tenderness.   Abdominal:      General: Bowel sounds are normal. There is no distension.      Palpations: Abdomen is soft. There is no mass.      Tenderness: There is no abdominal tenderness. There is no guarding or rebound.   Musculoskeletal:         General: No edema.      Cervical back: Normal range of motion and neck supple.      Comments: Neck:  FROM with pain.  No abnormality on inspection.  Nontender along the spinous processes.  Tender to palpation over the bilateral paraspinal muscles of the cervical  spine.  Trapezius muscles nontender.  No pain with axial loading. Upper extremities with FROM and no pain.  Strength is equal and appropriate.  Bicep, tricep, and brachioradialis DTR's 2/4 without clonus.  Sensation intact to light touch.  Distal pulses normal.     Lymphadenopathy:      Cervical: No cervical adenopathy.   Skin:     General: Skin is warm and dry.      Capillary Refill: Capillary refill takes less than 2 seconds.      Findings: No erythema or rash.   Neurological:      Mental Status: He is alert and oriented to person, place, and time.      Cranial Nerves: No cranial nerve deficit.   Psychiatric:         Mood and Affect: Mood and affect normal.         Behavior: Behavior normal.         Thought Content: Thought content normal.         ED Course                 Procedures         6 different trigger points were identified in the cervical spine.  3 on each side.  Started in the occipital area and lower ones at the C6 area.  Patient confirmed the locations.  Each area was cleansed with aggressive alcohol swabs.  2 mL of 0.5% bupivacaine with epinephrine utilized in 6 different sites.  No bleeding.  Heat applied.  Patient reported improvement.  No complications identified.    Critical Care time:  none                No results found. However, due to the size of the patient record, not all encounters were searched. Please check Results Review for a complete set of results.    Medications   bupivacaine 0.25 % - EPINEPHrine 1:200,000 injection 10 mL (10 mLs Injection Given 10/28/22 3936)       Assessments & Plan (with Medical Decision Making)     Chronic neck pain  Neck muscle spasm       57 year old male with a h/o chronic neck pain who presents for evaluation of an exacerbation of his chronic neck pain.  He is requesting trigger point injections given his significant results in the past with this modality.  Not improving with his chronic pain medications.  No new trauma or injury.  Increasing over the last 3 days.  Describes muscle spasms.  Occasional radiation of pain in the right upper extremity but no persistent numbness/tingling/weakness.  On exam vital signs are stable.  Normal range of motion.  Tender to palpation to muscle spasms and trigger points throughout the cervical spine.  Reproduces his symptoms.  No pain with axial loading.  Upper extremities with a clinical range of motion and strength.  Distal pulses 2+.  Sensation intact to light touch.  DTRs 2-4 without clonus.  See procedure note above.  6 different trigger points were identified and injected with 2 mL each of 0.5% bupivacaine with epinephrine.  No complications identified.  Patient was in improved condition upon discharge.  Rest recommended.  Heat application for 20 minutes every hour over the next 24- 48 hours.  Gentle massage recommended.  Continue his regular pain medication regimen.  Follow-up as needed.  He was in agreement.     I have reviewed the nursing notes.    I have reviewed the findings, diagnosis, plan and need for follow up with the patient.       Discharge Medication List as of 10/28/2022  4:25 PM          Final diagnoses:   Chronic neck pain   Neck muscle spasm     Disclaimer: This note consists of symbols derived from keyboarding,  dictation and/or voice recognition software. As a result, there may be errors in the script that have gone undetected. Please consider this when interpreting information found in this chart.        10/28/2022   [facility] EMERGENCY DEPT     Rajendra Ojeda PA-C  10/28/22 6621

## 2022-11-08 ASSESSMENT — SLEEP AND FATIGUE QUESTIONNAIRES
HOW LIKELY ARE YOU TO NOD OFF OR FALL ASLEEP WHILE SITTING QUIETLY AFTER LUNCH WITHOUT ALCOHOL: SLIGHT CHANCE OF DOZING
HOW LIKELY ARE YOU TO NOD OFF OR FALL ASLEEP WHILE WATCHING TV: SLIGHT CHANCE OF DOZING
HOW LIKELY ARE YOU TO NOD OFF OR FALL ASLEEP WHILE LYING DOWN TO REST IN THE AFTERNOON WHEN CIRCUMSTANCES PERMIT: MODERATE CHANCE OF DOZING
HOW LIKELY ARE YOU TO NOD OFF OR FALL ASLEEP WHILE SITTING AND TALKING TO SOMEONE: WOULD NEVER DOZE
HOW LIKELY ARE YOU TO NOD OFF OR FALL ASLEEP WHILE SITTING INACTIVE IN A PUBLIC PLACE: WOULD NEVER DOZE
HOW LIKELY ARE YOU TO NOD OFF OR FALL ASLEEP WHEN YOU ARE A PASSENGER IN A CAR FOR AN HOUR WITHOUT A BREAK: WOULD NEVER DOZE
HOW LIKELY ARE YOU TO NOD OFF OR FALL ASLEEP IN A CAR, WHILE STOPPED FOR A FEW MINUTES IN TRAFFIC: WOULD NEVER DOZE
HOW LIKELY ARE YOU TO NOD OFF OR FALL ASLEEP WHILE SITTING AND READING: WOULD NEVER DOZE

## 2022-11-09 ENCOUNTER — VIRTUAL VISIT (OUTPATIENT)
Dept: SLEEP MEDICINE | Facility: CLINIC | Age: 57
End: 2022-11-09
Attending: FAMILY MEDICINE
Payer: COMMERCIAL

## 2022-11-09 DIAGNOSIS — Z91.199 NO-SHOW FOR APPOINTMENT: Primary | ICD-10-CM

## 2022-11-09 DIAGNOSIS — G47.00 INSOMNIA, UNSPECIFIED TYPE: ICD-10-CM

## 2022-11-10 NOTE — PROGRESS NOTES
Spenser is a 57 year old who is being evaluated via a billable telephone visit.      What phone number would you like to be contacted at? [phone]  How would you like to obtain your AVS? MyChart    Assessment & Plan     Olson's esophagus with dysplasia  He is currently taking prilosec 40 mg daily and not sure his current symptoms are related to this.  No changes at this point and will have him see GI in consult.  - Adult GI  Referral - Consult Only; Future    Lymphocytic colitis  He has had a good clinical response at this point and is well over 8 weeks on 9mg daily dosing. Will have him begin tapering down to 6mg daily for two weeks and if diarrhea does not recur, can drop to 3mg daily for two weeks and then stop at that point if still having normal stools.  If he finds that he goes back to diarrhea with any dose drop, he should go back to prior dose and call me.  Will have GI address this as well with the consult.   - Adult GI  Referral - Consult Only; Future    Abdominal pain, epigastric  See comments above. Reducing budesonide might be beneficial.  Will have him try taking some carafate a half hour before eating to see if this helps reduce his post prandial pain/gas/bloating.    - Adult GI  Referral - Consult Only; Future      Return in about 2 months (around 1/11/2023) for with me after GI consult. .    Gregory G. Schoen, MD  [facility]        Alisson Dueñas is a 57 year old, presenting for the following health issues:  No chief complaint on file.      History of Present Illness       Reason for visit:  Stomach pain i think i need to see a gi specialist    He eats 0-1 servings of fruits and vegetables daily.He consumes 4 sweetened beverage(s) daily.He exercises with enough effort to increase his heart rate 30 to 60 minutes per day.  He exercises with enough effort to increase his heart rate 7 days per week.   He is taking medications regularly.   Spenser states  "that his stomach is painful after he eats, seeming to bet more severe as time goes along.  After eating he will have to go lay down on his side as he seems to rapidly build up gas.  Laying on his left side does help relieve some pain.  Feels like this happens within minutes of eating and he has pain and bloating with both belching and flatulence.  At times the pressure is so bad that he can't stand up straight.  There is both pain and pressure with this.  He notes this happened gradually and occasionally in the past and now is happening regularly/daily.    With the use of budesonide for his lymphocytic colitis he is now doing much better with his stools.  No further water loss and having a formed stool about once a week, which he states is \"back to normal.\"  However with the pain, he did try miralax and that cleaned him out.  He didn't want to use that regularly so has gotten stool softeners and now is having more regular stools. However, none of this helped. He is still taking budesonide 9mg daily in am at this point and has had a clinical response.    He is now seeing a new psychiatrist who will be tapering him off of his clonazepam and try a different anxiolytic/antidepressant in place of that but is getting past med history from his former psychiatrist.  He is trying to taper down on the clonazepam as he is able on his own right now.  She is now prescribing for him.  He will also be following up with Dr. Driscoll for a neck procedure next week and believes he may also be getting a neck brace.            Objective           Vitals:  No vitals were obtained today due to virtual visit.    Physical Exam   healthy, alert and no distress  PSYCH: Alert and oriented times 3; coherent speech, normal   rate and volume, able to articulate logical thoughts, able   to abstract reason, no tangential thoughts, no hallucinations   or delusions  His affect is normal  RESP: No cough, no audible wheezing, able to talk in full " sentences  Remainder of exam unable to be completed due to telephone visits                Phone call duration: 27 minutes

## 2022-11-11 ENCOUNTER — VIRTUAL VISIT (OUTPATIENT)
Dept: FAMILY MEDICINE | Facility: CLINIC | Age: 57
End: 2022-11-11
Payer: COMMERCIAL

## 2022-11-11 DIAGNOSIS — R10.13 ABDOMINAL PAIN, EPIGASTRIC: ICD-10-CM

## 2022-11-11 DIAGNOSIS — K22.719 BARRETT'S ESOPHAGUS WITH DYSPLASIA: Primary | ICD-10-CM

## 2022-11-11 DIAGNOSIS — K52.832 LYMPHOCYTIC COLITIS: ICD-10-CM

## 2022-11-11 PROCEDURE — 99214 OFFICE O/P EST MOD 30 MIN: CPT | Mod: 95 | Performed by: FAMILY MEDICINE

## 2022-11-11 RX ORDER — SUCRALFATE 1 G/1
1 TABLET ORAL 4 TIMES DAILY
Qty: 120 TABLET | Refills: 3 | Status: SHIPPED | OUTPATIENT
Start: 2022-11-11 | End: 2024-02-20

## 2022-11-17 NOTE — TELEPHONE ENCOUNTER
Diagnosis, Referred by & from: Olson's esophagus with dysplasia, Lymphocytic colitis, Abdominal pain, epigastric    Appt date: 12/8/2022   NOTES STATUS DETAILS   OFFICE NOTE from referring provider Internal 11/11/2022 [facility]   OFFICE NOTE from other specialist Internal 8/19/2022 [facility]   DISCHARGE SUMMARY from hospital N/A    DISCHARGE REPORT from the ER Internal 5/17/2022 [facility]   OPERATIVE REPORT N/A    MEDICATION LIST N/A    LABS     ANAL PAP/CEA N/A    BIOPSIES/PATHOLOGY RELATED TO DIAGNOSIS N/A    DIAGNOSTIC PROCEDURES     PFC TESTING (from the Pelvic floor center includes Manometry, PDNL, EMG, etc.) N/A    COLONOSCOPY Internal 7/15/2022 [facility]  - COLONOSCOPY, WITH POLYPECTOMY AND BIOPSY   UPPER ENDOSCOPY (EGD) Received 7/15/2022 [facility]  12/12/2018 [facility] SIGMOIDOSCOPY N/A    ERCP N/A    IMAGING (DISC & REPORT)      CT N/A    MRI N/A    XRAY Internal 1/21/2019 [facility]   ULTRASOUND  (ENDOANAL/ENDORECTAL) N/A      Records Requested  11/17/22    Facility     Outcome

## 2022-11-28 ENCOUNTER — TELEPHONE (OUTPATIENT)
Dept: FAMILY MEDICINE | Facility: CLINIC | Age: 57
End: 2022-11-28

## 2022-11-28 NOTE — TELEPHONE ENCOUNTER
Prior Authorization Retail Medication Request    Medication/Dose: Budesonide 3mg cap   ICD code (if different than what is on RX):    Previously Tried and Failed:    Rationale:      Insurance Name:  [employer]   Insurance ID:  [id number]      Pharmacy Information (if different than what is on RX)  Name:  [facility] [place]  Phone:  [phone]           Thank You,    Jill Momin  Pharm.D.  , Pharmacist in Charge    [facility]  [address] [place] [address]  [email or web address]  Office: [phone]  Cell: [phone]

## 2022-11-30 NOTE — TELEPHONE ENCOUNTER
Central Prior Authorization Team   Phone: [phone]      PA Initiation    Medication: budesonide (ENTOCORT EC) 3 MG EC capsule---initiated  Insurance Company: [employer] - Phone [phone] Fax [phone]  Pharmacy Filling the Rx: [facility] [address]   Filling Pharmacy Phone: [phone]  Filling Pharmacy Fax:    Start Date: 11/30/2022

## 2022-11-30 NOTE — TELEPHONE ENCOUNTER
Central Prior Authorization Team   Phone: [phone]      Prior Authorization Approval    Authorization Effective Date: 10/31/2022  Authorization Expiration Date: 11/30/2023  Medication: budesonide (ENTOCORT EC) 3 MG EC capsule---APPROVED  Approved Dose/Quantity:    Reference #:     Insurance Company: [employer] - Phone [phone] Fax [phone]  Expected CoPay:       CoPay Card Available:      Foundation Assistance Needed:    Which Pharmacy is filling the prescription (Not needed for infusion/clinic administered): [facility] [address]   Pharmacy Notified: Yes  Patient Notified: Yes PHARMACY WILL CONTACT WHEN FILLED

## 2022-12-05 ENCOUNTER — NURSE TRIAGE (OUTPATIENT)
Dept: FAMILY MEDICINE | Facility: CLINIC | Age: 57
End: 2022-12-05

## 2022-12-05 NOTE — TELEPHONE ENCOUNTER
"Nurse Triage SBAR    Is this a 2nd Level Triage? YES, LICENSED PRACTITIONER REVIEW IS REQUIRED    Situation: patient is calling in with bronchitis symptom's again. Patient states he has had a cold x2 months. He reports on Thursday started coughing and developed wheezing. He states he has had bronchitis \"like 50 times, I know this is what I have\". Patient reports the cough is congested, phlegm is greenish. States is feeling a little shortness of breath, more than normal. Denies any blood tinged sputum. Denies fever. Denies chest pain. Denies ankle swelling     Background: congested cough, mild shortness of breath, wheezing.     Assessment: Advised patient be seen today. No clinic openings yet today, advised to be seen in Urgent Care. Patient declined this. He wants a message sent to provider to see if he is willing to send in prescription for him or if he feels patient needs to be seen.     Protocol Recommended Disposition:   Go To Office Now    Recommendation:   patient declined being seen. Please send in prescription to [facility] if you feeling appropriate. Or do you feel patient needs to be seen in person? Please give recommendations.      Routed to provider    Does the patient meet one of the following criteria for ADS visit consideration? No    Reason for Disposition    MILD difficulty breathing (e.g., minimal/no SOB at rest, SOB with walking, pulse <100) and still present when not coughing    Wheezing is present    Additional Information    Negative: SEVERE difficulty breathing (e.g., struggling for each breath, speaks in single words)    Negative: Bluish (or gray) lips or face now    Negative: [1] Difficulty breathing AND [2] exposure to flames, smoke, or fumes    Negative: [1] Stridor AND [2] difficulty breathing    Negative: Sounds like a life-threatening emergency to the triager    Negative: [1] Previous asthma attacks AND [2] this feels like asthma attack    Negative: Dry cough (non-productive;  " no sputum or minimal clear sputum)    Negative: Bluish (or gray) lips or face    Negative: SEVERE difficulty breathing (e.g., struggling for each breath, speaks in single words)    Negative: Rapid onset of cough and has hives    Negative: Coughing started suddenly after medicine, an allergic food or bee sting    Negative: Difficulty breathing after exposure to flames, smoke, or fumes    Negative: Sounds like a life-threatening emergency to the triager    Negative: Previous asthma attacks and this feels like asthma attack    Negative: Dry cough (non-productive; no sputum or minimal clear sputum) and within 14 days of COVID-19 Exposure    Negative: MODERATE difficulty breathing (e.g., speaks in phrases, SOB even at rest, pulse 100-120) and still present when not coughing    Negative: Chest pain present when not coughing    Negative: Passed out (i.e., fainted, collapsed and was not responding)    Negative: Patient sounds very sick or weak to the triager    Negative: Coughed up > 1 tablespoon (15 ml) blood (Exception: Blood-tinged sputum.)    Negative: Fever > 103 F (39.4 C)    Negative: Fever > 101 F (38.3 C) and over 60 years of age    Negative: Fever > 100.0 F (37.8 C) and has diabetes mellitus or a weak immune system (e.g., HIV positive, cancer chemotherapy, organ transplant, splenectomy, chronic steroids)    Negative: Fever > 100.0 F (37.8 C) and bedridden (e.g., nursing home patient, stroke, chronic illness, recovering from surgery)    Negative: Increasing ankle swelling    Protocols used: COUGH-A-OH, COUGH - ACUTE [id number]

## 2022-12-08 ENCOUNTER — PRE VISIT (OUTPATIENT)
Dept: GASTROENTEROLOGY | Facility: CLINIC | Age: 57
End: 2022-12-08

## 2022-12-09 ENCOUNTER — HOSPITAL ENCOUNTER (EMERGENCY)
Facility: CLINIC | Age: 57
Discharge: HOME | End: 2022-12-09
Attending: FAMILY MEDICINE | Admitting: FAMILY MEDICINE
Payer: COMMERCIAL

## 2022-12-09 ENCOUNTER — APPOINTMENT (OUTPATIENT)
Dept: GENERAL RADIOLOGY | Facility: CLINIC | Age: 57
End: 2022-12-09
Attending: FAMILY MEDICINE
Payer: COMMERCIAL

## 2022-12-09 VITALS
WEIGHT: 130 LBS | HEIGHT: 67 IN | OXYGEN SATURATION: 96 % | HEART RATE: 74 BPM | RESPIRATION RATE: 20 BRPM | BODY MASS INDEX: 20.4 KG/M2 | TEMPERATURE: 97.3 F | DIASTOLIC BLOOD PRESSURE: 91 MMHG | SYSTOLIC BLOOD PRESSURE: 113 MMHG

## 2022-12-09 DIAGNOSIS — J20.9 ACUTE BRONCHITIS WITH BRONCHOSPASM: ICD-10-CM

## 2022-12-09 PROCEDURE — 71045 X-RAY EXAM CHEST 1 VIEW: CPT

## 2022-12-09 PROCEDURE — 99284 EMERGENCY DEPT VISIT MOD MDM: CPT | Mod: 25 | Performed by: FAMILY MEDICINE

## 2022-12-09 PROCEDURE — 99284 EMERGENCY DEPT VISIT MOD MDM: CPT | Performed by: FAMILY MEDICINE

## 2022-12-09 RX ORDER — METHYLPREDNISOLONE 4 MG
TABLET, DOSE PACK ORAL
Qty: 21 TABLET | Refills: 0 | Status: SHIPPED | OUTPATIENT
Start: 2022-12-09 | End: 2023-02-14

## 2022-12-09 ASSESSMENT — ACTIVITIES OF DAILY LIVING (ADL): ADLS_ACUITY_SCORE: 39

## 2022-12-09 NOTE — DISCHARGE INSTRUCTIONS
Your chest x-ray did not reveal any signs of pneumonia.  You have bronchitis with wheezing.  Please see the attached handout.  Begin Augmentin 875 mg twice a day for 10 days.  Begin Medrol Dosepak and take as directed.  Please stop smoking.  Continue your albuterol inhaler and nebulizer treatment.  Follow-up with Dr. Schoen in 5-7 days if not improving.

## 2022-12-09 NOTE — ED TRIAGE NOTES
Presents with cough and intermittent wheezing. States he has had cold sx for weeks and now feels like his chronic bronchitis is flaring up. Denies fevers. VSS.

## 2022-12-09 NOTE — ED PROVIDER NOTES
ED Provider Note   CC:     Chief Complaint   Patient presents with     Cough        HPI: Joselito Abarca is a 57 year old male with 2-month history of cough, with new onset wheezing and rattling in the chest on Saturday.  Patient has a history of frequent recurrent bronchitis.  He does not have any fever, chills, nausea, vomiting.  He has required longer courses of antibiotics.  He reports history of recent mold exposure weakening his lungs.  He started smoking again however.  Patient denies any significant productive cough.  He has been using albuterol inhaler and nebulizer.    Active Problems:   Patient Active Problem List   Diagnosis     Generalized anxiety disorder     Alcohol abuse, in remission     Esophageal reflux     Constipation     Thoracic or lumbosacral neuritis or radiculitis, unspecified     Intervertebral cervical disc disorder with myelopathy, cervical region     CARDIOVASCULAR SCREENING; LDL GOAL LESS THAN 160     Arthrodesis status     Neck pain     Health Care Home     Degeneration of cervical intervertebral disc     Leukocytosis     Nausea with vomiting     Moderate persistent asthma without complication     Chronic, continuous use of opioids     Status post cervical spinal arthrodesis     Chronic seasonal allergic rhinitis due to fungal spores     Gastroesophageal reflux disease, esophagitis presence not specified     Back pain, chronic     Encounter for screening for cardiovascular disorders     Persons encountering health services in other specified circumstances     S/P laparoscopic fundoplication     Tobacco dependence syndrome     Long-segment Olson's esophagus     Nondependent alcohol abuse, in remission     History of arthrodesis     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  albuterol (PROAIR HFA/PROVENTIL HFA/VENTOLIN HFA) 108 (90 Base) MCG/ACT inhaler, INHALE 2 PUFFS INTO THE LUNGS EVERY 6 HOURS AS NEEDED FOR SHORTNESS OF BREATH, DIFFICULTY BREATHING OR WHEEZING.  budesonide (ENTOCORT EC) 3 MG EC capsule, Take 3 capsules (9 mg) by mouth every morning  clonazePAM (KLONOPIN) 0.5 MG tablet, TAKE ONE-HALF TO ONE TABLET BY MOUTH THREE TIMES A DAY AS NEEDED FOR ANXIETY  DULoxetine (CYMBALTA) 60 MG capsule, TAKE ONE CAPSULE BY MOUTH ONCE DAILY  fluticasone (FLONASE) 50 MCG/ACT nasal spray, SPRAY 2 SPRAYS IN EACH NOSTRIL EVERY DAY  fluticasone (FLOVENT HFA) 220 MCG/ACT inhaler, INHALE 2 PUFFS INTO THE LUNGS TWICE DAILY  ipratropium - albuterol 0.5 mg/2.5 mg/3 mL (DUONEB) 0.5-2.5 (3) MG/3ML neb solution, NEBULIZE CONTENTS OF ONE VIAL EVERY 4 HOURS AS NEEDED FOR SHORTNESS OF BREATH / DYSPNEA  naloxone (NARCAN) 4 MG/0.1ML nasal spray, Spray 4 mg into one nostril alternating nostrils once as needed for opioid reversal every 2-3 minutes until assistance arrives  (Patient not taking: No sig reported)  omeprazole (PRILOSEC) 40 MG DR capsule, Take 1 capsule (40 mg) by mouth daily  order for DME, Equipment being ordered: Nebulizer mask and tubing  oxyCODONE IR (ROXICODONE) 10 MG tablet, Take 1 tablet (10 mg) by mouth every 4 hours as needed for severe pain  sildenafil (VIAGRA) 100 MG tablet, Take 1 tablet (100 mg) by mouth daily as needed 30 min to 4 hrs before sex. Do not use with nitroglycerin, terazosin or doxazosin.  sucralfate (CARAFATE) 1 GM tablet, Take 1 tablet (1 g) by mouth 4 times daily  topiramate (TOPAMAX) 25 MG tablet, TAKE THREE TABLETS BY MOUTH TWICE A DAY - TITRATE TO THIS DOSE AS INSTRUCTED IN CLINIC  traZODone (DESYREL) 50 MG tablet, TAKE 1-2 TABLETS ( MG) BY MOUTH AT BEDTIME  varenicline (CHANTIX) 1 MG tablet, Take 1 tablet (1 mg) by mouth 2 times daily  VITAMIN D3 50 MCG (2000 UT) tablet, TAKE ONE TABLET BY MOUTH EVERY DAY        ALLERGIES:    Allergies  "  Allergen Reactions     Vicodin [Hydrocodone-Acetaminophen] Nausea     Other reaction(s): Diaphoresis, Vomiting  HEADACHE       Past medical, surgical and social history, along with triage and nursing notes were reviewed.    Review of Systems   Negative except noted in HPI    Physical Exam     Vitals:    12/09/22 0150   BP: (!) 113/91   Pulse: 74   Resp: 20   Temp: 97.3  F (36.3  C)   TempSrc: Temporal   SpO2: 96%   Weight: 59 kg (130 lb)   Height: 1.702 m (5' 7\")     GENERAL APPEARANCE: alert, in mild distress  FACE: normal facies  EYES: PER;   HENT: Oral exam is benign  NECK: no adenopathy or asymmetry, thyroid normal to palpation  RESP: bilateral expiratory wheeze  CV: regular rates and rhythm  ABD: soft, no tenderness; no rebound or guarding  EXT: warm, good capillary refill  SKIN: no worrisome rash        Available Lab/Imaging Results     Results for orders placed or performed during the hospital encounter of 12/09/22 (from the past 24 hour(s))   XR Chest Port 1 View    Narrative    EXAM: XR CHEST PORT 1 VIEW  LOCATION: [facility]  DATE/TIME: 12/9/2022 2:37 AM    INDICATION: Cough for several weeks; wheezing now; rule out infiltrates  COMPARISON: 6/29/2021.    FINDINGS: The heart size is normal. The lungs are clear. No pneumothorax. Fusion hardware in the cervical spine. Degenerative disease in the thoracic spine.      Impression    IMPRESSION: No acute abnormality.     *Note: Due to a large number of results and/or encounters for the requested time period, some results have not been displayed. A complete set of results can be found in Results Review.         Impression     Final diagnoses:   Acute bronchitis with bronchospasm       ED Course & Medical Decision Making   Joselito Abarca presented with cough that has persisted for 2 months, but since Saturday he is developing wheezing and rattling in the chest consistent with his previous episodes of bronchitis with " bronchospasms.  He has been using albuterol but not finding improvement.  He has been prone to pneumonia in the past.  He denies fever, chills, nausea, vomiting or other systemic symptoms.  Despite his history, he is still smoking cigarettes which I counseled him on quitting.  Chest x-ray today reveals no acute abnormality.  Patient will begin Augmentin 875 mg twice a day for 10 days and take Medrol Dosepak.  Continue albuterol both in inhaler and nebulizer form.  See discharge instructions below.          Discharge instructions:  Your chest x-ray did not reveal any signs of pneumonia.  You have bronchitis with wheezing.  Please see the attached handout.  Begin Augmentin 875 mg twice a day for 10 days.  Begin Medrol Dosepak and take as directed.  Please stop smoking.  Continue your albuterol inhaler and nebulizer treatment.  Follow-up with Dr. Schoen in 5-7 days if not improving.      Disclaimer: This note consists of words and symbols derived from keyboarding and dictation using voice recognition software.  As a result, there may be errors that have gone undetected.  Please consider this when interpreting information found in this note.       Becca Bains MD  12/09/22 2789

## 2022-12-16 DIAGNOSIS — F41.1 GENERALIZED ANXIETY DISORDER: ICD-10-CM

## 2022-12-19 RX ORDER — DULOXETIN HYDROCHLORIDE 60 MG/1
CAPSULE, DELAYED RELEASE ORAL
Qty: 90 CAPSULE | Refills: 1 | Status: SHIPPED | OUTPATIENT
Start: 2022-12-19 | End: 2023-09-12

## 2022-12-22 ENCOUNTER — VIRTUAL VISIT (OUTPATIENT)
Dept: GASTROENTEROLOGY | Facility: CLINIC | Age: 57
End: 2022-12-22
Attending: FAMILY MEDICINE
Payer: COMMERCIAL

## 2022-12-22 VITALS — BODY MASS INDEX: 20.36 KG/M2 | WEIGHT: 130 LBS

## 2022-12-22 DIAGNOSIS — K22.719 BARRETT'S ESOPHAGUS WITH DYSPLASIA: ICD-10-CM

## 2022-12-22 DIAGNOSIS — R68.81 EARLY SATIETY: ICD-10-CM

## 2022-12-22 DIAGNOSIS — K52.832 LYMPHOCYTIC COLITIS: ICD-10-CM

## 2022-12-22 DIAGNOSIS — R63.4 UNINTENTIONAL WEIGHT LOSS: Primary | ICD-10-CM

## 2022-12-22 DIAGNOSIS — R10.84 GENERALIZED ABDOMINAL PAIN: ICD-10-CM

## 2022-12-22 PROCEDURE — 99204 OFFICE O/P NEW MOD 45 MIN: CPT | Mod: 95 | Performed by: PHYSICIAN ASSISTANT

## 2022-12-22 ASSESSMENT — PAIN SCALES - GENERAL: PAINLEVEL: MILD PAIN (2)

## 2022-12-22 NOTE — PROGRESS NOTES
Spenser is a 57 year old who is being evaluated via a billable video visit.      How would you like to obtain your AVS? MyChart  If the video visit is dropped, the invitation should be resent by: Send to e-mail at: [email or web address]  Will anyone else be joining your video visit? Lolita Dickson

## 2022-12-22 NOTE — PROGRESS NOTES
GASTROENTEROLOGY NEW PATIENT VIDEO VISIT    CC/REFERRING MD:    Schoen, Gregory G Gregory G Schoen    REASON FOR CONSULTATION:   Gregory G Schoen for   Chief Complaint   Patient presents with     Video Visit       HISTORY OF PRESENT ILLNESS:    Joselito Abarca is a 57 year old male who is being evaluated via a billable video visit for recurring postprandial abdominal pain.  Patient states that for several months now, he has had a recurring pain that occurs after eating.  It is generalized throughout the abdomen, though he does feel it may be more so in the upper abdomen.  When laying down, he will feel it more on his left side as well.  There is no associated nausea or vomiting, though there is associated bloating.  He currently eats just once daily and he will feel quite full afterwards, feeling like he cannot eat another meal until the following day.  In addition to this recurring pain, he has had a significant weight loss from 180 pounds over the summer to now about 130 pounds.  He feels like this weight loss started when he had the development of diarrhea over the summer.  This was ultimately determined to be lymphocytic colitis after undergoing colonoscopy.  He did complete a course of budesonide which resolved the diarrhea.  More typically, he has dealt with constipation on a more regular basis.  He was quite constipated in the last few weeks, but he is now using an over-the-counter stool softener which seems to be helping, now passing at least 1 soft stool daily.  He has used MiraLAX in the past with minimal effect.  He also underwent upper endoscopy due to history of GERD, was discovered to have a 5 cm patch of Olson's esophagus.  He is currently on omeprazole 40 mg daily and this does treat his reflux quite well.  He is status post Nissen fundoplication, which looked good on endoscopy.  They are recommending repeat EGD in 1 to 2 years.  He has no recent cross-sectional imaging of the abdomen.  He  does have history of lung nodule, followed by annual CT, nearly due for his next scan.    His past medical history is significant for chronic musculoskeletal pain, currently managed on daily opioids.  He also has a history of anxiety insomnia, currently managed on benzodiazepines.  He is a current every day smoker, has history of alcohol abuse and is in longstanding remission.           I have reviewed and updated the patient's Past Medical History, Social History, Family History and Medication List.    Exam:    General appearance:  Healthy appearing adult, in no acute distress  Eyes:  Sclera anicteric, Pupils round and reactive to light  Ears, nose, mouth and throat:  No obvious external lesions of ears and nose.  Hearing intact  Neck:  Symmetric, No obvious external lesions  Respiratory:  Normal respiration, no use of accessory muscles   MSK:  No visual upper extremity, neck or facial muscle atrophy  ABD:  No visual abdominal distention, no audible borborygmi  Skin:  No rashes or jaundice   Psychiatric:  Oriented to person, place and time, Appropriate mood and affect.   Neurologic:  Peripheral muscle function and dexterity appear to be intact      PERTINENT STUDIES have been reviewed.    ASSESSMENT/PLAN:    Joselito Abarca is a 57 year old male who presents for evaluation of recurring upper abdominal pain, floating, and early satiety with associated lack of appetite over the past several months.  This has been accompanied by an unintentional weight loss of 50 pounds.  We spent some time discussing his symptoms and work-up thus far.  His endoscopic evaluation was notable for a segment of Olson's as well as lymphocytic colitis (now fully resolved after treatment with budesonide), there was otherwise no significant abnormalities that might explain his abdominal pain.  Given his current daily opioid therapy, he likely does have delayed transit through his digestive tract -potentially gastroparesis and  constipation.  He is passing stool somewhat regularly right now, though he could still have a large stool burden.  The unintentional weight loss may be an effect of inadequate caloric intake, though given his smoking history and history of lung nodules, I am going to start by obtaining cross-sectional imaging of the chest, abdomen and pelvis.  I would also like to get some baseline labs to start.  Further evaluation may include a gastric emptying scan as well.  My suspicion is that we may just need to more aggressively treat constipation and try to address his motility and this may help stimulate some appetite and weight gain.  Plan for follow-up after labs and CT scans.      Video-Visit Details    Video VisitTime: 16 minutes    Type of service:  Video Visit    Originating Location (pt. Location): Home    Distant Location (provider location):  Off-site    Platform used for Video Visit: Sriram Hampton PA-C    RTC after labs and scans    Thank you for this consultation.  It was a pleasure to participate in the care of this patient; please contact us with any further questions.  A total of 28 minutes was spent with reviewing the chart, discussing with the patient, documentation and coordination of care.    This note was created with voice recognition software, and while reviewed for accuracy, typos may remain.     Sebastien Hampton PA-C  Division of Gastroenterology, Hepatology and Nutrition  [facility]  [phone]

## 2022-12-29 ENCOUNTER — LAB (OUTPATIENT)
Dept: LAB | Facility: CLINIC | Age: 57
End: 2022-12-29
Payer: COMMERCIAL

## 2022-12-29 ENCOUNTER — HOSPITAL ENCOUNTER (OUTPATIENT)
Dept: CT IMAGING | Facility: CLINIC | Age: 57
Discharge: HOME | End: 2022-12-29
Attending: PHYSICIAN ASSISTANT | Admitting: PHYSICIAN ASSISTANT
Payer: COMMERCIAL

## 2022-12-29 DIAGNOSIS — R63.4 UNINTENTIONAL WEIGHT LOSS: ICD-10-CM

## 2022-12-29 DIAGNOSIS — R10.84 GENERALIZED ABDOMINAL PAIN: ICD-10-CM

## 2022-12-29 LAB
ALBUMIN SERPL-MCNC: 3.5 G/DL (ref 3.4–5)
ALP SERPL-CCNC: 65 U/L (ref 40–150)
ALT SERPL W P-5'-P-CCNC: 18 U/L (ref 0–70)
ANION GAP SERPL CALCULATED.3IONS-SCNC: 4 MMOL/L (ref 3–14)
AST SERPL W P-5'-P-CCNC: 13 U/L (ref 0–45)
BASOPHILS # BLD AUTO: 0.1 10E3/UL (ref 0–0.2)
BASOPHILS NFR BLD AUTO: 1 %
BILIRUB SERPL-MCNC: 0.3 MG/DL (ref 0.2–1.3)
BUN SERPL-MCNC: 7 MG/DL (ref 7–30)
CALCIUM SERPL-MCNC: 8.8 MG/DL (ref 8.5–10.1)
CHLORIDE BLD-SCNC: 110 MMOL/L (ref 94–109)
CO2 SERPL-SCNC: 30 MMOL/L (ref 20–32)
CREAT SERPL-MCNC: 0.95 MG/DL (ref 0.66–1.25)
CRP SERPL-MCNC: <2.9 MG/L (ref 0–8)
EOSINOPHIL # BLD AUTO: 0.4 10E3/UL (ref 0–0.7)
EOSINOPHIL NFR BLD AUTO: 6 %
ERYTHROCYTE [DISTWIDTH] IN BLOOD BY AUTOMATED COUNT: 13.1 % (ref 10–15)
GFR SERPL CREATININE-BSD FRML MDRD: >90 ML/MIN/1.73M2
GLUCOSE BLD-MCNC: 110 MG/DL (ref 70–99)
HCT VFR BLD AUTO: 46.1 % (ref 40–53)
HGB BLD-MCNC: 15.1 G/DL (ref 13.3–17.7)
IMM GRANULOCYTES # BLD: 0 10E3/UL
IMM GRANULOCYTES NFR BLD: 0 %
LYMPHOCYTES # BLD AUTO: 2.6 10E3/UL (ref 0.8–5.3)
LYMPHOCYTES NFR BLD AUTO: 40 %
MCH RBC QN AUTO: 33 PG (ref 26.5–33)
MCHC RBC AUTO-ENTMCNC: 32.8 G/DL (ref 31.5–36.5)
MCV RBC AUTO: 101 FL (ref 78–100)
MONOCYTES # BLD AUTO: 0.4 10E3/UL (ref 0–1.3)
MONOCYTES NFR BLD AUTO: 6 %
NEUTROPHILS # BLD AUTO: 3.1 10E3/UL (ref 1.6–8.3)
NEUTROPHILS NFR BLD AUTO: 47 %
NRBC # BLD AUTO: 0 10E3/UL
NRBC BLD AUTO-RTO: 0 /100
PLATELET # BLD AUTO: 181 10E3/UL (ref 150–450)
POTASSIUM BLD-SCNC: 3.3 MMOL/L (ref 3.4–5.3)
PROT SERPL-MCNC: 7.1 G/DL (ref 6.8–8.8)
RBC # BLD AUTO: 4.58 10E6/UL (ref 4.4–5.9)
SODIUM SERPL-SCNC: 144 MMOL/L (ref 133–144)
TSH SERPL DL<=0.005 MIU/L-ACNC: 1.15 MU/L (ref 0.4–4)
WBC # BLD AUTO: 6.6 10E3/UL (ref 4–11)

## 2022-12-29 PROCEDURE — 86364 TISS TRNSGLTMNASE EA IG CLAS: CPT

## 2022-12-29 PROCEDURE — 250N000011 HC RX IP 250 OP 636: Performed by: PHYSICIAN ASSISTANT

## 2022-12-29 PROCEDURE — 86140 C-REACTIVE PROTEIN: CPT

## 2022-12-29 PROCEDURE — 80050 GENERAL HEALTH PANEL: CPT

## 2022-12-29 PROCEDURE — 86803 HEPATITIS C AB TEST: CPT

## 2022-12-29 PROCEDURE — 250N000009 HC RX 250: Performed by: PHYSICIAN ASSISTANT

## 2022-12-29 PROCEDURE — 36415 COLL VENOUS BLD VENIPUNCTURE: CPT

## 2022-12-29 PROCEDURE — 74177 CT ABD & PELVIS W/CONTRAST: CPT

## 2022-12-29 PROCEDURE — 82784 ASSAY IGA/IGD/IGG/IGM EACH: CPT

## 2022-12-29 RX ORDER — IOPAMIDOL 755 MG/ML
500 INJECTION, SOLUTION INTRAVASCULAR ONCE
Status: COMPLETED | OUTPATIENT
Start: 2022-12-29 | End: 2022-12-29

## 2022-12-29 RX ADMIN — SODIUM CHLORIDE 60 ML: 9 INJECTION, SOLUTION INTRAVENOUS at 15:51

## 2022-12-29 RX ADMIN — IOPAMIDOL 65 ML: 755 INJECTION, SOLUTION INTRAVENOUS at 15:51

## 2022-12-30 LAB
HCV AB SERPL QL IA: NONREACTIVE
IGA SERPL-MCNC: 218 MG/DL (ref 84–499)

## 2023-01-03 LAB
TTG IGA SER-ACNC: 0.9 U/ML
TTG IGG SER-ACNC: 1.3 U/ML

## 2023-01-03 NOTE — RESULT ENCOUNTER NOTE
Nohelia Dueñas,    Your recent set of lab tests and CT scans are available for you to review.  Overall, all of the lab work looked pretty normal.  There was no evidence of inflammation in the digestive tract, your celiac disease screening tests are negative, and your blood counts and metabolic panel looked good as well.  The CT scans did not show anything sinister or concerning.  There was a moderate amount of stool in your colon.  As we had discussed during your visit, I felt that potentially treating your constipation a little bit more aggressively may be helpful for your symptoms.  You had mentioned trying MiraLAX in the past without much effect.  Another option would be to use a drug called Movantik.  This specifically counteracts the effect that opioid medication has in the digestive tract, which will result in improvement in constipation and transit time through the gut.    If you are interested in trying this, please let me know and I can send it to your pharmacy.    Please let me know if you have any questions.    Sincerely,  Sebastien Hampton PA-C

## 2023-01-31 DIAGNOSIS — M54.2 CERVICALGIA: ICD-10-CM

## 2023-01-31 DIAGNOSIS — G89.29 CHRONIC MIDLINE THORACIC BACK PAIN: ICD-10-CM

## 2023-01-31 DIAGNOSIS — M54.6 CHRONIC MIDLINE THORACIC BACK PAIN: ICD-10-CM

## 2023-01-31 DIAGNOSIS — G89.4 CHRONIC PAIN SYNDROME: ICD-10-CM

## 2023-01-31 DIAGNOSIS — M47.812 ARTHROPATHY OF CERVICAL FACET JOINT: ICD-10-CM

## 2023-02-01 RX ORDER — TOPIRAMATE 25 MG/1
TABLET, FILM COATED ORAL
Qty: 180 TABLET | Refills: 11 | Status: SHIPPED | OUTPATIENT
Start: 2023-02-01 | End: 2024-07-24

## 2023-02-01 NOTE — TELEPHONE ENCOUNTER
Pending Prescriptions:                       Disp   Refills    topiramate (TOPAMAX) 25 MG tablet [Pharmac*180 ta*11       Sig: TAKE THREE TABLETS BY MOUTH TWICE A DAY - TITRATE TO           THIS DOSE AS INSTRUCTED IN CLINIC      Routing refill request to provider for review/approval because:  Review Authorizing provider's last note.     Angella Leiva RN

## 2023-02-14 ENCOUNTER — HOSPITAL ENCOUNTER (EMERGENCY)
Facility: CLINIC | Age: 58
Discharge: HOME | End: 2023-02-14
Attending: FAMILY MEDICINE | Admitting: FAMILY MEDICINE
Payer: COMMERCIAL

## 2023-02-14 VITALS
OXYGEN SATURATION: 98 % | RESPIRATION RATE: 18 BRPM | HEART RATE: 67 BPM | DIASTOLIC BLOOD PRESSURE: 84 MMHG | SYSTOLIC BLOOD PRESSURE: 116 MMHG | TEMPERATURE: 97 F

## 2023-02-14 DIAGNOSIS — M54.2 CHRONIC NECK PAIN: ICD-10-CM

## 2023-02-14 DIAGNOSIS — G89.29 CHRONIC NECK PAIN: ICD-10-CM

## 2023-02-14 PROCEDURE — 250N000011 HC RX IP 250 OP 636: Performed by: FAMILY MEDICINE

## 2023-02-14 PROCEDURE — 99284 EMERGENCY DEPT VISIT MOD MDM: CPT | Performed by: FAMILY MEDICINE

## 2023-02-14 PROCEDURE — 99284 EMERGENCY DEPT VISIT MOD MDM: CPT | Mod: 25 | Performed by: FAMILY MEDICINE

## 2023-02-14 PROCEDURE — 20553 NJX 1/MLT TRIGGER POINTS 3/>: CPT | Performed by: FAMILY MEDICINE

## 2023-02-14 PROCEDURE — 96372 THER/PROPH/DIAG INJ SC/IM: CPT | Mod: 59 | Performed by: FAMILY MEDICINE

## 2023-02-14 RX ORDER — KETOROLAC TROMETHAMINE 30 MG/ML
60 INJECTION, SOLUTION INTRAMUSCULAR; INTRAVENOUS ONCE
Status: COMPLETED | OUTPATIENT
Start: 2023-02-14 | End: 2023-02-14

## 2023-02-14 RX ORDER — METHYLPREDNISOLONE 4 MG
TABLET, DOSE PACK ORAL
Qty: 21 TABLET | Refills: 0 | Status: SHIPPED | OUTPATIENT
Start: 2023-02-14

## 2023-02-14 RX ADMIN — KETOROLAC TROMETHAMINE 60 MG: 30 INJECTION, SOLUTION INTRAMUSCULAR at 10:21

## 2023-02-14 NOTE — DISCHARGE INSTRUCTIONS
1.  I have given you a paper prescription for a Medrol Dosepak, if your pain is still there tomorrow go ahead and fill this and do a 5-day course of this.  If you are still not better after you fill and just the steroids, please follow-up with your primary care doctor or pain clinic doctor.

## 2023-02-14 NOTE — ED TRIAGE NOTES
"C/o chronic neck pain - exacerbation 10/10 right now. Takes PO meds at home for pain - 'typically need injection when it gets this bad\".      Triage Assessment     Row Name 02/14/23 0950       Triage Assessment (Adult)    Airway WDL WDL       Respiratory WDL    Respiratory WDL WDL       Cardiac WDL    Cardiac WDL WDL              "

## 2023-02-14 NOTE — ED PROVIDER NOTES
History     Chief Complaint   Patient presents with     Neck Pain     HPI  Joselito Abarca is a 57 year old male who presents with concerns of exacerbation of his chronic neck pain.  Patient is on chronic opioid therapy.  Patient states occasionally this flares up and is coming and gotten trigger point injections which has helped.  Patient states a few days ago patient was scraping some ice and shoveling and ever since then his neck has not calm down.  Denies any new extremity numbness or weakness.  Patient is working with the pain clinic where they have done nerve ablations and other procedures to treat this.    Allergies:  Allergies   Allergen Reactions     Vicodin [Hydrocodone-Acetaminophen] Nausea     Other reaction(s): Diaphoresis, Vomiting  HEADACHE       Problem List:    Patient Active Problem List    Diagnosis Date Noted     Insomnia, unspecified type 06/28/2022     Priority: Medium     Back pain, chronic 02/21/2019     Priority: Medium     S/P laparoscopic fundoplication 02/21/2019     Priority: Medium     Long-segment Olson's esophagus 02/21/2019     Priority: Medium     Gastroesophageal reflux disease, esophagitis presence not specified 09/21/2018     Priority: Medium     IMO Regulatory Load OCT 2020       Chronic seasonal allergic rhinitis due to fungal spores 06/07/2018     Priority: Medium     Status post cervical spinal arthrodesis 11/29/2017     Priority: Medium     Chronic, continuous use of opioids 12/13/2016     Priority: Medium     Patient is followed by Gregory G. Schoen, MD for ongoing prescription of pain medication.  All refills should be approved by this provider, or covering partner.    Medication(s): Oxycodone 5 mg IR: Take 2 capsules (10 mg) by mouth every 4 hours as needed 2 caps q 4 hour prn pain up to 12 per day .   Methadone 10 mg three times daily, 90 per month  Clonazepam 0.5 mg tid, 90 per month   Clinic visit frequency required: Q 3 months     Controlled substance  agreement:  Encounter-Level CSA - 2/27/15:               Controlled Substance Agreement - Scan on 3/14/2015  8:47 AM : Controlled Medication Agreement-02/27/15 (below)            Pain Clinic evaluation in the past: Yes    DIRE Total Score(s):  No flowsheet data found.    Last [facility] website verification:  10/30/2016     [email or web address]         Moderate persistent asthma without complication 11/02/2016     Priority: Medium     Nausea with vomiting 04/27/2016     Priority: Medium     Tobacco dependence syndrome 02/17/2016     Priority: Medium     Leukocytosis 02/16/2016     Priority: Medium     Degeneration of cervical intervertebral disc 01/26/2015     Priority: Medium     Health Care Home 09/30/2013     Priority: Medium     Status:  Accepted  Care Coordinator:    Melissa Behl BSN, RN, N  [facility] Care Coordinator  [phone]     See Letters for [facility] Care Plan  Date:  April 26, 2016            Persons encountering health services in other specified circumstances 09/30/2013     Priority: Medium     Overview:   Overview:   Status:  Accepted  Care Coordinator:    Melissa Behl BSN, RN, N  [facility] Care Coordinator  [phone]     See Letters for [facility] Care Plan  Date:  April 26, 2016       Arthrodesis status 06/15/2011     Priority: Medium     Neck pain 06/15/2011     Priority: Medium     History of arthrodesis 06/15/2011     Priority: Medium     CARDIOVASCULAR SCREENING; LDL GOAL LESS THAN 160 10/31/2010     Priority: Medium     Encounter for screening for cardiovascular disorders 10/31/2010     Priority: Medium     Intervertebral cervical disc disorder with myelopathy, cervical region 11/12/2009     Priority: Medium     Patient is followed by TIARA JIMENEZ for ongoing prescription of narcotic pain medicine.  Med: methadone 10 mg tid. Taking oxycodone up to 8 per day, 120 per month  Maximum use per month: 90  Expected duration: ongoing  Narcotic agreement on file: YES  Clinic visit  recommended: Q 3 months         Intervertebral disc disorder of cervical region with myelopathy 11/12/2009     Priority: Medium     Overview:   Overview:   Patient is followed by TIARA JIMENEZ for ongoing prescription of narcotic pain medicine.  Med: methadone 10 mg tid. Taking oxycodone up to 8 per day, 120 per month  Maximum use per month: 90  Expected duration: ongoing  Narcotic agreement on file: YES  Clinic visit recommended: Q 3 months       Constipation 03/19/2008     Priority: Medium     Problem list name updated by automated process. Provider to review       Thoracic or lumbosacral neuritis or radiculitis, unspecified 03/19/2008     Priority: Medium     Esophageal reflux 07/09/2003     Priority: Medium     Generalized anxiety disorder 07/08/2003     Priority: Medium     Alcohol abuse, in remission 07/08/2003     Priority: Medium     Nondependent alcohol abuse, in remission 07/08/2003     Priority: Medium        Past Medical History:    Past Medical History:   Diagnosis Date     Allergic rhinitis      Anxiety      Arthritis      Depressive disorder      GERD (gastroesophageal reflux disease)      Neck pain 06/15/2011     Pneumonia      Uncomplicated asthma        Past Surgical History:    Past Surgical History:   Procedure Laterality Date     BRONCHOSCOPY (RIGID OR FLEXIBLE), DIAGNOSTIC N/A 8/7/2018    Procedure: COMBINED BRONCHOSCOPY (RIGID OR FLEXIBLE), LAVAGE;  Bronchoscopy with Lavage and Transbronchial Biopsy;  Surgeon: Yung Miranda MD;  Location:  GI     COLONOSCOPY N/A 7/15/2022    Procedure: COLONOSCOPY, WITH POLYPECTOMY AND BIOPSY;  Surgeon: Jair Casey MD;  Location:  GI     COLONOSCOPY WITH CO2 INSUFFLATION N/A 9/24/2018    Procedure: COLONOSCOPY WITH CO2 INSUFFLATION;  Colon and upper endoscopy ;  Surgeon: Devin Pelayo MD;  Location: [facility] OR     COMBINED ESOPHAGOSCOPY, GASTROSCOPY, DUODENOSCOPY (EGD) WITH CO2 INSUFFLATION N/A 9/24/2018    Procedure: COMBINED  ESOPHAGOSCOPY, GASTROSCOPY, DUODENOSCOPY (EGD) WITH CO2 INSUFFLATION;  Colon and upper endoscopy ;  Surgeon: Devin Pelayo MD;  Location: [facility] OR     DISCECTOMY LUMBAR POSTERIOR MICROSCOPIC ONE LEVEL  2/21/2012    Procedure:DISCECTOMY LUMBAR POSTERIOR MICROSCOPIC ONE LEVEL; LEFT T1-T2 THORASIC HEMILAMINECTOMY MICRODISCECTOMY WITH MEXTRIX II ; Surgeon:SHARON PURI; Location:[facility] OR     DISCECTOMY, FUSION CERVICAL ANTERIOR ONE LEVEL, COMBINED N/A 11/29/2017    Procedure: COMBINED DISCECTOMY, FUSION CERVICAL ANTERIOR ONE LEVEL;  Anterior Cervical Discectomy and Fusion Cervical Six - Cervical Seven, Exploration and Revision Cervical Four - Cervical Six with Hardware Removal;  Surgeon: Nikolas Vasques MD;  Location: [facility]     ESOPHAGEAL IMPEDENCE FUNCTION TEST WITH 24 HOUR PH GREATER THAN 1 HOUR N/A 12/12/2018    Procedure: ESOPHAGEAL IMPEDENCE FUNCTION TEST WITH 24 HOUR PH GREATER THAN 1 HOUR;  Surgeon: Atif Oconnor MD;  Location: [facility]     ESOPHAGOSCOPY, GASTROSCOPY, DUODENOSCOPY (EGD), COMBINED N/A 9/24/2018    Procedure: COMBINED ESOPHAGOSCOPY, GASTROSCOPY, DUODENOSCOPY (EGD), BIOPSY SINGLE OR MULTIPLE;;  Surgeon: Devin Pelayo MD;  Location: [facility]     ESOPHAGOSCOPY, GASTROSCOPY, DUODENOSCOPY (EGD), COMBINED N/A 7/15/2022    Procedure: ESOPHAGOGASTRODUODENOSCOPY, WITH BIOPSY;  Surgeon: Jair Casey MD;  [facility]     EXPLORE SPINE, REMOVE HARDWARE, COMBINED N/A 11/29/2017    Procedure: COMBINED EXPLORE SPINE, REMOVE HARDWARE;;  Surgeon: Nikolas Vasques MD;  [facility]: [facility]     FUSION CERVICAL ANTERIOR TWO LEVELS  1/29/2010     HC DRAIN/INJ MAJOR JOINT/BURSA W/O US  5/5/2008    Left sacroiliac joint injection.     HC INJ EPIDURAL LUMBAR/SACRAL W/WO CONTRAST  2009     HEAD & NECK SURGERY      2013     HERNIA REPAIR       INJECT BLOCK MEDIAL BRANCH CERVICAL/THORACIC/LUMBAR Bilateral 8/26/2015    Procedure: INJECT BLOCK MEDIAL BRANCH CERVICAL / THORACIC /  LUMBAR;  Surgeon: Ronald Driscoll MD;  [facility]     INJECT BLOCK MEDIAL BRANCH CERVICAL/THORACIC/LUMBAR N/A 8/8/2019    Procedure: BLOCK, NERVE, FACET JOINT, MEDIAL BRANCH, DIAGNOSTIC Bilateral Cervical 2,3,4;  Surgeon: Ronald Driscoll MD;  [facility]     INJECT BLOCK MEDIAL BRANCH CERVICAL/THORACIC/LUMBAR N/A 9/13/2019    Procedure: Medial Branch Block Bilateral Cervical 2,3, and 4;  Surgeon: Ronald Driscoll MD;  [facility]     INJECT BLOCK MEDIAL BRANCH CERVICAL/THORACIC/LUMBAR Bilateral 7/3/2020    Procedure: Third occipital and Cervical 3-4 Medial Branch Blocks bilateral;  Surgeon: Ronald Driscoll MD;  [facility]     INJECT BLOCK MEDIAL BRANCH CERVICAL/THORACIC/LUMBAR N/A 7/29/2020    Procedure: medial branch blocks cervical 3-4 and bilateral third occiptal nerves;  Surgeon: Ronald Driscoll MD;  [facility]     INJECT BLOCK MEDIAL BRANCH CERVICAL/THORACIC/LUMBAR Bilateral 11/6/2020    Procedure: Cervical 1-2 Medial Branch Block Bilateral;  Surgeon: Ronald Driscoll MD;  [facility]     INJECT EPIDURAL LUMBAR N/A 2/13/2020    Procedure: Lumber 4-5 bilateral Epidural Injection;  Surgeon: Ronald Driscoll MD;  [facility]     INJECT FACET JOINT Bilateral 5/27/2015    Procedure: INJECT FACET JOINT;  Surgeon: Ronald Driscoll MD;  [facility]     NERVE BLOCK OCCIPITAL Bilateral 7/12/2018    Procedure: NERVE BLOCK OCCIPITAL;  bilateral occipital nerve blocks;  Surgeon: Ronald Driscoll MD;  [facility]     NERVE BLOCK OCCIPITAL Bilateral 12/9/2021    Procedure: BILATERAL OCCIPITAL NERVE BLOCK;  Surgeon: Ronald Driscoll MD;  [facility]     PROCEDURE PLACEHOLDER GENERAL N/A 02/21/2019    K[facility]     RADIO FREQUENCY ABLATION PULSED CERVICAL Right 10/18/2019    Procedure: CERVICAL RADIOFREQUENCY ABLATION  Cervical 2,3,4;  Surgeon: Ronald Driscoll MD;  [facility]     RADIO FREQUENCY ABLATION PULSED CERVICAL Left 11/14/2019    Procedure: Left Cervical 2, 3, 4  Radiofreqency Ablation;  Surgeon: Ronald Driscoll MD;  [facility]     RADIO FREQUENCY ABLATION PULSED CERVICAL Left 3/11/2021    Procedure: Bilateral Cervical 1 and 2 radiofrequency ablation, aborted;  Surgeon: Ronald Driscoll MD;  Location: [facility]       Family History:    Family History   Problem Relation Age of Onset     Family History Negative No family hx of      C.A.D. No family hx of        Social History:  Marital Status:  Single [1]  Social History     Tobacco Use     Smoking status: Every Day     Packs/day: 0.50     Years: 30.00     Pack years: 15.00     Types: Cigars, Cigarettes     Smokeless tobacco: Former     Quit date: 2012   Substance Use Topics     Alcohol use: No     Alcohol/week: 0.0 standard drinks     Comment: HX OF ABUSE-IN REMISSION     Drug use: No        Medications:    albuterol (PROAIR HFA/PROVENTIL HFA/VENTOLIN HFA) 108 (90 Base) MCG/ACT inhaler  amoxicillin-clavulanate (AUGMENTIN) 875-125 MG tablet  budesonide (ENTOCORT EC) 3 MG EC capsule  clonazePAM (KLONOPIN) 0.5 MG tablet  DULoxetine (CYMBALTA) 60 MG capsule  fluticasone (FLONASE) 50 MCG/ACT nasal spray  fluticasone (FLOVENT HFA) 220 MCG/ACT inhaler  ipratropium - albuterol 0.5 mg/2.5 mg/3 mL (DUONEB) 0.5-2.5 (3) MG/3ML neb solution  methylPREDNISolone (MEDROL DOSEPAK) 4 MG tablet therapy pack  naloxone (NARCAN) 4 MG/0.1ML nasal spray  omeprazole (PRILOSEC) 40 MG DR capsule  order for DME  oxyCODONE IR (ROXICODONE) 10 MG tablet  sildenafil (VIAGRA) 100 MG tablet  sucralfate (CARAFATE) 1 GM tablet  topiramate (TOPAMAX) 25 MG tablet  traZODone (DESYREL) 50 MG tablet  varenicline (CHANTIX) 1 MG tablet  VITAMIN D3 50 MCG (2000 UT) tablet          Review of Systems   All other systems reviewed and are negative.      Physical Exam   BP: 116/84  Pulse: 67  Temp: 97  F (36.1  C)  Resp: 18  SpO2: 98 %      Physical Exam  Vitals and nursing note reviewed.   Constitutional:       General: He is not in acute distress.     Appearance: Normal  appearance. He is not ill-appearing.   Neck:     Musculoskeletal:      Cervical back: Pain with movement and muscular tenderness present. No spinous process tenderness. Normal range of motion.   Neurological:      Mental Status: He is alert.         ED Course                 Procedures    6 trigger point injection was performed at the site of maximal tenderness using 1% plain Lidocaine with epi. This was well tolerated, and followed by good relief of pain.    No results found. However, due to the size of the patient record, not all encounters were searched. Please check Results Review for a complete set of results.    Medications   ketorolac (TORADOL) injection 60 mg (has no administration in time range)     Patient presents with acute exacerbation of chronic neck pain.  Multiple trigger points were done in his neck with good relief of his symptoms.  Patient was also given a shot of Toradol here.  I Nitza send the patient home also with a prescription for Medrol Dosepak.  He was instructed if the pain comes back again tomorrow to do this to see if this also helps to calm things down.  If it still not better, patient will follow-up with his pain doctor.    Assessments & Plan (with Medical Decision Making)  Acute exacerbation of chronic neck pain     I have reviewed the nursing notes.    I have reviewed the findings, diagnosis, plan and need for follow up with the patient.        2/14/2023   [facility] EMERGENCY DEPT     Kirk Jaramillo MD  02/14/23 0067

## 2023-02-14 NOTE — ED NOTES
Reviewed discharge instruction, verbalized understanding. No questions or concerns. Pt denies any s/sx reaction after IM injection.

## 2023-02-18 ENCOUNTER — HEALTH MAINTENANCE LETTER (OUTPATIENT)
Age: 58
End: 2023-02-18

## 2023-03-12 NOTE — TELEPHONE ENCOUNTER
Pt Oxycodone did not come through correctly - did not link correctly in our system to our refill request - please have dr cid      Thank you     Kandi Osborneles  Pharmacy Tech.  [facility]  [phone]   12-Mar-2023

## 2023-03-14 DIAGNOSIS — G47.00 INSOMNIA, UNSPECIFIED TYPE: ICD-10-CM

## 2023-03-15 NOTE — TELEPHONE ENCOUNTER
Pending Prescriptions:                       Disp   Refills    traZODone (DESYREL) 50 MG tablet           60 tab*3        Sig: Take 1-2 tablets ( mg) by mouth At Bedtime    Routing refill request to provider for review/approval because:  Drug interaction warning

## 2023-03-16 RX ORDER — TRAZODONE HYDROCHLORIDE 50 MG/1
50-100 TABLET, FILM COATED ORAL AT BEDTIME
Qty: 60 TABLET | Refills: 3 | Status: SHIPPED | OUTPATIENT
Start: 2023-03-16 | End: 2023-06-13

## 2023-03-27 NOTE — PROGRESS NOTES
COVID-19 PCR test completed. Patient handout For Patients Who Have Been Tested for Covid-19 (Coronavirus) was given to the patient, which includes test result notification process.    
9

## 2023-05-12 ENCOUNTER — HOSPITAL ENCOUNTER (EMERGENCY)
Facility: CLINIC | Age: 58
Discharge: HOME | End: 2023-05-12
Attending: EMERGENCY MEDICINE | Admitting: EMERGENCY MEDICINE
Payer: COMMERCIAL

## 2023-05-12 VITALS
HEART RATE: 63 BPM | RESPIRATION RATE: 18 BRPM | DIASTOLIC BLOOD PRESSURE: 74 MMHG | OXYGEN SATURATION: 98 % | TEMPERATURE: 98 F | WEIGHT: 125 LBS | BODY MASS INDEX: 19.58 KG/M2 | SYSTOLIC BLOOD PRESSURE: 135 MMHG

## 2023-05-12 DIAGNOSIS — M54.2 NECK PAIN: ICD-10-CM

## 2023-05-12 PROCEDURE — 99284 EMERGENCY DEPT VISIT MOD MDM: CPT | Mod: 25 | Performed by: EMERGENCY MEDICINE

## 2023-05-12 PROCEDURE — 96372 THER/PROPH/DIAG INJ SC/IM: CPT | Performed by: EMERGENCY MEDICINE

## 2023-05-12 PROCEDURE — 20552 NJX 1/MLT TRIGGER POINT 1/2: CPT | Performed by: EMERGENCY MEDICINE

## 2023-05-12 PROCEDURE — 250N000011 HC RX IP 250 OP 636: Performed by: EMERGENCY MEDICINE

## 2023-05-12 RX ORDER — KETOROLAC TROMETHAMINE 30 MG/ML
30 INJECTION, SOLUTION INTRAMUSCULAR; INTRAVENOUS ONCE
Status: COMPLETED | OUTPATIENT
Start: 2023-05-12 | End: 2023-05-12

## 2023-05-12 RX ADMIN — KETOROLAC TROMETHAMINE 30 MG: 30 INJECTION, SOLUTION INTRAMUSCULAR at 01:08

## 2023-05-12 ASSESSMENT — ACTIVITIES OF DAILY LIVING (ADL): ADLS_ACUITY_SCORE: 39

## 2023-05-12 NOTE — ED TRIAGE NOTES
"Pt presents with chronic neck pain. \"when it gets this bad I need to come in for trigger point injections.\"     Triage Assessment     Row Name 05/12/23 0015       Triage Assessment (Adult)    Airway WDL WDL       Respiratory WDL    Respiratory WDL WDL       Cardiac WDL    Cardiac WDL WDL              "

## 2023-05-12 NOTE — ED PROVIDER NOTES
History     chief complaint  HPI  Patient is a 57-year-old gentleman with a history of chronic cervical spine pain who is presenting with an acute flareup of his pain.  Patient states he gets recurrent trigger point injections and feels like he is at that point again.  He states there is nothing different about his neck pain tonight.  Per usual it causes headaches.  It is reproducible to palpation he has multiple trigger points he would like treated today.  He has no new fevers, chills, rhinorrhea, sore throat, cough, numbness/tingling/weakness in his arms or his legs.    Review of Systems:  All organ systems below were reviewed and are negative unless indicated in the HPI.    Constitutional  Eyes  ENT  Respiratory  Cardiovascular  Gastrointestinal  Genitourinary  Musculoskeletal  Skin  Neuro    Allergies:  Allergies   Allergen Reactions     Vicodin [Hydrocodone-Acetaminophen] Nausea     Other reaction(s): Diaphoresis, Vomiting  HEADACHE       Problem List:    Patient Active Problem List    Diagnosis Date Noted     Insomnia, unspecified type 06/28/2022     Priority: Medium     Back pain, chronic 02/21/2019     Priority: Medium     S/P laparoscopic fundoplication 02/21/2019     Priority: Medium     Long-segment Olson's esophagus 02/21/2019     Priority: Medium     Gastroesophageal reflux disease, esophagitis presence not specified 09/21/2018     Priority: Medium     IMO Regulatory Load OCT 2020       Chronic seasonal allergic rhinitis due to fungal spores 06/07/2018     Priority: Medium     Status post cervical spinal arthrodesis 11/29/2017     Priority: Medium     Chronic, continuous use of opioids 12/13/2016     Priority: Medium     Patient is followed by Gregory G. Schoen, MD for ongoing prescription of pain medication.  All refills should be approved by this provider, or covering partner.    Medication(s): Oxycodone 5 mg IR: Take 2 capsules (10 mg) by mouth every 4 hours as needed 2 caps q 4 hour prn pain  up to 12 per day .   Methadone 10 mg three times daily, 90 per month  Clonazepam 0.5 mg tid, 90 per month   Clinic visit frequency required: Q 3 months     Controlled substance agreement:  Encounter-Level CSA - 2/27/15:               Controlled Substance Agreement - Scan on 3/14/2015  8:47 AM : Controlled Medication Agreement-02/27/15 (below)            Pain Clinic evaluation in the past: Yes    DIRE Total Score(s):  No flowsheet data found.    Last [facility] website verification:  10/30/2016     [email or web address]         Moderate persistent asthma without complication 11/02/2016     Priority: Medium     Nausea with vomiting 04/27/2016     Priority: Medium     Tobacco dependence syndrome 02/17/2016     Priority: Medium     Leukocytosis 02/16/2016     Priority: Medium     Degeneration of cervical intervertebral disc 01/26/2015     Priority: Medium     Health Care Home 09/30/2013     Priority: Medium     Status:  Accepted  Care Coordinator:    Melissa Behl BSN, RN, PHN  [facility] Care Coordinator  [phone]     See Letters for [facility] Care Plan  Date:  April 26, 2016            Persons encountering health services in other specified circumstances 09/30/2013     Priority: Medium     Overview:   Overview:   Status:  Accepted  Care Coordinator:    Melissa Behl BSN, RN, N  [facility] Care Coordinator  [phone]     See Letters for [facility] Care Plan  Date:  April 26, 2016       Arthrodesis status 06/15/2011     Priority: Medium     Neck pain 06/15/2011     Priority: Medium     History of arthrodesis 06/15/2011     Priority: Medium     CARDIOVASCULAR SCREENING; LDL GOAL LESS THAN 160 10/31/2010     Priority: Medium     Encounter for screening for cardiovascular disorders 10/31/2010     Priority: Medium     Intervertebral cervical disc disorder with myelopathy, cervical region 11/12/2009     Priority: Medium     Patient is followed by TIARA JIMENEZ for ongoing prescription of narcotic pain medicine.  Med:  methadone 10 mg tid. Taking oxycodone up to 8 per day, 120 per month  Maximum use per month: 90  Expected duration: ongoing  Narcotic agreement on file: YES  Clinic visit recommended: Q 3 months         Intervertebral disc disorder of cervical region with myelopathy 11/12/2009     Priority: Medium     Overview:   Overview:   Patient is followed by TIARA JIMENEZ for ongoing prescription of narcotic pain medicine.  Med: methadone 10 mg tid. Taking oxycodone up to 8 per day, 120 per month  Maximum use per month: 90  Expected duration: ongoing  Narcotic agreement on file: YES  Clinic visit recommended: Q 3 months       Constipation 03/19/2008     Priority: Medium     Problem list name updated by automated process. Provider to review       Thoracic or lumbosacral neuritis or radiculitis, unspecified 03/19/2008     Priority: Medium     Esophageal reflux 07/09/2003     Priority: Medium     Generalized anxiety disorder 07/08/2003     Priority: Medium     Alcohol abuse, in remission 07/08/2003     Priority: Medium     Nondependent alcohol abuse, in remission 07/08/2003     Priority: Medium        Past Medical History:    Past Medical History:   Diagnosis Date     Allergic rhinitis      Anxiety      Arthritis      Depressive disorder      GERD (gastroesophageal reflux disease)      Neck pain 06/15/2011     Pneumonia      Uncomplicated asthma        Past Surgical History:    Past Surgical History:   Procedure Laterality Date     BRONCHOSCOPY (RIGID OR FLEXIBLE), DIAGNOSTIC N/A 8/7/2018    Procedure: COMBINED BRONCHOSCOPY (RIGID OR FLEXIBLE), LAVAGE;  Bronchoscopy with Lavage and Transbronchial Biopsy;  Surgeon: Yung Miranda MD;  [facility]     COLONOSCOPY N/A 7/15/2022    Procedure: COLONOSCOPY, WITH POLYPECTOMY AND BIOPSY;  Surgeon: Jair Casey MD;  Location:  GI     COLONOSCOPY WITH CO2 INSUFFLATION N/A 9/24/2018    Procedure: COLONOSCOPY WITH CO2 INSUFFLATION;  Colon and upper endoscopy ;   Surgeon: Devin Pelayo MD;  Location: [facility]     COMBINED ESOPHAGOSCOPY, GASTROSCOPY, DUODENOSCOPY (EGD) WITH CO2 INSUFFLATION N/A 9/24/2018    Procedure: COMBINED ESOPHAGOSCOPY, GASTROSCOPY, DUODENOSCOPY (EGD) WITH CO2 INSUFFLATION;  Colon and upper endoscopy ;  Surgeon: Devin Pelayo MD;  Location: [facility] OR     DISCECTOMY LUMBAR POSTERIOR MICROSCOPIC ONE LEVEL  2/21/2012    Procedure:DISCECTOMY LUMBAR POSTERIOR MICROSCOPIC ONE LEVEL; LEFT T1-T2 THORASIC HEMILAMINECTOMY MICRODISCECTOMY WITH MEXTRIX II ; Surgeon:SHARON PURI; Location:[facility]     DISCECTOMY, FUSION CERVICAL ANTERIOR ONE LEVEL, COMBINED N/A 11/29/2017    Procedure: COMBINED DISCECTOMY, FUSION CERVICAL ANTERIOR ONE LEVEL;  Anterior Cervical Discectomy and Fusion Cervical Six - Cervical Seven, Exploration and Revision Cervical Four - Cervical Six with Hardware Removal;  Surgeon: Nikolas Vasques MD;  Location: [facility]     ESOPHAGEAL IMPEDENCE FUNCTION TEST WITH 24 HOUR PH GREATER THAN 1 HOUR N/A 12/12/2018    Procedure: ESOPHAGEAL IMPEDENCE FUNCTION TEST WITH 24 HOUR PH GREATER THAN 1 HOUR;  Surgeon: Atif Oconnor MD;  [facility]     ESOPHAGOSCOPY, GASTROSCOPY, DUODENOSCOPY (EGD), COMBINED N/A 9/24/2018    Procedure: COMBINED ESOPHAGOSCOPY, GASTROSCOPY, DUODENOSCOPY (EGD), BIOPSY SINGLE OR MULTIPLE;;  Surgeon: Devin Pelayo MD;  Location: [facility]     ESOPHAGOSCOPY, GASTROSCOPY, DUODENOSCOPY (EGD), COMBINED N/A 7/15/2022    Procedure: ESOPHAGOGASTRODUODENOSCOPY, WITH BIOPSY;  Surgeon: Jair Casey MD;  [facility]     EXPLORE SPINE, REMOVE HARDWARE, COMBINED N/A 11/29/2017    Procedure: COMBINED EXPLORE SPINE, REMOVE HARDWARE;;  Surgeon: Nikolas Vasques MD;  [facility]     FUSION CERVICAL ANTERIOR TWO LEVELS  1/29/2010     HC DRAIN/INJ MAJOR JOINT/BURSA W/O US  5/5/2008    Left sacroiliac joint injection.     HC INJ EPIDURAL LUMBAR/SACRAL W/WO CONTRAST  2009     HEAD & NECK  SURGERY      2013     HERNIA REPAIR       INJECT BLOCK MEDIAL BRANCH CERVICAL/THORACIC/LUMBAR Bilateral 8/26/2015    Procedure: INJECT BLOCK MEDIAL BRANCH CERVICAL / THORACIC / LUMBAR;  Surgeon: Ronald Driscoll MD;  [facility]     INJECT BLOCK MEDIAL BRANCH CERVICAL/THORACIC/LUMBAR N/A 8/8/2019    Procedure: BLOCK, NERVE, FACET JOINT, MEDIAL BRANCH, DIAGNOSTIC Bilateral Cervical 2,3,4;  Surgeon: Ronald Driscoll MD;  [facility]     INJECT BLOCK MEDIAL BRANCH CERVICAL/THORACIC/LUMBAR N/A 9/13/2019    Procedure: Medial Branch Block Bilateral Cervical 2,3, and 4;  Surgeon: Ronald Driscoll MD;  [facility]     INJECT BLOCK MEDIAL BRANCH CERVICAL/THORACIC/LUMBAR Bilateral 7/3/2020    Procedure: Third occipital and Cervical 3-4 Medial Branch Blocks bilateral;  Surgeon: Ronald Driscoll MD;  [facility]     INJECT BLOCK MEDIAL BRANCH CERVICAL/THORACIC/LUMBAR N/A 7/29/2020    Procedure: medial branch blocks cervical 3-4 and bilateral third occiptal nerves;  Surgeon: Ronald Driscoll MD;  [facility]     INJECT BLOCK MEDIAL BRANCH CERVICAL/THORACIC/LUMBAR Bilateral 11/6/2020    Procedure: Cervical 1-2 Medial Branch Block Bilateral;  Surgeon: Ronald Driscoll MD;  [facility]     INJECT EPIDURAL LUMBAR N/A 2/13/2020    Procedure: Lumber 4-5 bilateral Epidural Injection;  Surgeon: Ronald Driscoll MD;  [facility]     INJECT FACET JOINT Bilateral 5/27/2015    Procedure: INJECT FACET JOINT;  Surgeon: Ronald Driscoll MD;  [facility]     NERVE BLOCK OCCIPITAL Bilateral 7/12/2018    Procedure: NERVE BLOCK OCCIPITAL;  bilateral occipital nerve blocks;  Surgeon: Ronald Driscoll MD;  [facility]     NERVE BLOCK OCCIPITAL Bilateral 12/9/2021    Procedure: BILATERAL OCCIPITAL NERVE BLOCK;  Surgeon: Ronald Driscoll MD;  [facility]     PROCEDURE PLACEHOLDER GENERAL N/A 02/21/2019    [facility]     RADIO FREQUENCY ABLATION PULSED CERVICAL Right 10/18/2019    Procedure: CERVICAL RADIOFREQUENCY  ABLATION  Cervical 2,3,4;  Surgeon: Ronald Driscoll MD;  [facility]     RADIO FREQUENCY ABLATION PULSED CERVICAL Left 11/14/2019    Procedure: Left Cervical 2, 3, 4 Radiofreqency Ablation;  Surgeon: Ronald Driscoll MD;  [facility]     RADIO FREQUENCY ABLATION PULSED CERVICAL Left 3/11/2021    Procedure: Bilateral Cervical 1 and 2 radiofrequency ablation, aborted;  Surgeon: Ronald Driscoll MD;  [facility]: [facility]       Family History:    Family History   Problem Relation Age of Onset     Family History Negative No family hx of      C.A.D. No family hx of        Medications:    albuterol (PROAIR HFA/PROVENTIL HFA/VENTOLIN HFA) 108 (90 Base) MCG/ACT inhaler  amoxicillin-clavulanate (AUGMENTIN) 875-125 MG tablet  budesonide (ENTOCORT EC) 3 MG EC capsule  clonazePAM (KLONOPIN) 0.5 MG tablet  DULoxetine (CYMBALTA) 60 MG capsule  fluticasone (FLONASE) 50 MCG/ACT nasal spray  fluticasone (FLOVENT HFA) 220 MCG/ACT inhaler  ipratropium - albuterol 0.5 mg/2.5 mg/3 mL (DUONEB) 0.5-2.5 (3) MG/3ML neb solution  methylPREDNISolone (MEDROL DOSEPAK) 4 MG tablet therapy pack  naloxone (NARCAN) 4 MG/0.1ML nasal spray  omeprazole (PRILOSEC) 40 MG DR capsule  order for DME  oxyCODONE IR (ROXICODONE) 10 MG tablet  sildenafil (VIAGRA) 100 MG tablet  sucralfate (CARAFATE) 1 GM tablet  topiramate (TOPAMAX) 25 MG tablet  traZODone (DESYREL) 50 MG tablet  varenicline (CHANTIX) 1 MG tablet  VITAMIN D3 50 MCG (2000 UT) tablet          Physical Exam   BP: (!) 142/80  Pulse: 63  Temp: 98  F (36.7  C)  Resp: 18  Weight: 56.7 kg (125 lb)  SpO2: 98 %    Gen: Vital signs reviewed  Eyes: Sclera white, pupils round  Neck: Multiple tender areas on both sides of his cervical spine in the musculature.  No erythema, warmth, or swelling noted.  ENT: External ears and nares normal  Card: Regular rate and rhythm  Resp: No respiratory distress. Lungs clear to auscultation bilaterally  Abd: Soft, non-distended, non-tender  Extremities: Warm, no  edema  Skin: No rashes  Neuro: Alert, speech normal.  Moves all extremities equally.    ED Course        Procedures    Using 0.5% bupivacaine without epinephrine, 6 trigger point injections were performed using a 30-gauge needle and 0.5 mL per site based on maximal painful regions.  A total of 2 muscle groups were involved.       No results found. However, due to the size of the patient record, not all encounters were searched. Please check Results Review for a complete set of results.    Medications   ketorolac (TORADOL) injection 30 mg (30 mg Intramuscular $Given 5/12/23 0108)         Consultations:  None    Social Determinants of Health:  Presents alone    Assessments & Plan (with Medical Decision Making)       I have reviewed the nursing notes.    I have reviewed the findings, diagnosis, plan and need for follow up with the patient.      Medical Decision Making  On arrival, patient is well-appearing.  He has normal vital signs and a normal neurologic exam.  There is no change in the character of his chronic neck pain except the severity of this.  I do not suspect meningitis, spinal epidural abscess, or other sinister pathology.  Trigger point injections performed he was given Toradol.  Discussed appropriate return precautions.  Discharged home in stable condition.    Final diagnoses:   Neck pain         Edilberto Virk M.D.   [facility]     Edilberto Virk MD  05/12/23 0118       Edilberto Virk MD  05/16/23 2048

## 2023-05-24 DIAGNOSIS — K52.832 LYMPHOCYTIC COLITIS: ICD-10-CM

## 2023-05-25 RX ORDER — BUDESONIDE 3 MG/1
9 CAPSULE, COATED PELLETS ORAL EVERY MORNING
Qty: 90 CAPSULE | Refills: 3 | Status: SHIPPED | OUTPATIENT
Start: 2023-05-25

## 2023-06-12 DIAGNOSIS — G47.00 INSOMNIA, UNSPECIFIED TYPE: ICD-10-CM

## 2023-06-13 RX ORDER — TRAZODONE HYDROCHLORIDE 50 MG/1
TABLET, FILM COATED ORAL
Qty: 60 TABLET | Refills: 2 | Status: SHIPPED | OUTPATIENT
Start: 2023-06-13

## 2023-06-13 NOTE — TELEPHONE ENCOUNTER
Pending Prescriptions:                       Disp   Refills    traZODone (DESYREL) 50 MG tablet [Pharmacy*60 tab*2        Sig: TAKE ONE TO TWO TABLETS (50-100MG) BY MOUTH AT           BEDTIME      Routing refill request to provider for review/approval because:  Drug interaction warning    Angella Leiva, RN, BSN

## 2023-09-11 DIAGNOSIS — F41.1 GENERALIZED ANXIETY DISORDER: ICD-10-CM

## 2023-09-12 RX ORDER — DULOXETIN HYDROCHLORIDE 60 MG/1
CAPSULE, DELAYED RELEASE ORAL
Qty: 90 CAPSULE | Refills: 1 | Status: SHIPPED | OUTPATIENT
Start: 2023-09-12

## 2023-10-28 ENCOUNTER — HOSPITAL ENCOUNTER (EMERGENCY)
Facility: CLINIC | Age: 58
End: 2023-10-28
Payer: COMMERCIAL

## 2023-11-12 DIAGNOSIS — K22.70 LONG-SEGMENT BARRETT'S ESOPHAGUS: ICD-10-CM

## 2023-11-13 RX ORDER — OMEPRAZOLE 40 MG/1
40 CAPSULE, DELAYED RELEASE ORAL DAILY
Qty: 60 CAPSULE | Refills: 0 | Status: SHIPPED | OUTPATIENT
Start: 2023-11-13 | End: 2024-02-16

## 2023-11-30 NOTE — TELEPHONE ENCOUNTER
Clonazepam was filled on 04/13/2022  Oxycodone was filled on 04/13/2022      Jill Rodriguez MA    No (0)
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
## 2024-02-05 ENCOUNTER — TELEPHONE (OUTPATIENT)
Dept: FAMILY MEDICINE | Facility: OTHER | Age: 59
End: 2024-02-05
Payer: COMMERCIAL

## 2024-02-05 NOTE — TELEPHONE ENCOUNTER
Called patient and LVM to give a call back as provider is out of the office. Okay to use any spot.

## 2024-02-07 ENCOUNTER — NURSE TRIAGE (OUTPATIENT)
Dept: FAMILY MEDICINE | Facility: CLINIC | Age: 59
End: 2024-02-07
Payer: COMMERCIAL

## 2024-02-07 NOTE — TELEPHONE ENCOUNTER
Reason for Call:  Appointment Request    Patient requesting this type of appt:  Office Visit    Requested provider: Schoen, Gregory G    Reason patient unable to be scheduled: Needs to be scheduled by clinic    When does patient want to be seen/preferred time:  ASAP    Comments: Patient states they have ongoing stomach pains and would like to be checked out    Could we send this information to you in [place] or would you prefer to receive a phone call?:   Patient would prefer a phone call   Okay to leave a detailed message?: Yes at Cell number on file:    Telephone Information:   Mobile [phone]       Call taken on 2/7/2024 at 11:27 AM by Marcia Barnard

## 2024-02-07 NOTE — TELEPHONE ENCOUNTER
Nurse Triage SBAR    Is this a 2nd Level Triage? YES, LICENSED PRACTITIONER REVIEW IS REQUIRED    Situation: He states the pain has been severe and constant for 1 month.    Background: Abdominal pain on and off since he was diagnosed with colitis about 1 year ago.    Assessment:   He is having trouble eating.  He has also had a lot of gas and states he is either burping or farting.  Pain level is not bad this morning due not eating much the last couple of days.  He states the pain increased when he eats.  He has not tried a clear liquid diet.    Protocol Recommended Disposition:   Go To ED/UCC Now (Or To Office With PCP Approval)    Recommendation: per protocol patient advised to be seen today. He states he does not need to be seen today due to his pain be lower than normal. He would like to know his providers advice. He was notified that Dr. Schoen is not in until Friday. He is willing to wait .    He was also wondering about getting a scope down his throat again.    Patient was advised to go in if his pain gets worse.    Routed to provider    Brionna Sharma RN on 2/7/2024 at 12:27 PM\

## 2024-02-12 NOTE — TELEPHONE ENCOUNTER
Please schedule [facility] for a clinic visit to assess.    Electronically signed by Greg Schoen, MD

## 2024-02-12 NOTE — TELEPHONE ENCOUNTER
RN Triage    Patient Contact    Attempt # 1    Was call answered?  No.  Left message on voicemail with information to call me back.    Brionna Sharma RN on 2/12/2024 at 12:43 PM

## 2024-02-13 NOTE — TELEPHONE ENCOUNTER
Called and notified patient of PCP advice per below documentation. Clinic appointment scheduled for 2/20/24 with PCP.     Advised patient to seek urgent/emergency care for any new or worsening of symptoms. Patient verbalized understanding and had no further questions.    Maria Luz Sweeney, MAXIMUSN, RN

## 2024-02-15 ENCOUNTER — TELEPHONE (OUTPATIENT)
Dept: FAMILY MEDICINE | Facility: CLINIC | Age: 59
End: 2024-02-15
Payer: COMMERCIAL

## 2024-02-15 DIAGNOSIS — K22.70 LONG-SEGMENT BARRETT'S ESOPHAGUS: ICD-10-CM

## 2024-02-15 ASSESSMENT — ASTHMA QUESTIONNAIRES
ACT_TOTALSCORE: 25
QUESTION_2 LAST FOUR WEEKS HOW OFTEN HAVE YOU HAD SHORTNESS OF BREATH: NOT AT ALL
ACT_TOTALSCORE: 25
QUESTION_4 LAST FOUR WEEKS HOW OFTEN HAVE YOU USED YOUR RESCUE INHALER OR NEBULIZER MEDICATION (SUCH AS ALBUTEROL): NOT AT ALL
QUESTION_5 LAST FOUR WEEKS HOW WOULD YOU RATE YOUR ASTHMA CONTROL: COMPLETELY CONTROLLED
QUESTION_1 LAST FOUR WEEKS HOW MUCH OF THE TIME DID YOUR ASTHMA KEEP YOU FROM GETTING AS MUCH DONE AT WORK, SCHOOL OR AT HOME: NONE OF THE TIME
QUESTION_3 LAST FOUR WEEKS HOW OFTEN DID YOUR ASTHMA SYMPTOMS (WHEEZING, COUGHING, SHORTNESS OF BREATH, CHEST TIGHTNESS OR PAIN) WAKE YOU UP AT NIGHT OR EARLIER THAN USUAL IN THE MORNING: NOT AT ALL

## 2024-02-16 RX ORDER — OMEPRAZOLE 40 MG/1
40 CAPSULE, DELAYED RELEASE ORAL DAILY
Qty: 60 CAPSULE | Refills: 0 | Status: SHIPPED | OUTPATIENT
Start: 2024-02-16 | End: 2024-05-23

## 2024-02-19 ASSESSMENT — PATIENT HEALTH QUESTIONNAIRE - PHQ9
SUM OF ALL RESPONSES TO PHQ QUESTIONS 1-9: 2
10. IF YOU CHECKED OFF ANY PROBLEMS, HOW DIFFICULT HAVE THESE PROBLEMS MADE IT FOR YOU TO DO YOUR WORK, TAKE CARE OF THINGS AT HOME, OR GET ALONG WITH OTHER PEOPLE: NOT DIFFICULT AT ALL
SUM OF ALL RESPONSES TO PHQ QUESTIONS 1-9: 2

## 2024-02-20 ENCOUNTER — OFFICE VISIT (OUTPATIENT)
Dept: FAMILY MEDICINE | Facility: CLINIC | Age: 59
End: 2024-02-20
Payer: COMMERCIAL

## 2024-02-20 VITALS
BODY MASS INDEX: 19.71 KG/M2 | WEIGHT: 125.6 LBS | HEART RATE: 58 BPM | HEIGHT: 67 IN | OXYGEN SATURATION: 98 % | SYSTOLIC BLOOD PRESSURE: 80 MMHG | TEMPERATURE: 97.4 F | DIASTOLIC BLOOD PRESSURE: 50 MMHG | RESPIRATION RATE: 18 BRPM

## 2024-02-20 DIAGNOSIS — K52.832 LYMPHOCYTIC COLITIS: ICD-10-CM

## 2024-02-20 DIAGNOSIS — K22.719 BARRETT'S ESOPHAGUS WITH DYSPLASIA: ICD-10-CM

## 2024-02-20 DIAGNOSIS — F10.11 NONDEPENDENT ALCOHOL ABUSE, IN REMISSION: ICD-10-CM

## 2024-02-20 DIAGNOSIS — R10.84 ABDOMINAL PAIN, GENERALIZED: Primary | ICD-10-CM

## 2024-02-20 DIAGNOSIS — R19.7 DIARRHEA, UNSPECIFIED TYPE: ICD-10-CM

## 2024-02-20 LAB
ALBUMIN SERPL BCG-MCNC: 4.2 G/DL (ref 3.5–5.2)
ALP SERPL-CCNC: 60 U/L (ref 40–150)
ALT SERPL W P-5'-P-CCNC: 11 U/L (ref 0–70)
ANION GAP SERPL CALCULATED.3IONS-SCNC: 9 MMOL/L (ref 7–15)
AST SERPL W P-5'-P-CCNC: 16 U/L (ref 0–45)
BASOPHILS # BLD AUTO: 0.1 10E3/UL (ref 0–0.2)
BASOPHILS NFR BLD AUTO: 1 %
BILIRUB SERPL-MCNC: 0.2 MG/DL
BUN SERPL-MCNC: 7.1 MG/DL (ref 6–20)
CALCIUM SERPL-MCNC: 9.5 MG/DL (ref 8.6–10)
CHLORIDE SERPL-SCNC: 102 MMOL/L (ref 98–107)
CREAT SERPL-MCNC: 0.93 MG/DL (ref 0.67–1.17)
DEPRECATED HCO3 PLAS-SCNC: 27 MMOL/L (ref 22–29)
EGFRCR SERPLBLD CKD-EPI 2021: >90 ML/MIN/1.73M2
EOSINOPHIL # BLD AUTO: 0.6 10E3/UL (ref 0–0.7)
EOSINOPHIL NFR BLD AUTO: 9 %
ERYTHROCYTE [DISTWIDTH] IN BLOOD BY AUTOMATED COUNT: 12.1 % (ref 10–15)
GLUCOSE SERPL-MCNC: 62 MG/DL (ref 70–99)
HCT VFR BLD AUTO: 42.6 % (ref 40–53)
HGB BLD-MCNC: 14.2 G/DL (ref 13.3–17.7)
IMM GRANULOCYTES # BLD: 0 10E3/UL
IMM GRANULOCYTES NFR BLD: 0 %
LIPASE SERPL-CCNC: 61 U/L (ref 13–60)
LYMPHOCYTES # BLD AUTO: 2.6 10E3/UL (ref 0.8–5.3)
LYMPHOCYTES NFR BLD AUTO: 43 %
MCH RBC QN AUTO: 32.8 PG (ref 26.5–33)
MCHC RBC AUTO-ENTMCNC: 33.3 G/DL (ref 31.5–36.5)
MCV RBC AUTO: 98 FL (ref 78–100)
MONOCYTES # BLD AUTO: 0.5 10E3/UL (ref 0–1.3)
MONOCYTES NFR BLD AUTO: 8 %
NEUTROPHILS # BLD AUTO: 2.4 10E3/UL (ref 1.6–8.3)
NEUTROPHILS NFR BLD AUTO: 39 %
NRBC # BLD AUTO: 0 10E3/UL
NRBC BLD AUTO-RTO: 0 /100
PLATELET # BLD AUTO: 159 10E3/UL (ref 150–450)
POTASSIUM SERPL-SCNC: 3.6 MMOL/L (ref 3.4–5.3)
PROT SERPL-MCNC: 6.9 G/DL (ref 6.4–8.3)
RBC # BLD AUTO: 4.33 10E6/UL (ref 4.4–5.9)
SODIUM SERPL-SCNC: 138 MMOL/L (ref 135–145)
WBC # BLD AUTO: 6.1 10E3/UL (ref 4–11)

## 2024-02-20 PROCEDURE — 85025 COMPLETE CBC W/AUTO DIFF WBC: CPT | Performed by: FAMILY MEDICINE

## 2024-02-20 PROCEDURE — 86140 C-REACTIVE PROTEIN: CPT | Performed by: FAMILY MEDICINE

## 2024-02-20 PROCEDURE — 36415 COLL VENOUS BLD VENIPUNCTURE: CPT | Performed by: FAMILY MEDICINE

## 2024-02-20 PROCEDURE — 83690 ASSAY OF LIPASE: CPT | Performed by: FAMILY MEDICINE

## 2024-02-20 PROCEDURE — 80053 COMPREHEN METABOLIC PANEL: CPT | Performed by: FAMILY MEDICINE

## 2024-02-20 PROCEDURE — 99214 OFFICE O/P EST MOD 30 MIN: CPT | Performed by: FAMILY MEDICINE

## 2024-02-20 RX ORDER — SUCRALFATE 1 G/1
1 TABLET ORAL 4 TIMES DAILY
Qty: 120 TABLET | Refills: 3 | Status: SHIPPED | OUTPATIENT
Start: 2024-02-20

## 2024-02-20 ASSESSMENT — ANXIETY QUESTIONNAIRES
7. FEELING AFRAID AS IF SOMETHING AWFUL MIGHT HAPPEN: NOT AT ALL
GAD7 TOTAL SCORE: 1
5. BEING SO RESTLESS THAT IT IS HARD TO SIT STILL: NOT AT ALL
IF YOU CHECKED OFF ANY PROBLEMS ON THIS QUESTIONNAIRE, HOW DIFFICULT HAVE THESE PROBLEMS MADE IT FOR YOU TO DO YOUR WORK, TAKE CARE OF THINGS AT HOME, OR GET ALONG WITH OTHER PEOPLE: NOT DIFFICULT AT ALL
7. FEELING AFRAID AS IF SOMETHING AWFUL MIGHT HAPPEN: NOT AT ALL
8. IF YOU CHECKED OFF ANY PROBLEMS, HOW DIFFICULT HAVE THESE MADE IT FOR YOU TO DO YOUR WORK, TAKE CARE OF THINGS AT HOME, OR GET ALONG WITH OTHER PEOPLE?: NOT DIFFICULT AT ALL
1. FEELING NERVOUS, ANXIOUS, OR ON EDGE: SEVERAL DAYS
6. BECOMING EASILY ANNOYED OR IRRITABLE: NOT AT ALL
4. TROUBLE RELAXING: NOT AT ALL
2. NOT BEING ABLE TO STOP OR CONTROL WORRYING: NOT AT ALL
3. WORRYING TOO MUCH ABOUT DIFFERENT THINGS: NOT AT ALL
GAD7 TOTAL SCORE: 1

## 2024-02-20 ASSESSMENT — PAIN SCALES - GENERAL: PAINLEVEL: MILD PAIN (2)

## 2024-02-20 NOTE — PROGRESS NOTES
Assessment & Plan     Abdominal pain, generalized  Diarrhea, unspecified type  Somewhat recent onset of progression of abdominal symptoms with associated bloating, increased gas, flatulence with and without leakage, increased and somewhat dramatic gastrocolic reflex but without vomiting or fevers.  Stools have shown no blood but some mucus has been present.  There has been no recent antibiotic exposure.  He has been taking budesonide oral 3 to 6 mg daily in the morning for almost 2 years now.  He also has been taking omeprazole and intermittent Carafate for his upper GI symptoms.  Recent progression suggestive of a change in either of his chronic processes of known Olson's esophagitis and lymphocytic colitis or could be secondary to an acute infectious appearance.  Labs thus far show a CBC and comprehensive chemistry profile and lipase as not very revealing in terms of definitive diagnoses, evidence of liver or pancreatic involvement.  Orders were placed for diagnostic EGD and colonoscopy.  I also reached out to the local GI team for feedback and recommendations.  In the meantime, we will continue with his current medications pending attainment of his labs and formulating a plan based on those results as well as consultation with GI.      Olson's esophagus with dysplasia  See comments above, EGD order has been placed.    Lymphocytic colitis  See comments above, colonoscopy order has been placed as well as stool test for infection.    Nondependent alcohol abuse, in remission  Stable problem for patient is a 9 contributing factor to his acute GI issues.      Follow up pending results of labs, etc.     Electronically signed by Greg Schoen, MD              Subjective   Spenser is a 58 year old, presenting for the following health issues:  Abdominal Pain        2/20/2024     4:38 PM   Additional Questions   Roomed by Anjali EDGAR     History of Present Illness       Reason for visit:  Severe stomach problems    He eats  0-1 servings of fruits and vegetables daily.He consumes 5 sweetened beverage(s) daily.He exercises with enough effort to increase his heart rate 10 to 19 minutes per day.  He exercises with enough effort to increase his heart rate 7 days per week. He is missing 2 dose(s) of medications per week.  He is not taking prescribed medications regularly due to other.         Concern - Abdominal Pain  Onset: Off and on for over a year, severe since December 22  Description: steady, constant aching pain  Intensity: severe  Progression of Symptoms:  worsening  Accompanying Signs & Symptoms: gas, increased bowel movements, moving around in bed gives gas, weight loss  Previous history of similar problem: yes- has not been right ever since and sick every day  Precipitating factors:        Worsened by: eating  Alleviating factors:        Improved by: nothing  Therapies tried and outcome: not eating/liquid diet- did not help    Having bad abdominal pains/gas pains since mid-December.  Uncontrolled and large amounts of flatulence and also belches but not malodorous. His last Rx for budesonide oral tabs was for 90 tabs on 5/25/23 with three refills.  He states that he is still taking but adjusts dose from 1-3 tabs a day depending on his stools. Often times cannot eat for up to 24 hours.  Has a strong gastrocolic reflex and will have to leave the table to get to the bathroom.  Had EGD and colonoscopy in July, 2022 and this did show Olson's esophagitis and lymphocytic or collagenous colitis. He has been on omeprazole and budesonide since then but not always taking carafate.  He thought duloxetine was an ulcer medication as well.  He currently states he is having intermittent formed stools but is more so having loose stools and sometimes watery in nature.  He reports that he is having significant episodes of loud gurgling followed by fairly urgent evacuation bowel movements.  He also reports he is having leakage with flatulence which  "she has never had before.  Although he has some upper abdominal discomfort, he reports no significant vomiting.  He does however report that he is not able to tolerate much volume with his eating because he quickly feels full and uncomfortable.  As noted above, this also triggers a trip to the bathroom due to strong gastrocolic reflex.  He has not seen any blood in his stools but some occasional mucus is present.  He reports no fever or chills associated with this.  He also denies use  any THC products.  He is however on a fairly extensive medication regimen through his pain clinic which includes clonazepam for his anxiety, Cymbalta 60 mg once daily also for anxiety and depression as well as chronic pain, oxycodone 10 mg IR 5 tablets daily as needed, and PDMP review shows that he is also on Suboxone sublingual film which appears to be used twice daily and then uses Ambien for sleep.  He states he has sleep challenges and uses both Ambien and trazodone for this.  He notes that he took a couple of Ambien this morning and feels that is probably why his blood pressure is so low today.  He denies any lightheadedness or dizziness and did not demonstrate any orthostatic symptoms when we had him stand up quickly in the clinic today.  Objective     BP (!) 80/50   Pulse 58   Temp 97.4  F (36.3  C) (Temporal)   Resp 18   Ht 1.708 m (5' 7.25\")   Wt 57 kg (125 lb 9.6 oz)   SpO2 98%   BMI 19.53 kg/m    Body mass index is 19.53 kg/m .  Physical Exam   Alert, oriented and in no acute distress.  Skin is pale, eyes without jaundice or some conjunctival injection.  PERRL, EOMI.  TMs clear, nose clear and oropharynx clear.  He appears adequately hydrated.  The neck is supple and free of adenopathy or masses, the thyroid is normal without enlargement or nodules.  Chest was clear to auscultation anteriorly but posteriorly did show some very mild end expiratory wheezes.  No rales or rhonchi were noted.  Heart was with regular " rhythm with ectopic beats present on occasion.  No murmurs noted.  Patient was asymptomatic and unaware of these.  Abdomen was with hyperactive bowel sounds.  It was scaphoid and without scars.  He exhibited tenderness in the infraumbilical area diffusely with some voluntary guarding in the midline but not in the lateral aspects of both lower quadrants.  There is no focal epigastric tenderness to palpation nor tenderness of significance in the right or left upper quadrants.    Results for orders placed or performed in visit on 02/20/24   Comprehensive metabolic panel (BMP + Alb, Alk Phos, ALT, AST, Total. Bili, TP)     Status: Abnormal   Result Value Ref Range    Sodium 138 135 - 145 mmol/L    Potassium 3.6 3.4 - 5.3 mmol/L    Carbon Dioxide (CO2) 27 22 - 29 mmol/L    Anion Gap 9 7 - 15 mmol/L    Urea Nitrogen 7.1 6.0 - 20.0 mg/dL    Creatinine 0.93 0.67 - 1.17 mg/dL    GFR Estimate >90 >60 mL/min/1.73m2    Calcium 9.5 8.6 - 10.0 mg/dL    Chloride 102 98 - 107 mmol/L    Glucose 62 (L) 70 - 99 mg/dL    Alkaline Phosphatase 60 40 - 150 U/L    AST 16 0 - 45 U/L    ALT 11 0 - 70 U/L    Protein Total 6.9 6.4 - 8.3 g/dL    Albumin 4.2 3.5 - 5.2 g/dL    Bilirubin Total 0.2 <=1.2 mg/dL   Lipase     Status: Abnormal   Result Value Ref Range    Lipase 61 (H) 13 - 60 U/L   CBC with platelets and differential     Status: Abnormal   Result Value Ref Range    WBC Count 6.1 4.0 - 11.0 10e3/uL    RBC Count 4.33 (L) 4.40 - 5.90 10e6/uL    Hemoglobin 14.2 13.3 - 17.7 g/dL    Hematocrit 42.6 40.0 - 53.0 %    MCV 98 78 - 100 fL    MCH 32.8 26.5 - 33.0 pg    MCHC 33.3 31.5 - 36.5 g/dL    RDW 12.1 10.0 - 15.0 %    Platelet Count 159 150 - 450 10e3/uL    % Neutrophils 39 %    % Lymphocytes 43 %    % Monocytes 8 %    % Eosinophils 9 %    % Basophils 1 %    % Immature Granulocytes 0 %    NRBCs per 100 WBC 0 <1 /100    Absolute Neutrophils 2.4 1.6 - 8.3 10e3/uL    Absolute Lymphocytes 2.6 0.8 - 5.3 10e3/uL    Absolute Monocytes 0.5 0.0 - 1.3  10e3/uL    Absolute Eosinophils 0.6 0.0 - 0.7 10e3/uL    Absolute Basophils 0.1 0.0 - 0.2 10e3/uL    Absolute Immature Granulocytes 0.0 <=0.4 10e3/uL    Absolute NRBCs 0.0 10e3/uL   CBC with platelets and differential     Status: Abnormal    Narrative    The following orders were created for panel order CBC with platelets and differential.  Procedure                               Abnormality         Status                     ---------                               -----------         ------                     CBC with platelets and d...[[id number]]  Abnormal            Final result                 Please view results for these tests on the individual orders.     Stool studies are pending as they await collection and presentation of samples.        Signed Electronically by: Gregory G. Schoen, MD

## 2024-02-21 LAB — CRP SERPL-MCNC: <3 MG/L

## 2024-02-22 ENCOUNTER — TELEPHONE (OUTPATIENT)
Dept: GASTROENTEROLOGY | Facility: CLINIC | Age: 59
End: 2024-02-22
Payer: COMMERCIAL

## 2024-02-22 NOTE — TELEPHONE ENCOUNTER
Pre Assessment RN Review    Focused Assessments    Pain Medication Review    Per scheduling questionnaire, patient reports taking prescription pain medication three or more times per week.    Per chart review, patient does have an active prescription for an opioid medication. Prescribed Medication(s): oxycodone      Scheduling Status & Recommendations    Sedation: MAC/Deep Sedation - Per order.  Prep: Golytely Extended Prep - Per RN assessment.    Milagros Lerner RN  Endoscopy Procedure Pre Assessment RN

## 2024-02-22 NOTE — TELEPHONE ENCOUNTER
"Endoscopy Scheduling Screen    Have you had a positive Covid test in the last 14 days?  No    Are you active on MyChart?   No    What insurance is in the chart?  Other:  [facility]    Ordering/Referring Provider: Schoen, Gregory G, MD   (If ordering provider performs procedure, schedule with ordering provider unless otherwise instructed. )    BMI: Estimated body mass index is 19.53 kg/m  as calculated from the following:    Height as of 2/20/24: 1.708 m (5' 7.25\").    Weight as of 2/20/24: 57 kg (125 lb 9.6 oz).     Sedation Ordered  MAC/deep sedation.   BMI<= 45 45 < BMI <= 48 48 < BMI < = 50  BMI > 50   No Restrictions No [facility]  No [facility]  [facility] with exceptions Hospital Only OR Only       Are you taking any prescription medications for pain 3 or more times per week?   YES, EGD: RN review needed to determine if MAC is required.  (RN Review required.)     Do you have a history of malignant hyperthermia or adverse reaction to anesthesia?  No    (Females) Are you currently pregnant?   No     Have you been diagnosed or told you have pulmonary hypertension?   No    Do you have an LVAD?  No    Have you been told you have moderate to severe sleep apnea?  No    Have you been told you have COPD, asthma, or any other lung disease?  No    Do you have any heart conditions?  No     Have you ever had an organ transplant?   No    Have you ever had or are you awaiting a heart or lung transplant?   No    Have you had a stroke or transient ischemic attack (TIA aka \"mini stroke\" in the last 6 months?   No    Have you been diagnosed with or been told you have cirrhosis of the liver?   No    Are you currently on dialysis?   No    Do you need assistance transferring?   No    BMI: Estimated body mass index is 19.53 kg/m  as calculated from the following:    Height as of 2/20/24: 1.708 m (5' 7.25\").    Weight as of 2/20/24: 57 kg (125 lb 9.6 oz).     Is patients BMI > 40 and scheduling location [facility]?  No    Do you take an injectable " medication for weight loss or diabetes (excluding insulin)?  No    Do you take the medication Naltrexone?  No    Do you take blood thinners?  No       Prep   Are you currently on dialysis or do you have chronic kidney disease?  No    Do you have a diagnosis of diabetes?  No    Do you have a diagnosis of cystic fibrosis (CF)?  No    On a regular basis do you go 3 -5 days between bowel movements?  No    BMI > 40?  No    Preferred Pharmacy:        [facility] - [address] [address]  Phone: [phone] Fax: [phone]      Final Scheduling Details   Colonoscopy prep sent?  N/A- RN review stated pt should have extended    Procedure scheduled  Upper endoscopy (EGD)    Surgeon:  Uriah     Date of procedure:  04/03/2024     Pre-OP / PAC:   No - Not required for this site.    Location  [facility] - Patient preference.    Sedation   MAC/Deep Sedation  Per location      Patient Reminders:   You will receive a call from a Nurse to review instructions and health history.  This assessment must be completed prior to your procedure.  Failure to complete the Nurse assessment may result in the procedure being cancelled.      On the day of your procedure, please designate an adult(s) who can drive you home stay with you for the next 24 hours. The medicines used in the exam will make you sleepy. You will not be able to drive.      You cannot take public transportation, ride share services, or non-medical taxi service without a responsible caregiver.  Medical transport services are allowed with the requirement that a responsible caregiver will receive you at your destination.  We require that drivers and caregivers are confirmed prior to your procedure.

## 2024-03-16 ENCOUNTER — HEALTH MAINTENANCE LETTER (OUTPATIENT)
Age: 59
End: 2024-03-16

## 2024-03-29 RX ORDER — TRAZODONE HYDROCHLORIDE 150 MG/1
150 TABLET ORAL AT BEDTIME
COMMUNITY
Start: 2024-01-28

## 2024-03-29 RX ORDER — ZOLPIDEM TARTRATE 10 MG/1
10 TABLET ORAL
COMMUNITY

## 2024-03-29 RX ORDER — PROPRANOLOL HYDROCHLORIDE 20 MG/1
20 TABLET ORAL 2 TIMES DAILY
COMMUNITY
Start: 2024-01-29

## 2024-03-29 RX ORDER — BUPRENORPHINE HYDROCHLORIDE, NALOXONE HYDROCHLORIDE 2; .5 MG/1; MG/1
1 FILM, SOLUBLE BUCCAL; SUBLINGUAL DAILY
COMMUNITY
Start: 2024-01-24

## 2024-04-02 ENCOUNTER — ANESTHESIA EVENT (OUTPATIENT)
Dept: GASTROENTEROLOGY | Facility: CLINIC | Age: 59
End: 2024-04-02
Payer: COMMERCIAL

## 2024-04-02 ASSESSMENT — LIFESTYLE VARIABLES: TOBACCO_USE: 1

## 2024-04-02 ASSESSMENT — COPD QUESTIONNAIRES: COPD: 1
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
## 2024-04-02 NOTE — H&P
[facility] Anesthesia Pre-op History and Physical    Joselito Abarca MRN# [id number]   Age: 58 year old YOB: 1965      Date of Surgery: 4/3/2024 Location [facility]      Date of Exam 4/3/2024 Facility (In hospital)       Home clinic: [facility]  Primary care provider: Schoen, Gregory G         Chief Complaint and/or Reason for Procedure:   No chief complaint on file.  EGD. Olson's  Exam 2022 5 cm, no dysplasia.   Neg gastric bx 2022. Abd pain.       Active problem list:     Patient Active Problem List    Diagnosis Date Noted    Insomnia, unspecified type 06/28/2022     Priority: Medium    Back pain, chronic 02/21/2019     Priority: Medium    S/P laparoscopic fundoplication 02/21/2019     Priority: Medium    Long-segment Olson's esophagus 02/21/2019     Priority: Medium    Gastroesophageal reflux disease, esophagitis presence not specified 09/21/2018     Priority: Medium     IMO Regulatory Load OCT 2020      Chronic seasonal allergic rhinitis due to fungal spores 06/07/2018     Priority: Medium    Status post cervical spinal arthrodesis 11/29/2017     Priority: Medium    Chronic, continuous use of opioids 12/13/2016     Priority: Medium     Patient is followed by Gregory G. Schoen, MD for ongoing prescription of pain medication.  All refills should be approved by this provider, or covering partner.    Medication(s): Oxycodone 5 mg IR: Take 2 capsules (10 mg) by mouth every 4 hours as needed 2 caps q 4 hour prn pain up to 12 per day .   Methadone 10 mg three times daily, 90 per month  Clonazepam 0.5 mg tid, 90 per month   Clinic visit frequency required: Q 3 months     Controlled substance agreement:  Encounter-Level CSA - 2/27/15:               Controlled Substance Agreement - Scan on 3/14/2015  8:47 AM : Controlled Medication Agreement-02/27/15 (below)            Pain Clinic evaluation in the past: Yes    DIRE Total Score(s):  No  flowsheet data found.    Last [facility] website verification:  10/30/2016     [email or web address]        Moderate persistent asthma without complication 11/02/2016     Priority: Medium    Nausea with vomiting 04/27/2016     Priority: Medium    Tobacco dependence syndrome 02/17/2016     Priority: Medium    Leukocytosis 02/16/2016     Priority: Medium    Degeneration of cervical intervertebral disc 01/26/2015     Priority: Medium    Health Care Home 09/30/2013     Priority: Medium     Status:  Accepted  Care Coordinator:    Melissa Behl BSN, RN, PHN  [facility] Care Coordinator  [phone]     See Letters for [facility] Care Plan  Date:  April 26, 2016           Persons encountering health services in other specified circumstances 09/30/2013     Priority: Medium     Overview:   Overview:   Status:  Accepted  Care Coordinator:    Melissa Behl BSN, RN, N  [facility] Care Coordinator  [phone]     See Letters for [facility] Care Plan  Date:  April 26, 2016      Arthrodesis status 06/15/2011     Priority: Medium    Neck pain 06/15/2011     Priority: Medium    History of arthrodesis 06/15/2011     Priority: Medium    CARDIOVASCULAR SCREENING; LDL GOAL LESS THAN 160 10/31/2010     Priority: Medium    Encounter for screening for cardiovascular disorders 10/31/2010     Priority: Medium    Intervertebral cervical disc disorder with myelopathy, cervical region 11/12/2009     Priority: Medium     Patient is followed by TIARA JIMENEZ for ongoing prescription of narcotic pain medicine.  Med: methadone 10 mg tid. Taking oxycodone up to 8 per day, 120 per month  Maximum use per month: 90  Expected duration: ongoing  Narcotic agreement on file: YES  Clinic visit recommended: Q 3 months        Intervertebral disc disorder of cervical region with myelopathy 11/12/2009     Priority: Medium     Overview:   Overview:   Patient is followed by TIARA JIMENEZ for ongoing prescription of narcotic pain medicine.  Med: methadone 10  mg tid. Taking oxycodone up to 8 per day, 120 per month  Maximum use per month: 90  Expected duration: ongoing  Narcotic agreement on file: YES  Clinic visit recommended: Q 3 months      Constipation 03/19/2008     Priority: Medium     Problem list name updated by automated process. Provider to review      Thoracic or lumbosacral neuritis or radiculitis, unspecified 03/19/2008     Priority: Medium    Esophageal reflux 07/09/2003     Priority: Medium    Generalized anxiety disorder 07/08/2003     Priority: Medium    Alcohol abuse, in remission 07/08/2003     Priority: Medium    Nondependent alcohol abuse, in remission 07/08/2003     Priority: Medium            Medications (include herbals and vitamins):   Any Plavix use in the last 7 days? No     No current facility-administered medications for this encounter.     Current Outpatient Medications   Medication Sig    propranolol (INDERAL) 20 MG tablet Take 20 mg by mouth 2 times daily    SUBOXONE 2-0.5 MG per film Place 1 Film under the tongue daily    traZODone (DESYREL) 150 MG tablet Take 150 mg by mouth at bedtime    albuterol (PROAIR HFA/PROVENTIL HFA/VENTOLIN HFA) 108 (90 Base) MCG/ACT inhaler INHALE 2 PUFFS INTO THE LUNGS EVERY 6 HOURS AS NEEDED FOR SHORTNESS OF BREATH, DIFFICULTY BREATHING OR WHEEZING.    aspirin-acetaminophen-caffeine (EXCEDRIN MIGRAINE) 250-250-65 MG tablet Take 3 tablets by mouth    budesonide (ENTOCORT EC) 3 MG EC capsule TAKE 3 CAPSULES (9 MG) BY MOUTH EVERY MORNING    clonazePAM (KLONOPIN) 0.5 MG tablet TAKE ONE-HALF TO ONE TABLET BY MOUTH THREE TIMES A DAY AS NEEDED FOR ANXIETY    DULoxetine (CYMBALTA) 60 MG capsule TAKE ONE CAPSULE BY MOUTH ONCE DAILY    fluticasone (FLONASE) 50 MCG/ACT nasal spray SPRAY 2 SPRAYS IN EACH NOSTRIL EVERY DAY    fluticasone (FLOVENT HFA) 220 MCG/ACT inhaler INHALE 2 PUFFS INTO THE LUNGS TWICE DAILY    ipratropium - albuterol 0.5 mg/2.5 mg/3 mL (DUONEB) 0.5-2.5 (3) MG/3ML neb solution NEBULIZE CONTENTS OF ONE  VIAL EVERY 4 HOURS AS NEEDED FOR SHORTNESS OF BREATH / DYSPNEA    methylPREDNISolone (MEDROL DOSEPAK) 4 MG tablet therapy pack Follow Package Directions (Patient not taking: Reported on 2/20/2024)    naloxone (NARCAN) 4 MG/0.1ML nasal spray Spray 4 mg into one nostril alternating nostrils once as needed for opioid reversal every 2-3 minutes until assistance arrives  (Patient not taking: Reported on 1/3/2022)    omeprazole (PRILOSEC) 40 MG DR capsule TAKE 1 CAPSULE (40 MG) BY MOUTH DAILY    order for DME Equipment being ordered: Nebulizer mask and tubing    oxyCODONE IR (ROXICODONE) 10 MG tablet Take 1 tablet (10 mg) by mouth every 4 hours as needed for severe pain    sildenafil (VIAGRA) 100 MG tablet Take 1 tablet (100 mg) by mouth daily as needed 30 min to 4 hrs before sex. Do not use with nitroglycerin, terazosin or doxazosin.    sucralfate (CARAFATE) 1 GM tablet Take 1 tablet (1 g) by mouth 4 times daily    topiramate (TOPAMAX) 25 MG tablet TAKE THREE TABLETS BY MOUTH TWICE A DAY - TITRATE TO THIS DOSE AS INSTRUCTED IN CLINIC    traZODone (DESYREL) 50 MG tablet TAKE ONE TO TWO TABLETS (50-100MG) BY MOUTH AT BEDTIME    zolpidem (AMBIEN) 10 MG tablet Take 10 mg by mouth nightly as needed             Allergies:      Allergies   Allergen Reactions    Vicodin [Hydrocodone-Acetaminophen] Nausea     Other reaction(s): Diaphoresis, Vomiting  HEADACHE     Allergy to Latex? No  Allergy to tape?   No  Intolerances:             Physical Exam:   All vitals have been reviewed  No data found.  No intake/output data recorded.  Lungs:   No increased work of breathing, good air exchange, clear to auscultation bilaterally, no crackles or wheezing     Cardiovascular:   Normal apical impulse, regular rate and rhythm, normal S1 and S2, no S3 or S4, and no murmur noted             Lab / Radiology Results:            Anesthetic risk and/or ASA classification:       Paul Kruse MD

## 2024-04-03 ENCOUNTER — HOSPITAL ENCOUNTER (OUTPATIENT)
Facility: CLINIC | Age: 59
Discharge: HOME | End: 2024-04-03
Attending: INTERNAL MEDICINE | Admitting: INTERNAL MEDICINE
Payer: COMMERCIAL

## 2024-04-03 ENCOUNTER — ANESTHESIA (OUTPATIENT)
Dept: GASTROENTEROLOGY | Facility: CLINIC | Age: 59
End: 2024-04-03
Payer: COMMERCIAL

## 2024-04-03 VITALS
OXYGEN SATURATION: 100 % | RESPIRATION RATE: 18 BRPM | TEMPERATURE: 97.7 F | HEART RATE: 53 BPM | SYSTOLIC BLOOD PRESSURE: 114 MMHG | DIASTOLIC BLOOD PRESSURE: 83 MMHG

## 2024-04-03 LAB — UPPER GI ENDOSCOPY: NORMAL

## 2024-04-03 PROCEDURE — 88305 TISSUE EXAM BY PATHOLOGIST: CPT | Mod: TC | Performed by: INTERNAL MEDICINE

## 2024-04-03 PROCEDURE — 258N000003 HC RX IP 258 OP 636: Performed by: NURSE ANESTHETIST, CERTIFIED REGISTERED

## 2024-04-03 PROCEDURE — 88305 TISSUE EXAM BY PATHOLOGIST: CPT | Mod: 26

## 2024-04-03 PROCEDURE — 250N000009 HC RX 250: Performed by: NURSE ANESTHETIST, CERTIFIED REGISTERED

## 2024-04-03 PROCEDURE — 43239 EGD BIOPSY SINGLE/MULTIPLE: CPT | Performed by: INTERNAL MEDICINE

## 2024-04-03 PROCEDURE — 250N000011 HC RX IP 250 OP 636: Performed by: NURSE ANESTHETIST, CERTIFIED REGISTERED

## 2024-04-03 PROCEDURE — 370N000017 HC ANESTHESIA TECHNICAL FEE, PER MIN: Performed by: INTERNAL MEDICINE

## 2024-04-03 RX ORDER — ONDANSETRON 4 MG/1
4 TABLET, ORALLY DISINTEGRATING ORAL EVERY 30 MIN PRN
Status: DISCONTINUED | OUTPATIENT
Start: 2024-04-03 | End: 2024-04-03 | Stop reason: HOSPADM

## 2024-04-03 RX ORDER — LIDOCAINE HYDROCHLORIDE 20 MG/ML
INJECTION, SOLUTION INFILTRATION; PERINEURAL PRN
Status: DISCONTINUED | OUTPATIENT
Start: 2024-04-03 | End: 2024-04-03

## 2024-04-03 RX ORDER — PROPOFOL 10 MG/ML
INJECTION, EMULSION INTRAVENOUS PRN
Status: DISCONTINUED | OUTPATIENT
Start: 2024-04-03 | End: 2024-04-03

## 2024-04-03 RX ORDER — OXYCODONE HYDROCHLORIDE 5 MG/1
10 TABLET ORAL
Status: DISCONTINUED | OUTPATIENT
Start: 2024-04-03 | End: 2024-04-03 | Stop reason: HOSPADM

## 2024-04-03 RX ORDER — OXYCODONE HYDROCHLORIDE 5 MG/1
5 TABLET ORAL
Status: DISCONTINUED | OUTPATIENT
Start: 2024-04-03 | End: 2024-04-03 | Stop reason: HOSPADM

## 2024-04-03 RX ORDER — ONDANSETRON 2 MG/ML
4 INJECTION INTRAMUSCULAR; INTRAVENOUS EVERY 30 MIN PRN
Status: DISCONTINUED | OUTPATIENT
Start: 2024-04-03 | End: 2024-04-03 | Stop reason: HOSPADM

## 2024-04-03 RX ORDER — NALOXONE HYDROCHLORIDE 0.4 MG/ML
0.1 INJECTION, SOLUTION INTRAMUSCULAR; INTRAVENOUS; SUBCUTANEOUS
Status: DISCONTINUED | OUTPATIENT
Start: 2024-04-03 | End: 2024-04-03 | Stop reason: HOSPADM

## 2024-04-03 RX ORDER — SODIUM CHLORIDE, SODIUM LACTATE, POTASSIUM CHLORIDE, CALCIUM CHLORIDE 600; 310; 30; 20 MG/100ML; MG/100ML; MG/100ML; MG/100ML
INJECTION, SOLUTION INTRAVENOUS CONTINUOUS
Status: DISCONTINUED | OUTPATIENT
Start: 2024-04-03 | End: 2024-04-03 | Stop reason: HOSPADM

## 2024-04-03 RX ORDER — LIDOCAINE 40 MG/G
CREAM TOPICAL
Status: DISCONTINUED | OUTPATIENT
Start: 2024-04-03 | End: 2024-04-03 | Stop reason: HOSPADM

## 2024-04-03 RX ORDER — PROPOFOL 10 MG/ML
INJECTION, EMULSION INTRAVENOUS CONTINUOUS PRN
Status: DISCONTINUED | OUTPATIENT
Start: 2024-04-03 | End: 2024-04-03

## 2024-04-03 RX ADMIN — LIDOCAINE HYDROCHLORIDE 1 ML: 10 INJECTION, SOLUTION EPIDURAL; INFILTRATION; INTRACAUDAL; PERINEURAL at 08:33

## 2024-04-03 RX ADMIN — PROPOFOL 150 MCG/KG/MIN: 10 INJECTION, EMULSION INTRAVENOUS at 08:50

## 2024-04-03 RX ADMIN — PROPOFOL 100 MG: 10 INJECTION, EMULSION INTRAVENOUS at 08:50

## 2024-04-03 RX ADMIN — SODIUM CHLORIDE, POTASSIUM CHLORIDE, SODIUM LACTATE AND CALCIUM CHLORIDE: 600; 310; 30; 20 INJECTION, SOLUTION INTRAVENOUS at 08:33

## 2024-04-03 RX ADMIN — LIDOCAINE HYDROCHLORIDE 50 MG: 20 INJECTION, SOLUTION INFILTRATION; PERINEURAL at 08:50

## 2024-04-03 ASSESSMENT — ACTIVITIES OF DAILY LIVING (ADL)
ADLS_ACUITY_SCORE: 40
ADLS_ACUITY_SCORE: 38

## 2024-04-03 NOTE — ANESTHESIA POSTPROCEDURE EVALUATION
Patient: Joselito Abarca    Procedure: Procedure(s):  Esophagoscopy, gastroscopy, duodenoscopy (EGD), combined       Anesthesia Type:  MAC    Note:  Disposition: Outpatient   Postop Pain Control: Uneventful            Sign Out: Well controlled pain   PONV: No   Neuro/Psych: Uneventful            Sign Out: Acceptable/Baseline neuro status   Airway/Respiratory: Uneventful            Sign Out: Acceptable/Baseline resp. status   CV/Hemodynamics: Uneventful            Sign Out: Acceptable CV status; No obvious hypovolemia; No obvious fluid overload   Other NRE: NONE   DID A NON-ROUTINE EVENT OCCUR? No           Last vitals:  Vitals Value Taken Time   /92 04/03/24 0940   Temp     Pulse 60 04/03/24 0940   Resp 18 04/03/24 0928   SpO2 99 % 04/03/24 0945   Vitals shown include unfiled device data.    Electronically Signed By: CORA Tucker CRNA  April 3, 2024  1:52 PM

## 2024-04-03 NOTE — DISCHARGE INSTRUCTIONS
[facility]    Home Care Following Endoscopy          Activity:  You have just undergone an endoscopic procedure usually performed with conscious sedation.  Do not work or operate machinery (including a car) for at least 12 hours.    I encourage you to walk and attempt to pass this air as soon as possible.    Diet:  Return to the diet you were on before your procedure but eat lightly for the first 12-24 hours.  Drink plenty of water.  Resume any regular medications unless otherwise advised by your physician.  Please begin any new medication prescribed as a result of your procedure as directed by your physician.   If you had any biopsy or polyp removed please refrain from aspirin or aspirin products for 2 days.  If on Coumadin please restart as instructed by your physician.   Pain:  You may take Tylenol as needed for pain.  Expected Recovery:  You can expect some mild abdominal fullness and/or discomfort due to the air used to inflate your intestinal tract. It is also normal to have a mild sore throat after upper endoscopy.    Call Your Physician if You Have:  After Upper Endoscopy:  Shoulder, back or chest pain.  Difficulty breathing or swallowing.  Vomiting blood.      Any questions or concerns about your recovery, please call [phone] or after hours [facility] ([phone]) Nurse Advice Line.    Follow-up Care:  You did have polyps/biopsy tissue sample(s) removed.  The polyps/biopsy tissue sample(s) will be sent to pathology.    You should receive letter in your My Chart from Dr. Kruse with your results within 1-2 weeks. If you do not participate in My Chart a physical letter will come in the mail in 2-3 weeks.  Please call if you have not received a notification of your results.  If asked to return to clinic please make an appointment 1 week after your procedure.  Call [phone].

## 2024-04-03 NOTE — ANESTHESIA CARE TRANSFER NOTE
Patient: Joselito Abarca    Procedure: Procedure(s):  Esophagoscopy, gastroscopy, duodenoscopy (EGD), combined       Diagnosis: Olson's esophagus with dysplasia [K22.719]  Diagnosis Additional Information: No value filed.    Anesthesia Type:   MAC     Note:    Oropharynx: oropharynx clear of all foreign objects  Level of Consciousness: awake  Oxygen Supplementation: room air      Dentition: dentition unchanged  Vital Signs Stable: post-procedure vital signs reviewed and stable  Report to RN Given: handoff report given  Patient transferred to: Phase II  Comments: To Phase II. Report to RN.  VSS Resp status stable.  Handoff Report: Identifed the Patient, Identified the Reponsible Provider, Reviewed the pertinent medical history, Discussed the surgical course, Reviewed Intra-OP anesthesia mangement and issues during anesthesia, Set expectations for post-procedure period and Allowed opportunity for questions and acknowledgement of understanding      Vitals:  Vitals Value Taken Time   BP     Temp     Pulse     Resp     SpO2 100 % 04/03/24 0910   Vitals shown include unfiled device data.    Electronically Signed By: CORA Tucker CRNA  April 3, 2024  9:11 AM

## 2024-04-03 NOTE — LETTER
April 8, 2024      [facility]  [address]   [facility] [address]        Dear ,    We are writing to inform you of your test results.    A repeat EGD in 3--5 yrs is suggested.  Good report.  No dysplasia.      Resulted Orders   Surgical Pathology Exam   Result Value Ref Range    Case Report       Surgical Pathology Report                         Case: [id number]                                  Authorizing Provider:  Paul Kruse MD        Collected:           04/03/2024 08:56 AM          Ordering Location:     [facility]          Received:            04/03/2024 09:10 AM                                 [facility]                                                          Pathologist:           Sugey Rahman MD                                                           Specimens:   A) - Esophagus, 40cm biopsy esophagus                                                               B) - Esophagus, esophagus 38 cm                                                                     C) - Esophagus, esophagus 36 cm                                                            Final Diagnosis       A.    Esophagus, at 40 cm, biopsy-  - Columnar mucosal fragments without goblet cell-type intestinal metaplasia, dysplasia, or malignancy.    B.   Esophagus, at 38 cm, biopsy-  - Squamocolumnar junctional mucosa showing goblet cell-type intestinal metaplasia, consistent with Olson's esophagus.  - Negative for dysplasia or malignancy.    C.   Esophagus, at 36 cm, biopsy-  - Columnar and squamocolumnar junctional mucosa showing goblet cell-type intestinal metaplasia, consistent with Olson's esophagus.  - Negative for dysplasia or malignancy.      Comment       Per endoscopy report, esophageal mucosal changes consistent with long segment Olson's esophagus were identified.  Evidence of Nissen fundoplication is additionally reported.      Clinical Information       Olson's esophagus  with dysplasia      Gross Description       A(1). Esophagus, 40cm biopsy esophagus:  The specimen is received in formalin, labeled with the patient's name, medical record number and other identifying information and designated  esophagus, 40 cm . It consists of 4 tan soft tissue fragments ranging from 0.1-0.2 cm. Entirely submitted in one cassette.    B(2). Esophagus, esophagus 38 cm:  The specimen is received in formalin, labeled with the patient's name, medical record number and other identifying information and designated  esophagus, 38 cm . It consists of 3 tan soft tissue fragments ranging from 0.1-0.2 cm. Entirely submitted in one cassette.    C(3). Esophagus, esophagus 36 cm:  The specimen is received in formalin, labeled with the patient's name, medical record number and other identifying information and designated  esophagus, 36 cm . It consists of 3 tan soft tissue fragments ranging from 0.1-0.2 cm. Entirely submitted in one cassette.   (GEORGETTE Ceballos)      Microscopic Description       A, B, and C.  Microscopic examination is performed.        Performing Labs       The technical component of this testing was completed at [facility]      Case Images         If you have any questions or concerns, please call the clinic at the number listed above.       Sincerely,      Paul Kruse MD

## 2024-04-07 LAB
PATH REPORT.COMMENTS IMP SPEC: NORMAL
PATH REPORT.FINAL DX SPEC: NORMAL
PATH REPORT.GROSS SPEC: NORMAL
PATH REPORT.MICROSCOPIC SPEC OTHER STN: NORMAL
PATH REPORT.RELEVANT HX SPEC: NORMAL
PHOTO IMAGE: NORMAL

## 2024-04-30 LAB — C DIFF TOX B STL QL: NEGATIVE

## 2024-04-30 PROCEDURE — 87507 IADNA-DNA/RNA PROBE TQ 12-25: CPT | Mod: 59 | Performed by: FAMILY MEDICINE

## 2024-04-30 PROCEDURE — 87493 C DIFF AMPLIFIED PROBE: CPT | Performed by: FAMILY MEDICINE

## 2024-05-01 LAB
ADV 40+41 DNA STL QL NAA+NON-PROBE: NEGATIVE
ASTRO TYP 1-8 RNA STL QL NAA+NON-PROBE: NEGATIVE
C CAYETANENSIS DNA STL QL NAA+NON-PROBE: NEGATIVE
CAMPYLOBACTER DNA SPEC NAA+PROBE: NEGATIVE
CRYPTOSP DNA STL QL NAA+NON-PROBE: NEGATIVE
E COLI O157 DNA STL QL NAA+NON-PROBE: NORMAL
E HISTOLYT DNA STL QL NAA+NON-PROBE: NEGATIVE
EAEC ASTA GENE ISLT QL NAA+PROBE: NEGATIVE
EC STX1+STX2 GENES STL QL NAA+NON-PROBE: NEGATIVE
EPEC EAE GENE STL QL NAA+NON-PROBE: NEGATIVE
ETEC LTA+ST1A+ST1B TOX ST NAA+NON-PROBE: NEGATIVE
G LAMBLIA DNA STL QL NAA+NON-PROBE: NEGATIVE
NOROVIRUS GI+II RNA STL QL NAA+NON-PROBE: NEGATIVE
P SHIGELLOIDES DNA STL QL NAA+NON-PROBE: NEGATIVE
RVA RNA STL QL NAA+NON-PROBE: NEGATIVE
SALMONELLA SP RPOD STL QL NAA+PROBE: NEGATIVE
SAPO I+II+IV+V RNA STL QL NAA+NON-PROBE: NEGATIVE
SHIGELLA SP+EIEC IPAH ST NAA+NON-PROBE: NEGATIVE
V CHOLERAE DNA SPEC QL NAA+PROBE: NEGATIVE
VIBRIO DNA SPEC NAA+PROBE: NEGATIVE
Y ENTEROCOL DNA STL QL NAA+PROBE: NEGATIVE

## 2024-05-23 DIAGNOSIS — K22.70 LONG-SEGMENT BARRETT'S ESOPHAGUS: ICD-10-CM

## 2024-05-23 RX ORDER — OMEPRAZOLE 40 MG/1
40 CAPSULE, DELAYED RELEASE ORAL DAILY
Qty: 90 CAPSULE | Refills: 1 | Status: SHIPPED | OUTPATIENT
Start: 2024-05-23

## 2024-07-23 DIAGNOSIS — M54.6 CHRONIC MIDLINE THORACIC BACK PAIN: ICD-10-CM

## 2024-07-23 DIAGNOSIS — G89.4 CHRONIC PAIN SYNDROME: ICD-10-CM

## 2024-07-23 DIAGNOSIS — M47.812 ARTHROPATHY OF CERVICAL FACET JOINT: ICD-10-CM

## 2024-07-23 DIAGNOSIS — G89.29 CHRONIC MIDLINE THORACIC BACK PAIN: ICD-10-CM

## 2024-07-23 DIAGNOSIS — M54.2 CERVICALGIA: ICD-10-CM

## 2024-07-24 RX ORDER — TOPIRAMATE 25 MG/1
TABLET, FILM COATED ORAL
Qty: 180 TABLET | Refills: 11 | Status: SHIPPED | OUTPATIENT
Start: 2024-07-24

## 2024-09-12 NOTE — NURSING NOTE
Screening Questionnaire for Adult Immunization    Are you sick today?   No   Do you have allergies to medications, food, a vaccine component or latex?   No   Have you ever had a serious reaction after receiving a vaccination?   No   Do you have a long-term health problem with heart disease, lung disease, asthma, kidney disease, metabolic disease (e.g. diabetes), anemia, or other blood disorder?   No   Do you have cancer, leukemia, HIV/AIDS, or any other immune system problem?   No   In the past 3 months, have you taken medications that affect  your immune system, such as prednisone, other steroids, or anticancer drugs; drugs for the treatment of rheumatoid arthritis, Crohn s disease, or psoriasis; or have you had radiation treatments?   No   Have you had a seizure, or a brain or other nervous system problem?   No   During the past year, have you received a transfusion of blood or blood     products, or been given immune (gamma) globulin or antiviral drug?   No   For women: Are you pregnant or is there a chance you could become        pregnant during the next month?   No   Have you received any vaccinations in the past 4 weeks?   No     Immunization questionnaire answers were all negative.         injection of tdap given by Arlene Serrano. Patient instructed to remain in clinic for 15 minutes afterwards, and to report any adverse reaction to me immediately.       Screening performed by Arlene Serrano on 6/21/2019 at 5:09 PM.    
calm

## 2024-11-07 NOTE — NURSING NOTE
"Joselito Abarca is a 54 year old male who presents for:  Chief Complaint   Patient presents with     Neurologic Problem     Arthropathy of cervical facet         Initial Vitals:  /74   Pulse 95   Temp 97.8  F (36.6  C)   Ht 5' 7\" (1.702 m)   Wt 168 lb (76.2 kg)   SpO2 99%   BMI 26.31 kg/m   Estimated body mass index is 26.31 kg/m  as calculated from the following:    Height as of this encounter: 5' 7\" (1.702 m).    Weight as of this encounter: 168 lb (76.2 kg).. Body surface area is 1.9 meters squared. BP completed using cuff size: regular  Worst Pain (10)    Nursing Comments: Patient presents with neck pain from the base of the neck up to top of head and bilateral down to shoulders    Rashi Blackmon MA  "
Bed/Stretcher in lowest position, wheels locked, appropriate side rails in place/Call bell, personal items and telephone in reach/Instruct patient to call for assistance before getting out of bed/chair/stretcher/Non-slip footwear applied when patient is off stretcher/[place] to call system/Physically safe environment - no spills, clutter or unnecessary equipment/Purposeful proactive rounding/Room/bathroom lighting operational, light cord in reach

## 2025-01-08 ENCOUNTER — TELEPHONE (OUTPATIENT)
Dept: FAMILY MEDICINE | Facility: CLINIC | Age: 60
End: 2025-01-08
Payer: COMMERCIAL

## 2025-01-08 DIAGNOSIS — G89.4 CHRONIC PAIN SYNDROME: ICD-10-CM

## 2025-01-08 DIAGNOSIS — M54.2 CERVICALGIA: Primary | ICD-10-CM

## 2025-01-08 NOTE — TELEPHONE ENCOUNTER
I spoke to the patient and he would like to go to the pain clinic here in [place].    Pat Zaidi LPN

## 2025-01-08 NOTE — TELEPHONE ENCOUNTER
Order/Referral Request    Who is requesting: Patient    Orders being requested: Pain Clinic    Reason service is needed/diagnosis: Chronic neck pain    When are orders needed by: ASAP    Has this been discussed with Provider: Yes    Does patient have a preference on a Group/Provider/Facility? No    Does patient have an appointment scheduled?: No    Where to send orders: Picked up    Could we send this information to you in [facility] or would you prefer to receive a phone call?:   Patient would prefer a phone call   Okay to leave a detailed message?: Yes at Cell number on file:    Telephone Information:   Mobile [phone]

## 2025-01-08 NOTE — TELEPHONE ENCOUNTER
A referral through our  pain management was placed by me.  If patient is wishing this for an outside clinic, we can print that and have him pick it up as requested.  If he has a specific pain clinic in mind, I can see if that clinic is loaded into Epic and would then also be officially noted as the referral recommendation.   Electronically signed by Greg Schoen, MD

## 2025-01-09 NOTE — DISCHARGE INSTRUCTIONS
Ice 20 minutes per hour, (20 minutes on, 40 minutes off) at least 4 times a day.  Continue your current medications.  Follow-up in clinic as needed.  It was nice visiting with you again this evening.  I hope this settles down quickly for you.    Thank you for choosing [facility]. We appreciate the opportunity to meet your urgent medical needs. Please let us know if we could have done anything to make your stay more satisfying.    After discharge, please closely monitor for any new or worsening symptoms. Return to the Emergency Department if you develop any acute worsening signs or symptoms.    If you had lab work, cultures or imaging studies done during your stay, the final results may still be pending. We will call you if your plan of care needs to change. However, if you are not improving as expected, please follow up with your primary care provider or clinic.     Start any prescription medications that were prescribed to you and take them as directed.     Please see additional handouts that may be pertinent to your condition.       Physician Transition of Care Summary  Invasive Cardiovascular Lab    Procedure Date: 1/9/2025  Attending:    * Tae Blanchard - Primary  Resident/Fellow/Other Assistant: Surgeons and Role:  * No surgeons found with a matching role *    Indications:   Pre-op Diagnosis      * Angina pectoris, unstable (Multi) [I20.0]    Post-procedure diagnosis:   Post-op Diagnosis     * Angina pectoris, unstable (Multi) [I20.0]    Procedure(s):   Left Heart Cath  71149 - CO CATH PLMT L HRT & ARTS W/NJX & ANGIO IMG S&I        Procedure Findings:   75% stenosis of distal circumflex, widely patent previous diagonal stent, mild diffuse disease of right coronary artery, normal left ventricular function, successful PTCA drug-eluting stent of distal circumflex    Description of the Procedure:   Left heart catheterization coronary angiography left ventriculogram, PTCA with drug-eluting stent of the distal circumflex    Complications:   None    Stents/Implants:   Implants       Stent    Stent, Antonio Preston Rahul, 2.75 X 18rx - [id number] - Implanted        Inventory item: STENT, ANTONIO FRONTIER RAHUL, 2.75 X 18RX Model/Cat number: [id number]    : [employer] Lot number: [id number]    Device identifier: [id number]        GUDID Information       Request status Successful        Brand name: [place] Preston™ Version/Model: [id number]    Company name: [employer] MRI safety info as of 1/9/25: MR Conditional    Contains dry or latex rubber: No      GMDN P.T. name: Drug-eluting coronary artery stent, non-bioabsorbable-polymer-coated                As of 1/9/2025       Status: Implanted                              Anticoagulation/Antiplatelet Plan:   Intravenous heparin, Plavix load    Estimated Blood Loss:   * No values recorded between 1/9/2025 12:41 PM and 1/9/2025  1:17 PM *    Anesthesia: Moderate Sedation Anesthesia Staff: No anesthesia staff entered.    Any Specimen(s) Removed:   No specimens collected during this  procedure.    Disposition:   Dual antiplatelet therapy      Electronically signed by: Tae Blanchard MD, 1/9/2025 1:17 PM

## 2025-01-26 NOTE — TELEPHONE ENCOUNTER
Prior Authorization Approval    Authorization Effective Date: 2/29/2024  Authorization Expiration Date: 2/28/2025  Medication: Budesonide 3 mg-APPROVED  Reference #:     Insurance Company: [employer] - Phone [phone] Fax [phone]  Which Pharmacy is filling the prescription (Not needed for infusion/clinic administered): [facility] [address]   Pharmacy Notified: Yes  Patient Notified: Instructed pharmacy to notify patient when script is ready to /ship.      
Prior Authorization Retail Medication Request    Medication/Dose: Budesonide 3 mg  Diagnosis and ICD code (if different than what is on RX):    New/renewal/insurance change PA/secondary ins. PA:  Previously Tried and Failed:    Rationale:      Insurance   Primary: osbaldo candelario  Insurance ID: [id number]    Secondary (if applicable):  Insurance ID:      Pharmacy Information (if different than what is on RX)  Name:  [facility]  Phone:  [phone]   Fax:[phone]   
Retail Pharmacy Prior Authorization Team   Phone: [phone]    PA Initiation    Medication: BUDESONIDE 3 MG PO CPEP  Insurance Company: [employer] - Phone [phone] Fax [phone]  Pharmacy Filling the Rx: [address]   Filling Pharmacy Phone: [phone]  Filling Pharmacy Fax:    Start Date: 2/29/2024      
no

## 2025-01-30 ENCOUNTER — TELEPHONE (OUTPATIENT)
Dept: FAMILY MEDICINE | Facility: CLINIC | Age: 60
End: 2025-01-30
Payer: COMMERCIAL

## 2025-01-30 DIAGNOSIS — G89.4 CHRONIC PAIN SYNDROME: Primary | ICD-10-CM

## 2025-01-30 DIAGNOSIS — M54.2 CERVICALGIA: ICD-10-CM

## 2025-01-30 DIAGNOSIS — M50.00 INTERVERTEBRAL DISC DISORDER OF CERVICAL REGION WITH MYELOPATHY: ICD-10-CM

## 2025-01-30 NOTE — TELEPHONE ENCOUNTER
Schoen, Gregory G, MD  [facility]  Please contact patient and inquire if he has changed his mind about the pain referral or if he wishes to call them to try to schedule at [phone]?  Electronically signed by Greg Schoen, MD

## 2025-01-30 NOTE — TELEPHONE ENCOUNTER
States anxiety is bad lately and the drive is too far. Patient is wondering if there are closer locations he can be seen?  States [place] doesn't have availably until July.  Routing provider to review.       Vincent Green, VF

## 2025-01-30 NOTE — TELEPHONE ENCOUNTER
I would recommend that he check into other options for pain management.  If he is willing to go to [place], Interventional-spine would be a reasonable option for him.  I did put in a referral to them in case that is needed and he wishes to go that route.   Electronically signed by Greg Schoen, MD

## 2025-03-07 ENCOUNTER — HOSPITAL ENCOUNTER (EMERGENCY)
Facility: CLINIC | Age: 60
Discharge: HOME | End: 2025-03-07
Attending: STUDENT IN AN ORGANIZED HEALTH CARE EDUCATION/TRAINING PROGRAM | Admitting: STUDENT IN AN ORGANIZED HEALTH CARE EDUCATION/TRAINING PROGRAM
Payer: COMMERCIAL

## 2025-03-07 VITALS
OXYGEN SATURATION: 100 % | BODY MASS INDEX: 23.54 KG/M2 | SYSTOLIC BLOOD PRESSURE: 121 MMHG | DIASTOLIC BLOOD PRESSURE: 91 MMHG | HEIGHT: 67 IN | TEMPERATURE: 97.1 F | HEART RATE: 76 BPM | RESPIRATION RATE: 18 BRPM | WEIGHT: 150 LBS

## 2025-03-07 DIAGNOSIS — M54.2 NECK PAIN: ICD-10-CM

## 2025-03-07 PROCEDURE — 99283 EMERGENCY DEPT VISIT LOW MDM: CPT | Performed by: STUDENT IN AN ORGANIZED HEALTH CARE EDUCATION/TRAINING PROGRAM

## 2025-03-07 PROCEDURE — 99282 EMERGENCY DEPT VISIT SF MDM: CPT

## 2025-03-07 PROCEDURE — 64450 NJX AA&/STRD OTHER PN/BRANCH: CPT | Performed by: STUDENT IN AN ORGANIZED HEALTH CARE EDUCATION/TRAINING PROGRAM

## 2025-03-07 PROCEDURE — 64450 NJX AA&/STRD OTHER PN/BRANCH: CPT

## 2025-03-07 ASSESSMENT — COLUMBIA-SUICIDE SEVERITY RATING SCALE - C-SSRS
2. HAVE YOU ACTUALLY HAD ANY THOUGHTS OF KILLING YOURSELF IN THE PAST MONTH?: NO
1. IN THE PAST MONTH, HAVE YOU WISHED YOU WERE DEAD OR WISHED YOU COULD GO TO SLEEP AND NOT WAKE UP?: NO
6. HAVE YOU EVER DONE ANYTHING, STARTED TO DO ANYTHING, OR PREPARED TO DO ANYTHING TO END YOUR LIFE?: NO

## 2025-03-07 ASSESSMENT — ACTIVITIES OF DAILY LIVING (ADL): ADLS_ACUITY_SCORE: 44

## 2025-03-08 NOTE — DISCHARGE INSTRUCTIONS
For your chronic neck pain we encouraged you to follow-up with her primary doctor and pain management clinics.    Feel free to come to the ER for reevaluation if anything is worsening.    Please call your primary doctor in the morning to discuss your ER visit, make sure you are doing well, and to follow up on any incidental findings. Please come back to the ER for re evaluation if you feel like things are getting worse or have other concerns.

## 2025-03-08 NOTE — ED PROVIDER NOTES
History     Chief Complaint   Patient presents with    Neck Pain     HPI  Joselito Abarca is a 59 year old male who presents to the emergency department for acute on chronic neck pain.  He notes that he has been working with his primary doctor, with various pain management clinics.  States when his pain gets bad he usually comes to the ER for trigger point injections in the neck and various physicians at this facility have done a form in the past.  No new injuries.    Allergies:  Allergies   Allergen Reactions    Vicodin [Hydrocodone-Acetaminophen] Nausea     Other reaction(s): Diaphoresis, Vomiting  HEADACHE       Problem List:    Patient Active Problem List    Diagnosis Date Noted    Insomnia, unspecified type 06/28/2022     Priority: Medium    Back pain, chronic 02/21/2019     Priority: Medium    S/P laparoscopic fundoplication 02/21/2019     Priority: Medium    Long-segment Olson's esophagus 02/21/2019     Priority: Medium    Gastroesophageal reflux disease, esophagitis presence not specified 09/21/2018     Priority: Medium     IMO Regulatory Load OCT 2020      Chronic seasonal allergic rhinitis due to fungal spores 06/07/2018     Priority: Medium    Status post cervical spinal arthrodesis 11/29/2017     Priority: Medium    Chronic, continuous use of opioids 12/13/2016     Priority: Medium     Patient is followed by Gregory G. Schoen, MD for ongoing prescription of pain medication.  All refills should be approved by this provider, or covering partner.    Medication(s): Oxycodone 5 mg IR: Take 2 capsules (10 mg) by mouth every 4 hours as needed 2 caps q 4 hour prn pain up to 12 per day .   Methadone 10 mg three times daily, 90 per month  Clonazepam 0.5 mg tid, 90 per month   Clinic visit frequency required: Q 3 months     Controlled substance agreement:  Encounter-Level CSA - 2/27/15:               Controlled Substance Agreement - Scan on 3/14/2015  8:47 AM : Controlled Medication Agreement-02/27/15  (below)            Pain Clinic evaluation in the past: Yes    DIRE Total Score(s):  No flowsheet data found.    Last [facility] website verification:  10/30/2016     [email or web address]        Moderate persistent asthma without complication 11/02/2016     Priority: Medium    Nausea with vomiting 04/27/2016     Priority: Medium    Tobacco dependence syndrome 02/17/2016     Priority: Medium    Leukocytosis 02/16/2016     Priority: Medium    Degeneration of cervical intervertebral disc 01/26/2015     Priority: Medium    Persons encountering health services in other specified circumstances 09/30/2013     Priority: Medium     Overview:   Overview:   Status:  Accepted  Care Coordinator:    Melissa Behl BSN, RN, PHN  [facility] Care Coordinator  [phone]     See Letters for [facility] Care Plan  Date:  April 26, 2016      Arthrodesis status 06/15/2011     Priority: Medium    Neck pain 06/15/2011     Priority: Medium    History of arthrodesis 06/15/2011     Priority: Medium    CARDIOVASCULAR SCREENING; LDL GOAL LESS THAN 160 10/31/2010     Priority: Medium    Encounter for screening for cardiovascular disorders 10/31/2010     Priority: Medium    Intervertebral cervical disc disorder with myelopathy, cervical region 11/12/2009     Priority: Medium     Patient is followed by TIARA JIMENEZ for ongoing prescription of narcotic pain medicine.  Med: methadone 10 mg tid. Taking oxycodone up to 8 per day, 120 per month  Maximum use per month: 90  Expected duration: ongoing  Narcotic agreement on file: YES  Clinic visit recommended: Q 3 months        Intervertebral disc disorder of cervical region with myelopathy 11/12/2009     Priority: Medium     Overview:   Overview:   Patient is followed by TIARA JIMENEZ for ongoing prescription of narcotic pain medicine.  Med: methadone 10 mg tid. Taking oxycodone up to 8 per day, 120 per month  Maximum use per month: 90  Expected duration: ongoing  Narcotic agreement on file:  YES  Clinic visit recommended: Q 3 months      Constipation 03/19/2008     Priority: Medium     Problem list name updated by automated process. Provider to review      Thoracic or lumbosacral neuritis or radiculitis, unspecified 03/19/2008     Priority: Medium    Esophageal reflux 07/09/2003     Priority: Medium    Generalized anxiety disorder 07/08/2003     Priority: Medium    Alcohol abuse, in remission 07/08/2003     Priority: Medium    Nondependent alcohol abuse, in remission 07/08/2003     Priority: Medium        Past Medical History:    Past Medical History:   Diagnosis Date    Allergic rhinitis     Anxiety     Arthritis     Depressive disorder     GERD (gastroesophageal reflux disease)     Neck pain 06/15/2011    Pneumonia     Uncomplicated asthma        Past Surgical History:    Past Surgical History:   Procedure Laterality Date    BRONCHOSCOPY (RIGID OR FLEXIBLE), DIAGNOSTIC N/A 8/7/2018    Procedure: COMBINED BRONCHOSCOPY (RIGID OR FLEXIBLE), LAVAGE;  Bronchoscopy with Lavage and Transbronchial Biopsy;  Surgeon: Yung Miranda MD;  Location: [facility]    COLONOSCOPY N/A 7/15/2022    Procedure: COLONOSCOPY, WITH POLYPECTOMY AND BIOPSY;  Surgeon: Jair Casey MD;  Location:  GI    COLONOSCOPY WITH CO2 INSUFFLATION N/A 9/24/2018    Procedure: COLONOSCOPY WITH CO2 INSUFFLATION;  Colon and upper endoscopy ;  Surgeon: Devin Pelayo MD;  Location: [facility] OR    COMBINED ESOPHAGOSCOPY, GASTROSCOPY, DUODENOSCOPY (EGD) WITH CO2 INSUFFLATION N/A 9/24/2018    Procedure: COMBINED ESOPHAGOSCOPY, GASTROSCOPY, DUODENOSCOPY (EGD) WITH CO2 INSUFFLATION;  Colon and upper endoscopy ;  Surgeon: Devin Pelayo MD;  Location: [facility] OR    DISCECTOMY LUMBAR POSTERIOR MICROSCOPIC ONE LEVEL  2/21/2012    Procedure:DISCECTOMY LUMBAR POSTERIOR MICROSCOPIC ONE LEVEL; LEFT T1-T2 THORASIC HEMILAMINECTOMY MICRODISCECTOMY WITH MEXTRIX II ; Surgeon:SHARON PURI; Location:[facility] OR    DISCECTOMY, FUSION  CERVICAL ANTERIOR ONE LEVEL, COMBINED N/A 11/29/2017    Procedure: COMBINED DISCECTOMY, FUSION CERVICAL ANTERIOR ONE LEVEL;  Anterior Cervical Discectomy and Fusion Cervical Six - Cervical Seven, Exploration and Revision Cervical Four - Cervical Six with Hardware Removal;  Surgeon: Nikolas Vasques MD;  Location: [facility]    ESOPHAGEAL IMPEDENCE FUNCTION TEST WITH 24 HOUR PH GREATER THAN 1 HOUR N/A 12/12/2018    Procedure: ESOPHAGEAL IMPEDENCE FUNCTION TEST WITH 24 HOUR PH GREATER THAN 1 HOUR;  Surgeon: Atif Oconnor MD;  Location: [facility]    ESOPHAGOSCOPY, GASTROSCOPY, DUODENOSCOPY (EGD), COMBINED N/A 9/24/2018    Procedure: COMBINED ESOPHAGOSCOPY, GASTROSCOPY, DUODENOSCOPY (EGD), BIOPSY SINGLE OR MULTIPLE;;  Surgeon: Devin Pelayo MD;  Location: [facility]    ESOPHAGOSCOPY, GASTROSCOPY, DUODENOSCOPY (EGD), COMBINED N/A 7/15/2022    Procedure: ESOPHAGOGASTRODUODENOSCOPY, WITH BIOPSY;  Surgeon: Jair Casey MD;  [facility]    ESOPHAGOSCOPY, GASTROSCOPY, DUODENOSCOPY (EGD), COMBINED N/A 4/3/2024    Procedure: Esophagoscopy, gastroscopy, duodenoscopy, combined;  Surgeon: Paul Kruse MD;  [facility]: [facility]    EXPLORE SPINE, REMOVE HARDWARE, COMBINED N/A 11/29/2017    Procedure: COMBINED EXPLORE SPINE, REMOVE HARDWARE;;  Surgeon: Nikolas Vasques MD;  Location: [facility]    FUSION CERVICAL ANTERIOR TWO LEVELS  1/29/2010    HC DRAIN/INJ MAJOR JOINT/BURSA W/O US  5/5/2008    Left sacroiliac joint injection.    HC INJ EPIDURAL LUMBAR/SACRAL W/WO CONTRAST  2009    HEAD & NECK SURGERY      2013    HERNIA REPAIR      INJECT BLOCK MEDIAL BRANCH CERVICAL/THORACIC/LUMBAR Bilateral 8/26/2015    Procedure: INJECT BLOCK MEDIAL BRANCH CERVICAL / THORACIC / LUMBAR;  Surgeon: Ronald Driscoll MD;  [facility]    INJECT BLOCK MEDIAL BRANCH CERVICAL/THORACIC/LUMBAR N/A 8/8/2019    Procedure: BLOCK, NERVE, FACET JOINT, MEDIAL BRANCH, DIAGNOSTIC Bilateral Cervical 2,3,4;  Surgeon: Ronald Driscoll MD;   [facility]    INJECT BLOCK MEDIAL BRANCH CERVICAL/THORACIC/LUMBAR N/A 9/13/2019    Procedure: Medial Branch Block Bilateral Cervical 2,3, and 4;  Surgeon: Ronald Driscoll MD;  [facility]    INJECT BLOCK MEDIAL BRANCH CERVICAL/THORACIC/LUMBAR Bilateral 7/3/2020    Procedure: Third occipital and Cervical 3-4 Medial Branch Blocks bilateral;  Surgeon: Ronald Driscoll MD;  [facility]    INJECT BLOCK MEDIAL BRANCH CERVICAL/THORACIC/LUMBAR N/A 7/29/2020    Procedure: medial branch blocks cervical 3-4 and bilateral third occiptal nerves;  Surgeon: Ronald Driscoll MD;  [facility]    INJECT BLOCK MEDIAL BRANCH CERVICAL/THORACIC/LUMBAR Bilateral 11/6/2020    Procedure: Cervical 1-2 Medial Branch Block Bilateral;  Surgeon: Ronald Driscoll MD;  [facility]    INJECT EPIDURAL LUMBAR N/A 2/13/2020    Procedure: Lumber 4-5 bilateral Epidural Injection;  Surgeon: Ronald Driscoll MD;  [facility]    INJECT FACET JOINT Bilateral 5/27/2015    Procedure: INJECT FACET JOINT;  Surgeon: Ronald Driscoll MD;  [facility]    NERVE BLOCK OCCIPITAL Bilateral 7/12/2018    Procedure: NERVE BLOCK OCCIPITAL;  bilateral occipital nerve blocks;  Surgeon: Ronald Driscoll MD;  [facility]    NERVE BLOCK OCCIPITAL Bilateral 12/9/2021    Procedure: BILATERAL OCCIPITAL NERVE BLOCK;  Surgeon: Ronald Driscoll MD;  [facility]    PROCEDURE PLACEHOLDER GENERAL N/A 02/21/2019    Kellen Ward at [facility]    RADIO FREQUENCY ABLATION PULSED CERVICAL Right 10/18/2019    Procedure: CERVICAL RADIOFREQUENCY ABLATION  Cervical 2,3,4;  Surgeon: Ronald Driscoll MD;  [facility]    RADIO FREQUENCY ABLATION PULSED CERVICAL Left 11/14/2019    Procedure: Left Cervical 2, 3, 4 Radiofreqency Ablation;  Surgeon: Ronald Driscoll MD;  [facility]    RADIO FREQUENCY ABLATION PULSED CERVICAL Left 3/11/2021    Procedure: Bilateral Cervical 1 and 2 radiofrequency ablation, aborted;  Surgeon: Ronald Driscoll MD;  Location: [facility]       Family  History:    Family History   Problem Relation Age of Onset    Family History Negative No family hx of     C.A.D. No family hx of        Social History:  Marital Status:  Single [1]  Social History     Tobacco Use    Smoking status: Every Day     Current packs/day: 0.50     Average packs/day: 0.5 packs/day for 30.0 years (15.0 ttl pk-yrs)     Types: Cigars, Cigarettes    Smokeless tobacco: Former     Quit date: 2012   Vaping Use    Vaping status: Never Used   Substance Use Topics    Alcohol use: No     Alcohol/week: 0.0 standard drinks of alcohol     Comment: HX OF ABUSE-IN REMISSION    Drug use: No        Medications:    albuterol (PROAIR HFA/PROVENTIL HFA/VENTOLIN HFA) 108 (90 Base) MCG/ACT inhaler  aspirin-acetaminophen-caffeine (EXCEDRIN MIGRAINE) 250-250-65 MG tablet  budesonide (ENTOCORT EC) 3 MG EC capsule  clonazePAM (KLONOPIN) 0.5 MG tablet  DULoxetine (CYMBALTA) 60 MG capsule  fluticasone (FLONASE) 50 MCG/ACT nasal spray  fluticasone (FLOVENT HFA) 220 MCG/ACT inhaler  ipratropium - albuterol 0.5 mg/2.5 mg/3 mL (DUONEB) 0.5-2.5 (3) MG/3ML neb solution  methylPREDNISolone (MEDROL DOSEPAK) 4 MG tablet therapy pack  naloxone (NARCAN) 4 MG/0.1ML nasal spray  omeprazole (PRILOSEC) 40 MG DR capsule  order for DME  oxyCODONE IR (ROXICODONE) 10 MG tablet  propranolol (INDERAL) 20 MG tablet  sildenafil (VIAGRA) 100 MG tablet  SUBOXONE 2-0.5 MG per film  sucralfate (CARAFATE) 1 GM tablet  topiramate (TOPAMAX) 25 MG tablet  traZODone (DESYREL) 150 MG tablet  traZODone (DESYREL) 50 MG tablet  zolpidem (AMBIEN) 10 MG tablet          Review of Systems  Gen: No fevers, no unintentional weight changes  Head and neck: No headache  Eye: No eye pain, no vision changes  Respiratory: No shortness of breath, no cough  Cardiovascular: No chest pain, no palpitations  Abdominal: No vomiting, no constipation, no diarrhea  Urinary: No dysuria, no hematuria  Musculoskeletal: No joint redness, no trauma  Neurologic: No confusion, no  "weakness, no sensation changes  Psychiatric: No suicidal ideation, no homicidal ideation    Physical Exam   BP: (!) 121/91  Pulse: 76  Temp: 97.1  F (36.2  C)  Resp: 18  Height: 170.2 cm (5' 7\")  Weight: 68 kg (150 lb)  SpO2: 100 %      Physical Exam  General: Alert, awake, no distress  Head: Normocephalic, atraumatic  Eyes: Grossly intact extraocular movements, conjunctiva clear, pupils equal   Mouth: Mucous membranes moist  Neck: Supple, grossly full range of motion, no observable masses, nonspecific tenderness to deep palpation paraspinal bilateral in the neck  Respiratory: No distress,  clear to auscultation bilaterally  Cardiac: S1 and S2 cardiac sounds appreciated, normal rate, no edema  Abdominal: Soft, not distended, no tenderness appreciated  Neurologic: Normal level of consciousness, speech is clear and appropriate, moving all extremities grossly normal and symmetric  Musculoskeletal: Intact neurovascular exam the bilateral upper and lower extremities.  No inflammatory changes of the neck.  Skin: No gross rashes or lesions  Psych: Normal mood and appropriate for situation        ED Course        [facility]    Nerve Block    Date/Time: 3/7/2025 9:00 PM    Performed by: Haja Oneill MD  Authorized by: Haja Oneill MD    Risks, benefits and alternatives discussed.      INDICATIONS     Indications:  Pain relief    LOCATION      Nerve block body site: Cervical spine.    Laterality:  Bilateral    PRE PROCEDURE DETAILS      Skin preparation:  2% chlorhexidine    PROCEDURE DETAILS (see MAR for exact dosages)      Block needle gauge:  30 G    Anesthetic injected:  Bupivacaine 0.5% w/o epi    Steroid injected:  None    Additive injected:  None    Injection procedure:  Anatomic landmarks identified, negative aspiration for blood, anatomic landmarks palpated and introduced needle    PROCEDURE    Patient Tolerance:  Patient tolerated the procedure well with no immediate " complications                    No results found. However, due to the size of the patient record, not all encounters were searched. Please check Results Review for a complete set of results.    Medications - No data to display    Assessments & Plan (with Medical Decision Making)   Vital signs are reassuring.  Reviewed previous ER documentation.  We discussed various treatment options including medications versus analgesic injections.  Trigger point injections do not fall in the category of emergent procedures.  Discussed that we can do this more an elective basis.  Discussed that the pain management clinic would be more specialized in nerve blocks and trigger injections.  He still wishes to pursue today.  We did have a full consent conversation.  I injected a total of 0.5 cc 0.5% bupivacaine in each spot, totaling 6, 3 on each side of cervical spine.  We monitored him after the injections, does not have any focal neurologic deficits or other particular concerns after the procedure itself.  He appears stable to be discharged from the ER setting.  Encouraged to follow-up with his primary doctor and the pain management clinic.    I have reviewed the nursing notes.    I have reviewed the findings, diagnosis, plan and need for follow up with the patient.          New Prescriptions    No medications on file       Final diagnoses:   Neck pain       3/7/2025   [facility] EMERGENCY DEPT       Haja Oneill MD  03/07/25 [id number]

## 2025-03-22 ENCOUNTER — HEALTH MAINTENANCE LETTER (OUTPATIENT)
Age: 60
End: 2025-03-22

## 2025-04-01 DIAGNOSIS — K22.70 LONG-SEGMENT BARRETT'S ESOPHAGUS: ICD-10-CM

## 2025-04-02 RX ORDER — OMEPRAZOLE 40 MG/1
40 CAPSULE, DELAYED RELEASE ORAL DAILY
Qty: 90 CAPSULE | Refills: 1 | Status: SHIPPED | OUTPATIENT
Start: 2025-04-02

## 2025-08-31 ENCOUNTER — MYC REFILL (OUTPATIENT)
Dept: FAMILY MEDICINE | Facility: CLINIC | Age: 60
End: 2025-08-31
Payer: COMMERCIAL

## 2025-08-31 DIAGNOSIS — K52.832 LYMPHOCYTIC COLITIS: ICD-10-CM

## 2025-09-02 RX ORDER — BUDESONIDE 3 MG/1
9 CAPSULE, COATED PELLETS ORAL EVERY MORNING
Qty: 90 CAPSULE | Refills: 0 | Status: SHIPPED | OUTPATIENT
Start: 2025-09-02

## (undated) DEVICE — DRAPE C-ARM

## (undated) DEVICE — TUBING SUCTION 12"X1/4" N612

## (undated) DEVICE — SYR 05ML LL W/O NDL

## (undated) DEVICE — SU VICRYL 2-0 CT-2 CR 8X18" J726D

## (undated) DEVICE — SYR 03ML LL W/O NDL

## (undated) DEVICE — TRAY PROCEDURE SUPPORT PAIN MANAGEMENT 332114

## (undated) DEVICE — BASIN SET MINOR DISP

## (undated) DEVICE — PREP CHLORAPREP 26ML TINTED ORANGE  260815

## (undated) DEVICE — NDL COUNTER 20CT 31142493

## (undated) DEVICE — GLOVE PROTEXIS W/NEU-THERA 7.5  2D73TE75

## (undated) DEVICE — DRAPE C-ARM 60X42" 1013

## (undated) DEVICE — DRAPE POUCH INSTRUMENT 1018

## (undated) DEVICE — NDL SPINAL 18GA 3.5" 405184

## (undated) DEVICE — NDL ECLIPSE 18GA 1.5"

## (undated) DEVICE — SYR 10ML LL W/O NDL

## (undated) DEVICE — Device

## (undated) DEVICE — DRAPE SHEET REV FOLD 3/4 9349

## (undated) DEVICE — GLOVE BIOGEL PI SZ 7.5 40875

## (undated) DEVICE — DRAPE MAYO STAND 23X54 8337

## (undated) DEVICE — KIT ENDO TURNOVER/PROCEDURE CARRY-ON 101822

## (undated) DEVICE — ESU ELEC BLADE 2.75" COATED/INSULATED E1455

## (undated) DEVICE — BLADE CLIPPER 4406

## (undated) DEVICE — SOL WATER IRRIG 1000ML BOTTLE 2F7114

## (undated) DEVICE — ESU GROUND PAD UNIVERSAL W/O CORD

## (undated) DEVICE — DRAPE MICRO ZEISS OPMI 137X381CM 306070-0000-000

## (undated) DEVICE — SU VICRYL 3-0 SH 27" UND J416H

## (undated) DEVICE — SYR 10ML FINGER CONTROL W/O NDL 309695

## (undated) DEVICE — DRSG GAUZE 4X4" 3033

## (undated) DEVICE — GLOVE EXAM NITRILE LG

## (undated) DEVICE — GLOVE PROTEXIS W/NEU-THERA 8.5  2D73TE85

## (undated) DEVICE — DRAPE STERI TOWEL SM 1000

## (undated) DEVICE — SOL ISOPROPYL ALCOHOL USP 70% 16OZ  NDC10565-002-16 D0022

## (undated) DEVICE — SYR BULB IRRIG DOVER 60 ML LATEX FREE 67000

## (undated) DEVICE — SOL WATER IRRIG 1000ML BOTTLE 07139-09

## (undated) DEVICE — DRAIN JACKSON PRATT 07MM FLAT SU130-1310

## (undated) DEVICE — GLOVE PROTEXIS BLUE W/NEU-THERA 8.5  2D73EB85

## (undated) DEVICE — BUR MATCHSTICK 3MM ANSPACH L-8NS-G1

## (undated) DEVICE — LUBRICATING JELLY 4.25OZ

## (undated) DEVICE — SPONGE KITTNER 31001010

## (undated) DEVICE — DRSG BANDAID 1X3" FABRIC

## (undated) DEVICE — ADH FLOSEAL W/HUMAN THROMBIN 5ML 1501825

## (undated) DEVICE — BLADE KNIFE SURG 10 371110

## (undated) DEVICE — PACK SPINE SM CUSTOM SNE15SSFSK

## (undated) DEVICE — TUBING IV EXTENSION SET 34"

## (undated) DEVICE — DRAIN JACKSON PRATT RESERVOIR 100ML SU130-1305

## (undated) DEVICE — ADH LIQUID MASTISOL TOPICAL VIAL 2-3ML 0523-48

## (undated) DEVICE — STOCKING SLEEVE COMPRESSION CALF MED

## (undated) DEVICE — BONE WAX 2.5GM W31G

## (undated) DEVICE — NDL SPINAL 22GA 5" QUINCKE 405148

## (undated) DEVICE — GLOVE PROTEXIS W/NEU-THERA 7.0  2D73TE70

## (undated) DEVICE — NDL SPINAL 25GA 3" QUINCKE 405170

## (undated) DEVICE — SU MONOCRYL 4-0 PS-2 18" UND Y496G

## (undated) DEVICE — ESU CANNULA VENOM RF 18GA 10X100MM LF 0406840125

## (undated) DEVICE — GLOVE PROTEXIS BLUE W/NEU-THERA 8.0  2D73EB80

## (undated) DEVICE — DRSG STERI STRIP 1/2X4" R1547

## (undated) DEVICE — NDL SPINAL 25GA 3.5" QUINCKE 405180

## (undated) RX ORDER — PROPOFOL 10 MG/ML
INJECTION, EMULSION INTRAVENOUS
Status: DISPENSED
Start: 2022-07-15

## (undated) RX ORDER — ALBUTEROL SULFATE 0.83 MG/ML
SOLUTION RESPIRATORY (INHALATION)
Status: DISPENSED
Start: 2018-08-07

## (undated) RX ORDER — GLYCOPYRROLATE 0.2 MG/ML
INJECTION INTRAMUSCULAR; INTRAVENOUS
Status: DISPENSED
Start: 2022-07-15

## (undated) RX ORDER — DEXMEDETOMIDINE HYDROCHLORIDE 100 UG/ML
INJECTION, SOLUTION INTRAVENOUS
Status: DISPENSED
Start: 2022-07-15

## (undated) RX ORDER — ATROPINE SULFATE 0.1 MG/ML
INJECTION INTRAVENOUS
Status: DISPENSED
Start: 2020-11-06

## (undated) RX ORDER — FENTANYL CITRATE 50 UG/ML
INJECTION, SOLUTION INTRAMUSCULAR; INTRAVENOUS
Status: DISPENSED
Start: 2018-08-07

## (undated) RX ORDER — ATROPINE SULFATE 0.1 MG/ML
INJECTION INTRAVENOUS
Status: DISPENSED
Start: 2019-08-08

## (undated) RX ORDER — FENTANYL CITRATE 50 UG/ML
INJECTION, SOLUTION INTRAMUSCULAR; INTRAVENOUS
Status: DISPENSED
Start: 2018-09-24

## (undated) RX ORDER — FENTANYL CITRATE 50 UG/ML
INJECTION, SOLUTION INTRAMUSCULAR; INTRAVENOUS
Status: DISPENSED
Start: 2019-11-14

## (undated) RX ORDER — DEXAMETHASONE SODIUM PHOSPHATE 10 MG/ML
INJECTION INTRAMUSCULAR; INTRAVENOUS
Status: DISPENSED
Start: 2017-11-29

## (undated) RX ORDER — FENTANYL CITRATE-0.9 % NACL/PF 10 MCG/ML
PLASTIC BAG, INJECTION (ML) INTRAVENOUS
Status: DISPENSED
Start: 2022-07-15

## (undated) RX ORDER — EPHEDRINE SULFATE 50 MG/ML
INJECTION, SOLUTION INTRAMUSCULAR; INTRAVENOUS; SUBCUTANEOUS
Status: DISPENSED
Start: 2020-11-06

## (undated) RX ORDER — ONDANSETRON 2 MG/ML
INJECTION INTRAMUSCULAR; INTRAVENOUS
Status: DISPENSED
Start: 2018-08-07

## (undated) RX ORDER — FENTANYL CITRATE 50 UG/ML
INJECTION, SOLUTION INTRAMUSCULAR; INTRAVENOUS
Status: DISPENSED
Start: 2017-11-29

## (undated) RX ORDER — BUPIVACAINE HYDROCHLORIDE 2.5 MG/ML
INJECTION, SOLUTION EPIDURAL; INFILTRATION; INTRACAUDAL
Status: DISPENSED
Start: 2017-11-29

## (undated) RX ORDER — DIPHENHYDRAMINE HYDROCHLORIDE 50 MG/ML
INJECTION INTRAMUSCULAR; INTRAVENOUS
Status: DISPENSED
Start: 2018-09-24

## (undated) RX ORDER — EPHEDRINE SULFATE 50 MG/ML
INJECTION, SOLUTION INTRAMUSCULAR; INTRAVENOUS; SUBCUTANEOUS
Status: DISPENSED
Start: 2017-11-29

## (undated) RX ORDER — LIDOCAINE HYDROCHLORIDE 20 MG/ML
INJECTION, SOLUTION EPIDURAL; INFILTRATION; INTRACAUDAL; PERINEURAL
Status: DISPENSED
Start: 2018-07-12

## (undated) RX ORDER — KETAMINE HYDROCHLORIDE 10 MG/ML
INJECTION, SOLUTION INTRAMUSCULAR; INTRAVENOUS
Status: DISPENSED
Start: 2017-11-29

## (undated) RX ORDER — LIDOCAINE HYDROCHLORIDE 20 MG/ML
INJECTION, SOLUTION EPIDURAL; INFILTRATION; INTRACAUDAL; PERINEURAL
Status: DISPENSED
Start: 2022-07-15

## (undated) RX ORDER — HYDROMORPHONE HYDROCHLORIDE 1 MG/ML
INJECTION, SOLUTION INTRAMUSCULAR; INTRAVENOUS; SUBCUTANEOUS
Status: DISPENSED
Start: 2017-11-29

## (undated) RX ORDER — DEXMEDETOMIDINE HYDROCHLORIDE 100 UG/ML
INJECTION, SOLUTION INTRAVENOUS
Status: DISPENSED
Start: 2017-11-29

## (undated) RX ORDER — KETOROLAC TROMETHAMINE 30 MG/ML
INJECTION, SOLUTION INTRAMUSCULAR; INTRAVENOUS
Status: DISPENSED
Start: 2018-09-24

## (undated) RX ORDER — PROPOFOL 10 MG/ML
INJECTION, EMULSION INTRAVENOUS
Status: DISPENSED
Start: 2018-07-12

## (undated) RX ORDER — PROPOFOL 10 MG/ML
INJECTION, EMULSION INTRAVENOUS
Status: DISPENSED
Start: 2017-11-29

## (undated) RX ORDER — LIDOCAINE HYDROCHLORIDE AND EPINEPHRINE 10; 10 MG/ML; UG/ML
INJECTION, SOLUTION INFILTRATION; PERINEURAL
Status: DISPENSED
Start: 2018-08-07

## (undated) RX ORDER — CEFAZOLIN SODIUM 1 G/3ML
INJECTION, POWDER, FOR SOLUTION INTRAMUSCULAR; INTRAVENOUS
Status: DISPENSED
Start: 2017-11-29

## (undated) RX ORDER — SIMETHICONE 40MG/0.6ML
SUSPENSION, DROPS(FINAL DOSAGE FORM)(ML) ORAL
Status: DISPENSED
Start: 2018-09-24

## (undated) RX ORDER — LIDOCAINE HYDROCHLORIDE 20 MG/ML
INJECTION, SOLUTION EPIDURAL; INFILTRATION; INTRACAUDAL; PERINEURAL
Status: DISPENSED
Start: 2017-11-29

## (undated) RX ORDER — ACETAMINOPHEN 10 MG/ML
INJECTION, SOLUTION INTRAVENOUS
Status: DISPENSED
Start: 2017-11-29

## (undated) RX ORDER — ONDANSETRON 2 MG/ML
INJECTION INTRAMUSCULAR; INTRAVENOUS
Status: DISPENSED
Start: 2017-11-29

## (undated) RX ORDER — LIDOCAINE HYDROCHLORIDE 10 MG/ML
INJECTION, SOLUTION EPIDURAL; INFILTRATION; INTRACAUDAL; PERINEURAL
Status: DISPENSED
Start: 2018-08-07

## (undated) RX ORDER — HYDRALAZINE HYDROCHLORIDE 20 MG/ML
INJECTION INTRAMUSCULAR; INTRAVENOUS
Status: DISPENSED
Start: 2022-07-15